# Patient Record
Sex: MALE | Race: WHITE | NOT HISPANIC OR LATINO | Employment: OTHER | URBAN - METROPOLITAN AREA
[De-identification: names, ages, dates, MRNs, and addresses within clinical notes are randomized per-mention and may not be internally consistent; named-entity substitution may affect disease eponyms.]

---

## 2017-02-07 ENCOUNTER — ALLSCRIPTS OFFICE VISIT (OUTPATIENT)
Dept: OTHER | Facility: OTHER | Age: 82
End: 2017-02-07

## 2017-02-07 LAB
FLUAV AG SPEC QL IA: NEGATIVE
INFLUENZA B AG (HISTORICAL): NEGATIVE
S PYO AG THROAT QL: NEGATIVE

## 2017-02-23 ENCOUNTER — GENERIC CONVERSION - ENCOUNTER (OUTPATIENT)
Dept: OTHER | Facility: OTHER | Age: 82
End: 2017-02-23

## 2017-04-27 ENCOUNTER — GENERIC CONVERSION - ENCOUNTER (OUTPATIENT)
Dept: OTHER | Facility: OTHER | Age: 82
End: 2017-04-27

## 2017-04-28 ENCOUNTER — LAB (OUTPATIENT)
Dept: LAB | Facility: HOSPITAL | Age: 82
End: 2017-04-28
Attending: INTERNAL MEDICINE
Payer: MEDICARE

## 2017-04-28 ENCOUNTER — TRANSCRIBE ORDERS (OUTPATIENT)
Dept: LAB | Facility: HOSPITAL | Age: 82
End: 2017-04-28

## 2017-04-28 DIAGNOSIS — R06.02 SHORTNESS OF BREATH: Primary | ICD-10-CM

## 2017-04-28 DIAGNOSIS — R06.02 SHORTNESS OF BREATH: ICD-10-CM

## 2017-04-28 LAB
ALBUMIN SERPL BCP-MCNC: 3.9 G/DL (ref 3.5–5)
ALP SERPL-CCNC: 109 U/L (ref 46–116)
ALT SERPL W P-5'-P-CCNC: 28 U/L (ref 12–78)
ANION GAP SERPL CALCULATED.3IONS-SCNC: 8 MMOL/L (ref 4–13)
AST SERPL W P-5'-P-CCNC: 18 U/L (ref 5–45)
BASOPHILS # BLD AUTO: 0.03 THOUSANDS/ΜL (ref 0–0.1)
BASOPHILS NFR BLD AUTO: 0 % (ref 0–1)
BILIRUB SERPL-MCNC: 0.94 MG/DL (ref 0.2–1)
BUN SERPL-MCNC: 13 MG/DL (ref 5–25)
CALCIUM SERPL-MCNC: 8.8 MG/DL (ref 8.3–10.1)
CHLORIDE SERPL-SCNC: 106 MMOL/L (ref 100–108)
CO2 SERPL-SCNC: 27 MMOL/L (ref 21–32)
CREAT SERPL-MCNC: 0.92 MG/DL (ref 0.6–1.3)
DEPRECATED D DIMER PPP: 305 NG/ML (FEU) (ref 0–424)
EOSINOPHIL # BLD AUTO: 0.2 THOUSAND/ΜL (ref 0–0.61)
EOSINOPHIL NFR BLD AUTO: 3 % (ref 0–6)
ERYTHROCYTE [DISTWIDTH] IN BLOOD BY AUTOMATED COUNT: 14.5 % (ref 11.6–15.1)
ERYTHROCYTE [SEDIMENTATION RATE] IN BLOOD: 12 MM/HOUR (ref 0–10)
GFR SERPL CREATININE-BSD FRML MDRD: >60 ML/MIN/1.73SQ M
GLUCOSE P FAST SERPL-MCNC: 110 MG/DL (ref 65–99)
HCT VFR BLD AUTO: 39.4 % (ref 36.5–49.3)
HGB BLD-MCNC: 13.3 G/DL (ref 12–17)
LYMPHOCYTES # BLD AUTO: 2.17 THOUSANDS/ΜL (ref 0.6–4.47)
LYMPHOCYTES NFR BLD AUTO: 29 % (ref 14–44)
MCH RBC QN AUTO: 29.4 PG (ref 26.8–34.3)
MCHC RBC AUTO-ENTMCNC: 33.8 G/DL (ref 31.4–37.4)
MCV RBC AUTO: 87 FL (ref 82–98)
MONOCYTES # BLD AUTO: 0.62 THOUSAND/ΜL (ref 0.17–1.22)
MONOCYTES NFR BLD AUTO: 8 % (ref 4–12)
NEUTROPHILS # BLD AUTO: 4.36 THOUSANDS/ΜL (ref 1.85–7.62)
NEUTS SEG NFR BLD AUTO: 60 % (ref 43–75)
NRBC BLD AUTO-RTO: 0 /100 WBCS
PLATELET # BLD AUTO: 273 THOUSANDS/UL (ref 149–390)
PMV BLD AUTO: 9.6 FL (ref 8.9–12.7)
POTASSIUM SERPL-SCNC: 3.7 MMOL/L (ref 3.5–5.3)
PROT SERPL-MCNC: 7.3 G/DL (ref 6.4–8.2)
RBC # BLD AUTO: 4.53 MILLION/UL (ref 3.88–5.62)
SODIUM SERPL-SCNC: 141 MMOL/L (ref 136–145)
TSH SERPL DL<=0.05 MIU/L-ACNC: 2.58 UIU/ML (ref 0.36–3.74)
WBC # BLD AUTO: 7.41 THOUSAND/UL (ref 4.31–10.16)

## 2017-04-28 PROCEDURE — 85025 COMPLETE CBC W/AUTO DIFF WBC: CPT

## 2017-04-28 PROCEDURE — 36415 COLL VENOUS BLD VENIPUNCTURE: CPT

## 2017-04-28 PROCEDURE — 85652 RBC SED RATE AUTOMATED: CPT

## 2017-04-28 PROCEDURE — 80053 COMPREHEN METABOLIC PANEL: CPT

## 2017-04-28 PROCEDURE — 85379 FIBRIN DEGRADATION QUANT: CPT

## 2017-04-28 PROCEDURE — 84443 ASSAY THYROID STIM HORMONE: CPT

## 2017-09-28 ENCOUNTER — GENERIC CONVERSION - ENCOUNTER (OUTPATIENT)
Dept: OTHER | Facility: OTHER | Age: 82
End: 2017-09-28

## 2017-10-04 ENCOUNTER — APPOINTMENT (OUTPATIENT)
Dept: PHYSICAL THERAPY | Age: 82
End: 2017-10-04
Payer: MEDICARE

## 2017-10-04 PROCEDURE — 97162 PT EVAL MOD COMPLEX 30 MIN: CPT

## 2017-10-04 PROCEDURE — G8979 MOBILITY GOAL STATUS: HCPCS

## 2017-10-04 PROCEDURE — G8978 MOBILITY CURRENT STATUS: HCPCS

## 2017-10-09 ENCOUNTER — APPOINTMENT (OUTPATIENT)
Dept: PHYSICAL THERAPY | Age: 82
End: 2017-10-09
Payer: MEDICARE

## 2017-10-09 PROCEDURE — 97110 THERAPEUTIC EXERCISES: CPT

## 2017-10-11 ENCOUNTER — APPOINTMENT (OUTPATIENT)
Dept: PHYSICAL THERAPY | Age: 82
End: 2017-10-11
Payer: MEDICARE

## 2017-10-11 PROCEDURE — 97110 THERAPEUTIC EXERCISES: CPT

## 2017-10-17 ENCOUNTER — APPOINTMENT (OUTPATIENT)
Dept: PHYSICAL THERAPY | Age: 82
End: 2017-10-17
Payer: MEDICARE

## 2017-10-17 PROCEDURE — 97112 NEUROMUSCULAR REEDUCATION: CPT

## 2017-10-17 PROCEDURE — 97110 THERAPEUTIC EXERCISES: CPT

## 2017-10-19 ENCOUNTER — APPOINTMENT (OUTPATIENT)
Dept: PHYSICAL THERAPY | Age: 82
End: 2017-10-19
Payer: MEDICARE

## 2017-10-19 PROCEDURE — 97110 THERAPEUTIC EXERCISES: CPT

## 2017-10-24 ENCOUNTER — APPOINTMENT (OUTPATIENT)
Dept: PHYSICAL THERAPY | Age: 82
End: 2017-10-24
Payer: MEDICARE

## 2017-10-24 PROCEDURE — 97110 THERAPEUTIC EXERCISES: CPT

## 2017-10-26 ENCOUNTER — APPOINTMENT (OUTPATIENT)
Dept: PHYSICAL THERAPY | Age: 82
End: 2017-10-26
Payer: MEDICARE

## 2017-10-26 PROCEDURE — 97110 THERAPEUTIC EXERCISES: CPT

## 2017-10-31 ENCOUNTER — APPOINTMENT (OUTPATIENT)
Dept: PHYSICAL THERAPY | Age: 82
End: 2017-10-31
Payer: MEDICARE

## 2017-11-02 ENCOUNTER — ALLSCRIPTS OFFICE VISIT (OUTPATIENT)
Dept: OTHER | Facility: OTHER | Age: 82
End: 2017-11-02

## 2017-11-02 ENCOUNTER — APPOINTMENT (OUTPATIENT)
Dept: PHYSICAL THERAPY | Age: 82
End: 2017-11-02
Payer: MEDICARE

## 2017-11-02 DIAGNOSIS — E78.00 PURE HYPERCHOLESTEROLEMIA: ICD-10-CM

## 2017-11-02 DIAGNOSIS — Z12.5 ENCOUNTER FOR SCREENING FOR MALIGNANT NEOPLASM OF PROSTATE: ICD-10-CM

## 2017-11-03 NOTE — PROGRESS NOTES
Assessment  1  Back pain (724 5) (M54 9)   2  Benign essential hypertension (401 1) (I10)   3  Atrial fibrillation (427 31) (I48 91)   4  Pure hypercholesterolemia (272 0) (E78 00)    Plan  Advance directive discussed with patient    · We recommend that you create an advance directive ; Status:Complete - Retrospective  By Protocol Authorization;   Done: [id number]  Encounter for prostate cancer screening, Pure hypercholesterolemia    · (1) PSA (SCREEN) (Dx V76 44 Screen for Prostate Cancer); Status:Active; Requested  for:02Nov2017;   Pure hypercholesterolemia    · (1) CBC/PLT/DIFF; Status:Active; Requested IGY:[id number];    · (1) COMPREHENSIVE METABOLIC PANEL; Status:Active; Requested BXJ:[id number];    · (1) LIPID PANEL, FASTING; Status:Active; Requested for:02Nov2017;    · (1) TSH; Status:Active; Requested NVC:[id number];   Screening for genitourinary condition    · *VB - Urinary Incontinence Screen (Dx Z13 89 Screen for UI); Status:Complete -  Retrospective By Protocol Authorization;   Done: 70ZQO6570 10:18AM    Discussion/Summary    Back pain: Looks like lumbar strain, can try heating pad, stretching, and continue with physical therapy  No tenderness over spine or sacroiliac joint  Controlled, continue medications  Rate is controlled, continue meds  fasting glucose: Watch diet, and will check hemoglobin A1c  Continue statin, and will check lipid panel  Avoid saturated fats and move towards a more plant based diet  Chief Complaint  The patient presents today with back pain      History of Present Illness  HPI: Back pain: pt has been doing physical therapy and developed more back pain  Pain located mostly left lower back, no radiation down legs, no rash in area  No change in urine or bowels  Pain does not wake him up at night, no fevers chills sweats  No bleeding problems, no palpitations  patient reports compliance with med(s) and no adverse side effects   No lightheadedness, no chest pain, no shortness of breath, no dry cough  patient tolerating med and denies any significant myalgias  fasting glucose: Patient has been trying did decrease sweets  Review of Systems    Constitutional: no fever,-- no chills-- and-- not feeling tired  Cardiovascular: the heart rate was not slow,-- no chest pain,-- the heart rate was not fast,-- no palpitations-- and-- no lower extremity edema  Respiratory: no shortness of breath,-- no cough,-- no wheezing-- and-- no shortness of breath during exertion  Gastrointestinal: no constipation-- and-- no diarrhea  Genitourinary: no dysuria  Musculoskeletal: arthralgias, but-- as noted in HPI  Integumentary: no rashes  Other Symptoms: No anxiety or depression  Active Problems  1  Acute upper respiratory infection (465 9) (J06 9)   2  Advance directive discussed with patient (V65 49) (Z71 89)   3  Atrial fibrillation (427 31) (I48 91)   4  Back pain (724 5) (M54 9)   5  Benign essential hypertension (401 1) (I10)   6  Esophageal reflux (530 81) (K21 9)   7  Pure hypercholesterolemia (272 0) (E78 00)   8  Screening for genitourinary condition (V81 6) (Z13 89)    Social History   ·    · Never smoked   · Social alcohol use (Z78 9)  The social history was reviewed and updated today  Current Meds   1  Azithromycin 250 MG Oral Tablet; TAKE 2 TABLETS ON DAY 1 THEN TAKE 1 TABLET A   DAY FOR 4 DAYS; Therapy: 41NOC8431 to (Last Rx:09Jun2017)  Requested for: 62YWM5664 Ordered   2  Coumadin 5 MG Oral Tablet; TAKE 1 TABLET DAILY AS DIRECTED; Therapy: (Recorded:86Bjq4785) to Recorded   3  Metoprolol Succinate ER 50 MG Oral Tablet Extended Release 24 Hour; Therapy: (Recorded:52Fkc4030) to Recorded   4  Oseltamivir Phosphate 75 MG Oral Capsule; TAKE 1 CAPSULE TWICE DAILY; Therapy: 95DIE3227 to (Evaluate:96Fju5934)  Requested for: 32KUY4625; Last   Rx:[id number] Ordered   5   Telmisartan-HCTZ 40-12 5 MG Oral Tablet; TAKE 1 TABLET DAILY  Requested for:   12Tjj5284; Last Rx: 25AUY2756 Ordered   6  Viagra 50 MG Oral Tablet; TAKE 1 TABLET  As Directed; Therapy: (Recorded:42Crq5572) to Recorded    The medication list was reviewed and updated today  Allergies  1  Penicillins    Vitals   Recorded: 60RBZ4989 10:24AM   Temperature 97 6 F, Oral   Heart Rate 46   Respiration 15   Systolic 596, Sitting   Diastolic 80, Sitting   Weight 253 lb    BMI Calculated 32 93   BSA Calculated 2 39   O2 Saturation 97     Physical Exam    Constitutional   General appearance: No acute distress, well appearing and well nourished  Head and Face   Head and face: Normal     Eyes   Conjunctiva and lids: No erythema, swelling or discharge  Ears, Nose, Mouth, and Throat   External inspection of ears and nose: Normal     Neck   Neck: Supple, symmetric, trachea midline, no masses  Thyroid: Normal, no thyromegaly  Pulmonary   Respiratory effort: No increased work of breathing or signs of respiratory distress  Auscultation of lungs: Clear to auscultation  Cardiovascular   Auscultation of heart: Abnormal   The heart rate was normal  The rhythm was irregularly irregular  Soft systolic murmur  Carotid pulses: 2+ bilaterally  Examination of extremities for edema and/or varicosities: Normal     Abdomen   Abdomen: Non-tender, no masses  Liver and spleen: No hepatomegaly or splenomegaly  Musculoskeletal   Inspection/palpation of joints, bones, and muscles: Normal  -- No tenderness with palpation over spine or sacroiliac joints  Range of motion: Abnormal  -- Straight leg raises are okay, patient has difficulty with the JOSE test due to in flexibility, good range of motion of hips in the hip joints passively  Neurologic   Cortical function: Normal mental status  Psychiatric   Judgment and insight: Normal     Orientation to person, place and time: Normal     Recent and remote memory: Intact      Mood and affect: Normal        Results/Data  *VB - Urinary Incontinence Screen (Dx Z13 89 Screen for UI) 35MNO3620 10:18AM Mariam Dumont     Test Name Result Flag Reference   Urinary Incontinence Assessment [id number]         Signatures   Electronically signed by : DEANDRE Evans ; Nov 2 2017 10:40AM EST                       (Author)

## 2017-11-07 ENCOUNTER — APPOINTMENT (OUTPATIENT)
Dept: PHYSICAL THERAPY | Age: 82
End: 2017-11-07
Payer: MEDICARE

## 2017-11-07 PROCEDURE — G8979 MOBILITY GOAL STATUS: HCPCS

## 2017-11-07 PROCEDURE — G8978 MOBILITY CURRENT STATUS: HCPCS

## 2017-11-07 PROCEDURE — 97110 THERAPEUTIC EXERCISES: CPT

## 2017-11-07 PROCEDURE — 97014 ELECTRIC STIMULATION THERAPY: CPT

## 2017-11-09 ENCOUNTER — APPOINTMENT (OUTPATIENT)
Dept: PHYSICAL THERAPY | Age: 82
End: 2017-11-09
Payer: MEDICARE

## 2017-11-14 ENCOUNTER — APPOINTMENT (OUTPATIENT)
Dept: PHYSICAL THERAPY | Age: 82
End: 2017-11-14
Payer: MEDICARE

## 2017-11-14 PROCEDURE — 97110 THERAPEUTIC EXERCISES: CPT

## 2017-11-16 ENCOUNTER — APPOINTMENT (OUTPATIENT)
Dept: PHYSICAL THERAPY | Age: 82
End: 2017-11-16
Payer: MEDICARE

## 2017-11-16 PROCEDURE — 97110 THERAPEUTIC EXERCISES: CPT

## 2017-11-20 ENCOUNTER — APPOINTMENT (OUTPATIENT)
Dept: PHYSICAL THERAPY | Age: 82
End: 2017-11-20
Payer: MEDICARE

## 2017-11-20 PROCEDURE — 97110 THERAPEUTIC EXERCISES: CPT

## 2017-11-22 ENCOUNTER — APPOINTMENT (OUTPATIENT)
Dept: PHYSICAL THERAPY | Age: 82
End: 2017-11-22
Payer: MEDICARE

## 2017-11-22 PROCEDURE — 97110 THERAPEUTIC EXERCISES: CPT

## 2017-11-28 ENCOUNTER — APPOINTMENT (OUTPATIENT)
Dept: PHYSICAL THERAPY | Age: 82
End: 2017-11-28
Payer: MEDICARE

## 2017-11-28 PROCEDURE — 97110 THERAPEUTIC EXERCISES: CPT

## 2017-11-28 PROCEDURE — 97112 NEUROMUSCULAR REEDUCATION: CPT

## 2017-11-30 ENCOUNTER — APPOINTMENT (OUTPATIENT)
Dept: PHYSICAL THERAPY | Age: 82
End: 2017-11-30
Payer: MEDICARE

## 2017-11-30 PROCEDURE — G8980 MOBILITY D/C STATUS: HCPCS

## 2017-11-30 PROCEDURE — 97112 NEUROMUSCULAR REEDUCATION: CPT

## 2017-11-30 PROCEDURE — G8979 MOBILITY GOAL STATUS: HCPCS

## 2017-11-30 PROCEDURE — 97110 THERAPEUTIC EXERCISES: CPT

## 2018-01-13 VITALS
SYSTOLIC BLOOD PRESSURE: 120 MMHG | DIASTOLIC BLOOD PRESSURE: 80 MMHG | TEMPERATURE: 97.6 F | BODY MASS INDEX: 32.93 KG/M2 | WEIGHT: 253 LBS | OXYGEN SATURATION: 97 % | HEART RATE: 46 BPM | RESPIRATION RATE: 15 BRPM

## 2018-01-14 VITALS
HEIGHT: 74 IN | DIASTOLIC BLOOD PRESSURE: 70 MMHG | OXYGEN SATURATION: 97 % | WEIGHT: 252 LBS | SYSTOLIC BLOOD PRESSURE: 120 MMHG | TEMPERATURE: 99.8 F | RESPIRATION RATE: 15 BRPM | BODY MASS INDEX: 32.34 KG/M2 | HEART RATE: 85 BPM

## 2018-03-18 DIAGNOSIS — I10 HTN (HYPERTENSION), BENIGN: Primary | ICD-10-CM

## 2018-03-18 RX ORDER — TELMISARTAN AND HYDROCHLORTHIAZIDE 40; 12.5 MG/1; MG/1
TABLET ORAL
Qty: 90 TABLET | Refills: 3 | Status: SHIPPED | OUTPATIENT
Start: 2018-03-18 | End: 2018-07-20 | Stop reason: SDUPTHER

## 2018-03-22 ENCOUNTER — OFFICE VISIT (OUTPATIENT)
Dept: INTERNAL MEDICINE CLINIC | Facility: CLINIC | Age: 83
End: 2018-03-22
Payer: MEDICARE

## 2018-03-22 VITALS
HEIGHT: 74 IN | BODY MASS INDEX: 32.7 KG/M2 | OXYGEN SATURATION: 97 % | HEART RATE: 80 BPM | TEMPERATURE: 97.6 F | DIASTOLIC BLOOD PRESSURE: 82 MMHG | SYSTOLIC BLOOD PRESSURE: 122 MMHG | WEIGHT: 254.8 LBS

## 2018-03-22 DIAGNOSIS — I10 BENIGN ESSENTIAL HYPERTENSION: Primary | ICD-10-CM

## 2018-03-22 DIAGNOSIS — I48.91 ATRIAL FIBRILLATION, UNSPECIFIED TYPE (HCC): ICD-10-CM

## 2018-03-22 DIAGNOSIS — E78.00 PURE HYPERCHOLESTEROLEMIA: ICD-10-CM

## 2018-03-22 PROCEDURE — 99214 OFFICE O/P EST MOD 30 MIN: CPT | Performed by: INTERNAL MEDICINE

## 2018-03-22 RX ORDER — PROPRANOLOL HCL 60 MG
60 CAPSULE, EXTENDED RELEASE 24HR ORAL DAILY
Refills: 0 | COMMUNITY
Start: 2018-03-20 | End: 2018-06-19 | Stop reason: DRUGHIGH

## 2018-03-22 RX ORDER — OSELTAMIVIR PHOSPHATE 75 MG/1
1 CAPSULE ORAL 2 TIMES DAILY
COMMUNITY
Start: 2017-02-07 | End: 2018-03-22 | Stop reason: ALTCHOICE

## 2018-03-22 RX ORDER — WARFARIN SODIUM 5 MG/1
1 TABLET ORAL DAILY
Status: ON HOLD | COMMUNITY
End: 2019-04-06 | Stop reason: SDUPTHER

## 2018-03-22 NOTE — PROGRESS NOTES
Assessment/Plan:    Benign essential hypertension   Controlled, continue meds  Pure hypercholesterolemia    Continue to watch diet  Patient had stiffness on a statin  Atrial fibrillation ([place] [address] )   Rate controlled, continue meds and follow-up Cardiology  Diagnoses and all orders for this visit:    Benign essential hypertension    Pure hypercholesterolemia    Atrial fibrillation, unspecified type ([place] [address] )    Other orders  -     warfarin (COUMADIN) 5 mg tablet; Take 1 tablet by mouth daily  -     Discontinue: oseltamivir (TAMIFLU) 75 mg capsule; Take 1 capsule by mouth 2 (two) times a day  -     propranolol (INDERAL LA) 60 mg 24 hr capsule; Take 60 mg by mouth daily          Subjective:      Patient ID: Juany Del Rio is a 82 y o  male  Hypertension: Patient reports compliance with blood pressure meds  Atrial fibrillation:  No palpitations, no bleeding problems  GERD: no current problems with this    Hypercholesterolemia:  Patient had stiffness on a statin, and currently watches his diet for cholesterol  The following portions of the patient's history were reviewed and updated as appropriate: allergies, current medications, past family history, past medical history, past social history, past surgical history and problem list     Review of Systems   Constitutional: Negative for chills, fatigue and fever  HENT: Negative for congestion, nosebleeds, postnasal drip, sore throat and trouble swallowing  Eyes: Negative for pain  Respiratory: Positive for shortness of breath (  With exertion)  Negative for cough, chest tightness and wheezing  Cardiovascular: Negative for chest pain, palpitations and leg swelling  Gastrointestinal: Negative for abdominal pain, constipation, diarrhea, nausea and vomiting  Endocrine: Negative for polydipsia and polyuria  Genitourinary: Negative for dysuria, flank pain and hematuria  Musculoskeletal: Negative for arthralgias     Skin: Negative for rash    Neurological: Negative for dizziness, tremors and headaches  Hematological: Does not bruise/bleed easily  Psychiatric/Behavioral: Negative for confusion and dysphoric mood  The patient is not nervous/anxious  Objective:      /82   Pulse 80   Temp 97 6 °F (36 4 °C) (Oral)   Ht 6' 2" (1 88 m)   Wt 116 kg (254 lb 12 8 oz)   SpO2 97%   BMI 32 71 kg/m²          Physical Exam   Constitutional: He is oriented to person, place, and time  He appears well-developed and well-nourished  No distress  HENT:   Head: Normocephalic and atraumatic  Right Ear: External ear normal    Left Ear: External ear normal    Eyes: Conjunctivae are normal  No scleral icterus  Neck: Normal range of motion  Neck supple  No tracheal deviation present  No thyromegaly present  Cardiovascular: Normal rate  An irregularly irregular rhythm present  Murmur ( Soft) heard  Systolic murmur is present   Pulmonary/Chest: Effort normal and breath sounds normal  No respiratory distress  He has no wheezes  He has no rales  Abdominal: Soft  Bowel sounds are normal  There is no tenderness  There is no rebound and no guarding  Musculoskeletal: He exhibits no edema  Lymphadenopathy:     He has no cervical adenopathy  Neurological: He is alert and oriented to person, place, and time  Psychiatric: He has a normal mood and affect  His behavior is normal  Judgment and thought content normal    Vitals reviewed

## 2018-05-14 ENCOUNTER — APPOINTMENT (OUTPATIENT)
Dept: RADIOLOGY | Age: 83
End: 2018-05-14
Payer: MEDICARE

## 2018-05-14 ENCOUNTER — OFFICE VISIT (OUTPATIENT)
Dept: INTERNAL MEDICINE CLINIC | Facility: CLINIC | Age: 83
End: 2018-05-14
Payer: MEDICARE

## 2018-05-14 VITALS
HEART RATE: 60 BPM | DIASTOLIC BLOOD PRESSURE: 80 MMHG | BODY MASS INDEX: 33.2 KG/M2 | OXYGEN SATURATION: 98 % | SYSTOLIC BLOOD PRESSURE: 130 MMHG | WEIGHT: 258.6 LBS

## 2018-05-14 DIAGNOSIS — M25.551 RIGHT HIP PAIN: ICD-10-CM

## 2018-05-14 DIAGNOSIS — M25.551 RIGHT HIP PAIN: Primary | ICD-10-CM

## 2018-05-14 PROCEDURE — 73502 X-RAY EXAM HIP UNI 2-3 VIEWS: CPT

## 2018-05-14 PROCEDURE — 99213 OFFICE O/P EST LOW 20 MIN: CPT | Performed by: NURSE PRACTITIONER

## 2018-05-14 NOTE — PROGRESS NOTES
Assessment/Plan:     Diagnoses and all orders for this visit:    Right hip pain  Comments:  try tylenol arthritis for pain  warm compresses   Orders:  -     XR hip/pelv 2-3 vws right if performed; Future  -     Ambulatory referral to Physical Therapy; Future    Other orders  -     Multiple Vitamins-Minerals (ICAPS AREDS 2) CAPS; Take by mouth          Subjective:      Patient ID: Kendra Navarro is a 82 y o  male  Here for right hip pain   Started about a week ago   Denies any fall, trauma, or injury  He slept on a different mattress while visiting his daughter   Osiel Evans to chiropractor X 3 which has helped   Osiel Evans for a walk today and then went home and had a sharp pain in his hip after going down the stairs   The pain has not radiated but it did this one time down to his foot   Taking advil with relief   Hurts worse with movement and sitting on a hard chair  He is still able to walk but with pain            The following portions of the patient's history were reviewed and updated as appropriate: allergies, current medications, past family history, past medical history, past social history, past surgical history and problem list     Review of Systems   Constitutional: Negative  Musculoskeletal: Positive for arthralgias  Right hip pain   Neurological: Negative  Objective:      /80 (BP Location: Left arm, Patient Position: Sitting, Cuff Size: Large)   Pulse 60   Wt 117 kg (258 lb 9 6 oz)   SpO2 98%   BMI 33 20 kg/m²          Physical Exam   Constitutional: He is oriented to person, place, and time  He appears well-developed and well-nourished  Cardiovascular: Normal rate and regular rhythm  Pulmonary/Chest: Effort normal and breath sounds normal    Musculoskeletal: He exhibits no edema  Able to perform flexion, extension, and rotation with minimal pain  Moderate pain with lateral bending  Normal gait  Neurological: He is alert and oriented to person, place, and time  Psychiatric: He has a normal mood and affect  His behavior is normal    Vitals reviewed

## 2018-05-14 NOTE — PATIENT INSTRUCTIONS
Try taking tylenol arthritis for pain   Xray right hip to r/o fracture   If negative for a fracture start  Low impact exercises and stretching  And physical therapy

## 2018-05-21 ENCOUNTER — OFFICE VISIT (OUTPATIENT)
Dept: INTERNAL MEDICINE CLINIC | Facility: CLINIC | Age: 83
End: 2018-05-21
Payer: MEDICARE

## 2018-05-21 VITALS — SYSTOLIC BLOOD PRESSURE: 130 MMHG | DIASTOLIC BLOOD PRESSURE: 80 MMHG | TEMPERATURE: 97.9 F | RESPIRATION RATE: 72 BRPM

## 2018-05-21 DIAGNOSIS — J01.00 ACUTE NON-RECURRENT MAXILLARY SINUSITIS: Primary | ICD-10-CM

## 2018-05-21 DIAGNOSIS — I48.91 ATRIAL FIBRILLATION, UNSPECIFIED TYPE (HCC): ICD-10-CM

## 2018-05-21 DIAGNOSIS — R25.1 TREMOR OF LEFT HAND: ICD-10-CM

## 2018-05-21 PROCEDURE — 99214 OFFICE O/P EST MOD 30 MIN: CPT | Performed by: INTERNAL MEDICINE

## 2018-05-21 RX ORDER — AZITHROMYCIN 250 MG/1
250 TABLET, FILM COATED ORAL EVERY 24 HOURS
Qty: 6 TABLET | Refills: 0 | Status: SHIPPED | OUTPATIENT
Start: 2018-05-21 | End: 2018-05-21 | Stop reason: CLARIF

## 2018-05-21 RX ORDER — AZITHROMYCIN 250 MG/1
250 TABLET, FILM COATED ORAL EVERY 24 HOURS
Qty: 6 TABLET | Refills: 0 | Status: SHIPPED | OUTPATIENT
Start: 2018-05-21 | End: 2018-05-26

## 2018-05-21 NOTE — PROGRESS NOTES
Assessment/Plan:    Tremor of left hand   Will refer to Neurology for evaluation    Atrial fibrillation ([facility] [address] )   Rate controlled, continue meds    Acute non-recurrent maxillary sinusitis   Will treat with azithromycin, follow up if not improving       Diagnoses and all orders for this visit:    Acute non-recurrent maxillary sinusitis  -     Discontinue: azithromycin (ZITHROMAX) 250 mg tablet; Take 1 tablet (250 mg total) by mouth every 24 hours for 5 days 2 tabs first day, then one tab daily  -     azithromycin (ZITHROMAX) 250 mg tablet; Take 1 tablet (250 mg total) by mouth every 24 hours for 5 days 2 tabs first day, then one tab daily    Tremor of left hand  -     Ambulatory referral to Neurology; Future    Atrial fibrillation, unspecified type ([facility] [address] )          Subjective:      Patient ID: Angela Jacinto is a 82 y o  male  Two days ago started feeling tired, and not feeling well  Has lots of head congestion, and can't breath through nose  No facial pressure or pain  No fevers chills or sweats  Patient coughing up a lot of green mucus  No chest pressure or chest pain  The following portions of the patient's history were reviewed and updated as appropriate: allergies, current medications, past family history, past medical history, past social history, past surgical history and problem list     Review of Systems   Constitutional: Positive for fatigue  Negative for chills and fever  HENT: Positive for congestion and sinus pressure  Negative for nosebleeds, postnasal drip, sore throat and trouble swallowing  Eyes: Negative for pain  Respiratory: Positive for cough  Negative for chest tightness, shortness of breath and wheezing  Cardiovascular: Negative for chest pain, palpitations and leg swelling  Gastrointestinal: Negative for abdominal pain, constipation, diarrhea, nausea and vomiting  Endocrine: Negative for polydipsia and polyuria     Genitourinary: Negative for dysuria, flank pain and hematuria  Musculoskeletal: Negative for arthralgias  Skin: Negative for rash  Neurological: Positive for tremors  Negative for dizziness and headaches  Hematological: Does not bruise/bleed easily  Psychiatric/Behavioral: Negative for confusion and dysphoric mood  The patient is not nervous/anxious  Objective:      /80   Temp 97 9 °F (36 6 °C)   Resp (!) 72          Physical Exam   Constitutional: He is oriented to person, place, and time  He appears well-developed and well-nourished  No distress  HENT:   Head: Normocephalic and atraumatic  Right Ear: External ear normal    Left Ear: External ear normal    Eyes: Conjunctivae are normal  No scleral icterus  Neck: Normal range of motion  Neck supple  No tracheal deviation present  No thyromegaly present  Cardiovascular: Normal rate and normal heart sounds  An irregularly irregular rhythm present  Pulmonary/Chest: Effort normal and breath sounds normal  No respiratory distress  He has no wheezes  He has no rales  Abdominal: Soft  Bowel sounds are normal  There is no tenderness  There is no rebound and no guarding  Musculoskeletal: He exhibits edema ( mild)  Lymphadenopathy:     He has no cervical adenopathy  Neurological: He is alert and oriented to person, place, and time  Some pill rolling tremor left hand, slightly stooped posture and decreased arm swing (but her reports his back is bothering him), no masked facies, no difficulty getting out of a chair, no micrographia   Psychiatric: He has a normal mood and affect   His behavior is normal  Judgment and thought content normal

## 2018-05-23 ENCOUNTER — EVALUATION (OUTPATIENT)
Dept: PHYSICAL THERAPY | Age: 83
End: 2018-05-23
Payer: MEDICARE

## 2018-05-23 DIAGNOSIS — M25.551 RIGHT HIP PAIN: Primary | ICD-10-CM

## 2018-05-23 PROCEDURE — G8981 BODY POS CURRENT STATUS: HCPCS | Performed by: PHYSICAL THERAPIST

## 2018-05-23 PROCEDURE — 97162 PT EVAL MOD COMPLEX 30 MIN: CPT | Performed by: PHYSICAL THERAPIST

## 2018-05-23 PROCEDURE — G8982 BODY POS GOAL STATUS: HCPCS | Performed by: PHYSICAL THERAPIST

## 2018-05-23 NOTE — PROGRESS NOTES
PT Evaluation     Today's date: 2018  Patient name: Kym Wolfe  : 1935  MRN: [id number]  Referring provider: Louis Jenkins MD  Dx:   Encounter Diagnosis     ICD-10-CM    1  Right hip pain M25 551 Ambulatory referral to Physical Therapy    try tylenol arthritis for pain  warm compresses                   Assessment  Impairments: abnormal or restricted ROM, activity intolerance, impaired balance, impaired physical strength, lacks appropriate home exercise program and pain with function    Assessment details: Patient seen for PT evaluation for R hip pain  Patient presents with the following: decreased LE strength, decreased hip ROM, hamstring tightness, IT band tightness on R, pain with palpation at R lateral hip, mild balance deficits d/t pain at R hip during stance phase of gait, pain with functional activities (ie sit>stand transfers and lying on R side  PT established HEP for gentle stretches to hip and stability/strengthening exercises  Patient is a good candidate for PT  Thank you for your referrals  Understanding of Dx/Px/POC: good   Prognosis: good    Goals  Impairment Goals to be met within 4 weeks  - Decrease pain to 0/10 at rest and with activity  - Improve ROM by 5-10 degrees  - Increase strength to 5/5 throughout LEs  - Patient to be able to sustain balance x 15 sec SLS each     Functional Goals to be met within 4-6 weeks  - Return to Prior Level of Function  - Increase Functional Status Measure to predicted  - Patient will be independent with HEP  - Patient to be able to tolerate ascending/descending stairs with pain <2/10, reciprocally  , B handrails         Plan  Patient would benefit from: skilled PT  Planned modality interventions: cryotherapy and thermotherapy: hydrocollator packs  Planned therapy interventions: abdominal trunk stabilization, manual therapy, balance, patient education, neuromuscular re-education, postural training, strengthening, stretching, therapeutic activities, therapeutic exercise, home exercise program and gait training  Frequency: 2x week  Duration in weeks: 4  Treatment plan discussed with: patient        Subjective Evaluation    History of Present Illness  Date of onset: 2018  Mechanism of injury: Patient reports onset of R hip pain s/p walking down the stairs when his leg/hip gave out and had immediate hip pain  Patient reports that he did have R hip pain ~ 2-3 weeks prior when he was sleeping on a hard mattress visiting family/friends and felt increased R hip pain  Was able to see the chiropractor for a few visits which helped initially; however, re-aggravated hip again when he was walking down the stairs and caught his hip  Patient feels that his hip pain is steadily improving, but would like to come to PT to exercise his hip and legs     Pain  Current pain ratin  At best pain ratin  At worst pain ratin  Location: R hip  Quality: dull ache, sharp and throbbing  Aggravating factors: sitting, standing, walking, stair climbing and lifting  Progression: improved    Social Support  [facility]: [facility]  Lives with: spouse    Employment status: not working    Diagnostic Tests  X-ray: normal  Treatments  Previous treatment: chiropractic  Patient Goals  Patient goals for therapy: decreased pain, improved balance, increased motion and increased strength          Objective     Active Range of Motion     Lumbar   Flexion: 62 degrees   Extension: 8 degrees with pain  Left lateral flexion: 10 degrees   Right lateral flexion: 10 degrees   Left rotation: 15 degrees   Right rotation: 10 degrees   Left Hip   Flexion: 108 degrees   Abduction: WFL    Right Hip   Flexion: 110 degrees   Abduction: WFL  Left Knee   Normal active range of motion    Right Knee   Normal active range of motion    Additional Active Range of Motion Details  Denies numbness/tingling into LEs    Strength/Myotome Testing     Left Hip   Planes of Motion   Flexion: 4  Extension: 4-  Abduction: 4  Adduction: 4    Right Hip   Planes of Motion   Flexion: 4-  Extension: 4-  Abduction: 4  Adduction: 4    Left Knee   Flexion: 4-  Extension: 4    Right Knee   Flexion: 4-  Extension: 4      Flowsheet Rows      Most Recent Value   PT/OT G-Codes   Current Score  52   Projected Score  65   FOTO information reviewed  Yes   Assessment Type  Evaluation   G code set  Changing & Maintaining Body Position   Changing and Maintaining Body Position Current Status ()  CK   Changing and Maintaining Body Position Goal Status ()  CJ          Precautions na    Specialty Daily Treatment Diary       Manual                                Exercise Diary                 Bike                 IT band stretch, strap, R only                bridges        SLR flexion, each        s/l hip abd, R only                Leg press        Knee extension        Hip abd        Ham curls                Biodex        SLS, foam        Tandem, foam                Step ups, FW, 8"        Step downs- progress        Modalities

## 2018-06-05 ENCOUNTER — OFFICE VISIT (OUTPATIENT)
Dept: PHYSICAL THERAPY | Age: 83
End: 2018-06-05
Payer: MEDICARE

## 2018-06-05 DIAGNOSIS — M25.551 RIGHT HIP PAIN: Primary | ICD-10-CM

## 2018-06-05 PROCEDURE — 97112 NEUROMUSCULAR REEDUCATION: CPT

## 2018-06-05 PROCEDURE — 97110 THERAPEUTIC EXERCISES: CPT

## 2018-06-05 NOTE — PROGRESS NOTES
Daily Note     Today's date: 2018  Patient name: Ana Mckay  : 1935  MRN: [id number]  Referring provider: Laurita Garner MD  Dx:   Encounter Diagnosis     ICD-10-CM    1  Right hip pain M25 551                   Subjective: Reports R hip pain persists but decreased since IE  Objective: See treatment diary below    Precautions na     Specialty Daily Treatment Diary         Manual                                                       Exercise Diary                            Bike   10' no tension                         IT band stretch, strap, R only  5x :20                         bridges  20x           SLR flexion, R  20x           s/l hip abd, R only  20x                         Leg press  20x 70#           Knee extension  20x 20#           Hip abd  20x 30#           Ham curls  20x 50#                         SLS, B  5x :30           Tandem, B  5x :30                         Step ups, FW, 8"  20x                                   Modalities                                                            Assessment: Tolerated treatment well  Challenged with balance exercises  Patient would benefit from continued PT  Plan: Continue per plan of care

## 2018-06-06 ENCOUNTER — OFFICE VISIT (OUTPATIENT)
Dept: NEUROLOGY | Facility: CLINIC | Age: 83
End: 2018-06-06
Payer: MEDICARE

## 2018-06-06 VITALS
SYSTOLIC BLOOD PRESSURE: 110 MMHG | DIASTOLIC BLOOD PRESSURE: 80 MMHG | HEART RATE: 71 BPM | WEIGHT: 246 LBS | BODY MASS INDEX: 31.58 KG/M2

## 2018-06-06 DIAGNOSIS — R25.1 TREMOR OF LEFT HAND: ICD-10-CM

## 2018-06-06 PROCEDURE — 99205 OFFICE O/P NEW HI 60 MIN: CPT | Performed by: PSYCHIATRY & NEUROLOGY

## 2018-06-06 NOTE — PROGRESS NOTES
Patient ID: Avel Peace is a 82 y o  male  Assessment/Plan: This is a 83 y/o Male who is here as a new patient for evaluation of tremor in his left hand  He does have a history of atrial fibrillation  Neurologic examination - non-focal, no motor or sensory deficits  He does have pill rolling resting type of tremor  He does have a hypophonic voice and does have some evidence of hypomimia, no bradykinesia noted, no shuffing gait noted  This is likely early parkinsons disease  PLAN:  - does not want any medications at this time  - no additional imaging/workup necessary at this time  Will have follow up with Dr Lulú Ashton at next available visit  Problem List Items Addressed This Visit     Tremor of left hand             Subjective:    HPI    This is a 83 y/o M who is here as a new patient for evaluation of tremor  He has a history of atrial fibrillation, benign essential hypertension  He says he has a tremor in his left hand, and it is present with certain positions  It does not bother him  He says that his PCP noticed but he does not notice it  No trouble with walking, no falls  No constipation, and dizziness  Handwriting is always shaky, but no change  Tremor is not present in his voice or head or anywhere else except his left hand/arm  NO tremor in his legs  No family history of tremor or parkinsons  He is on coumadin for atrial fibrillation  He had many surgeries in his shoulder, and his PCP thinks it could be pinched his arm  It does not bother him  He does not want take any medications for it  He says that his legs are always moving around at night, and he would prefer not to take any medications at this time  The following portions of the patient's history were reviewed and updated as appropriate:   He  has a past medical history of A-fib ([address] ) and Hypertension    He   Patient Active Problem List    Diagnosis Date Noted    Tremor of left hand 05/21/2018    Acute non-recurrent maxillary sinusitis 05/21/2018    Atrial fibrillation ([facility] [address] ) 07/27/2016    Pure hypercholesterolemia 07/27/2016    Benign essential hypertension 07/27/2016     He  has a past surgical history that includes Cardioversion and Rotator cuff repair  His family history includes Coronary artery disease in his father; No Known Problems in his mother  He  reports that he has never smoked  He has never used smokeless tobacco  He reports that he drinks alcohol  His drug history is not on file  Current Outpatient Prescriptions   Medication Sig Dispense Refill    Omega-3 Fatty Acids (FISH OIL OMEGA-3 PO) Take by mouth      propranolol (INDERAL LA) 60 mg 24 hr capsule Take 60 mg by mouth daily  0    warfarin (COUMADIN) 5 mg tablet Take 1 tablet by mouth daily      Multiple Vitamins-Minerals (ICAPS AREDS 2) CAPS Take by mouth      telmisartan-hydrochlorothiazide (MICARDIS HCT) 40-12 5 MG per tablet take 1 tablet by mouth once daily 90 tablet 3     No current facility-administered medications for this visit  Current Outpatient Prescriptions on File Prior to Visit   Medication Sig    propranolol (INDERAL LA) 60 mg 24 hr capsule Take 60 mg by mouth daily    warfarin (COUMADIN) 5 mg tablet Take 1 tablet by mouth daily    Multiple Vitamins-Minerals (ICAPS AREDS 2) CAPS Take by mouth    telmisartan-hydrochlorothiazide (MICARDIS HCT) 40-12 5 MG per tablet take 1 tablet by mouth once daily     No current facility-administered medications on file prior to visit  He is allergic to penicillins and pollen extract            Objective:    Blood pressure 110/80, pulse 71, weight 112 kg (246 lb)  Physical Exam  General: Patient is not in any acute distress  HEENT: normocephalic, atraumatic  Neck: supple  Heart: regular heart rate and rhythm, no murmurs, rubs and or gallops  Chest: Clear to auscultation bilaterally  Abdomen: soft and non-tender     Skin: no lesions  Musculosketal: no bony abnormalities  Neurologic Examination:   Mental status: alert, awake, and following commands  Speech: Speech is fluent without any dysarthria, no aphasia noted  Cranial Nerves: Pupils equal round reactive to light and extraocular movements intact  Visual fields full to confrontation  Facial sensation intact to soft touch in V1, V2 and V3 distributions  Face symmetric  Tongue midline, able to move side to side  Palate elevation symmetric uvula midline, no deviation noted  Shoulder shrug strong  Motor: Strength 5/5 in all 4 extremities  No drift  Normal rapid alternating movements  Normal tone  Pill rolling tremor present in left hand with rest    Sensory: Sensation intact to soft touch, vibration and pinprick in all 4 extremities  Cerebellar: Finger-to-nose intact, normal heel to shin  Reflexes: 2+ in all 4 extremities, downgoing toes bilaterally  Gait: Normal gait, tandem walking, normal arm to swing  Neurological Exam      ROS:    Review of Systems   Constitutional: Negative  Negative for appetite change and fever  HENT: Positive for congestion and sinus pain  Negative for hearing loss, tinnitus, trouble swallowing and voice change  Eyes: Negative  Negative for photophobia and pain  Respiratory: Negative  Negative for shortness of breath  Cardiovascular: Negative  Negative for palpitations  Gastrointestinal: Negative  Negative for nausea and vomiting  Endocrine: Negative  Negative for cold intolerance and heat intolerance  Genitourinary: Negative  Negative for dysuria, frequency and urgency  Musculoskeletal: Positive for back pain and myalgias  Negative for neck pain  Skin: Negative  Negative for rash  Neurological: Positive for tremors  Negative for dizziness, seizures, syncope, facial asymmetry, speech difficulty, weakness, light-headedness, numbness and headaches  Hematological: Negative  Does not bruise/bleed easily  Psychiatric/Behavioral: Negative    Negative for confusion, hallucinations and sleep disturbance

## 2018-06-07 ENCOUNTER — OFFICE VISIT (OUTPATIENT)
Dept: PHYSICAL THERAPY | Age: 83
End: 2018-06-07
Payer: MEDICARE

## 2018-06-07 DIAGNOSIS — M25.551 RIGHT HIP PAIN: Primary | ICD-10-CM

## 2018-06-07 PROCEDURE — 97110 THERAPEUTIC EXERCISES: CPT

## 2018-06-07 PROCEDURE — 97112 NEUROMUSCULAR REEDUCATION: CPT

## 2018-06-07 NOTE — PROGRESS NOTES
Daily Note     Today's date: 2018  Patient name: Chasidy Holcomb  : 1935  MRN: [id number]  Referring provider: Ana Paula Conteh MD  Dx:   Encounter Diagnosis     ICD-10-CM    1  Right hip pain M25 551                   Subjective: Finds balance exercises very challenging  "These make me sweat "      Objective: See treatment diary below    Precautions na     Specialty Daily Treatment Diary         Manual                                                     Exercise Diary                            Bike   10' no tension  10' L1                       IT band stretch, strap, R only  5x :20  5x :20                       bridges  20x  20x         SLR flexion, R  20x  20x         s/l hip abd, R only  20x  20x                       Leg press  20x 70#  20x 70#         Knee extension  20x 20#  20x 20#         Hip abd  20x 30#  20x 30#         Ham curls  20x 50#  20x 50#                       SLS, B  5x :30  5x :30         Tandem, B  5x :30  5x :30                       Step ups  20x 20x                      Modalities                                                             Assessment: Continuing with plan of care as indicated in treatment diary  Presents with moderate difficulty with balance exercises with need for occasional UE support  Tolerated treatment well and adapting well to plan of care  Patient demonstrated fatigue post treatment and would benefit from continued PT   Plan: Continue per plan of care

## 2018-06-12 ENCOUNTER — OFFICE VISIT (OUTPATIENT)
Dept: PHYSICAL THERAPY | Age: 83
End: 2018-06-12
Payer: MEDICARE

## 2018-06-12 DIAGNOSIS — M25.551 RIGHT HIP PAIN: Primary | ICD-10-CM

## 2018-06-12 PROCEDURE — 97112 NEUROMUSCULAR REEDUCATION: CPT

## 2018-06-12 PROCEDURE — 97110 THERAPEUTIC EXERCISES: CPT

## 2018-06-12 NOTE — PROGRESS NOTES
Daily Note     Today's date: 2018  Patient name: Kendra Navarro  : 1935  MRN: [id number]  Referring provider: Isla Cranker, MD  Dx:   Encounter Diagnosis     ICD-10-CM    1  Right hip pain M25 551                   Subjective: R hip pain improved  Feeling more LBP today  Objective: See treatment diary below    Precautions na     Specialty Daily Treatment Diary         Manual                                                   Exercise Diary                            Bike   10' no tension  10' L1 5' L1                      IT band stretch, strap, R only  5x :20  5x :20 nt                      bridges  20x  20x 10x       SLR flexion, R  20x  20x 10x       s/l hip abd, R only  20x  20x 10x                      Leg press  20x 70#  20x 70# 20x 70#        Knee extension  20x 20#  20x 20# 20x 20#        Hip abd  20x 30#  20x 30# 20x 30#        Ham curls  20x 50#  20x 50# 20x 50#                      SLS, B  5x :30  5x :30 3x :30        Tandem, B  5x :30  5x :30 3x :30                      Step ups  20x 20x 20x                     Modalities                                                             Assessment: Patient 30 minutes late for session  Performing exercises with less reps today were appropriate due to time constraints  Tolerated treatment well  Patient demonstrated fatigue post treatment, exhibited good technique with therapeutic exercises and would benefit from continued PT  Plan: Continue per plan of care  Resume full rep count NV

## 2018-06-14 ENCOUNTER — APPOINTMENT (OUTPATIENT)
Dept: PHYSICAL THERAPY | Age: 83
End: 2018-06-14
Payer: MEDICARE

## 2018-06-14 NOTE — PROGRESS NOTES
Daily Note     Today's date: 2018  Patient name: Trinity Grant  : 1935  MRN: [id number]  Referring provider: Vannesa Camacho MD  Dx: No diagnosis found  Subjective: ***      Objective: See treatment diary below      Assessment: Tolerated treatment {Tolerated treatment :8115151992}   Patient {assessment:0289419849}      Plan: {PLAN:0689454360}    Precautions na     Specialty Daily Treatment Diary         Manual                                                   Exercise Diary                            Bike   10' no tension  10' L1 5' L1                      IT band stretch, strap, R only  5x :20  5x :20 nt                      bridges  20x  20x 10x       SLR flexion, R  20x  20x 10x       s/l hip abd, R only  20x  20x 10x                      Leg press  20x 70#  20x 70# 20x 70#        Knee extension  20x 20#  20x 20# 20x 20#        Hip abd  20x 30#  20x 30# 20x 30#        Ham curls  20x 50#  20x 50# 20x 50#                      SLS, B  5x :30  5x :30 3x :30        Tandem, B  5x :30  5x :30 3x :30                      Step ups  20x 20x 20x                                   Modalities

## 2018-06-19 ENCOUNTER — EVALUATION (OUTPATIENT)
Dept: PHYSICAL THERAPY | Age: 83
End: 2018-06-19
Payer: MEDICARE

## 2018-06-19 ENCOUNTER — OFFICE VISIT (OUTPATIENT)
Dept: INTERNAL MEDICINE CLINIC | Facility: CLINIC | Age: 83
End: 2018-06-19
Payer: MEDICARE

## 2018-06-19 VITALS
WEIGHT: 242.4 LBS | HEART RATE: 57 BPM | SYSTOLIC BLOOD PRESSURE: 118 MMHG | TEMPERATURE: 97.5 F | DIASTOLIC BLOOD PRESSURE: 68 MMHG | OXYGEN SATURATION: 98 % | HEIGHT: 74 IN | BODY MASS INDEX: 31.11 KG/M2

## 2018-06-19 DIAGNOSIS — M25.551 RIGHT HIP PAIN: Primary | ICD-10-CM

## 2018-06-19 DIAGNOSIS — R25.1 TREMOR OF LEFT HAND: ICD-10-CM

## 2018-06-19 DIAGNOSIS — R14.0 BLOATING: ICD-10-CM

## 2018-06-19 DIAGNOSIS — I10 BENIGN ESSENTIAL HYPERTENSION: ICD-10-CM

## 2018-06-19 DIAGNOSIS — I48.91 ATRIAL FIBRILLATION, UNSPECIFIED TYPE (HCC): Primary | ICD-10-CM

## 2018-06-19 DIAGNOSIS — E78.00 PURE HYPERCHOLESTEROLEMIA: ICD-10-CM

## 2018-06-19 PROCEDURE — G8982 BODY POS GOAL STATUS: HCPCS | Performed by: PHYSICAL THERAPIST

## 2018-06-19 PROCEDURE — 97110 THERAPEUTIC EXERCISES: CPT | Performed by: PHYSICAL THERAPIST

## 2018-06-19 PROCEDURE — G8981 BODY POS CURRENT STATUS: HCPCS | Performed by: PHYSICAL THERAPIST

## 2018-06-19 PROCEDURE — 99214 OFFICE O/P EST MOD 30 MIN: CPT | Performed by: INTERNAL MEDICINE

## 2018-06-19 RX ORDER — LORATADINE 10 MG/1
10 TABLET ORAL DAILY PRN
COMMUNITY
End: 2021-09-16 | Stop reason: SDUPTHER

## 2018-06-19 RX ORDER — WARFARIN SODIUM 5 MG/1
1 TABLET ORAL
COMMUNITY
End: 2018-06-19 | Stop reason: SDUPTHER

## 2018-06-19 RX ORDER — PROPRANOLOL HYDROCHLORIDE 20 MG/1
20 TABLET ORAL EVERY 12 HOURS SCHEDULED
Qty: 60 TABLET | Refills: 5
Start: 2018-06-19 | End: 2021-09-16 | Stop reason: SDUPTHER

## 2018-06-19 RX ORDER — PROPRANOLOL HYDROCHLORIDE 20 MG/1
TABLET ORAL
Refills: 0 | COMMUNITY
Start: 2018-06-08 | End: 2018-06-19 | Stop reason: SDUPTHER

## 2018-06-19 RX ORDER — TELMISARTAN AND HYDROCHLORTHIAZIDE 40; 12.5 MG/1; MG/1
TABLET ORAL
COMMUNITY
Start: 2018-03-18 | End: 2018-06-19 | Stop reason: SDUPTHER

## 2018-06-19 NOTE — ASSESSMENT & PLAN NOTE
After reviewing the results of the x-ray of the abdomen from the emergency room, I agree with the addition of MiraLax to see if this helps with the symptoms

## 2018-06-19 NOTE — PATIENT INSTRUCTIONS
Problem List Items Addressed This Visit     Atrial fibrillation ([facility] [address] ) - Primary     Rate controlled, continue meds           Relevant Medications    propranolol (INDERAL) 20 mg tablet    Pure hypercholesterolemia      Continue to watch diet         Benign essential hypertension     Controlled, continue meds, stay active, watch diet  Relevant Medications    propranolol (INDERAL) 20 mg tablet    Tremor of left hand      Follow-up Neurology for this  Bloating       After reviewing the results of the x-ray of the abdomen from the emergency room, I agree with the addition of MiraLax to see if this helps with the symptoms

## 2018-06-19 NOTE — PROGRESS NOTES
Daily Note     Today's date: 2018  Patient name: Jese Bhagat  : 1935  MRN: [id number]  Referring provider: Eulogio Centeno MD  Dx:   Encounter Diagnosis     ICD-10-CM    1  Right hip pain M25 551                   Subjective: Hip pain is better      Objective: See treatment diary below       Manual                                                 Exercise Diary                            Bike   10' no tension  10' L1 5' L1   10 minutes                   IT band stretch, strap, R only  5x :20  5x :20 nt                      bridges  20x  20x 10x       SLR flexion, R  20x  20x 10x       s/l hip abd, R only  20x  20x 10x                      Leg press  20x 70#  20x 70# 20x 70#        Knee extension  20x 20#  20x 20# 20x 20#        Hip abd  20x 30#  20x 30# 20x 30#        Ham curls  20x 50#  20x 50# 20x 50#                      SLS, B  5x :30  5x :30 3x :30        Tandem, B  5x :30  5x :30 3x :30                      Step ups  20x 20x 20x                     Modalities                                                               Assessment: Tolerated treatment well  Patient would benefit from continued PT  Reviewed HEP - Piriformis, hip flexor, and quad stretch  Plan: Continue per plan of care

## 2018-06-19 NOTE — PROGRESS NOTES
Assessment/Plan:    Atrial fibrillation (HCC)  Rate controlled, continue meds      Benign essential hypertension  Controlled, continue meds, stay active, watch diet  Pure hypercholesterolemia   Continue to watch diet    Tremor of left hand   Follow-up Neurology for this  Bloating    After reviewing the results of the x-ray of the abdomen from the emergency room, I agree with the addition of MiraLax to see if this helps with the symptoms  Diagnoses and all orders for this visit:    Atrial fibrillation, unspecified type ([address] )  -     propranolol (INDERAL) 20 mg tablet; Take 1 tablet (20 mg total) by mouth every 12 (twelve) hours    Benign essential hypertension    Pure hypercholesterolemia    Tremor of left hand    Bloating    Other orders  -     loratadine (CLARITIN) 10 mg tablet; Take 10 mg by mouth daily  -     Dentifrices (BIOTENE DRY MOUTH CARE DT); Apply to teeth          Subjective:      Patient ID: Estefanía Hu is a 82 y o  male  Patient was in the emergency room June 15th for feeling abdominal bloating, and some difficulty taking deep breaths due to the bloating  He had EKG done, chest x-ray, labs, cardiac enzymes  He was told to get some MiraLax which he did get yet  He has been using prune juice and feels better after bowel movements  In general patient feels fatigued  He had a stress test earlier that day on [address] with Cardiology  The following portions of the patient's history were reviewed and updated as appropriate: allergies, current medications, past family history, past medical history, past social history, past surgical history and problem list     Review of Systems   Constitutional: Positive for fatigue  Negative for chills and fever  HENT: Negative for congestion, nosebleeds, postnasal drip, sore throat and trouble swallowing  Eyes: Negative for pain  Respiratory: Negative for cough, chest tightness, shortness of breath and wheezing      Cardiovascular: Negative for chest pain, palpitations and leg swelling  Gastrointestinal: Positive for abdominal distention  Negative for abdominal pain, constipation, diarrhea, nausea and vomiting  Endocrine: Negative for polydipsia and polyuria  Genitourinary: Negative for dysuria, flank pain and hematuria  Musculoskeletal: Negative for arthralgias  Skin: Negative for rash  Neurological: Negative for dizziness, tremors and headaches  Hematological: Does not bruise/bleed easily  Psychiatric/Behavioral: Negative for confusion and dysphoric mood  The patient is not nervous/anxious  Objective:      /68   Pulse 57   Temp 97 5 °F (36 4 °C)   Ht 6' 2" (1 88 m)   Wt 110 kg (242 lb 6 4 oz)   SpO2 98%   BMI 31 12 kg/m²          Physical Exam   Constitutional: He is oriented to person, place, and time  He appears well-developed and well-nourished  No distress  HENT:   Head: Normocephalic and atraumatic  Right Ear: External ear normal    Left Ear: External ear normal    Eyes: Conjunctivae are normal  No scleral icterus  Neck: Normal range of motion  Neck supple  No tracheal deviation present  No thyromegaly present  Cardiovascular: Normal rate  An irregularly irregular rhythm present  Murmur ( soft systolic) heard  Pulmonary/Chest: Effort normal and breath sounds normal  No respiratory distress  He has no wheezes  He has no rales  Abdominal: Soft  Bowel sounds are normal  There is no tenderness  There is no rebound and no guarding  Musculoskeletal: He exhibits edema ( trace)  Lymphadenopathy:     He has no cervical adenopathy  Neurological: He is alert and oriented to person, place, and time  Slight resting tremor left hand   Psychiatric: He has a normal mood and affect  His behavior is normal  Judgment and thought content normal    Vitals reviewed

## 2018-07-06 ENCOUNTER — TELEPHONE (OUTPATIENT)
Dept: INTERNAL MEDICINE CLINIC | Facility: CLINIC | Age: 83
End: 2018-07-06

## 2018-07-06 DIAGNOSIS — F41.9 ANXIETY: Primary | ICD-10-CM

## 2018-07-06 RX ORDER — ALPRAZOLAM 0.25 MG/1
0.25 TABLET ORAL 2 TIMES DAILY PRN
Qty: 50 TABLET | Refills: 1 | Status: SHIPPED | OUTPATIENT
Start: 2018-07-06 | End: 2021-09-16 | Stop reason: ALTCHOICE

## 2018-07-20 DIAGNOSIS — I10 HTN (HYPERTENSION), BENIGN: ICD-10-CM

## 2018-07-21 RX ORDER — TELMISARTAN AND HYDROCHLORTHIAZIDE 40; 12.5 MG/1; MG/1
1 TABLET ORAL DAILY
Qty: 90 TABLET | Refills: 0 | Status: SHIPPED | OUTPATIENT
Start: 2018-07-21 | End: 2018-07-24 | Stop reason: ALTCHOICE

## 2018-07-24 ENCOUNTER — OFFICE VISIT (OUTPATIENT)
Dept: INTERNAL MEDICINE CLINIC | Facility: CLINIC | Age: 83
End: 2018-07-24
Payer: MEDICARE

## 2018-07-24 VITALS
TEMPERATURE: 97.7 F | WEIGHT: 231 LBS | BODY MASS INDEX: 29.65 KG/M2 | DIASTOLIC BLOOD PRESSURE: 72 MMHG | HEART RATE: 69 BPM | HEIGHT: 74 IN | OXYGEN SATURATION: 98 % | SYSTOLIC BLOOD PRESSURE: 130 MMHG

## 2018-07-24 DIAGNOSIS — R25.1 TREMOR OF LEFT HAND: ICD-10-CM

## 2018-07-24 DIAGNOSIS — I10 BENIGN ESSENTIAL HYPERTENSION: Primary | ICD-10-CM

## 2018-07-24 DIAGNOSIS — I48.19 PERSISTENT ATRIAL FIBRILLATION (HCC): ICD-10-CM

## 2018-07-24 PROCEDURE — 99214 OFFICE O/P EST MOD 30 MIN: CPT | Performed by: INTERNAL MEDICINE

## 2018-07-24 RX ORDER — TELMISARTAN 40 MG/1
40 TABLET ORAL DAILY
Qty: 90 TABLET | Refills: 3 | Status: SHIPPED | OUTPATIENT
Start: 2018-07-24 | End: 2019-08-13 | Stop reason: SDUPTHER

## 2018-07-24 RX ORDER — ISOSORBIDE MONONITRATE 30 MG/1
30 TABLET, EXTENDED RELEASE ORAL
Refills: 0 | COMMUNITY
Start: 2018-07-23 | End: 2018-07-24 | Stop reason: ALTCHOICE

## 2018-07-24 NOTE — PATIENT INSTRUCTIONS
Problem List Items Addressed This Visit     Persistent atrial fibrillation ([address] )      Rate controlled, continue medications and follow-up Cardiology         Benign essential hypertension - Primary    Relevant Medications    telmisartan (MICARDIS) 40 mg tablet    Tremor of left hand       Patient following up with Neurology for probable early Parkinson's

## 2018-07-24 NOTE — PROGRESS NOTES
Assessment/Plan:    Tremor of left hand    Patient following up with Neurology for probable early Parkinson's    Persistent atrial fibrillation ([address] )   Rate controlled, continue medications and follow-up Cardiology       Diagnoses and all orders for this visit:    Benign essential hypertension  -     telmisartan (MICARDIS) 40 mg tablet; Take 1 tablet (40 mg total) by mouth daily    Tremor of left hand    Persistent atrial fibrillation (HCC)    Other orders  -     Discontinue: isosorbide mononitrate (IMDUR) 30 mg 24 hr tablet; Take 30 mg by mouth daily at bedtime          Subjective:      Patient ID: Ana Mckay is a 82 y o  male  Interval history:  Patient was seen by Neurosurgery after an emergency room visit  He had a small subdural hematoma and concussion  There is discussion that it may have just been a artifact on the CT scan  Patient not having nausea, vomiting, or lethargy  Atrial fibrillation: no palpitations    HTN: no cardiopulm complaints, no CP, no SOB, no lightheadedness     anxiety:  Patient has been feeling nervous at times, and uses alprazolam for this when his symptoms occur  The following portions of the patient's history were reviewed and updated as appropriate: allergies, current medications, past family history, past medical history, past social history, past surgical history and problem list     Review of Systems   Constitutional: Positive for fatigue  Negative for chills and fever  HENT: Negative for congestion, nosebleeds, postnasal drip, sore throat and trouble swallowing  Eyes: Negative for pain  Respiratory: Negative for cough, chest tightness, shortness of breath and wheezing  Cardiovascular: Negative for chest pain, palpitations and leg swelling  Gastrointestinal: Negative for abdominal pain, constipation, diarrhea, nausea and vomiting  Endocrine: Negative for polydipsia and polyuria  Genitourinary: Negative for dysuria, flank pain and hematuria  Musculoskeletal: Negative for arthralgias  Skin: Negative for rash  Neurological: Positive for tremors  Negative for dizziness and headaches  Hematological: Does not bruise/bleed easily  Psychiatric/Behavioral: Negative for confusion and dysphoric mood  The patient is not nervous/anxious  Objective:      /72   Pulse 69   Temp 97 7 °F (36 5 °C)   Ht 6' 2" (1 88 m)   Wt 105 kg (231 lb)   SpO2 98%   BMI 29 66 kg/m²          Physical Exam   Constitutional: He is oriented to person, place, and time  He appears well-developed and well-nourished  No distress  HENT:   Head: Normocephalic and atraumatic  Right Ear: External ear normal    Left Ear: External ear normal    Eyes: Conjunctivae are normal  No scleral icterus  Neck: Normal range of motion  Neck supple  No tracheal deviation present  No thyromegaly present  Cardiovascular: Normal rate and normal heart sounds  An irregularly irregular rhythm present  No murmur heard  Pulmonary/Chest: Effort normal and breath sounds normal  No respiratory distress  He has no wheezes  He has no rales  Abdominal: Soft  Bowel sounds are normal  There is no tenderness  There is no rebound and no guarding  Musculoskeletal: He exhibits no edema  Lymphadenopathy:     He has no cervical adenopathy  Neurological: He is alert and oriented to person, place, and time  Slight pill rolling tremor left hand   Psychiatric: He has a normal mood and affect  His behavior is normal  Judgment and thought content normal    Vitals reviewed

## 2018-07-31 ENCOUNTER — OFFICE VISIT (OUTPATIENT)
Dept: INTERNAL MEDICINE CLINIC | Facility: CLINIC | Age: 83
End: 2018-07-31
Payer: MEDICARE

## 2018-07-31 VITALS
SYSTOLIC BLOOD PRESSURE: 128 MMHG | HEIGHT: 74 IN | OXYGEN SATURATION: 97 % | HEART RATE: 57 BPM | WEIGHT: 234.6 LBS | BODY MASS INDEX: 30.11 KG/M2 | DIASTOLIC BLOOD PRESSURE: 76 MMHG

## 2018-07-31 DIAGNOSIS — F41.9 ANXIETY: Primary | ICD-10-CM

## 2018-07-31 DIAGNOSIS — R25.1 TREMOR OF LEFT HAND: ICD-10-CM

## 2018-07-31 DIAGNOSIS — I48.19 PERSISTENT ATRIAL FIBRILLATION (HCC): ICD-10-CM

## 2018-07-31 PROCEDURE — 99213 OFFICE O/P EST LOW 20 MIN: CPT | Performed by: INTERNAL MEDICINE

## 2018-07-31 RX ORDER — ESCITALOPRAM OXALATE 10 MG/1
10 TABLET ORAL DAILY
Qty: 30 TABLET | Refills: 5 | Status: SHIPPED | OUTPATIENT
Start: 2018-07-31 | End: 2019-10-29 | Stop reason: SDUPTHER

## 2018-07-31 NOTE — PATIENT INSTRUCTIONS
Problem List Items Addressed This Visit     Persistent atrial fibrillation ([address] )     Rate controlled, continue meds         Tremor of left hand     Probable early Parkinson's  Anxiety - Primary     Discussed that alprazolam can be a central nervous system depressant, and he may do better with a daily SSRI like escitalopram   He can still keep alprazolam to use if having a panic attack or excessive anxiety  He should notice some improvement in a week or so           Relevant Medications    escitalopram (LEXAPRO) 10 mg tablet

## 2018-07-31 NOTE — PROGRESS NOTES
Assessment/Plan:    Anxiety  Discussed that alprazolam can be a central nervous system depressant, and he may do better with a daily SSRI like escitalopram   He can still keep alprazolam to use if having a panic attack or excessive anxiety  He should notice some improvement in a week or so  Tremor of left hand  Probable early Parkinson's  Persistent atrial fibrillation ([address] )  Rate controlled, continue meds       Diagnoses and all orders for this visit:    Anxiety  -     escitalopram (LEXAPRO) 10 mg tablet; Take 1 tablet (10 mg total) by mouth daily    Tremor of left hand    Persistent atrial fibrillation (HCC)          Subjective:      Patient ID: Trinity Grant is a 82 y o  male  Anxiety: pt has been using alprazolam occasionally, and he gets very tired after taking it  He has even tried a half a pill  Atrial Fibrillation:  No palpitations  The following portions of the patient's history were reviewed and updated as appropriate: allergies, current medications, past family history, past medical history, past social history, past surgical history and problem list     Review of Systems   Constitutional: Positive for fatigue  Respiratory: Positive for shortness of breath (with exertion)  Negative for chest tightness  Cardiovascular: Negative for chest pain and palpitations  Neurological: Positive for tremors  Psychiatric/Behavioral: Positive for dysphoric mood  The patient is nervous/anxious  Objective:      /76   Pulse 57   Ht 6' 2" (1 88 m)   Wt 106 kg (234 lb 9 6 oz)   SpO2 97%   BMI 30 12 kg/m²          Physical Exam   Constitutional: He is oriented to person, place, and time  He appears well-developed and well-nourished  Cardiovascular: Normal rate and normal heart sounds  An irregularly irregular rhythm present  No murmur heard  Neurological: He is alert and oriented to person, place, and time  Psychiatric: He has a normal mood and affect   His behavior is normal  Judgment and thought content normal

## 2018-07-31 NOTE — ASSESSMENT & PLAN NOTE
Discussed that alprazolam can be a central nervous system depressant, and he may do better with a daily SSRI like escitalopram   He can still keep alprazolam to use if having a panic attack or excessive anxiety  He should notice some improvement in a week or so

## 2018-09-27 NOTE — PROGRESS NOTES
DEPARTMENT OF NEUROLOGICAL SCIENCES  [address] DISORDERS [facility]         NEW PATIENT EVALUATION NOTE    Patient: Gabby Elkins [place] Record Number: # [id number]  YOB: 1935  Date of visit: 2018    Referring provider: Jinny Hebert MD    The patient was not accompanied today  History was obtained from patient    HISTORY OF PRESENT ILLNESS:  Mr Jo Olea is a 82 y o  right handed male who has been referred to the [facility] for evaluation of tremor and possible Parkinsons       He did have three surgeries on the left shoulder before       Date of First Symptom:  Mid , in May-   First Symptom: left hand tremor  Side More Affected: left  Main Problems:  1  Left hand tremor  He says he first noticed when he was seeing Dr Christie Rodriguez, then saw Dr Lewis Louder  No medications or treatments started  He does not notice it when he's driving, but worse when he resting arm in position on a chair or when fatigued  Noticing it more at night  Shaking is not present and does not bother him during activities or tasks       No known family history of tremor or diagnosed PD  Mother  in childbirth       Hyposmia: denies  Fatigue: denies  RBD (REM semi-purposeful body movements): he is told he is very active during night by his wife, but no specifically acting out dreams  Seldom dreams  Gait Disturbance:  He feels that he has been leaning to his right side more  Onset:  Uncertain, within the last few months after reading more about PD  Falls: None  Fractures: None  Freezing of Gait: none  Handwriting: "worse" but "junkier" and he is not sure if smaller  Speech: his wife told him he has been talking more quieter (wife is also hard of hearing)  Facial Expression: not told or noticed any change in facial expression     Dementia:  None  Hallucination: None  Constipation: yes  Skin Cancer History / Last Skin Exam: excision of lesion on right hand  2018, told it was cancerous but uncertain type by Dr Carolyn Dutta  Hypovitaminosis D: denies  Hypovitaminosis B12:  denies  Exercise Therapy: "always out and about" from the house       Family History: Number of First Degree Relatives With Parkinsonism:   None     Neuro Imaging: None     Parkinson's Medications:   Effect of levodopa:  Never  Effect of pramipexole:   Never  Effect of ropinirole:       Never  Effect of other agonist:  Never  Effect of rasagiline:   Never  Effect of other medication:  Started on propranolol 60mg qDay, then reduced now to 20mg BID  No noticed benefit to tremor and he feels more "uptight" than before       Parkinson's Procedures:   DBS: none    REVIEW OF PAST MEDICAL, SOCIAL AND FAMILY HISTORY:  This is the list of problems as per our Medical Records:    Patient Active Problem List    Diagnosis Date Noted    Anxiety 07/31/2018    Dyspnea on exertion 06/22/2018    Bloating 06/19/2018    Tremor of left hand 05/21/2018    Acute non-recurrent maxillary sinusitis 05/21/2018    Persistent atrial fibrillation ([address] ) 07/27/2016    Pure hypercholesterolemia 07/27/2016    Benign essential hypertension 07/27/2016    Esophageal reflux 07/27/2016       Past Medical History:   Diagnosis Date    A-fib ([facility] [address]     Hypertension         Past Surgical History:   Procedure Laterality Date    CARDIOVERSION      ATRIAL    ROTATOR CUFF REPAIR          Allergies   Allergen Reactions    Penicillins Hives    Pollen Extract Other (See Comments)               Outpatient Encounter Prescriptions as of 9/28/2018   Medication Sig Dispense Refill    Apoaequorin (PREVAGEN PO) Take 1 tablet by mouth daily      Coenzyme Q10 (COQ-10 PO) Take 1 tablet by mouth daily      loratadine (CLARITIN) 10 mg tablet Take 10 mg by mouth daily as needed        Multiple Vitamins-Minerals (ICAPS AREDS 2) CAPS Take 1 capsule by mouth 2 (two) times a day        propranolol (INDERAL) 20 mg tablet Take 1 tablet (20 mg total) by mouth every 12 (twelve) hours 60 tablet 5    Simethicone (GAS-X PO) Take 1 tablet by mouth daily as needed      telmisartan (MICARDIS) 40 mg tablet Take 1 tablet (40 mg total) by mouth daily 90 tablet 3    warfarin (COUMADIN) 5 mg tablet Take 1 tablet by mouth daily   25MG TAKE 1 TABLET 6 DAY A WEEK AND THEN 1 TABLET 1 DAY A WEEK       ALPRAZolam (XANAX) 0 25 mg tablet Take 1 tablet (0 25 mg total) by mouth 2 (two) times a day as needed for anxiety (Patient not taking: Reported on 9/28/2018 ) 50 tablet 1    Dentifrices (BIOTENE DRY MOUTH CARE DT) Apply to teeth      escitalopram (LEXAPRO) 10 mg tablet Take 1 tablet (10 mg total) by mouth daily (Patient not taking: Reported on 9/28/2018 ) 30 tablet 5     No facility-administered encounter medications on file as of 9/28/2018  Social History   Substance Use Topics    Smoking status: Former Smoker    Smokeless tobacco: Never Used      Comment: Quit 35-40 years ago    Alcohol use Yes      Comment: Rare, previously moderate drinker cut down 2016  Family History   Problem Relation Age of Onset    No Known Problems Mother     Coronary artery disease Father         REVIEW OF SYSTEMS:  The patient has entered data on an intake form regarding present illness, past medical and surgical history, medications, allergies, family and social history, and a full review of 14 systems  I have reviewed this form with the patient, and all the relevant information has been included on this note  The full review of systems was negative except as stated in HPI and below  Review of Systems   Constitutional: Negative   Negative for appetite change and fever  HENT: Negative   Negative for hearing loss, tinnitus, trouble swallowing and voice change     Eyes: Negative   Negative for photophobia and pain  Respiratory: Negative   Negative for shortness of breath     Cardiovascular: Negative   Negative for palpitations     Gastrointestinal: Negative   Negative for nausea and vomiting  Endocrine: Negative   Negative for cold intolerance and heat intolerance  Genitourinary: Negative   Negative for dysuria, frequency and urgency  Musculoskeletal: Negative   Negative for myalgias and neck pain  Skin: Negative   Negative for rash  Neurological: Positive for tremors (Left hand)  Negative for dizziness, seizures, syncope, facial asymmetry, speech difficulty, weakness, light-headedness, numbness and headaches  Hematological: Negative   Does not bruise/bleed easily  Psychiatric/Behavioral: Negative   Negative for confusion, hallucinations and sleep disturbance  PHYSICAL EXAMINATION:     Vital signs:  /78 (BP Location: Left arm, Patient Position: Sitting, Cuff Size: Adult)   Pulse (!) 52   Ht 6' 2" (1 88 m)   Wt 107 kg (236 lb 8 oz)   BMI 30 36 kg/m²      General:  Well-appearing, well nourished, pleasant patient in no acute distress  Mood and Fund of Knowledge are appropriate  Head:  Normocephalic, atraumatic  Oropharynx and conjunctiva are clear  Speech  No hypophonia or bradylalia  No scanning speech  Language: Comprehension intact  Neck:  Supple, strong 5/5 forward flexion and retroflexion  Extremities: Range of motion is normal       Cognitive and Mental Exam:  The patient is alert, oriented to self, location, date and situation  Memory is normal to provide accurate details of health history     Cranial Nerves:  Funduscopic examination reveals no papilledema  Direct and consensual light reflexes were normal  No afferent pupillary defect  Visual fields are full to confrontation  Extraocular movements were full, with normal pursuit and saccades  Pupils were equal, reactive to light symmetrically  Facial sensation to light touch was intact  Face is symmetric with normal strength    Hearing was assessed using the Calibrated Finger Rub Auditory Screening Test (CALFRAST) and was not abnormal (Better than CALFRAST-Strong-70)      Palate is up going bilaterally and symmetrically  Neck muscles are strong  Tongue protrusion is at midline with normal movements  No dysarthria       Motor:     Dystonia: none  Dyskinesia: none  Myoclonus: none    Chorea: none  Tics: none     Spiral Drawing:  Normal circular drawing without tremor, some overlapping loops  Handwriting:  3rd signature slightly smaller than 1st  Dot-to-Dot:  Performed without issue       Hand over fist:  Left arm catch     UPDRS III:   Speech: 1  Facial Expression: 2  Tremor (Head):  0  Action Tremor of Hands (R): 0  Action Tremor of Hands (L): 0  Resting Tremor of Hands (R): 0  Resting Tremor of Hands (L): 2 (with thumb flex-ext, and wrist flexion-extension)  Finger tapping (R): 2  Finger tapping (L): 2  Hand clenching (R): 1  Hand clenching (L): 2  ALEXANDER Hand (R): 2   ALEXANDER Hand (L): 2  Leg Agility (R):  1  Leg Agility (L):  3  Rigidity (Neck): 2  Rigidity (RUE): 2  Rigidity (LUE): 2  Rigidity (RLE): 1  Rigidity (LLE): 1  Arising From Chair: 0  Posture: 1  Gait: 1  Postural Stability: 1  Body Bradykinesia: 3 (reduced left arm swing, re-emergent rest tremor on left hand while counting backwards and walking)  -------------------------------------------------------------------------------------  34     Muscle Strength Right Left   Muscle Strength Right Left   Deltoid 5/5 5/5   Hip Adductors 5/5 5/5   Biceps 5/5 5/5   Hip Abductors 5/5 5/5   Triceps 5/5 5/5   Knee Extensors 5/5 5/5   Wrist Extensors 5/5 5/5   Knee Flexors 5/5 5/5   Wrist Flexors 5/5 5/5   Ankle Extensors 5/5 5/5    5/5 5/5   Ankle Flexors 5/5 5/5   Finger Abductors 5/5 5/5           Hip Flexors 5/5 5/5       Hip Extensors 5/5 5/5          Sensory  Normal sensation to light touch and proprioception     Coordination:  Intact rapid alternating movements, tandem gait intact     Gait:  Slow to get up out of chair, but gait is smooth without freezing, normal turns, walks on heels and toes, slight forward bending of trunk, +presence of reemergent rest tremor of left hand  Pull test of 1       Reflexes:    Right Left   Biceps 2/4 2/4   Brachioradialis 2/4 2/4   Triceps 2/4 2/4   Knee 2/4 2/4   Ankle 2/4 2/4      Plantar cutaneous reflex:  Right: flexor  Left: flexor      REVIEW OF ANCILLARY TESTS:   No results found for this or any previous visit  Diagnoses for this encounter:  1  Parkinson disease ([facility] [address] )  CBC and differential    Comprehensive metabolic panel    Vitamin D 25 hydroxy    Vitamin B12    Vitamin E    Vitamin B6    Folate    TSH, 3rd generation with Free T4 reflex    Ceruloplasmin    COPPER, URINE, 24 HOUR    Ambulatory referral to Speech Therapy    Ambulatory referral to Physical Therapy    MRI brain without contrast   2  Tremor of left hand         ASSESSMENT     Impression of this gentleman with uncertain history of left hand tremors, resting type in setting of findings of bradykinesia, rigidity  Mild symptoms and no reports of cognitive issues  Stage II parkinsonism, 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 Parkinson's disease due to asymmetrical presentation, but early stage and cannot yet rule out DLB or PD plus  No obvious red flags and atypicality  No hx of exposure to Reglan or dopamine blocking agents  No signs of dystonia       Discussed with him in detail about the diagnosis and expected progression and treatments available  All questions answered  He mentioned he might seek a second opinion and I would have no problems with this  PLAN     ? We will perform the blood test panel for ruling out other forms of parkinsonism, including for any vitamin deficiencies commonly associated  ? Referral for Physical Therapy and Speech Therapy for [place] and [place]  Encouraged home exercises as well  ? Order MRI brain wo contrast to establish baseline  ? Discussed about low dose trial of levodopa, but he was not yet bothered by symptoms enough to elect starting it  He will call us if he is interested  ?  Explained to patient about a higher risk of melanoma in PD patients than normal populace, hence importance of regular skin checks yearly  ? Return to clinic in 3 months (Dec 2018)  ? The patient has been instructed to call us about any new neurological problems or medication side effects      Mary Lou Nation MD  Movement Disorders  Department of Neurological [address] [facility]     A total of 60 minutes were spent face-to-face with this patient, of which at least 50% was spent on counseling and coordination of care  We discussed the natural history of the patient's condition, differential diagnosis, level of diagnostic certainty, treatment alternatives and their side effects and possible complications

## 2018-09-28 ENCOUNTER — OFFICE VISIT (OUTPATIENT)
Dept: NEUROLOGY | Facility: CLINIC | Age: 83
End: 2018-09-28
Payer: MEDICARE

## 2018-09-28 VITALS
SYSTOLIC BLOOD PRESSURE: 126 MMHG | DIASTOLIC BLOOD PRESSURE: 78 MMHG | HEIGHT: 74 IN | WEIGHT: 236.5 LBS | HEART RATE: 52 BPM | BODY MASS INDEX: 30.35 KG/M2

## 2018-09-28 DIAGNOSIS — G20 PARKINSON DISEASE (HCC): Primary | ICD-10-CM

## 2018-09-28 DIAGNOSIS — R25.1 TREMOR OF LEFT HAND: ICD-10-CM

## 2018-09-28 PROBLEM — R06.00 DYSPNEA ON EXERTION: Status: ACTIVE | Noted: 2018-06-22

## 2018-09-28 PROBLEM — R06.09 DYSPNEA ON EXERTION: Status: ACTIVE | Noted: 2018-06-22

## 2018-09-28 PROCEDURE — 99214 OFFICE O/P EST MOD 30 MIN: CPT | Performed by: PSYCHIATRY & NEUROLOGY

## 2018-09-28 NOTE — LETTER
2018     R[address] Benavides [address]  51   [address] [address]    Patient: Clara Dumont   YOB: 1935   Date of Visit: 2018       Dear Dr Denise Page: Thank you for referring Jaz Okeefe to me for evaluation  Below are my notes for this consultation  If you have questions, please do not hesitate to call me  I look forward to following your patient along with you  Sincerely,        Cesar Mckeon MD        CC: No Recipients  Cesar Mckeon MD  2018  2:55 PM  Incomplete  DEPARTMENT OF NEUROLOGICAL SCIENCES  [address]         NEW PATIENT EVALUATION NOTE    Patient: [address] [place] [address] Record Number: # [id number]  YOB: 1935  Date of visit: 2018    Referring provider: Bronwyn Berry MD    The patient was not accompanied today  History was obtained from patient    HISTORY OF PRESENT ILLNESS:  Mr Willis Mota is a 82 y o  right handed male who has been referred to the [address] [place] [address] for evaluation of tremor and possible Parkinsons  He did have three surgeries on the left shoulder before  Date of First Symptom:  Mid , in May-   First Symptom: left hand tremor  Side More Affected: left  Main Problems:  1  Left hand tremor  He says he first noticed when he was seeing Dr Denise Page, then saw Dr Bobby Almonte  No medications or treatments started  He does not notice it when he's driving, but worse when he resting arm in position on a chair or when fatigued  Noticing it more at night  Shaking is not present and does not bother him during activities or tasks  No known family history of tremor or diagnosed PD  Mother  in childbirth  Hyposmia: denies  Fatigue: denies  RBD (REM semi-purposeful body movements): he is told he is very active during night by his wife, but no specifically acting out dreams  Seldom dreams     Gait Disturbance:  He feels that he has been leaning to his right side more  Onset:  Uncertain, within the last few months after reading more about PD  Falls: None  Fractures: None  Freezing of Gait: none  Handwriting: "worse" but "junkier" and he is not sure if smaller  Speech: his wife told him he has been talking more quieter (wife is also hard of hearing)  Facial Expression: not told or noticed any change in facial expression  Dementia:  None  Hallucination: None  Constipation: yes  Skin Cancer History / Last Skin Exam: excision of lesion on right hand Sept 25, 2018, told it was cancerous but uncertain type by Dr Matthew Bateman  Hypovitaminosis D: denies  Hypovitaminosis B12:  denies  Exercise Therapy: "always out and about" from the house  Family History: Number of First Degree Relatives With Parkinsonism:   None    Neuro Imaging: None    Parkinson's Medications:   Effect of levodopa:  Never  Effect of pramipexole:   Never  Effect of ropinirole:  Never  Effect of other agonist:  Never  Effect of rasagiline:   Never  Effect of other medication:  Started on propranolol 60mg qDay, then reduced now to 20mg BID  No noticed benefit to tremor and he feels more "uptight" than before  Parkinson's Procedures:   DBS: none    REVIEW OF PAST MEDICAL, SOCIAL AND FAMILY HISTORY:  This is the list of problems as per our Medical Records:    Patient Active Problem List    Diagnosis Date Noted    Anxiety 07/31/2018    Bloating 06/19/2018    Tremor of left hand 05/21/2018    Acute non-recurrent maxillary sinusitis 05/21/2018    Persistent atrial fibrillation ([address] ) 07/27/2016    Pure hypercholesterolemia 07/27/2016    Benign essential hypertension 07/27/2016       Past Medical History:   Diagnosis Date    A-fib [facility] [address] )     Hypertension         Past Surgical History:   Procedure Laterality Date    CARDIOVERSION      ATRIAL    ROTATOR CUFF REPAIR          Allergies   Allergen Reactions    Penicillins Hives    Pollen Extract Other (See Comments)               Outpatient Encounter Prescriptions as of 9/28/2018   Medication Sig Dispense Refill    Apoaequorin (PREVAGEN PO) Take 1 tablet by mouth daily      Coenzyme Q10 (COQ-10 PO) Take 1 tablet by mouth daily      loratadine (CLARITIN) 10 mg tablet Take 10 mg by mouth daily as needed        Multiple Vitamins-Minerals (ICAPS AREDS 2) CAPS Take 1 capsule by mouth 2 (two) times a day        propranolol (INDERAL) 20 mg tablet Take 1 tablet (20 mg total) by mouth every 12 (twelve) hours 60 tablet 5    Simethicone (GAS-X PO) Take 1 tablet by mouth daily as needed      telmisartan (MICARDIS) 40 mg tablet Take 1 tablet (40 mg total) by mouth daily 90 tablet 3    warfarin (COUMADIN) 5 mg tablet Take 1 tablet by mouth daily   25MG TAKE 1 TABLET 6 DAY A WEEK AND THEN 1 TABLET 1 DAY A WEEK       ALPRAZolam (XANAX) 0 25 mg tablet Take 1 tablet (0 25 mg total) by mouth 2 (two) times a day as needed for anxiety (Patient not taking: Reported on 9/28/2018 ) 50 tablet 1    Dentifrices (BIOTENE DRY MOUTH CARE DT) Apply to teeth      escitalopram (LEXAPRO) 10 mg tablet Take 1 tablet (10 mg total) by mouth daily (Patient not taking: Reported on 9/28/2018 ) 30 tablet 5     No facility-administered encounter medications on file as of 9/28/2018  Social History   Substance Use Topics    Smoking status: Former Smoker    Smokeless tobacco: Never Used      Comment: Quit 35-40 years ago    Alcohol use Yes      Comment: Rare, previously moderate drinker cut down 2016  Family History   Problem Relation Age of Onset    No Known Problems Mother     Coronary artery disease Father         REVIEW OF SYSTEMS:  The patient has entered data on an intake form regarding present illness, past medical and surgical history, medications, allergies, family and social history, and a full review of 14 systems  I have reviewed this form with the patient, and all the relevant information has been included on this note   The full review of systems was negative except as stated in HPI and below  []Hide copied text  []Hover for attribution information  Review of Systems   Constitutional: Negative  Negative for appetite change and fever  HENT: Negative  Negative for hearing loss, tinnitus, trouble swallowing and voice change  Eyes: Negative  Negative for photophobia and pain  Respiratory: Negative  Negative for shortness of breath  Cardiovascular: Negative  Negative for palpitations  Gastrointestinal: Negative  Negative for nausea and vomiting  Endocrine: Negative  Negative for cold intolerance and heat intolerance  Genitourinary: Negative  Negative for dysuria, frequency and urgency  Musculoskeletal: Negative  Negative for myalgias and neck pain  Skin: Negative  Negative for rash  Neurological: Positive for tremors (Left hand)  Negative for dizziness, seizures, syncope, facial asymmetry, speech difficulty, weakness, light-headedness, numbness and headaches  Hematological: Negative  Does not bruise/bleed easily  Psychiatric/Behavioral: Negative  Negative for confusion, hallucinations and sleep disturbance  PHYSICAL EXAMINATION:     Vital signs:  /78 (BP Location: Left arm, Patient Position: Sitting, Cuff Size: Adult)   Pulse (!) 52   Ht 6' 2" (1 88 m)   Wt 107 kg (236 lb 8 oz)   BMI 30 36 kg/m²      General:  Well-appearing, well nourished, pleasant patient in no acute distress  Mood and Fund of Knowledge are appropriate  Head:  Normocephalic, atraumatic  Oropharynx and conjunctiva are clear  Speech  No hypophonia or bradylalia  No scanning speech  Language: Comprehension intact  Neck:  Supple, strong 5/5 forward flexion and retroflexion  Extremities: Range of motion is normal       Cognitive and Mental Exam:  The patient is alert, oriented to self, location, date and situation   Memory is normal to provide accurate details of health history     Cranial Nerves:  Funduscopic examination reveals no papilledema  Direct and consensual light reflexes were normal  No afferent pupillary defect  Visual fields are full to confrontation  Extraocular movements were full, with normal pursuit and saccades  Pupils were equal, reactive to light symmetrically  Facial sensation to light touch was intact  Face is symmetric with normal strength  Hearing was assessed using the Calibrated Finger Rub Auditory Screening Test (CALFRAST) and was not abnormal (Better than CALFRAST-Strong-70)  Palate is up going bilaterally and symmetrically  Neck muscles are strong  Tongue protrusion is at midline with normal movements  No dysarthria  Motor:    Dystonia: none  Dyskinesia: none  Myoclonus: none  Chorea: none  Tics: none    Spiral Drawing:  Normal circular drawing without tremor, some overlapping loops  Handwriting:  3rd signature slightly smaller than 1st  Dot-to-Dot:  Performed without issue       Hand over fist:  Left arm catch    UPDRS III:   Speech: 1  Facial Expression: 2  Tremor (Head):  0  Action Tremor of Hands (R): 0  Action Tremor of Hands (L): 0  Resting Tremor of Hands (R): 0  Resting Tremor of Hands (L): 2 (with thumb flex-ext, and wrist flexion-extension)  Finger tapping (R): 2  Finger tapping (L): 2  Hand clenching (R): 1  Hand clenching (L): 2  ALEXANDER Hand (R): 2   ALEXANDER Hand (L): 2  Leg Agility (R):   1  Leg Agility (L):   3  Rigidity (Neck): 2  Rigidity (RUE): 2  Rigidity (LUE): 2  Rigidity (RLE): 1  Rigidity (LLE): 1  Arising From Chair: 0  Posture: 1  Gait: 1  Postural Stability: 1  Body Bradykinesia: 3 (reduced left arm swing, re-emergent rest tremor on left hand while counting backwards and walking)  -------------------------------------------------------------------------------------  34    Muscle Strength Right Left  Muscle Strength Right Left   Deltoid 5/5 5/5  Hip Adductors 5/5 5/5   Biceps 5/5 5/5  Hip Abductors 5/5 5/5   Triceps 5/5 5/5  Knee Extensors 5/5 5/5 Wrist Extensors 5/5 5/5  Knee Flexors 5/5 5/5   Wrist Flexors 5/5 5/5  Ankle Extensors 5/5 5/5    5/5 5/5  Ankle Flexors 5/5 5/5   Finger Abductors 5/5 5/5       Hip Flexors 5/5 5/5   Hip Extensors 5/5 5/5     Sensory  Normal sensation to light touch and proprioception    Coordination:  Intact rapid alternating movements, tandem gait intact     Gait:  Slow to get up out of chair, but gait is smooth without freezing, normal turns, walks on heels and toes, slight forward bending of trunk, +presence of reemergent rest tremor of left hand  Pull test of 1  Reflexes:    Right Left   Biceps 2/4 2/4   Brachioradialis 2/4 2/4   Triceps 2/4 2/4   Knee 2/4 2/4   Ankle 2/4 2/4      Plantar cutaneous reflex:  Right: flexor  Left: flexor      REVIEW OF ANCILLARY TESTS:   No results found for this or any previous visit  Diagnoses for this encounter:  1  Parkinson disease ([facility] [address] )  CBC and differential    Comprehensive metabolic panel    Vitamin D 25 hydroxy    Vitamin B12    Vitamin E    Vitamin B6    Folate    TSH, 3rd generation with Free T4 reflex    Ceruloplasmin    COPPER, URINE, 24 HOUR    Ambulatory referral to Speech Therapy    Ambulatory referral to Physical Therapy    MRI brain without contrast   2  Tremor of left hand         ASSESSMENT     Impression of this gentleman with uncertain history of left hand tremors, resting type in setting of findings of bradykinesia, rigidity  Mild symptoms and no reports of cognitive issues  Stage II parkinsonism, 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 Parkinson's disease due to asymmetrical presentation, but early stage and cannot yet rule out DLB or PD plus  No obvious red flags and atypicality  No hx of exposure to Reglan or dopamine blocking agents  No signs of dystonia  Discussed with him in detail about the diagnosis and expected progression and treatments available  All questions answered  He mentioned he might seek a second opinion and I would have no problems with this       PLAN · We will perform the blood test panel for ruling out other forms of parkinsonism, including for any vitamin deficiencies commonly associated  · Referral for Physical Therapy and Speech Therapy for BIG and L[facility]  Encouraged home exercises as well  · Order MRI brain wo contrast to establish baseline  · Discussed about low dose trial of levodopa, but he was not yet bothered by symptoms enough to elect starting it  He will call us if he is interested  · Explained to patient about a higher risk of melanoma in PD patients than normal populace, hence importance of regular skin checks yearly  · Return to clinic in 3 months (Dec 2018)  · The patient has been instructed to call us about any new neurological problems or medication side effects  Adarsh Nunn MD  Movement Disorders  Department of Neurological [address] [place] [address]    A total of 60 minutes were spent face-to-face with this patient, of which at least 50% was spent on counseling and coordination of care  We discussed the natural history of the patient's condition, differential diagnosis, level of diagnostic certainty, treatment alternatives and their side effects and possible complications  Santos Dumont MD  9/28/2018 11:39 AM  Sign at close encounter  [facility] PATIENT EVALUATION NOTE    Patient: [address] [place] [address] Record Number: # [id number]  YOB: 1935  Date of visit: 9/28/2018    Referring provider: Cherie Quintero MD    The patient was not accompanied today  History was obtained from patient    HISTORY OF PRESENT ILLNESS:  Mr Chauncey Eid is a 82 y o  right handed male who has been referred to the [facility] for evaluation of tremor and possible Parkinsons  He did have three surgeries on the left shoulder before       Date of First Symptom:  Mid 2018, in May-June   First Symptom: left hand tremor  Side More Affected: left  Main Problems:  1  Left hand tremor  He says he first noticed when he was seeing Dr Jesse Moise, then saw  He does not notice it when he's driving, but worse when he resting arm in position on a chair or when fatigued  Noticing it more at night  Shaking is not present and does not bother him during activities or tasks  No known family history of tremor or diagnosed PD  Mother  in childbirth  Hyposmia: denies  Fatigue: denies  RBD (REM semi-purposeful body movements): he is told he is very active during night by his wife, but no specifically acting out dreams  Seldom dreams  Gait Disturbance:  He feels that he has been leaning to his right side more  Onset:  Uncertain, within the last few months after reading more about PD  Falls: None  Fractures: None  Freezing of Gait: none  Handwriting: "worse" but "junkier" and he is not sure if smaller  Speech: his wife told him he has been talking more quieter (wife is also hard of hearing)  Facial Expression: not told or noticed any change in facial expression  Dementia:  None  MMSE: *  MOCA: *  Hallucination: None  Constipation: yes  UPDRS Motor: *  Mckenzie & Yahr STAGE: *  Pull Test:   Skin Cancer History / Last Skin Exam: excision of lesion on right hand 2018, told it was cancerous but uncertain type by Dr Yaima Xiao  Hypovitaminosis D: denies  Hypovitaminosis B12:  denies  Dysautonomia:    Gaze Palsy:   Exercise Therapy: "always out and about" from the house  Family History: Number of First Degree Relatives With Parkinsonism:   None    Neuro Imaging: None    Parkinson's Medications:   Effect of levodopa:  Never  Effect of pramipexole:   Never  Effect of ropinirole:  Never  Effect of other agonist:  Never  Effect of rasagiline:   Never  Effect of other medication:  Started on propranolol 60mg qDay, then reduced now to 20mg BID  No noticed benefit to tremor and he feels more "uptight" than before       Parkinson's Procedures:   DBS: none    REVIEW OF PAST MEDICAL, SOCIAL AND FAMILY HISTORY:  This is the list of problems as per our Medical Records:    Patient Active Problem List    Diagnosis Date Noted    Anxiety 07/31/2018    Bloating 06/19/2018    Tremor of left hand 05/21/2018    Acute non-recurrent maxillary sinusitis 05/21/2018    Persistent atrial fibrillation ([facility] [address] ) 07/27/2016    Pure hypercholesterolemia 07/27/2016    Benign essential hypertension 07/27/2016       Past Medical History:   Diagnosis Date    A-fib ([facility] [address] )     Hypertension         Past Surgical History:   Procedure Laterality Date    CARDIOVERSION      ATRIAL    ROTATOR CUFF REPAIR          Allergies   Allergen Reactions    Penicillins Hives    Pollen Extract Other (See Comments)       Outpatient Encounter Prescriptions as of 9/28/2018   Medication Sig Dispense Refill    Apoaequorin (PREVAGEN PO) Take 1 tablet by mouth daily      Coenzyme Q10 (COQ-10 PO) Take 1 tablet by mouth daily      loratadine (CLARITIN) 10 mg tablet Take 10 mg by mouth daily as needed        Multiple Vitamins-Minerals (ICAPS AREDS 2) CAPS Take 1 capsule by mouth 2 (two) times a day        propranolol (INDERAL) 20 mg tablet Take 1 tablet (20 mg total) by mouth every 12 (twelve) hours 60 tablet 5    Simethicone (GAS-X PO) Take 1 tablet by mouth daily as needed      telmisartan (MICARDIS) 40 mg tablet Take 1 tablet (40 mg total) by mouth daily 90 tablet 3    warfarin (COUMADIN) 5 mg tablet Take 1 tablet by mouth daily   25MG TAKE 1 TABLET 6 DAY A WEEK AND THEN 1 TABLET 1 DAY A WEEK       ALPRAZolam (XANAX) 0 25 mg tablet Take 1 tablet (0 25 mg total) by mouth 2 (two) times a day as needed for anxiety (Patient not taking: Reported on 9/28/2018 ) 50 tablet 1    Dentifrices (BIOTENE DRY MOUTH CARE DT) Apply to teeth      escitalopram (LEXAPRO) 10 mg tablet Take 1 tablet (10 mg total) by mouth daily (Patient not taking: Reported on 9/28/2018 ) 30 tablet 5     No facility-administered encounter medications on file as of 9/28/2018  Social History   Substance Use Topics    Smoking status: Former Smoker    Smokeless tobacco: Never Used      Comment: Quit 35-40 years ago    Alcohol use Yes      Comment: Rare        Family History   Problem Relation Age of Onset    No Known Problems Mother     Coronary artery disease Father         REVIEW OF SYSTEMS:  The patient has entered data on an intake form regarding present illness, past medical and surgical history, medications, allergies, family and social history, and a full review of 14 systems  I have reviewed this form with the patient, and all the relevant information has been included on this note  The full review of systems was negative except as stated in HPI and below         HEAD  NEURO     [] Headache  [] Unconsciousness spells  [] Forgetfulness   [] Head injury  [] Hallucinations  [] Depression   [] Dizziness  [] Paranoia  [] Seizures      [] Confusion       EYES  EARS  NOSE   [] Poor vision  [] Ear infection  [] Bloody nose   [] Eye pain  [] Hearing loss  [] Nose trauma   [] Eye swelling  [] Ringing in the ear  [] Sore nose   [] Double vision          NECK  HEART  RESPIRATORY   [] Trouble swallowing  [] Irregular heartbeat  [] Cough   [] Hoarseness  [] Fainting  [] Wheezing   [] Neck stiffness  [] High blood pressure  [] Bloody sputum   [] Neck pain     [] Shortness of breath   [] Neck swelling          CHEST  ABDOMEN     [] Chest pain  [] Stomach pain  [] Weight gain   [] Lump in breast  [] Loss of appetite  [] Nausea   [] Breast discharge  [] Weight loss  [] Vomiting     GI  BLADDER   [] Diarrhea  [] Urinary pain   [] Constipation  [] Increased frequency   [] Bloody or dark bowel movements  [] Blood in urine     MUSCULOSKELETAL   OTHER   [] Joint pain  [] Weakness  [] Rash   [] Back pain  [] Falls  [] Cancer   [] Joint swelling  [] Poor balance  [] Pregnant   [] Stiffness  [] Clumsiness  [] Fever         [] Chills       PHYSICAL EXAMINATION:     Vital signs:  /78 (BP Location: Left arm, Patient Position: Sitting, Cuff Size: Adult)   Pulse (!) 52   Ht 6' 2" (1 88 m)   Wt 107 kg (236 lb 8 oz)   BMI 30 36 kg/m²      General:  Well-appearing, well nourished, pleasant patient in no acute distress  Mood and Fund of Knowledge are appropriate  Head:  Normocephalic, atraumatic  Oropharynx and conjunctiva are clear  Speech  No hypophonia or bradylalia  No scanning speech  Language: Comprehension intact  Neck:  Supple, strong 5/5 forward flexion and retroflexion  Extremities: Range of motion is normal       Cognitive and Mental Exam:  The patient is alert, oriented to self, location, date and situation  Memory is normal to provide accurate details of health history     Cranial Nerves:  Sense of smell intact / impaired  Funduscopic examination reveals no papilledema  Direct and consensual light reflexes were normal  No afferent pupillary defect  Visual fields are full to confrontation  Extraocular movements were full, with normal pursuit and saccades  Pupils were equal, reactive to light symmetrically  Facial sensation to light touch was intact  Face is symmetric with normal strength  Hearing was assessed using the Calibrated Finger Rub Auditory Screening Test (CALFRAST) and was not abnormal (Better than CALFRAST-Strong-70)  Palate is up going bilaterally and symmetrically  Neck muscles are strong  Tongue protrusion is at midline with normal movements  No dysarthria  Motor:    Dystonia: ***  Dyskinesia: ***  Myoclonus: ***  Chorea: ***  Tics: ***      Spiral Drawing:   Handwriting:   Dot-to-Dot:     Hand over fist:  Left arm catch    UPDRS III:   Speech: 1  Facial Expression: 2  Tremor (Head):  0  Action Tremor of Hands (R): 0  Action Tremor of Hands (L): 0  Resting Tremor of Hands (R):   Resting Tremor of Hands (L): 2 (with thumb flex-ext, and wrist pronation supination)  Finger tapping (R): 2  Finger tapping (L): 2  Hand clenching (R): 1  Hand clenching (L): 2  ALEXANDER Hand (R): 2   ALEXANDER Hand (L): 2  Leg Agility (R):   1  Leg Agility (L):   3  Rigidity (Neck): 2  Rigidity (RUE): 2  Rigidity (LUE): 2  Rigidity (RLE): 1  Rigidity (LLE): 1  Arising From Chair: 0  Posture: 1  Gait: 1  Postural Stability: 0  Body Bradykinesia: 3 (reduced left arm swing, re-emergent rest tremor on left hand)  -------------------------------------------------------------------------------------    Muscle Strength Right Left  Muscle Strength Right Left   Deltoid 5/5 5/5  Hip Adductors 5/5 5/5   Biceps 5/5 5/5  Hip Abductors 5/5 5/5   Triceps 5/5 5/5  Knee Extensors 5/5 5/5   Wrist Extensors 5/5 5/5  Knee Flexors 5/5 5/5   Wrist Flexors 5/5 5/5  Ankle Extensors 5/5 5/5    5/5 5/5  Ankle Flexors 5/5 5/5   Finger Abductors 5/5 5/5       Hip Flexors 5/5 5/5   Hip Extensors 5/5 5/5     Sensory    Coordination:  Finger-to-nose-finger: normal  Sequential Finger Movements: normal  Hand rhythm tapping: normal  Finger-following-finger: normal     Gait:  Normal comprehensive gait evaluation, has normal raising, stance, gait, turns, AS, tip-toes, heels and Pull test       Reflexes:    Right Left   Biceps */4 */4   Brachioradialis */4 */4   Triceps */4 */4   Knee */4 */4   Ankle */4 */4      Plantar cutaneous reflex:  Right: flexor  Left: flexor      REVIEW OF ANCILLARY TESTS:   No results found for this or any previous visit  Diagnoses for this encounter:  No diagnosis found  ASSESSMENT     Impression of       PLAN     ·    ·    ·    ·    ·     ·   · The patient has been instructed to call us about any new neurological problems or medication side effects  · Return to Clinic on     [address]  89   Justin Nunn MD  Movement Disorders  Department of Neurological [address]    A total of *** minutes were spent face-to-face with this patient, of which at least 50% was spent on counseling and coordination of care  We discussed the natural history of the patient's condition, differential diagnosis, level of diagnostic certainty, treatment alternatives and their side effects and possible complications

## 2018-09-28 NOTE — PROGRESS NOTES
Review of Systems   Constitutional: Negative  Negative for appetite change and fever  HENT: Negative  Negative for hearing loss, tinnitus, trouble swallowing and voice change  Eyes: Negative  Negative for photophobia and pain  Respiratory: Negative  Negative for shortness of breath  Cardiovascular: Negative  Negative for palpitations  Gastrointestinal: Negative  Negative for nausea and vomiting  Endocrine: Negative  Negative for cold intolerance and heat intolerance  Genitourinary: Negative  Negative for dysuria, frequency and urgency  Musculoskeletal: Negative  Negative for myalgias and neck pain  Skin: Negative  Negative for rash  Neurological: Positive for tremors (Left hand)  Negative for dizziness, seizures, syncope, facial asymmetry, speech difficulty, weakness, light-headedness, numbness and headaches  Hematological: Negative  Does not bruise/bleed easily  Psychiatric/Behavioral: Negative  Negative for confusion, hallucinations and sleep disturbance

## 2018-09-28 NOTE — PATIENT INSTRUCTIONS
Directions:   - We will perform the blood test panel for ruling out other forms of parkinsonism, including for any vitamin deficiencies commonly associated  - Referral for Physical Therapy and Speech Therapy for [facility]  Encouraged home exercises as well  - Order MRI brain wo contrast to establish baseline    - Discussed about low dose trial of levodopa, but he was not yet bothered by symptoms enough to elect starting it  He will call us if he is interested  - Explained to patient about a higher risk of melanoma in PD patients than normal populace, hence importance of regular skin checks yearly     - Return to clinic in 3 months (Dec 2018)

## 2018-09-28 NOTE — LETTER
2018     Raza Silverman [address]  51   [address] [address]    Patient: Paulina Griffin   YOB: 1935   Date of Visit: 2018       Dear Dr Danish Jacobs: Thank you for referring Frieda Francois to me for evaluation  Below are my notes for this consultation  If you have questions, please do not hesitate to call me  I look forward to following your patient along with you  Sincerely,        Ganga Waterman MD        CC: No Recipients  Ganga Waterman MD  2018  3:15 PM  Sign at close encounter  [address] [facility] PATIENT EVALUATION NOTE    Patient: [facility] [employer] Record Number: # [id number]  YOB: 1935  Date of visit: 2018    Referring provider: Cheri Delgado MD    The patient was not accompanied today  History was obtained from patient    HISTORY OF PRESENT ILLNESS:  Mr Jennifer Tavarez is a 82 y o  right handed male who has been referred to the [address] [place] [address] for evaluation of tremor and possible Parkinsons       He did have three surgeries on the left shoulder before       Date of First Symptom:  Mid , in May-   First Symptom: left hand tremor  Side More Affected: left  Main Problems:  1  Left hand tremor  He says he first noticed when he was seeing Dr Danish Jacobs, then saw Dr Kim Finley  No medications or treatments started  He does not notice it when he's driving, but worse when he resting arm in position on a chair or when fatigued  Noticing it more at night  Shaking is not present and does not bother him during activities or tasks       No known family history of tremor or diagnosed PD  Mother  in childbirth       Hyposmia: denies  Fatigue: denies  RBD (REM semi-purposeful body movements): he is told he is very active during night by his wife, but no specifically acting out dreams  Seldom dreams     Gait Disturbance:  He feels that he has been leaning to his right side more  Onset:  Uncertain, within the last few months after reading more about PD  Falls: None  Fractures: None  Freezing of Gait: none  Handwriting: "worse" but "junkier" and he is not sure if smaller  Speech: his wife told him he has been talking more quieter (wife is also hard of hearing)  Facial Expression: not told or noticed any change in facial expression  Dementia:  None  Hallucination: None  Constipation: yes  Skin Cancer History / Last Skin Exam: excision of lesion on right hand Sept 25, 2018, told it was cancerous but uncertain type by Dr Ethel Wagner  Hypovitaminosis D: denies  Hypovitaminosis B12:  denies  Exercise Therapy: "always out and about" from the house       Family History: Number of First Degree Relatives With Parkinsonism:   None     Neuro Imaging: None     Parkinson's Medications:   Effect of levodopa:  Never  Effect of pramipexole:   Never  Effect of ropinirole:       Never  Effect of other agonist:  Never  Effect of rasagiline:   Never  Effect of other medication:  Started on propranolol 60mg qDay, then reduced now to 20mg BID   No noticed benefit to tremor and he feels more "uptight" than before       Parkinson's Procedures:   DBS: none    REVIEW OF PAST MEDICAL, SOCIAL AND FAMILY HISTORY:  This is the list of problems as per our Medical Records:    Patient Active Problem List    Diagnosis Date Noted    Anxiety 07/31/2018    Dyspnea on exertion 06/22/2018    Bloating 06/19/2018    Tremor of left hand 05/21/2018    Acute non-recurrent maxillary sinusitis 05/21/2018    Persistent atrial fibrillation ([facility] ) 07/27/2016    Pure hypercholesterolemia 07/27/2016    Benign essential hypertension 07/27/2016    Esophageal reflux 07/27/2016       Past Medical History:   Diagnosis Date    A-fib ([facility] )     Hypertension         Past Surgical History:   Procedure Laterality Date    CARDIOVERSION      ATRIAL    ROTATOR CUFF REPAIR          Allergies   Allergen Reactions    Penicillins Hives    Pollen Extract Other (See Comments)       Outpatient Encounter Prescriptions as of 9/28/2018   Medication Sig Dispense Refill    Apoaequorin (PREVAGEN PO) Take 1 tablet by mouth daily      Coenzyme Q10 (COQ-10 PO) Take 1 tablet by mouth daily      loratadine (CLARITIN) 10 mg tablet Take 10 mg by mouth daily as needed        Multiple Vitamins-Minerals (ICAPS AREDS 2) CAPS Take 1 capsule by mouth 2 (two) times a day        propranolol (INDERAL) 20 mg tablet Take 1 tablet (20 mg total) by mouth every 12 (twelve) hours 60 tablet 5    Simethicone (GAS-X PO) Take 1 tablet by mouth daily as needed      telmisartan (MICARDIS) 40 mg tablet Take 1 tablet (40 mg total) by mouth daily 90 tablet 3    warfarin (COUMADIN) 5 mg tablet Take 1 tablet by mouth daily   25MG TAKE 1 TABLET 6 DAY A WEEK AND THEN 1 TABLET 1 DAY A WEEK       ALPRAZolam (XANAX) 0 25 mg tablet Take 1 tablet (0 25 mg total) by mouth 2 (two) times a day as needed for anxiety (Patient not taking: Reported on 9/28/2018 ) 50 tablet 1    Dentifrices (BIOTENE DRY MOUTH CARE DT) Apply to teeth      escitalopram (LEXAPRO) 10 mg tablet Take 1 tablet (10 mg total) by mouth daily (Patient not taking: Reported on 9/28/2018 ) 30 tablet 5     No facility-administered encounter medications on file as of 9/28/2018  Social History   Substance Use Topics    Smoking status: Former Smoker    Smokeless tobacco: Never Used      Comment: Quit 35-40 years ago    Alcohol use Yes      Comment: Rare, previously moderate drinker cut down 2016  Family History   Problem Relation Age of Onset    No Known Problems Mother     Coronary artery disease Father         REVIEW OF SYSTEMS:  The patient has entered data on an intake form regarding present illness, past medical and surgical history, medications, allergies, family and social history, and a full review of 14 systems   I have reviewed this form with the patient, and all the relevant information has been included on this note  The full review of systems was negative except as stated in HPI and below  Review of Systems   Constitutional: Negative   Negative for appetite change and fever  HENT: Negative   Negative for hearing loss, tinnitus, trouble swallowing and voice change     Eyes: Negative   Negative for photophobia and pain  Respiratory: Negative   Negative for shortness of breath     Cardiovascular: Negative   Negative for palpitations  Gastrointestinal: Negative   Negative for nausea and vomiting  Endocrine: Negative   Negative for cold intolerance and heat intolerance  Genitourinary: Negative   Negative for dysuria, frequency and urgency  Musculoskeletal: Negative   Negative for myalgias and neck pain  Skin: Negative   Negative for rash  Neurological: Positive for tremors (Left hand)  Negative for dizziness, seizures, syncope, facial asymmetry, speech difficulty, weakness, light-headedness, numbness and headaches  Hematological: Negative   Does not bruise/bleed easily  Psychiatric/Behavioral: Negative   Negative for confusion, hallucinations and sleep disturbance  PHYSICAL EXAMINATION:     Vital signs:  /78 (BP Location: Left arm, Patient Position: Sitting, Cuff Size: Adult)   Pulse (!) 52   Ht 6' 2" (1 88 m)   Wt 107 kg (236 lb 8 oz)   BMI 30 36 kg/m²       General:  Well-appearing, well nourished, pleasant patient in no acute distress  Mood and Fund of Knowledge are appropriate  Head:  Normocephalic, atraumatic  Oropharynx and conjunctiva are clear  Speech  No hypophonia or bradylalia  No scanning speech  Language: Comprehension intact  Neck:  Supple, strong 5/5 forward flexion and retroflexion  Extremities: Range of motion is normal       Cognitive and Mental Exam:  The patient is alert, oriented to self, location, date and situation   Memory is normal to provide accurate details of health history     Cranial Nerves:  Funduscopic examination reveals no papilledema  Direct and consensual light reflexes were normal  No afferent pupillary defect  Visual fields are full to confrontation  Extraocular movements were full, with normal pursuit and saccades  Pupils were equal, reactive to light symmetrically  Facial sensation to light touch was intact  Face is symmetric with normal strength    Hearing was assessed using the Calibrated Finger Rub Auditory Screening Test (CALFRAST) and was not abnormal (Better than CALFRAST-Strong-70)     Palate is up going bilaterally and symmetrically  Neck muscles are strong  Tongue protrusion is at midline with normal movements  No dysarthria       Motor:     Dystonia: none  Dyskinesia: none  Myoclonus: none    Chorea: none  Tics: none     Spiral Drawing:  Normal circular drawing without tremor, some overlapping loops  Handwriting:  3rd signature slightly smaller than 1st  Dot-to-Dot:  Performed without issue       Hand over fist:  Left arm catch     UPDRS III:   Speech: 1  Facial Expression: 2  Tremor (Head):  0  Action Tremor of Hands (R): 0  Action Tremor of Hands (L): 0  Resting Tremor of Hands (R): 0  Resting Tremor of Hands (L): 2 (with thumb flex-ext, and wrist flexion-extension)  Finger tapping (R): 2  Finger tapping (L): 2  Hand clenching (R): 1  Hand clenching (L): 2  ALEXANDER Hand (R): 2   ALEXANDER Hand (L): 2  Leg Agility (R):  1  Leg Agility (L):  3  Rigidity (Neck): 2  Rigidity (RUE): 2  Rigidity (LUE): 2  Rigidity (RLE): 1  Rigidity (LLE): 1  Arising From Chair: 0  Posture: 1  Gait: 1  Postural Stability: 1  Body Bradykinesia: 3 (reduced left arm swing, re-emergent rest tremor on left hand while counting backwards and walking)  -------------------------------------------------------------------------------------  34     Muscle Strength Right Left   Muscle Strength Right Left   Deltoid 5/5 5/5   Hip Adductors 5/5 5/5   Biceps 5/5 5/5   Hip Abductors 5/5 5/5   Triceps 5/5 5/5   Knee Extensors 5/5 5/5   Wrist Extensors 5/5 5/5   Knee Flexors 5/5 5/5   Wrist Flexors 5/5 5/5   Ankle Extensors 5/5 5/5    5/5 5/5   Ankle Flexors 5/5 5/5   Finger Abductors 5/5 5/5           Hip Flexors 5/5 5/5       Hip Extensors 5/5 5/5          Sensory  Normal sensation to light touch and proprioception     Coordination:  Intact rapid alternating movements, tandem gait intact     Gait:  Slow to get up out of chair, but gait is smooth without freezing, normal turns, walks on heels and toes, slight forward bending of trunk, +presence of reemergent rest tremor of left hand  Pull test of 1       Reflexes:    Right Left   Biceps 2/4 2/4   Brachioradialis 2/4 2/4   Triceps 2/4 2/4   Knee 2/4 2/4   Ankle 2/4 2/4      Plantar cutaneous reflex:  Right: flexor  Left: flexor      REVIEW OF ANCILLARY TESTS:   No results found for this or any previous visit  Diagnoses for this encounter:  1  Parkinson disease ([facility] )  CBC and differential    Comprehensive metabolic panel    Vitamin D 25 hydroxy    Vitamin B12    Vitamin E    Vitamin B6    Folate    TSH, 3rd generation with Free T4 reflex    Ceruloplasmin    COPPER, URINE, 24 HOUR    Ambulatory referral to Speech Therapy    Ambulatory referral to Physical Therapy    MRI brain without contrast   2  Tremor of left hand         ASSESSMENT     Impression of this gentleman with uncertain history of left hand tremors, resting type in setting of findings of bradykinesia, rigidity  Mild symptoms and no reports of cognitive issues  Stage II parkinsonism, 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 Parkinson's disease due to asymmetrical presentation, but early stage and cannot yet rule out DLB or PD plus  No obvious red flags and atypicality  No hx of exposure to Reglan or dopamine blocking agents  No signs of dystonia       Discussed with him in detail about the diagnosis and expected progression and treatments available  All questions answered   He mentioned he might seek a second opinion and I would have no problems with this  PLAN     ? We will perform the blood test panel for ruling out other forms of parkinsonism, including for any vitamin deficiencies commonly associated  ? Referral for Physical Therapy and Speech Therapy for [facility]  Encouraged home exercises as well  ? Order MRI brain wo contrast to establish baseline  ? Discussed about low dose trial of levodopa, but he was not yet bothered by symptoms enough to elect starting it  He will call us if he is interested  ? Explained to patient about a higher risk of melanoma in PD patients than normal populace, hence importance of regular skin checks yearly  ? Return to clinic in 3 months (Dec 2018)  ? The patient has been instructed to call us about any new neurological problems or medication side effects      Tabitha Lindau Clance Runner, MD  Movement Disorders  Department of Neurological [facility]     A total of 60 minutes were spent face-to-face with this patient, of which at least 50% was spent on counseling and coordination of care  We discussed the natural history of the patient's condition, differential diagnosis, level of diagnostic certainty, treatment alternatives and their side effects and possible complications

## 2018-10-01 ENCOUNTER — APPOINTMENT (OUTPATIENT)
Dept: LAB | Age: 83
End: 2018-10-01
Payer: MEDICARE

## 2018-10-01 ENCOUNTER — TELEPHONE (OUTPATIENT)
Dept: INTERNAL MEDICINE CLINIC | Facility: CLINIC | Age: 83
End: 2018-10-01

## 2018-10-01 DIAGNOSIS — G20 PARKINSON DISEASE (HCC): ICD-10-CM

## 2018-10-01 DIAGNOSIS — Z71.84 TRAVEL ADVICE ENCOUNTER: Primary | ICD-10-CM

## 2018-10-01 LAB
25(OH)D3 SERPL-MCNC: 26.9 NG/ML (ref 30–100)
ALBUMIN SERPL BCP-MCNC: 3.9 G/DL (ref 3.5–5)
ALP SERPL-CCNC: 125 U/L (ref 46–116)
ALT SERPL W P-5'-P-CCNC: 29 U/L (ref 12–78)
ANION GAP SERPL CALCULATED.3IONS-SCNC: 5 MMOL/L (ref 4–13)
AST SERPL W P-5'-P-CCNC: 20 U/L (ref 5–45)
BASOPHILS # BLD AUTO: 0.05 THOUSANDS/ΜL (ref 0–0.1)
BASOPHILS NFR BLD AUTO: 1 % (ref 0–1)
BILIRUB SERPL-MCNC: 1.22 MG/DL (ref 0.2–1)
BUN SERPL-MCNC: 16 MG/DL (ref 5–25)
CALCIUM SERPL-MCNC: 8.9 MG/DL (ref 8.3–10.1)
CHLORIDE SERPL-SCNC: 106 MMOL/L (ref 100–108)
CO2 SERPL-SCNC: 26 MMOL/L (ref 21–32)
CREAT SERPL-MCNC: 0.83 MG/DL (ref 0.6–1.3)
EOSINOPHIL # BLD AUTO: 0.3 THOUSAND/ΜL (ref 0–0.61)
EOSINOPHIL NFR BLD AUTO: 4 % (ref 0–6)
ERYTHROCYTE [DISTWIDTH] IN BLOOD BY AUTOMATED COUNT: 14.7 % (ref 11.6–15.1)
FOLATE SERPL-MCNC: 14 NG/ML (ref 3.1–17.5)
GFR SERPL CREATININE-BSD FRML MDRD: 83 ML/MIN/1.73SQ M
GLUCOSE P FAST SERPL-MCNC: 95 MG/DL (ref 65–99)
HCT VFR BLD AUTO: 39 % (ref 36.5–49.3)
HGB BLD-MCNC: 12.8 G/DL (ref 12–17)
IMM GRANULOCYTES # BLD AUTO: 0.03 THOUSAND/UL (ref 0–0.2)
IMM GRANULOCYTES NFR BLD AUTO: 0 % (ref 0–2)
LYMPHOCYTES # BLD AUTO: 1.85 THOUSANDS/ΜL (ref 0.6–4.47)
LYMPHOCYTES NFR BLD AUTO: 23 % (ref 14–44)
MCH RBC QN AUTO: 29.6 PG (ref 26.8–34.3)
MCHC RBC AUTO-ENTMCNC: 32.8 G/DL (ref 31.4–37.4)
MCV RBC AUTO: 90 FL (ref 82–98)
MONOCYTES # BLD AUTO: 0.75 THOUSAND/ΜL (ref 0.17–1.22)
MONOCYTES NFR BLD AUTO: 9 % (ref 4–12)
NEUTROPHILS # BLD AUTO: 5.11 THOUSANDS/ΜL (ref 1.85–7.62)
NEUTS SEG NFR BLD AUTO: 63 % (ref 43–75)
NRBC BLD AUTO-RTO: 0 /100 WBCS
PLATELET # BLD AUTO: 329 THOUSANDS/UL (ref 149–390)
PMV BLD AUTO: 9.8 FL (ref 8.9–12.7)
POTASSIUM SERPL-SCNC: 4.3 MMOL/L (ref 3.5–5.3)
PROT SERPL-MCNC: 7.5 G/DL (ref 6.4–8.2)
RBC # BLD AUTO: 4.32 MILLION/UL (ref 3.88–5.62)
SODIUM SERPL-SCNC: 137 MMOL/L (ref 136–145)
TSH SERPL DL<=0.05 MIU/L-ACNC: 1.76 UIU/ML (ref 0.36–3.74)
VIT B12 SERPL-MCNC: 403 PG/ML (ref 100–900)
WBC # BLD AUTO: 8.09 THOUSAND/UL (ref 4.31–10.16)

## 2018-10-01 PROCEDURE — 82306 VITAMIN D 25 HYDROXY: CPT

## 2018-10-01 PROCEDURE — 82390 ASSAY OF CERULOPLASMIN: CPT

## 2018-10-01 PROCEDURE — 82607 VITAMIN B-12: CPT

## 2018-10-01 PROCEDURE — 84446 ASSAY OF VITAMIN E: CPT

## 2018-10-01 PROCEDURE — 80053 COMPREHEN METABOLIC PANEL: CPT

## 2018-10-01 PROCEDURE — 85025 COMPLETE CBC W/AUTO DIFF WBC: CPT

## 2018-10-01 PROCEDURE — 84207 ASSAY OF VITAMIN B-6: CPT

## 2018-10-01 PROCEDURE — 36415 COLL VENOUS BLD VENIPUNCTURE: CPT

## 2018-10-01 PROCEDURE — 82746 ASSAY OF FOLIC ACID SERUM: CPT

## 2018-10-01 PROCEDURE — 84443 ASSAY THYROID STIM HORMONE: CPT

## 2018-10-01 RX ORDER — AZITHROMYCIN 250 MG/1
250 TABLET, FILM COATED ORAL EVERY 24 HOURS
Qty: 6 TABLET | Refills: 0 | Status: SHIPPED | OUTPATIENT
Start: 2018-10-01 | End: 2018-10-06

## 2018-10-02 LAB — CERULOPLASMIN SERPL-MCNC: 25.3 MG/DL (ref 16–31)

## 2018-10-04 LAB
A-TOCOPHEROL VIT E SERPL-MCNC: 14.4 MG/L (ref 9–29)
GAMMA TOCOPHEROL SERPL-MCNC: 0.6 MG/L (ref 0.5–4.9)
VIT B6 SERPL-MCNC: 5.7 UG/L (ref 5.3–46.7)

## 2018-10-05 ENCOUNTER — TELEPHONE (OUTPATIENT)
Dept: NEUROLOGY | Facility: CLINIC | Age: 83
End: 2018-10-05

## 2018-10-05 NOTE — TELEPHONE ENCOUNTER
Mikki Loving MD  P Neurology [address] [facility], would you please inform the patient that all his labs that came back were reassuring and unremarkable except for a slightly lower vitamin D level than normal      Would recommend adding on Vitamin D3 of 1000 IU a day, as this commonly is low in Parkinson's patients and could help with fatigue  Thanks      Pt's wife made aware  She will have him call back with any questions

## 2018-10-08 ENCOUNTER — TELEPHONE (OUTPATIENT)
Dept: INTERNAL MEDICINE CLINIC | Facility: CLINIC | Age: 83
End: 2018-10-08

## 2018-10-23 ENCOUNTER — HOSPITAL ENCOUNTER (OUTPATIENT)
Dept: RADIOLOGY | Age: 83
Discharge: HOME | End: 2018-10-23
Payer: MEDICARE

## 2018-10-23 DIAGNOSIS — G20 PARKINSON DISEASE (HCC): ICD-10-CM

## 2018-10-23 PROCEDURE — 70551 MRI BRAIN STEM W/O DYE: CPT

## 2018-10-25 ENCOUNTER — TELEPHONE (OUTPATIENT)
Dept: NEUROLOGY | Facility: CLINIC | Age: 83
End: 2018-10-25

## 2018-10-25 NOTE — TELEPHONE ENCOUNTER
Called and Left a message on pt's answering machine for a call back  Attempted both phone numbers Iisted

## 2018-10-25 NOTE — TELEPHONE ENCOUNTER
Pt called back again re: MRI result  Advised of all of the below again  Pt verbalized understanding

## 2018-10-25 NOTE — TELEPHONE ENCOUNTER
----- Message from Soraida Harrell MD sent at 10/24/2018 10:43 PM EDT -----  Regarding: MRI results  Hi please let pt know MRI images reviewed and normal  He should continue with the rest of the plan discussed, but has he found who to see for 2nd opinion as he mentioned?      Thanks    ----- Message -----  From: Interface, Radiology Results In  Sent: 10/23/2018   2:45 PM  To: Soraida Harrell MD

## 2018-11-01 ENCOUNTER — TELEPHONE (OUTPATIENT)
Dept: NEUROLOGY | Facility: CLINIC | Age: 83
End: 2018-11-01

## 2018-11-21 ENCOUNTER — OFFICE VISIT (OUTPATIENT)
Dept: INTERNAL MEDICINE CLINIC | Facility: CLINIC | Age: 83
End: 2018-11-21
Payer: MEDICARE

## 2018-11-21 VITALS
HEART RATE: 73 BPM | OXYGEN SATURATION: 98 % | SYSTOLIC BLOOD PRESSURE: 112 MMHG | WEIGHT: 233 LBS | DIASTOLIC BLOOD PRESSURE: 80 MMHG | BODY MASS INDEX: 29.9 KG/M2 | HEIGHT: 74 IN

## 2018-11-21 DIAGNOSIS — G20 PARKINSON'S DISEASE (HCC): ICD-10-CM

## 2018-11-21 DIAGNOSIS — I10 BENIGN ESSENTIAL HYPERTENSION: Primary | ICD-10-CM

## 2018-11-21 DIAGNOSIS — I48.19 PERSISTENT ATRIAL FIBRILLATION (HCC): ICD-10-CM

## 2018-11-21 PROCEDURE — 99214 OFFICE O/P EST MOD 30 MIN: CPT | Performed by: NURSE PRACTITIONER

## 2018-11-21 NOTE — PROGRESS NOTES
Assessment/Plan:    No problem-specific Assessment & Plan notes found for this encounter  Diagnoses and all orders for this visit:    Benign essential hypertension    Persistent atrial fibrillation (HCC)    Parkinson's disease ([facility] )    Other orders  -     carbidopa-levodopa (SINEMET)  mg per tablet; Take 1 tablet by mouth        · Patient Counseling:   ? Nutrition: Stressed importance of a well balanced diet, moderation of sodium/saturated fat, caloric balance and sufficient intake of fiber  ? Exercise: Stressed the importance of regular exercise with a goal of 150 minutes per week  ? Dental Health: Discussed daily flossing and brushing and regular dental visits      · Immunizations reviewed yes, up to date        Subjective:      Patient ID: Yari Farrar is a 82 y o  male  Patient is here for a regular follow up    htn- stable on medication    afib - rate controlled on coumadin    Recently diagnosed with parkinsons- on medication for the this sees neuro        The following portions of the patient's history were reviewed and updated as appropriate: allergies, current medications, past family history, past medical history, past social history, past surgical history and problem list     Review of Systems   Constitutional: Negative  HENT: Negative  Eyes: Negative  Respiratory: Negative  Cardiovascular: Negative  Gastrointestinal: Negative  Musculoskeletal: Negative  Neurological: Negative  Family History   Problem Relation Age of Onset    No Known Problems Mother     Coronary artery disease Father      Past Medical History:   Diagnosis Date    A-fib [facility])     Hypertension     Parkinson's disease ([facility] ) 11/01/2018     Social History     Social History    Marital status: /Civil Union     Spouse name: N/A    Number of children: N/A    Years of education: N/A     Occupational History    Not on file       Social History Main Topics    Smoking status: Former Smoker    Smokeless tobacco: Never Used      Comment: Quit 35-40 years ago    Alcohol use Yes      Comment: Rare, previously moderate drinker cut down 2016   Drug use: No    Sexual activity: Not on file     Other Topics Concern    Not on file     Social History Narrative    No narrative on file       Current Outpatient Prescriptions:     ALPRAZolam (XANAX) 0 25 mg tablet, Take 1 tablet (0 25 mg total) by mouth 2 (two) times a day as needed for anxiety, Disp: 50 tablet, Rfl: 1    Apoaequorin (PREVAGEN PO), Take 1 tablet by mouth daily, Disp: , Rfl:     carbidopa-levodopa (SINEMET)  mg per tablet, Take 1 tablet by mouth, Disp: , Rfl:     Coenzyme Q10 (COQ-10 PO), Take 1 tablet by mouth daily, Disp: , Rfl:     Dentifrices (BIOTENE DRY MOUTH CARE DT), Apply to teeth, Disp: , Rfl:     loratadine (CLARITIN) 10 mg tablet, Take 10 mg by mouth daily as needed  , Disp: , Rfl:     Multiple Vitamins-Minerals (ICAPS AREDS 2) CAPS, Take 1 capsule by mouth 2 (two) times a day  , Disp: , Rfl:     propranolol (INDERAL) 20 mg tablet, Take 1 tablet (20 mg total) by mouth every 12 (twelve) hours, Disp: 60 tablet, Rfl: 5    Simethicone (GAS-X PO), Take 1 tablet by mouth daily as needed, Disp: , Rfl:     telmisartan (MICARDIS) 40 mg tablet, Take 1 tablet (40 mg total) by mouth daily, Disp: 90 tablet, Rfl: 3    warfarin (COUMADIN) 5 mg tablet, Take 1 tablet by mouth daily   25MG TAKE 1 TABLET 6 DAY A WEEK AND THEN 1 TABLET 1 DAY A WEEK , Disp: , Rfl:     escitalopram (LEXAPRO) 10 mg tablet, Take 1 tablet (10 mg total) by mouth daily (Patient not taking: Reported on 9/28/2018 ), Disp: 30 tablet, Rfl: 5  Allergies   Allergen Reactions    Penicillins Hives    Pollen Extract Other (See Comments)              Objective:      /80 (BP Location: Left arm, Patient Position: Sitting, Cuff Size: Large)   Pulse 73   Ht 6' 2" (1 88 m)   Wt 106 kg (233 lb)   SpO2 98%   BMI 29 92 kg/m²          Physical Exam Constitutional: He is oriented to person, place, and time  He appears well-developed and well-nourished  HENT:   Head: Normocephalic and atraumatic  Eyes: Pupils are equal, round, and reactive to light  Conjunctivae are normal    Neck: Normal range of motion  Neck supple  Cardiovascular: Normal rate and regular rhythm  Pulmonary/Chest: Effort normal and breath sounds normal    Abdominal: Soft  Bowel sounds are normal    Musculoskeletal: Normal range of motion  Neurological: He is alert and oriented to person, place, and time  Skin: Skin is warm and dry

## 2018-12-03 ENCOUNTER — APPOINTMENT (OUTPATIENT)
Dept: SPEECH THERAPY | Facility: CLINIC | Age: 83
End: 2018-12-03
Payer: MEDICARE

## 2018-12-03 ENCOUNTER — APPOINTMENT (OUTPATIENT)
Dept: PHYSICAL THERAPY | Facility: CLINIC | Age: 83
End: 2018-12-03
Payer: MEDICARE

## 2018-12-04 ENCOUNTER — APPOINTMENT (OUTPATIENT)
Dept: PHYSICAL THERAPY | Facility: CLINIC | Age: 83
End: 2018-12-04
Payer: MEDICARE

## 2018-12-04 ENCOUNTER — APPOINTMENT (OUTPATIENT)
Dept: SPEECH THERAPY | Facility: CLINIC | Age: 83
End: 2018-12-04
Payer: MEDICARE

## 2018-12-05 ENCOUNTER — APPOINTMENT (OUTPATIENT)
Dept: PHYSICAL THERAPY | Facility: CLINIC | Age: 83
End: 2018-12-05
Payer: MEDICARE

## 2018-12-05 ENCOUNTER — APPOINTMENT (OUTPATIENT)
Dept: SPEECH THERAPY | Facility: CLINIC | Age: 83
End: 2018-12-05
Payer: MEDICARE

## 2018-12-06 ENCOUNTER — APPOINTMENT (OUTPATIENT)
Dept: PHYSICAL THERAPY | Facility: CLINIC | Age: 83
End: 2018-12-06
Payer: MEDICARE

## 2018-12-06 ENCOUNTER — APPOINTMENT (OUTPATIENT)
Dept: SPEECH THERAPY | Facility: CLINIC | Age: 83
End: 2018-12-06
Payer: MEDICARE

## 2018-12-10 ENCOUNTER — EVALUATION (OUTPATIENT)
Dept: SPEECH THERAPY | Facility: CLINIC | Age: 83
End: 2018-12-10
Payer: MEDICARE

## 2018-12-10 ENCOUNTER — EVALUATION (OUTPATIENT)
Dept: PHYSICAL THERAPY | Facility: CLINIC | Age: 83
End: 2018-12-10
Payer: MEDICARE

## 2018-12-10 DIAGNOSIS — G20 PARKINSON'S DISEASE (HCC): Primary | ICD-10-CM

## 2018-12-10 DIAGNOSIS — G20 PARKINSON'S DISEASE (HCC): ICD-10-CM

## 2018-12-10 DIAGNOSIS — R49.9 UNSPECIFIED VOICE AND RESONANCE DISORDER: Primary | ICD-10-CM

## 2018-12-10 PROCEDURE — G8979 MOBILITY GOAL STATUS: HCPCS | Performed by: PHYSICAL THERAPIST

## 2018-12-10 PROCEDURE — 97163 PT EVAL HIGH COMPLEX 45 MIN: CPT | Performed by: PHYSICAL THERAPIST

## 2018-12-10 PROCEDURE — G9171 VOICE CURRENT STATUS: HCPCS | Performed by: SPEECH-LANGUAGE PATHOLOGIST

## 2018-12-10 PROCEDURE — G8978 MOBILITY CURRENT STATUS: HCPCS | Performed by: PHYSICAL THERAPIST

## 2018-12-10 PROCEDURE — G9172 VOICE GOAL STATUS: HCPCS | Performed by: SPEECH-LANGUAGE PATHOLOGIST

## 2018-12-10 PROCEDURE — 92524 BEHAVRAL QUALIT ANALYS VOICE: CPT | Performed by: SPEECH-LANGUAGE PATHOLOGIST

## 2018-12-10 NOTE — PROGRESS NOTES
PT Evaluation     Today's date: 12/10/2018  Patient name: Shavon Nunez  : 1935  MRN: [id number]  Referring provider: Jaelyn Zepeda MD  Dx: No diagnosis found  Assessment  Assessment details: Pt presents to physical therapy with a diagnosis of Parkinson's Disease  At this time patient has decreased coordination, endurance and gait efficiency per 6 MWT, decreased fine motor coordination per 9 hole peg test, and will benefit from education in home exercise program in order to stay active within home, community and independent with all IADLs, ADLs  Patient will benefit from skilled therapy intervention 4x/week for 4 weeks in order to address above impairments  Understanding of Dx/Px/POC: good   Prognosis: good    Goals  STG:  PT will complet e6 MWT in 1600 ft within 4 weeks  Pt will complete 9 hole peg test in 23 seconds within 4 weeks    LTG:  Pt will report improved to legible handwriting within 16 visits  Pt will report overall independence and knowledge of HEP within 16 visits  Plan  Planned therapy interventions: therapeutic exercise, neuromuscular re-education, balance and home exercise program  Frequency: 4x week  Duration in weeks: 4  Plan of Care beginning date: 12/10/2018  Plan of Care expiration date: 3/11/2019  Treatment plan discussed with: patient        Subjective Evaluation    History of Present Illness  Mechanism of injury: Pt reports to physical therapy with a diganosis of Parkinson's Disease  Pt was diagnosed several months ago and was referred for physical therapy for the [facility] program  Denies difficulty with ADLs, no difficulty with IADLs, and reports no difficulty with community or recreational activity  Pt only reports symptoms with UE tremors  Pt reports poor handwriting, increased difficulty with buttons  Has macular degeneration as well  Does note difficulty with voice being softer     Pain  No pain reported    Social Support  Stairs in house: yes   Lives in: multiple-level home  Lives with: spouse    Employment status: not working (worked as a )  Hand dominance: right  Exercise history: every now and then goes to gym walking on a treadmill    Treatments  Current treatment: physical therapy and speech therapy        Objective    Flowsheet Rows      Most Recent Value   PT/OT G-Codes   Current Score  91   Projected Score  0          Balance Test    6 Minute Walk Test (ft): 1450ft           5x Sit To Stand (s): 11 sec   TUG:  10sec   TUG dual task:  10sec   TUG co sec       Muscle Tone Left Right   Modified Barney Scale     Hamstring WNl wnl   Gastroc wnl wnl   Quad wnl wnl       9 - Hole Peg Test:  Left:32 sec   Right:26 sec     Manual Muscle Testing - Hip Left Right   Flexion 5 5   Extension 5 5   Abduction 5 5   External Rotation 5 5     Manual Muscle Testing - Knee Left Right   Flexion 5 5   Extension 5 5     Manual Muscle Testing - Ankle Left Right   Doriflexion 5 55   Plantarflexion 5 5        Transfers    Sit To Stand Independent   W/C To Bed Independent   Sit To Supine Independent   Roll Independent         Gait Assessment: forward trunk lean throughout gait cycle      Precautions: na    Daily Treatment Diary     Manual                                                                                   Exercise Diary                                                                                                                                                                                                                                                                                      Modalities

## 2018-12-10 NOTE — PROGRESS NOTES
Speech-Language Pathology Initial Evaluation    Today's date: 12/10/2018  Patients name: Stef Melgar  : 1935  MRN: [id number]   Safety measures: PD  Referring provider: Elver Judd MD    Subjective comments: "I'm fine "    How did the patient hear about us? Physician    Patient's goal(s): "so my wife can understand me"    Reason for referral: Change in vocal quality  Prior functional status: Communication effective and appropriate in all situations  Clinically complex situations: N/A    History: Patient is a 82 y o  male who was referred to outpatient skilled Speech Therapy services for a voice (LSVT LOUD) evaluation  Patient was recently dx with PD  Patient noted first symptom of L hand tremor May-2018, which worsens when he is resting his arm in the position of a chair or when fatigued  Patient reportedly notices it more at night  Patient reported that it does not bother him during activities/tasks  Changes also noted with handwriting ("   "worse" but "junkier" and he is not sure if smaller   ")  Per review of record, patient's wife tells him that he is talking more quietly  Patient indicated frustration with having to repeat himself  Patient indicated that his wife is also [place]  Patient with no significant c/o dysphagia  Patient reported that he takes his time with P O  Intake and "keeps a drink nearby" if needed to "wash something down"  No recent PNA or changes in respiratory function per patient report       Hearing: WFL during testing  Vision: WFL with reading glasses (cataract removed; "some macular degeneration" per patient report)    Home environment/lifestyle: Lives with wife  Highest level of education: BS Plus   Vocational status: Retired  ('s Ed)    Mental status: Alert  Behavior status: Cooperative  Communication modalities: Verbal  Rehabilitation prognosis: Good rehab potential to reach the established goals    Assessments    [facility] Treatment (LSVT) LOUD assessment:    Sound Level Meter-to-Mouth Distance: 50 cm throughout testing    Maximum Duration of Sustained Vowel Phonation (/ah/):   Average MDP 10 1 sec    Average Intensity 78 8 dB SPL       Maximum Fundamental Frequency Range:   Highest Pitch 318 0 Hz   Lowest Pitch 74 0 Hz   Average Intensity 79 5 dB SPL     Reading of Words:   Average Intensity  83 5 dB SPL     Reading of Phrases:   Average Intensity  83 1 dB SPL     Conversational Monologue:   Average Intensity  72 9 dB SPL     *Data gathered from reading and conversation tasks revealed vocal SPL levels that may reduce patients speech intelligibility and communicative effectiveness in his functional living environment  **STIMULABILITY TESTING TO [address] LOUD! **    Maximum Duration of Sustained Vowel Phonation (/ah/):   Average MDP 13 9 sec   Average Intensity 87 3 dB SPL       Maximum Fundamental Frequency Range:   Highest Pitch 353 5 Hz   Lowest Pitch 150 5 Hz   Average Intensity 77 05 dB SPL     Reading of Phrases:   Average Intensity  85 6 dB SPL     *Patient demonstrated vocal improvement with loudness and quality in all tasks when stimulated  Voice Handicap Index (VHI): The VHI is a list of 30 statements that many people have used to describe their voices and the effects of their voices on their lives  Patient indicated how frequently he has the same experience using a rating point scale (never = 0, almost never = 1, sometimes = 2, almost always = 3, and always = 4)  Results were as follows:    Subscale: Score: Self-Perceived Impairment Level:   Physical 7/40 Mild   Emotional 1/40 Mild   Functional 8/40 Mild        TOTAL 16/120 Mild     Goals  Short-term goals:  1  Patient will be educated on vocal hygiene and demonstrate understanding of recommendations to facilitate improved vocal quality (to be achieved in 1-2 weeks)       2  Patient will demonstrate understanding that his/her louder voice is WNL and will become comfortable increasing his/her phonatory effort (to be achieved in 4 weeks)  3  Patient will develop the ability to self-monitor adequate loudness levels in conversation to facilitate increased communication success (to be achieved in 4 weeks)  4  Patient will be able to increase his/her vocal loudness to reach a target sound pressure level of 75 dB SPL with paragraph-length reading material in order to increase vocal respiratory support required for functional communication (to be achieved in 4 weeks)  5  Patient will be able to increase his/her vocal loudness to reach a target sound pressure level of 75 dB SPL during brief conversational speech in order to increase vocal respiratory support required for functional communication (to be achieved in 4 weeks)       Long-Term Goals:  1  Patient will independently increase his/her vocal loudness to an appropriate level to be understood by others (to be achieved by discharge)      2  Patient will utilize strategies and exercises to increase vocal loudness and improve respiratory laryngeal coordination using the LSVT protocol (to be achieved by discharge)  Functional Limitations Reporting (G-codes):   Flowsheet Rows      Most Recent Value   SLP G-Codes   FOTO information reviewed  N/A   Assessment Type  Evaluation   Functional Limitations  Voice   Voice Current Status ()  CI   Voice Goal Status ()  CH        Impressions/Recommendations    Impressions: Patient presents with a mild voice disorder c/b reduced loudness and a hoarse vocal quality, which contributed to an overall decrease in speech intelligibility  During conversation, speech intelligibility is reduced 10% of the time  Based on stimulability testing, patient is an excellent candidate for the LSVT LOUD program       Patient would benefit from skilled speech/voice therapy (LSVT LOUD program) in the outpatient setting 4x/week for 4 weeks (consisting of 16 sessions)   Prognosis for improvement is good based on motivation, stimulability, and family support  Recommendations:  -Patient would benefit from outpatient skilled Speech Therapy services : LSVT LOUD program    -Frequency: 4x weekly  -Duration: 4-6 weeks    -Intervention certification from: 79/88/9311  -Intervention certification to: 80/97/0376    Visit Tracking:  -Referring provider: Eric Candelaria  -Billing guidelines: CMS  -Visit #1/10   -Medicare [employer]  -RE due 01/10/2019

## 2018-12-10 NOTE — LETTER
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    2  Parkinson's disease [facility]) G20        Dear Dr Severo Hutchinson:    Please review the attached Plan of Care from [facility] recent visit  Please verify that you agree therapy should continue by signing the attached document and sending it back to our office  If you have any questions or concerns, please don't hesitate to call  Sincerely,    Janey Hernandez, [address] [facility]      Referring Provider:     Based upon review of the patient's progress and continued therapy plan, it is my medical opinion that Brian Mcmillan should continue speech therapy treatment at the Physical Therapy at [address] [place] [address] Street:                    Bianka Gomez MD  [address] [facility]: [phone]        Speech-Language Pathology Initial Evaluation    Today's date: 12/10/2018  Patients name: Kirby Haq  : 1935  MRN: [id number]   Safety measures: PD  Referring provider: Kavon Elizabeth MD    Subjective comments: "I'm fine "    How did the patient hear about us? Physician    Patient's goal(s): "so my wife can understand me"    Reason for referral: Change in vocal quality  Prior functional status: Communication effective and appropriate in all situations  Clinically complex situations: N/A    History: Patient is a 82 y o  male who was referred to outpatient skilled Speech Therapy services for a voice (LSVT LOUD) evaluation  Patient was recently dx with PD  Patient noted first symptom of L hand tremor May-2018, which worsens when he is resting his arm in the position of a chair or when fatigued  Patient reportedly notices it more at night  Patient reported that it does not bother him during activities/tasks   Changes also noted with handwriting ("   "worse" but "junkier" and he is not sure if smaller   ")  Per review of record, patient's wife tells him that he is talking more quietly  Patient indicated frustration with having to repeat himself  Patient indicated that his wife is also [place]  Patient with no significant c/o dysphagia  Patient reported that he takes his time with P O  Intake and "keeps a drink nearby" if needed to "wash something down"  No recent PNA or changes in respiratory function per patient report  Hearing: WFL during testing  Vision: WFL with reading glasses (cataract removed; "some macular degeneration" per patient report)    Home environment/lifestyle: Lives with wife  Highest level of education: BS Plus   Vocational status: Retired  ('s Ed)    Mental status: Alert  Behavior status: Cooperative  Communication modalities: Verbal  Rehabilitation prognosis: Good rehab potential to reach the established goals    Assessments    [facility] (LSVT) LOUD assessment:    Sound Level Meter-to-Mouth Distance: 50 cm throughout testing    Maximum Duration of Sustained Vowel Phonation (/ah/):   Average MDP 10 1 sec    Average Intensity 78 8 dB SPL       Maximum Fundamental Frequency Range:   Highest Pitch 318 0 Hz   Lowest Pitch 74 0 Hz   Average Intensity 79 5 dB SPL     Reading of Words:   Average Intensity  83 5 dB SPL     Reading of Phrases:   Average Intensity  83 1 dB SPL     Conversational Monologue:   Average Intensity  72 9 dB SPL     *Data gathered from reading and conversation tasks revealed vocal SPL levels that may reduce patients speech intelligibility and communicative effectiveness in his functional living environment  **STIMULABILITY TESTING TO [address] LOUD! **    Maximum Duration of Sustained Vowel Phonation (/ah/):   Average MDP 13 9 sec   Average Intensity 87 3 dB SPL       Maximum Fundamental Frequency Range:   Highest Pitch 353 5 Hz   Lowest Pitch 150 5 Hz   Average Intensity 77 05 dB SPL     Reading of Phrases:   Average Intensity  85 6 dB SPL     *Patient demonstrated vocal improvement with loudness and quality in all tasks when stimulated  Voice Handicap Index (VHI): The VHI is a list of 30 statements that many people have used to describe their voices and the effects of their voices on their lives  Patient indicated how frequently he has the same experience using a rating point scale (never = 0, almost never = 1, sometimes = 2, almost always = 3, and always = 4)  Results were as follows:    Subscale: Score: Self-Perceived Impairment Level:   Physical 7/40 Mild   Emotional 1/40 Mild   Functional 8/40 Mild        TOTAL 16/120 Mild     Goals  Short-term goals:  1  Patient will be educated on vocal hygiene and demonstrate understanding of recommendations to facilitate improved vocal quality (to be achieved in 1-2 weeks)  2  Patient will demonstrate understanding that his/her louder voice is WNL and will become comfortable increasing his/her phonatory effort (to be achieved in 4 weeks)  3  Patient will develop the ability to self-monitor adequate loudness levels in conversation to facilitate increased communication success (to be achieved in 4 weeks)  4  Patient will be able to increase his/her vocal loudness to reach a target sound pressure level of 75 dB SPL with paragraph-length reading material in order to increase vocal respiratory support required for functional communication (to be achieved in 4 weeks)  5  Patient will be able to increase his/her vocal loudness to reach a target sound pressure level of 75 dB SPL during brief conversational speech in order to increase vocal respiratory support required for functional communication (to be achieved in 4 weeks)       Long-Term Goals:  1   Patient will independently increase his/her vocal loudness to an appropriate level to be understood by others (to be achieved by discharge)      2  Patient will utilize strategies and exercises to increase vocal loudness and improve respiratory laryngeal coordination using the LSVT protocol (to be achieved by discharge)  Functional Limitations Reporting (G-codes):   Flowsheet Rows      Most Recent Value   SLP G-Codes   FOTO information reviewed  N/A   Assessment Type  Evaluation   Functional Limitations  Voice   Voice Current Status ()  CI   Voice Goal Status ()  CH        Impressions/Recommendations    Impressions: Patient presents with a mild voice disorder c/b reduced loudness and a hoarse vocal quality, which contributed to an overall decrease in speech intelligibility  During conversation, speech intelligibility is reduced 10% of the time  Based on stimulability testing, patient is an excellent candidate for the LSVT LOUD program       Patient would benefit from skilled speech/voice therapy (LSVT LOUD program) in the outpatient setting 4x/week for 4 weeks (consisting of 16 sessions)  Prognosis for improvement is good based on motivation, stimulability, and family support  Recommendations:  -Patient would benefit from outpatient skilled Speech Therapy services : LSVT LOUD program    -Frequency: 4x weekly  -Duration: 4-6 weeks    -Intervention certification from: 14/31/2948  -Intervention certification to: 96/05/5760    Visit Tracking:  -Referring provider: Keyon Elliott  -Billing guidelines: CMS  -Visit #1/10   -Medicare  [employer]  -RE due 01/10/2019

## 2018-12-11 ENCOUNTER — OFFICE VISIT (OUTPATIENT)
Dept: SPEECH THERAPY | Facility: CLINIC | Age: 83
End: 2018-12-11
Payer: MEDICARE

## 2018-12-11 ENCOUNTER — OFFICE VISIT (OUTPATIENT)
Dept: PHYSICAL THERAPY | Facility: CLINIC | Age: 83
End: 2018-12-11
Payer: MEDICARE

## 2018-12-11 DIAGNOSIS — G20 PARKINSON'S DISEASE (HCC): Primary | ICD-10-CM

## 2018-12-11 DIAGNOSIS — G20 PARKINSON'S DISEASE (HCC): ICD-10-CM

## 2018-12-11 DIAGNOSIS — R49.9 UNSPECIFIED VOICE AND RESONANCE DISORDER: Primary | ICD-10-CM

## 2018-12-11 PROCEDURE — 97112 NEUROMUSCULAR REEDUCATION: CPT | Performed by: PHYSICAL THERAPIST

## 2018-12-11 PROCEDURE — 92507 TX SP LANG VOICE COMM INDIV: CPT

## 2018-12-11 PROCEDURE — 97116 GAIT TRAINING THERAPY: CPT | Performed by: PHYSICAL THERAPIST

## 2018-12-11 NOTE — PROGRESS NOTES
Daily Speech Treatment Note    Today's date: 2018  Patients name: Zamzam Olivier  : 1935  MRN: [id number]  Safety measures: PD  Referring provider: Harry Shultz MD    Primary Diagnosis/Billing code: Y68 9  Secondary Diagnosis/ Billing code: [address]    Visit Tracking:  -Referring provider: Betty German  -Billing guidelines: CMS  -Visit #2/10   -Medicare [employer]  -RE due 01/10/2019  Subjective/Behavioral:  -"Nice to meet you "    Objective/Assessment:  -Reviewed testing results and goals in plan care with patient  Patient is in agreement at this time  Short-term goals:  1  Patient will be educated on vocal hygiene and demonstrate understanding of recommendations to facilitate improved vocal quality (to be achieved in 1-2 weeks)  2  Patient will demonstrate understanding that his/her louder voice is WNL and will become comfortable increasing his/her phonatory effort (to be achieved in 4 weeks)  3  Patient will develop the ability to self-monitor adequate loudness levels in conversation to facilitate increased communication success (to be achieved in 4 weeks)  4  Patient will be able to increase his/her vocal loudness to reach a target sound pressure level of 75 dB SPL with paragraph-length reading material in order to increase vocal respiratory support required for functional communication (to be achieved in 4 weeks)       5  Patient will be able to increase his/her vocal loudness to reach a target sound pressure level of 75 dB SPL during brief conversational speech in order to increase vocal respiratory support required for functional communication (to be achieved in 4 weeks)         Daily Task #1  Maximum Duration & Intensity of Sustained LOUD /ah/ Phonation:  Average duration: 9 3 sec  Average intensity: 83 5 dB SPL  Perceived level of effort: 10/10  Cues for loudness: Minimal    Daily Task #2  Maximum Fundamental Frequency Range:  Average Pitch (high): 342 3 Hz  Average Pitch (low): 149 2 Hz  Perceived level of effort: 8/10  Cues for loudness: Min-Mod  Cues for pitch: Moderate    Daily Task #3  Maximum Speech Loudness Drill of Functional Phrases:  Average intensity: 74 2 dB SPL  Perceived level of effort: 8/10  Cues for loudness: Minimal    Daily Task #4  Hierarchal Speech Loudness Drill (Word Level): Average intensity: 73 7 dB SPL  Perceived level of effort: 9/10  Cues for loudness: Min    Average intensity during spontaneous "off the cuff" questions: 66  8dB SPL    Plan:  -Patient was provided with home exercises/activities to target goals in plan of care at the end of today's session   -Continue with current plan of care

## 2018-12-11 NOTE — PROGRESS NOTES
Daily Note     Today's date: 2018  Patient name: Marli Marshall  : 1935  MRN: [id number]  Referring provider: Kian Martinez MD  Dx:   Encounter Diagnosis     ICD-10-CM    1  Parkinson's disease ([facility] [address] ) G20                   Subjective: Reports feeling well today no changes since last visit  Objective: See treatment diary below    LSVT BIG Daily Treatment Diary      Carryover Assignment             Big walking while walking to restaurant later                                                                         Max Daily Exercises             Floor to Ceiling  10x           Side to Side  10x ea            Forward Step and Reach  10x            Sideways Step and Reach  10x           Backwards Step and Reach  10x           Forwards Rock and Reach  10x            Sideways Rock and Reach  10x            Functional Components             1  STS  10x 2            2  Hand writting  On wide ruled paper  4 min           3              4              5              Hierarchies             1              2              BIG walking               In enrique, 2# ankles, 1# wrists, 10 min                                             Assessment: Tolerated treatment fair today  Rest breaks were needed between sets of exercises due to fatigue, but was able to complete session  Visual, verbal, tactile cues needed to perform exercises big  Demonstrates decreased arm swing on R, states this is due to previous shoulder surgeries  Would benefit from continued PT with LSVT BIG program        Plan: Continue POC progress as tolerated

## 2018-12-12 ENCOUNTER — TRANSCRIBE ORDERS (OUTPATIENT)
Dept: SPEECH THERAPY | Facility: CLINIC | Age: 83
End: 2018-12-12

## 2018-12-12 ENCOUNTER — OFFICE VISIT (OUTPATIENT)
Dept: PHYSICAL THERAPY | Facility: CLINIC | Age: 83
End: 2018-12-12
Payer: MEDICARE

## 2018-12-12 ENCOUNTER — OFFICE VISIT (OUTPATIENT)
Dept: SPEECH THERAPY | Facility: CLINIC | Age: 83
End: 2018-12-12
Payer: MEDICARE

## 2018-12-12 DIAGNOSIS — R49.9 UNSPECIFIED VOICE AND RESONANCE DISORDER: Primary | ICD-10-CM

## 2018-12-12 DIAGNOSIS — G20 PARKINSON'S DISEASE (HCC): Primary | ICD-10-CM

## 2018-12-12 DIAGNOSIS — G20 PARKINSON DISEASE (HCC): ICD-10-CM

## 2018-12-12 DIAGNOSIS — G20 PARKINSON'S DISEASE (HCC): ICD-10-CM

## 2018-12-12 PROCEDURE — 97112 NEUROMUSCULAR REEDUCATION: CPT | Performed by: PHYSICAL THERAPIST

## 2018-12-12 PROCEDURE — 92507 TX SP LANG VOICE COMM INDIV: CPT

## 2018-12-12 PROCEDURE — 97116 GAIT TRAINING THERAPY: CPT | Performed by: PHYSICAL THERAPIST

## 2018-12-12 NOTE — PROGRESS NOTES
Daily Speech Treatment Note    Today's date: 2018  Patients name: Kirby Haq  : 1935  MRN: [id number]  Safety measures: PD  Referring provider: Kavon Elizabeth MD    Primary Diagnosis/Billing code: I01 8  Secondary Diagnosis/ Billing code: [address]    Visit Tracking:  -Referring provider: Non-Epic  -Billing guidelines: CMS  -Visit #3/10   -Medicare [employer]  -RE due 01/10/2019  Subjective/Behavioral:  -"I think my voice is better already "     Objective/Assessment:  Education provided on adequate water intake- patient reports he is currently drinking >64oz daily  Education was also provided on HEP with tracking worksheets provided and instructions to return to therapy with them  Reciprocal comprehension was verbally expressed  Short-term goals:  1  Patient will be educated on vocal hygiene and demonstrate understanding of recommendations to facilitate improved vocal quality (to be achieved in 1-2 weeks)  2  Patient will demonstrate understanding that his/her louder voice is WNL and will become comfortable increasing his/her phonatory effort (to be achieved in 4 weeks)  3  Patient will develop the ability to self-monitor adequate loudness levels in conversation to facilitate increased communication success (to be achieved in 4 weeks)  4  Patient will be able to increase his/her vocal loudness to reach a target sound pressure level of 75 dB SPL with paragraph-length reading material in order to increase vocal respiratory support required for functional communication (to be achieved in 4 weeks)       5  Patient will be able to increase his/her vocal loudness to reach a target sound pressure level of 75 dB SPL during brief conversational speech in order to increase vocal respiratory support required for functional communication (to be achieved in 4 weeks)         Daily Task #1  Maximum Duration & Intensity of Sustained LOUD /ah/ Phonation:  Average duration: 10 sec  Average intensity: 83 6 dB SPL  Perceived level of effort: 9/10  Cues for loudness: Min-mod    Daily Task #2  Maximum Fundamental Frequency Range:  Average Pitch (high): 292 6 Hz  Average Pitch (low): 149 3 Hz  Perceived level of effort: 9/10  Cues for loudness: Min-Mod  Cues for pitch: Mod-max    Daily Task #3  Maximum Speech Loudness Drill of Functional Phrases:  Average intensity: 75 8 dB SPL  Perceived level of effort: 8/10  Cues for loudness: Minimal- none    Daily Task #4  Hierarchal Speech Loudness Drill (Word Level): Average intensity: 76 4 dB SPL  Perceived level of effort: 9/10  Cues for loudness: Min- none    Average intensity during spontaneous "off the cuff" questions: 66 5 dB SPL    Plan:  -Patient was provided with home exercises/activities to target goals in plan of care at the end of today's session   -Continue with current plan of care       DEANDRE Lundy , [address] [place] [address] Language Pathologist   [id number]

## 2018-12-12 NOTE — PROGRESS NOTES
Daily Note     Today's date: 2018  Patient name: Jeannie Christianson  : 1935  MRN: [id number]  Referring provider: Kiko Koroma MD  Dx:   Encounter Diagnosis     ICD-10-CM    1  Parkinson's disease ([address] ) G20                   Subjective: Reports feeling tired today from session yesterday  Was able to do about half his HEP at home last night  Objective: See treatment diary below    LSVT BIG Daily Treatment Diary      Carryover Assignment             Big walking while walking to restaurant later                                                                         Max Daily Exercises           Floor to Ceiling  10x  10x          Side to Side  10x ea   10x ea         Forward Step and Reach  10x   10x ea         Sideways Step and Reach  10x  10x ea         Backwards Step and Reach  10x  10x ea         Forwards Rock and Reach  10x   10x ea         Sideways Rock and Reach  10x   10x ea         Functional Components    10x ea         1  STS  10x 2   10x2         2  Hand writting  On wide ruled paper  4 min  On wide ruled paper  4 min         3  Step Ups    10x2 ea LE, opposite arm swing, 8in step          4              5              Hierarchies             1              2              BIG walking               In enrique, 2# ankles, 1# wrists, 10 min  In enrique, 2# ankles, 1# wrists, 10 min    Around clinic in tight spaces, backwards and side stepping                                           Assessment: Tolerated treatment fair today  Needed rest breaks between sets due to fatigue  Cues needed with daily exercises about 80% of time  Performed well with big walking only needing minimal cueing  Would benefit from continued PT with LSVT BIG program        Plan: Continue POC progress as tolerated

## 2018-12-13 ENCOUNTER — APPOINTMENT (OUTPATIENT)
Dept: PHYSICAL THERAPY | Facility: CLINIC | Age: 83
End: 2018-12-13
Payer: MEDICARE

## 2018-12-14 ENCOUNTER — OFFICE VISIT (OUTPATIENT)
Dept: PHYSICAL THERAPY | Facility: CLINIC | Age: 83
End: 2018-12-14
Payer: MEDICARE

## 2018-12-14 ENCOUNTER — OFFICE VISIT (OUTPATIENT)
Dept: SPEECH THERAPY | Facility: CLINIC | Age: 83
End: 2018-12-14
Payer: MEDICARE

## 2018-12-14 DIAGNOSIS — G20 PARKINSON'S DISEASE (HCC): ICD-10-CM

## 2018-12-14 DIAGNOSIS — R49.9 UNSPECIFIED VOICE AND RESONANCE DISORDER: Primary | ICD-10-CM

## 2018-12-14 DIAGNOSIS — G20 PARKINSON'S DISEASE (HCC): Primary | ICD-10-CM

## 2018-12-14 PROCEDURE — 92507 TX SP LANG VOICE COMM INDIV: CPT | Performed by: SPEECH-LANGUAGE PATHOLOGIST

## 2018-12-14 PROCEDURE — 97116 GAIT TRAINING THERAPY: CPT | Performed by: PHYSICAL THERAPIST

## 2018-12-14 PROCEDURE — 97112 NEUROMUSCULAR REEDUCATION: CPT | Performed by: PHYSICAL THERAPIST

## 2018-12-14 NOTE — PROGRESS NOTES
Daily Speech Treatment Note    Today's date: 2018   Patients name: Erick Prasad  : 1935  MRN: [id number]  Safety measures: PD  Referring provider: Jessica Quintana MD    Primary Diagnosis/Billing code: R36 4  Secondary Diagnosis/ Billing code: [address]    Visit Tracking:  -Referring provider: Yojana Singh  -Billing guidelines: CMS  -Visit #4/10   -Medicare [facility]  -RE due 01/10/2019  Subjective/Behavioral:  -"Sorry, I was on the phone " (Patient was seen for a 45-minute session today )    Objective/Assessment:    Short-term goals:  1  Patient will be educated on vocal hygiene and demonstrate understanding of recommendations to facilitate improved vocal quality (to be achieved in 1-2 weeks)  2  Patient will demonstrate understanding that his/her louder voice is WNL and will become comfortable increasing his/her phonatory effort (to be achieved in 4 weeks)  3  Patient will develop the ability to self-monitor adequate loudness levels in conversation to facilitate increased communication success (to be achieved in 4 weeks)  4  Patient will be able to increase his/her vocal loudness to reach a target sound pressure level of 75 dB SPL with paragraph-length reading material in order to increase vocal respiratory support required for functional communication (to be achieved in 4 weeks)  5  Patient will be able to increase his/her vocal loudness to reach a target sound pressure level of 75 dB SPL during brief conversational speech in order to increase vocal respiratory support required for functional communication (to be achieved in 4 weeks)     The following data was collected using [address] [place] [address]  Sound level meter-to-mouth distance: 50 cm      Daily Task #1  Maximum Duration & Intensity of Sustained LOUD /ah/ Phonation:  Average duration: 10 25 sec  Average intensity: 88 1 dB SPL  Perceived level of effort: 9/10  Cues for loudness: Minimal-none    Daily Task #2  Maximum Fundamental Frequency Range:  Average Pitch (high): 310 8 Hz  Average Pitch (low): 130 3 Hz  Perceived level of effort: 8/10  Cues for loudness: Minimal-none  Cues for pitch: Minimal-moderate    Daily Task #3  Maximum Speech Loudness Drill of Functional Phrases:  Average intensity: 82 0 dB SPL  Perceived level of effort: 9/10  Cues for loudness: Minimal-none    Daily Task #4  Hierarchal Speech Loudness Drill (Word Level): Average intensity: 82 0 dB SPL  Perceived level of effort: 9/10  Cues for loudness: Minimal-none    Average intensity during spontaneous "off the cuff" questions: 69 9 dB SPL    Plan:  -Patient was provided with home exercises/activities to target goals in plan of care at the end of today's session   -Continue with current plan of care

## 2018-12-14 NOTE — PROGRESS NOTES
Daily Note     Today's date: 2018  Patient name: Paola Doyle  : 1935  MRN: [id number]  Referring provider: Maty Stephens MD  Dx:   Encounter Diagnosis     ICD-10-CM    1  Parkinson's disease ([facility] [address] ) G20                   Subjective: Reports feeling stiff today from workouts  Objective: See treatment diary below    LSVT BIG Daily Treatment Diary      Carryover Assignment             Big walking while walking to restaurant later                                                                         Max Daily Exercises         Floor to Ceiling  10x  10x   10x        Side to Side  10x ea   10x ea  10x ea       Forward Step and Reach  10x   10x ea  10x ea       Sideways Step and Reach  10x  10x ea  10x ea       Backwards Step and Reach  10x  10x ea  10x ea       Forwards Rock and Reach  10x   10x ea  10x ea       Sideways Rock and Reach  10x   10x ea  10x ea       Functional Components            1  STS  10x 2   10x2  10x2        2  Hand writting  On wide ruled paper  4 min  On wide ruled paper  4 min  On wide ruled paper  4 min       3  Step Ups    10x2 ea LE, opposite arm swing, 8in step   10x2 ea LE, opposite arm swing, 8in step        4              5              Hierarchies             1              2              BIG walking               In enrique, 2# ankles, 1# wrists, 10 min  In enrique, 2# ankles, 1# wrists, 10 min    Around clinic in tight spaces, backwards and side stepping  In enrique, 2# ankles, 1# wrists, 10 min    Around clinic in tight spaces, backwards and side stepping                                         Assessment: Tolerated treatment well today  Still needs rest breaks between exercises due to fatigue, but was able to continue with shorter breaks today  Cues and shaping needed about 80% of time with daily exercises, but demonstrates good gait pattern with walking with occasional cues needed   Would benefit from continued PT with LSVT BIG program  Plan: Continue POC progress as tolerated

## 2018-12-14 NOTE — PROGRESS NOTES
Daily Speech Treatment Note    Today's date: 2018   Patients name: Mayra Gordillo  : 1935  MRN: [id number]  Safety measures: PD  Referring provider: Isi Adams MD    Primary Diagnosis/Billing code: K22 3  Secondary Diagnosis/ Billing code: Matt Figueroa    Visit Tracking:  -Referring provider: Hailee John  -Billing guidelines: CMS  -Visit #5/10    -Medicare  [facility]  -RE due 01/10/2019  Subjective/Behavioral:  -"I'm fine "    Objective/Assessment:    Short-term goals:  1  Patient will be educated on vocal hygiene and demonstrate understanding of recommendations to facilitate improved vocal quality (to be achieved in 1-2 weeks)  2  Patient will demonstrate understanding that his/her louder voice is WNL and will become comfortable increasing his/her phonatory effort (to be achieved in 4 weeks)  3  Patient will develop the ability to self-monitor adequate loudness levels in conversation to facilitate increased communication success (to be achieved in 4 weeks)  4  Patient will be able to increase his/her vocal loudness to reach a target sound pressure level of 75 dB SPL with paragraph-length reading material in order to increase vocal respiratory support required for functional communication (to be achieved in 4 weeks)  5  Patient will be able to increase his/her vocal loudness to reach a target sound pressure level of 75 dB SPL during brief conversational speech in order to increase vocal respiratory support required for functional communication (to be achieved in 4 weeks)     The following data was collected using [address] [place] [address]  Sound level meter-to-mouth distance: 50 cm      Daily Task #1  Maximum Duration & Intensity of Sustained LOUD /ah/ Phonation:  Average duration: 9 6 sec  Average intensity: 90 8 dB SPL  Perceived level of effort: 9/10  Cues for loudness: Minimal-none    Daily Task #2  Maximum Fundamental Frequency Range:  Average Pitch (high): 295 4 Hz  Average Pitch (low): 130 4 Hz  Perceived level of effort: 8/10  Cues for loudness: Minimal-none  Cues for pitch: Minimal    Daily Task #3  Maximum Speech Loudness Drill of Functional Phrases:  Average intensity: 85 0 dB SPL  Perceived level of effort: 9/10  Cues for loudness: Minimal-none    Daily Task #4  Hierarchal Speech Loudness Drill (Word Level): Average intensity: 82 9 dB SPL  Perceived level of effort: 9/10  Cues for loudness: Minimal-none    Average intensity during spontaneous "off the cuff" questions: 68 5 dB SPL    Plan:  -Patient was provided with home exercises/activities to target goals in plan of care at the end of today's session   -Continue with current plan of care

## 2018-12-17 ENCOUNTER — OFFICE VISIT (OUTPATIENT)
Dept: PHYSICAL THERAPY | Facility: CLINIC | Age: 83
End: 2018-12-17
Payer: MEDICARE

## 2018-12-17 ENCOUNTER — OFFICE VISIT (OUTPATIENT)
Dept: SPEECH THERAPY | Facility: CLINIC | Age: 83
End: 2018-12-17
Payer: MEDICARE

## 2018-12-17 DIAGNOSIS — R49.9 UNSPECIFIED VOICE AND RESONANCE DISORDER: Primary | ICD-10-CM

## 2018-12-17 DIAGNOSIS — G20 PARKINSON'S DISEASE (HCC): ICD-10-CM

## 2018-12-17 DIAGNOSIS — G20 PARKINSON'S DISEASE (HCC): Primary | ICD-10-CM

## 2018-12-17 PROCEDURE — 97116 GAIT TRAINING THERAPY: CPT | Performed by: PHYSICAL THERAPIST

## 2018-12-17 PROCEDURE — 97112 NEUROMUSCULAR REEDUCATION: CPT | Performed by: PHYSICAL THERAPIST

## 2018-12-17 PROCEDURE — 92507 TX SP LANG VOICE COMM INDIV: CPT | Performed by: SPEECH-LANGUAGE PATHOLOGIST

## 2018-12-17 NOTE — PROGRESS NOTES
Daily Note     Today's date: 2018  Patient name: Paola Doyle  : 1935  MRN: [id number]  Referring provider: Maty Stephens MD  Dx:   Encounter Diagnosis     ICD-10-CM    1  Parkinson's disease ([facility] [address] ) G20                   Subjective:Reports no new changes today feeling well    Objective: See treatment diary below    LSVT BIG Daily Treatment Diary      Carryover Assignment             Big walking while walking to restaurant later                                                                         Max Daily Exercises       Floor to Ceiling  10x  10x   10x   10x     Side to Side  10x ea   10x ea  10x ea  10x     Forward Step and Reach  10x   10x ea  10x ea  10x     Sideways Step and Reach  10x  10x ea  10x ea  10x     Backwards Step and Reach  10x  10x ea  10x ea  10x     Forwards Rock and Reach  10x   10x ea  10x ea  10x     Sideways Rock and Reach  10x   10x ea  10x ea  10x     Functional Components            1  STS  10x 2   10x2  10x2   10x     2  Hand writting  On wide ruled paper  4 min  On wide ruled paper  4 min  On wide ruled paper  4 min  on lined paper     3  Step Ups    10x2 ea LE, opposite arm swing, 8in step   10x2 ea LE, opposite arm swing, 8in step   8" 10x fwd, 10x lat     4              5              Hierarchies             1              2              BIG walking               In enrique, 2# ankles, 1# wrists, 10 min  In enrique, 2# ankles, 1# wrists, 10 min    Around clinic in tight spaces, backwards and side stepping  In enrique, 2# ankles, 1# wrists, 10 min    Around clinic in tight spaces, backwards and side stepping  10 mintreadmill                                        Assessment:Session completed with 2# wts on ankles  Pt required frequent seated rest breaks between maximal daily exercises  Plan: Continue POC progress as tolerated

## 2018-12-18 ENCOUNTER — OFFICE VISIT (OUTPATIENT)
Dept: SPEECH THERAPY | Facility: CLINIC | Age: 83
End: 2018-12-18
Payer: MEDICARE

## 2018-12-18 ENCOUNTER — OFFICE VISIT (OUTPATIENT)
Dept: PHYSICAL THERAPY | Facility: CLINIC | Age: 83
End: 2018-12-18
Payer: MEDICARE

## 2018-12-18 DIAGNOSIS — G20 PARKINSON'S DISEASE (HCC): ICD-10-CM

## 2018-12-18 DIAGNOSIS — R49.9 UNSPECIFIED VOICE AND RESONANCE DISORDER: Primary | ICD-10-CM

## 2018-12-18 DIAGNOSIS — G20 PARKINSON'S DISEASE (HCC): Primary | ICD-10-CM

## 2018-12-18 PROCEDURE — 97112 NEUROMUSCULAR REEDUCATION: CPT | Performed by: PHYSICAL THERAPIST

## 2018-12-18 PROCEDURE — 92507 TX SP LANG VOICE COMM INDIV: CPT

## 2018-12-18 PROCEDURE — 97110 THERAPEUTIC EXERCISES: CPT | Performed by: PHYSICAL THERAPIST

## 2018-12-18 NOTE — PROGRESS NOTES
Daily Speech Treatment Note    Today's date: 2018   Patients name: Kaden Elliott  : 1935  MRN: [id number]  Safety measures: PD  Referring provider: Henrry Oswald MD    Primary Diagnosis/Billing code: X13 7  Secondary Diagnosis/ Billing code: [address]    Visit Tracking:  -Referring provider: Non-Epic  -Billing guidelines: CMS  -Visit #6/10    -Medicare  [facility] [facility]  -RE due 01/10/2019  Subjective/Behavioral:  -"I'm good "    Objective/Assessment: Pt reports completing LSVT exercises at home, he communicated his wife is noticing an increased loudness in his voice  Short-term goals:  1  Patient will be educated on vocal hygiene and demonstrate understanding of recommendations to facilitate improved vocal quality (to be achieved in 1-2 weeks)  2  Patient will demonstrate understanding that his/her louder voice is WNL and will become comfortable increasing his/her phonatory effort (to be achieved in 4 weeks)  3  Patient will develop the ability to self-monitor adequate loudness levels in conversation to facilitate increased communication success (to be achieved in 4 weeks)  4  Patient will be able to increase his/her vocal loudness to reach a target sound pressure level of 75 dB SPL with paragraph-length reading material in order to increase vocal respiratory support required for functional communication (to be achieved in 4 weeks)  5  Patient will be able to increase his/her vocal loudness to reach a target sound pressure level of 75 dB SPL during brief conversational speech in order to increase vocal respiratory support required for functional communication (to be achieved in 4 weeks)     The following data was collected using [address] [place] [address]  Sound level meter-to-mouth distance: 50 cm      Daily Task #1  Maximum Duration & Intensity of Sustained LOUD /ah/ Phonation:  Average duration: 9 8 sec  Average intensity: 87 6 dB SPL  Perceived level of effort: 10/10  Cues for loudness: Minimal-none    Daily Task #2  Maximum Fundamental Frequency Range:  Average Pitch (high): 300 6 Hz  Average Pitch (low): 132 0 Hz  Perceived level of effort: 8/10  Cues for loudness: Minimal-none  Cues for pitch: Minimal    Daily Task #3  Maximum Speech Loudness Drill of Functional Phrases:  Average intensity: 87 7 dB SPL  Perceived level of effort: 9/10  Cues for loudness: Minimal-none    Daily Task #4  Hierarchal Speech Loudness Drill (Word Level): Average intensity: 85 0 dB SPL  Perceived level of effort: 9/10  Cues for loudness: Minimal-none    Average intensity during spontaneous "off the cuff" questions: 70  dB SPL    Plan:  -Patient was provided with home exercises/activities to target goals in plan of care at the end of today's session   -Continue with current plan of care

## 2018-12-18 NOTE — PROGRESS NOTES
Daily Note     Today's date: 2018  Patient name: Mayra Gordillo  : 1935  MRN: [id number]  Referring provider: Isi Adams MD  Dx:   Encounter Diagnosis     ICD-10-CM    1  Parkinson's disease ([facility] [address] ) G20                   Subjective:Feeling stiff after last session    Objective: See treatment diary below    LSVT BIG Daily Treatment Diary      Carryover Assignment             Big walking while walking to restaurant later                                                                         Max Daily Exercises     Floor to Ceiling  10x  10x   10x   10x  10x   Side to Side  10x ea   10x ea  10x ea  10x  10x   Forward Step and Reach  10x   10x ea  10x ea  10x  10x   Sideways Step and Reach  10x  10x ea  10x ea  10x  10x   Backwards Step and Reach  10x  10x ea  10x ea  10x  10x   Forwards Rock and Reach  10x   10x ea  10x ea  10x  10x   Sideways Rock and Reach  10x   10x ea  10x ea  10x  10x   Functional Components            1  STS  10x 2   10x2  10x2   10x  10x   2  Hand writting  On wide ruled paper  4 min  On wide ruled paper  4 min  On wide ruled paper  4 min  on lined paper  On lined paper   3  Step Ups    10x2 ea LE, opposite arm swing, 8in step   10x2 ea LE, opposite arm swing, 8in step   8" 10x fwd, 10x lat 8" 10xfwd 10xlat   4  Marches         2# ankle wts with reciprocal arm reach   5              Hierarchies             1              2              BIG walking               In enrique, 2# ankles, 1# wrists, 10 min  In enrique, 2# ankles, 1# wrists, 10 min    Around clinic in tight spaces, backwards and side stepping  In enrique, 2# ankles, 1# wrists, 10 min    Around clinic in tight spaces, backwards and side stepping  10 mintreadmill   10 min treadmill                                      Assessment: Pt did exercises slightly slower but was able to complete them with fewer rest breaks between individual exercises   Patient demonstrated good reciprocal coordinationwith barry this session  Plan: Continue POC progress as tolerated

## 2018-12-19 ENCOUNTER — OFFICE VISIT (OUTPATIENT)
Dept: SPEECH THERAPY | Facility: CLINIC | Age: 83
End: 2018-12-19
Payer: MEDICARE

## 2018-12-19 ENCOUNTER — OFFICE VISIT (OUTPATIENT)
Dept: PHYSICAL THERAPY | Facility: CLINIC | Age: 83
End: 2018-12-19
Payer: MEDICARE

## 2018-12-19 DIAGNOSIS — G20 PARKINSON'S DISEASE (HCC): ICD-10-CM

## 2018-12-19 DIAGNOSIS — R49.9 UNSPECIFIED VOICE AND RESONANCE DISORDER: Primary | ICD-10-CM

## 2018-12-19 DIAGNOSIS — G20 PARKINSON'S DISEASE (HCC): Primary | ICD-10-CM

## 2018-12-19 PROCEDURE — 92507 TX SP LANG VOICE COMM INDIV: CPT | Performed by: SPEECH-LANGUAGE PATHOLOGIST

## 2018-12-19 PROCEDURE — 97110 THERAPEUTIC EXERCISES: CPT | Performed by: PHYSICAL THERAPIST

## 2018-12-19 PROCEDURE — 97112 NEUROMUSCULAR REEDUCATION: CPT | Performed by: PHYSICAL THERAPIST

## 2018-12-19 NOTE — PROGRESS NOTES
Daily Note     Today's date: 2018  Patient name: Kacey Márquez  : 1935  MRN: [id number]  Referring provider: Jerry Olvera MD  Dx:   Encounter Diagnosis     ICD-10-CM    1  Parkinson's disease ([facility] [address] ) G20                   Subjective:Feeling stiff after last session    Objective: See treatment diary below    LSVT BIG Daily Treatment Diary      Carryover Assignment             Big walking while walking to restaurant later                                                                         Max Daily Exercises    Floor to Ceiling 10x     10x   Side to Side 10x     10x   Forward Step and Reach 10x     10x   Sideways Step and Reach 10x     10x   Backwards Step and Reach 10x     10x   Forwards Rock and Reach 10x     10x   Sideways Rock and Reach 10x     10x   Functional Components         1  STS 10x     10x   2  Hand writting      On lined paper   3  Step Ups 8" fwd lat 10x ea    8" 10xfwd 10xlat   4  Marches 3laps with alt arm reach     2# ankle wts with reciprocal arm reach   5 clips Blue and black 2x ea UE        Hierarchies         1          2          BIG walking          NUstep 12 min (no treadmill or hallway available)     10 min treadmill                                      Assessment: Needs continued cues for exercieses for sequence and form  Continues to be fatigued during exercise and needing several rest breaks between exercise  Plan: Continue POC progress as tolerated

## 2018-12-19 NOTE — PROGRESS NOTES
Daily Speech Treatment Note    Today's date: 2018   Patients name: Awa Hassan  : 1935  MRN: [id number]  Safety measures: PD  Referring provider: Carola Pena MD    Primary Diagnosis/Billing code: M56 5  Secondary Diagnosis/ Billing code: [address]    Visit Tracking:  -Referring provider: Non-Epic  -Billing guidelines: CMS  -Visit #7/10    -Medicare  [facility]  -RE due 01/10/2019  Subjective/Behavioral:  -"I'm good "    Patient was observed to speak with his LOUD voice with another client and his wife in the PT gym today--demonstrated good carryover of strategy  Objective/Assessment:    Short-term goals:  1  Patient will be educated on vocal hygiene and demonstrate understanding of recommendations to facilitate improved vocal quality (to be achieved in 1-2 weeks)  2  Patient will demonstrate understanding that his/her louder voice is WNL and will become comfortable increasing his/her phonatory effort (to be achieved in 4 weeks)  3  Patient will develop the ability to self-monitor adequate loudness levels in conversation to facilitate increased communication success (to be achieved in 4 weeks)  4  Patient will be able to increase his/her vocal loudness to reach a target sound pressure level of 75 dB SPL with paragraph-length reading material in order to increase vocal respiratory support required for functional communication (to be achieved in 4 weeks)  5  Patient will be able to increase his/her vocal loudness to reach a target sound pressure level of 75 dB SPL during brief conversational speech in order to increase vocal respiratory support required for functional communication (to be achieved in 4 weeks)     The following data was collected using [address] [place] [address]  Sound level meter-to-mouth distance: 50 cm      Daily Task #1  Maximum Duration & Intensity of Sustained LOUD /ah/ Phonation:  Average duration: 9 9 sec  Average intensity: 95 8 dB SPL  Perceived level of effort: 10/10  Cues for loudness: Minimal-none    Daily Task #2  Maximum Fundamental Frequency Range:  Average Pitch (high): 361 4 Hz  Average Pitch (low): 132 8 Hz  Perceived level of effort: 8/10  Cues for loudness: Minimal-none  Cues for pitch: Minimal-none    Daily Task #3  Maximum Speech Loudness Drill of Functional Phrases:  Average intensity: 85 7 dB SPL  Perceived level of effort: 9/10  Cues for loudness: Minimal-none    Daily Task #4  Hierarchal Speech Loudness Drill (Sentence Level): Average intensity: 82 6 dB SPL  Perceived level of effort: 9/10  Cues for loudness: Minimal-none    Average intensity during spontaneous "off the cuff" questions: 69 3 dB SPL    Plan:  -Patient was provided with home exercises/activities to target goals in plan of care at the end of today's session   -Continue with current plan of care

## 2018-12-20 ENCOUNTER — OFFICE VISIT (OUTPATIENT)
Dept: PHYSICAL THERAPY | Facility: CLINIC | Age: 83
End: 2018-12-20
Payer: MEDICARE

## 2018-12-20 ENCOUNTER — OFFICE VISIT (OUTPATIENT)
Dept: SPEECH THERAPY | Facility: CLINIC | Age: 83
End: 2018-12-20
Payer: MEDICARE

## 2018-12-20 DIAGNOSIS — G20 PARKINSON'S DISEASE (HCC): Primary | ICD-10-CM

## 2018-12-20 DIAGNOSIS — G20 PARKINSON'S DISEASE (HCC): ICD-10-CM

## 2018-12-20 DIAGNOSIS — R49.9 UNSPECIFIED VOICE AND RESONANCE DISORDER: Primary | ICD-10-CM

## 2018-12-20 PROCEDURE — 92507 TX SP LANG VOICE COMM INDIV: CPT

## 2018-12-20 PROCEDURE — 97112 NEUROMUSCULAR REEDUCATION: CPT | Performed by: PHYSICAL THERAPIST

## 2018-12-20 PROCEDURE — 97116 GAIT TRAINING THERAPY: CPT | Performed by: PHYSICAL THERAPIST

## 2018-12-20 NOTE — PROGRESS NOTES
Daily Note     Today's date: 2018  Patient name: Nisha Srivastava  : 1935  MRN: [id number]  Referring provider: James Santos MD  Dx:   Encounter Diagnosis     ICD-10-CM    1  Parkinson's disease ([facility] [address] ) G20                   Subjective: Arrived 6 min late for appointment  Feeling well today  Objective: See treatment diary below    LSVT BIG Daily Treatment Diary      Carryover Assignment             Big walking while walking to restaurant later                                                                         Max Daily Exercises    Floor to Ceiling 10x 23D with finger flicks     68G   Side to Side 10x 10x    10x   Forward Step and Reach 10x 10x    10x   Sideways Step and Reach 10x 10x    10x   Backwards Step and Reach 10x 10x    10x   Forwards Rock and Reach 10x 10x    10x   Sideways Rock and Reach 10x 10x    10x   Functional Components         1  STS 10x 10x    10x   2  Hand writting  Standing on foam, lined paper, 4 min    On lined paper   3  Step Ups 8" fwd lat 10x ea 8" fwd 10x ea   8" 10xfwd 10xlat   4  Marches 3laps with alt arm reach  3laps with alt arm reach    2# ankle wts with reciprocal arm reach   5 clips Blue and black 2x ea UE Blue and black 2x ea UE, 2 min     Standing on foam       Hierarchies         1          2          BIG walking          NUstep 12 min (no treadmill or hallway available) 10 min around clinic, 2lbs ankles, 1lbs wrists  Also backwards walking and side stepping with big arms     10 min treadmill                                      Assessment: Performed well today with less verbal/visual cues needed to perform daily exercises properly, cues needed about 50% of time  Still challenged with backwards step and reach and coordinating arm swing with marching  Rest breaks needed due to fatigue  Would benefit from continued PT  Plan: Continue POC progress as tolerated

## 2018-12-20 NOTE — PROGRESS NOTES
Daily Speech Treatment Note    Today's date: 2018   Patients name: Haylie Oleary  : 1935  MRN: [id number]  Safety measures: PD  Referring provider: Philly Forrest MD    Primary Diagnosis/Billing code: G82 3  Secondary Diagnosis/ Billing code: Andrzej Win    Visit Tracking:  -Referring provider: Non-Epic  -Billing guidelines: CMS  -Visit #8/10    -Medicare  [facility]  -RE due 01/10/2019  Subjective/Behavioral:  -"I'm good "      Objective/Assessment:    Short-term goals:  1  Patient will be educated on vocal hygiene and demonstrate understanding of recommendations to facilitate improved vocal quality (to be achieved in 1-2 weeks)  2  Patient will demonstrate understanding that his/her louder voice is WNL and will become comfortable increasing his/her phonatory effort (to be achieved in 4 weeks)  3  Patient will develop the ability to self-monitor adequate loudness levels in conversation to facilitate increased communication success (to be achieved in 4 weeks)  4  Patient will be able to increase his/her vocal loudness to reach a target sound pressure level of 75 dB SPL with paragraph-length reading material in order to increase vocal respiratory support required for functional communication (to be achieved in 4 weeks)  5  Patient will be able to increase his/her vocal loudness to reach a target sound pressure level of 75 dB SPL during brief conversational speech in order to increase vocal respiratory support required for functional communication (to be achieved in 4 weeks)     The following data was collected using [address] [place] [address]  Sound level meter-to-mouth distance: 50 cm      Daily Task #1  Maximum Duration & Intensity of Sustained LOUD /ah/ Phonation:  Average duration: 9 9 sec  Average intensity: 79 6 dB SPL  Perceived level of effort: 10/10  Cues for loudness: Minimal-none    Daily Task #2  Maximum Fundamental Frequency Range:  Average Pitch (high): 307 6 Hz  Average Pitch (low): 138 1 Hz  Perceived level of effort: 9/10  Cues for loudness: Minimal-none  Cues for pitch: Minimal-none    Daily Task #3  Maximum Speech Loudness Drill of Functional Phrases:  Average intensity: 79 2 dB SPL  Perceived level of effort: 9/10  Cues for loudness: Minimal-none    Daily Task #4  Hierarchal Speech Loudness Drill (Sentence Level): Average intensity: 75 5 dB SPL  Perceived level of effort: 9/10  Cues for loudness: Minimal-none    Average intensity during spontaneous "off the cuff" questions: 68 3 dB SPL    Plan:  -Patient was provided with home exercises/activities to target goals in plan of care at the end of today's session   -Continue with current plan of care

## 2018-12-24 ENCOUNTER — OFFICE VISIT (OUTPATIENT)
Dept: PHYSICAL THERAPY | Facility: CLINIC | Age: 83
End: 2018-12-24
Payer: MEDICARE

## 2018-12-24 ENCOUNTER — OFFICE VISIT (OUTPATIENT)
Dept: SPEECH THERAPY | Facility: CLINIC | Age: 83
End: 2018-12-24
Payer: MEDICARE

## 2018-12-24 DIAGNOSIS — R49.9 UNSPECIFIED VOICE AND RESONANCE DISORDER: Primary | ICD-10-CM

## 2018-12-24 DIAGNOSIS — G20 PARKINSON'S DISEASE (HCC): Primary | ICD-10-CM

## 2018-12-24 DIAGNOSIS — G20 PARKINSON'S DISEASE (HCC): ICD-10-CM

## 2018-12-24 PROCEDURE — 97116 GAIT TRAINING THERAPY: CPT | Performed by: PHYSICAL THERAPIST

## 2018-12-24 PROCEDURE — 97112 NEUROMUSCULAR REEDUCATION: CPT | Performed by: PHYSICAL THERAPIST

## 2018-12-24 PROCEDURE — 92507 TX SP LANG VOICE COMM INDIV: CPT

## 2018-12-24 NOTE — PROGRESS NOTES
Daily Speech Treatment Note    Today's date: 2018   Patients name: Dago Setting  : 1935  MRN: [id number]  Safety measures: PD  Referring provider: Aye Mcfarlane MD    Primary Diagnosis/Billing code: G62 5  Secondary Diagnosis/ Billing code: [address]    Visit Tracking:  -Referring provider: Emily Buenrostro  -Billing guidelines: CMS  -Visit #9/10  -Medicare  [facility] [facility]  -RE due 01/10/2019  Subjective/Behavioral:  -"I drink so much water while I'm here "       Objective/Assessment:    Short-term goals:  1  Patient will be educated on vocal hygiene and demonstrate understanding of recommendations to facilitate improved vocal quality (to be achieved in 1-2 weeks)  2  Patient will demonstrate understanding that his/her louder voice is WNL and will become comfortable increasing his/her phonatory effort (to be achieved in 4 weeks)  3  Patient will develop the ability to self-monitor adequate loudness levels in conversation to facilitate increased communication success (to be achieved in 4 weeks)  4  Patient will be able to increase his/her vocal loudness to reach a target sound pressure level of 75 dB SPL with paragraph-length reading material in order to increase vocal respiratory support required for functional communication (to be achieved in 4 weeks)  5  Patient will be able to increase his/her vocal loudness to reach a target sound pressure level of 75 dB SPL during brief conversational speech in order to increase vocal respiratory support required for functional communication (to be achieved in 4 weeks)     The following data was collected using [address] [place] [address]  Sound level meter-to-mouth distance: 50 cm  Daily Task #1   Maximum Duration & Intensity of Sustained LOUD /ah/ Phonation:  Average duration: 10 4 sec  Average intensity: 95 8 dB SPL  Perceived level of effort: 9/10  Cues for loudness: Minimal-moderate; pt perceptually with increased volume       Daily Task #2  Maximum Fundamental Frequency Range:  Average Pitch (high): 334 6 Hz  Average Pitch (low): 142 1 Hz  Perceived level of effort: 9/10  Cues for loudness: Minimal-none  Cues for pitch: Minimal-none    Daily Task #3  Maximum Speech Loudness Drill of Functional Phrases:  Average intensity: 95 1 dB SPL  Perceived level of effort: 9/10  Cues for loudness: Minimal-none    Daily Task #4  Hierarchal Speech Loudness Drill (Sentence Level): Average intensity: 93 5 dB SPL  Perceived level of effort: 9/10  Cues for loudness: Minimal-none    Average intensity during spontaneous "off the cuff" questions: 86 1 dB SPL    Plan:  -Patient was provided with home exercises/activities to target goals in plan of care at the end of today's session   -Continue with current plan of care

## 2018-12-24 NOTE — PROGRESS NOTES
Daily Note     Today's date: 2018  Patient name: Paulina Markham  : 1935  MRN: [id number]  Referring provider: Simeon Oconnor MD  Dx:   Encounter Diagnosis     ICD-10-CM    1  Parkinson's disease ([facility] [address] ) G20                    Subjective: Feeling well today but had breakfast right before coming, has been doing HEP  Objective: See treatment diary below    LSVT BIG Daily Treatment Diary      Carryover Assignment             Big walking while walking to restaurant later                                                                         Max Daily Exercises    Floor to Ceiling 10x 48T with finger flicks  83S with finger flicks   45U   Side to Side 10x 10x 10x   10x   Forward Step and Reach 10x 10x 10x   10x   Sideways Step and Reach 10x 10x 10x   10x   Backwards Step and Reach 10x 10x 10x   10x   Forwards Rock and Reach 10x 10x 10x   10x   Sideways Rock and Reach 10x 10x 10x   10x   Functional Components         1  STS 10x 10x 10x    10x   2  Hand writting  Standing on foam, lined paper, 4 min Standing on foam, lined paper, 4 min   On lined paper   3  Step Ups 8" fwd lat 10x ea 8" fwd 10x ea 8" fwd 10x ea  8" 10xfwd 10xlat   4  Marches 3laps with alt arm reach  3laps with alt arm reach  5  laps at mirror with alt arm reach  2# ankle wts with reciprocal arm reach   5 clips Blue and black 2x ea UE Blue and black 2x ea UE, 2 min     Standing on foam Blue and black 2x ea UE, 3 min     Standing on foam      Hierarchies         1          2          BIG walking          NUstep 12 min (no treadmill or hallway available) 10 min around clinic, 2lbs ankles, 1lbs wrists  Also backwards walking and side stepping with big arms  10 min around clinic, 2lbs ankles, 1lbs wrists       Also backwards walking and side stepping with big arms, quick turn arounds    10 min treadmill                                      Assessment: Performed well today, but had some incresaed difficulty with exercises due to feeling full from eating directly before session  Able to perform daily exericses with less cues today, about 50% of time  Would benefit from continued PT with LSVT BIG program      Plan: Continue POC progress as tolerated

## 2018-12-26 ENCOUNTER — OFFICE VISIT (OUTPATIENT)
Dept: SPEECH THERAPY | Facility: CLINIC | Age: 83
End: 2018-12-26
Payer: MEDICARE

## 2018-12-26 ENCOUNTER — OFFICE VISIT (OUTPATIENT)
Dept: PHYSICAL THERAPY | Facility: CLINIC | Age: 83
End: 2018-12-26
Payer: MEDICARE

## 2018-12-26 DIAGNOSIS — G20 PARKINSON'S DISEASE (HCC): ICD-10-CM

## 2018-12-26 DIAGNOSIS — R49.9 UNSPECIFIED VOICE AND RESONANCE DISORDER: Primary | ICD-10-CM

## 2018-12-26 DIAGNOSIS — G20 PARKINSON'S DISEASE (HCC): Primary | ICD-10-CM

## 2018-12-26 PROCEDURE — 97530 THERAPEUTIC ACTIVITIES: CPT | Performed by: PHYSICAL THERAPIST

## 2018-12-26 PROCEDURE — 92507 TX SP LANG VOICE COMM INDIV: CPT

## 2018-12-26 PROCEDURE — 97112 NEUROMUSCULAR REEDUCATION: CPT | Performed by: PHYSICAL THERAPIST

## 2018-12-26 PROCEDURE — G9171 VOICE CURRENT STATUS: HCPCS

## 2018-12-26 PROCEDURE — G9172 VOICE GOAL STATUS: HCPCS

## 2018-12-26 NOTE — PROGRESS NOTES
DEPARTMENT OF NEUROLOGICAL SCIENCES  [address] DISORDERS CLINIC         RETURN PATIENT NOTE    Patient: Gabby Guerrero Record Number: # [id number]  YOB: 1935  Date of visit: 12/28/2018    Referring provider: No ref  provider found    ASSESSMENT     1  Parkinson's disease ([address] )     2  Dry mouth       Impression of this gentleman with asymmetric bradykinesia and rest tremor, likely stage II parkinsonism and idiopathic Parkinson's disease  Now started on Sinemet 3 tabs a day  Continues to have no obvious red flags today  He has seen [facility], who agrees  He is overall doing well with tremors still present and not much benefit noticed yet on the trial  He can continue this right now and then if symptoms become more bothersome we can adjust this further  For dry mouth, this may be related to hypomimia and his open jaw - advised chewing on toothpick to keep mouth closed and circulating saliva       PLAN     ? He can continue the low dose trial of levodopa for now  Though he does not feel symptoms are better for now, if he stops levodopa he might worsen  He will call us If he develops more bothersome symptoms so we can adjust the dosage  Discussed about potential side effects and what to look out for   ? Suggested to have toothpick in mouth to reduce mouth dryness, fluid hydration  ? Continue Physical Therapy and Speech Therapy for BIG and Loud  Encouraged home exercises as well  ? Reviewed MRI brain wo contrast  ? Explained to patient about a higher risk of melanoma in PD patients than normal, hence importance of regular skin checks yearly  ? The patient has been instructed to call us about any new neurological problems or medication side effects  ?  Return to clinic in 6 months  A total of 45 minutes were spent face-to-face with this patient, of which at least 50% was spent on counseling and coordination of care   We discussed the natural history of the patient's condition, differential diagnosis, level of diagnostic certainty, treatment alternatives and their side effects and possible complications  HISTORY OF PRESENT ILLNESS:  Mr Guy Ashraf is a 82 y o  right handed male who returns to the [address] [place] [address] for follow-up for Parkinson's disease  Last visit 18       The patient was not accompanied today  History was obtained from patient    Interim History  His labs that came back were reassuring and unremarkable except for a slightly lower vitamin D level than normal  Recommended adding on Vitamin D3 of 1000 IU a day  His MRI results were also unremarkable for age  He did seek a 2nd opinion at the Movement Disorders clinic at [facility] with Dr Greg Cervantes on 18; also found 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 PD and started on Sinemet 25/100 1 tab TID per his request  He has been at the [facility] therapy for three weeks now  He does note some dryness of mouth now  Current Dopaminergic Medications:      Name of Med AM PM QHS       Sinemet 25/100 IR 1 1 1                                    INITIAL HISTORY  He did have three surgeries on the left shoulder before  Date of First Symptom:  Mid , in May-   First Symptom: left hand tremor  Side More Affected: left  Main Problems:  1  Left hand tremor  He says he first noticed when he was seeing Dr Lenin Cooper, then saw Dr Rimma Melgar  No medications or treatments started  He does not notice it when he's driving, but worse when he resting arm in position on a chair or when fatigued  Noticing it more at night  Shaking is not present and does not bother him during activities or tasks  No known family history of tremor or diagnosed PD  Mother  in childbirth  Hyposmia: denies  Fatigue: denies  RBD (REM semi-purposeful body movements): he is told he is very active during night by his wife, but no specifically acting out dreams  Seldom dreams     Gait Disturbance:  He feels that he has been leaning to his right side more  Onset:  Uncertain, within the last few months after reading more about PD  Falls: None  Fractures: None  Freezing of Gait: none  Handwriting: "worse" but "junkier" and he is not sure if smaller  Speech: his wife told him he has been talking more quieter (wife is also hard of hearing)  Facial Expression: not told or noticed any change in facial expression  Dementia:  None  Hallucination: None  Constipation: yes  Skin Cancer History / Last Skin Exam: excision of lesion on right hand Sept 25, 2018, told it was cancerous but uncertain type by Dr Cynthia Marie  Hypovitaminosis D: denies  Hypovitaminosis B12:  denies  Exercise Therapy: "always out and about" from the house       Family History: Number of First Degree Relatives With Parkinsonism:   None     Neuro Imaging: None     Parkinson's Medications:   Effect of levodopa:  Never  Effect of pramipexole:   Never  Effect of ropinirole:       Never  Effect of other agonist:  Never  Effect of rasagiline:   Never  Effect of other medication:  Started on propranolol 60mg qDay, then reduced now to 20mg BID   No noticed benefit to tremor and he feels more "uptight" than before       Parkinson's Procedures:   DBS: none    REVIEW OF PAST MEDICAL, SOCIAL AND FAMILY HISTORY:  This is the list of problems as per our Medical Records:    Patient Active Problem List    Diagnosis Date Noted    Parkinson's disease ([facility] [address] ) 12/28/2018    Anxiety 07/31/2018    Dyspnea on exertion 06/22/2018    Bloating 06/19/2018    Tremor of left hand 05/21/2018    Acute non-recurrent maxillary sinusitis 05/21/2018    Persistent atrial fibrillation ([facility] [address] ) 07/27/2016    Pure hypercholesterolemia 07/27/2016    Benign essential hypertension 07/27/2016    Esophageal reflux 07/27/2016       Past Medical History:   Diagnosis Date    A-fib [facility])     Hypertension     Parkinson's disease ([facility] [address] ) 11/01/2018        Past Surgical History:   Procedure Laterality Date    CARDIOVERSION      ATRIAL  ROTATOR CUFF REPAIR          Allergies   Allergen Reactions    Penicillins Hives    Pollen Extract Other (See Comments)       Outpatient Encounter Prescriptions as of 12/28/2018   Medication Sig Dispense Refill    Apoaequorin (PREVAGEN PO) Take 1 tablet by mouth daily      carbidopa-levodopa (SINEMET)  mg per tablet Take 1 tablet by mouth 3 (three) times a day        Dentifrices (BIOTENE DRY MOUTH CARE DT) Apply to teeth      loratadine (CLARITIN) 10 mg tablet Take 10 mg by mouth daily as needed        propranolol (INDERAL) 20 mg tablet Take 1 tablet (20 mg total) by mouth every 12 (twelve) hours 60 tablet 5    Simethicone (GAS-X PO) Take 1 tablet by mouth daily as needed      telmisartan (MICARDIS) 40 mg tablet Take 1 tablet (40 mg total) by mouth daily 90 tablet 3    warfarin (COUMADIN) 5 mg tablet Take 1 tablet by mouth daily   25MG TAKE 1 TABLET 6 DAY A WEEK AND THEN 1 TABLET 1 DAY A WEEK       ALPRAZolam (XANAX) 0 25 mg tablet Take 1 tablet (0 25 mg total) by mouth 2 (two) times a day as needed for anxiety (Patient not taking: Reported on 12/28/2018 ) 50 tablet 1    Coenzyme Q10 (COQ-10 PO) Take 1 tablet by mouth daily      escitalopram (LEXAPRO) 10 mg tablet Take 1 tablet (10 mg total) by mouth daily (Patient not taking: Reported on 9/28/2018 ) 30 tablet 5    Multiple Vitamins-Minerals (ICAPS AREDS 2) CAPS Take 1 capsule by mouth 2 (two) times a day         No facility-administered encounter medications on file as of 12/28/2018  Social History   Substance Use Topics    Smoking status: Former Smoker    Smokeless tobacco: Never Used      Comment: Quit 35-40 years ago    Alcohol use Yes      Comment: Rare, previously moderate drinker cut down 2016           Family History   Problem Relation Age of Onset    No Known Problems Mother     Coronary artery disease Father         REVIEW OF SYSTEMS:  The patient has entered data on an intake form regarding present illness, past medical and surgical history, medications, allergies, family and social history, and a full review of 14 systems  I have reviewed this form with the patient, and all the relevant information has been included on this note  The full review of systems was negative except as stated in HPI and below  Constitutional: Negative for appetite change and fever  HENT: Negative  Negative for hearing loss, tinnitus, trouble swallowing and voice change  Eyes: Negative  Negative for photophobia and pain  Respiratory: Negative  Negative for shortness of breath  Cardiovascular: Negative  Negative for palpitations  Gastrointestinal: Negative  Negative for nausea  Endocrine: Negative  Negative for cold intolerance and heat intolerance  Genitourinary: Negative  Negative for dysuria, frequency and urgency  Musculoskeletal: Negative for myalgias and neck pain  Skin: Negative  Allergic/Immunologic: Negative  Neurological: Positive for tremors and weakness  Negative for dizziness, seizures, syncope, facial asymmetry, speech difficulty and numbness  Hematological: Negative  Does not bruise/bleed easily  Psychiatric/Behavioral: Negative  Negative for confusion and hallucinations  FOCUSED PHYSICAL EXAMINATION:     Vital signs:  /62 (BP Location: Right arm, Patient Position: Standing, Cuff Size: Standard)   Pulse 64   Ht 6' 2" (1 88 m)   Wt 106 kg (233 lb)   BMI 29 92 kg/m²      General:  Well-appearing, well nourished, pleasant patient in no acute distress  Mood and Fund of Knowledge are appropriate  Head:  Some hypomimia  Normocephalic, atraumatic  Oropharynx and conjunctiva are clear  Speech  No hypophonia or bradylalia  No scanning speech  Language: Comprehension intact  Neck:  Supple, strong 5/5 forward flexion and retroflexion  Extremities: Range of motion is normal       Cognitive and Mental Exam:  The patient is alert, oriented to self, location, date and situation  Memory is normal to provide accurate details of health history     Cranial Nerves:  Visual fields are full to confrontation  Extraocular movements were full, with normal pursuit and saccades  Pupils were equal, reactive to light symmetrically  Facial sensation to light touch was intact  Face is symmetric with normal strength    Hearing was assessed using the Calibrated Finger Rub Auditory Screening Test (CALFRAST) and was not abnormal (Better than CALFRAST-Strong-70)     Palate is up going bilaterally and symmetrically  Neck muscles are strong  Tongue protrusion is at midline with normal movements     No dysarthria       Motor:     UPDRS III:   Speech: 1  Facial Expression: 2  Tremor (Head):  0  Action Tremor of Hands (R): 0  Action Tremor of Hands (L): 0  Resting Tremor of Hands (R): 0  Resting Tremor of Hands (L): 2 (with thumb flex-ext, and wrist flexion-extension)  Finger tapping (R): 2  Finger tapping (L): 2  Hand clenching (R): 1  Hand clenching (L): 2  ALEXANDER Hand (R): 1  ALEXANDER Hand (L): 2  Leg Agility (R):  1  Leg Agility (L):  1  Rigidity (Neck): 3  Rigidity (RUE): 1  Rigidity (LUE): 2  Rigidity (RLE): 1  Rigidity (LLE): 1  Arising From Chair: 0  Posture: 1  Gait: 1  Postural Stability: 0   Body Bradykinesia: 3 (reduced left arm swing, re-emergent rest tremor on left hand while counting backwards and walking)  -------------------------------------------------------------------------------------       Muscle Strength Right Left   Muscle Strength Right Left   Deltoid 5/5 5/5   Hip Adductors 5/5 5/5   Biceps 5/5 5/5   Hip Abductors 5/5 5/5   Triceps 5/5 5/5   Knee Extensors 5/5 5/5   Wrist Extensors 5/5 5/5   Knee Flexors 5/5 5/5   Wrist Flexors 5/5 5/5   Ankle Extensors 5/5 5/5    5/5 5/5   Ankle Flexors 5/5 5/5   Finger Abductors 5/5 5/5           Hip Flexors 5/5 5/5       Hip Extensors 5/5 5/5          Sensory  Normal sensation to light touch and proprioception      Gait:  Slow rise out of chair, no freezing, normal turns, walks on heels and toes, slight forward bending of trunk, +rest tremor of left hand       Reflexes:    Right Left   Biceps 2/4 2/4   Brachioradialis 2/4 2/4   Triceps 2/4 2/4   Knee 2/4 2/4   Ankle 2/4 2/4       REVIEW OF ANCILLARY TESTS:   Results for orders placed or performed during the hospital encounter of 10/23/18   MRI brain without contrast    Narrative    MRI BRAIN WITHOUT CONTRAST    INDICATION: G20: Parkinson's disease  COMPARISON:   None  TECHNIQUE:  Sagittal T1, axial T2, axial FLAIR, axial T1, axial gradient echo, and axial diffusion imaging  IMAGE QUALITY:  Diagnostic  FINDINGS:    BRAIN PARENCHYMA:  There is no discrete mass, mass effect or midline shift  There are foci of T2 and FLAIR signal abnormality in the periventricular and subcortical white matter which are typical for microvascular disease in patients of this age group    Brainstem and cerebellum demonstrate normal signal  There is no intracranial hemorrhage  There is no evidence of acute infarction and diffusion imaging is unremarkable  VENTRICLES:  The ventricles are normal in size and contour  SELLA AND PITUITARY GLAND:  Normal     ORBITS:  Left lens replacement  PARANASAL SINUSES:  Normal     VASCULATURE:  Evaluation of the major intracranial vasculature demonstrates appropriate flow voids  CALVARIUM AND SKULL BASE:  Normal     EXTRACRANIAL SOFT TISSUES:  Normal       Impression    No acute intracranial abnormality  Scattered nonspecific T2 and FLAIR foci compatible with mild chronic small vessel ischemic changes

## 2018-12-26 NOTE — PROGRESS NOTES
Speech-Language Pathology Re-Evaluation    Today's date: 2018  Patients name: Rk Crowder  : 1935  MRN: [id number]   Safety measures: PD  Referring provider: Yoan Nicholas MD    Subjective comments: "I think I am doing a lot better"    Patient's goal(s): "so my wife can understand me"    Reason for referral: Change in vocal quality  Prior functional status: Communication effective and appropriate in all situations  Clinically complex situations: N/A    History: Patient is a 82 y o  male who was referred to outpatient skilled Speech Therapy services for a voice (LSVT LOUD) evaluation  Patient was recently dx with PD  Patient noted first symptom of L hand tremor May-2018, which worsens when he is resting his arm in the position of a chair or when fatigued  Patient reportedly notices it more at night  Patient reported that it does not bother him during activities/tasks  Changes also noted with handwriting ("   "worse" but "junkier" and he is not sure if smaller   ")  Per review of record, patient's wife tells him that he is talking more quietly  Patient indicated frustration with having to repeat himself  Patient indicated that his wife is also [place]  Patient with no significant c/o dysphagia  Patient reported that he takes his time with P O  Intake and "keeps a drink nearby" if needed to "wash something down"  No recent PNA or changes in respiratory function per patient report         Assessments    Ariel Standing Voice Treatment (LSVT) LOUD re-assessment:    Sound Level Meter-to-Mouth Distance: 50 cm throughout testing    Maximum Duration of Sustained Vowel Phonation (/ah/): IE: Status:   Average MDP 14 5 sec  10 1 sec IMPROVEMENT   Average Intensity 86 9 dB SPL 78 8 IMPROVEMENT         Maximum Fundamental Frequency Range: IE: Status:   Highest Pitch 269 8 Hz 318 IMPROVEMENT   Lowest Pitch 127 6 Hz 74 IMPROVEMENT   Average Intensity 78 dB SPL 79 NO CHANGE         Reading of Words: IE: Status:   Average Intensity  77 4 dB SPL 83 5 DECLINE         Reading of Phrases: IE: Status:   Average Intensity  81 8 dB SPL 83 DECLINE     Conversational Monologue: IE: Status:   Average Intensity  72 3 dB SPL 72 9 NO CHANGE     IE indicates the scores from the initial evaluation (12/10/2018)  *Data gathered from reading and conversation tasks revealed vocal SPL levels that may significantly reduce patients speech intelligibility and communicative effectiveness in his functional living environment  Goals  Short-term goals:  1  Patient will be educated on vocal hygiene and demonstrate understanding of recommendations to facilitate improved vocal quality (to be achieved in 1-2 weeks)  MET    2  Patient will demonstrate understanding that his/her louder voice is WNL and will become comfortable increasing his/her phonatory effort (to be achieved in 4 weeks)  PARTIALLY MET    3  Patient will develop the ability to self-monitor adequate loudness levels in conversation to facilitate increased communication success (to be achieved in 4 weeks)  PARTIALLY MET    4  Patient will be able to increase his/her vocal loudness to reach a target sound pressure level of 75 dB SPL with paragraph-length reading material in order to increase vocal respiratory support required for functional communication (to be achieved in 4 weeks)  PARTIALLY MET    5  Patient will be able to increase his/her vocal loudness to reach a target sound pressure level of 75 dB SPL during brief conversational speech in order to increase vocal respiratory support required for functional communication (to be achieved in 4 weeks)  PARTIALLY MET     Long-Term Goals:  1  Patient will independently increase his/her vocal loudness to an appropriate level to be understood by others (to be achieved by discharge)   PARTIALLY MET     2  Patient will utilize strategies and exercises to increase vocal loudness and improve respiratory laryngeal coordination using the LSVT protocol (to be achieved by discharge)  PARTIALLY MET    Functional Limitations Reporting (G-codes):   Flowsheet Rows      Most Recent Value   SLP G-Codes   FOTO information reviewed  N/A   Assessment Type  Re-evaluation   Functional Limitations  Voice   Voice Current Status ()  CI   Voice Goal Status ()  CH            Impressions/Recommendations    Impressions: Patient presents with a mild voice disorder c/b reduced loudness and a hoarse vocal quality, which contributed to an overall decrease in speech intelligibility  During conversation, speech intelligibility is reduced 5% (improved from 10%) of the time       Patient would benefit from skilled speech/voice therapy (LSVT LOUD program) in the outpatient setting 4x/week for 4 weeks (consisting of 16 sessions)  Prognosis for improvement is good based on motivation, stimulability, and family support  Recommendations:  -Patient would benefit from outpatient skilled Speech Therapy services : LSVT LOUD program    -Frequency: 4x weekly  -Duration: 4-6 weeks    -Intervention certification from: 90/56/5437  -Intervention certification to 2/07/2747    Visit Tracking:  -Referring provider: Evangelist Dee  -Billing guidelines: CMS  -Visit #1/10; 10 total  -Medicare [employer]  -RE due 01/26/2019

## 2018-12-26 NOTE — LETTER
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    2  Parkinson's disease [facility]) G20        Dear Dr Dao Iba:    Please review the attached Plan of Care from [facility] recent visit  Please verify that you agree therapy should continue by signing the attached document and sending it back to our office  If you have any questions or concerns, please don't hesitate to call  Sincerely,    Gorge Loving, [address] [facility], [place]      Referring Provider:     Based upon review of the patient's progress and continued therapy plan, it is my medical opinion that Tracy Beltran should continue speech therapy treatment at the Physical Therapy at [address] [place] [address]:                    Bianca Villalta MD  [address]: [phone]        Speech-Language Pathology Re-Evaluation    Today's date: 2018  Patients name: Erick Prasad  : 1935  MRN: [id number]   Safety measures: PD  Referring provider: Jessica Quintana MD    Subjective comments: "I think I am doing a lot better"    Patient's goal(s): "so my wife can understand me"    Reason for referral: Change in vocal quality  Prior functional status: Communication effective and appropriate in all situations  Clinically complex situations: N/A    History: Patient is a 82 y o  male who was referred to outpatient skilled Speech Therapy services for a voice (LSVT LOUD) evaluation  Patient was recently dx with PD  Patient noted first symptom of L hand tremor May-2018, which worsens when he is resting his arm in the position of a chair or when fatigued  Patient reportedly notices it more at night  Patient reported that it does not bother him during activities/tasks  Changes also noted with handwriting ("   "worse" but "junkier" and he is not sure if smaller   ")  Per review of record, patient's wife tells him that he is talking more quietly  Patient indicated frustration with having to repeat himself  Patient indicated that his wife is also [place]  Patient with no significant c/o dysphagia  Patient reported that he takes his time with P O  Intake and "keeps a drink nearby" if needed to "wash something down"  No recent PNA or changes in respiratory function per patient report  Assessments    [facility] Voice Treatment (LSVT) LOUD re-assessment:    Sound Level Meter-to-Mouth Distance: 50 cm throughout testing    Maximum Duration of Sustained Vowel Phonation (/ah/): IE: Status:   Average MDP 14 5 sec  10 1 sec IMPROVEMENT   Average Intensity 86 9 dB SPL 78 8 IMPROVEMENT         Maximum Fundamental Frequency Range: IE: Status:   Highest Pitch 269 8 Hz 318 IMPROVEMENT   Lowest Pitch 127 6 Hz 74 IMPROVEMENT   Average Intensity 78 dB SPL 79 NO CHANGE         Reading of Words: IE: Status:   Average Intensity  77 4 dB SPL 83 5 DECLINE         Reading of Phrases: IE: Status:   Average Intensity  81 8 dB SPL 83 DECLINE     Conversational Monologue: IE: Status:   Average Intensity  72 3 dB SPL 72 9 NO CHANGE     IE indicates the scores from the initial evaluation (12/10/2018)  *Data gathered from reading and conversation tasks revealed vocal SPL levels that may significantly reduce patients speech intelligibility and communicative effectiveness in his functional living environment  Goals  Short-term goals:  1  Patient will be educated on vocal hygiene and demonstrate understanding of recommendations to facilitate improved vocal quality (to be achieved in 1-2 weeks)  MET    2  Patient will demonstrate understanding that his/her louder voice is WNL and will become comfortable increasing his/her phonatory effort (to be achieved in 4 weeks)  PARTIALLY MET    3   Patient will develop the ability to self-monitor adequate loudness levels in conversation to facilitate increased communication success (to be achieved in 4 weeks)  PARTIALLY MET    4  Patient will be able to increase his/her vocal loudness to reach a target sound pressure level of 75 dB SPL with paragraph-length reading material in order to increase vocal respiratory support required for functional communication (to be achieved in 4 weeks)  PARTIALLY MET    5  Patient will be able to increase his/her vocal loudness to reach a target sound pressure level of 75 dB SPL during brief conversational speech in order to increase vocal respiratory support required for functional communication (to be achieved in 4 weeks)  PARTIALLY MET     Long-Term Goals:  1  Patient will independently increase his/her vocal loudness to an appropriate level to be understood by others (to be achieved by discharge)  PARTIALLY MET     2  Patient will utilize strategies and exercises to increase vocal loudness and improve respiratory laryngeal coordination using the LSVT protocol (to be achieved by discharge)  PARTIALLY MET    Functional Limitations Reporting (G-codes):   Flowsheet Rows      Most Recent Value   SLP G-Codes   FOTO information reviewed  N/A   Assessment Type  Re-evaluation   Functional Limitations  Voice   Voice Current Status ()  CI   Voice Goal Status ()              Impressions/Recommendations    Impressions: Patient presents with a mild voice disorder c/b reduced loudness and a hoarse vocal quality, which contributed to an overall decrease in speech intelligibility  During conversation, speech intelligibility is reduced 5% (improved from 10%) of the time       Patient would benefit from skilled speech/voice therapy (LSVT LOUD program) in the outpatient setting 4x/week for 4 weeks (consisting of 16 sessions)  Prognosis for improvement is good based on motivation, stimulability, and family support      Recommendations:  -Patient would benefit from outpatient skilled Speech Therapy services : LSVT LOUD program    -Frequency: 4x weekly  -Duration: 4-6 weeks    -Intervention certification from: 02/50/5033  -Intervention certification to 6/67/5053    Visit Tracking:  -Referring provider: Quincy Terrazas  -Billing guidelines: CMS  -Visit #1/10; 10 total  -Medicare [employer]  -RE due 01/26/2019

## 2018-12-26 NOTE — PROGRESS NOTES
Daily Note     Today's date: 2018  Patient name: Milo Boas  : 1935  MRN: [id number]  Referring provider: Forrest Devi MD  Dx:   No diagnosis found  Subjective: Feels his transfers, handwriting and overall mobility has improved  Objective: See treatment diary below    LSVT BIG Daily Treatment Diary      Carryover Assignment             Big walking while walking to restaurant later                                                                         Max Daily Exercises    Floor to Ceiling 10x 95Y with finger flicks  42Z with finger flicks 50A  54O   Side to Side 10x 10x 10x 10x  10x   Forward Step and Reach 10x 10x 10x 10x  10x   Sideways Step and Reach 10x 10x 10x 10x  10x   Backwards Step and Reach 10x 10x 10x 10x  10x   Forwards Rock and Reach 10x 10x 10x 10x  10x   Sideways Rock and Reach 10x 10x 10x 10x  10x   Functional Components         1  STS 10x 10x 10x    10x   2  Hand writting  Standing on foam, lined paper, 4 min Standing on foam, lined paper, 4 min   On lined paper   3  Step Ups 8" fwd lat 10x ea 8" fwd 10x ea 8" fwd 10x ea  8" 10xfwd 10xlat   4  Marches 3laps with alt arm reach  3laps with alt arm reach  5  laps at mirror with alt arm reach  2# ankle wts with reciprocal arm reach   5 clips Blue and black 2x ea UE Blue and black 2x ea UE, 2 min     Standing on foam Blue and black 2x ea UE, 3 min     Standing on foam      Hierarchies         1          2          BIG walking          NUstep 12 min (no treadmill or hallway available) 10 min around clinic, 2lbs ankles, 1lbs wrists  Also backwards walking and side stepping with big arms  10 min around clinic, 2lbs ankles, 1lbs wrists       Also backwards walking and side stepping with big arms, quick turn arounds    10 min treadmill                                      Assessment: Pt has progressed with balance per TUG and 5x sit to stand, walking efficiency and endurance per 6 MWT, imprved with balance per ABC, and overall has improved with UE dextierity in R UE per 9 hole peg test  Pt will continue to benefit from skilled PT in order to improve endurance to age referenced norms and left fine motor skills  Plan: Continue POC progress as tolerated           Balance Test     6 Minute Walk Test (ft): 1450ft; 7770 ft                5x Sit To Stand (s): 11 sec (IE); 10 53 sec RE   TUG:  10sec (IE); 7 25sec RE   TUG dual task:  10sec (IE); 8 87 sec RE   TUG co sec (IE) ; 9 44 sec RE       9 - Hole Peg Test:  Left:32 sec ; 32 sec RE  Right:26 sec ; 22 sec  RE    STG:  PT will complete 6 MWT in 1600 ft within 4 weeks - partially met  Pt will complete 9 hole peg test in 23 seconds within 4 weeks - MET R UE    LTG:  Pt will report improved to legible handwriting within 16 visits  Pt will report overall independence and knowledge of HEP within 16

## 2018-12-27 ENCOUNTER — OFFICE VISIT (OUTPATIENT)
Dept: PHYSICAL THERAPY | Facility: CLINIC | Age: 83
End: 2018-12-27
Payer: MEDICARE

## 2018-12-27 ENCOUNTER — OFFICE VISIT (OUTPATIENT)
Dept: SPEECH THERAPY | Facility: CLINIC | Age: 83
End: 2018-12-27
Payer: MEDICARE

## 2018-12-27 DIAGNOSIS — R49.9 UNSPECIFIED VOICE AND RESONANCE DISORDER: Primary | ICD-10-CM

## 2018-12-27 DIAGNOSIS — G20 PARKINSON'S DISEASE (HCC): Primary | ICD-10-CM

## 2018-12-27 DIAGNOSIS — G20 PARKINSON'S DISEASE (HCC): ICD-10-CM

## 2018-12-27 PROCEDURE — 92507 TX SP LANG VOICE COMM INDIV: CPT

## 2018-12-27 PROCEDURE — 97112 NEUROMUSCULAR REEDUCATION: CPT

## 2018-12-27 PROCEDURE — 97116 GAIT TRAINING THERAPY: CPT

## 2018-12-27 NOTE — PROGRESS NOTES
[facility] Daily Speech Treatment Note    Today's date: 2018   Patients name: Haylie Oleary  : 1935  MRN: [id number]  Safety measures: PD  Referring provider: Philly Forrest MD    Primary Diagnosis/Billing code: Z79 3  Secondary Diagnosis/ Billing code: [address]    Visit Tracking:  -Referring provider: Non-Epic  -Billing guidelines: CMS  -Visit #2/10; 10 total  -Medicare  [employer]  -RE due 2019  Subjective/Behavioral:  -"The PT worked me out today "       Objective/Assessment:    Short-term goals:    2  Patient will demonstrate understanding that his/her louder voice is WNL and will become comfortable increasing his/her phonatory effort (to be achieved in 4 weeks)  PARTIALLY MET    3  Patient will develop the ability to self-monitor adequate loudness levels in conversation to facilitate increased communication success (to be achieved in 4 weeks)  PARTIALLY MET    4  Patient will be able to increase his/her vocal loudness to reach a target sound pressure level of 75 dB SPL with paragraph-length reading material in order to increase vocal respiratory support required for functional communication (to be achieved in 4 weeks)  PARTIALLY MET    5  Patient will be able to increase his/her vocal loudness to reach a target sound pressure level of 75 dB SPL during brief conversational speech in order to increase vocal respiratory support required for functional communication (to be achieved in 4 weeks)  PARTIALLY MET       The following data was collected using [address] [place] [address]  Sound level meter-to-mouth distance: 50 cm      Daily Task #1   Maximum Duration & Intensity of Sustained LOUD /ah/ Phonation:  Average duration: 12 9 sec  Average intensity: 90 8 dB SPL  Perceived level of effort: 8/10  Cues for loudness: None    Daily Task #2  Maximum Fundamental Frequency Range:  Average Pitch (high): 298 0 Hz  Average Pitch (low): 131 8 Hz  Perceived level of effort: 9/10  Cues for loudness: Minimal-none  Cues for pitch: Minimal-none    Daily Task #3  Maximum Speech Loudness Drill of Functional Phrases:  Average intensity: 84 7 dB SPL  Perceived level of effort: 9/10  Cues for loudness: Minimal-none    Daily Task #4  Hierarchal Speech Loudness Drill (Sentence Level): Average intensity: 81 1 dB SPL  Perceived level of effort: 9/10  Cues for loudness: Minimal-none    Average intensity during spontaneous "off the cuff" questions: 71 8 dB SPL    Plan:  -Patient was provided with home exercises/activities to target goals in plan of care at the end of today's session   -Continue with current plan of care

## 2018-12-27 NOTE — PROGRESS NOTES
Daily Note     Today's date: 2018  Patient name: Maria Isabel Cao  : 1935  MRN: [id number]  Referring provider: Zainab Arellano MD  Dx:   Encounter Diagnosis     ICD-10-CM    1  Parkinson's disease ([facility] [address] ) G20          Subjective: Reports stiffness upon arrival  Reports that he feels he has improved since starting BIG program  Reports that he feels he is becoming more flexible  Objective: See treatment diary below    LSVT BIG Daily Treatment Diary      Carryover Assignment             Big walking while walking to restaurant later                                                                         Max Daily Exercises    Floor to Ceiling 10x 97C with finger flicks  28K with finger flicks 10E 50D w/ finger flicks   Side to Side 10x 10x 10x 64Z 87B w/ finger flicks   Forward Step and Reach 10x 10x 10x 10x 10x , stepping to blue foam   Sideways Step and Reach 10x 10x 10x 10x 10x , stepping to blue foam   Backwards Step and Reach 10x 10x 10x 10x 10x , stepping to blue foam   Forwards Rock and Reach 10x 10x 10x 10x 10x, standing on blue disks   Sideways Rock and Reach 10x 10x 10x 10x 10x, standing on blue disks   Functional Components        1  STS 10x 10x 10x   Foam, 10x   2  Hand writting  Standing on foam, lined paper, 4 min Standing on foam, lined paper, 4 min     3  Step Ups 8" fwd lat 10x ea 8" fwd 10x ea 8" fwd 10x ea  From foam, 8'',fwd/lateral, 10x   4  Marches 3laps with alt arm reach  3laps with alt arm reach  5  laps at mirror with alt arm reach  3 laps   5 clips Blue and black 2x ea UE Blue and black 2x ea UE, 2 min     Standing on foam Blue and black 2x ea UE, 3 min     Standing on foam     Hierarchies        1         2         BIG walking          NUstep 12 min (no treadmill or hallway available) 10 min around clinic, 2lbs ankles, 1lbs wrists  Also backwards walking and side stepping with big arms  10 min around clinic, 2lbs ankles, 1lbs wrists  Also backwards walking and side stepping with big arms, quick turn arounds    10 min treadmill                                      Assessment: Patient demo good understanding of exercises  Able to demonstrate big movements, requires reminders at times for big posture  Progressed stepping exericses to include stepping to blue foam, as well as standing on blue disks for reaching exercises  Patient had some minor balance deficits  Able to self correct most of the time, with minimal assistance from therapist  Reports fatigue post tx  Plan: Continue POC progress as tolerated

## 2018-12-28 ENCOUNTER — OFFICE VISIT (OUTPATIENT)
Dept: NEUROLOGY | Facility: CLINIC | Age: 83
End: 2018-12-28
Payer: MEDICARE

## 2018-12-28 ENCOUNTER — APPOINTMENT (OUTPATIENT)
Dept: SPEECH THERAPY | Facility: CLINIC | Age: 83
End: 2018-12-28
Payer: MEDICARE

## 2018-12-28 ENCOUNTER — APPOINTMENT (OUTPATIENT)
Dept: PHYSICAL THERAPY | Facility: CLINIC | Age: 83
End: 2018-12-28
Payer: MEDICARE

## 2018-12-28 VITALS
DIASTOLIC BLOOD PRESSURE: 62 MMHG | HEART RATE: 64 BPM | SYSTOLIC BLOOD PRESSURE: 116 MMHG | BODY MASS INDEX: 29.9 KG/M2 | HEIGHT: 74 IN | WEIGHT: 233 LBS

## 2018-12-28 DIAGNOSIS — R68.2 DRY MOUTH: ICD-10-CM

## 2018-12-28 DIAGNOSIS — G20 PARKINSON'S DISEASE (HCC): Primary | ICD-10-CM

## 2018-12-28 PROBLEM — G20.A1 PARKINSON'S DISEASE: Status: ACTIVE | Noted: 2018-12-28

## 2018-12-28 PROCEDURE — 99214 OFFICE O/P EST MOD 30 MIN: CPT | Performed by: PSYCHIATRY & NEUROLOGY

## 2018-12-28 NOTE — PROGRESS NOTES
Review of Systems   Constitutional: Negative for appetite change and fever  HENT: Negative  Negative for hearing loss, tinnitus, trouble swallowing and voice change  Eyes: Negative  Negative for photophobia and pain  Respiratory: Negative  Negative for shortness of breath  Cardiovascular: Negative  Negative for palpitations  Gastrointestinal: Negative  Negative for nausea  Endocrine: Negative  Negative for cold intolerance and heat intolerance  Genitourinary: Negative  Negative for dysuria, frequency and urgency  Musculoskeletal: Negative for myalgias and neck pain  Skin: Negative  Allergic/Immunologic: Negative  Neurological: Positive for tremors and weakness  Negative for dizziness, seizures, syncope, facial asymmetry, speech difficulty and numbness  Hematological: Negative  Does not bruise/bleed easily  Psychiatric/Behavioral: Negative  Negative for confusion and hallucinations

## 2018-12-28 NOTE — PATIENT INSTRUCTIONS
- continue the Sinemet as before   Discussed about potential side effects and what to look out for   - Continue with BIG and LOUD therapy, techniques at home as well  - Suggested to have toothpick in mouth to reduce mouth dryness, fluid hydration  - All questions addressed

## 2018-12-28 NOTE — LETTER
December 28, 2018     Raoul Wagner [address]  51   [address] [address]    Patient: Maria Isabel Cao   YOB: 1935   Date of Visit: 12/28/2018       Dear Dr Red Olea:    I am seeing Mr Og Mcdonald for evaluation of his Parkinson's disease  Below are my notes for this visit for your records and to keep you updated on his health status  If you have questions, please do not hesitate to call me  I look forward to following your patient along with you  Sincerely,        Mikki Loving MD        CC: No Recipients  [facility]  12/28/2018 10:35 AM  Sign at close encounter  Review of Systems   Constitutional: Negative for appetite change and fever  HENT: Negative  Negative for hearing loss, tinnitus, trouble swallowing and voice change  Eyes: Negative  Negative for photophobia and pain  Respiratory: Negative  Negative for shortness of breath  Cardiovascular: Negative  Negative for palpitations  Gastrointestinal: Negative  Negative for nausea  Endocrine: Negative  Negative for cold intolerance and heat intolerance  Genitourinary: Negative  Negative for dysuria, frequency and urgency  Musculoskeletal: Negative for myalgias and neck pain  Skin: Negative  Allergic/Immunologic: Negative  Neurological: Positive for tremors and weakness  Negative for dizziness, seizures, syncope, facial asymmetry, speech difficulty and numbness  Hematological: Negative  Does not bruise/bleed easily  Psychiatric/Behavioral: Negative  Negative for confusion and hallucinations  Mikki Loving MD  12/28/2018  6:52 PM  Sign at close encounter  [address] PATIENT NOTE    Patient: [address] [place] [address] Record Number: # [id number]  YOB: 1935  Date of visit: 12/28/2018    Referring provider: No ref  provider found    ASSESSMENT     1   Park[facility]'s [facility] ([facility] ) 2  Dry mouth       Impression of this gentleman with asymmetric bradykinesia and rest tremor, 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 He can continue the low dose trial of levodopa for now  Though he does not feel symptoms are better for now, if he stops levodopa he might worsen  He will call us If he develops more bothersome symptoms so we can adjust the dosage  Discussed about potential side effects and what to look out for   ? Suggested to have toothpick in mouth to reduce mouth dryness, fluid hydration  ? Continue Physical Therapy and Speech Therapy for [facility]  Encouraged home exercises as well  ? Reviewed MRI brain wo contrast  ? Explained to patient about a higher risk of melanoma in PD patients than normal, hence importance of regular skin checks yearly  ? The patient has been instructed to call us about any new neurological problems or medication side effects  ?  Return to clinic in 6 months  A total of 45 minutes were spent face-to-face with this patient, of which at least 50% was spent on counseling and coordination of care  We discussed the natural history of the patient's condition, differential diagnosis, level of diagnostic certainty, treatment alternatives and their side effects and possible complications  HISTORY OF PRESENT ILLNESS:  Mr Almaz Villareal is a 82 y o  right handed male who returns to the [address] [place] [address] for follow-up for Parkinson's disease  Last visit 9/28/18     The patient was not accompanied today  History was obtained from patient    Interim History  His labs that came back were reassuring and unremarkable except for a slightly lower vitamin D level than normal  Recommended adding on Vitamin D3 of 1000 IU a day  His MRI results were also unremarkable for age  He did seek a 2nd opinion at the Movement Disorders clinic at [facility] with Dr Matt Santacruz on 18; also found 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 PD and started on Sinemet 25/100 1 tab TID per his request  He has been at the [facility] therapy for three weeks now  He does note some dryness of mouth now  Current Dopaminergic Medications:      Name of Med AM PM QHS       Sinemet 25/100 IR 1 1 1                                    INITIAL HISTORY  He did have three surgeries on the left shoulder before  Date of First Symptom:  Mid , in May-   First Symptom: left hand tremor  Side More Affected: left  Main Problems:  1  Left hand tremor  He says he first noticed when he was seeing Dr Joan Long, then saw Dr Dory Crane  No medications or treatments started  He does not notice it when he's driving, but worse when he resting arm in position on a chair or when fatigued  Noticing it more at night  Shaking is not present and does not bother him during activities or tasks  No known family history of tremor or diagnosed PD  Mother  in childbirth  Hyposmia: denies  Fatigue: denies  RBD (REM semi-purposeful body movements): he is told he is very active during night by his wife, but no specifically acting out dreams  Seldom dreams  Gait Disturbance:  He feels that he has been leaning to his right side more  Onset:  Uncertain, within the last few months after reading more about PD     Falls: None  Fractures: None  Freezing of Gait: none  Handwriting: "worse" but "junkier" and he is not sure if smaller  Speech: his wife told him he has been talking more quieter (wife is also hard of hearing)  Facial Expression: not told or noticed any change in facial expression  Dementia:  None  Hallucination: None  Constipation: yes  Skin Cancer History / Last Skin Exam: excision of lesion on right hand Sept 25, 2018, told it was cancerous but uncertain type by Dr Sarah Welch  Hypovitaminosis D: denies  Hypovitaminosis B12:  denies  Exercise Therapy: "always out and about" from the house       Family History: Number of First Degree Relatives With Parkinsonism:   None     Neuro Imaging: None     Parkinson's Medications:   Effect of levodopa:  Never  Effect of pramipexole:   Never  Effect of ropinirole:       Never  Effect of other agonist:  Never  Effect of rasagiline:   Never  Effect of other medication:  Started on propranolol 60mg qDay, then reduced now to 20mg BID  No noticed benefit to tremor and he feels more "uptight" than before       Parkinson's Procedures:   DBS: none    REVIEW OF PAST MEDICAL, SOCIAL AND FAMILY HISTORY:  This is the list of problems as per our Medical Records:    Patient Active Problem List    Diagnosis Date Noted    Parkinson's disease ([facility] ) 12/28/2018    Anxiety 07/31/2018    Dyspnea on exertion 06/22/2018    Bloating 06/19/2018    Tremor of left hand 05/21/2018    Acute non-recurrent maxillary sinusitis 05/21/2018    Pers[facility] ) 07/27/2016    Pure hypercholesterolemia 07/27/2016    Benign essential hypertension 07/27/2016    Esophageal reflux 07/27/2016       Past Medical History:   Diagnosis Date    A-fib [facility])     Hypertension     Parkinson's disease ([facility] ) 11/01/2018        Past Surgical History:   Procedure Laterality Date    CARDIOVERSION      ATRIAL    ROTATOR CUFF REPAIR          Allergies   Allergen Reactions    Penicillins Hives    Pollen Extract Other (See Comments)               Outpatient Encounter Prescriptions as of 12/28/2018   Medication Sig Dispense Refill    Apoaequorin (PREVAGEN PO) Take 1 tablet by mouth daily      carbidopa-levodopa (SINEMET)  mg per tablet Take 1 tablet by mouth 3 (three) times a day        Dentifrices (BIOTENE DRY MOUTH CARE DT) Apply to teeth      loratadine (CLARITIN) 10 mg tablet Take 10 mg by mouth daily as needed        propranolol (INDERAL) 20 mg tablet Take 1 tablet (20 mg total) by mouth every 12 (twelve) hours 60 tablet 5    Simethicone (GAS-X PO) Take 1 tablet by mouth daily as needed      telmisartan (MICARDIS) 40 mg tablet Take 1 tablet (40 mg total) by mouth daily 90 tablet 3    warfarin (COUMADIN) 5 mg tablet Take 1 tablet by mouth daily   25MG TAKE 1 TABLET 6 DAY A WEEK AND THEN 1 TABLET 1 DAY A WEEK       ALPRAZolam (XANAX) 0 25 mg tablet Take 1 tablet (0 25 mg total) by mouth 2 (two) times a day as needed for anxiety (Patient not taking: Reported on 12/28/2018 ) 50 tablet 1    Coenzyme Q10 (COQ-10 PO) Take 1 tablet by mouth daily      escitalopram (LEXAPRO) 10 mg tablet Take 1 tablet (10 mg total) by mouth daily (Patient not taking: Reported on 9/28/2018 ) 30 tablet 5    Multiple Vitamins-Minerals (ICAPS AREDS 2) CAPS Take 1 capsule by mouth 2 (two) times a day         No facility-administered encounter medications on file as of 12/28/2018  Social History   Substance Use Topics    Smoking status: Former Smoker    Smokeless tobacco: Never Used      Comment: Quit 35-40 years ago    Alcohol use Yes      Comment: Rare, previously moderate drinker cut down 2016  Family History   Problem Relation Age of Onset    No Known Problems Mother     Coronary artery disease Father         REVIEW OF SYSTEMS:  The patient has entered data on an intake form regarding present illness, past medical and surgical history, medications, allergies, family and social history, and a full review of 14 systems  I have reviewed this form with the patient, and all the relevant information has been included on this note  The full review of systems was negative except as stated in HPI and below       Constitutional: Negative for appetite change and fever  HENT: Negative  Negative for hearing loss, tinnitus, trouble swallowing and voice change  Eyes: Negative  Negative for photophobia and pain  Respiratory: Negative  Negative for shortness of breath  Cardiovascular: Negative  Negative for palpitations  Gastrointestinal: Negative  Negative for nausea  Endocrine: Negative  Negative for cold intolerance and heat intolerance  Genitourinary: Negative  Negative for dysuria, frequency and urgency  Musculoskeletal: Negative for myalgias and neck pain  Skin: Negative  Allergic/Immunologic: Negative  Neurological: Positive for tremors and weakness  Negative for dizziness, seizures, syncope, facial asymmetry, speech difficulty and numbness  Hematological: Negative  Does not bruise/bleed easily  Psychiatric/Behavioral: Negative  Negative for confusion and hallucinations  FOCUSED PHYSICAL EXAMINATION:     Vital signs:  /62 (BP Location: Right arm, Patient Position: Standing, Cuff Size: Standard)   Pulse 64   Ht 6' 2" (1 88 m)   Wt 106 kg (233 lb)   BMI 29 92 kg/m²       General:  Well-appearing, well nourished, pleasant patient in no acute distress  Mood and Fund of Knowledge are appropriate  Head:   Some hypomimia  Normocephalic, atraumatic  Oropharynx and conjunctiva are clear  Speech  No hypophonia or bradylalia  No scanning speech  Language: Comprehension intact  Neck:  Supple, strong 5/5 forward flexion and retroflexion  Extremities: Range of motion is normal       Cognitive and Mental Exam:  The patient is alert, oriented to self, location, date and situation  Memory is normal to provide accurate details of health history     Cranial Nerves:  Visual fields are full to confrontation  Extraocular movements were full, with normal pursuit and saccades  Pupils were equal, reactive to light symmetrically  Facial sensation to light touch was intact     Face is symmetric with normal strength    Hearing was assessed using the Calibrated Finger Rub Auditory Screening Test (CALFRAST) and was not abnormal (Better than CALFRAST-Strong-70)     Palate is up going bilaterally and symmetrically  Neck muscles are strong  Tongue protrusion is at midline with normal movements     No dysarthria       Motor:     UPDRS III:   Speech: 1  Facial Expression: 2  Tremor (Head):  0  Action Tremor of Hands (R): 0  Action Tremor of Hands (L): 0  Resting Tremor of Hands (R): 0  Resting Tremor of Hands (L): 2 (with thumb flex-ext, and wrist flexion-extension)  Finger tapping (R): 2  Finger tapping (L): 2  Hand clenching (R): 1  Hand clenching (L): 2  ALEXANDER Hand (R): 1  ALEXANDER Hand (L): 2  Leg Agility (R):  1  Leg Agility (L):  1  Rigidity (Neck): 3  Rigidity (RUE): 1  Rigidity (LUE): 2  Rigidity (RLE): 1  Rigidity (LLE): 1  Arising From Chair: 0  Posture: 1  Gait: 1  Postural Stability: 0   Body Bradykinesia: 3 (reduced left arm swing, re-emergent rest tremor on left hand while counting backwards and walking)  -------------------------------------------------------------------------------------       Muscle Strength Right Left   Muscle Strength Right Left   Deltoid 5/5 5/5   Hip Adductors 5/5 5/5   Biceps 5/5 5/5   Hip Abductors 5/5 5/5   Triceps 5/5 5/5   Knee Extensors 5/5 5/5   Wrist Extensors 5/5 5/5   Knee Flexors 5/5 5/5   Wrist Flexors 5/5 5/5   Ankle Extensors 5/5 5/5    5/5 5/5   Ankle Flexors 5/5 5/5   Finger Abductors 5/5 5/5           Hip Flexors 5/5 5/5       Hip Extensors 5/5 5/5          Sensory  Normal sensation to light touch and proprioception      Gait:  Slow rise out of chair, no freezing, normal turns, walks on heels and toes, slight forward bending of trunk, +rest tremor of left hand       Reflexes:    Right Left   Biceps 2/4 2/4   Brachioradialis 2/4 2/4   Triceps 2/4 2/4   Knee 2/4 2/4   Ankle 2/4 2/4       REVIEW OF ANCILLARY TESTS:   Results for orders placed or performed during the hospital encounter of 10/23/18   MRI brain without contrast    Narrative    MRI BRAIN WITHOUT CONTRAST    INDICATION: G20: Parkinson's disease  COMPARISON:   None  TECHNIQUE:  Sagittal T1, axial T2, axial FLAIR, axial T1, axial gradient echo, and axial diffusion imaging  IMAGE QUALITY:  Diagnostic  FINDINGS:    BRAIN PARENCHYMA:  There is no discrete mass, mass effect or midline shift  There are foci of T2 and FLAIR signal abnormality in the periventricular and subcortical white matter which are typical for microvascular disease in patients of this age group    Brainstem and cerebellum demonstrate normal signal  There is no intracranial hemorrhage  There is no evidence of acute infarction and diffusion imaging is unremarkable  VENTRICLES:  The ventricles are normal in size and contour  SELLA AND PITUITARY GLAND:  Normal     ORBITS:  Left lens replacement  PARANASAL SINUSES:  Normal     VASCULATURE:  Evaluation of the major intracranial vasculature demonstrates appropriate flow voids  CALVARIUM AND SKULL BASE:  Normal     EXTRACRANIAL SOFT TISSUES:  Normal       Impression    No acute intracranial abnormality  Scattered nonspecific T2 and FLAIR foci compatible with mild chronic small vessel ischemic changes

## 2018-12-30 NOTE — PROGRESS NOTES
[facility] Daily Speech Treatment Note    Today's date: 2018  Patients name: Mayra Gordillo  : 1935  MRN: [id number]  Safety measures: PD  Referring provider: Isi Adams MD    Primary Diagnosis/Billing code: A01 1  Secondary Diagnosis/ Billing code: [address]    Visit Tracking:  -Referring provider: Non-Epic  -Billing guidelines: CMS  -Visit #3/10; 12 total   -Medicare  [employer]  -RE due 2019  Subjective/Behavioral:  -"I've been good "    Objective/Assessment:    Short-term goals:    2  Patient will demonstrate understanding that his/her louder voice is WNL and will become comfortable increasing his/her phonatory effort (to be achieved in 4 weeks)  PARTIALLY MET    3  Patient will develop the ability to self-monitor adequate loudness levels in conversation to facilitate increased communication success (to be achieved in 4 weeks)  PARTIALLY MET    4  Patient will be able to increase his/her vocal loudness to reach a target sound pressure level of 75 dB SPL with paragraph-length reading material in order to increase vocal respiratory support required for functional communication (to be achieved in 4 weeks)  PARTIALLY MET    5  Patient will be able to increase his/her vocal loudness to reach a target sound pressure level of 75 dB SPL during brief conversational speech in order to increase vocal respiratory support required for functional communication (to be achieved in 4 weeks)  PARTIALLY MET     The following data was collected using [address] [place] [address]  Sound level meter-to-mouth distance: 50 cm      Daily Task #1   Maximum Duration & Intensity of Sustained LOUD /ah/ Phonation:  Average duration: 13 7 sec  Average intensity: 89 1 dB SPL  Perceived level of effort: 8/10  Cues for loudness: None    Daily Task #2  Maximum Fundamental Frequency Range:  Average Pitch (high): 270 1 Hz  Average Pitch (low): 117 7 Hz  Perceived level of effort: 9/10  Cues for loudness: Minimal-none  Cues for pitch: Minimal-none    Daily Task #3  Maximum Speech Loudness Drill of Functional Phrases:  Average intensity: 86 0 dB SPL  Perceived level of effort: 9/10  Cues for loudness: Minimal-none    Daily Task #4  Hierarchal Speech Loudness Drill (Paragraph Level): Average intensity: 77 8 dB SPL  Perceived level of effort: 9/10  Cues for loudness: Minimal-none    Average intensity during spontaneous "off the cuff" questions: 68 9 dB SPL    Plan:  -Patient was provided with home exercises/activities to target goals in plan of care at the end of today's session   -Continue with current plan of care

## 2018-12-31 ENCOUNTER — OFFICE VISIT (OUTPATIENT)
Dept: PHYSICAL THERAPY | Facility: CLINIC | Age: 83
End: 2018-12-31
Payer: MEDICARE

## 2018-12-31 ENCOUNTER — OFFICE VISIT (OUTPATIENT)
Dept: SPEECH THERAPY | Facility: CLINIC | Age: 83
End: 2018-12-31
Payer: MEDICARE

## 2018-12-31 DIAGNOSIS — G20 PARKINSON'S DISEASE (HCC): ICD-10-CM

## 2018-12-31 DIAGNOSIS — G20 PARKINSON'S DISEASE (HCC): Primary | ICD-10-CM

## 2018-12-31 DIAGNOSIS — R49.9 UNSPECIFIED VOICE AND RESONANCE DISORDER: Primary | ICD-10-CM

## 2018-12-31 PROCEDURE — 97110 THERAPEUTIC EXERCISES: CPT

## 2018-12-31 PROCEDURE — 97116 GAIT TRAINING THERAPY: CPT

## 2018-12-31 PROCEDURE — 92507 TX SP LANG VOICE COMM INDIV: CPT | Performed by: SPEECH-LANGUAGE PATHOLOGIST

## 2018-12-31 NOTE — PROGRESS NOTES
Daily Note     Today's date: 2018  Patient name: Nicolas Trevino  : 1935  MRN: [id number]  Referring provider: Hannah Lane MD  Dx:   Encounter Diagnosis     ICD-10-CM    1  Parkinson's disease ([facility] ) G20          Subjective: Reports stiffness upon arrival  States that he did his HEP "a little bit" over the weekend  Objective: See treatment diary below    [facility] BIG Daily Treatment Diary      Carryover Assignment                                                                                    Max Daily Exercises        Floor to Ceiling 83N w/ finger flicks       Side to Side 17N w/ finger flicks       Forward Step and Reach 10x, stand on blue disks, step to blue foam       Sideways Step and Reach 10x, stand on blue disks, step to blue foam       Backwards Step and Reach 10x, stand on blue disks, step to blue foam       Forwards Rock and Reach 10x, standing on blue disks       Sideways Rock and Reach 10x, standing on blue disks       Functional Components        1  STS Foam, 10x       2  Hand writting        3  Step Ups From foam, 8'',fwd/lateral, 10x       4  Marches        5 clips        Hierarchies        1         2         BIG walking         10 min treadmill, 2 0 mph                             **1# wrist weights & 1 5# ankle weights entire session**    Assessment: Progressed today to include addition of 1# wrist weights and 1 5# ankle weights today  Advised to inform therapist of pain or soreness  Patient challenged at times with uneven surfaces with some LOB, able to mostly self correct  Utilizes ankle strategy  Most challenged and apprehensive with backwards stepping  Min A required from therapist at times  Advised patient to monitor for DOMS  Plan: Continue POC progress as tolerated

## 2019-01-01 NOTE — PROGRESS NOTES
[facility] Daily Speech Treatment Note    Today's date: 2019 (***update)  Patients name: Nicolas Trevino  : 1935  MRN: [id number]  Safety measures: PD  Referring provider: Hannah Lane MD    Primary Diagnosis/Billing code: A46 0  Secondary Diagnosis/ Billing code: [address]    Visit Tracking:  -Referring provider: Non-Epic  -Billing guidelines: CMS  -Visit #3/10; 12 total (***update)  -Medicare  [employer]  -RE due 2019  Subjective/Behavioral:  -***    Objective/Assessment:    Short-term goals:    2  Patient will demonstrate understanding that his/her louder voice is WNL and will become comfortable increasing his/her phonatory effort (to be achieved in 4 weeks)  PARTIALLY MET    3  Patient will develop the ability to self-monitor adequate loudness levels in conversation to facilitate increased communication success (to be achieved in 4 weeks)  PARTIALLY MET    4  Patient will be able to increase his/her vocal loudness to reach a target sound pressure level of 75 dB SPL with paragraph-length reading material in order to increase vocal respiratory support required for functional communication (to be achieved in 4 weeks)  PARTIALLY MET    5  Patient will be able to increase his/her vocal loudness to reach a target sound pressure level of 75 dB SPL during brief conversational speech in order to increase vocal respiratory support required for functional communication (to be achieved in 4 weeks)  PARTIALLY MET     The following data was collected using [address] [place] [address]  Sound level meter-to-mouth distance: 50 cm      Daily Task #1   Maximum Duration & Intensity of Sustained LOUD /ah/ Phonation:  Average duration: *** sec  Average intensity: *** dB SPL  Perceived level of effort: 8/10  Cues for loudness: None    Daily Task #2  Maximum Fundamental Frequency Range:  Average Pitch (high): *** Hz  Average Pitch (low): *** Hz  Perceived level of effort: 9/10  Cues for loudness: Minimal-none  Cues for pitch: Minimal-none    Daily Task #3  Maximum Speech Loudness Drill of Functional Phrases:  Average intensity: *** dB SPL  Perceived level of effort: 9/10  Cues for loudness: Minimal-none    Daily Task #4  Hierarchal Speech Loudness Drill (Paragraph Level): Average intensity: *** dB SPL  Perceived level of effort: 9/10  Cues for loudness: Minimal-none    Average intensity during spontaneous "off the cuff" questions: *** dB SPL    Plan:  -Patient was provided with home exercises/activities to target goals in plan of care at the end of today's session   -Continue with current plan of care

## 2019-01-01 NOTE — PROGRESS NOTES
[facility] Pedroza Daily Speech Treatment Note    Today's date: 2019   Patients name: Oscar Owen  : 1935  MRN: [id number]  Safety measures: PD  Referring provider: Eugene Guerrero MD    Primary Diagnosis/Billing code: B79 5  Secondary Diagnosis/ Billing code: [address]    Visit Tracking:  -Referring provider: Non-Epic  -Billing guidelines: CMS  -Visit #5/10; 14 total   -Medicare  [facility]  -RE due 2019  Subjective/Behavioral:  -"I have a cold "    Objective/Assessment:    Short-term goals:    2  Patient will demonstrate understanding that his/her louder voice is WNL and will become comfortable increasing his/her phonatory effort (to be achieved in 4 weeks)  PARTIALLY MET    3  Patient will develop the ability to self-monitor adequate loudness levels in conversation to facilitate increased communication success (to be achieved in 4 weeks)  PARTIALLY MET    4  Patient will be able to increase his/her vocal loudness to reach a target sound pressure level of 75 dB SPL with paragraph-length reading material in order to increase vocal respiratory support required for functional communication (to be achieved in 4 weeks)  PARTIALLY MET    5  Patient will be able to increase his/her vocal loudness to reach a target sound pressure level of 75 dB SPL during brief conversational speech in order to increase vocal respiratory support required for functional communication (to be achieved in 4 weeks)  PARTIALLY MET     The following data was collected using [address] [place] [address]  Sound level meter-to-mouth distance: 50 cm  *Patient with frequent expectoration of mucous from cold today during session      Daily Task #1   Maximum Duration & Intensity of Sustained LOUD /ah/ Phonation:  Average duration: 12 0 sec  Average intensity: 91 6 dB SPL  Perceived level of effort: 8/10  Cues for loudness: None    Daily Task #2  Maximum Fundamental Frequency Range:  Average Pitch (high): 308 8 Hz  Average Pitch (low): 136 0 Hz  Perceived level of effort: 9/10  Cues for loudness: Minimal-none  Cues for pitch: Minimal-moderate    Daily Task #3  Maximum Speech Loudness Drill of Functional Phrases:  Average intensity: 84 9 dB SPL  Perceived level of effort: 9/10  Cues for loudness: Minimal-none    Daily Task #4  Hierarchal Speech Loudness Drill (Paragraph Level): Average intensity: 76 0 dB SPL  Perceived level of effort: 9/10  Cues for loudness: Minimal-none    Average intensity during spontaneous "off the cuff" questions: 75 1 dB SPL    Plan:  -Patient was provided with home exercises/activities to target goals in plan of care at the end of today's session   -Continue with current plan of care

## 2019-01-01 NOTE — PROGRESS NOTES
[facility] Daily Speech Treatment Note    Today's date: 1/3/2019  Patients name: Stef Melgar  : 1935  MRN: [id number]  Safety measures: PD  Referring provider: Elver Judd MD    Primary Diagnosis/Billing code: W45 7  Secondary Diagnosis/ Billing code: [address]    Visit Tracking:  -Referring provider: Non-Epic  -Billing guidelines: CMS  -Visit #4/10; 13 total  -Medicare  [facility]  -RE due 2019  Subjective/Behavioral:  -"I have a cold today  I got antibiotics yesterday "     Objective/Assessment:    Short-term goals:    2  Patient will demonstrate understanding that his/her louder voice is WNL and will become comfortable increasing his/her phonatory effort (to be achieved in 4 weeks)  PARTIALLY MET    3  Patient will develop the ability to self-monitor adequate loudness levels in conversation to facilitate increased communication success (to be achieved in 4 weeks)  PARTIALLY MET    4  Patient will be able to increase his/her vocal loudness to reach a target sound pressure level of 75 dB SPL with paragraph-length reading material in order to increase vocal respiratory support required for functional communication (to be achieved in 4 weeks)  PARTIALLY MET    5  Patient will be able to increase his/her vocal loudness to reach a target sound pressure level of 75 dB SPL during brief conversational speech in order to increase vocal respiratory support required for functional communication (to be achieved in 4 weeks)  PARTIALLY MET     The following data was collected using [address] [place] [address]  Sound level meter-to-mouth distance: 50 cm      Daily Task #1   Maximum Duration & Intensity of Sustained LOUD /ah/ Phonation:  Average duration: 11 1 sec  Average intensity: 84 9 dB SPL  Perceived level of effort: 9/10  Cues for loudness: None    Daily Task #2  Maximum Fundamental Frequency Range:  Average Pitch (high): 312 Hz  Average Pitch (low): 126 7 Hz  Perceived level of effort: 9/10  Cues for loudness: Minimal-moderate  Cues for pitch: Minimal-none    Daily Task #3  Maximum Speech Loudness Drill of Functional Phrases:  Average intensity: 87 1 dB SPL  Perceived level of effort: 9/10  Cues for loudness: Minimal-none    Daily Task #4  Hierarchal Speech Loudness Drill (Paragraph Level): Average intensity: 82 6 dB SPL  Perceived level of effort: 9/10  Cues for loudness: Minimal-none    Average intensity during spontaneous "off the cuff" questions: 75 0 dB SPL    Plan:  -Patient was provided with home exercises/activities to target goals in plan of care at the end of today's session   -Continue with current plan of care

## 2019-01-02 ENCOUNTER — APPOINTMENT (OUTPATIENT)
Dept: SPEECH THERAPY | Facility: CLINIC | Age: 84
End: 2019-01-02
Payer: MEDICARE

## 2019-01-02 ENCOUNTER — OFFICE VISIT (OUTPATIENT)
Dept: INTERNAL MEDICINE CLINIC | Facility: CLINIC | Age: 84
End: 2019-01-02
Payer: MEDICARE

## 2019-01-02 ENCOUNTER — APPOINTMENT (OUTPATIENT)
Dept: PHYSICAL THERAPY | Facility: CLINIC | Age: 84
End: 2019-01-02
Payer: MEDICARE

## 2019-01-02 VITALS
WEIGHT: 241 LBS | SYSTOLIC BLOOD PRESSURE: 120 MMHG | TEMPERATURE: 97.5 F | BODY MASS INDEX: 30.93 KG/M2 | OXYGEN SATURATION: 97 % | DIASTOLIC BLOOD PRESSURE: 72 MMHG | HEIGHT: 74 IN | HEART RATE: 88 BPM

## 2019-01-02 DIAGNOSIS — J06.9 UPPER RESPIRATORY TRACT INFECTION, UNSPECIFIED TYPE: Primary | ICD-10-CM

## 2019-01-02 PROCEDURE — 99213 OFFICE O/P EST LOW 20 MIN: CPT | Performed by: INTERNAL MEDICINE

## 2019-01-02 RX ORDER — CEPHALEXIN 500 MG/1
500 CAPSULE ORAL EVERY 6 HOURS SCHEDULED
Qty: 28 CAPSULE | Refills: 0 | Status: SHIPPED | OUTPATIENT
Start: 2019-01-02 | End: 2019-01-09

## 2019-01-02 NOTE — PATIENT INSTRUCTIONS
Problem List Items Addressed This Visit        Respiratory    URI (upper respiratory infection) - Primary     Will cover with cephalexin, follow up if not improving         Relevant Medications    cephalexin (KEFLEX) 500 mg capsule

## 2019-01-02 NOTE — PROGRESS NOTES
Assessment/Plan:    URI (upper respiratory infection)  Will cover with cephalexin, follow up if not improving       Diagnoses and all orders for this visit:    Upper respiratory tract infection, unspecified type  -     cephalexin (KEFLEX) 500 mg capsule; Take 1 capsule (500 mg total) by mouth every 6 (six) hours for 7 days          Subjective:      Patient ID: Haylie Oleary is a 82 y o  male  Onset about 2 days ago coughing up some yellow mucus, nasal congestion, no facial pain  No fevers chills or sweats, no pleuritic pain        The following portions of the patient's history were reviewed and updated as appropriate: allergies, current medications, past family history, past medical history, past social history, past surgical history and problem list     Review of Systems   Constitutional: Negative for chills, fatigue and fever  HENT: Positive for congestion and postnasal drip  Negative for nosebleeds, sinus pain, sinus pressure, sore throat and trouble swallowing  Eyes: Negative for pain  Respiratory: Positive for cough  Negative for chest tightness, shortness of breath and wheezing  Cardiovascular: Negative for chest pain, palpitations and leg swelling  Gastrointestinal: Negative for abdominal pain, constipation, diarrhea, nausea and vomiting  Endocrine: Negative for polydipsia and polyuria  Genitourinary: Negative for dysuria, flank pain and hematuria  Musculoskeletal: Negative for arthralgias  Skin: Negative for rash  Neurological: Negative for dizziness and headaches  Hematological: Does not bruise/bleed easily  Psychiatric/Behavioral: Negative for confusion and dysphoric mood  The patient is not nervous/anxious  Objective:      /72   Pulse 88   Temp 97 5 °F (36 4 °C)   Ht 6' 2" (1 88 m)   Wt 109 kg (241 lb)   SpO2 97%   BMI 30 94 kg/m²          Physical Exam   Constitutional: He appears well-developed and well-nourished     HENT:   Mouth/Throat: Oropharynx is clear and moist  No oropharyngeal exudate  Nasal mucosa okay   Eyes: Conjunctivae are normal    Neck: Normal range of motion  Neck supple  Pulmonary/Chest: Effort normal and breath sounds normal  No respiratory distress  He has no wheezes  He has no rales

## 2019-01-03 ENCOUNTER — OFFICE VISIT (OUTPATIENT)
Dept: PHYSICAL THERAPY | Facility: CLINIC | Age: 84
End: 2019-01-03
Payer: MEDICARE

## 2019-01-03 ENCOUNTER — OFFICE VISIT (OUTPATIENT)
Dept: SPEECH THERAPY | Facility: CLINIC | Age: 84
End: 2019-01-03
Payer: MEDICARE

## 2019-01-03 DIAGNOSIS — G20 PARKINSON'S DISEASE (HCC): Primary | ICD-10-CM

## 2019-01-03 DIAGNOSIS — G20 PARKINSON'S DISEASE (HCC): ICD-10-CM

## 2019-01-03 DIAGNOSIS — R49.9 UNSPECIFIED VOICE AND RESONANCE DISORDER: Primary | ICD-10-CM

## 2019-01-03 PROCEDURE — 92507 TX SP LANG VOICE COMM INDIV: CPT

## 2019-01-03 PROCEDURE — 97116 GAIT TRAINING THERAPY: CPT

## 2019-01-03 PROCEDURE — 97112 NEUROMUSCULAR REEDUCATION: CPT

## 2019-01-03 NOTE — PROGRESS NOTES
Daily Note     Today's date: 1/3/2019  Patient name: Yari Farrar  : 1935  MRN: [id number]  Referring provider: Castillo Ellis MD  Dx:   Encounter Diagnosis     ICD-10-CM    1  Parkinson's disease ([facility] [address] ) G20          Subjective: States that he needs to take it slower today, due to being ill yesterday  Objective: See treatment diary below    LSVT BIG Daily Treatment Diary      Carryover Assignment                                                                                    Max Daily Exercises  1/3      Floor to Ceiling 51H w/ finger flicks 92X w/ finger flicks      Side to Side 89S w/ finger flicks 53C w/ finger flicks      Forward Step and Reach 10x, stand on blue disks, step to blue foam 10x, stand on blue disks, step to blue foam      Sideways Step and Reach 10x, stand on blue disks, step to blue foam 10x, stand on blue disks, step to blue foam      Backwards Step and Reach 10x, stand on blue disks, step to blue foam 10x, stand on blue disks, step to blue foam      Forwards Rock and Reach 10x, standing on blue disks 10x, standing on blue disks      Sideways Rock and Reach 10x, standing on blue disks 10x, standing on blue disks      Functional Components        1  STS Foam, 10x Foam, w/ finger flicks, 72I      2  Hand writting        3  Step Ups From foam, 8'',fwd/lateral, 10x From foam, 8'',fwd/lateral, 10x      4  Marches        5 clips        Hierarchies        1         2         BIG walking         10 min treadmill, 2 0 mph 10 min treadmill, 2 0 mph                            **1# wrist weights & 1 5# ankle weights entire session**    Assessment:   Patient required more rest breaks today  Tolerated treatment session well overall though with no complaints  Able to demonstrate bigger movements with less cueing  Reports feeling tired post tx  Plan: Continue POC progress as tolerated

## 2019-01-04 ENCOUNTER — OFFICE VISIT (OUTPATIENT)
Dept: SPEECH THERAPY | Facility: CLINIC | Age: 84
End: 2019-01-04
Payer: MEDICARE

## 2019-01-04 ENCOUNTER — OFFICE VISIT (OUTPATIENT)
Dept: PHYSICAL THERAPY | Facility: CLINIC | Age: 84
End: 2019-01-04
Payer: MEDICARE

## 2019-01-04 DIAGNOSIS — R49.9 UNSPECIFIED VOICE AND RESONANCE DISORDER: Primary | ICD-10-CM

## 2019-01-04 DIAGNOSIS — G20 PARKINSON'S DISEASE (HCC): Primary | ICD-10-CM

## 2019-01-04 DIAGNOSIS — G20 PARKINSON'S DISEASE (HCC): ICD-10-CM

## 2019-01-04 PROCEDURE — 92507 TX SP LANG VOICE COMM INDIV: CPT | Performed by: SPEECH-LANGUAGE PATHOLOGIST

## 2019-01-04 PROCEDURE — 97112 NEUROMUSCULAR REEDUCATION: CPT | Performed by: PHYSICAL THERAPIST

## 2019-01-04 NOTE — PROGRESS NOTES
Daily Note     Today's date: 2019  Patient name: Zamzam Olivier  : 1935  MRN: [id number]  Referring provider: Harry Shultz MD  Dx:   Encounter Diagnosis     ICD-10-CM    1  Parkinson's disease ([facility] [address] ) G20          Subjective: Arrived 10 min late for appointment today  Feeling a little sick today still  Feels he will need to make it "slower"  Objective: See treatment diary below    LSVT BIG Daily Treatment Diary      Carryover Assignment                                                                                    Max Daily Exercises 12/31 1/3 1/4     Floor to Ceiling 10Q w/ finger flicks 60M w/ finger flicks 14S w/ finger flicks     Side to Side 99I w/ finger flicks 99T w/ finger flicks 93Z w/ finger flicks     Forward Step and Reach 10x, stand on blue disks, step to blue foam 10x, stand on blue disks, step to blue foam 10x, stand on blue disks, step to blue foam     Sideways Step and Reach 10x, stand on blue disks, step to blue foam 10x, stand on blue disks, step to blue foam 10x, stand on blue disks, step to blue foam     Backwards Step and Reach 10x, stand on blue disks, step to blue foam 10x, stand on blue disks, step to blue foam 10x, stand on blue disks, step to blue foam     Forwards Rock and Reach 10x, standing on blue disks 10x, standing on blue disks 10x, standing on blue disks     Sideways Rock and Reach 10x, standing on blue disks 10x, standing on blue disks 10x, standing on blue disks     Functional Components        1  STS Foam, 10x Foam, w/ finger flicks, 20A Foam, w/ finger flicks, 28O     2  Hand writting        3  Step Ups From foam, 8'',fwd/lateral, 10x From foam, 8'',fwd/lateral, 10x From foam, 8'',fwrd, 10x     4          5 clips        Hierarchies        1         2         BIG walking         10 min treadmill, 2 0 mph 10 min treadmill, 2 0 mph 10 min around clinic,     over compliant surface, quick turns, fast walking (115bpm), backwards walking, sidestepping                        **1# wrist weights & 2# ankle weights entire session**    Assessment: Tolerated session fair needing longer rest breaks due to not feeling well  Requires verbal cues with daily exercises about 10% of time to perform properly  Demonstrated fatigue with Big walking, but uses good form and stride amplitude  Would benefit from continued PT  Plan: Continue POC progress as tolerated

## 2019-01-06 NOTE — PROGRESS NOTES
Speedy Priest Daily Speech Treatment Note    Today's date: 2019   Patients name: Mayra Gordillo  : 1935  MRN: [id number]  Safety measures: PD  Referring provider: Isi Adams MD    Primary Diagnosis/Billing code: I93 9  Secondary Diagnosis/ Billing code: [address]    Visit Tracking:  -Referring provider: Non-Epic  -Billing guidelines: CMS  -Visit #610; 15 total  -Medicare  [facility]  -RE due 2019  Subjective/Behavioral:  -"I'm fine "    Objective/Assessment:    Short-term goals:    2  Patient will demonstrate understanding that his/her louder voice is WNL and will become comfortable increasing his/her phonatory effort (to be achieved in 4 weeks)  PARTIALLY MET    3  Patient will develop the ability to self-monitor adequate loudness levels in conversation to facilitate increased communication success (to be achieved in 4 weeks)  PARTIALLY MET    4  Patient will be able to increase his/her vocal loudness to reach a target sound pressure level of 75 dB SPL with paragraph-length reading material in order to increase vocal respiratory support required for functional communication (to be achieved in 4 weeks)  PARTIALLY MET    5  Patient will be able to increase his/her vocal loudness to reach a target sound pressure level of 75 dB SPL during brief conversational speech in order to increase vocal respiratory support required for functional communication (to be achieved in 4 weeks)  PARTIALLY MET     The following data was collected using [address] [place] [address]  Sound level meter-to-mouth distance: 50 cm      Daily Task #1   Maximum Duration & Intensity of Sustained LOUD /ah/ Phonation:  Average duration: 13 3 sec  Average intensity: 83 6 dB SPL  Perceived level of effort: 8/10  Cues for loudness: None    Daily Task #2  Maximum Fundamental Frequency Range:  Average Pitch (high): 318 2 Hz  Average Pitch (low): 128 5 Hz  Perceived level of effort: 9/10  Cues for loudness: Minimal-none  Cues for pitch: Minimal-moderate    Daily Task #3  Maximum Speech Loudness Drill of Functional Phrases:  Average intensity: 77 6 dB SPL  Perceived level of effort: 9/10  Cues for loudness: Minimal-none    Daily Task #4  Hierarchal Speech Loudness Drill (Paragraph Level): Average intensity: 72 3 dB SPL  Perceived level of effort: 9/10  Cues for loudness: Minimal-none    Average intensity during spontaneous "off the cuff" questions: 64 5 dB SPL    Plan:  -Patient was provided with home exercises/activities to target goals in plan of care at the end of today's session   -Continue with current plan of care

## 2019-01-07 ENCOUNTER — OFFICE VISIT (OUTPATIENT)
Dept: PHYSICAL THERAPY | Facility: CLINIC | Age: 84
End: 2019-01-07
Payer: MEDICARE

## 2019-01-07 ENCOUNTER — OFFICE VISIT (OUTPATIENT)
Dept: SPEECH THERAPY | Facility: CLINIC | Age: 84
End: 2019-01-07
Payer: MEDICARE

## 2019-01-07 DIAGNOSIS — R49.9 UNSPECIFIED VOICE AND RESONANCE DISORDER: Primary | ICD-10-CM

## 2019-01-07 DIAGNOSIS — G20 PARKINSON'S DISEASE (HCC): Primary | ICD-10-CM

## 2019-01-07 DIAGNOSIS — G20 PARKINSON'S DISEASE (HCC): ICD-10-CM

## 2019-01-07 PROCEDURE — 92507 TX SP LANG VOICE COMM INDIV: CPT | Performed by: SPEECH-LANGUAGE PATHOLOGIST

## 2019-01-07 PROCEDURE — 97112 NEUROMUSCULAR REEDUCATION: CPT

## 2019-01-07 PROCEDURE — 97116 GAIT TRAINING THERAPY: CPT

## 2019-01-08 ENCOUNTER — OFFICE VISIT (OUTPATIENT)
Dept: SPEECH THERAPY | Facility: CLINIC | Age: 84
End: 2019-01-08
Payer: MEDICARE

## 2019-01-08 ENCOUNTER — OFFICE VISIT (OUTPATIENT)
Dept: PHYSICAL THERAPY | Facility: CLINIC | Age: 84
End: 2019-01-08
Payer: MEDICARE

## 2019-01-08 DIAGNOSIS — G20 PARKINSON'S DISEASE (HCC): Primary | ICD-10-CM

## 2019-01-08 DIAGNOSIS — G20 PARKINSON'S DISEASE (HCC): ICD-10-CM

## 2019-01-08 DIAGNOSIS — R49.9 UNSPECIFIED VOICE AND RESONANCE DISORDER: Primary | ICD-10-CM

## 2019-01-08 PROCEDURE — 97116 GAIT TRAINING THERAPY: CPT

## 2019-01-08 PROCEDURE — 97112 NEUROMUSCULAR REEDUCATION: CPT

## 2019-01-08 PROCEDURE — 92507 TX SP LANG VOICE COMM INDIV: CPT

## 2019-01-08 NOTE — PROGRESS NOTES
Daily Note     Today's date: 2019  Patient name: Oscar Owen  : 1935  MRN: [id number]  Referring provider: Eugene Guerrero MD  Dx:   Encounter Diagnosis     ICD-10-CM    1  Parkinson's disease ([facility] [address] ) G20          Subjective: Offers no new complaints  Objective: See treatment diary below    LSVT BIG Daily Treatment Diary      Carryover Assignment                                                                                    Max Daily Exercises 12/31 1/3 1/4 1/7 1/8   Floor to Ceiling 09J w/ finger flicks 80Z w/ finger flicks 35A w/ finger flicks 21Q w/ finger flicks 55D w/ finger flicks   Side to Side 22K w/ finger flicks 95J w/ finger flicks 40I w/ finger flicks 91V w/ finger flicks 06W w/ finger flicks   Forward Step and Reach 10x, stand on blue disks, step to blue foam 10x, stand on blue disks, step to blue foam 10x, stand on blue disks, step to blue foam 10x, stand on blue disks, step to blue foam 10x, stand on black disks, step to blue foam   Sideways Step and Reach 10x, stand on blue disks, step to blue foam 10x, stand on blue disks, step to blue foam 10x, stand on blue disks, step to blue foam 10x, stand on blue disks, step to blue foam 10x, stand on black disks, step to blue foam   Backwards Step and Reach 10x, stand on blue disks, step to blue foam 10x, stand on blue disks, step to blue foam 10x, stand on blue disks, step to blue foam 10x, stand on blue disks, step to blue foam 10x, stand on black disks, step to blue foam   Forwards Rock and Reach 10x, standing on blue disks 10x, standing on blue disks 10x, standing on blue disks 10x, standing on blue disks 10x, standing on black disks   Sideways Rock and Reach 10x, standing on blue disks 10x, standing on blue disks 10x, standing on blue disks 10x, standing on blue disks 10x, standing on black disks   Functional Components        1   STS Foam, 10x Foam, w/ finger flicks, 42P Foam, w/ finger flicks, 94U Foam, w/ finger flicks, 20M Foam, w/ finger flicks, 39G   2  Hand writting        3  Step Ups From foam, 8'',fwd/lateral, 10x From foam, 8'',fwd/lateral, 10x From foam, 8'',fwrd, 10x 6''+foam, fwd/lateral, 10x 6''+foam, fwd/lateral, 10x   4  Marches        5 clips        Hierarchies        1         2         BIG walking         10 min treadmill, 2 0 mph 10 min treadmill, 2 0 mph 10 min around clinic,     over compliant surface, quick turns, fast walking (115bpm), backwards walking, sidestepping  10 min, 2 0 mph, 4% incline 10 min, 2 0 mph, 4% incline    fwd/lateral over hurdles on foam    Fwd-3 laps, lateral- 2 laps  Fwd-3 laps, lateral- 1 laps               **1# wrist weights & 2# ankle weights entire session**    Assessment: Progressed to standing on black disks with additional challenge noted  Demo instability and LOB, able to mostly self correct with stepping strategy, but some assistance from therapist required  Continues to be challenged to stepping over hurdles on foam     Plan: 1 more session, then transition to HEP

## 2019-01-08 NOTE — PROGRESS NOTES
Susy Leos Daily Speech Treatment Note    Today's date: 2019   Patients name: Awa Hassan  : 1935  MRN: [id number]  Safety measures: PD  Referring provider: Carola Pena MD    Primary Diagnosis/Billing code: D66 0  Secondary Diagnosis/ Billing code: [address]    Visit Tracking:  -Referring provider: Non-Epic  -Billing guidelines: CMS  -Visit #7/10; 16 total   -Medicare  [facility]  -RE due 2019  Subjective/Behavioral:  "Feeling a little better"    Objective/Assessment:    Short-term goals:    2  Patient will demonstrate understanding that his/her louder voice is WNL and will become comfortable increasing his/her phonatory effort (to be achieved in 4 weeks)  PARTIALLY MET    3  Patient will develop the ability to self-monitor adequate loudness levels in conversation to facilitate increased communication success (to be achieved in 4 weeks)  PARTIALLY MET    4  Patient will be able to increase his/her vocal loudness to reach a target sound pressure level of 75 dB SPL with paragraph-length reading material in order to increase vocal respiratory support required for functional communication (to be achieved in 4 weeks)  PARTIALLY MET    5  Patient will be able to increase his/her vocal loudness to reach a target sound pressure level of 75 dB SPL during brief conversational speech in order to increase vocal respiratory support required for functional communication (to be achieved in 4 weeks)  PARTIALLY MET     The following data was collected using [address] [place] [address]  Sound level meter-to-mouth distance: 50 cm      Daily Task #1   Maximum Duration & Intensity of Sustained LOUD /ah/ Phonation:  Average duration: 13 9 sec  Average intensity: 88 3 dB SPL  Perceived level of effort: 8/10  Cues for loudness: None    Daily Task #2  Maximum Fundamental Frequency Range:  Average Pitch (high): 324 7 Hz  Average Pitch (low): 135 Hz  Perceived level of effort: 9/10  Cues for loudness: Minimal-none  Cues for pitch: Minimal-moderate    Daily Task #3  Maximum Speech Loudness Drill of Functional Phrases:  Average intensity: 81 5 dB SPL  Perceived level of effort: 9/10  Cues for loudness: Minimal-none    Average intensity during spontaneous "off the cuff" questions: 70 2 dB SPL    Plan:  -Patient was provided with home exercises/activities to target goals in plan of care at the end of today's session   -Continue with current plan of care

## 2019-01-10 ENCOUNTER — OFFICE VISIT (OUTPATIENT)
Dept: PHYSICAL THERAPY | Facility: CLINIC | Age: 84
End: 2019-01-10
Payer: MEDICARE

## 2019-01-10 ENCOUNTER — OFFICE VISIT (OUTPATIENT)
Dept: SPEECH THERAPY | Facility: CLINIC | Age: 84
End: 2019-01-10
Payer: MEDICARE

## 2019-01-10 DIAGNOSIS — R49.9 UNSPECIFIED VOICE AND RESONANCE DISORDER: Primary | ICD-10-CM

## 2019-01-10 DIAGNOSIS — G20 PARKINSON'S DISEASE (HCC): Primary | ICD-10-CM

## 2019-01-10 DIAGNOSIS — G20 PARKINSON'S DISEASE (HCC): ICD-10-CM

## 2019-01-10 PROCEDURE — 97116 GAIT TRAINING THERAPY: CPT

## 2019-01-10 PROCEDURE — 92507 TX SP LANG VOICE COMM INDIV: CPT

## 2019-01-10 PROCEDURE — 97112 NEUROMUSCULAR REEDUCATION: CPT

## 2019-01-10 PROCEDURE — G9172 VOICE GOAL STATUS: HCPCS

## 2019-01-10 PROCEDURE — G9173 VOICE D/C STATUS: HCPCS

## 2019-01-10 NOTE — PROGRESS NOTES
Speech-Language Pathology Discharge  Today's date: 1/10/2019  Patients name: Maria Isabel Cao  : 1935  MRN: [id number]   Safety measures: PD  Referring provider: Zainab Arellano MD      Visit Tracking:  -Referring provider: Non-Epic  -Billing guidelines: CMS  -Visit #8/10; 16 total   -Medicare  [facility]  -RE due 2019  Subjective comments: "I'm fine "    Patient's goal(s): "so my wife can understand me "          Assessments     [facility] (LSVT) LOUD re-assessment:     Sound Level Meter-to-Mouth Distance: 50 cm throughout testing           Maximum Duration of Sustained Vowel Phonation (/ah/): IE: Status:   Average MDP 12 1sec  10 1  IMPROVEMENT   Average Intensity 83 2 dB SPL 78 8 IMPROVEMENT             Maximum Fundamental Frequency Range: IE: Status:   Highest Pitch 319 3 Hz 318 IMPROVEMENT   Lowest Pitch 119 7 Hz 74 0 IMPROVEMENT   Average Intensity 82 7 dB SPL 79 5 IMPROVEMENT             Reading of Words: IE: Status:   Average Intensity  78 6 dB SPL 83 5 DECLINE             Reading of Phrases: IE: Status:   Average Intensity  76 8 dB SPL 83 1 DECLINE            Conversational Monologue: IE: Status:   Average Intensity  73 3 dB SPL 72 9 IMPROVEMENT      IE indicates the scores from the initial evaluation (12/10/18)  Goals  Short-term goals:  1  Patient will be educated on vocal hygiene and demonstrate understanding of recommendations to facilitate improved vocal quality (to be achieved in 1-2 weeks)  MET    2  Patient will demonstrate understanding that his/her louder voice is WNL and will become comfortable increasing his/her phonatory effort (to be achieved in 4 weeks)  MET    3  Patient will develop the ability to self-monitor adequate loudness levels in conversation to facilitate increased communication success (to be achieved in 4 weeks)  MET    4   Patient will be able to increase his/her vocal loudness to reach a target sound pressure level of 75 dB SPL with paragraph-length reading material in order to increase vocal respiratory support required for functional communication (to be achieved in 4 weeks)  MET    5  Patient will be able to increase his/her vocal loudness to reach a target sound pressure level of 75 dB SPL during brief conversational speech in order to increase vocal respiratory support required for functional communication (to be achieved in 4 weeks)  MET     Long-Term Goals:  1  Patient will independently increase his/her vocal loudness to an appropriate level to be understood by others (to be achieved by discharge)  MET     2  Patient will utilize strategies and exercises to increase vocal loudness and improve respiratory laryngeal coordination using the LSVT protocol (to be achieved by discharge)  MET    Functional Limitations Reporting (G-codes):   Flowsheet Rows      Most Recent Value   SLP G-Codes   FOTO information reviewed  N/A   Assessment Type  Discharge   Functional Limitations  Voice   Voice Goal Status ()  [address]   Voice Discharge Status ()  CI            Impressions/Recommendations     Impressions: 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 c/b reduced loudness and a hoarse vocal quality, which contributed to an overall decrease in speech intelligibility  During conversation, speech intelligibility is reduced 5% of the time (compared to 10% on IE) Patient reports he has noticed an improvement in his vocal loudness and is pleased with the results   He was encouraged to continue doing voice exercises at home       Patient has completed the 16 sessions of LSVT LOUD and has been d/c'd to home exercise program       Recommendations:  -Patient to be discharged from outpatient skilled Speech Therapy services: Patient to be discharged to an independent Home Exercise Program

## 2019-01-10 NOTE — PROGRESS NOTES
Daily Note     Today's date: 1/10/2019  Patient name: Ernestina Cameron  : 1935  MRN: [id number]  Referring provider: Tito Dutta MD  Dx:   Encounter Diagnosis     ICD-10-CM    1  Parkinson's disease ([facility] [address] ) G20        Subjective: Offers no new complaints  Objective: See treatment diary below    LSVT BIG Daily Treatment Diary      Carryover Assignment                                                                                    Max Daily Exercises 1/10       Floor to Ceiling 58T w/ finger flicks       Side to Side 70C w/ finger flicks       Forward Step and Reach 10x, stand on black disks, step to blue foam       Sideways Step and Reach 10x, stand on black disks, step to blue foam       Backwards Step and Reach 10x, stand on black disks, step to blue foam       Forwards Rock and Reach 10x, stand on black disks, step to blue foam       [employer] and Reach 10x, stand on black disks, step to blue foam       Functional Components        1  STS Foam, w/ finger flicks, 93P       2  Hand writting        3  Step Ups        4  March        5 clips        Hierarchies        1         2         BIG walking         10 min, 2 0 mph, 4% incline        fwd/lateral over hurdles on foam                   **1# wrist weights & 2# ankle weights entire session**    Assessment: 6 MWT: 1450ft  Reviewed exercises  Patient demo good understanding for HEP  Plan: Patient D/C to ongoing HEP

## 2019-03-08 ENCOUNTER — OFFICE VISIT (OUTPATIENT)
Dept: INTERNAL MEDICINE CLINIC | Facility: CLINIC | Age: 84
End: 2019-03-08
Payer: MEDICARE

## 2019-03-08 VITALS
HEIGHT: 74 IN | BODY MASS INDEX: 30.88 KG/M2 | OXYGEN SATURATION: 98 % | WEIGHT: 240.6 LBS | SYSTOLIC BLOOD PRESSURE: 122 MMHG | DIASTOLIC BLOOD PRESSURE: 78 MMHG | HEART RATE: 66 BPM | TEMPERATURE: 98.1 F

## 2019-03-08 DIAGNOSIS — I10 BENIGN ESSENTIAL HYPERTENSION: ICD-10-CM

## 2019-03-08 DIAGNOSIS — G20 PARKINSON'S DISEASE (HCC): ICD-10-CM

## 2019-03-08 DIAGNOSIS — Z00.00 MEDICARE ANNUAL WELLNESS VISIT, SUBSEQUENT: ICD-10-CM

## 2019-03-08 DIAGNOSIS — I48.19 PERSISTENT ATRIAL FIBRILLATION (HCC): Primary | ICD-10-CM

## 2019-03-08 PROCEDURE — G0439 PPPS, SUBSEQ VISIT: HCPCS | Performed by: INTERNAL MEDICINE

## 2019-03-08 PROCEDURE — 99214 OFFICE O/P EST MOD 30 MIN: CPT | Performed by: INTERNAL MEDICINE

## 2019-03-08 NOTE — PROGRESS NOTES
Assessment/Plan:    Medicare annual wellness visit, subsequent  Discussed preventative health, cancer screening, immunizations, and safety issues  I recommend getting the Shingrix shot to help prevent Shingles  You can get it a pharmacy, and they can administer it there  It is a two shot series with the second shot needed between 2-6 months after the first shot  I would not recommend getting the shot before an important or fun event in case you were to have a reaction to the shot like a sore arm or flu-like symptoms  I make the same recommendation about any shot, as people can have a reaction to any shot  Benign essential hypertension  Controlled, continue current meds    Persistent atrial fibrillation ([facility] [address] )  Rate controlled, continue meds and follow-up Cardiology    Parkinson's disease [facility])  Follow-up Neurology       Diagnoses and all orders for this visit:    Persistent atrial fibrillation ([address] )    Medicare annual wellness visit, subsequent    Benign essential hypertension    Parkinson's disease ([facility] [place] [address] )          Subjective:      Patient ID: Stef Melgar is a 82 y o  male  Atrial fibrillation:  No heart palpitations, no bleeding problems  Parkinson's:  Patient on Sinemet, and he has noticed less shaking on the medication    Hypertension:  Patient tolerating blood pressure meds, no cardiopulmonary complaints      The following portions of the patient's history were reviewed and updated as appropriate: allergies, current medications, past family history, past medical history, past social history, past surgical history and problem list     Review of Systems   Constitutional: Negative for chills, fatigue and fever  HENT: Negative for congestion, nosebleeds, postnasal drip, sore throat and trouble swallowing  Eyes: Negative for pain  Respiratory: Negative for cough, chest tightness, shortness of breath and wheezing  Cardiovascular: Negative for chest pain, palpitations and leg swelling  Gastrointestinal: Negative for abdominal pain, constipation, diarrhea, nausea and vomiting  Endocrine: Negative for polydipsia and polyuria  Genitourinary: Negative for dysuria, flank pain and hematuria  Musculoskeletal: Negative for arthralgias  Skin: Negative for rash  Neurological: Negative for dizziness, tremors and headaches  Hematological: Does not bruise/bleed easily  Psychiatric/Behavioral: Negative for confusion and dysphoric mood  The patient is not nervous/anxious  Objective:      /78   Pulse 66   Temp 98 1 °F (36 7 °C)   Ht 6' 2" (1 88 m)   Wt 109 kg (240 lb 9 6 oz)   SpO2 98%   BMI 30 89 kg/m²          Physical Exam   Constitutional: He is oriented to person, place, and time  He appears well-developed and well-nourished  No distress  HENT:   Head: Normocephalic and atraumatic  Right Ear: External ear normal    Left Ear: External ear normal    Eyes: Conjunctivae are normal  No scleral icterus  Neck: Normal range of motion  Neck supple  No tracheal deviation present  No thyromegaly present  Cardiovascular: Normal rate and normal heart sounds  An irregularly irregular rhythm present  No murmur heard  Pulmonary/Chest: Effort normal and breath sounds normal  No respiratory distress  He has no wheezes  He has no rales  Abdominal: Soft  Bowel sounds are normal  There is no tenderness  There is no rebound and no guarding  Musculoskeletal: He exhibits no edema  Lymphadenopathy:     He has no cervical adenopathy  Neurological: He is alert and oriented to person, place, and time  Psychiatric: He has a normal mood and affect  His behavior is normal  Judgment and thought content normal    Vitals reviewed

## 2019-03-08 NOTE — PATIENT INSTRUCTIONS
Problem List Items Addressed This Visit        Cardiovascular and Mediastinum    Persistent atrial fibrillation ([address] ) - Primary     Rate controlled, continue meds and follow-up Cardiology         Benign essential hypertension     Controlled, continue current meds            Nervous and Auditory    Parkinson's disease [facility])     Follow-up Neurology            Other    Medicare annual wellness visit, subsequent     Discussed preventative health, cancer screening, immunizations, and safety issues  I recommend getting the Shingrix shot to help prevent Shingles  You can get it a pharmacy, and they can administer it there  It is a two shot series with the second shot needed between 2-6 months after the first shot  I would not recommend getting the shot before an important or fun event in case you were to have a reaction to the shot like a sore arm or flu-like symptoms  I make the same recommendation about any shot, as people can have a reaction to any shot

## 2019-03-08 NOTE — PROGRESS NOTES
Assessment and Plan:    Problem List Items Addressed This Visit        Other    Medicare annual wellness visit, subsequent - Primary     Discussed preventative health, cancer screening, immunizations, and safety issues  I recommend getting the Shingrix shot to help prevent Shingles  You can get it a pharmacy, and they can administer it there  It is a two shot series with the second shot needed between 2-6 months after the first shot  I would not recommend getting the shot before an important or fun event in case you were to have a reaction to the shot like a sore arm or flu-like symptoms  I make the same recommendation about any shot, as people can have a reaction to any shot  Health Maintenance Due   Topic Date Due    Medicare Annual Wellness Visit (AWV)  1935    BMI: Followup Plan  03/09/1955    DTaP,Tdap,and Td Vaccines (1 - Tdap) 03/09/1958         HPI:  Virgil Trevino is a 82 y o  male here for his Subsequent Wellness Visit      Patient Active Problem List   Diagnosis    Persistent atrial fibrillation (HCC)    Pure hypercholesterolemia    Benign essential hypertension    Tremor of left hand    Acute non-recurrent maxillary sinusitis    Bloating    Anxiety    Dyspnea on exertion    Esophageal reflux    Parkinson's disease ([facility] [address] )    URI (upper respiratory infection)    Medicare annual wellness visit, subsequent     Past Medical History:   Diagnosis Date    A-fib ([facility] [address] )     Hypertension     Parkinson's disease ([facility] [address] ) 11/01/2018     Past Surgical History:   Procedure Laterality Date    CARDIOVERSION      ATRIAL    ROTATOR CUFF REPAIR       Family History   Problem Relation Age of Onset    No Known Problems Mother     Coronary artery disease Father      Social History     Tobacco Use   Smoking Status Former Smoker   Smokeless Tobacco Never Used   Tobacco Comment    Quit 35-40 years ago     Social History     Substance and Sexual Activity   Alcohol Use Yes    Comment: Rare, previously moderate drinker cut down 2016  Social History     Substance and Sexual Activity   Drug Use No       Current Outpatient Medications   Medication Sig Dispense Refill    ALPRAZolam (XANAX) 0 25 mg tablet Take 1 tablet (0 25 mg total) by mouth 2 (two) times a day as needed for anxiety 50 tablet 1    carbidopa-levodopa (SINEMET)  mg per tablet Take 1 tablet by mouth 3 (three) times a day        Coenzyme Q10 (COQ-10 PO) Take 1 tablet by mouth daily      Dentifrices (BIOTENE DRY MOUTH CARE DT) Apply to teeth      escitalopram (LEXAPRO) 10 mg tablet Take 1 tablet (10 mg total) by mouth daily 30 tablet 5    loratadine (CLARITIN) 10 mg tablet Take 10 mg by mouth daily as needed        Multiple Vitamins-Minerals (ICAPS AREDS 2) CAPS Take 1 capsule by mouth 2 (two) times a day        propranolol (INDERAL) 20 mg tablet Take 1 tablet (20 mg total) by mouth every 12 (twelve) hours 60 tablet 5    Simethicone (GAS-X PO) Take 1 tablet by mouth daily as needed      telmisartan (MICARDIS) 40 mg tablet Take 1 tablet (40 mg total) by mouth daily 90 tablet 3    warfarin (COUMADIN) 5 mg tablet Take 1 tablet by mouth daily   25MG TAKE 1 TABLET 6 DAY A WEEK AND THEN 1 TABLET 1 DAY A WEEK        No current facility-administered medications for this visit  Allergies   Allergen Reactions    Penicillins Hives    Pollen Extract Other (See Comments)       Immunization History   Administered Date(s) Administered    INFLUENZA 09/04/2016, 10/17/2017, 10/26/2017, 09/13/2018    Influenza Split High Dose Preservative Free IM 1935, 10/06/2014, 09/09/2015, 10/17/2017    Influenza TIV (IM) 10/11/2010, 10/11/2011    Pneumococcal Conjugate 13-Valent 09/27/2015    Pneumococcal Polysaccharide PPV23 01/15/2017    Zoster 01/01/2013       Patient Care Team:  Mik Delgado MD as PCP - General    Medicare Screening Tests and Risk Assessments: Dayna Pollard is here for his Subsequent Wellness visit  Health Risk Assessment:  Patient rates overall health as very good  Patient feels that their physical health rating is Slightly worse  Eyesight was rated as Same  Hearing was rated as Same  Patient feels that their emotional and mental health rating is Same  Pain experienced by patient in the last 7 days has been None  Patient states that he has experienced no weight loss or gain in last 6 months  Emotional/Mental Health:  Patient has been feeling nervous/anxious  PHQ-9 Depression Screening:    Frequency of the following problems over the past two weeks:      1  Little interest or pleasure in doing things: 0 - not at all      2  Feeling down, depressed, or hopeless: 0 - not at all  PHQ-2 Score: 0          Broken Bones/Falls: Fall Risk Assessment:    In the past year, patient has experienced: No history of falling in past year          Bladder/Bowel:  Patient has not leaked urine accidently in the last six months  Patient reports no loss of bowel control  Immunizations:  Patient has had a flu vaccination within the last year  Patient has received a pneumonia shot  Patient has received a shingles shot  Patient has not received tetanus/diphtheria shot  Home Safety:  Patient has trouble with stairs inside or outside of their home  Patient currently reports that there are no safety hazards present in home, working smoke alarms, working carbon monoxide detectors  Preventative Screenings:   no prostate cancer screen performed, no colon cancer screen completed, cholesterol screen completed, 4/4/2018  no glaucoma eye exam completed(Additional Comments: Done with colonoscopy)    Nutrition:  Current diet: Regular with servings of the following:    Medications:  Patient is currently taking over-the-counter supplements  List of OTC medications includes: vitamins  Patient is able to manage medications  Lifestyle Choices:  Patient reports no tobacco use    Patient has smoked or used tobacco in the past   Patient has stopped his tobacco use  Tobacco use quit date: 1972  Patient reports alcohol use  Alcohol use per week: per month 4  Patient drives a vehicle  Patient wears seat belt  Current level of exercise of physical activity described by patient as: walking  Activities of Daily Living:  Can get out of bed by his or her self, able to dress self, unable to make own meals, able to do own shopping, able to bathe self, can do own laundry/housekeeping, can manage own money, pay bills and track expensesAdditional Comments: wife    Previous Hospitalizations:  No hospitalization or ED visit in past 12 months        Advanced Directives:  Patient has decided on a power of   Patient has spoken to designated power of   Patient has not completed advanced directive  Preventative Screening/Counseling:      Cardiovascular:      General: Risks and Benefits Discussed and Screening Current          Diabetes:      General: Risks and Benefits Discussed and Screening Current          Colorectal Cancer:      General: Risks and Benefits Discussed and Screening Not Indicated          Prostate Cancer:      General: Risks and Benefits Discussed and Screening Current          Osteoporosis:      General: Risks and Benefits Discussed          AAA:      General: Risks and Benefits Discussed          Glaucoma:      General: Risks and Benefits Discussed          HIV:      General: Screening Not Indicated          Hepatitis C:      General: Screening Not Indicated        Advanced Directives: Information on ACP and/or AD provided       Immunizations:      Influenza: Risks & Benefits Discussed, Influenza Recommended Annually and Influenza UTD This Year      Pneumococcal: Risks & Benefits Discussed and Lifetime Vaccine Completed      Shingrix: Risks & Benefits Discussed      Zostavax: Risks & Benefits Discussed and Zostavax Vaccine UTD      TDAP: Risks & Benefits Discussed and Vaccine Status Unknown      Other Preventative Counseling (Non-Medicare):  Car/seat belt/driving safety reviewed, Skin self-exam and Sunscreen use

## 2019-03-27 ENCOUNTER — APPOINTMENT (OUTPATIENT)
Dept: RADIOLOGY | Age: 84
End: 2019-03-27
Payer: MEDICARE

## 2019-03-27 ENCOUNTER — TELEPHONE (OUTPATIENT)
Dept: INTERNAL MEDICINE CLINIC | Facility: CLINIC | Age: 84
End: 2019-03-27

## 2019-03-27 ENCOUNTER — TRANSCRIBE ORDERS (OUTPATIENT)
Dept: ADMINISTRATIVE | Age: 84
End: 2019-03-27

## 2019-03-27 DIAGNOSIS — M54.5 LOW BACK PAIN, UNSPECIFIED BACK PAIN LATERALITY, UNSPECIFIED CHRONICITY, WITH SCIATICA PRESENCE UNSPECIFIED: Primary | ICD-10-CM

## 2019-03-27 DIAGNOSIS — M54.5 LOW BACK PAIN, UNSPECIFIED BACK PAIN LATERALITY, UNSPECIFIED CHRONICITY, WITH SCIATICA PRESENCE UNSPECIFIED: ICD-10-CM

## 2019-03-27 PROCEDURE — 72100 X-RAY EXAM L-S SPINE 2/3 VWS: CPT

## 2019-03-27 NOTE — TELEPHONE ENCOUNTER
Pt went to the chiropractor today and they asked him to ask you to put an order in for a low back xray  [place] He will be going to [facility] - please call once entered  Reji Gregory to [facility]

## 2019-04-05 ENCOUNTER — APPOINTMENT (EMERGENCY)
Dept: RADIOLOGY | Facility: HOSPITAL | Age: 84
End: 2019-04-05
Payer: MEDICARE

## 2019-04-05 ENCOUNTER — HOSPITAL ENCOUNTER (OUTPATIENT)
Facility: HOSPITAL | Age: 84
Setting detail: OBSERVATION
Discharge: HOME | End: 2019-04-06
Attending: EMERGENCY MEDICINE | Admitting: INTERNAL MEDICINE
Payer: MEDICARE

## 2019-04-05 DIAGNOSIS — R07.89 OTHER CHEST PAIN: Primary | ICD-10-CM

## 2019-04-05 DIAGNOSIS — I48.19 PERSISTENT ATRIAL FIBRILLATION (HCC): ICD-10-CM

## 2019-04-05 LAB
ALBUMIN SERPL BCP-MCNC: 4 G/DL (ref 3.5–5)
ALP SERPL-CCNC: 128 U/L (ref 46–116)
ALT SERPL W P-5'-P-CCNC: 21 U/L (ref 12–78)
ANION GAP SERPL CALCULATED.3IONS-SCNC: 5 MMOL/L (ref 4–13)
APTT PPP: 52 SECONDS (ref 26–38)
AST SERPL W P-5'-P-CCNC: 20 U/L (ref 5–45)
BASOPHILS # BLD AUTO: 0.04 THOUSANDS/ΜL (ref 0–0.1)
BASOPHILS NFR BLD AUTO: 0 % (ref 0–1)
BILIRUB SERPL-MCNC: 0.89 MG/DL (ref 0.2–1)
BUN SERPL-MCNC: 14 MG/DL (ref 5–25)
CALCIUM SERPL-MCNC: 8.7 MG/DL (ref 8.3–10.1)
CHLORIDE SERPL-SCNC: 106 MMOL/L (ref 100–108)
CO2 SERPL-SCNC: 26 MMOL/L (ref 21–32)
CREAT SERPL-MCNC: 0.92 MG/DL (ref 0.6–1.3)
EOSINOPHIL # BLD AUTO: 0.24 THOUSAND/ΜL (ref 0–0.61)
EOSINOPHIL NFR BLD AUTO: 3 % (ref 0–6)
ERYTHROCYTE [DISTWIDTH] IN BLOOD BY AUTOMATED COUNT: 13.3 % (ref 11.6–15.1)
GFR SERPL CREATININE-BSD FRML MDRD: 77 ML/MIN/1.73SQ M
GLUCOSE SERPL-MCNC: 89 MG/DL (ref 65–140)
HCT VFR BLD AUTO: 39.8 % (ref 36.5–49.3)
HGB BLD-MCNC: 13.1 G/DL (ref 12–17)
IMM GRANULOCYTES # BLD AUTO: 0.04 THOUSAND/UL (ref 0–0.2)
IMM GRANULOCYTES NFR BLD AUTO: 0 % (ref 0–2)
INR PPP: 3.33 (ref 0.86–1.17)
LYMPHOCYTES # BLD AUTO: 2.66 THOUSANDS/ΜL (ref 0.6–4.47)
LYMPHOCYTES NFR BLD AUTO: 30 % (ref 14–44)
MAGNESIUM SERPL-MCNC: 2.1 MG/DL (ref 1.6–2.6)
MCH RBC QN AUTO: 30.1 PG (ref 26.8–34.3)
MCHC RBC AUTO-ENTMCNC: 32.9 G/DL (ref 31.4–37.4)
MCV RBC AUTO: 92 FL (ref 82–98)
MONOCYTES # BLD AUTO: 1.17 THOUSAND/ΜL (ref 0.17–1.22)
MONOCYTES NFR BLD AUTO: 13 % (ref 4–12)
NEUTROPHILS # BLD AUTO: 4.86 THOUSANDS/ΜL (ref 1.85–7.62)
NEUTS SEG NFR BLD AUTO: 54 % (ref 43–75)
NRBC BLD AUTO-RTO: 0 /100 WBCS
PLATELET # BLD AUTO: 300 THOUSANDS/UL (ref 149–390)
PMV BLD AUTO: 9.4 FL (ref 8.9–12.7)
POTASSIUM SERPL-SCNC: 3.9 MMOL/L (ref 3.5–5.3)
PROT SERPL-MCNC: 7.5 G/DL (ref 6.4–8.2)
PROTHROMBIN TIME: 33.8 SECONDS (ref 11.8–14.2)
RBC # BLD AUTO: 4.35 MILLION/UL (ref 3.88–5.62)
SODIUM SERPL-SCNC: 137 MMOL/L (ref 136–145)
TROPONIN I SERPL-MCNC: <0.02 NG/ML
WBC # BLD AUTO: 9.01 THOUSAND/UL (ref 4.31–10.16)

## 2019-04-05 PROCEDURE — 93005 ELECTROCARDIOGRAM TRACING: CPT

## 2019-04-05 PROCEDURE — 84484 ASSAY OF TROPONIN QUANT: CPT

## 2019-04-05 PROCEDURE — 80053 COMPREHEN METABOLIC PANEL: CPT | Performed by: EMERGENCY MEDICINE

## 2019-04-05 PROCEDURE — 85025 COMPLETE CBC W/AUTO DIFF WBC: CPT | Performed by: EMERGENCY MEDICINE

## 2019-04-05 PROCEDURE — 99285 EMERGENCY DEPT VISIT HI MDM: CPT | Performed by: EMERGENCY MEDICINE

## 2019-04-05 PROCEDURE — 84484 ASSAY OF TROPONIN QUANT: CPT | Performed by: EMERGENCY MEDICINE

## 2019-04-05 PROCEDURE — 99285 EMERGENCY DEPT VISIT HI MDM: CPT

## 2019-04-05 PROCEDURE — 99220 PR INITIAL OBSERVATION CARE/DAY 70 MINUTES: CPT | Performed by: INTERNAL MEDICINE

## 2019-04-05 PROCEDURE — 1123F ACP DISCUSS/DSCN MKR DOCD: CPT | Performed by: EMERGENCY MEDICINE

## 2019-04-05 PROCEDURE — 83735 ASSAY OF MAGNESIUM: CPT | Performed by: EMERGENCY MEDICINE

## 2019-04-05 PROCEDURE — 85730 THROMBOPLASTIN TIME PARTIAL: CPT | Performed by: EMERGENCY MEDICINE

## 2019-04-05 PROCEDURE — 36415 COLL VENOUS BLD VENIPUNCTURE: CPT

## 2019-04-05 PROCEDURE — 71046 X-RAY EXAM CHEST 2 VIEWS: CPT

## 2019-04-05 PROCEDURE — 85610 PROTHROMBIN TIME: CPT | Performed by: EMERGENCY MEDICINE

## 2019-04-06 VITALS
WEIGHT: 241.2 LBS | HEART RATE: 53 BPM | OXYGEN SATURATION: 93 % | DIASTOLIC BLOOD PRESSURE: 76 MMHG | HEIGHT: 74 IN | SYSTOLIC BLOOD PRESSURE: 170 MMHG | BODY MASS INDEX: 30.96 KG/M2 | TEMPERATURE: 97.6 F | RESPIRATION RATE: 20 BRPM

## 2019-04-06 PROBLEM — R07.89 CHEST PRESSURE: Status: RESOLVED | Noted: 2019-04-05 | Resolved: 2019-04-06

## 2019-04-06 LAB
ANION GAP SERPL CALCULATED.3IONS-SCNC: 6 MMOL/L (ref 4–13)
BUN SERPL-MCNC: 16 MG/DL (ref 5–25)
CALCIUM SERPL-MCNC: 8.4 MG/DL (ref 8.3–10.1)
CHLORIDE SERPL-SCNC: 107 MMOL/L (ref 100–108)
CHOLEST SERPL-MCNC: 181 MG/DL (ref 50–200)
CO2 SERPL-SCNC: 26 MMOL/L (ref 21–32)
CREAT SERPL-MCNC: 0.81 MG/DL (ref 0.6–1.3)
ERYTHROCYTE [DISTWIDTH] IN BLOOD BY AUTOMATED COUNT: 13.5 % (ref 11.6–15.1)
GFR SERPL CREATININE-BSD FRML MDRD: 83 ML/MIN/1.73SQ M
GLUCOSE SERPL-MCNC: 84 MG/DL (ref 65–140)
HCT VFR BLD AUTO: 38.7 % (ref 36.5–49.3)
HDLC SERPL-MCNC: 40 MG/DL (ref 40–60)
HGB BLD-MCNC: 12.7 G/DL (ref 12–17)
INR PPP: 3.42 (ref 0.86–1.17)
LDLC SERPL CALC-MCNC: 108 MG/DL (ref 0–100)
MCH RBC QN AUTO: 30.5 PG (ref 26.8–34.3)
MCHC RBC AUTO-ENTMCNC: 32.8 G/DL (ref 31.4–37.4)
MCV RBC AUTO: 93 FL (ref 82–98)
PLATELET # BLD AUTO: 281 THOUSANDS/UL (ref 149–390)
PLATELET # BLD AUTO: 291 THOUSANDS/UL (ref 149–390)
PMV BLD AUTO: 9.8 FL (ref 8.9–12.7)
PMV BLD AUTO: 9.9 FL (ref 8.9–12.7)
POTASSIUM SERPL-SCNC: 3.7 MMOL/L (ref 3.5–5.3)
PROTHROMBIN TIME: 34.5 SECONDS (ref 11.8–14.2)
RBC # BLD AUTO: 4.17 MILLION/UL (ref 3.88–5.62)
SODIUM SERPL-SCNC: 139 MMOL/L (ref 136–145)
TRIGL SERPL-MCNC: 165 MG/DL
TROPONIN I SERPL-MCNC: <0.02 NG/ML
WBC # BLD AUTO: 8.57 THOUSAND/UL (ref 4.31–10.16)

## 2019-04-06 PROCEDURE — 84484 ASSAY OF TROPONIN QUANT: CPT | Performed by: PHYSICIAN ASSISTANT

## 2019-04-06 PROCEDURE — 85049 AUTOMATED PLATELET COUNT: CPT | Performed by: PHYSICIAN ASSISTANT

## 2019-04-06 PROCEDURE — 80048 BASIC METABOLIC PNL TOTAL CA: CPT | Performed by: PHYSICIAN ASSISTANT

## 2019-04-06 PROCEDURE — 99217 PR OBSERVATION CARE DISCHARGE MANAGEMENT: CPT | Performed by: NURSE PRACTITIONER

## 2019-04-06 PROCEDURE — 80061 LIPID PANEL: CPT | Performed by: PHYSICIAN ASSISTANT

## 2019-04-06 PROCEDURE — 85610 PROTHROMBIN TIME: CPT | Performed by: NURSE PRACTITIONER

## 2019-04-06 PROCEDURE — 85027 COMPLETE CBC AUTOMATED: CPT | Performed by: PHYSICIAN ASSISTANT

## 2019-04-06 RX ORDER — ACETAMINOPHEN 325 MG/1
650 TABLET ORAL EVERY 4 HOURS PRN
Status: DISCONTINUED | OUTPATIENT
Start: 2019-04-06 | End: 2019-04-06 | Stop reason: HOSPADM

## 2019-04-06 RX ORDER — LANOLIN ALCOHOL/MO/W.PET/CERES
6 CREAM (GRAM) TOPICAL
Status: DISCONTINUED | OUTPATIENT
Start: 2019-04-06 | End: 2019-04-06 | Stop reason: HOSPADM

## 2019-04-06 RX ORDER — ONDANSETRON 2 MG/ML
4 INJECTION INTRAMUSCULAR; INTRAVENOUS EVERY 6 HOURS PRN
Status: DISCONTINUED | OUTPATIENT
Start: 2019-04-06 | End: 2019-04-06 | Stop reason: HOSPADM

## 2019-04-06 RX ORDER — ESCITALOPRAM OXALATE 10 MG/1
10 TABLET ORAL DAILY
Status: DISCONTINUED | OUTPATIENT
Start: 2019-04-06 | End: 2019-04-06 | Stop reason: HOSPADM

## 2019-04-06 RX ORDER — POLYETHYLENE GLYCOL 3350 17 G/17G
17 POWDER, FOR SOLUTION ORAL DAILY
Status: DISCONTINUED | OUTPATIENT
Start: 2019-04-06 | End: 2019-04-06 | Stop reason: HOSPADM

## 2019-04-06 RX ORDER — CALCIUM CARBONATE 200(500)MG
1000 TABLET,CHEWABLE ORAL DAILY PRN
Status: DISCONTINUED | OUTPATIENT
Start: 2019-04-06 | End: 2019-04-06 | Stop reason: HOSPADM

## 2019-04-06 RX ORDER — LOSARTAN POTASSIUM 25 MG/1
25 TABLET ORAL DAILY
COMMUNITY
End: 2020-08-31 | Stop reason: ALTCHOICE

## 2019-04-06 RX ORDER — WARFARIN SODIUM 5 MG/1
5 TABLET ORAL DAILY
Refills: 0
Start: 2019-04-07 | End: 2021-08-09 | Stop reason: ALTCHOICE

## 2019-04-06 RX ORDER — PROPRANOLOL HYDROCHLORIDE 20 MG/1
20 TABLET ORAL EVERY 12 HOURS SCHEDULED
Status: DISCONTINUED | OUTPATIENT
Start: 2019-04-06 | End: 2019-04-06 | Stop reason: HOSPADM

## 2019-04-06 RX ORDER — LOSARTAN POTASSIUM 50 MG/1
50 TABLET ORAL DAILY
Status: DISCONTINUED | OUTPATIENT
Start: 2019-04-06 | End: 2019-04-06 | Stop reason: HOSPADM

## 2019-04-06 RX ADMIN — PROPRANOLOL HYDROCHLORIDE 20 MG: 20 TABLET ORAL at 02:06

## 2019-04-06 RX ADMIN — CARBIDOPA AND LEVODOPA 1 TABLET: 25; 100 TABLET ORAL at 08:54

## 2019-04-06 RX ADMIN — PROPRANOLOL HYDROCHLORIDE 20 MG: 20 TABLET ORAL at 08:54

## 2019-04-06 RX ADMIN — LOSARTAN POTASSIUM 50 MG: 50 TABLET, FILM COATED ORAL at 08:54

## 2019-04-08 ENCOUNTER — TRANSITIONAL CARE MANAGEMENT (OUTPATIENT)
Dept: INTERNAL MEDICINE CLINIC | Facility: CLINIC | Age: 84
End: 2019-04-08

## 2019-04-09 ENCOUNTER — OFFICE VISIT (OUTPATIENT)
Dept: INTERNAL MEDICINE CLINIC | Facility: CLINIC | Age: 84
End: 2019-04-09
Payer: MEDICARE

## 2019-04-09 VITALS
DIASTOLIC BLOOD PRESSURE: 71 MMHG | WEIGHT: 247.6 LBS | HEIGHT: 74 IN | HEART RATE: 58 BPM | SYSTOLIC BLOOD PRESSURE: 119 MMHG | TEMPERATURE: 97.6 F | BODY MASS INDEX: 31.78 KG/M2 | OXYGEN SATURATION: 95 %

## 2019-04-09 DIAGNOSIS — I48.19 PERSISTENT ATRIAL FIBRILLATION (HCC): ICD-10-CM

## 2019-04-09 DIAGNOSIS — I10 BENIGN ESSENTIAL HYPERTENSION: ICD-10-CM

## 2019-04-09 DIAGNOSIS — G20 PARKINSON'S DISEASE (HCC): ICD-10-CM

## 2019-04-09 DIAGNOSIS — R07.9 CHEST PAIN, UNSPECIFIED TYPE: Primary | ICD-10-CM

## 2019-04-09 LAB
ATRIAL RATE: 416 BPM
QRS AXIS: 47 DEGREES
QRSD INTERVAL: 98 MS
QT INTERVAL: 406 MS
QTC INTERVAL: 418 MS
T WAVE AXIS: 56 DEGREES
VENTRICULAR RATE: 64 BPM

## 2019-04-09 PROCEDURE — 93010 ELECTROCARDIOGRAM REPORT: CPT | Performed by: INTERNAL MEDICINE

## 2019-04-09 PROCEDURE — 99495 TRANSJ CARE MGMT MOD F2F 14D: CPT | Performed by: INTERNAL MEDICINE

## 2019-06-28 ENCOUNTER — OFFICE VISIT (OUTPATIENT)
Dept: NEUROLOGY | Facility: CLINIC | Age: 84
End: 2019-06-28
Payer: MEDICARE

## 2019-06-28 VITALS
HEIGHT: 74 IN | RESPIRATION RATE: 14 BRPM | SYSTOLIC BLOOD PRESSURE: 128 MMHG | BODY MASS INDEX: 31.57 KG/M2 | HEART RATE: 64 BPM | WEIGHT: 246 LBS | DIASTOLIC BLOOD PRESSURE: 65 MMHG

## 2019-06-28 DIAGNOSIS — G20 PARKINSON'S DISEASE (HCC): Primary | ICD-10-CM

## 2019-06-28 PROCEDURE — 99215 OFFICE O/P EST HI 40 MIN: CPT | Performed by: PSYCHIATRY & NEUROLOGY

## 2019-06-28 RX ORDER — ISOSORBIDE MONONITRATE 30 MG/1
30 TABLET, EXTENDED RELEASE ORAL
COMMUNITY
End: 2021-09-15 | Stop reason: SDUPTHER

## 2019-07-17 ENCOUNTER — OFFICE VISIT (OUTPATIENT)
Dept: INTERNAL MEDICINE CLINIC | Facility: CLINIC | Age: 84
End: 2019-07-17
Payer: MEDICARE

## 2019-07-17 VITALS
SYSTOLIC BLOOD PRESSURE: 137 MMHG | HEIGHT: 74 IN | WEIGHT: 245.6 LBS | HEART RATE: 72 BPM | TEMPERATURE: 97.6 F | BODY MASS INDEX: 31.52 KG/M2 | OXYGEN SATURATION: 97 % | DIASTOLIC BLOOD PRESSURE: 78 MMHG

## 2019-07-17 DIAGNOSIS — I48.19 PERSISTENT ATRIAL FIBRILLATION (HCC): ICD-10-CM

## 2019-07-17 DIAGNOSIS — I10 BENIGN ESSENTIAL HYPERTENSION: ICD-10-CM

## 2019-07-17 DIAGNOSIS — G20 PARKINSON'S DISEASE (HCC): Primary | ICD-10-CM

## 2019-07-17 PROCEDURE — 99214 OFFICE O/P EST MOD 30 MIN: CPT | Performed by: INTERNAL MEDICINE

## 2019-07-17 NOTE — PATIENT INSTRUCTIONS
I recommend getting the Shingrix shot to help prevent Shingles  You can get it a pharmacy, and they can administer it there  It is a two shot series with the second shot needed between 2-6 months after the first shot  I would not recommend getting the shot before an important or fun event in case you were to have a reaction to the shot like a sore arm or flu-like symptoms  I make the same recommendation about any shot, as people can have a reaction to any shot      Problem List Items Addressed This Visit        Cardiovascular and Mediastinum    Persistent atrial fibrillation ([address] )     Rate controlled, continue meds and  follow-up Cardiology         Benign essential hypertension     Borderline, continue meds along with healthy diet and exercise            Nervous and Auditory    Parkinson's disease ([address] ) - Primary     Continue Sinemet and follow-up neurology

## 2019-07-17 NOTE — PROGRESS NOTES
Assessment/Plan:    Parkinson's disease ([facility] [address] )  Continue Sinemet and follow-up neurology    Benign essential hypertension  Borderline, continue meds along with healthy diet and exercise    Persistent atrial fibrillation ([facility] [address] )  Rate controlled, continue meds and  follow-up Cardiology       Diagnoses and all orders for this visit:    Parkinson's disease ([facility] [address] )    Benign essential hypertension    [facility] ([facility] )          Subjective:      Patient ID: Bill Boo is a 82 y o  male  Parkinson's:  Patient following with Neurology for this and is on Sinemet  No difficulty getting out of chairs, no shuffling gait, he is a little stiffer than he used to be    Hypertension:  Patient reports compliance with blood pressure med, no lightheadedness, no chest pain, no shortness of breath  Atrial fibrillation:  No palpitations, no bleeding problems  The following portions of the patient's history were reviewed and updated as appropriate: allergies, current medications, past family history, past medical history, past social history, past surgical history and problem list     Review of Systems   Constitutional: Negative for chills, fatigue and fever  HENT: Negative for congestion, nosebleeds, postnasal drip, sore throat and trouble swallowing  Eyes: Negative for pain  Respiratory: Negative for cough, chest tightness, shortness of breath and wheezing  Cardiovascular: Negative for chest pain, palpitations and leg swelling  Gastrointestinal: Negative for abdominal pain, constipation, diarrhea, nausea and vomiting  Endocrine: Negative for polydipsia and polyuria  Genitourinary: Negative for dysuria, flank pain and hematuria  Musculoskeletal: Negative for arthralgias  Skin: Negative for rash  Neurological: Positive for tremors  Negative for dizziness and headaches  Hematological: Does not bruise/bleed easily  Psychiatric/Behavioral: Negative for confusion and dysphoric mood   The patient is not nervous/anxious  Objective:      /78   Pulse 72   Temp 97 6 °F (36 4 °C)   Ht 6' 2" (1 88 m)   Wt 111 kg (245 lb 9 6 oz)   SpO2 97%   BMI 31 53 kg/m²          Physical Exam   Constitutional: He is oriented to person, place, and time  He appears well-developed and well-nourished  No distress  HENT:   Head: Normocephalic and atraumatic  Right Ear: External ear normal    Left Ear: External ear normal    Eyes: Conjunctivae are normal  No scleral icterus  Neck: Normal range of motion  Neck supple  No tracheal deviation present  No thyromegaly present  Cardiovascular: Normal rate and normal heart sounds  An irregularly irregular rhythm present  No murmur heard  Pulmonary/Chest: Effort normal and breath sounds normal  No respiratory distress  He has no wheezes  He has no rales  Abdominal: Soft  Bowel sounds are normal  There is no tenderness  There is no rebound and no guarding  Musculoskeletal: He exhibits no edema  Lymphadenopathy:     He has no cervical adenopathy  Neurological: He is alert and oriented to person, place, and time  Resting tremor left hand, no shuffling gait, no masked faces, no stooped posture, patient able to get out of chair okay   Psychiatric: He has a normal mood and affect  His behavior is normal  Judgment and thought content normal    Vitals reviewed  BMI Counseling: Body mass index is 31 53 kg/m²  Discussed the patient's BMI with him  The BMI is above average  BMI counseling and education was provided to the patient  Nutrition recommendations include reducing portion sizes, decreasing overall calorie intake, 3-5 servings of fruits/vegetables daily, reducing fast food intake, consuming healthier snacks, decreasing soda and/or juice intake, moderation in carbohydrate intake and increasing intake of lean protein  Exercise recommendations include exercising 3-5 times per week

## 2019-07-25 ENCOUNTER — TELEPHONE (OUTPATIENT)
Dept: INTERNAL MEDICINE CLINIC | Facility: CLINIC | Age: 84
End: 2019-07-25

## 2019-07-25 NOTE — TELEPHONE ENCOUNTER
I recommend he tries topical Aspercreme to the area and can try a heating pad with gentle stretching, and see if he wants to come in for an appointment tomorrow    If patient having bowel or bladder incontinence, high fevers, or a rash in the area, let me know

## 2019-07-25 NOTE — TELEPHONE ENCOUNTER
Spoke with pt he is having severe back pain and wants to see Dr Diane Flores, scheduled for tomorrow @ 11:30  He is going to try using heating pad and advil

## 2019-07-25 NOTE — TELEPHONE ENCOUNTER
Patient is having L leg and lower back pain  Started last night and when pt woke up he can barely walk  Asking what you would recommend      Please advise

## 2019-07-26 ENCOUNTER — OFFICE VISIT (OUTPATIENT)
Dept: INTERNAL MEDICINE CLINIC | Facility: CLINIC | Age: 84
End: 2019-07-26
Payer: MEDICARE

## 2019-07-26 VITALS
RESPIRATION RATE: 12 BRPM | TEMPERATURE: 97.6 F | SYSTOLIC BLOOD PRESSURE: 122 MMHG | HEART RATE: 70 BPM | OXYGEN SATURATION: 98 % | BODY MASS INDEX: 31.53 KG/M2 | HEIGHT: 74 IN | DIASTOLIC BLOOD PRESSURE: 64 MMHG

## 2019-07-26 DIAGNOSIS — M54.42 ACUTE LEFT-SIDED LOW BACK PAIN WITH LEFT-SIDED SCIATICA: Primary | ICD-10-CM

## 2019-07-26 PROCEDURE — 99213 OFFICE O/P EST LOW 20 MIN: CPT | Performed by: INTERNAL MEDICINE

## 2019-07-26 RX ORDER — METHYLPREDNISOLONE 4 MG/1
TABLET ORAL
Qty: 21 EACH | Refills: 0 | Status: SHIPPED | OUTPATIENT
Start: 2019-07-26 | End: 2019-08-15 | Stop reason: ALTCHOICE

## 2019-07-26 NOTE — PATIENT INSTRUCTIONS
Problem List Items Addressed This Visit        Other    Acute left-sided low back pain with left-sided sciatica - Primary     Will try oral steroids instead of the advil pt is taking, and will send pt for PT eval and treat           Relevant Medications    methylPREDNISolone 4 MG tablet therapy pack    Other Relevant Orders    Ambulatory referral to Physical Therapy

## 2019-07-26 NOTE — PROGRESS NOTES
Assessment/Plan:    No problem-specific Assessment & Plan notes found for this encounter  There are no diagnoses linked to this encounter  Subjective:      Patient ID: Allan Ortega is a 82 y o  male  Patient woke up with pain 2 days ago in the left sacroiliac area, radiating down the the lateral thigh on the left into the knee  No rash in area, no trauma or strain, it has affected his walking  Patient has been using heating pad, icy hot topical, and advil      The following portions of the patient's history were reviewed and updated as appropriate: allergies, current medications, past family history, past medical history, past social history, past surgical history and problem list     Review of Systems   Constitutional: Negative for chills and fever  Gastrointestinal: Positive for constipation  Negative for diarrhea  Musculoskeletal: Positive for back pain  Skin: Negative for rash  Objective:      /64 (BP Location: Left arm)   Pulse 70   Temp 97 6 °F (36 4 °C)   Resp 12   Ht 6' 2" (1 88 m)   SpO2 98%   BMI 31 53 kg/m²          Physical Exam   Constitutional: He appears well-nourished  Abdominal: Soft  Bowel sounds are normal  He exhibits no distension and no mass  There is no tenderness  There is no guarding     Musculoskeletal:   Patient able to flex the hip against resistance, no tenderness over spine, no tenderness over sacroiliac joint

## 2019-07-30 ENCOUNTER — EVALUATION (OUTPATIENT)
Dept: PHYSICAL THERAPY | Age: 84
End: 2019-07-30
Payer: MEDICARE

## 2019-07-30 DIAGNOSIS — M54.42 ACUTE LEFT-SIDED LOW BACK PAIN WITH LEFT-SIDED SCIATICA: Primary | ICD-10-CM

## 2019-07-30 PROCEDURE — 97162 PT EVAL MOD COMPLEX 30 MIN: CPT | Performed by: PHYSICAL THERAPIST

## 2019-07-30 NOTE — PROGRESS NOTES
PT Evaluation     Today's date: 2019  Patient name: Tamy Ledezma  : 1935  MRN: [id number]  Referring provider: Francine Garcia MD  Dx:   Encounter Diagnosis     ICD-10-CM    1  Acute left-sided low back pain with left-sided sciatica M54 42 Ambulatory referral to Physical Therapy                  Assessment  Assessment details: Patient seen for PT evaluation for acute low back pain  Presents with muscle tightness, SI dysfunction, significant tightness throughout all lumbar spine, full LE strength with mild weakness at hips  Balance and gait deficits related to Parkinson's and back pain  Impairments: abnormal gait, abnormal or restricted ROM, activity intolerance, impaired balance, impaired physical strength, lacks appropriate home exercise program, pain with function, poor posture  and poor body mechanics  Functional limitations: Patient with moderate limitations with IADLs with standing, sit>stand with recreational activities, with household chores and with prolonged walking  Understanding of Dx/Px/POC: good   Prognosis: good    Goals  Impairment Goals to be met within 4 weeks  - Decrease pain to 0/10 at rest and with activity  - Improve ROM by 5-10 degrees lumbar spine  - Increase strength to 5/5 throughout  - Improve walking speed     Functional Goals to be met within 4-6 weeks     - Return to Prior Level of Function  - Increase Functional Status Measure to: expected  - Patient will be independent with HEP  - Patient to be able to tolerate bending with back pain <2/10       Plan  Patient would benefit from: skilled physical therapy  Planned modality interventions: cryotherapy and thermotherapy: hydrocollator packs  Planned therapy interventions: abdominal trunk stabilization, manual therapy, neuromuscular re-education, patient education, postural training, strengthening, stretching, therapeutic activities, therapeutic exercise, home exercise program, gait training and balance  Frequency: 2x week  Duration in weeks: 6  Treatment plan discussed with: patient        Subjective Evaluation    History of Present Illness  Mechanism of injury: Patient with onset of L sided low back pain x ~ 2 -4 weeks ago when he was bending over with the bird feeder, trying to refill it  Patient thinks he may have strained his back in the mean time  Patient recently diagnosed with Parkinson's x 8 months  Patient noticed a tremoring in his L hand, was assessed by 2 different neurologists and diagnosed with Parkinson's  Pain  Current pain ratin  At best pain ratin  At worst pain ratin          Objective     Active Range of Motion     Lumbar   Flexion: 60 degrees  with pain  Extension: 0 degrees  with pain  Left lateral flexion: 5 degrees       Right lateral flexion: 10 degrees   Left Hip   Flexion: 80 degrees     Right Hip   Flexion: 80 degrees     Passive Range of Motion   Left Hip   Flexion: WFL    Right Hip   Flexion: WFL    Strength/Myotome Testing     Left Hip   Planes of Motion   Flexion: 4  Extension: 4-  Abduction: 4+  Adduction: 5    Right Hip   Planes of Motion   Flexion: 4  Extension: 4-  Abduction: 4+  Adduction: 5    Left Knee   Flexion: 5  Extension: 5    Right Knee   Flexion: 5  Extension: 5    Ambulation     Observational Gait   Gait: antalgic   Decreased walking speed and stride length  Quality of Movement During Gait   Trunk  Forward lean  Precautions Parkinson's    Specialty Daily Treatment Diary       Manual        Hamstring stretch        IT band stretch? ?                 Exercise Diary                 Bike                 SKTC                bridges        Sup DLS iso abd        DLS alt arms/legs                Standing hip abd        Standing hip ext                Leg press        Squats to hi/lo                                                        Modalities

## 2019-07-31 ENCOUNTER — APPOINTMENT (OUTPATIENT)
Dept: PHYSICAL THERAPY | Age: 84
End: 2019-07-31
Payer: MEDICARE

## 2019-08-02 ENCOUNTER — OFFICE VISIT (OUTPATIENT)
Dept: PHYSICAL THERAPY | Age: 84
End: 2019-08-02
Payer: MEDICARE

## 2019-08-02 DIAGNOSIS — M54.42 ACUTE LEFT-SIDED LOW BACK PAIN WITH LEFT-SIDED SCIATICA: Primary | ICD-10-CM

## 2019-08-02 PROCEDURE — 97110 THERAPEUTIC EXERCISES: CPT

## 2019-08-02 NOTE — PROGRESS NOTES
Daily Note     Today's date: 2019  Patient name: Bill Boo  : 1935  MRN: [id number]  Referring provider: Swati Brower MD  Dx:   Encounter Diagnosis     ICD-10-CM    1  Acute left-sided low back pain with left-sided sciatica M54 42                   Subjective: Pt reports pain mostly with poor sitting  No pain with walking or prolonged standing      Objective: See treatment diary below      Assessment: Tolerated treatment fair  LB ext causes pain with repetition but pt will use at 10 reps only when pain with LB from sitting   Otherwise pt will continue with flexibility prescribed by LPT      Plan: Cont PT per LPT plan     Precautions Parkinson's    Specialty Daily Treatment Diary       Manual        Hamstring stretch 51yvpw4       IT band stretch?? 25kefb1               Exercise Diary                 Bike  10m               [address] 27htbj90       Standing back bend ROM only x10 +pain with reps       bridges 2x10       Sup DLS iso abd 2U44       DLS alt arms/legs 1# 2x10       LTR 8cnbt35       Standing hip abd NT       Standing hip ext NT               Leg press NT       Squats to hi/lo NT                                                       Modalities

## 2019-08-06 ENCOUNTER — OFFICE VISIT (OUTPATIENT)
Dept: PHYSICAL THERAPY | Age: 84
End: 2019-08-06
Payer: MEDICARE

## 2019-08-06 DIAGNOSIS — M54.42 ACUTE LEFT-SIDED LOW BACK PAIN WITH LEFT-SIDED SCIATICA: Primary | ICD-10-CM

## 2019-08-06 PROCEDURE — 97110 THERAPEUTIC EXERCISES: CPT

## 2019-08-06 NOTE — PROGRESS NOTES
Daily Note     Today's date: 2019  Patient name: Laila Nj  : 1935  MRN: [id number]  Referring provider: Vazquez Lazo MD  Dx:   Encounter Diagnosis     ICD-10-CM    1  Acute left-sided low back pain with left-sided sciatica M54 42                   Subjective: Pt reports pain in LB with sitting has improved with doing back ext at home  Objective: See treatment diary below      Assessment: Tolerated treatment fair   Will continue LB ext for ROM jose luis after sittitng Otherwise pt will continue with flexibility and core ex prescribed by LPT      Plan: Cont PT per LPT plan     Precautions Parkinson's    Specialty Daily Treatment Diary       Manual       Hamstring stretch 92jjzs9 99ywej0      IT band stretch?? 35uegy9 67aghi2              Exercise Diary                 Bike  10m 10m              SKTC 60xsza63 51kdbi86      Standing back bend ROM only x10 +pain with reps x10      bridges 2x10 2x10      Sup DLS iso abd 8J13 1E84 1 5# 3x10      DLS alt arms/legs 1# 2x10 1 5# 3x10      LTR 6lrde86 2zapj68      Standing hip abd NT       Standing hip ext NT               Leg press NT #75 x30      Squats to hi/lo NT 2x10                                                      Modalities

## 2019-08-08 ENCOUNTER — OFFICE VISIT (OUTPATIENT)
Dept: PHYSICAL THERAPY | Age: 84
End: 2019-08-08
Payer: MEDICARE

## 2019-08-08 DIAGNOSIS — M54.42 ACUTE LEFT-SIDED LOW BACK PAIN WITH LEFT-SIDED SCIATICA: Primary | ICD-10-CM

## 2019-08-08 PROCEDURE — 97110 THERAPEUTIC EXERCISES: CPT | Performed by: PHYSICAL THERAPIST

## 2019-08-08 PROCEDURE — 97140 MANUAL THERAPY 1/> REGIONS: CPT | Performed by: PHYSICAL THERAPIST

## 2019-08-08 NOTE — PROGRESS NOTES
Daily Note     Today's date: 2019  Patient name: Cristian Maahraj  : 1935  MRN: [id number]  Referring provider: Jay Jorgensen MD  Dx:   Encounter Diagnosis     ICD-10-CM    1  Acute left-sided low back pain with left-sided sciatica M54 42                   Subjective: Patient reports his back pain is less, feels the back bends and exercises are helping to address his back pain and his leg pain  Objective: See treatment diary below      Assessment: Has been consistent with HEP at home  Making good progress  Able to tolerate current program without increase in pain  Moderately tighter on L hip with IT band stretch compared to the R side  No increase in pain with TE  Improved standing posture and gait after session today  Plan: Cont PT per LPT plan     Precautions Parkinson's    Specialty Daily Treatment Diary       Manual      Hamstring stretch 05jtqo7 77pagz4 5x:20     IT band stretch?? 94jtlz0 41sdkb9 5x:20             Exercise Diary                 Bike  10m 10m 10'             SKTC 12fpvk40 77ghwy62 10x:10     Standing back bend ROM only x10 +pain with reps x10 x10     bridges 2x10 2x10 2x10     Sup DLS iso abd 7X75 9Y53 1 5# 3x10 HEP (performs with iso abd)     DLS alt arms/legs 1# 2x10 1 5# 3x10 1  # 3x10     LTR 2tomh84 6jjay11 20x :05     Standing hip abd NT       Standing hip ext NT               Leg press NT #75 x30 75# 30x     Squats to hi/lo NT 2x10 2x10                                                     Modalities

## 2019-08-13 ENCOUNTER — OFFICE VISIT (OUTPATIENT)
Dept: PHYSICAL THERAPY | Age: 84
End: 2019-08-13
Payer: MEDICARE

## 2019-08-13 DIAGNOSIS — M54.42 ACUTE LEFT-SIDED LOW BACK PAIN WITH LEFT-SIDED SCIATICA: Primary | ICD-10-CM

## 2019-08-13 DIAGNOSIS — I10 BENIGN ESSENTIAL HYPERTENSION: ICD-10-CM

## 2019-08-13 PROCEDURE — 97110 THERAPEUTIC EXERCISES: CPT | Performed by: PHYSICAL THERAPIST

## 2019-08-13 NOTE — PROGRESS NOTES
Daily Note     Today's date: 2019  Patient name: Jese Bhagat  : 1935  MRN: [id number]  Referring provider: Eulogio Centeno MD  Dx:   Encounter Diagnosis     ICD-10-CM    1  Acute left-sided low back pain with left-sided sciatica M54 42                   Subjective: Patient feels that he's making progress, has less pain since he's started therapy  Reports that he's been doing his stretches daily  Objective: See treatment diary below      Assessment: Making good progress with progressions  Still tighter on L hip compared to R with manual stretching  Improved standing and gait after PT today  Feeling less pain after PT today  Plan: Cont PT per LPT plan     Precautions Parkinson's    Specialty Daily Treatment Diary       Manual     Hamstring stretch 75xcot2 99livc1 5x:20 5x:20    IT band stretch 71jubm5 61lhma1 5x:20 5x:20            Exercise Diary                 Bike  10m 10m 10' 10'            SKTC 76zwjl20 06ocke99 10x:10 10x:10    Standing back bend ROM only x10 +pain with reps x10 x10 x10    bridges 2x10 2x10 2x10 2x10    Sup DLS iso abd 3C90 6L48 1 5# 3x10 HEP (performs with iso abd)     DLS alt arms/legs 1# 2x10 1 5# 3x10 1  # 3x10 1 5# 3x10    LTR 2qsrj22 2ochh53 20x :05 20x:05    Standing hip abd & / NT       SLR flexion  NT   1 5# 2x10            Leg press NT #75 x30 75# 30x 75# 30x    Squats to hi/lo NT 2x10 2x10 2x10                                                    Modalities

## 2019-08-14 RX ORDER — TELMISARTAN 40 MG/1
TABLET ORAL
Qty: 90 TABLET | Refills: 3 | Status: SHIPPED | OUTPATIENT
Start: 2019-08-14 | End: 2020-11-30

## 2019-08-15 ENCOUNTER — OFFICE VISIT (OUTPATIENT)
Dept: PHYSICAL THERAPY | Age: 84
End: 2019-08-15
Payer: MEDICARE

## 2019-08-15 ENCOUNTER — OFFICE VISIT (OUTPATIENT)
Dept: INTERNAL MEDICINE CLINIC | Facility: CLINIC | Age: 84
End: 2019-08-15
Payer: MEDICARE

## 2019-08-15 VITALS
WEIGHT: 245.8 LBS | RESPIRATION RATE: 18 BRPM | HEIGHT: 74 IN | HEART RATE: 84 BPM | SYSTOLIC BLOOD PRESSURE: 110 MMHG | OXYGEN SATURATION: 96 % | DIASTOLIC BLOOD PRESSURE: 64 MMHG | TEMPERATURE: 98 F | BODY MASS INDEX: 31.54 KG/M2

## 2019-08-15 DIAGNOSIS — M54.42 ACUTE LEFT-SIDED LOW BACK PAIN WITH LEFT-SIDED SCIATICA: Primary | ICD-10-CM

## 2019-08-15 DIAGNOSIS — H61.23 BILATERAL IMPACTED CERUMEN: ICD-10-CM

## 2019-08-15 DIAGNOSIS — J06.9 UPPER RESPIRATORY TRACT INFECTION, UNSPECIFIED TYPE: Primary | ICD-10-CM

## 2019-08-15 PROCEDURE — 99213 OFFICE O/P EST LOW 20 MIN: CPT | Performed by: NURSE PRACTITIONER

## 2019-08-15 PROCEDURE — 97140 MANUAL THERAPY 1/> REGIONS: CPT | Performed by: PHYSICAL THERAPIST

## 2019-08-15 PROCEDURE — 1123F ACP DISCUSS/DSCN MKR DOCD: CPT | Performed by: NURSE PRACTITIONER

## 2019-08-15 PROCEDURE — 97110 THERAPEUTIC EXERCISES: CPT | Performed by: PHYSICAL THERAPIST

## 2019-08-15 RX ORDER — AZITHROMYCIN 250 MG/1
TABLET, FILM COATED ORAL
Qty: 6 TABLET | Refills: 0 | Status: SHIPPED | OUTPATIENT
Start: 2019-08-15 | End: 2019-08-19

## 2019-08-15 NOTE — PROGRESS NOTES
Daily Note     Today's date: 8/15/2019  Patient name: Jese Bhagat  : 1935  MRN: [id number]  Referring provider: Eulogio Cenetno MD  Dx:   Encounter Diagnosis     ICD-10-CM    1  Acute left-sided low back pain with left-sided sciatica M54 42                   Subjective: Pt states that ever since he started PT that he feels his back is getting better  Objective: See treatment diary below      Assessment: Pt subjectively reported left lateral thigh pain during manual ITB stretching, did not increase in intensity throughout tx session  Continues to progress in lumbar stabilization and LE strengthening program  Pt would benefit from continued PT services to reduce pain and increase level of function  Plan: Cont PT per LPT plan     Precautions Parkinson's    Specialty Daily Treatment Diary       Manual 8/2 8/6 8/8 8/13 8/15p   Hamstring stretch 63wahb8 84ldqo5 5x:20 5x:20 5x20"   IT band stretch 44thzo3 55tpku6 5x:20 5x:20 5x20"           Exercise Diary                 Bike  10m 10m 10' 10' 10'           SKTC 43phtx38 63duvm90 10x:10 10x:10 10x10"   Standing back bend ROM only x10 +pain with reps x10 x10 x10 x10   bridges 2x10 2x10 2x10 2x10 2x15   Sup DLS iso abd 0G65 2N81 1 5# 3x10 HEP (performs with iso abd)     DLS alt arms/legs 1# 2x10 1 5# 3x10 1  # 3x10 1 5# 3x10 1 5#, 3x10   LTR 4kwrv82 1nyph19 20x :05 20x:05 20x5"   Standing hip abd & / NT       SLR flexion  NT   1 5# 2x10 1 5#, 3x15           Leg press NT #75 x30 75# 30x 75# 30x 80#, 3x15   Squats to hi/lo NT 2x10 2x10 2x10 2x10                                                   Modalities

## 2019-08-15 NOTE — ASSESSMENT & PLAN NOTE
Start antibiotics  Drink plenty of fluids and rest  May take over the counter mucinex and cough syrup/cough drops  Call if worsening

## 2019-08-15 NOTE — PROGRESS NOTES
Assessment/Plan:    URI (upper respiratory infection)  Start antibiotics  Drink plenty of fluids and rest  May take over the counter mucinex and cough syrup/cough drops  Call if worsening  Diagnoses and all orders for this visit:    Upper respiratory tract infection, unspecified type  -     azithromycin (ZITHROMAX) 250 mg tablet; Take 2 tablets today then 1 tablet daily x 4 days    Bilateral impacted cerumen  -     carbamide peroxide (DEBROX) 6 5 % otic solution; Administer 5 drops into both ears 2 (two) times a day          Subjective:      Patient ID: Leah Lees is a 82 y o  male  URI    This is a new problem  The current episode started in the past 7 days  The problem has been unchanged  There has been no fever  Associated symptoms include congestion, coughing, rhinorrhea and a sore throat  The following portions of the patient's history were reviewed and updated as appropriate: allergies, current medications, past family history, past medical history, past social history, past surgical history and problem list     Review of Systems   Constitutional: Negative  HENT: Positive for congestion, rhinorrhea and sore throat  Eyes: Negative  Respiratory: Positive for cough  Cardiovascular: Negative  Gastrointestinal: Negative  Musculoskeletal: Negative  Neurological: Negative  Objective:      /64 (BP Location: Left arm, Patient Position: Sitting, Cuff Size: Large)   Pulse 84   Temp 98 °F (36 7 °C) (Oral)   Resp 18   Ht 6' 2" (1 88 m)   Wt 111 kg (245 lb 12 8 oz)   SpO2 96%   BMI 31 56 kg/m²          Physical Exam   Constitutional: He is oriented to person, place, and time  He appears well-developed and well-nourished  HENT:   Head: Normocephalic and atraumatic  Right Ear: External ear normal    Left Ear: External ear normal    Nose: Nose normal    Mouth/Throat: Oropharynx is clear and moist    Eyes: Pupils are equal, round, and reactive to light  Conjunctivae are normal    Neck: Normal range of motion  Neck supple  Cardiovascular: Normal rate and regular rhythm  Pulmonary/Chest: Effort normal and breath sounds normal    Abdominal: Soft  Bowel sounds are normal    Musculoskeletal: Normal range of motion  Neurological: He is alert and oriented to person, place, and time  Skin: Skin is warm and dry  Nursing note and vitals reviewed

## 2019-08-20 ENCOUNTER — OFFICE VISIT (OUTPATIENT)
Dept: PHYSICAL THERAPY | Age: 84
End: 2019-08-20
Payer: MEDICARE

## 2019-08-20 DIAGNOSIS — M54.42 ACUTE LEFT-SIDED LOW BACK PAIN WITH LEFT-SIDED SCIATICA: Primary | ICD-10-CM

## 2019-08-20 PROCEDURE — 97110 THERAPEUTIC EXERCISES: CPT | Performed by: PHYSICAL THERAPIST

## 2019-08-20 PROCEDURE — 97140 MANUAL THERAPY 1/> REGIONS: CPT | Performed by: PHYSICAL THERAPIST

## 2019-08-20 NOTE — PROGRESS NOTES
Daily Note     Today's date: 2019  Patient name: Toya Bloch  : 1935  MRN: [id number]  Referring provider: Wade Rene MD  Dx:   Encounter Diagnosis     ICD-10-CM    1  Acute left-sided low back pain with left-sided sciatica M54 42                   Subjective: Patient reports minimal back pain since starting PT, has really started to notice a decrease in his back pain over the past week  Objective: See treatment diary below      Assessment: Still tight at L hip compared to R with IT band stretching; although, able to tolerate progressive stretching to address tightness  Able to tolerate increase in reps from last session  No progression today  Discussed discharge at last session as patient is doing well  Plan: Cont PT per LPT plan     Precautions Parkinson's    Specialty Daily Treatment Diary       Manual 8/20 8/6 8/8 8/13 8/15   Hamstring stretch 17evak7 78raqn5 5x:20 5x:20 5x20"   IT band stretch 85xcaa4 17zieo7 5x:20 5x:20 5x20"           Exercise Diary                 Bike  10m 10m 10' 10' 10'           SKTC 70impe60 37spfr66 10x:10 10x:10 10x10"   Standing back bend  x10 x10 x10 x10   bridges 2x10 2x10 2x10 2x10 2x15   Sup DLS iso abd  5O26 6 0# 3x10 HEP (performs with iso abd)     DLS alt arms/legs 4 3# 3x10 1 5# 3x10 1  # 3x10 1 5# 3x10 1 5#, 3x10   LTR 9tqva36 2cige43 20x :05 20x:05 20x5"   Standing hip abd & /        SLR flexion  1 5# 3x10   1 5# 2x10 1 5#, 3x15           Leg press 80# 3x15 #75 x30 75# 30x 75# 30x 80#, 3x15   Squats to hi/lo 2x10 2x10 2x10 2x10 2x10                                                   Modalities

## 2019-08-22 ENCOUNTER — OFFICE VISIT (OUTPATIENT)
Dept: PHYSICAL THERAPY | Age: 84
End: 2019-08-22
Payer: MEDICARE

## 2019-08-22 DIAGNOSIS — M54.42 ACUTE LEFT-SIDED LOW BACK PAIN WITH LEFT-SIDED SCIATICA: Primary | ICD-10-CM

## 2019-08-22 PROCEDURE — 97110 THERAPEUTIC EXERCISES: CPT

## 2019-08-22 NOTE — PROGRESS NOTES
Daily Note     Today's date: 2019  Patient name: Zoe Baig  : 1935  MRN: [id number]  Referring provider: Brooklyn Kiran MD  Dx:   Encounter Diagnosis     ICD-10-CM    1  Acute left-sided low back pain with left-sided sciatica M54 42                   Subjective: Patient reports minimal back pain since starting PT          Objective: See treatment diary below      Assessment: Still tight at L hip compared to R with IT band stretching; although, able to tolerate progressive stretching to address tightness  Challenged wit progression of proximal hip strengthening  Plan: Cont PT per LPT plan     Precautions Parkinson's    Specialty Daily Treatment Diary       Manual 8/20 8/22 8/8 8/13 8/15   Hamstring stretch 25zsev7 76smmh2 5x:20 5x:20 5x20"   IT band stretch 64aemm9 79jigp3 5x:20 5x:20 5x20"           Exercise Diary                 Bike  10m 10m 10' 10' 10'           SKTC 79dfrr30 62jcxo43 10x:10 10x:10 10x10"   Standing back bend  x10 x10 x10 x10   bridges 2x10 2x10 2x10 2x10 2x15   Sup DLS iso abd   HEP (performs with iso abd)     DLS alt arms/legs 8 3# 3x10 1 5# 3x10 1  # 3x10 1 5# 3x10 1 5#, 3x10   LTR 9hjyo72 7uzpu19 20x :05 20x:05 20x5"   Standing hip abd & /        SLR flexion  1 5# 3x10 1 5# 3x10  1 5# 2x10 1 5#, 3x15   S/l hip abd  1 5#2x10      Leg press 80# 3x15 #80 x30 75# 30x 75# 30x 80#, 3x15   Squats to hi/lo 2x10 2x10 2x10 2x10 2x10                                                   Modalities

## 2019-08-27 ENCOUNTER — OFFICE VISIT (OUTPATIENT)
Dept: PHYSICAL THERAPY | Age: 84
End: 2019-08-27
Payer: MEDICARE

## 2019-08-27 DIAGNOSIS — M54.42 ACUTE LEFT-SIDED LOW BACK PAIN WITH LEFT-SIDED SCIATICA: Primary | ICD-10-CM

## 2019-08-27 PROCEDURE — 97110 THERAPEUTIC EXERCISES: CPT | Performed by: PHYSICAL THERAPIST

## 2019-08-27 NOTE — PROGRESS NOTES
Daily Note     Today's date: 2019  Patient name: Tamy Ledezma  : 1935  MRN: [id number]  Referring provider: Francine Garcia MD  Dx:   Encounter Diagnosis     ICD-10-CM    1  Acute left-sided low back pain with left-sided sciatica M54 42                   Subjective: Patient reports twinges today after walking about 1/2 mile this morning  Wants to get back to being able to walk 1+ miles a day to stay active  Also talking about getting back into the gym to strengthen daily  Objective: See treatment diary below      Assessment: Progressed program, increased difficulty performing s/l hip abd d/t muscle weakness at hip; fatigued and only able to perform reps of 10  Fatigued today throughout session, able to complete program, needing increased rest between exercises  Increased weight with DLS and SLR exercises today         Plan: Cont PT per LPT plan     Precautions Parkinson's    Specialty Daily Treatment Diary       Manual 8/20 8/22 8/27 8/13 8/15   Hamstring stretch 27bpjr1 83mcgc4 5x:20 5x:20 5x20"   IT band stretch 61mljb7 96iwna9 5x:20 5x:20 5x20"           Exercise Diary                 Bike  10m 10m 10' 10' 10'           SKTC 01saro02 17ijdd81 10x:10 10x:10 10x10"   Standing back bend  x10 x10 x10 x10   bridges 2x10 2x10 2x10 2x10 2x15           DLS alt arms/legs 2 3# 3x10 1 5# 3x10 2# 3x10 1 5# 3x10 1 5#, 3x10   LTR 6siqe57 9ejso29 20x :05 20x:05 20x5"   Standing hip abd & /        SLR flexion  1 5# 3x10 1 5# 3x10 2# 2x15 1 5# 2x10 1 5#, 3x15   S/l hip abd  1 5#2x10 2# 2x10     Leg press 80# 3x15 #80 x30 80# 30x 75# 30x 80#, 3x15   Squats to hi/lo 2x10 2x10 2x10 2x10 2x10                                                   Modalities

## 2019-08-29 ENCOUNTER — EVALUATION (OUTPATIENT)
Dept: PHYSICAL THERAPY | Age: 84
End: 2019-08-29
Payer: MEDICARE

## 2019-08-29 DIAGNOSIS — M54.42 ACUTE LEFT-SIDED LOW BACK PAIN WITH LEFT-SIDED SCIATICA: Primary | ICD-10-CM

## 2019-08-29 PROCEDURE — 97140 MANUAL THERAPY 1/> REGIONS: CPT | Performed by: PHYSICAL THERAPIST

## 2019-08-29 NOTE — PROGRESS NOTES
PT Discharge    Today's date: 2019  Patient name: Ana Mckay  : 1935  MRN: [id number]  Referring provider: Laurita Garner MD  Dx:   Encounter Diagnosis     ICD-10-CM    1  Acute left-sided low back pain with left-sided sciatica M54 42                   Assessment  Assessment details: Patient seen for PT re-assessment  Patient presents with improved upright posture, improved gait speed, stride length since IE  Patient responding well to manual stretching hips and hamstrings  Has been compliant with stretching at home and has returned to doing his Parkinson's [place] program daily  Impairments: abnormal gait, abnormal or restricted ROM, activity intolerance, impaired balance, impaired physical strength, lacks appropriate home exercise program, pain with function, poor posture  and poor body mechanics  Functional limitations: Patient with no report of limitations with IADLs nor with standing activities  Understanding of Dx/Px/POC: good   Prognosis: good    Goals  Impairment Goals to be met within 4 weeks  - Decrease pain to 0/10 at rest and with activity met  - Improve ROM by 5-10 degrees lumbar spine met  - Increase strength to 5/5 throughout met  - Improve walking speed  met    Functional Goals to be met within 4-6 weeks     - Return to Prior Level of Function met  - Increase Functional Status Measure to: expected met  - Patient will be independent with HEP met  - Patient to be able to tolerate bending with back pain <2/10 met      Plan  Plan details: Discharge PT  Patient would benefit from: skilled physical therapy  Planned modality interventions: cryotherapy and thermotherapy: hydrocollator packs  Planned therapy interventions: abdominal trunk stabilization, manual therapy, neuromuscular re-education, patient education, postural training, strengthening, stretching, therapeutic activities, therapeutic exercise, home exercise program, gait training and balance  Frequency: 2x week  Treatment plan discussed with: patient        Subjective Evaluation    History of Present Illness  Mechanism of injury: Patient feels that he's ready to discharge to [facility], feels that he's significantly looser  Plans to start exercising at the [facility] gym [facility]) to maintain gains made during PT  Pain  Current pain ratin  At best pain ratin  At worst pain ratin  Location: low back, L hip  Quality: dull ache and tight          Objective     Active Range of Motion     Lumbar   Flexion: 75 degrees   Extension: 5 degrees   Left lateral flexion: 8 degrees       Right lateral flexion: 12 degrees   Left Hip   Flexion: 80 degrees     Right Hip   Flexion: 80 degrees     Passive Range of Motion   Left Hip   Flexion: WFL    Right Hip   Flexion: WFL    Strength/Myotome Testing     Left Hip   Planes of Motion   Flexion: 5  Extension: 4+  Abduction: 5  Adduction: 5    Right Hip   Planes of Motion   Flexion: 5  Extension: 4+  Abduction: 5  Adduction: 5    Left Knee   Flexion: 5  Extension: 5    Right Knee   Flexion: 5  Extension: 5    Ambulation     Observational Gait   Gait: antalgic   Decreased walking speed and stride length  Quality of Movement During Gait   Trunk  Forward lean                Precautions Parkinson's    Specialty Daily Treatment Diary       Manual  8/15   Hamstring stretch 18bpxj7 39zsic1 5x:20 5x:20 5x20"   IT band stretch 26gzgj5 61clxd0 5x:20 5x:20 5x20"           Exercise Diary                 Bike  10m 10m 10' 10' 10'           SKTC 26veyo58 86yfsb81 10x:10  10x10"   Standing back bend  x10 x10  x10   bridges 2x10 2x10 2x10  2x15           DLS alt arms/legs 4 7# 3x10 1 5# 3x10 2# 3x10  1 5#, 3x10   LTR 5ejqk85 8taen61 20x :05  20x5"   Standing hip abd & /        SLR flexion  1 5# 3x10 1 5# 3x10 2# 2x15  1 5#, 3x15   S/l hip abd  1 5#2x10 2# 2x10     Leg press 80# 3x15 #80 x30 80# 30x  80#, 3x15   Squats to hi/lo 2x10 2x10 2x10  2x10 Modalities

## 2019-10-10 ENCOUNTER — TELEPHONE (OUTPATIENT)
Dept: INTERNAL MEDICINE CLINIC | Facility: CLINIC | Age: 84
End: 2019-10-10

## 2019-10-10 NOTE — TELEPHONE ENCOUNTER
The patient took is coumadin this morning  He also took his wife's mario by mistake  He would like to know if this is dangerous      As per Lizbeth Wallace; the patient should call his cardiologist

## 2019-10-22 ENCOUNTER — TELEPHONE (OUTPATIENT)
Dept: INTERNAL MEDICINE CLINIC | Facility: CLINIC | Age: 84
End: 2019-10-22

## 2019-10-22 DIAGNOSIS — Z71.84 TRAVEL ADVICE ENCOUNTER: Primary | ICD-10-CM

## 2019-10-22 RX ORDER — AZITHROMYCIN 250 MG/1
250 TABLET, FILM COATED ORAL EVERY 24 HOURS
Qty: 6 TABLET | Refills: 0 | Status: SHIPPED | OUTPATIENT
Start: 2019-10-22 | End: 2019-10-27

## 2019-10-22 NOTE — TELEPHONE ENCOUNTER
Patient is going on a trip  Patient is requesting a zpak to have on hand in case it is needed    Pharmacy-[facility]    Please advise

## 2019-10-29 ENCOUNTER — OFFICE VISIT (OUTPATIENT)
Dept: INTERNAL MEDICINE CLINIC | Facility: CLINIC | Age: 84
End: 2019-10-29
Payer: MEDICARE

## 2019-10-29 VITALS
HEART RATE: 106 BPM | HEIGHT: 74 IN | DIASTOLIC BLOOD PRESSURE: 90 MMHG | WEIGHT: 250 LBS | SYSTOLIC BLOOD PRESSURE: 150 MMHG | OXYGEN SATURATION: 96 % | TEMPERATURE: 97.7 F | BODY MASS INDEX: 32.08 KG/M2

## 2019-10-29 DIAGNOSIS — I48.19 PERSISTENT ATRIAL FIBRILLATION (HCC): ICD-10-CM

## 2019-10-29 DIAGNOSIS — I10 BENIGN ESSENTIAL HYPERTENSION: Primary | ICD-10-CM

## 2019-10-29 DIAGNOSIS — G20 PARKINSON'S DISEASE (HCC): ICD-10-CM

## 2019-10-29 DIAGNOSIS — E78.00 PURE HYPERCHOLESTEROLEMIA: ICD-10-CM

## 2019-10-29 DIAGNOSIS — F41.9 ANXIETY: ICD-10-CM

## 2019-10-29 PROCEDURE — 99214 OFFICE O/P EST MOD 30 MIN: CPT | Performed by: INTERNAL MEDICINE

## 2019-10-29 RX ORDER — ESCITALOPRAM OXALATE 10 MG/1
10 TABLET ORAL DAILY
Qty: 30 TABLET | Refills: 5 | Status: SHIPPED | OUTPATIENT
Start: 2019-10-29 | End: 2020-11-02

## 2019-10-29 NOTE — PROGRESS NOTES
Assessment/Plan:    Anxiety  Since patient feeling a little on edge, I recommend restarting escitalopram 10 mg daily    Parkinson's disease ([facility] )  Follow up neuro    Benign essential hypertension  Elevated today, but patient stressed out, continue monitoring, continue medications    Persistent atrial fibrillation ([facility] [address] )  Rate controlled, continue meds and follow-up Cardiology    Pure hypercholesterolemia  Continue healthy diet and exercise       Diagnoses and all orders for this visit:    Benign essential hypertension    Anxiety  -     escitalopram (LEXAPRO) 10 mg tablet; Take 1 tablet (10 mg total) by mouth daily    Parkinson's disease ([facility] [address] )    Persistent atrial fibrillation    Pure hypercholesterolemia    Other orders  -     Cancel: influenza vaccine, [id number], high-dose, PF 0 5 mL (FLUZONE HIGH-DOSE)          Subjective:      Patient ID: Deanna Donnelly is a 82 y o  male  Patient has been feeling a little on edge  He was on escitalopram in the past, but stopped because he was feeling well  Hypertension:  No lightheadedness, patient reports compliance with medications  Atrial fibrillation:  No palpitations or bleeding problems    Hypercholesterolemia:  Patient watches his diet for this       The following portions of the patient's history were reviewed and updated as appropriate: allergies, current medications, past family history, past medical history, past social history, past surgical history and problem list     Review of Systems   Constitutional: Negative for chills, fatigue and fever  HENT: Negative for congestion, nosebleeds, postnasal drip, sore throat and trouble swallowing  Eyes: Negative for pain  Respiratory: Negative for cough, chest tightness, shortness of breath and wheezing  Cardiovascular: Negative for chest pain, palpitations and leg swelling  Gastrointestinal: Negative for abdominal pain, constipation, diarrhea, nausea and vomiting     Endocrine: Negative for polydipsia and polyuria  Genitourinary: Negative for dysuria, flank pain and hematuria  Musculoskeletal: Negative for arthralgias  Skin: Negative for rash  Neurological: Positive for tremors  Negative for dizziness and headaches  Hematological: Does not bruise/bleed easily  Psychiatric/Behavioral: Negative for confusion and dysphoric mood  The patient is nervous/anxious  Objective:      /90   Pulse (!) 106   Temp 97 7 °F (36 5 °C)   Ht 6' 2" (1 88 m)   Wt 113 kg (250 lb)   SpO2 96%   BMI 32 10 kg/m²          Physical Exam   Constitutional: He is oriented to person, place, and time  He appears well-developed and well-nourished  No distress  HENT:   Head: Normocephalic and atraumatic  Right Ear: External ear normal    Left Ear: External ear normal    Eyes: Conjunctivae are normal  No scleral icterus  Neck: Normal range of motion  Neck supple  No tracheal deviation present  No thyromegaly present  Cardiovascular: Normal rate and normal heart sounds  An irregularly irregular rhythm present  No murmur heard  Pulmonary/Chest: Effort normal and breath sounds normal  No respiratory distress  He has no wheezes  He has no rales  Abdominal: Soft  Bowel sounds are normal  There is no tenderness  There is no rebound and no guarding  Musculoskeletal: He exhibits no edema  Lymphadenopathy:     He has no cervical adenopathy  Neurological: He is alert and oriented to person, place, and time  Pill rolling tremor   Psychiatric: He has a normal mood and affect  His behavior is normal  Judgment and thought content normal    Vitals reviewed

## 2019-10-29 NOTE — PATIENT INSTRUCTIONS
Problem List Items Addressed This Visit        Cardiovascular and Mediastinum    Persistent atrial fibrillation     Rate controlled, continue meds and follow-up Cardiology         Benign essential hypertension - Primary     Elevated today, but patient stressed out, continue monitoring, continue medications            Nervous and Auditory    Parkinson's disease ([facility] )     Follow up neuro            Other    Pure hypercholesterolemia     Continue healthy diet and exercise         Anxiety     Since patient feeling a little on edge, I recommend restarting escitalopram 10 mg daily         Relevant Medications    escitalopram (LEXAPRO) 10 mg tablet

## 2020-01-05 NOTE — PROGRESS NOTES
[facility] DISORDERS CLINIC         RETURN PATIENT NOTE    Patient: Gabby Elkins [place] Record Number: # [id number]  YOB: 1935  Date of visit: 1/6/2020    Referring provider: Ashley Haddad MD    ASSESSMENT     1  Parkinson's disease ([facility] )        Impression of this 81 yo gentleman with 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 Parkinson's disease, overall stable on 1 tab TID of Sinemet 25/100 with slight progression  He does not note any motor fluctuations  He is exercising well and finds Biotene helpful for dry mouth  No interim problems with gait, with swallowing  He does note mild worsening of memory but not enough to affect his daily routine  Proceed as below       PLAN     ? Continue with the current Sinemet regimen 1 tab TID and his exercise regimen  ? Discussed about potential for cognitive rehabilitation if memory worsens  ? The patient has been instructed to call us about any new neurological problems or medication side effects  ? Return to clinic in 6 months  A total of 25 minutes were spent face-to-face with this patient, of which 50% was spent on counseling and coordination of care  HISTORY OF PRESENT ILLNESS:     Mr Delicia Emmanuel is a 82 y o  right handed male who returns to the [address] [place] [address] for follow-up for Parkinson's disease  Last visit 6/28/19       The patient was not accompanied today  History was obtained from patient    Interim History  He states he has been overall doing well, with his tremors stable  He continues to be active, walking a mile a day  He continues taking Sinemet 25/100 IR 1 tab TID  He continues to deny any clear ON or OFF periods  He goes regularly out to lunch with a group of friends  He does keep hydrated, taking biotene spray for the dry mouth  He does endorse some decrease in concentration and mild forgetfulness but not enough to impact his overall daily life and regimens       Current Dopaminergic Medications:      Name of Med AM PM [facility]       Sinemet 25/100 IR 1 1 1           INITIAL HISTORY  He did have three surgeries on the left shoulder before  Date of First Symptom:  Mid , in May-   First Symptom: left hand tremor  Side More Affected: left  Main Problems:  1  Left hand tremor  He says he first noticed when he was seeing Dr Marion Workman, then saw Dr Karen Eid  No medications or treatments started  He does not notice it when he's driving, but worse when he resting arm in position on a chair or when fatigued  Noticing it more at night  Shaking is not present and does not bother him during activities or tasks  No known family history of tremor or diagnosed PD  Mother  in childbirth  Hyposmia: denies  Fatigue: denies  RBD (REM semi-purposeful body movements): he is told he is very active during night by his wife, but no specifically acting out dreams  Seldom dreams  Gait Disturbance:  He feels that he has been leaning to his right side more  Onset:  Uncertain, within the last few months after reading more about PD  Falls: None  Fractures: None  Freezing of Gait: none  Handwriting: "worse" but "junkier" and he is not sure if smaller  Speech: his wife told him he has been talking more quieter (wife is also hard of hearing)  Facial Expression: not told or noticed any change in facial expression  Dementia:  None  Hallucination: None  Constipation: yes  Skin Cancer History / Last Skin Exam: excision of lesion on right hand 2018, told it was cancerous but uncertain type by Dr Merline Rude     Hypovitaminosis D: denies  Hypovitaminosis B12:  denies  Exercise Therapy: "always out and about" from the house       Family History: Number of First Degree Relatives With Parkinsonism:   None     Neuro Imaging: None     Parkinson's Medications:   Effect of levodopa:  Never  Effect of pramipexole:   Never  Effect of ropinirole:       Never  Effect of other agonist:  Never  Effect of rasagiline:   Never  Effect of other medication:  Started on propranolol 60mg qDay, then reduced now to 20mg BID  No noticed benefit to tremor and he feels more "uptight" than before       Parkinson's Procedures:   DBS: none    REVIEW OF PAST MEDICAL, SOCIAL AND FAMILY HISTORY:  This is the list of problems as per our Medical Records:    Patient Active Problem List    Diagnosis Date Noted    Acute left-sided low back pain with left-sided sciatica 07/26/2019    Chest pain 04/09/2019    Medicare annual wellness visit, subsequent 03/08/2019    URI (upper respiratory infection) 01/02/2019    Parkinson's disease ([facility] ) 12/28/2018    Anxiety 07/31/2018    Dyspnea on exertion 06/22/2018    Bloating 06/19/2018    Tremor of left hand 05/21/2018    Acute non-recurrent maxillary sinusitis 05/21/2018    Persistent atrial fibrillation 07/27/2016    Pure hypercholesterolemia 07/27/2016    Benign essential hypertension 07/27/2016    Esophageal reflux 07/27/2016     Allergies   Allergen Reactions    Penicillins Hives    Pollen Extract Other (See Comments)             Outpatient Encounter Medications as of 1/6/2020   Medication Sig Dispense Refill    carbidopa-levodopa (SINEMET)  mg per tablet Take 1 tablet by mouth 3 (three) times a day        Coenzyme Q10 (COQ-10 PO) Take 1 tablet by mouth daily      Dentifrices (BIOTENE DRY MOUTH CARE DT) Apply to teeth      escitalopram (LEXAPRO) 10 mg tablet Take 1 tablet (10 mg total) by mouth daily 30 tablet 5    isosorbide mononitrate (IMDUR) 30 mg 24 hr tablet Take 30 mg by mouth daily at bedtime      loratadine (CLARITIN) 10 mg tablet Take 10 mg by mouth daily as needed        losartan (COZAAR) 25 mg tablet Take 25 mg by mouth daily      Multiple Vitamins-Minerals (ICAPS AREDS 2) CAPS Take 1 capsule by mouth 2 (two) times a day        propranolol (INDERAL) 20 mg tablet Take 1 tablet (20 mg total) by mouth every 12 (twelve) hours 60 tablet 5    Simethicone (GAS-X PO) Take 1 tablet by mouth daily as needed      telmisartan (MICARDIS) 40 mg tablet take 1 tablet by mouth once daily 90 tablet 3    warfarin (COUMADIN) 5 mg tablet Take 1 tablet (5 mg total) by mouth daily   25MG TAKE 1 TABLET 6 DAY A WEEK AND THEN 1 TABLET 1 DAY A WEEK  0    ALPRAZolam (XANAX) 0 25 mg tablet Take 1 tablet (0 25 mg total) by mouth 2 (two) times a day as needed for anxiety (Patient not taking: Reported on 8/15/2019) 50 tablet 1    carbamide peroxide (DEBROX) 6 5 % otic solution Administer 5 drops into both ears 2 (two) times a day (Patient not taking: Reported on 1/6/2020) 15 mL 0     No facility-administered encounter medications on file as of 1/6/2020  Social History     Tobacco Use    Smoking status: Former Smoker    Smokeless tobacco: Never Used    Tobacco comment: Quit 35-40 years ago   Substance Use Topics    Alcohol use: Yes     Frequency: Monthly or less     Drinks per session: 3 or 4     Binge frequency: Never     Comment: Rare, previously moderate drinker cut down 2016  Family History   Problem Relation Age of Onset    No Known Problems Mother     Coronary artery disease Father         REVIEW OF SYSTEMS:  The patient has entered data on an intake form regarding present illness, past medical and surgical history, medications, allergies, family and social history, and a full review of 14 systems  I have reviewed this form with the patient, and all the relevant information has been included on this note  The full review of systems was negative except as stated in HPI and below  Constitutional: Negative  Negative for appetite change and fever  HENT: Negative  Negative for hearing loss, tinnitus, trouble swallowing and voice change  Eyes: Negative  Negative for photophobia and pain  Respiratory: Negative  Negative for shortness of breath  Cardiovascular: Negative  Negative for palpitations  Gastrointestinal: Negative   Negative for nausea and vomiting  Endocrine: Negative  Negative for cold intolerance and heat intolerance  Genitourinary: Negative  Negative for dysuria, frequency and urgency  Musculoskeletal: Negative  Negative for myalgias and neck pain  Skin: Negative  Negative for rash  Allergic/Immunologic: Negative  Neurological: Negative for dizziness, tremors, seizures, syncope, facial asymmetry, speech difficulty, weakness, light-headedness, numbness and headaches  Positive for memory problems   Hematological: Negative  Does not bruise/bleed easily  Psychiatric/Behavioral: Negative  Negative for confusion, hallucinations and sleep disturbance  FOCUSED PHYSICAL EXAMINATION:     Vital signs:  /76 (BP Location: Left arm, Patient Position: Standing, Cuff Size: Large)   Pulse 91   Ht 6' 2" (1 88 m)   Wt 110 kg (242 lb)   BMI 31 07 kg/m²      General:  Well-appearing, well nourished, pleasant patient in no acute distress  Mood and Fund of Knowledge are appropriate  Head:  Some hypomimia  Normocephalic, atraumatic  Oropharynx and conjunctiva are clear  Speech  No hypophonia or bradylalia  No scanning speech  Language: Comprehension intact  Neck:  Supple, strong 5/5 forward flexion and retroflexion  Extremities: Range of motion is normal       Cognitive and Mental Exam:  The patient is alert, oriented to self, location, date and situation  Memory is normal to provide accurate details of health history     Cranial Nerves:  Visual fields are full to confrontation  Extraocular movements were full, with normal pursuit and saccades  Pupils were equal, reactive to light symmetrically  Facial sensation to light touch was intact  Face is symmetric with normal strength    Hearing was not assessed  Palate is up going bilaterally and symmetrically  Neck muscles are strong  Tongue protrusion is at midline with normal movements     No dysarthria       Motor:     [id number]                Time since last dose:   12/28/18 6/28/19 1/6/20   Speech  1  2 softer  2   Facial Expression  2  3  3   Postural Tremor (Right) 0  0  0   Postural Tremor (Left) 0 0 0   Kinetic Tremor (Right)  0 0 0   Kinetic Tremor (Left)  0 0 0   Rest tremor amplitude RUE 0 0 0   Rest tremor amplitude LUE 2 with thumb flex-ext, and wrist flexion-extension 2  1 wrist   Rest tremor amplitude RLE 0 0 0   Rest tremor amplitude LLE 0 0 0   Lip/Jaw Tremor  0 0 1   Consistency of tremor 0 0 0   Finger Taps (Right)   2 0 2   Finger Taps (Left)  2 1 2   Hand Movement (Right)  1 0 0   Hand Movement (Left)   2 0 0   Pronation/Supination (Right)  1 0 1   Pronation/Supination (Left)   2 2 2   Toe Tapping (Right) - 0  0   Toe Tapping (Left) - 3 2   Leg Agility (Right)  - 0 0   Leg Agility (Left)   -  2 2   Rigidity - Neck  3  2  3   Rigidity - Upper Extremity (Right)  1   2 2   Rigidity - Upper Extremity (Left)   2  3 2   Rigidity - Lower Extremity (Right)  1  0 0   Rigidity - Lower Extremity (Left)   1  0 0   Arising from Chair   0  0 0    Gait   1  1 2   Freezing of Gait -  0 0   Postural Stability  0  0 (two steps -   Posture 1  2 2   Global spontaneity of movement 3   3 reduced arm swing on left 3     Reduced L arm swing and rest tremor present  Normal turns      -------------------------------------------------------------------------------------     Muscle Strength Right Left   Muscle Strength Right Left   Deltoid 5/5 5/5   Hip Adductors 5/5 5/5   Biceps 5/5 5/5   Hip Abductors 5/5 5/5   Triceps 5/5 5/5   Knee Extensors 5/5 5/5   Wrist Extensors 5/5 5/5   Knee Flexors 5/5 5/5   Wrist Flexors 5/5 5/5   Ankle Extensors 5/5 5/5    5/5 5/5   Ankle Flexors 5/5 5/5   Finger Abductors 5/5 5/5           Hip Flexors 5/5 5/5       Hip Extensors 5/5 5/5            Gait:  Slow rise from chair, normal turns, can walk backwards well, mainly L hand swing reduced  Impaired tandem gait  Slightly forward truncal bending       Reflexes:  DTRs were 2/4 throughout

## 2020-01-06 ENCOUNTER — OFFICE VISIT (OUTPATIENT)
Dept: NEUROLOGY | Facility: CLINIC | Age: 85
End: 2020-01-06
Payer: MEDICARE

## 2020-01-06 VITALS
HEIGHT: 74 IN | SYSTOLIC BLOOD PRESSURE: 136 MMHG | WEIGHT: 242 LBS | BODY MASS INDEX: 31.06 KG/M2 | HEART RATE: 91 BPM | DIASTOLIC BLOOD PRESSURE: 76 MMHG

## 2020-01-06 DIAGNOSIS — G20 PARKINSON'S DISEASE (HCC): Primary | ICD-10-CM

## 2020-01-06 PROCEDURE — 99214 OFFICE O/P EST MOD 30 MIN: CPT | Performed by: PSYCHIATRY & NEUROLOGY

## 2020-01-06 NOTE — PROGRESS NOTES
Review of Systems   Constitutional: Negative  Negative for appetite change and fever  HENT: Negative  Negative for hearing loss, tinnitus, trouble swallowing and voice change  Eyes: Negative  Negative for photophobia and pain  Respiratory: Negative  Negative for shortness of breath  Cardiovascular: Negative  Negative for palpitations  Gastrointestinal: Negative  Negative for nausea and vomiting  Endocrine: Negative  Negative for cold intolerance and heat intolerance  Genitourinary: Negative  Negative for dysuria, frequency and urgency  Musculoskeletal: Negative  Negative for myalgias and neck pain  Skin: Negative  Negative for rash  Allergic/Immunologic: Negative  Neurological: Negative for dizziness, tremors, seizures, syncope, facial asymmetry, speech difficulty, weakness, light-headedness, numbness and headaches  Positive for memory problems     Hematological: Negative  Does not bruise/bleed easily  Psychiatric/Behavioral: Negative  Negative for confusion, hallucinations and sleep disturbance

## 2020-01-06 NOTE — PATIENT INSTRUCTIONS
6 months follow up  Continue with the current Sinemet regimen 1 tab TID and his exercise regimen  Discussed about potential for cognitive rehabilitation if memory worsens

## 2020-01-06 NOTE — LETTER
January 6, 2020     Jose Luisoliva Amaris, [address] [facility]  [address] [facility] [address]    Patient: Jasper Ellis   YOB: 1935   Date of Visit: 1/6/2020       Dear Dr Hayley Mares:    Earlier today I saw Mr Jose Vargas for follow-up of his Parkinson's disease  Below are my notes for this visit for your records and to keep you updated on his health status  If you have questions, please do not hesitate to call me  I look forward to following your patient along with you  Sincerely,        Valentino Bible, MD        CC: No Recipients  Valentino Bible, MD  1/6/2020  3:42 PM  Sign at close encounter  [address] [place] [address] PATIENT NOTE    Patient: [address] [place] [address] Record Number: # [id number]  YOB: 1935  Date of visit: 1/6/2020    Referring provider: Rito Villegas MD    ASSESSMENT     1  Park[facility] ([facility] )        Impression of this 83 yo gentleman with 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 Parkinson's disease, overall stable on 1 tab TID of Sinemet 25/100 with slight progression  He does not note any motor fluctuations  He is exercising well and finds Biotene helpful for dry mouth  No interim problems with gait, with swallowing  He does note mild worsening of memory but not enough to affect his daily routine  Proceed as below       PLAN     ? Continue with the current Sinemet regimen 1 tab TID and his exercise regimen  ? Discussed about potential for cognitive rehabilitation if memory worsens  ? The patient has been instructed to call us about any new neurological problems or medication side effects  ? Return to clinic in 6 months  A total of 25 minutes were spent face-to-face with this patient, of which 50% was spent on counseling and coordination of care       HISTORY OF PRESENT ILLNESS:     Mr Yong Banuelos is a 82 y o  right handed male who returns to the [address] [place] [address] for follow-up for Parkinson's disease  Last visit 19       The patient was not accompanied today  History was obtained from patient    Interim History  He states he has been overall doing well, with his tremors stable  He continues to be active, walking a mile a day  He continues taking Sinemet 25/100 IR 1 tab TID  He continues to deny any clear ON or OFF periods  He goes regularly out to lunch with a group of friends  He does keep hydrated, taking biotene spray for the dry mouth  He does endorse some decrease in concentration and mild forgetfulness but not enough to impact his overall daily life and regimens  Current Dopaminergic Medications:      Name of Med AM PM QHS       Sinemet 25/100 IR 1 1 1           INITIAL HISTORY  He did have three surgeries on the left shoulder before  Date of First Symptom:  Mid , in May-   First Symptom: left hand tremor  Side More Affected: left  Main Problems:  1  Left hand tremor  He says he first noticed when he was seeing Dr Ciro Ocampo, then saw Dr Kim Cheng  No medications or treatments started  He does not notice it when he's driving, but worse when he resting arm in position on a chair or when fatigued  Noticing it more at night  Shaking is not present and does not bother him during activities or tasks  No known family history of tremor or diagnosed PD  Mother  in childbirth  Hyposmia: denies  Fatigue: denies  RBD (REM semi-purposeful body movements): he is told he is very active during night by his wife, but no specifically acting out dreams  Seldom dreams  Gait Disturbance:  He feels that he has been leaning to his right side more  Onset:  Uncertain, within the last few months after reading more about PD     Falls: None  Fractures: None  Freezing of Gait: none  Handwriting: "worse" but "junkier" and he is not sure if smaller  Speech: his wife told him he has been talking more quieter (wife is also hard of hearing)  Facial Expression: not told or noticed any change in facial expression  Dementia:  None  Hallucination: None  Constipation: yes  Skin Cancer History / Last Skin Exam: excision of lesion on right hand Sept 25, 2018, told it was cancerous but uncertain type by Dr Isrrael Jules  Hypovitaminosis D: denies  Hypovitaminosis B12:  denies  Exercise Therapy: "always out and about" from the house       Family History: Number of First Degree Relatives With Parkinsonism:   None     Neuro Imaging: None     Parkinson's Medications:   Effect of levodopa:  Never  Effect of pramipexole:   Never  Effect of ropinirole:       Never  Effect of other agonist:  Never  Effect of rasagiline:   Never  Effect of other medication:  Started on propranolol 60mg qDay, then reduced now to 20mg BID  No noticed benefit to tremor and he feels more "uptight" than before       Parkinson's Procedures:   DBS: none    REVIEW OF PAST MEDICAL, SOCIAL AND FAMILY HISTORY:  This is the list of problems as per our Medical Records:    Patient Active Problem List    Diagnosis Date Noted    Acute left-sided low back pain with left-sided sciatica 07/26/2019    Chest pain 04/09/2019    Medicare annual wellness visit, subsequent 03/08/2019    URI (upper respiratory infection) 01/02/2019    [facility] ([facility] ) 12/28/2018    Anxiety 07/31/2018    Dyspnea on exertion 06/22/2018    Bloating 06/19/2018    Tremor of left hand 05/21/2018    Acute non-recurrent maxillary sinusitis 05/21/2018    Persistent atrial fibrillation 07/27/2016    Pure hypercholesterolemia 07/27/2016    Benign essential hypertension 07/27/2016    Esophageal reflux 07/27/2016     Allergies   Allergen Reactions    Penicillins Hives    Pollen Extract Other (See Comments)             Outpatient Encounter Medications as of 1/6/2020   Medication Sig Dispense Refill    carbidopa-levodopa (SINEMET)  mg per tablet Take 1 tablet by mouth 3 (three) times a day        Coenzyme Q10 (COQ-10 PO) Take 1 tablet by mouth daily      Dentifrices (BIOTENE DRY MOUTH CARE DT) Apply to teeth      escitalopram (LEXAPRO) 10 mg tablet Take 1 tablet (10 mg total) by mouth daily 30 tablet 5    isosorbide mononitrate (IMDUR) 30 mg 24 hr tablet Take 30 mg by mouth daily at bedtime      loratadine (CLARITIN) 10 mg tablet Take 10 mg by mouth daily as needed        losartan (COZAAR) 25 mg tablet Take 25 mg by mouth daily      Multiple Vitamins-Minerals (ICAPS AREDS 2) CAPS Take 1 capsule by mouth 2 (two) times a day        propranolol (INDERAL) 20 mg tablet Take 1 tablet (20 mg total) by mouth every 12 (twelve) hours 60 tablet 5    Simethicone (GAS-X PO) Take 1 tablet by mouth daily as needed      telmisartan (MICARDIS) 40 mg tablet take 1 tablet by mouth once daily 90 tablet 3    warfarin (COUMADIN) 5 mg tablet Take 1 tablet (5 mg total) by mouth daily   25MG TAKE 1 TABLET 6 DAY A WEEK AND THEN 1 TABLET 1 DAY A WEEK  0    ALPRAZolam (XANAX) 0 25 mg tablet Take 1 tablet (0 25 mg total) by mouth 2 (two) times a day as needed for anxiety (Patient not taking: Reported on 8/15/2019) 50 tablet 1    carbamide peroxide (DEBROX) 6 5 % otic solution Administer 5 drops into both ears 2 (two) times a day (Patient not taking: Reported on 1/6/2020) 15 mL 0     No facility-administered encounter medications on file as of 1/6/2020  Social History     Tobacco Use    Smoking status: Former Smoker    Smokeless tobacco: Never Used    Tobacco comment: Quit 35-40 years ago   Substance Use Topics    Alcohol use: Yes     Frequency: Monthly or less     Drinks per session: 3 or 4     Binge frequency: Never     Comment: Rare, previously moderate drinker cut down 2016           Family History   Problem Relation Age of Onset    No Known Problems Mother     Coronary artery disease Father         REVIEW OF SYSTEMS:  The patient has entered data on an intake form regarding present illness, past medical and surgical history, medications, allergies, family and social history, and a full review of 14 systems  I have reviewed this form with the patient, and all the relevant information has been included on this note  The full review of systems was negative except as stated in HPI and below  Constitutional: Negative  Negative for appetite change and fever  HENT: Negative  Negative for hearing loss, tinnitus, trouble swallowing and voice change  Eyes: Negative  Negative for photophobia and pain  Respiratory: Negative  Negative for shortness of breath  Cardiovascular: Negative  Negative for palpitations  Gastrointestinal: Negative  Negative for nausea and vomiting  Endocrine: Negative  Negative for cold intolerance and heat intolerance  Genitourinary: Negative  Negative for dysuria, frequency and urgency  Musculoskeletal: Negative  Negative for myalgias and neck pain  Skin: Negative  Negative for rash  Allergic/Immunologic: Negative  Neurological: Negative for dizziness, tremors, seizures, syncope, facial asymmetry, speech difficulty, weakness, light-headedness, numbness and headaches  Positive for memory problems   Hematological: Negative  Does not bruise/bleed easily  Psychiatric/Behavioral: Negative  Negative for confusion, hallucinations and sleep disturbance  FOCUSED PHYSICAL EXAMINATION:     Vital signs:  /76 (BP Location: Left arm, Patient Position: Standing, Cuff Size: Large)   Pulse 91   Ht 6' 2" (1 88 m)   Wt 110 kg (242 lb)   BMI 31 07 kg/m²       General:  Well-appearing, well nourished, pleasant patient in no acute distress  Mood and Fund of Knowledge are appropriate  Head:  Some hypomimia  Normocephalic, atraumatic  Oropharynx and conjunctiva are clear  Speech  No hypophonia or bradylalia  No scanning speech  Language: Comprehension intact  Neck:  Supple, strong 5/5 forward flexion and retroflexion     Extremities: Range of motion is normal       Cognitive and Mental Exam:  The patient is alert, oriented to self, location, date and situation  Memory is normal to provide accurate details of health history     Cranial Nerves:  Visual fields are full to confrontation  Extraocular movements were full, with normal pursuit and saccades  Pupils were equal, reactive to light symmetrically  Facial sensation to light touch was intact  Face is symmetric with normal strength    Hearing was not assessed  Palate is up going bilaterally and symmetrically  Neck muscles are strong  Tongue protrusion is at midline with normal movements  No dysarthria       Motor:     [id number]                Time since last dose:   12/28/18 6/28/19 1/6/20   Speech  1  2 softer  2   Facial Expression  2  3  3   Postural Tremor (Right) 0  0  0   Postural Tremor (Left) 0 0 0   Kinetic Tremor (Right)  0 0 0   Kinetic Tremor (Left)  0 0 0   Rest tremor amplitude RUE 0 0 0   Rest tremor amplitude LUE 2 with thumb flex-ext, and wrist flexion-extension 2  1 wrist   Rest tremor amplitude RLE 0 0 0   Rest tremor amplitude LLE 0 0 0   Lip/Jaw Tremor  0 0 1   Consistency of tremor 0 0 0   Finger Taps (Right)   2 0 2   Finger Taps (Left)  2 1 2   Hand Movement (Right)  1 0 0   Hand Movement (Left)   2 0 0   Pronation/Supination (Right)  1 0 1   Pronation/Supination (Left)   2 2 2   Toe Tapping (Right) - 0  0   Toe Tapping (Left) - 3 2   Leg Agility (Right)  - 0 0   Leg Agility (Left)   -  2 2   Rigidity - Neck  3  2  3   Rigidity - Upper Extremity (Right)  1   2 2   Rigidity - Upper Extremity (Left)   2  3 2   Rigidity - Lower Extremity (Right)  1  0 0   Rigidity - Lower Extremity (Left)   1  0 0   Arising from Chair   0  0 0    Gait   1  1 2   Freezing of Gait -  0 0   Postural Stability  0  0 (two steps -   Posture 1  2 2   Global spontaneity of movement 3   3 reduced arm swing on left 3     Reduced L arm swing and rest tremor present   Normal turns      -------------------------------------------------------------------------------------     Muscle Strength Right Left   Muscle Strength Right Left   Deltoid 5/5 5/5   Hip Adductors 5/5 5/5   Biceps 5/5 5/5   Hip Abductors 5/5 5/5   Triceps 5/5 5/5   Knee Extensors 5/5 5/5   Wrist Extensors 5/5 5/5   Knee Flexors 5/5 5/5   Wrist Flexors 5/5 5/5   Ankle Extensors 5/5 5/5    5/5 5/5   Ankle Flexors 5/5 5/5   Finger Abductors 5/5 5/5           Hip Flexors 5/5 5/5       Hip Extensors 5/5 5/5            Gait:  Slow rise from chair, normal turns, can walk backwards well, mainly L hand swing reduced  Impaired tandem gait  Slightly forward truncal bending       Reflexes:  DTRs were 2/4 throughout

## 2020-02-07 ENCOUNTER — OFFICE VISIT (OUTPATIENT)
Dept: INTERNAL MEDICINE CLINIC | Facility: CLINIC | Age: 85
End: 2020-02-07
Payer: MEDICARE

## 2020-02-07 VITALS
WEIGHT: 246 LBS | SYSTOLIC BLOOD PRESSURE: 142 MMHG | HEART RATE: 75 BPM | TEMPERATURE: 97.5 F | OXYGEN SATURATION: 95 % | HEIGHT: 74 IN | BODY MASS INDEX: 31.57 KG/M2 | DIASTOLIC BLOOD PRESSURE: 96 MMHG

## 2020-02-07 DIAGNOSIS — R01.1 SYSTOLIC MURMUR: ICD-10-CM

## 2020-02-07 DIAGNOSIS — F41.9 ANXIETY: ICD-10-CM

## 2020-02-07 DIAGNOSIS — G20 PARKINSON'S DISEASE (HCC): ICD-10-CM

## 2020-02-07 DIAGNOSIS — I10 BENIGN ESSENTIAL HYPERTENSION: Primary | ICD-10-CM

## 2020-02-07 DIAGNOSIS — I48.19 PERSISTENT ATRIAL FIBRILLATION (HCC): ICD-10-CM

## 2020-02-07 PROBLEM — S06.5X9A SDH (SUBDURAL HEMATOMA) (HCC): Status: ACTIVE | Noted: 2020-02-07

## 2020-02-07 PROBLEM — I71.20 THORACIC AORTIC ANEURYSM WITHOUT RUPTURE: Status: ACTIVE | Noted: 2020-02-07

## 2020-02-07 PROBLEM — I71.2 THORACIC AORTIC ANEURYSM WITHOUT RUPTURE (HCC): Status: ACTIVE | Noted: 2020-02-07

## 2020-02-07 PROBLEM — S06.5XAA SDH (SUBDURAL HEMATOMA): Status: ACTIVE | Noted: 2020-02-07

## 2020-02-07 PROCEDURE — 4040F PNEUMOC VAC/ADMIN/RCVD: CPT | Performed by: INTERNAL MEDICINE

## 2020-02-07 PROCEDURE — 1160F RVW MEDS BY RX/DR IN RCRD: CPT | Performed by: INTERNAL MEDICINE

## 2020-02-07 PROCEDURE — 3080F DIAST BP >= 90 MM HG: CPT | Performed by: INTERNAL MEDICINE

## 2020-02-07 PROCEDURE — 3008F BODY MASS INDEX DOCD: CPT | Performed by: INTERNAL MEDICINE

## 2020-02-07 PROCEDURE — 3077F SYST BP >= 140 MM HG: CPT | Performed by: INTERNAL MEDICINE

## 2020-02-07 PROCEDURE — 1036F TOBACCO NON-USER: CPT | Performed by: INTERNAL MEDICINE

## 2020-02-07 PROCEDURE — 99214 OFFICE O/P EST MOD 30 MIN: CPT | Performed by: INTERNAL MEDICINE

## 2020-02-07 NOTE — ASSESSMENT & PLAN NOTE
I reinforced with patient that escitalopram is a daily medication, and he will check to make sure he is taking daily when he gets home    He can continue alprazolam as needed, or not use that wanted all if he does not need it

## 2020-02-07 NOTE — PROGRESS NOTES
Assessment/Plan:    Benign essential hypertension  The elevated, continue meds along with healthy diet exercise    Anxiety  I reinforced with patient that escitalopram is a daily medication, and he will check to make sure he is taking daily when he gets home  He can continue alprazolam as needed, or not use that wanted all if he does not need it    Parkinson's disease [facility])  Patient doing well, follow-up Neurology, continue med    Systolic murmur  Patient asymptomatic, Echo 06/22/2018 was okay    Persistent atrial fibrillation ([facility] )  Rate controlled, continue meds       Diagnoses and all orders for this visit:    Benign essential hypertension  -     CBC and differential; Future  -     Comprehensive metabolic panel; Future  -     Lipid Panel with Direct LDL reflex; Future  -     TSH, 3rd generation with Free T4 reflex; Future    Anxiety    Parkinson's disease ([facility] )    Systolic murmur    Persistent atrial fibrillation        BMI Counseling: Body mass index is 31 58 kg/m²  The BMI is above normal  Nutrition recommendations include encouraging healthy choices of fruits and vegetables and moderation in carbohydrate intake  Exercise recommendations include exercising 3-5 times per week  Subjective:      Patient ID: Benja Mijaers is a 82 y o  male  Parkinson's:  Patient on Sinemet any follows with Neurology, no falls  No freezing up, no excessive movements  Hypertension:  Patient reports compliance with meds, no lightheadedness  Atrial fibrillation:  Patient reports compliance with med, no bleeding problems, no palpitations    Anxiety:  Patient has not been using alprazolam much      The following portions of the patient's history were reviewed and updated as appropriate: allergies, current medications, past family history, past medical history, past social history, past surgical history and problem list     Review of Systems   Constitutional: Negative for chills, fatigue and fever     HENT: Negative for congestion, nosebleeds, postnasal drip, sore throat and trouble swallowing  Eyes: Negative for pain  Respiratory: Negative for cough, chest tightness, shortness of breath and wheezing  Cardiovascular: Negative for chest pain, palpitations and leg swelling  Gastrointestinal: Negative for abdominal pain, constipation, diarrhea, nausea and vomiting  Endocrine: Negative for polydipsia and polyuria  Genitourinary: Negative for dysuria, flank pain and hematuria  Musculoskeletal: Negative for arthralgias  Skin: Negative for rash  Neurological: Positive for tremors  Negative for dizziness, light-headedness and headaches  Hematological: Does not bruise/bleed easily  Psychiatric/Behavioral: Negative for confusion and dysphoric mood  The patient is not nervous/anxious  Objective:      /96   Pulse 75   Temp 97 5 °F (36 4 °C)   Ht 6' 2" (1 88 m)   Wt 112 kg (246 lb)   SpO2 95%   BMI 31 58 kg/m²          Physical Exam   Constitutional: He is oriented to person, place, and time  He appears well-developed and well-nourished  No distress  HENT:   Head: Normocephalic and atraumatic  Right Ear: External ear normal    Left Ear: External ear normal    Eyes: Conjunctivae are normal  No scleral icterus  Neck: Normal range of motion  Neck supple  No tracheal deviation present  No thyromegaly present  Cardiovascular: Normal rate  An irregularly irregular rhythm present  Murmur (Soft systolic) heard  Pulmonary/Chest: Effort normal and breath sounds normal  No respiratory distress  He has no wheezes  He has no rales  Abdominal: Soft  Bowel sounds are normal  There is no tenderness  There is no rebound and no guarding  Musculoskeletal: He exhibits no edema  Lymphadenopathy:     He has no cervical adenopathy  Neurological: He is alert and oriented to person, place, and time     No difficulty getting out of chair, no shuffling gait mild pill-rolling resting tremor   Psychiatric: He has a normal mood and affect  His behavior is normal  Judgment and thought content normal    Vitals reviewed

## 2020-02-07 NOTE — PATIENT INSTRUCTIONS
Problem List Items Addressed This Visit        Cardiovascular and Mediastinum    Persistent atrial fibrillation     Rate controlled, continue meds         Benign essential hypertension - Primary     The elevated, continue meds along with healthy diet exercise         Relevant Orders    CBC and differential    Comprehensive metabolic panel    Lipid Panel with Direct LDL reflex    TSH, 3rd generation with Free T4 reflex       Nervous and Auditory    Parkinson's disease [facility])     Patient doing well, follow-up Neurology, continue med            Other    Anxiety     I reinforced with patient that escitalopram is a daily medication, and he will check to make sure he is taking daily when he gets home    He can continue alprazolam as needed, or not use that wanted all if he does not need it         Systolic murmur     Patient asymptomatic, Echo 06/22/2018 was okay

## 2020-05-11 ENCOUNTER — OFFICE VISIT (OUTPATIENT)
Dept: INTERNAL MEDICINE CLINIC | Facility: CLINIC | Age: 85
End: 2020-05-11
Payer: MEDICARE

## 2020-05-11 VITALS
HEART RATE: 64 BPM | TEMPERATURE: 98.6 F | WEIGHT: 234 LBS | BODY MASS INDEX: 30.03 KG/M2 | SYSTOLIC BLOOD PRESSURE: 128 MMHG | DIASTOLIC BLOOD PRESSURE: 90 MMHG | HEIGHT: 74 IN | OXYGEN SATURATION: 92 %

## 2020-05-11 DIAGNOSIS — G20 PARKINSON'S DISEASE (HCC): ICD-10-CM

## 2020-05-11 DIAGNOSIS — F41.9 ANXIETY: ICD-10-CM

## 2020-05-11 DIAGNOSIS — I10 BENIGN ESSENTIAL HYPERTENSION: Primary | ICD-10-CM

## 2020-05-11 DIAGNOSIS — Z00.00 MEDICARE ANNUAL WELLNESS VISIT, SUBSEQUENT: ICD-10-CM

## 2020-05-11 DIAGNOSIS — Z13.6 SCREENING FOR AAA (ABDOMINAL AORTIC ANEURYSM): ICD-10-CM

## 2020-05-11 DIAGNOSIS — I48.19 PERSISTENT ATRIAL FIBRILLATION (HCC): ICD-10-CM

## 2020-05-11 PROCEDURE — 1160F RVW MEDS BY RX/DR IN RCRD: CPT | Performed by: INTERNAL MEDICINE

## 2020-05-11 PROCEDURE — 3074F SYST BP LT 130 MM HG: CPT | Performed by: INTERNAL MEDICINE

## 2020-05-11 PROCEDURE — 1125F AMNT PAIN NOTED PAIN PRSNT: CPT | Performed by: INTERNAL MEDICINE

## 2020-05-11 PROCEDURE — 3080F DIAST BP >= 90 MM HG: CPT | Performed by: INTERNAL MEDICINE

## 2020-05-11 PROCEDURE — G0439 PPPS, SUBSEQ VISIT: HCPCS | Performed by: INTERNAL MEDICINE

## 2020-05-11 PROCEDURE — 4040F PNEUMOC VAC/ADMIN/RCVD: CPT | Performed by: INTERNAL MEDICINE

## 2020-05-11 PROCEDURE — 1170F FXNL STATUS ASSESSED: CPT | Performed by: INTERNAL MEDICINE

## 2020-05-11 PROCEDURE — 1036F TOBACCO NON-USER: CPT | Performed by: INTERNAL MEDICINE

## 2020-05-11 PROCEDURE — 3008F BODY MASS INDEX DOCD: CPT | Performed by: INTERNAL MEDICINE

## 2020-05-11 PROCEDURE — 99214 OFFICE O/P EST MOD 30 MIN: CPT | Performed by: INTERNAL MEDICINE

## 2020-05-30 ENCOUNTER — TELEPHONE (OUTPATIENT)
Dept: OTHER | Facility: OTHER | Age: 85
End: 2020-05-30

## 2020-06-17 DIAGNOSIS — G20 PARKINSON'S DISEASE (HCC): Primary | ICD-10-CM

## 2020-07-13 NOTE — PROGRESS NOTES
[address] [facility] DISORDERS CLINIC         RETURN PATIENT NOTE    [facility] Number: # [id number]  YOB: 1935  Date of visit: 7/14/2020    Referring provider: Lex Srivastava MD    ASSESSMENT     1  [facility]        Impression of this 83 yo gentleman returning for idiopathic Parkinsons's disease, feeling stable still on just 1 tablet TID of Sinemet and with L hand tremor, bradykinesia, and some rigidity  He is hypophonic but otherwise not particularly bothered by anything currently  He takes his Sinemet doses at variable times during the day, but sometimes this works out well and sometimes not  Proceed as below with mainly seeing how a scheduled dose regimen may be better able to help his day  PLAN     ? He will try the stretching and vocal exercises techniques previously taught him  ? Continue with the current Sinemet regimen 1 tab TID and his exercise regimen  He will be trying to take his first dose of medication at 10am and monitoring if he requires next doses after a certain number of hours and then call back to update us  ? Discussed about potential for cognitive rehabilitation if memory worsens  ? The patient has been instructed to call us about any new neurological problems or medication side effects  ? Return to clinic in 4 months  A total of 25 minutes were spent face-to-face with this patient, of which 50% was spent on counseling and coordination of care  HISTORY OF PRESENT ILLNESS:     Mr Laurence Covarrubias is a 82 y o  right handed male who returns for Parkinson's disease  Last visit 1/6/2020       The patient was not accompanied today  History was obtained from patient    Interim History  No interim calls were made to the office  He tells me today he still is "in good shape" and taking walks around the neighborhood   He does have some tremors, moreso if he places his arm "the wrong way" on a chair or table  He denies any progression or spread of tremors elsewhere  He denies any aspects that are bothersome to him at this time  He says he does not have much stiffness and is currently trying to lose some weight  He continues to take Sinemet 25/100 IR 1 tab TID, without clear ON or OFF period  He says his wife has told him he is quieter  No hallucinations or swallowing problems or gait problems  Name of Med AM PM QHS       Sinemet 25/100 IR 1 tab 1 tab 1 tab         INITIAL HISTORY  He did have three surgeries on the left shoulder before  Date of First Symptom:  Mid , in May-   First Symptom: left hand tremor  Side More Affected: left  Main Problems:  1  Left hand tremor  He says he first noticed when he was seeing Dr Joo Pope, then saw Dr Vickey Dacosta  No medications or treatments started  He does not notice it when he's driving, but worse when he resting arm in position on a chair or when fatigued  Noticing it more at night  Shaking is not present and does not bother him during activities or tasks  No known family history of tremor or diagnosed PD  Mother  in childbirth  Hyposmia: denies  Fatigue: denies  RBD (REM semi-purposeful body movements): he is told he is very active during night by his wife, but no specifically acting out dreams  Seldom dreams  Gait Disturbance:  He feels that he has been leaning to his right side more  Onset:  Uncertain, within the last few months after reading more about PD  Falls: None  Fractures: None  Freezing of Gait: none  Handwriting: "worse" but "junkier" and he is not sure if smaller  Speech: his wife told him he has been talking more quieter (wife is also hard of hearing)  Facial Expression: not told or noticed any change in facial expression     Dementia:  None  Hallucination: None  Constipation: yes  Skin Cancer History / Last Skin Exam: excision of lesion on right hand 2018, told it was cancerous but uncertain type by Dr Ermelinda Child  Hypovitaminosis D: denies  Hypovitaminosis B12:  denies  Exercise Therapy: "always out and about" from the house       Family History: Number of First Degree Relatives With Parkinsonism:   None     Neuro Imaging: None     Parkinson's Medications:   Effect of levodopa:  Never  Effect of pramipexole:   Never  Effect of ropinirole:       Never  Effect of other agonist:  Never  Effect of rasagiline:   Never  Effect of other medication:  Started on propranolol 60mg qDay, then reduced now to 20mg BID  No noticed benefit to tremor and he feels more "uptight" than before       Parkinson's Procedures:   DBS: none    REVIEW OF PAST MEDICAL, SOCIAL AND FAMILY HISTORY:  This is the list of problems as per our Medical Records:    Patient Active Problem List    Diagnosis Date Noted    Systolic murmur 94/57/6052    Thoracic aortic aneurysm without rupture [facility] ) 02/07/2020    SDH (subdural hematoma) ([facility] ) 02/07/2020    Acute left-sided low back pain with left-sided sciatica 07/26/2019    Chest pain 04/09/2019    Medicare annual wellness visit, subsequent 03/08/2019    URI (upper respiratory infection) 01/02/2019    Parkinson's disease ([facility] ) 12/28/2018    Anxiety 07/31/2018    Dyspnea on exertion 06/22/2018    Bloating 06/19/2018    Tremor of left hand 05/21/2018    Acute non-recurrent maxillary sinusitis 05/21/2018    Persistent atrial fibrillation ([facility] ) 07/27/2016    Pure hypercholesterolemia 07/27/2016    Benign essential hypertension 07/27/2016    Esophageal reflux 07/27/2016     Allergies   Allergen Reactions    Penicillins Hives    Pollen Extract Other (See Comments)             Outpatient Encounter Medications as of 7/14/2020   Medication Sig Dispense Refill    carbidopa-levodopa (SINEMET)  mg per tablet Take 1 tablet by mouth 3 (three) times a day 270 tablet 3    Coenzyme Q10 (COQ-10 PO) Take 1 tablet by mouth daily      isosorbide mononitrate (IMDUR) 30 mg 24 hr tablet Take 30 mg by mouth daily at bedtime      loratadine (CLARITIN) 10 mg tablet Take 10 mg by mouth daily as needed        Multiple Vitamins-Minerals (ICAPS AREDS 2) CAPS Take 1 capsule by mouth 2 (two) times a day        propranolol (INDERAL) 20 mg tablet Take 1 tablet (20 mg total) by mouth every 12 (twelve) hours 60 tablet 5    warfarin (COUMADIN) 5 mg tablet Take 1 tablet (5 mg total) by mouth daily   25MG TAKE 1 TABLET 6 DAY A WEEK AND THEN 1 TABLET 1 DAY A WEEK  0    ALPRAZolam (XANAX) 0 25 mg tablet Take 1 tablet (0 25 mg total) by mouth 2 (two) times a day as needed for anxiety (Patient not taking: Reported on 8/15/2019) 50 tablet 1    carbamide peroxide (DEBROX) 6 5 % otic solution Administer 5 drops into both ears 2 (two) times a day (Patient not taking: Reported on 1/6/2020) 15 mL 0    Dentifrices (BIOTENE DRY MOUTH CARE DT) Apply to teeth      escitalopram (LEXAPRO) 10 mg tablet Take 1 tablet (10 mg total) by mouth daily (Patient not taking: Reported on 7/14/2020) 30 tablet 5    losartan (COZAAR) 25 mg tablet Take 25 mg by mouth daily      Simethicone (GAS-X PO) Take 1 tablet by mouth daily as needed      telmisartan (MICARDIS) 40 mg tablet take 1 tablet by mouth once daily (Patient not taking: Reported on 7/14/2020) 90 tablet 3     No facility-administered encounter medications on file as of 7/14/2020  Social History     Tobacco Use    Smoking status: Former Smoker    Smokeless tobacco: Never Used    Tobacco comment: Quit 35-40 years ago   Substance Use Topics    Alcohol use: Yes     Frequency: Monthly or less     Drinks per session: 3 or 4     Binge frequency: Never     Comment: Rare, previously moderate drinker cut down 2016         Family History   Problem Relation Age of Onset    No Known Problems Mother     Coronary artery disease Father         REVIEW OF SYSTEMS:  The patient has entered data on an intake form regarding present illness, past medical and surgical history, medications, allergies, family and social history, and a full review of 14 systems  I have reviewed this form with the patient, and all the relevant information has been included on this note  The full review of systems was negative except as stated in HPI and below  Constitutional: Negative  Negative for appetite change and fever  HENT: Negative  Negative for hearing loss, tinnitus, trouble swallowing and voice change  Eyes: Negative  Negative for photophobia and pain  Respiratory: Negative  Negative for shortness of breath  Cardiovascular: Negative  Negative for palpitations  Gastrointestinal: Negative  Negative for nausea and vomiting  Endocrine: Negative  Negative for cold intolerance  Genitourinary: Negative  Negative for dysuria, frequency and urgency  Musculoskeletal: Negative  Negative for myalgias and neck pain  Skin: Negative  Negative for rash  Allergic/Immunologic: Negative  Neurological: Positive for tremors (left arm)  Negative for dizziness, seizures, syncope, facial asymmetry, speech difficulty, weakness, light-headedness, numbness and headaches  Hematological: Negative  Does not bruise/bleed easily  Psychiatric/Behavioral: Negative  Negative for confusion, hallucinations and sleep disturbance  FOCUSED PHYSICAL EXAMINATION:     Vital signs:  /72 (BP Location: Left arm, Patient Position: Standing, Cuff Size: Large)   Pulse 74   Temp 98 6 °F (37 °C) (Tympanic Core)   Ht 6' 2" (1 88 m)   Wt 108 kg (238 lb)   BMI 30 56 kg/m²      General:  Well-appearing, well nourished, pleasant patient in no acute distress  Mood appropriate  Head:  Some hypomimia  Normocephalic, atraumatic  Oropharynx and conjunctiva are clear  Speech  No hypophonia or bradylalia  No scanning speech  Language: Comprehension intact  Neck:  Supple, strong 5/5 forward flexion and retroflexion     Extremities: Range of motion is normal       Cognitive and Mental Exam:  The patient is alert, oriented to self, location, date and situation       Cranial Nerves:  Visual fields are full to confrontation  Extraocular movements were full, with normal pursuit and saccades  Pupils were equal, reactive to light symmetrically  Facial sensation to light touch was intact  Face is symmetric with normal strength    Hearing was not assessed  Palate is up going bilaterally and symmetrically  Neck muscles are strong  Tongue protrusion is at midline with normal movements  No dysarthria       Motor:     [id number]                Time since last dose:   12/28/18 6/28/19 1/6/20 7/14/20   Speech  1  2 softer  2 2 hoarse   Facial Expression  2  3  3 3   Postural Tremor (Right) 0  0  0 0   Postural Tremor (Left) 0 0 0 0   Kinetic Tremor (Right)  0 0 0 0   Kinetic Tremor (Left)  0 0 0 0   Rest tremor amplitude RUE 0 0 0 0   Rest tremor amplitude LUE 2 with thumb flex-ext, and wrist flexion-extension 2  1 wrist 3   Rest tremor amplitude RLE 0 0 0 0   Rest tremor amplitude LLE 0 0 0 0   Lip/Jaw Tremor  0 0 1 0   Consistency of tremor 0 0 0 0   Finger Taps (Right)   2 0 2 1   Finger Taps (Left)  2 1 2 2   Hand Movement (Right)  1 0 0 0   Hand Movement (Left)   2 0 0 0   Pronation/Supination (Right)  1 0 1 2   Pronation/Supination (Left)   2 2 2 2   Toe Tapping (Right) - 0  0 2   Toe Tapping (Left) - 3 2 3   Leg Agility (Right)  - 0 0 1   Leg Agility (Left)   -  2 2 2   Rigidity - Neck  3  2  3 3   Rigidity - Upper Extremity (Right)  1   2 2 1   Rigidity - Upper Extremity (Left)   2  3 2 3   Rigidity - Lower Extremity (Right)  1  0 0 0   Rigidity - Lower Extremity (Left)   1  0 0 0   Arising from Chair   0  0 0 0    Gait   1  1 2 2   Freezing of Gait -  0 0 1   Postural Stability  0  0 (two steps - 0 (two steps)   Posture 1  2 2 1   Global spontaneity of movement 3   3 reduced arm swing on left 3 3     Reduced L arm swing and rest tremor present  Normal turns  -------------------------------------------------------------------------------------     Muscle Strength Right Left   Muscle Strength Right Left   Deltoid 5/5 5/5   Hip Adductors 5/5 5/5   Biceps 5/5 5/5   Hip Abductors 5/5 5/5   Triceps 5/5 5/5   Knee Extensors 5/5 5/5   Wrist Extensors 5/5 5/5   Knee Flexors 5/5 5/5   Wrist Flexors 5/5 5/5   Ankle Extensors 5/5 5/5    5/5 5/5   Ankle Flexors 5/5 5/5   Finger Abductors 5/5 5/5           Hip Flexors 5/5 5/5       Hip Extensors 5/5 5/5            Gait:  Slow rise from chair, normal turns, can walk backwards well, mainly L hand swing reduced  Impaired tandem gait  Slightly forward truncal bending  Pull test 0       Reflexes:  DTRs were 2/4 throughout

## 2020-07-14 ENCOUNTER — OFFICE VISIT (OUTPATIENT)
Dept: NEUROLOGY | Facility: CLINIC | Age: 85
End: 2020-07-14
Payer: MEDICARE

## 2020-07-14 VITALS
DIASTOLIC BLOOD PRESSURE: 72 MMHG | TEMPERATURE: 98.6 F | SYSTOLIC BLOOD PRESSURE: 116 MMHG | HEIGHT: 74 IN | WEIGHT: 238 LBS | BODY MASS INDEX: 30.54 KG/M2 | HEART RATE: 74 BPM

## 2020-07-14 DIAGNOSIS — G20 PARKINSON'S DISEASE (HCC): Primary | ICD-10-CM

## 2020-07-14 PROCEDURE — 3008F BODY MASS INDEX DOCD: CPT | Performed by: PSYCHIATRY & NEUROLOGY

## 2020-07-14 PROCEDURE — 3078F DIAST BP <80 MM HG: CPT | Performed by: PSYCHIATRY & NEUROLOGY

## 2020-07-14 PROCEDURE — 1036F TOBACCO NON-USER: CPT | Performed by: PSYCHIATRY & NEUROLOGY

## 2020-07-14 PROCEDURE — 99214 OFFICE O/P EST MOD 30 MIN: CPT | Performed by: PSYCHIATRY & NEUROLOGY

## 2020-07-14 PROCEDURE — 3074F SYST BP LT 130 MM HG: CPT | Performed by: PSYCHIATRY & NEUROLOGY

## 2020-07-14 PROCEDURE — 1160F RVW MEDS BY RX/DR IN RCRD: CPT | Performed by: PSYCHIATRY & NEUROLOGY

## 2020-07-14 PROCEDURE — 4040F PNEUMOC VAC/ADMIN/RCVD: CPT | Performed by: PSYCHIATRY & NEUROLOGY

## 2020-07-14 NOTE — PATIENT INSTRUCTIONS
? He will try the stretching and vocal exercises techniques previously taught him  ? Continue with the current Sinemet regimen 1 tab TID and his exercise regimen  He will be trying to take his first dose of medication at 10am and monitoring if he requires next doses after a certain number of hours and then call back to update us  ? Discussed about potential for cognitive rehabilitation if memory worsens  ? The patient has been instructed to call us about any new neurological problems or medication side effects  ? Return to clinic in 4 months

## 2020-07-14 NOTE — Clinical Note
Alan Edwards, after speaking with Dr Vandana Sinha, would we be able to place him with Joycelyn Wade first after all?

## 2020-07-14 NOTE — LETTER
July 14, 2020     Dana Wang, [address] [place] [address]    Patient: Margarita Arguello   YOB: 1935   Date of Visit: 7/14/2020       Dear Dr Jahaira Barry:    Earlier today I saw Mr  Anselmo Sarmiento for evaluation of his Parkinson's disease  Below are my notes for this visit for your records and to keep you updated on his health status  If you have questions, please do not hesitate to call me  Sincerely,        Hany Chand MD        CC: No Recipients  Hany Chand MD  7/14/2020  8:30 PM  [facility]  DEPARTMENT OF NEUROLOGICAL SCIENCES  [address] CLINIC         RETURN PATIENT NOTE    Patient: Margarita Arguello  Medical Record Number: # [id number]  YOB: 1935  Date of visit: 7/14/2020    Referring provider: George Raya MD    ASSESSMENT     1  Parkinson's disease ([facility] [address] )        Impression of this 81 yo gentleman returning for idiopathic Parkinsons's disease, feeling stable still on just 1 tablet TID of Sinemet and with L hand tremor, bradykinesia, and some rigidity  He is hypophonic but otherwise not particularly bothered by anything currently  He takes his Sinemet doses at variable times during the day, but sometimes this works out well and sometimes not  Proceed as below with mainly seeing how a scheduled dose regimen may be better able to help his day  PLAN     ? He will try the stretching and vocal exercises techniques previously taught him  ? Continue with the current Sinemet regimen 1 tab TID and his exercise regimen  He will be trying to take his first dose of medication at 10am and monitoring if he requires next doses after a certain number of hours and then call back to update us  ? Discussed about potential for cognitive rehabilitation if memory worsens  ? The patient has been instructed to call us about any new neurological problems or medication side effects  ? Return to clinic in 4 months       A total of 25 minutes were spent face-to-face with this patient, of which 50% was spent on counseling and coordination of care  HISTORY OF PRESENT ILLNESS:     Mr Herson Ospina is a 82 y o  right handed male who returns for Parkinson's disease  Last visit 2020       The patient was not accompanied today  History was obtained from patient    Interim History  No interim calls were made to the office  He tells me today he still is "in good shape" and taking walks around the neighborhood  He does have some tremors, moreso if he places his arm "the wrong way" on a chair or table  He denies any progression or spread of tremors elsewhere  He denies any aspects that are bothersome to him at this time  He says he does not have much stiffness and is currently trying to lose some weight  He continues to take Sinemet 25/100 IR 1 tab TID, without clear ON or OFF period  He says his wife has told him he is quieter  No hallucinations or swallowing problems or gait problems  Name of Med AM PM QHS       Sinemet 25/100 IR 1 tab 1 tab 1 tab         INITIAL HISTORY  He did have three surgeries on the left shoulder before  Date of First Symptom:  Mid , in May-   First Symptom: left hand tremor  Side More Affected: left  Main Problems:  1  Left hand tremor  He says he first noticed when he was seeing Dr Jose Carlos Mitchell, then saw Dr Jordan Vincent  No medications or treatments started  He does not notice it when he's driving, but worse when he resting arm in position on a chair or when fatigued  Noticing it more at night  Shaking is not present and does not bother him during activities or tasks  No known family history of tremor or diagnosed PD  Mother  in childbirth  Hyposmia: denies  Fatigue: denies  RBD (REM semi-purposeful body movements): he is told he is very active during night by his wife, but no specifically acting out dreams  Seldom dreams  Gait Disturbance:  He feels that he has been leaning to his right side more  Onset:  Uncertain, within the last few months after reading more about PD  Falls: None  Fractures: None  Freezing of Gait: none  Handwriting: "worse" but "junkier" and he is not sure if smaller  Speech: his wife told him he has been talking more quieter (wife is also hard of hearing)  Facial Expression: not told or noticed any change in facial expression  Dementia:  None  Hallucination: None  Constipation: yes  Skin Cancer History / Last Skin Exam: excision of lesion on right hand Sept 25, 2018, told it was cancerous but uncertain type by Dr Osmin Herrera  Hypovitaminosis D: denies  Hypovitaminosis B12:  denies  Exercise Therapy: "always out and about" from the house       Family History: Number of First Degree Relatives With Parkinsonism:   None     Neuro Imaging: None     Parkinson's Medications:   Effect of levodopa:  Never  Effect of pramipexole:   Never  Effect of ropinirole:       Never  Effect of other agonist:  Never  Effect of rasagiline:   Never  Effect of other medication:  Started on propranolol 60mg qDay, then reduced now to 20mg BID   No noticed benefit to tremor and he feels more "uptight" than before       Parkinson's Procedures:   DBS: none    REVIEW OF PAST MEDICAL, SOCIAL AND FAMILY HISTORY:  This is the list of problems as per our Medical Records:    Patient Active Problem List    Diagnosis Date Noted    Systolic murmur 78/25/7264    Thoracic aortic aneurysm without rupture ([facility] [address] ) 02/07/2020    SDH (subdural hematoma) ([facility] [address] ) 02/07/2020    Acute left-sided low back pain with left-sided sciatica 07/26/2019    Chest pain 04/09/2019    Medicare annual wellness visit, subsequent 03/08/2019    URI (upper respiratory infection) 01/02/2019    Parkinson's disease ([address] ) 12/28/2018    Anxiety 07/31/2018    Dyspnea on exertion 06/22/2018    Bloating 06/19/2018    Tremor of left hand 05/21/2018    Acute non-recurrent maxillary sinusitis 05/21/2018    Persistent atrial fibrillation ([facility] [address] ) 07/27/2016    Pure hypercholesterolemia 07/27/2016    Benign essential hypertension 07/27/2016    Esophageal reflux 07/27/2016     Allergies   Allergen Reactions    Penicillins Hives    Pollen Extract Other (See Comments)       Outpatient Encounter Medications as of 7/14/2020   Medication Sig Dispense Refill    carbidopa-levodopa (SINEMET)  mg per tablet Take 1 tablet by mouth 3 (three) times a day 270 tablet 3    Coenzyme Q10 (COQ-10 PO) Take 1 tablet by mouth daily      isosorbide mononitrate (IMDUR) 30 mg 24 hr tablet Take 30 mg by mouth daily at bedtime      loratadine (CLARITIN) 10 mg tablet Take 10 mg by mouth daily as needed        Multiple Vitamins-Minerals (ICAPS AREDS 2) CAPS Take 1 capsule by mouth 2 (two) times a day        propranolol (INDERAL) 20 mg tablet Take 1 tablet (20 mg total) by mouth every 12 (twelve) hours 60 tablet 5    warfarin (COUMADIN) 5 mg tablet Take 1 tablet (5 mg total) by mouth daily   25MG TAKE 1 TABLET 6 DAY A WEEK AND THEN 1 TABLET 1 DAY A WEEK  0    ALPRAZolam (XANAX) 0 25 mg tablet Take 1 tablet (0 25 mg total) by mouth 2 (two) times a day as needed for anxiety (Patient not taking: Reported on 8/15/2019) 50 tablet 1    carbamide peroxide (DEBROX) 6 5 % otic solution Administer 5 drops into both ears 2 (two) times a day (Patient not taking: Reported on 1/6/2020) 15 mL 0    Dentifrices (BIOTENE DRY MOUTH CARE DT) Apply to teeth      escitalopram (LEXAPRO) 10 mg tablet Take 1 tablet (10 mg total) by mouth daily (Patient not taking: Reported on 7/14/2020) 30 tablet 5    losartan (COZAAR) 25 mg tablet Take 25 mg by mouth daily      Simethicone (GAS-X PO) Take 1 tablet by mouth daily as needed      telmisartan (MICARDIS) 40 mg tablet take 1 tablet by mouth once daily (Patient not taking: Reported on 7/14/2020) 90 tablet 3     No facility-administered encounter medications on file as of 7/14/2020        Social History     Tobacco Use    Smoking status: Former Smoker    Smokeless tobacco: Never Used    Tobacco comment: Quit 35-40 years ago   Substance Use Topics    Alcohol use: Yes     Frequency: Monthly or less     Drinks per session: 3 or 4     Binge frequency: Never     Comment: Rare, previously moderate drinker cut down 2016  Family History   Problem Relation Age of Onset    No Known Problems Mother     Coronary artery disease Father         REVIEW OF SYSTEMS:  The patient has entered data on an intake form regarding present illness, past medical and surgical history, medications, allergies, family and social history, and a full review of 14 systems  I have reviewed this form with the patient, and all the relevant information has been included on this note  The full review of systems was negative except as stated in HPI and below  Constitutional: Negative  Negative for appetite change and fever  HENT: Negative  Negative for hearing loss, tinnitus, trouble swallowing and voice change  Eyes: Negative  Negative for photophobia and pain  Respiratory: Negative  Negative for shortness of breath  Cardiovascular: Negative  Negative for palpitations  Gastrointestinal: Negative  Negative for nausea and vomiting  Endocrine: Negative  Negative for cold intolerance  Genitourinary: Negative  Negative for dysuria, frequency and urgency  Musculoskeletal: Negative  Negative for myalgias and neck pain  Skin: Negative  Negative for rash  Allergic/Immunologic: Negative  Neurological: Positive for tremors (left arm)  Negative for dizziness, seizures, syncope, facial asymmetry, speech difficulty, weakness, light-headedness, numbness and headaches  Hematological: Negative  Does not bruise/bleed easily  Psychiatric/Behavioral: Negative  Negative for confusion, hallucinations and sleep disturbance       FOCUSED PHYSICAL EXAMINATION:     Vital signs:  /72 (BP Location: Left arm, Patient Position: Standing, Cuff Size: Large)   Pulse 74   Temp 98 6 °F (37 °C) (Tympanic Core)   Ht 6' 2" (1 88 m)   Wt 108 kg (238 lb)   BMI 30 56 kg/m²       General:  Well-appearing, well nourished, pleasant patient in no acute distress  Mood appropriate  Head:  Some hypomimia  Normocephalic, atraumatic  Oropharynx and conjunctiva are clear  Speech  No hypophonia or bradylalia  No scanning speech  Language: Comprehension intact  Neck:  Supple, strong 5/5 forward flexion and retroflexion  Extremities: Range of motion is normal       Cognitive and Mental Exam:  The patient is alert, oriented to self, location, date and situation       Cranial Nerves:  Visual fields are full to confrontation  Extraocular movements were full, with normal pursuit and saccades  Pupils were equal, reactive to light symmetrically  Facial sensation to light touch was intact  Face is symmetric with normal strength    Hearing was not assessed  Palate is up going bilaterally and symmetrically  Neck muscles are strong  Tongue protrusion is at midline with normal movements     No dysarthria       Motor:     [id number]                Time since last dose:   12/28/18 6/28/19 1/6/20 7/14/20   Speech  1  2 softer  2 2 hoarse   Facial Expression  2  3  3 3   Postural Tremor (Right) 0  0  0 0   Postural Tremor (Left) 0 0 0 0   Kinetic Tremor (Right)  0 0 0 0   Kinetic Tremor (Left)  0 0 0 0   Rest tremor amplitude RUE 0 0 0 0   Rest tremor amplitude LUE 2 with thumb flex-ext, and wrist flexion-extension 2  1 wrist 3   Rest tremor amplitude RLE 0 0 0 0   Rest tremor amplitude LLE 0 0 0 0   Lip/Jaw Tremor  0 0 1 0   Consistency of tremor 0 0 0 0   Finger Taps (Right)   2 0 2 1   Finger Taps (Left)  2 1 2 2   Hand Movement (Right)  1 0 0 0   Hand Movement (Left)   2 0 0 0   Pronation/Supination (Right)  1 0 1 2   Pronation/Supination (Left)   2 2 2 2   Toe Tapping (Right) - 0  0 2   Toe Tapping (Left) - 3 2 3   Leg Agility (Right)  - 0 0 1   Leg Agility (Left)   -  2 2 2   Rigidity - Neck  3  2  3 3 Rigidity - Upper Extremity (Right)  1   2 2 1   Rigidity - Upper Extremity (Left)   2  3 2 3   Rigidity - Lower Extremity (Right)  1  0 0 0   Rigidity - Lower Extremity (Left)   1  0 0 0   Arising from Chair   0  0 0 0    Gait   1  1 2 2   Freezing of Gait -  0 0 1   Postural Stability  0  0 (two steps - 0 (two steps)   Posture 1  2 2 1   Global spontaneity of movement 3   3 reduced arm swing on left 3 3     Reduced L arm swing and rest tremor present  Normal turns      -------------------------------------------------------------------------------------     Muscle Strength Right Left   Muscle Strength Right Left   Deltoid 5/5 5/5   Hip Adductors 5/5 5/5   Biceps 5/5 5/5   Hip Abductors 5/5 5/5   Triceps 5/5 5/5   Knee Extensors 5/5 5/5   Wrist Extensors 5/5 5/5   Knee Flexors 5/5 5/5   Wrist Flexors 5/5 5/5   Ankle Extensors 5/5 5/5    5/5 5/5   Ankle Flexors 5/5 5/5   Finger Abductors 5/5 5/5           Hip Flexors 5/5 5/5       Hip Extensors 5/5 5/5            Gait:  Slow rise from chair, normal turns, can walk backwards well, mainly L hand swing reduced  Impaired tandem gait  Slightly forward truncal bending  Pull test 0       Reflexes:  DTRs were 2/4 throughout  Aidee Preciado  7/14/2020 12:04 PM  Sign at close encounter  Review of Systems   Constitutional: Negative  Negative for appetite change and fever  HENT: Negative  Negative for hearing loss, tinnitus, trouble swallowing and voice change  Eyes: Negative  Negative for photophobia and pain  Respiratory: Negative  Negative for shortness of breath  Cardiovascular: Negative  Negative for palpitations  Gastrointestinal: Negative  Negative for nausea and vomiting  Endocrine: Negative  Negative for cold intolerance  Genitourinary: Negative  Negative for dysuria, frequency and urgency  Musculoskeletal: Negative  Negative for myalgias and neck pain  Skin: Negative  Negative for rash  Allergic/Immunologic: Negative  Neurological: Positive for tremors (left arm)  Negative for dizziness, seizures, syncope, facial asymmetry, speech difficulty, weakness, light-headedness, numbness and headaches  Hematological: Negative  Does not bruise/bleed easily  Psychiatric/Behavioral: Negative  Negative for confusion, hallucinations and sleep disturbance  Sanjay Blue MD  7/14/2020  1:08 PM  Sign at close encounter  [address] PATIENT NOTE    Patient: [address] [place] [address] Record Number: # [id number]  YOB: 1935  Date of visit: 7/14/2020    Referring provider: No ref  provider found    ASSESSMENT     No diagnosis found  Impression of this 81 yo gentleman with 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 Parkinson's disease, overall stable on 1 tab TID of Sinemet 25/100 with slight progression  He does not note any motor fluctuations  He is exercising well and finds Biotene helpful for dry mouth  No interim problems with gait, with swallowing  He does note mild worsening of memory but not enough to affect his daily routine  Proceed as below       PLAN     ? He will try the stretching and vocal exercises techniques previously taught him  ? Continue with the current Sinemet regimen 1 tab TID and his exercise regimen  He will be trying to take his first dose of medication at 10am and monitoring if he requires next doses after a certain number of hours and then call back to update us  ? Discussed about potential for cognitive rehabilitation if memory worsens  ? The patient has been instructed to call us about any new neurological problems or medication side effects  ? Return to clinic in 4 months  A total of 25 minutes were spent face-to-face with this patient, of which 50% was spent on counseling and coordination of care       HISTORY OF PRESENT ILLNESS:     Mr Jose Rodriguez is a 82 y o  right handed male who returns for Parkinson's disease  Last visit 2020       The patient was not accompanied today  History was obtained from patient    Interim History  No interim calls were made to the office  He tells me today he still is "in good shape" and taking walks around the neighborhood  He does have some tremors, moreso if he places his arm "the wrong way" on a chair or table  He denies any progression or spread of tremors elsewhere  He denies any aspects that are bothersome to him at this time  He says he does not have much stiffness and is currently trying to lose some weight  He continues to take Sinemet 25/100 IR 1 tab TID, without clear ON or OFF period  He says his wife has told him he is quieter  No hallucinations or swallowing problems or gait problems  Name of Med AM PM QHS       Sinemet 25/100 IR 1 tab 1 tab 1 tab           He continues to deny any clear ON or OFF periods  He goes regularly out to lunch with a group of friends  He does keep hydrated, taking biotene spray for the dry mouth  He does endorse some decrease in concentration and mild forgetfulness but not enough to impact his overall daily life and regimens  Name of Med AM PM QHS       Sinemet 25/100 IR 1 1 1           INITIAL HISTORY  He did have three surgeries on the left shoulder before  Date of First Symptom:  Mid , in May-   First Symptom: left hand tremor  Side More Affected: left  Main Problems:  1  Left hand tremor  He says he first noticed when he was seeing Dr Raffy Zepeda, then saw Dr Oswald Berger  No medications or treatments started  He does not notice it when he's driving, but worse when he resting arm in position on a chair or when fatigued  Noticing it more at night  Shaking is not present and does not bother him during activities or tasks  No known family history of tremor or diagnosed PD  Mother  in childbirth     Hyposmia: denies  Fatigue: denies  RBD (REM semi-purposeful body movements): he is told he is very active during night by his wife, but no specifically acting out dreams  Seldom dreams  Gait Disturbance:  He feels that he has been leaning to his right side more  Onset:  Uncertain, within the last few months after reading more about PD  Falls: None  Fractures: None  Freezing of Gait: none  Handwriting: "worse" but "junkier" and he is not sure if smaller  Speech: his wife told him he has been talking more quieter (wife is also hard of hearing)  Facial Expression: not told or noticed any change in facial expression  Dementia:  None  Hallucination: None  Constipation: yes  Skin Cancer History / Last Skin Exam: excision of lesion on right hand Sept 25, 2018, told it was cancerous but uncertain type by Dr Moshe Wilson  Hypovitaminosis D: denies  Hypovitaminosis B12:  denies  Exercise Therapy: "always out and about" from the house       Family History: Number of First Degree Relatives With Parkinsonism:   None     Neuro Imaging: None     Parkinson's Medications:   Effect of levodopa:  Never  Effect of pramipexole:   Never  Effect of ropinirole:       Never  Effect of other agonist:  Never  Effect of rasagiline:   Never  Effect of other medication:  Started on propranolol 60mg qDay, then reduced now to 20mg BID   No noticed benefit to tremor and he feels more "uptight" than before       Parkinson's Procedures:   DBS: none    REVIEW OF PAST MEDICAL, SOCIAL AND FAMILY HISTORY:  This is the list of problems as per our Medical Records:    Patient Active Problem List    Diagnosis Date Noted    Systolic murmur 74/83/8458    Thoracic aortic aneurysm without rupture ([facility] [address] ) 02/07/2020    SDH (subdural hematoma) ([facility] [address] ) 02/07/2020    Acute left-sided low back pain with left-sided sciatica 07/26/2019    Chest pain 04/09/2019    Medicare annual wellness visit, subsequent 03/08/2019    URI (upper respiratory infection) 01/02/2019    Parkinson's disease ([address] ) 12/28/2018    Anxiety 07/31/2018    Dyspnea on exertion 06/22/2018    Bloating 06/19/2018    Tremor of left hand 05/21/2018    Acute non-recurrent maxillary sinusitis 05/21/2018    Persistent atrial fibrillation ([address] ) 07/27/2016    Pure hypercholesterolemia 07/27/2016    Benign essential hypertension 07/27/2016    Esophageal reflux 07/27/2016     Allergies   Allergen Reactions    Penicillins Hives    Pollen Extract Other (See Comments)       Outpatient Encounter Medications as of 7/14/2020   Medication Sig Dispense Refill    ALPRAZolam (XANAX) 0 25 mg tablet Take 1 tablet (0 25 mg total) by mouth 2 (two) times a day as needed for anxiety (Patient not taking: Reported on 8/15/2019) 50 tablet 1    carbamide peroxide (DEBROX) 6 5 % otic solution Administer 5 drops into both ears 2 (two) times a day (Patient not taking: Reported on 1/6/2020) 15 mL 0    carbidopa-levodopa (SINEMET)  mg per tablet Take 1 tablet by mouth 3 (three) times a day 270 tablet 3    Coenzyme Q10 (COQ-10 PO) Take 1 tablet by mouth daily      Dentifrices (BIOTENE DRY MOUTH CARE DT) Apply to teeth      escitalopram (LEXAPRO) 10 mg tablet Take 1 tablet (10 mg total) by mouth daily 30 tablet 5    isosorbide mononitrate (IMDUR) 30 mg 24 hr tablet Take 30 mg by mouth daily at bedtime      loratadine (CLARITIN) 10 mg tablet Take 10 mg by mouth daily as needed        losartan (COZAAR) 25 mg tablet Take 25 mg by mouth daily      Multiple Vitamins-Minerals (ICAPS AREDS 2) CAPS Take 1 capsule by mouth 2 (two) times a day        propranolol (INDERAL) 20 mg tablet Take 1 tablet (20 mg total) by mouth every 12 (twelve) hours 60 tablet 5    Simethicone (GAS-X PO) Take 1 tablet by mouth daily as needed      telmisartan (MICARDIS) 40 mg tablet take 1 tablet by mouth once daily 90 tablet 3    warfarin (COUMADIN) 5 mg tablet Take 1 tablet (5 mg total) by mouth daily   25MG TAKE 1 TABLET 6 DAY A WEEK AND THEN 1 TABLET 1 DAY A WEEK  0     No facility-administered encounter medications on file as of 7/14/2020  Social History     Tobacco Use    Smoking status: Former Smoker    Smokeless tobacco: Never Used    Tobacco comment: Quit 35-40 years ago   Substance Use Topics    Alcohol use: Yes     Frequency: Monthly or less     Drinks per session: 3 or 4     Binge frequency: Never     Comment: Rare, previously moderate drinker cut down 2016  Family History   Problem Relation Age of Onset    No Known Problems Mother     Coronary artery disease Father         REVIEW OF SYSTEMS:  The patient has entered data on an intake form regarding present illness, past medical and surgical history, medications, allergies, family and social history, and a full review of 14 systems  I have reviewed this form with the patient, and all the relevant information has been included on this note  The full review of systems was negative except as stated in HPI and below  Constitutional: Negative  Negative for appetite change and fever  HENT: Negative  Negative for hearing loss, tinnitus, trouble swallowing and voice change  Eyes: Negative  Negative for photophobia and pain  Respiratory: Negative  Negative for shortness of breath  Cardiovascular: Negative  Negative for palpitations  Gastrointestinal: Negative  Negative for nausea and vomiting  Endocrine: Negative  Negative for cold intolerance  Genitourinary: Negative  Negative for dysuria, frequency and urgency  Musculoskeletal: Negative  Negative for myalgias and neck pain  Skin: Negative  Negative for rash  Allergic/Immunologic: Negative  Neurological: Positive for tremors (left arm)  Negative for dizziness, seizures, syncope, facial asymmetry, speech difficulty, weakness, light-headedness, numbness and headaches  Hematological: Negative  Does not bruise/bleed easily  Psychiatric/Behavioral: Negative  Negative for confusion, hallucinations and sleep disturbance  FOCUSED PHYSICAL EXAMINATION:     Vital signs:   There were no vitals taken for this visit  General:  Well-appearing, well nourished, pleasant patient in no acute distress  Mood appropriate  Head:  Some hypomimia  Normocephalic, atraumatic  Oropharynx and conjunctiva are clear  Speech  No hypophonia or bradylalia  No scanning speech  Language: Comprehension intact  Neck:  Supple, strong 5/5 forward flexion and retroflexion  Extremities: Range of motion is normal       Cognitive and Mental Exam:  The patient is alert, oriented to self, location, date and situation       Cranial Nerves:  Visual fields are full to confrontation  Extraocular movements were full, with normal pursuit and saccades  Pupils were equal, reactive to light symmetrically  Facial sensation to light touch was intact  Face is symmetric with normal strength    Hearing was not assessed  Palate is up going bilaterally and symmetrically  Neck muscles are strong  Tongue protrusion is at midline with normal movements     No dysarthria       Motor:     [id number]                Time since last dose:   12/28/18 6/28/19 1/6/20 7/14/20   Speech  1  2 softer  2 2 hoarse   Facial Expression  2  3  3 3   Postural Tremor (Right) 0  0  0 0   Postural Tremor (Left) 0 0 0 0   Kinetic Tremor (Right)  0 0 0 0   Kinetic Tremor (Left)  0 0 0 0   Rest tremor amplitude RUE 0 0 0 0   Rest tremor amplitude LUE 2 with thumb flex-ext, and wrist flexion-extension 2  1 wrist 3   Rest tremor amplitude RLE 0 0 0 0   Rest tremor amplitude LLE 0 0 0 0   Lip/Jaw Tremor  0 0 1 0   Consistency of tremor 0 0 0 0   Finger Taps (Right)   2 0 2 1   Finger Taps (Left)  2 1 2 2   Hand Movement (Right)  1 0 0 0   Hand Movement (Left)   2 0 0 0   Pronation/Supination (Right)  1 0 1 2   Pronation/Supination (Left)   2 2 2 2   Toe Tapping (Right) - 0  0 2   Toe Tapping (Left) - 3 2 3   Leg Agility (Right)  - 0 0 1   Leg Agility (Left)   -  2 2 2   Rigidity - Neck  3  2  3 3   Rigidity - Upper Extremity (Right)  1   2 2 1   Rigidity - Upper Extremity (Left)   2  3 2 3   Rigidity - Lower Extremity (Right)  1  0 0 0   Rigidity - Lower Extremity (Left)   1  0 0 0   Arising from Chair   0  0 0 0    Gait   1  1 2 2   Freezing of Gait -  0 0 1   Postural Stability  0  0 (two steps - 0 (two steps)   Posture 1  2 2 1   Global spontaneity of movement 3   3 reduced arm swing on left 3 3     Reduced L arm swing and rest tremor present  Normal turns      -------------------------------------------------------------------------------------     Muscle Strength Right Left   Muscle Strength Right Left   Deltoid 5/5 5/5   Hip Adductors 5/5 5/5   Biceps 5/5 5/5   Hip Abductors 5/5 5/5   Triceps 5/5 5/5   Knee Extensors 5/5 5/5   Wrist Extensors 5/5 5/5   Knee Flexors 5/5 5/5   Wrist Flexors 5/5 5/5   Ankle Extensors 5/5 5/5    5/5 5/5   Ankle Flexors 5/5 5/5   Finger Abductors 5/5 5/5           Hip Flexors 5/5 5/5       Hip Extensors 5/5 5/5            Gait:  Slow rise from chair, normal turns, can walk backwards well, mainly L hand swing reduced  Impaired tandem gait  Slightly forward truncal bending       Reflexes:  DTRs were 2/4 throughout

## 2020-07-14 NOTE — PROGRESS NOTES
Review of Systems   Constitutional: Negative  Negative for appetite change and fever  HENT: Negative  Negative for hearing loss, tinnitus, trouble swallowing and voice change  Eyes: Negative  Negative for photophobia and pain  Respiratory: Negative  Negative for shortness of breath  Cardiovascular: Negative  Negative for palpitations  Gastrointestinal: Negative  Negative for nausea and vomiting  Endocrine: Negative  Negative for cold intolerance  Genitourinary: Negative  Negative for dysuria, frequency and urgency  Musculoskeletal: Negative  Negative for myalgias and neck pain  Skin: Negative  Negative for rash  Allergic/Immunologic: Negative  Neurological: Positive for tremors (left arm)  Negative for dizziness, seizures, syncope, facial asymmetry, speech difficulty, weakness, light-headedness, numbness and headaches  Hematological: Negative  Does not bruise/bleed easily  Psychiatric/Behavioral: Negative  Negative for confusion, hallucinations and sleep disturbance

## 2020-07-15 ENCOUNTER — TELEPHONE (OUTPATIENT)
Dept: NEUROLOGY | Facility: CLINIC | Age: 85
End: 2020-07-15

## 2020-07-15 NOTE — TELEPHONE ENCOUNTER
----- Message from Mikki Loving MD sent at 7/14/2020  8:31 PM EDT -----  Italo Lopez, after speaking with Dr Gilberto Whitman, would we be able to place him with Sourav Damian first after all?

## 2020-07-20 ENCOUNTER — OFFICE VISIT (OUTPATIENT)
Dept: INTERNAL MEDICINE CLINIC | Facility: CLINIC | Age: 85
End: 2020-07-20
Payer: MEDICARE

## 2020-07-20 VITALS
RESPIRATION RATE: 16 BRPM | HEIGHT: 74 IN | BODY MASS INDEX: 30.83 KG/M2 | WEIGHT: 240.2 LBS | SYSTOLIC BLOOD PRESSURE: 142 MMHG | DIASTOLIC BLOOD PRESSURE: 88 MMHG | HEART RATE: 76 BPM | OXYGEN SATURATION: 98 % | TEMPERATURE: 97.5 F

## 2020-07-20 DIAGNOSIS — L98.9 SKIN LESION: Primary | ICD-10-CM

## 2020-07-20 PROCEDURE — 99213 OFFICE O/P EST LOW 20 MIN: CPT | Performed by: INTERNAL MEDICINE

## 2020-07-20 PROCEDURE — 1160F RVW MEDS BY RX/DR IN RCRD: CPT | Performed by: INTERNAL MEDICINE

## 2020-07-20 PROCEDURE — 1036F TOBACCO NON-USER: CPT | Performed by: INTERNAL MEDICINE

## 2020-07-20 PROCEDURE — 4040F PNEUMOC VAC/ADMIN/RCVD: CPT | Performed by: INTERNAL MEDICINE

## 2020-07-20 PROCEDURE — 3079F DIAST BP 80-89 MM HG: CPT | Performed by: INTERNAL MEDICINE

## 2020-07-20 PROCEDURE — 3077F SYST BP >= 140 MM HG: CPT | Performed by: INTERNAL MEDICINE

## 2020-07-20 PROCEDURE — 3008F BODY MASS INDEX DOCD: CPT | Performed by: INTERNAL MEDICINE

## 2020-07-20 NOTE — PROGRESS NOTES
Assessment/Plan:    Skin lesion  This being in a high son exposure area, recommend evaluation of patient referred to surgery       Diagnoses and all orders for this visit:    Skin lesion  -     Ambulatory referral to General Surgery; Future          Subjective:      Patient ID: Nisha Srivastava is a 82 y o  male      Patient has a skin lesion on his head he wants evaluated      The following portions of the patient's history were reviewed and updated as appropriate: allergies, current medications, past family history, past medical history, past social history, past surgical history and problem list     Review of Systems   Skin:        Skin lesion top of head         Objective:      /88 (BP Location: Left arm, Patient Position: Sitting, Cuff Size: Adult)   Pulse 76   Temp 97 5 °F (36 4 °C) (Temporal)   Resp 16   Ht 6' 2" (1 88 m)   Wt 109 kg (240 lb 3 2 oz)   SpO2 98%   BMI 30 84 kg/m²          Physical Exam   Skin:   1 cm papule with a keratinized center about 0 5 cm in diameter, no erythema, no tenderness

## 2020-08-19 ENCOUNTER — TELEMEDICINE (OUTPATIENT)
Dept: INTERNAL MEDICINE CLINIC | Facility: CLINIC | Age: 85
End: 2020-08-19
Payer: MEDICARE

## 2020-08-19 DIAGNOSIS — J06.9 UPPER RESPIRATORY TRACT INFECTION, UNSPECIFIED TYPE: Primary | ICD-10-CM

## 2020-08-19 PROCEDURE — 3077F SYST BP >= 140 MM HG: CPT | Performed by: INTERNAL MEDICINE

## 2020-08-19 PROCEDURE — 1036F TOBACCO NON-USER: CPT | Performed by: INTERNAL MEDICINE

## 2020-08-19 PROCEDURE — 99442 PR PHYS/QHP TELEPHONE EVALUATION 11-20 MIN: CPT | Performed by: INTERNAL MEDICINE

## 2020-08-19 PROCEDURE — 4040F PNEUMOC VAC/ADMIN/RCVD: CPT | Performed by: INTERNAL MEDICINE

## 2020-08-19 PROCEDURE — 3079F DIAST BP 80-89 MM HG: CPT | Performed by: INTERNAL MEDICINE

## 2020-08-19 RX ORDER — IPRATROPIUM BROMIDE 42 UG/1
1 SPRAY, METERED NASAL 4 TIMES DAILY PRN
COMMUNITY
Start: 2020-08-18 | End: 2022-03-13 | Stop reason: SDUPTHER

## 2020-08-19 NOTE — ASSESSMENT & PLAN NOTE
Discussed using DayQuil and NyQuil for symptomatic relief, if patient worsening will do further evaluation, discussed COVID testing with patient also to keep a close eye on his respiratory status

## 2020-08-19 NOTE — PATIENT INSTRUCTIONS
Problem List Items Addressed This Visit        Respiratory    URI (upper respiratory infection) - Primary     Discussed using DayQuil and NyQuil for symptomatic relief, if patient worsening will do further evaluation, discussed COVID testing with patient also to keep a close eye on his respiratory status

## 2020-08-19 NOTE — PROGRESS NOTES
Virtual Regular Visit      Assessment/Plan:    Problem List Items Addressed This Visit        Respiratory    URI (upper respiratory infection) - Primary     Discussed using DayQuil and NyQuil for symptomatic relief, if patient worsening will do further evaluation, discussed COVID testing with patient also to keep a close eye on his respiratory status                    Reason for visit is   Chief Complaint   Patient presents with    Virtual Regular Visit        Encounter provider Ousmane Bobby MD    Provider located at [address] [place] [id number]      Recent Visits  No visits were found meeting these conditions  Showing recent visits within past 7 days and meeting all other requirements     Today's Visits  Date Type Provider Dept   08/19/20 Telemedicine Vern Mcdonnell today's visits and meeting all other requirements     Future Appointments  No visits were found meeting these conditions  Showing future appointments within next 150 days and meeting all other requirements        The patient was identified by name and date of birth  Cedric Downs was informed that this is a telemedicine visit and that the visit is being conducted through [facility] [place] [place] and patient was informed that this is a secure, HIPAA-compliant platform  He agrees to proceed     My office door was closed  No one else was in the room  He acknowledged consent and understanding of privacy and security of the video platform  The patient has agreed to participate and understands they can discontinue the visit at any time  Patient is aware this is a billable service  Subjective  Cedric Downs is a 82 y o  male   Pt having some cold symptoms for about a week, no fevers, no chills, no shortness of breath, no aches         Past Medical History:   Diagnosis Date    A-fib [facility])     Hypertension     Parkinson's disease ([facility] [address] ) 11/01/2018 Past Surgical History:   Procedure Laterality Date    CARDIOVERSION      ATRIAL    ROTATOR CUFF REPAIR         Current Outpatient Medications   Medication Sig Dispense Refill    ALPRAZolam (XANAX) 0 25 mg tablet Take 1 tablet (0 25 mg total) by mouth 2 (two) times a day as needed for anxiety (Patient not taking: Reported on 8/15/2019) 50 tablet 1    carbamide peroxide (DEBROX) 6 5 % otic solution Administer 5 drops into both ears 2 (two) times a day (Patient not taking: Reported on 1/6/2020) 15 mL 0    carbidopa-levodopa (SINEMET)  mg per tablet Take 1 tablet by mouth 3 (three) times a day 270 tablet 3    Coenzyme Q10 (COQ-10 PO) Take 1 tablet by mouth daily      Dentifrices (BIOTENE DRY MOUTH CARE DT) Apply to teeth      escitalopram (LEXAPRO) 10 mg tablet Take 1 tablet (10 mg total) by mouth daily (Patient not taking: Reported on 7/14/2020) 30 tablet 5    ipratropium (ATROVENT) 0 06 % nasal spray       isosorbide mononitrate (IMDUR) 30 mg 24 hr tablet Take 30 mg by mouth daily at bedtime      loratadine (CLARITIN) 10 mg tablet Take 10 mg by mouth daily as needed        losartan (COZAAR) 25 mg tablet Take 25 mg by mouth daily      Multiple Vitamins-Minerals (ICAPS AREDS 2) CAPS Take 1 capsule by mouth 2 (two) times a day        propranolol (INDERAL) 20 mg tablet Take 1 tablet (20 mg total) by mouth every 12 (twelve) hours 60 tablet 5    Simethicone (GAS-X PO) Take 1 tablet by mouth daily as needed      telmisartan (MICARDIS) 40 mg tablet take 1 tablet by mouth once daily (Patient not taking: Reported on 7/14/2020) 90 tablet 3    warfarin (COUMADIN) 5 mg tablet Take 1 tablet (5 mg total) by mouth daily   25MG TAKE 1 TABLET 6 DAY A WEEK AND THEN 1 TABLET 1 DAY A WEEK  0     No current facility-administered medications for this visit  Allergies   Allergen Reactions    Penicillins Hives    Pollen Extract Other (See Comments)              Review of Systems   Constitutional: Negative for chills, fatigue and fever  HENT: Positive for congestion and rhinorrhea  Negative for nosebleeds, postnasal drip, sore throat and trouble swallowing  Eyes: Negative for pain  Respiratory: Negative for cough, chest tightness, shortness of breath and wheezing  Cardiovascular: Negative for chest pain, palpitations and leg swelling  Gastrointestinal: Negative for abdominal pain, constipation, diarrhea, nausea and vomiting  Endocrine: Negative for polydipsia and polyuria  Genitourinary: Negative for dysuria, flank pain and hematuria  Musculoskeletal: Negative for arthralgias and myalgias  Skin: Negative for rash  Neurological: Negative for dizziness, tremors and headaches  Hematological: Does not bruise/bleed easily  Psychiatric/Behavioral: Negative for confusion and dysphoric mood  The patient is not nervous/anxious  Video Exam    There were no vitals filed for this visit  Physical Exam   It was my intent to perform this visit via video technology but the patient was not able to do a video connection so the visit was completed via audio telephone only  I spent 20 minutes with patient today in which greater than 50% of the time was spent in counseling/coordination of care regarding acute issue      VIRTUAL VISIT [address] acknowledges that he has consented to an online visit or consultation  He understands that the online visit is based solely on information provided by him, and that, in the absence of a face-to-face physical evaluation by the physician, the diagnosis he receives is both limited and provisional in terms of accuracy and completeness  This is not intended to replace a full medical face-to-face evaluation by the physician  Nicolas Trevino understands and accepts these terms

## 2020-08-31 ENCOUNTER — TELEMEDICINE (OUTPATIENT)
Dept: INTERNAL MEDICINE CLINIC | Facility: CLINIC | Age: 85
End: 2020-08-31
Payer: MEDICARE

## 2020-08-31 DIAGNOSIS — J01.00 ACUTE NON-RECURRENT MAXILLARY SINUSITIS: Primary | ICD-10-CM

## 2020-08-31 PROCEDURE — 4040F PNEUMOC VAC/ADMIN/RCVD: CPT | Performed by: INTERNAL MEDICINE

## 2020-08-31 PROCEDURE — 3077F SYST BP >= 140 MM HG: CPT | Performed by: INTERNAL MEDICINE

## 2020-08-31 PROCEDURE — 1036F TOBACCO NON-USER: CPT | Performed by: INTERNAL MEDICINE

## 2020-08-31 PROCEDURE — 3079F DIAST BP 80-89 MM HG: CPT | Performed by: INTERNAL MEDICINE

## 2020-08-31 PROCEDURE — 99214 OFFICE O/P EST MOD 30 MIN: CPT | Performed by: INTERNAL MEDICINE

## 2020-08-31 RX ORDER — AZITHROMYCIN 250 MG/1
250 TABLET, FILM COATED ORAL EVERY 24 HOURS
Qty: 6 TABLET | Refills: 0 | Status: SHIPPED | OUTPATIENT
Start: 2020-08-31 | End: 2020-09-05

## 2020-08-31 NOTE — PROGRESS NOTES
Virtual Regular Visit      Assessment/Plan:    Problem List Items Addressed This Visit        Respiratory    Acute non-recurrent maxillary sinusitis - Primary     Will treat with azithromycin, follow up if not improving, if developing shortness of breath will check COVID  Take calf dose of normal Coumadin while on antibiotics         Relevant Medications    azithromycin (ZITHROMAX) 250 mg tablet               Reason for visit is   Chief Complaint   Patient presents with    Virtual Regular Visit        Encounter provider Millie Chase MD    Provider located at [address] [place] [phone]      Recent Visits  No visits were found meeting these conditions  Showing recent visits within past 7 days and meeting all other requirements     Today's Visits  Date Type Provider Dept   08/31/20 Telemedicine Millie Chase MD [id number] today's visits and meeting all other requirements     Future Appointments  No visits were found meeting these conditions  Showing future appointments within next 150 days and meeting all other requirements        The patient was identified by name and date of birth  Nisha Srivastava was informed that this is a telemedicine visit and that the visit is being conducted through [facility] and patient was informed that this is a secure, HIPAA-compliant platform  He agrees to proceed     My office door was closed  No one else was in the room  He acknowledged consent and understanding of privacy and security of the video platform  The patient has agreed to participate and understands they can discontinue the visit at any time  Patient is aware this is a billable service  Subjective  Nisha Srivastava is a 82 y o  male         Patient woke up today with nasal congestion, hard time breathing through his nose, no fevers or chills, no cough, no shortness of breath except that he can not breathe through his nose   No aches  No diarrhea       Past Medical History:   Diagnosis Date    [facility] [address] )     Hypertension     Parkinson's disease ([facility] ) 11/01/2018       Past Surgical History:   Procedure Laterality Date    CARDIOVERSION      ATRIAL    ROTATOR CUFF REPAIR         Current Outpatient Medications   Medication Sig Dispense Refill    carbidopa-levodopa (SINEMET)  mg per tablet Take 1 tablet by mouth 3 (three) times a day 270 tablet 3    ipratropium (ATROVENT) 0 06 % nasal spray       isosorbide mononitrate (IMDUR) 30 mg 24 hr tablet Take 30 mg by mouth daily at bedtime      loratadine (CLARITIN) 10 mg tablet Take 10 mg by mouth daily as needed        Multiple Vitamins-Minerals (ICAPS AREDS 2) CAPS Take 1 capsule by mouth 2 (two) times a day        propranolol (INDERAL) 20 mg tablet Take 1 tablet (20 mg total) by mouth every 12 (twelve) hours 60 tablet 5    telmisartan (MICARDIS) 40 mg tablet take 1 tablet by mouth once daily 90 tablet 3    warfarin (COUMADIN) 5 mg tablet Take 1 tablet (5 mg total) by mouth daily   25MG TAKE 1 TABLET 6 DAY A WEEK AND THEN 1 TABLET 1 DAY A WEEK  0    ALPRAZolam (XANAX) 0 25 mg tablet Take 1 tablet (0 25 mg total) by mouth 2 (two) times a day as needed for anxiety (Patient not taking: Reported on 8/15/2019) 50 tablet 1    azithromycin (ZITHROMAX) 250 mg tablet Take 1 tablet (250 mg total) by mouth every 24 hours for 5 days 2 tabs first day, then one tab daily 6 tablet 0    carbamide peroxide (DEBROX) 6 5 % otic solution Administer 5 drops into both ears 2 (two) times a day (Patient not taking: Reported on 1/6/2020) 15 mL 0    Coenzyme Q10 (COQ-10 PO) Take 1 tablet by mouth daily      Dentifrices (BIOTENE DRY MOUTH CARE DT) Apply to teeth      escitalopram (LEXAPRO) 10 mg tablet Take 1 tablet (10 mg total) by mouth daily (Patient not taking: Reported on 7/14/2020) 30 tablet 5    Simethicone (GAS-X PO) Take 1 tablet by mouth daily as needed       No current facility-administered medications for this visit  Allergies   Allergen Reactions    Penicillins Hives    Pollen Extract Other (See Comments)       Review of Systems    Video Exam    There were no vitals filed for this visit  Physical Exam  Constitutional:       General: He is not in acute distress  Appearance: He is well-developed  He is not diaphoretic  HENT:      Head: Normocephalic and atraumatic  Right Ear: External ear normal       Left Ear: External ear normal       Nose: Nose normal    Eyes:      General: No scleral icterus  Right eye: No discharge  Left eye: No discharge  Conjunctiva/sclera: Conjunctivae normal    Neck:      Musculoskeletal: Normal range of motion and neck supple  Trachea: No tracheal deviation  Pulmonary:      Effort: Pulmonary effort is normal  No respiratory distress  Abdominal:      Palpations: Abdomen is soft  Tenderness: There is no abdominal tenderness  Musculoskeletal: Normal range of motion  Skin:     Coloration: Skin is not pale  Findings: No erythema  Neurological:      Mental Status: He is alert and oriented to person, place, and time  Cranial Nerves: No cranial nerve deficit  Coordination: Coordination normal    Psychiatric:         Behavior: Behavior normal          Thought Content: Thought content normal          Judgment: Judgment normal           I spent 25 minutes with patient today in which greater than 50% of the time was spent in counseling/coordination of care regarding acute and chronic conditions      VIRTUAL VISIT [address] acknowledges that he has consented to an online visit or consultation  He understands that the online visit is based solely on information provided by him, and that, in the absence of a face-to-face physical evaluation by the physician, the diagnosis he receives is both limited and provisional in terms of accuracy and completeness   This is not intended to replace a full medical face-to-face evaluation by the physician  Shavon Nunez understands and accepts these terms

## 2020-08-31 NOTE — ASSESSMENT & PLAN NOTE
Will treat with azithromycin, follow up if not improving, if developing shortness of breath will check COVID    Take calf dose of normal Coumadin while on antibiotics

## 2020-08-31 NOTE — PATIENT INSTRUCTIONS
Problem List Items Addressed This Visit        Respiratory    Acute non-recurrent maxillary sinusitis - Primary     Will treat with azithromycin, follow up if not improving, if developing shortness of breath will check COVID    Take calf dose of normal Coumadin while on antibiotics         Relevant Medications    azithromycin (ZITHROMAX) 250 mg tablet

## 2020-09-19 ENCOUNTER — NURSE TRIAGE (OUTPATIENT)
Dept: OTHER | Facility: OTHER | Age: 85
End: 2020-09-19

## 2020-09-19 NOTE — TELEPHONE ENCOUNTER
Reason for Disposition   [1] Taking antibiotic > 7 days AND [2] nasal discharge not improved    Answer Assessment - Initial Assessment Questions  1  ANTIBIOTIC: "What antibiotic are you receiving?" "How many times per day?"      Zpack     2  ONSET: "When was the antibiotic started?"       About 2 weeks ago     3  PAIN: "How bad is the sinus pain?"   (Scale 1-10; mild, moderate or severe)    - MILD (1-3): doesn't interfere with normal activities     - MODERATE (4-7): interferes with normal activities (e g , work or school) or awakens from sleep    - SEVERE (8-10): excruciating pain and patient unable to do any normal activities         Denies  Discomfort from "stuffiness and difficulty breathing from his nose"    4  FEVER: "Do you have a fever?" If so, ask: "What is it, how was it measured, and when did it start?"       Denies     5   SYMPTOMS: "Are there any other symptoms you're concerned about?" If so, ask: "When did it start?"      Denies    Protocols used: SINUS INFECTION ON ANTIBIOTIC FOLLOW-UP CALL-[facility]

## 2020-09-19 NOTE — TELEPHONE ENCOUNTER
Regarding: stuffy nose, meds not working  ----- Message from Gianna Acosta sent at 9/19/2020  1:14 PM EDT -----  "I was given medication for a stuffy nose and it is not working "

## 2020-09-22 ENCOUNTER — OFFICE VISIT (OUTPATIENT)
Dept: INTERNAL MEDICINE CLINIC | Facility: CLINIC | Age: 85
End: 2020-09-22
Payer: MEDICARE

## 2020-09-22 VITALS
RESPIRATION RATE: 18 BRPM | TEMPERATURE: 98.4 F | SYSTOLIC BLOOD PRESSURE: 118 MMHG | WEIGHT: 239.8 LBS | HEIGHT: 74 IN | HEART RATE: 59 BPM | OXYGEN SATURATION: 96 % | DIASTOLIC BLOOD PRESSURE: 62 MMHG | BODY MASS INDEX: 30.77 KG/M2

## 2020-09-22 DIAGNOSIS — J30.1 SEASONAL ALLERGIC RHINITIS DUE TO POLLEN: Primary | ICD-10-CM

## 2020-09-22 PROCEDURE — 99213 OFFICE O/P EST LOW 20 MIN: CPT | Performed by: INTERNAL MEDICINE

## 2020-09-22 NOTE — PATIENT INSTRUCTIONS
Problem List Items Addressed This Visit        Respiratory    Seasonal allergic rhinitis due to pollen - Primary     I recommend patient try nasal steroid like Flonase which is available over-the-counter  Follow up if not improving

## 2020-09-22 NOTE — ASSESSMENT & PLAN NOTE
I recommend patient try nasal steroid like Flonase which is available over-the-counter  Follow up if not improving

## 2020-09-22 NOTE — PROGRESS NOTES
Assessment/Plan:    Seasonal allergic rhinitis due to pollen  I recommend patient try nasal steroid like Flonase which is available over-the-counter  Follow up if not improving  Diagnoses and all orders for this visit:    Seasonal allergic rhinitis due to pollen          Subjective:      Patient ID: Paulina Markham is a 82 y o  male  Patient having some runny nose with some yellow discharge for 4-5 days  No fevers or chills, no foul-smelling mucus, no cough, no shortness of breath, no GI complaints, no headache, no flu-like aches  Patient is starting to feel better  The following portions of the patient's history were reviewed and updated as appropriate: allergies, current medications, past family history, past medical history, past social history, past surgical history and problem list     Review of Systems   Constitutional: Negative for chills, fatigue and fever  HENT: Positive for congestion  Negative for nosebleeds, postnasal drip, sore throat and trouble swallowing  Eyes: Negative for pain  Respiratory: Negative for cough, chest tightness, shortness of breath and wheezing  Cardiovascular: Negative for chest pain, palpitations and leg swelling  Gastrointestinal: Negative for abdominal pain, constipation, diarrhea, nausea and vomiting  Endocrine: Negative for polydipsia and polyuria  Genitourinary: Negative for dysuria, flank pain and hematuria  Musculoskeletal: Negative for arthralgias  Skin: Negative for rash  Neurological: Negative for dizziness, tremors and headaches  Hematological: Does not bruise/bleed easily  Psychiatric/Behavioral: Negative for confusion and dysphoric mood  The patient is not nervous/anxious            Objective:      /62   Pulse 59   Temp 98 4 °F (36 9 °C) (Tympanic)   Resp 18   Ht 6' 2" (1 88 m)   Wt 109 kg (239 lb 12 8 oz)   SpO2 96%   BMI 30 79 kg/m²          Physical Exam  Constitutional:       General: He is not in acute distress  Appearance: Normal appearance  He is well-developed  HENT:      Head: Normocephalic and atraumatic  Right Ear: External ear normal       Left Ear: External ear normal       Ears:      Comments: Some wax in canals     Nose: Nose normal       Mouth/Throat:      Pharynx: No oropharyngeal exudate or posterior oropharyngeal erythema  Eyes:      General: No scleral icterus  Conjunctiva/sclera: Conjunctivae normal    Neck:      Musculoskeletal: Normal range of motion and neck supple  Thyroid: No thyromegaly  Trachea: No tracheal deviation  Cardiovascular:      Rate and Rhythm: Normal rate  Rhythm irregular  Heart sounds: Normal heart sounds  No murmur  Pulmonary:      Effort: Pulmonary effort is normal  No respiratory distress  Breath sounds: Normal breath sounds  No wheezing or rales  Abdominal:      General: Bowel sounds are normal       Palpations: Abdomen is soft  Tenderness: There is no abdominal tenderness  There is no guarding or rebound  Lymphadenopathy:      Cervical: No cervical adenopathy  Neurological:      Mental Status: He is alert and oriented to person, place, and time  Comments: Mild pill rolling tremor   Psychiatric:         Behavior: Behavior normal          Thought Content:  Thought content normal          Judgment: Judgment normal

## 2020-09-29 ENCOUNTER — TELEMEDICINE (OUTPATIENT)
Dept: INTERNAL MEDICINE CLINIC | Facility: CLINIC | Age: 85
End: 2020-09-29
Payer: MEDICARE

## 2020-09-29 DIAGNOSIS — J01.00 ACUTE NON-RECURRENT MAXILLARY SINUSITIS: Primary | ICD-10-CM

## 2020-09-29 PROCEDURE — 99213 OFFICE O/P EST LOW 20 MIN: CPT | Performed by: INTERNAL MEDICINE

## 2020-09-29 RX ORDER — AZITHROMYCIN 250 MG/1
250 TABLET, FILM COATED ORAL EVERY 24 HOURS
Qty: 6 TABLET | Refills: 0 | Status: SHIPPED | OUTPATIENT
Start: 2020-09-29 | End: 2020-10-04

## 2020-09-29 NOTE — ASSESSMENT & PLAN NOTE
Will treat with azithromycin since patient has allergy to penicillins, follow up if not improved  Discussed COVID testing if patient not improving  Pt not in any respiratory distress

## 2020-09-29 NOTE — PROGRESS NOTES
Virtual Regular Visit      Assessment/Plan:    Problem List Items Addressed This Visit        Respiratory    Acute non-recurrent maxillary sinusitis - Primary     Will treat with azithromycin since patient has allergy to penicillins, follow up if not improved  Discussed COVID testing if patient not improving  Pt not in any respiratory distress  Relevant Medications    azithromycin (ZITHROMAX) 250 mg tablet               Reason for visit is   Chief Complaint   Patient presents with    Virtual Regular Visit        Encounter provider Ezio Valdez MD    Provider located at [address] [place] [id number]      Recent Visits  Date Type Provider Dept   09/22/20 Office Visit Ezio Valdez MD [id number] recent visits within past 7 days and meeting all other requirements     Today's Visits  Date Type Provider Dept   09/29/20 Telemedicine Ezio Valdez MD [id number] today's visits and meeting all other requirements     Future Appointments  No visits were found meeting these conditions  Showing future appointments within next 150 days and meeting all other requirements        The patient was identified by name and date of birth  Juany Del Rio was informed that this is a telemedicine visit and that the visit is being conducted through telephone  My office door was closed  No one else was in the room  He acknowledged consent and understanding of privacy and security of the video platform  The patient has agreed to participate and understands they can discontinue the visit at any time  Patient is aware this is a billable service  Subjective  Juany Del Rio is a 82 y o  male   Patient reports his sinus symptoms have been getting worse, he has been using the nasal steroid  No fevers or chills, no shortness of breath, no significant cough, no GI complaints         Past Medical History: Diagnosis Date    A-fib [facility])     Hypertension     Parkinson's disease ([facility] ) 11/01/2018       Past Surgical History:   Procedure Laterality Date    CARDIOVERSION      ATRIAL    ROTATOR CUFF REPAIR         Current Outpatient Medications   Medication Sig Dispense Refill    ALPRAZolam (XANAX) 0 25 mg tablet Take 1 tablet (0 25 mg total) by mouth 2 (two) times a day as needed for anxiety 50 tablet 1    carbamide peroxide (DEBROX) 6 5 % otic solution Administer 5 drops into both ears 2 (two) times a day 15 mL 0    carbidopa-levodopa (SINEMET)  mg per tablet Take 1 tablet by mouth 3 (three) times a day 270 tablet 3    Coenzyme Q10 (COQ-10 PO) Take 1 tablet by mouth daily      Dentifrices (BIOTENE DRY MOUTH CARE DT) Apply to teeth      escitalopram (LEXAPRO) 10 mg tablet Take 1 tablet (10 mg total) by mouth daily 30 tablet 5    ipratropium (ATROVENT) 0 06 % nasal spray       isosorbide mononitrate (IMDUR) 30 mg 24 hr tablet Take 30 mg by mouth daily at bedtime      loratadine (CLARITIN) 10 mg tablet Take 10 mg by mouth daily as needed        Multiple Vitamins-Minerals (ICAPS AREDS 2) CAPS Take 1 capsule by mouth 2 (two) times a day        propranolol (INDERAL) 20 mg tablet Take 1 tablet (20 mg total) by mouth every 12 (twelve) hours 60 tablet 5    Simethicone (GAS-X PO) Take 1 tablet by mouth daily as needed      telmisartan (MICARDIS) 40 mg tablet take 1 tablet by mouth once daily 90 tablet 3    warfarin (COUMADIN) 5 mg tablet Take 1 tablet (5 mg total) by mouth daily   25MG TAKE 1 TABLET 6 DAY A WEEK AND THEN 1 TABLET 1 DAY A WEEK  0    azithromycin (ZITHROMAX) 250 mg tablet Take 1 tablet (250 mg total) by mouth every 24 hours for 5 days 2 tabs first day, then one tab daily 6 tablet 0     No current facility-administered medications for this visit  Allergies   Allergen Reactions    Penicillins Hives    Pollen Extract Other (See Comments)              Review of Systems   Constitutional: Negative for chills, fatigue and fever  HENT: Positive for congestion and postnasal drip  Negative for nosebleeds, sore throat and trouble swallowing  Eyes: Negative for pain  Respiratory: Negative for cough, chest tightness, shortness of breath and wheezing  Cardiovascular: Negative for chest pain, palpitations and leg swelling  Gastrointestinal: Negative for abdominal pain, constipation, diarrhea, nausea and vomiting  Endocrine: Negative for polydipsia and polyuria  Genitourinary: Negative for dysuria, flank pain and hematuria  Musculoskeletal: Negative for arthralgias  Skin: Negative for rash  Neurological: Negative for dizziness, tremors and headaches  Hematological: Does not bruise/bleed easily  Psychiatric/Behavioral: Negative for confusion and dysphoric mood  The patient is not nervous/anxious  Video Exam    There were no vitals filed for this visit  Physical Exam   It was my intent to perform this visit via video technology but the patient was not able to do a video connection so the visit was completed via audio telephone only  I spent 15 minutes with patient today in which greater than 50% of the time was spent in counseling/coordination of care regarding acute issue      VIRTUAL VISIT [address] acknowledges that he has consented to an online visit or consultation  He understands that the online visit is based solely on information provided by him, and that, in the absence of a face-to-face physical evaluation by the physician, the diagnosis he receives is both limited and provisional in terms of accuracy and completeness  This is not intended to replace a full medical face-to-face evaluation by the physician  Margarita Arguello understands and accepts these terms

## 2020-09-29 NOTE — PATIENT INSTRUCTIONS
Problem List Items Addressed This Visit        Respiratory    Acute non-recurrent maxillary sinusitis - Primary     Will treat with azithromycin since patient has allergy to penicillins, follow up if not improved  Discussed COVID testing if patient not improving  Pt not in any respiratory distress           Relevant Medications    azithromycin (ZITHROMAX) 250 mg tablet

## 2020-11-01 DIAGNOSIS — F41.9 ANXIETY: ICD-10-CM

## 2020-11-02 RX ORDER — ESCITALOPRAM OXALATE 10 MG/1
TABLET ORAL
Qty: 30 TABLET | Refills: 5 | Status: SHIPPED | OUTPATIENT
Start: 2020-11-02 | End: 2021-11-17

## 2020-11-29 DIAGNOSIS — I10 BENIGN ESSENTIAL HYPERTENSION: ICD-10-CM

## 2020-11-30 RX ORDER — TELMISARTAN 40 MG/1
TABLET ORAL
Qty: 90 TABLET | Refills: 3 | Status: SHIPPED | OUTPATIENT
Start: 2020-11-30 | End: 2021-08-09 | Stop reason: SDUPTHER

## 2020-12-03 ENCOUNTER — TELEPHONE (OUTPATIENT)
Dept: NEUROLOGY | Facility: CLINIC | Age: 85
End: 2020-12-03

## 2020-12-21 ENCOUNTER — TELEPHONE (OUTPATIENT)
Dept: INTERNAL MEDICINE CLINIC | Facility: CLINIC | Age: 85
End: 2020-12-21

## 2020-12-21 DIAGNOSIS — Z20.822 EXPOSURE TO COVID-19 VIRUS: ICD-10-CM

## 2020-12-21 DIAGNOSIS — Z20.822 EXPOSURE TO COVID-19 VIRUS: Primary | ICD-10-CM

## 2020-12-21 PROCEDURE — U0003 INFECTIOUS AGENT DETECTION BY NUCLEIC ACID (DNA OR RNA); SEVERE ACUTE RESPIRATORY SYNDROME CORONAVIRUS 2 (SARS-COV-2) (CORONAVIRUS DISEASE [COVID-19]), AMPLIFIED PROBE TECHNIQUE, MAKING USE OF HIGH THROUGHPUT TECHNOLOGIES AS DESCRIBED BY CMS-2020-01-R: HCPCS | Performed by: INTERNAL MEDICINE

## 2020-12-22 LAB — SARS-COV-2 RNA SPEC QL NAA+PROBE: NOT DETECTED

## 2021-01-27 ENCOUNTER — IMMUNIZATIONS (OUTPATIENT)
Dept: FAMILY MEDICINE CLINIC | Facility: HOSPITAL | Age: 86
End: 2021-01-27

## 2021-01-27 DIAGNOSIS — Z23 ENCOUNTER FOR IMMUNIZATION: Primary | ICD-10-CM

## 2021-01-27 PROCEDURE — 0001A SARS-COV-2 / COVID-19 MRNA VACCINE (PFIZER-BIONTECH) 30 MCG: CPT

## 2021-01-27 PROCEDURE — 91300 SARS-COV-2 / COVID-19 MRNA VACCINE (PFIZER-BIONTECH) 30 MCG: CPT

## 2021-02-17 ENCOUNTER — IMMUNIZATIONS (OUTPATIENT)
Dept: FAMILY MEDICINE CLINIC | Facility: HOSPITAL | Age: 86
End: 2021-02-17

## 2021-02-17 DIAGNOSIS — Z23 ENCOUNTER FOR IMMUNIZATION: Primary | ICD-10-CM

## 2021-02-17 PROCEDURE — 91300 SARS-COV-2 / COVID-19 MRNA VACCINE (PFIZER-BIONTECH) 30 MCG: CPT

## 2021-02-17 PROCEDURE — 0002A SARS-COV-2 / COVID-19 MRNA VACCINE (PFIZER-BIONTECH) 30 MCG: CPT

## 2021-04-13 ENCOUNTER — OFFICE VISIT (OUTPATIENT)
Dept: NEUROLOGY | Facility: CLINIC | Age: 86
End: 2021-04-13
Payer: MEDICARE

## 2021-04-13 VITALS
HEART RATE: 65 BPM | DIASTOLIC BLOOD PRESSURE: 70 MMHG | WEIGHT: 240.7 LBS | BODY MASS INDEX: 30.89 KG/M2 | HEIGHT: 74 IN | SYSTOLIC BLOOD PRESSURE: 118 MMHG

## 2021-04-13 DIAGNOSIS — G20 PARKINSON'S DISEASE (HCC): ICD-10-CM

## 2021-04-13 PROCEDURE — 99213 OFFICE O/P EST LOW 20 MIN: CPT | Performed by: PHYSICIAN ASSISTANT

## 2021-04-13 NOTE — ASSESSMENT & PLAN NOTE
Patient overall doing well  He does continue to have some left-sided tremor however otherwise his exam is stable  He is currently only taking Sinemet 1 tab in the morning  We did discuss what Parkinson's disease is and how the medications are used to help  We will have try to get more consistent with taking the Sinemet 3 times a day to see if there is any added  He was also encouraged to continue with his daily walks and exercise

## 2021-04-13 NOTE — PROGRESS NOTES
Patient ID: Paulina Markham is a 82 y o  male  Assessment/Plan:    Parkinson's disease ([facility] [address] )  Patient overall doing well  He does continue to have some left-sided tremor however otherwise his exam is stable  He is currently only taking Sinemet 1 tab in the morning  We did discuss what Parkinson's disease is and how the medications are used to help  We will have try to get more consistent with taking the Sinemet 3 times a day to see if there is any added  He was also encouraged to continue with his daily walks and exercise  Subjective:    Mr Renny Lilly is an 82 y o  right handed male who returns for Parkinson's disease  To review, symptoms began in mid 2018 with left hand tremor  No known family history of tremor or diagnosed PD  Started on propranolol 60mg qDay, then reduced to 20mg BID  No benefit to tremor and he felt more "uptight" on the medication  He was later started on Sinemet with improvement  At his last visit he was doing well and he was encouraged to try and get on a better schedule for taking his Sinemet  INTERVAL HISTORY:  Patient overall doing well  No concerns today  He will have some occasional tremors however these are minor and overall well controlled  He is not taking the medication tid but rather only first thing in the AM    He is walking well and denies any falls  He is able to perform all of his ADLs on his own  He likes to walk in the AM when the weather is warm  He sleeps well at night  He feels that his memory is still good, however not as good as it was  No hallucinations  Current medications:  Sinemet 25/100mg 1tab tid - he is only really taking this once a day      I personally reviewed and updated the ROS  Objective:    Blood pressure 118/70, pulse 65, height 6' 2" (1 88 m), weight 109 kg (240 lb 11 2 oz)  Physical Exam  Constitutional:       Appearance: Normal appearance     HENT:      Right Ear: Hearing normal       Left Ear: Hearing normal    Eyes:      General: Lids are normal       Extraocular Movements: Extraocular movements intact  Pupils: Pupils are equal, round, and reactive to light  Pulmonary:      Effort: Pulmonary effort is normal    Neurological:      Mental Status: He is alert  Deep Tendon Reflexes: Strength normal    Psychiatric:         Speech: Speech normal          Neurological Exam  Mental Status  Alert  Oriented to person, place and time  Speech is normal     Cranial Nerves  CN III, IV, VI: Extraocular movements intact bilaterally  Normal lids and orbits bilaterally  Pupils equal round and reactive to light bilaterally  CN V:  Right: Facial sensation is normal   Left: Facial sensation is normal on the left  CN VIII:  Right: Hearing is normal   Left: Hearing is normal   CN XI: Shoulder shrug strength is normal     Motor   Strength is 5/5 throughout all four extremities  Sensory  Light touch is normal in upper and lower extremities  Gait  Arose without using hands  Stooped posture  Slightly decreased stride on the left  No clear shuffling or freezing  Decreased arm swing on the left  Ochoa Brady     UPDRSIII                Time since last dose:   4/13/21 7/14/20   Speech   2 2 hoarse   Facial Expression    3   Postural Tremor (Right)  0 0   Postural Tremor (Left) 0 0   Kinetic Tremor (Right)  0 0   Kinetic Tremor (Left)  0 0   Rest tremor amplitude RUE 0 0   Rest tremor amplitude LUE 2 3   Rest tremor amplitude RLE 0 0   Rest tremor amplitude LLE 0 0   Lip/Jaw Tremor   0   Consistency of tremor 1 0   Finger Taps (Right)   1 1   Finger Taps (Left)  2 2   Hand Movement (Right)  0 0   Hand Movement (Left)   1 0   Pronation/Supination (Right)  1 2   Pronation/Supination (Left)   2 2   Toe Tapping (Right)  0 2   Toe Tapping (Left) 2 3   Leg Agility (Right)  0 1   Leg Agility (Left)   1 2   Rigidity - Neck    3   Rigidity - Upper Extremity (Right)  0 1   Rigidity - Upper Extremity (Left)   1 3   Rigidity - Lower Extremity (Right)  0 0   Rigidity - Lower Extremity (Left)   0 0   Arising from Chair   0 0    Gait   1 2   Freezing of Gait 0 1   Postural Stability   0 (two steps)   Posture 2 1   Global spontaneity of movement 1                ROS:    Review of Systems   Constitutional: Negative  Negative for appetite change and fever  HENT: Negative  Negative for hearing loss, tinnitus, trouble swallowing and voice change  Eyes: Negative  Negative for photophobia and pain  Respiratory: Negative  Negative for shortness of breath  Cardiovascular: Negative  Negative for palpitations  Gastrointestinal: Negative  Negative for nausea and vomiting  Endocrine: Negative  Negative for cold intolerance  Genitourinary: Negative  Negative for dysuria, frequency and urgency  Musculoskeletal: Negative  Negative for myalgias and neck pain  Skin: Negative  Negative for rash  Allergic/Immunologic: Negative  Neurological: Positive for tremors  Negative for dizziness, seizures, syncope, facial asymmetry, speech difficulty, weakness, light-headedness, numbness and headaches  Hematological: Negative  Does not bruise/bleed easily  Psychiatric/Behavioral: Negative  Negative for confusion, hallucinations and sleep disturbance  All other systems reviewed and are negative

## 2021-04-13 NOTE — PATIENT INSTRUCTIONS
Overall doing well  Discussed taking the Sinemet three times a day consistently  He was also encouraged to remain active

## 2021-05-24 ENCOUNTER — OFFICE VISIT (OUTPATIENT)
Dept: INTERNAL MEDICINE CLINIC | Facility: CLINIC | Age: 86
End: 2021-05-24
Payer: MEDICARE

## 2021-05-24 VITALS
HEIGHT: 74 IN | HEART RATE: 55 BPM | TEMPERATURE: 97.1 F | WEIGHT: 241 LBS | OXYGEN SATURATION: 99 % | DIASTOLIC BLOOD PRESSURE: 62 MMHG | BODY MASS INDEX: 30.93 KG/M2 | SYSTOLIC BLOOD PRESSURE: 132 MMHG

## 2021-05-24 DIAGNOSIS — I10 BENIGN ESSENTIAL HYPERTENSION: ICD-10-CM

## 2021-05-24 DIAGNOSIS — E78.00 PURE HYPERCHOLESTEROLEMIA: ICD-10-CM

## 2021-05-24 DIAGNOSIS — I48.19 PERSISTENT ATRIAL FIBRILLATION (HCC): ICD-10-CM

## 2021-05-24 DIAGNOSIS — G20 PARKINSON'S DISEASE (HCC): ICD-10-CM

## 2021-05-24 DIAGNOSIS — R42 LIGHTHEADEDNESS: Primary | ICD-10-CM

## 2021-05-24 PROCEDURE — 99214 OFFICE O/P EST MOD 30 MIN: CPT | Performed by: INTERNAL MEDICINE

## 2021-05-24 RX ORDER — RIVAROXABAN 15 MG/1
TABLET, FILM COATED ORAL
COMMUNITY
Start: 2021-05-10 | End: 2021-06-23 | Stop reason: SDUPTHER

## 2021-05-24 NOTE — PROGRESS NOTES
Assessment/Plan:    Lightheadedness  Patient's blood pressure is good, heart rate is good, oxygenation is good  Discussed with patient that if he were to take an extra dose of his medications, could cause some lightheadedness, but on review of his meds he is not on high doses of meds to start with  Discussed that with Parkinson's sometimes people can feel a little lightheaded  Discussed with patient that rate issues with AFib can sometimes cause some lightheadedness, and keep an eye on his heart rate, it is good today    Persistent atrial fibrillation (HCC)  Rate controlled, continue anticoagulation    Benign essential hypertension  Controlled, continue meds and monitor    Pure hypercholesterolemia  Continue with healthy diet    Parkinson's disease ([facility] )  Continue medication follow-up Neurology       Diagnoses and all orders for this visit:    Lightheadedness    Persistent atrial fibrillation ([facility] )    Benign essential hypertension    Pure hypercholesterolemia    Parkinson's disease ([facility] )    Other orders  -     Xarelto 15 MG tablet; take 1 tablet once daily REPLACING WARFARIN        BMI Counseling: Body mass index is 30 94 kg/m²  The BMI is above normal  Nutrition recommendations include encouraging healthy choices of fruits and vegetables and moderation in carbohydrate intake  Exercise recommendations include exercising 3-5 times per week  Subjective:      Patient ID: Jeannie Christianson is a 82 y o  male  Been feeling lightheaded today, no dizziness, he had a slight feeling like he was going to pass out  No palpitations, no nausea, no vomiting, no chest discomfort, no shortness of breath  Patient reports compliance with medications, but reports her is a possibility he may have taken extra accident at some point  No new medication, no new over-the-counter medications  Patient does not feel sick, no fevers, no chills, no nausea vomiting or diarrhea    Patient unsure if he has had increasing anxiety lately  No black tarry stool  Patient denies increasing caffeine  No changes in his gait  The following portions of the patient's history were reviewed and updated as appropriate: allergies, current medications, past family history, past medical history, past social history, past surgical history and problem list     Review of Systems   Constitutional: Negative for chills, fatigue and fever  HENT: Negative for congestion, nosebleeds, postnasal drip, sore throat and trouble swallowing  Eyes: Negative for pain  Respiratory: Negative for cough, chest tightness, shortness of breath and wheezing  Cardiovascular: Negative for chest pain, palpitations and leg swelling  Gastrointestinal: Negative for abdominal pain, constipation, diarrhea, nausea and vomiting  Endocrine: Negative for polydipsia and polyuria  Genitourinary: Negative for dysuria, flank pain and hematuria  Musculoskeletal: Negative for arthralgias  Skin: Negative for rash  Neurological: Positive for tremors and light-headedness  Negative for dizziness and headaches  Hematological: Does not bruise/bleed easily  Psychiatric/Behavioral: Negative for confusion and dysphoric mood  The patient is not nervous/anxious  Objective:      /62   Pulse 55   Temp (!) 97 1 °F (36 2 °C)   Ht 6' 2" (1 88 m)   Wt 109 kg (241 lb)   SpO2 99%   BMI 30 94 kg/m²          Physical Exam  Vitals signs reviewed  Constitutional:       General: He is not in acute distress  Appearance: Normal appearance  He is well-developed  HENT:      Head: Normocephalic and atraumatic  Right Ear: External ear normal       Left Ear: External ear normal    Eyes:      General: No scleral icterus  Conjunctiva/sclera: Conjunctivae normal    Neck:      Musculoskeletal: Normal range of motion and neck supple  Thyroid: No thyromegaly  Trachea: No tracheal deviation  Cardiovascular:      Rate and Rhythm: Normal rate   Rhythm irregular  Heart sounds: Normal heart sounds  No murmur  Pulmonary:      Effort: Pulmonary effort is normal  No respiratory distress  Breath sounds: Normal breath sounds  No wheezing or rales  Abdominal:      General: Bowel sounds are normal       Palpations: Abdomen is soft  Tenderness: There is no abdominal tenderness  There is no guarding or rebound  Musculoskeletal:      Right lower leg: No edema  Left lower leg: No edema  Lymphadenopathy:      Cervical: No cervical adenopathy  Neurological:      Mental Status: He is alert and oriented to person, place, and time  Comments: Resting tremor left hand   Psychiatric:         Mood and Affect: Mood normal          Behavior: Behavior normal          Thought Content:  Thought content normal          Judgment: Judgment normal

## 2021-05-24 NOTE — ASSESSMENT & PLAN NOTE
Patient's blood pressure is good, heart rate is good, oxygenation is good  Discussed with patient that if he were to take an extra dose of his medications, could cause some lightheadedness, but on review of his meds he is not on high doses of meds to start with  Discussed that with Parkinson's sometimes people can feel a little lightheaded    Discussed with patient that rate issues with AFib can sometimes cause some lightheadedness, and keep an eye on his heart rate, it is good today

## 2021-05-24 NOTE — PATIENT INSTRUCTIONS
Problem List Items Addressed This Visit        Cardiovascular and Mediastinum    Persistent atrial fibrillation ([address] )     Rate controlled, continue anticoagulation         Benign essential hypertension     Controlled, continue meds and monitor            Nervous and Auditory    Parkinson's disease ([facility] [address] )     Continue medication follow-up Neurology            Other    Pure hypercholesterolemia     Continue with healthy diet         Lightheadedness - Primary     Patient's blood pressure is good, heart rate is good, oxygenation is good  Discussed with patient that if he were to take an extra dose of his medications, could cause some lightheadedness, but on review of his meds he is not on high doses of meds to start with  Discussed that with Parkinson's sometimes people can feel a little lightheaded    Discussed with patient that rate issues with AFib can sometimes cause some lightheadedness, and keep an eye on his heart rate, it is good today

## 2021-06-02 ENCOUNTER — OFFICE VISIT (OUTPATIENT)
Dept: NEUROLOGY | Facility: CLINIC | Age: 86
End: 2021-06-02
Payer: MEDICARE

## 2021-06-02 ENCOUNTER — OFFICE VISIT (OUTPATIENT)
Dept: INTERNAL MEDICINE CLINIC | Facility: CLINIC | Age: 86
End: 2021-06-02
Payer: MEDICARE

## 2021-06-02 VITALS
HEART RATE: 60 BPM | SYSTOLIC BLOOD PRESSURE: 120 MMHG | HEIGHT: 74 IN | WEIGHT: 241.5 LBS | DIASTOLIC BLOOD PRESSURE: 76 MMHG | BODY MASS INDEX: 30.99 KG/M2

## 2021-06-02 VITALS
HEIGHT: 74 IN | HEART RATE: 50 BPM | SYSTOLIC BLOOD PRESSURE: 100 MMHG | BODY MASS INDEX: 31.06 KG/M2 | WEIGHT: 242 LBS | TEMPERATURE: 98 F | OXYGEN SATURATION: 98 % | DIASTOLIC BLOOD PRESSURE: 62 MMHG

## 2021-06-02 DIAGNOSIS — G20 PARKINSON'S DISEASE (HCC): Primary | ICD-10-CM

## 2021-06-02 DIAGNOSIS — H00.012 HORDEOLUM EXTERNUM OF RIGHT LOWER EYELID: Primary | ICD-10-CM

## 2021-06-02 PROCEDURE — 99213 OFFICE O/P EST LOW 20 MIN: CPT | Performed by: INTERNAL MEDICINE

## 2021-06-02 PROCEDURE — 99213 OFFICE O/P EST LOW 20 MIN: CPT | Performed by: PHYSICIAN ASSISTANT

## 2021-06-02 NOTE — ASSESSMENT & PLAN NOTE
Patient overall doing well  He has no complaints today  He states he is still taking his Sinemet in the morning and will occasionally remember in the evening as well  He really takes his midday dose  We once again had a long discussion in regards to the role of Sinemet in the need to take it multiple times a day to get the most benefit  He will start by trying to take it in the morning, in the afternoon around lunch and in the evening around dinner  He remains active and walks daily  He is however having some difficulty with going up stairs lately  Because of this we will make a referral for formal physical therapy  He would certainly benefit from the big therapy exercises

## 2021-06-02 NOTE — PROGRESS NOTES
Assessment/Plan:    Hordeolum externum of right lower eyelid   Patient can try a warm washcloth for this, and will refer to eye doctor       Diagnoses and all orders for this visit:    Hordeolum externum of right lower eyelid  -     Ambulatory referral to Ophthalmology; Future          Subjective:      Patient ID: Margarita Arguello is a 82 y o  male  Patient started with a lump on his right lower eyelid few days ago, is been red and tender and swollen  No history of this  No foreign body history      The following portions of the patient's history were reviewed and updated as appropriate: allergies, current medications, past family history, past medical history, past social history, past surgical history and problem list     Review of Systems   Eyes: Negative for pain, discharge and itching           Objective:      /62   Pulse (!) 50   Temp 98 °F (36 7 °C)   Ht 6' 2" (1 88 m)   Wt 110 kg (242 lb)   SpO2 98%   BMI 31 07 kg/m²          Physical Exam  Eyes:      Comments: Stye right medial lower lid at lash line

## 2021-06-02 NOTE — PROGRESS NOTES
Patient ID: Haylie Oleary is a 82 y o  male  Assessment/Plan:    Parkinson's disease ([facility] )  Patient overall doing well  He has no complaints today  He states he is still taking his Sinemet in the morning and will occasionally remember in the evening as well  He really takes his midday dose  We once again had a long discussion in regards to the role of Sinemet in the need to take it multiple times a day to get the most benefit  He will start by trying to take it in the morning, in the afternoon around lunch and in the evening around dinner  He remains active and walks daily  He is however having some difficulty with going up stairs lately  Because of this we will make a referral for formal physical therapy  He would certainly benefit from the big therapy exercises  Subjective:    Mr Bryanna Duron is an 82 y o  right handed male who returns for Parkinson's disease  To review, symptoms began in mid 2018 with left hand tremor  No known family history of tremor or diagnosed PD  Started on propranolol 60mg qDay, then reduced to 20mg BID  No benefit to tremor and he felt more "uptight" on the medication  He was later started on Sinemet with improvement  At his last visit he was doing well and he was encouraged to get more consistent with taking his Sinemet  INTERVAL HISTORY:  He has been having some issues with intermittent lightheadedness  No current issues with this  He feels that he is doing well  He is able to perform all of his ADLs on his own  He goes for walks daily  He does not notice tremors on a regular basis  He is still driving   He stays active with going out with his buddies   He does have a local gym that he is thinking about starting back up at     He finds it harder to go up stairs a times   Sleeping well   No hallucinations     Current medications:  Sinemet 25/100mg 1tab tid - he is only really taking this in the AM and sometimes in the evening       I personally reviewed and updated the ROS  Objective:    Blood pressure 120/76, pulse 60, height 6' 2" (1 88 m), weight 110 kg (241 lb 8 oz)  Physical Exam  Constitutional:       Appearance: Normal appearance  HENT:      Right Ear: Hearing normal       Left Ear: Hearing normal    Eyes:      General: Lids are normal       Extraocular Movements: Extraocular movements intact  Pupils: Pupils are equal, round, and reactive to light  Pulmonary:      Effort: Pulmonary effort is normal    Neurological:      Mental Status: He is alert  Deep Tendon Reflexes: Strength normal    Psychiatric:         Speech: Speech normal          Neurological Exam  Mental Status  Alert  Oriented to person, place and time  Speech is normal     Cranial Nerves  CN III, IV, VI: Extraocular movements intact bilaterally  Normal lids and orbits bilaterally  Pupils equal round and reactive to light bilaterally  CN V:  Right: Facial sensation is normal   Left: Facial sensation is normal on the left  CN VIII:  Right: Hearing is normal   Left: Hearing is normal   CN XI: Shoulder shrug strength is normal     Motor   Strength is 5/5 throughout all four extremities  Sensory  Light touch is normal in upper and lower extremities  Gait  Arose without using hands  Stooped posture  Slightly decreased stride on the left  No clear shuffling or freezing  Decreased arm swing on the left  Ronna Cary     UPDRSIII                Time since last dose:   4/13/21 6/2/21   Speech   2 2 hoarse   Facial Expression       Postural Tremor (Right)  0 0   Postural Tremor (Left) 0 0   Kinetic Tremor (Right)  0 0   Kinetic Tremor (Left)  0 0   Rest tremor amplitude RUE 0 0   Rest tremor amplitude LUE 2 2   Rest tremor amplitude RLE 0 0   Rest tremor amplitude LLE 0 0   Lip/Jaw Tremor       Consistency of tremor 1 1   Finger Taps (Right)   1 1   Finger Taps (Left)  2 2   Hand Movement (Right)  0 0   Hand Movement (Left)   1 1   Pronation/Supination (Right)  1 2 Pronation/Supination (Left)   2 2   Toe Tapping (Right)  0 1   Toe Tapping (Left) 2 2   Leg Agility (Right)  0 1   Leg Agility (Left)   1 2   Rigidity - Neck       Rigidity - Upper Extremity (Right)  0 1   Rigidity - Upper Extremity (Left)   1 2   Rigidity - Lower Extremity (Right)  0 0   Rigidity - Lower Extremity (Left)   0 0   Arising from Chair   0 0    Gait   1 1   Freezing of Gait 0 0   Postural Stability       Posture 2 1   Global spontaneity of movement 1  1         ROS:    Review of Systems   Constitutional: Negative  Negative for appetite change and fever  HENT: Negative  Negative for hearing loss, tinnitus, trouble swallowing and voice change  Eyes: Negative  Negative for photophobia and pain  Respiratory: Negative  Negative for shortness of breath  Cardiovascular: Negative  Negative for palpitations  Gastrointestinal: Negative  Negative for nausea and vomiting  Endocrine: Negative  Negative for cold intolerance  Genitourinary: Negative  Negative for dysuria, frequency and urgency  Musculoskeletal: Negative  Negative for myalgias and neck pain  Skin: Negative  Negative for rash  Allergic/Immunologic: Negative  Neurological: Negative  Negative for dizziness, tremors, seizures, syncope, facial asymmetry, speech difficulty, weakness, light-headedness, numbness and headaches  Hematological: Negative  Does not bruise/bleed easily  Psychiatric/Behavioral: Negative  Negative for confusion, hallucinations and sleep disturbance  All other systems reviewed and are negative

## 2021-06-02 NOTE — PATIENT INSTRUCTIONS
Problem List Items Addressed This Visit        Other    Hordeolum externum of right lower eyelid - Primary      Patient can try a warm washcloth for this, and will refer to eye doctor         Relevant Orders    Ambulatory referral to Ophthalmology

## 2021-06-02 NOTE — PATIENT INSTRUCTIONS
He is taking the Sinemet in the AM and then at times in the evening  We once again discussed trying to get more consistent with taking the Sinemet three times a day (in the AM, around lunch and around dinner)  Continue with exercises and activity   Will make referral for PT

## 2021-06-13 ENCOUNTER — HOSPITAL ENCOUNTER (INPATIENT)
Facility: HOSPITAL | Age: 86
LOS: 3 days | Discharge: HOME HEALTH | DRG: 605 | End: 2021-06-17
Attending: SURGERY | Admitting: EMERGENCY MEDICINE
Payer: MEDICARE

## 2021-06-13 ENCOUNTER — APPOINTMENT (OUTPATIENT)
Dept: RADIOLOGY | Facility: HOSPITAL | Age: 86
DRG: 605 | End: 2021-06-13
Payer: MEDICARE

## 2021-06-13 ENCOUNTER — APPOINTMENT (EMERGENCY)
Dept: RADIOLOGY | Facility: HOSPITAL | Age: 86
DRG: 605 | End: 2021-06-13
Payer: MEDICARE
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
## 2021-06-13 DIAGNOSIS — R00.1 BRADYCARDIA: Primary | ICD-10-CM

## 2021-06-13 PROBLEM — G20 PARKINSON DISEASE (HCC): Status: ACTIVE | Noted: 2021-06-13

## 2021-06-13 PROBLEM — I35.0 AORTIC STENOSIS: Status: ACTIVE | Noted: 2021-06-13

## 2021-06-13 PROBLEM — I48.91 ATRIAL FIBRILLATION (HCC): Status: ACTIVE | Noted: 2021-06-13

## 2021-06-13 PROBLEM — I10 HYPERTENSION: Status: ACTIVE | Noted: 2021-06-13

## 2021-06-13 PROBLEM — G20.A1 PARKINSON DISEASE: Status: ACTIVE | Noted: 2021-06-13

## 2021-06-13 PROBLEM — E78.5 HYPERLIPIDEMIA: Status: ACTIVE | Noted: 2021-06-13

## 2021-06-13 LAB
ABO GROUP BLD: NORMAL
ABO GROUP BLD: NORMAL
ANION GAP SERPL CALCULATED.3IONS-SCNC: 6 MMOL/L (ref 4–13)
APTT PPP: 30 SECONDS (ref 23–37)
BASOPHILS # BLD AUTO: 0.06 THOUSANDS/ΜL (ref 0–0.1)
BASOPHILS NFR BLD AUTO: 1 % (ref 0–1)
BLD GP AB SCN SERPL QL: NEGATIVE
BUN SERPL-MCNC: 12 MG/DL (ref 5–25)
CALCIUM SERPL-MCNC: 9.1 MG/DL (ref 8.3–10.1)
CHLORIDE SERPL-SCNC: 107 MMOL/L (ref 100–108)
CO2 SERPL-SCNC: 27 MMOL/L (ref 21–32)
CREAT SERPL-MCNC: 0.83 MG/DL (ref 0.6–1.3)
EOSINOPHIL # BLD AUTO: 0.22 THOUSAND/ΜL (ref 0–0.61)
EOSINOPHIL NFR BLD AUTO: 2 % (ref 0–6)
ERYTHROCYTE [DISTWIDTH] IN BLOOD BY AUTOMATED COUNT: 13.7 % (ref 11.6–15.1)
GFR SERPL CREATININE-BSD FRML MDRD: 65 ML/MIN/1.73SQ M
GLUCOSE SERPL-MCNC: 120 MG/DL (ref 65–140)
HCT VFR BLD AUTO: 37.2 % (ref 34.8–46.1)
HCT VFR BLD AUTO: 39.5 % (ref 34.8–46.1)
HGB BLD-MCNC: 12.2 G/DL (ref 11.5–15.4)
HGB BLD-MCNC: 12.8 G/DL (ref 11.5–15.4)
HOLD SPECIMEN: NORMAL
IMM GRANULOCYTES # BLD AUTO: 0.12 THOUSAND/UL (ref 0–0.2)
IMM GRANULOCYTES NFR BLD AUTO: 1 % (ref 0–2)
INR PPP: 1.18 (ref 0.84–1.19)
LYMPHOCYTES # BLD AUTO: 1.97 THOUSANDS/ΜL (ref 0.6–4.47)
LYMPHOCYTES NFR BLD AUTO: 19 % (ref 14–44)
MCH RBC QN AUTO: 29.7 PG (ref 26.8–34.3)
MCHC RBC AUTO-ENTMCNC: 32.4 G/DL (ref 31.4–37.4)
MCV RBC AUTO: 92 FL (ref 82–98)
MONOCYTES # BLD AUTO: 0.94 THOUSAND/ΜL (ref 0.17–1.22)
MONOCYTES NFR BLD AUTO: 9 % (ref 4–12)
NEUTROPHILS # BLD AUTO: 6.85 THOUSANDS/ΜL (ref 1.85–7.62)
NEUTS SEG NFR BLD AUTO: 68 % (ref 43–75)
NRBC BLD AUTO-RTO: 0 /100 WBCS
PLATELET # BLD AUTO: 318 THOUSANDS/UL (ref 149–390)
PMV BLD AUTO: 9.2 FL (ref 8.9–12.7)
POTASSIUM SERPL-SCNC: 4.3 MMOL/L (ref 3.5–5.3)
PROTHROMBIN TIME: 15 SECONDS (ref 11.6–14.5)
RBC # BLD AUTO: 4.31 MILLION/UL (ref 3.81–5.12)
RH BLD: POSITIVE
RH BLD: POSITIVE
SODIUM SERPL-SCNC: 140 MMOL/L (ref 136–145)
SPECIMEN EXPIRATION DATE: NORMAL
TROPONIN I SERPL-MCNC: <0.02 NG/ML
WBC # BLD AUTO: 10.16 THOUSAND/UL (ref 4.31–10.16)

## 2021-06-13 PROCEDURE — NC001 PR NO CHARGE: Performed by: EMERGENCY MEDICINE

## 2021-06-13 PROCEDURE — 80048 BASIC METABOLIC PNL TOTAL CA: CPT | Performed by: SURGERY

## 2021-06-13 PROCEDURE — 74177 CT ABD & PELVIS W/CONTRAST: CPT

## 2021-06-13 PROCEDURE — 36415 COLL VENOUS BLD VENIPUNCTURE: CPT | Performed by: SURGERY

## 2021-06-13 PROCEDURE — G1004 CDSM NDSC: HCPCS

## 2021-06-13 PROCEDURE — 84484 ASSAY OF TROPONIN QUANT: CPT | Performed by: ORTHOPAEDIC SURGERY

## 2021-06-13 PROCEDURE — 85018 HEMOGLOBIN: CPT | Performed by: ORTHOPAEDIC SURGERY

## 2021-06-13 PROCEDURE — 71260 CT THORAX DX C+: CPT

## 2021-06-13 PROCEDURE — 82803 BLOOD GASES ANY COMBINATION: CPT

## 2021-06-13 PROCEDURE — 70450 CT HEAD/BRAIN W/O DYE: CPT

## 2021-06-13 PROCEDURE — 99285 EMERGENCY DEPT VISIT HI MDM: CPT

## 2021-06-13 PROCEDURE — 99220 PR INITIAL OBSERVATION CARE/DAY 70 MINUTES: CPT | Performed by: INTERNAL MEDICINE

## 2021-06-13 PROCEDURE — 85014 HEMATOCRIT: CPT

## 2021-06-13 PROCEDURE — 82947 ASSAY GLUCOSE BLOOD QUANT: CPT

## 2021-06-13 PROCEDURE — 85730 THROMBOPLASTIN TIME PARTIAL: CPT | Performed by: SURGERY

## 2021-06-13 PROCEDURE — 86850 RBC ANTIBODY SCREEN: CPT | Performed by: INTERNAL MEDICINE

## 2021-06-13 PROCEDURE — 93005 ELECTROCARDIOGRAM TRACING: CPT

## 2021-06-13 PROCEDURE — 85610 PROTHROMBIN TIME: CPT | Performed by: SURGERY

## 2021-06-13 PROCEDURE — 84132 ASSAY OF SERUM POTASSIUM: CPT

## 2021-06-13 PROCEDURE — 86901 BLOOD TYPING SEROLOGIC RH(D): CPT | Performed by: INTERNAL MEDICINE

## 2021-06-13 PROCEDURE — 1124F ACP DISCUSS-NO DSCNMKR DOCD: CPT | Performed by: EMERGENCY MEDICINE

## 2021-06-13 PROCEDURE — 85025 COMPLETE CBC W/AUTO DIFF WBC: CPT | Performed by: SURGERY

## 2021-06-13 PROCEDURE — 99222 1ST HOSP IP/OBS MODERATE 55: CPT | Performed by: SURGERY

## 2021-06-13 PROCEDURE — 86900 BLOOD TYPING SEROLOGIC ABO: CPT | Performed by: INTERNAL MEDICINE

## 2021-06-13 PROCEDURE — 85014 HEMATOCRIT: CPT | Performed by: ORTHOPAEDIC SURGERY

## 2021-06-13 PROCEDURE — 72125 CT NECK SPINE W/O DYE: CPT

## 2021-06-13 PROCEDURE — 84295 ASSAY OF SERUM SODIUM: CPT

## 2021-06-13 RX ORDER — ACETAMINOPHEN 325 MG/1
650 TABLET ORAL EVERY 6 HOURS PRN
Status: DISCONTINUED | OUTPATIENT
Start: 2021-06-13 | End: 2021-06-17 | Stop reason: HOSPADM

## 2021-06-13 RX ORDER — OXYCODONE HYDROCHLORIDE 5 MG/1
2.5 TABLET ORAL EVERY 4 HOURS PRN
Status: DISCONTINUED | OUTPATIENT
Start: 2021-06-13 | End: 2021-06-17 | Stop reason: HOSPADM

## 2021-06-13 RX ORDER — LORATADINE 10 MG/1
10 TABLET ORAL DAILY
COMMUNITY
End: 2022-03-13 | Stop reason: SDUPTHER

## 2021-06-13 RX ORDER — OXYCODONE HYDROCHLORIDE 5 MG/1
5 TABLET ORAL EVERY 4 HOURS PRN
Status: DISCONTINUED | OUTPATIENT
Start: 2021-06-13 | End: 2021-06-17 | Stop reason: HOSPADM

## 2021-06-13 RX ORDER — BACITRACIN, NEOMYCIN, POLYMYXIN B 400; 3.5; 5 [USP'U]/G; MG/G; [USP'U]/G
1 OINTMENT TOPICAL 2 TIMES DAILY
Status: DISCONTINUED | OUTPATIENT
Start: 2021-06-13 | End: 2021-06-17 | Stop reason: HOSPADM

## 2021-06-13 RX ORDER — SODIUM CHLORIDE, SODIUM GLUCONATE, SODIUM ACETATE, POTASSIUM CHLORIDE, MAGNESIUM CHLORIDE, SODIUM PHOSPHATE, DIBASIC, AND POTASSIUM PHOSPHATE .53; .5; .37; .037; .03; .012; .00082 G/100ML; G/100ML; G/100ML; G/100ML; G/100ML; G/100ML; G/100ML
75 INJECTION, SOLUTION INTRAVENOUS CONTINUOUS
Status: DISCONTINUED | OUTPATIENT
Start: 2021-06-13 | End: 2021-06-15

## 2021-06-13 RX ORDER — LORAZEPAM 2 MG/ML
0.25 INJECTION INTRAMUSCULAR ONCE
Status: COMPLETED | OUTPATIENT
Start: 2021-06-13 | End: 2021-06-13

## 2021-06-13 RX ORDER — ALPRAZOLAM 0.25 MG/1
TABLET ORAL 2 TIMES DAILY PRN
COMMUNITY
End: 2021-09-16 | Stop reason: ALTCHOICE

## 2021-06-13 RX ORDER — ISOSORBIDE MONONITRATE 30 MG/1
30 TABLET, EXTENDED RELEASE ORAL DAILY
COMMUNITY
End: 2021-09-16 | Stop reason: SDUPTHER

## 2021-06-13 RX ORDER — PROPRANOLOL HYDROCHLORIDE 20 MG/1
20 TABLET ORAL EVERY 12 HOURS SCHEDULED
COMMUNITY
End: 2021-06-23 | Stop reason: SDUPTHER

## 2021-06-13 RX ORDER — TELMISARTAN 40 MG/1
40 TABLET ORAL DAILY
COMMUNITY
End: 2021-06-23 | Stop reason: SDUPTHER

## 2021-06-13 RX ADMIN — SODIUM CHLORIDE, SODIUM GLUCONATE, SODIUM ACETATE, POTASSIUM CHLORIDE, MAGNESIUM CHLORIDE, SODIUM PHOSPHATE, DIBASIC, AND POTASSIUM PHOSPHATE 75 ML/HR: .53; .5; .37; .037; .03; .012; .00082 INJECTION, SOLUTION INTRAVENOUS at 15:56

## 2021-06-13 RX ADMIN — CARBIDOPA AND LEVODOPA 1 TABLET: 25; 100 TABLET ORAL at 16:52

## 2021-06-13 RX ADMIN — LORAZEPAM 0.25 MG: 2 INJECTION INTRAMUSCULAR; INTRAVENOUS at 16:21

## 2021-06-13 RX ADMIN — IOHEXOL 100 ML: 350 INJECTION, SOLUTION INTRAVENOUS at 22:50

## 2021-06-13 RX ADMIN — ACETAMINOPHEN 650 MG: 325 TABLET, FILM COATED ORAL at 20:08

## 2021-06-13 RX ADMIN — CARBIDOPA AND LEVODOPA 1 TABLET: 25; 100 TABLET ORAL at 21:55

## 2021-06-13 RX ADMIN — BACITRACIN, NEOMYCIN, POLYMYXIN B 1 SMALL APPLICATION: 400; 3.5; 5 OINTMENT TOPICAL at 15:56

## 2021-06-13 RX ADMIN — DICLOFENAC SODIUM 2 G: 10 GEL TOPICAL at 21:00

## 2021-06-13 RX ADMIN — DICLOFENAC SODIUM 2 G: 10 GEL TOPICAL at 21:55

## 2021-06-13 NOTE — ASSESSMENT & PLAN NOTE
· afib with slow ventricular rate on admission, denies any lightheadedness or dizziness  · Patient states he was arranging his pill organizer today and may have double dosed himself with propranolol today  · Monitor on telemetry off propanolol at this time  · Consider cardiology evaluation if he remains persistently bradycardic

## 2021-06-13 NOTE — ASSESSMENT & PLAN NOTE
· Per cardiology records in [address] [place] [address] Rd  · No imaging on file  · Consider checking echocardiogram if he becomes symptomatic after heart rate is normalized

## 2021-06-13 NOTE — ASSESSMENT & PLAN NOTE
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

## 2021-06-13 NOTE — H&P
H&P Exam - Trauma   Kate Marin 82 y o  female MRN: [id number]  Unit/Bed#: ED 18 Encounter: [id number]    Assessment/Plan   Trauma Alert: Level B  Model of Arrival: Ambulance  Trauma Team: Attending Daryle Moon and Residents Natalie Peña   Consultants: None    Trauma Active Problems:   Fall on Xarelto   +Head strike, CTH negative for acute injury  Left shoulder pain, xrays negative for acute injury  History of Afib with new onset bradycardia and EKG changes      Trauma Plan:    Medicine consult for admission and syncope/cardiac workup   Troponin   Pain control   Geriatrics consult   PT/OT when able       Chief Complaint: Left shoulder pain     History of Present Illness   HPI:  Kate Marin is a 82 y o  female who presents as a level B trauma alert status post fall  Past medical history significant for AFib on Xarelto and two previous left shoulder surgeries for [facility] joint separation  Patient reports that he had a misstep while walking and fell to the ground hitting the left side of his head  No loss of consciousness  Also complaining of mild left shoulder pain  Denies pain elsewhere  Superficial abrasion over dorsum of left hand, left wrist, left elbow, and left occiput  No headaches, lightheadedness, changes in vision, chest pain, palpitations, shortness of breath  Mechanism:Fall    Review of Systems   Constitutional: Negative  Negative for chills, diaphoresis and fever  HENT: Negative  Eyes: Negative for photophobia and visual disturbance  Respiratory: Negative  Negative for chest tightness and shortness of breath  Cardiovascular: Negative for chest pain and palpitations  Gastrointestinal: Negative for abdominal pain  Musculoskeletal: Positive for arthralgias  Negative for back pain, joint swelling, neck pain and neck stiffness  Neurological: Negative for dizziness, syncope, light-headedness and headaches         12-point, complete review of systems was reviewed and negative except as stated above        Historical Information   Efforts to obtain history included the following sources: obtained from other records    Past Medical History:   Diagnosis Date    Afib ([address] )     Left AC separation      Past Surgical History:   Procedure Laterality Date    SHOULDER SURGERY       Social History   Social History     Substance and Sexual Activity   Alcohol Use Not Currently     Social History     Substance and Sexual Activity   Drug Use Never     Social History     Tobacco Use   Smoking Status Never Smoker   Smokeless Tobacco Never Used     E-Cigarette/Vaping    E-Cigarette Use Never User      E-Cigarette/Vaping Substances       There is no immunization history on file for this patient  Last Tetanus: Unknown   Family History: Non-contributory      Meds/Allergies   all current active meds have been reviewed    Not on File      PHYSICAL EXAM    Objective   Vitals:   First set: Temperature: 97 5 °F (36 4 °C) (06/13/21 1330)  Pulse: 64 (06/13/21 1330)  Respirations: 16 (06/13/21 1330)  Blood Pressure: 122/67 (06/13/21 1330)    Primary Survey:   (A) Airway: Intact   (B) Breathing: Clear bilaterally  (C) Circulation: Pulses:   normal  (D) Disabliity:  GCS Total:  15  (E) Expose:  Completed    Secondary Survey: (Click on Physical Exam tab above)  Physical Exam  Vitals reviewed  HENT:      Head:      Comments: Superficial abrasion over left occiput      Right Ear: External ear normal       Left Ear: External ear normal       Nose: Nose normal       Mouth/Throat:      Mouth: Mucous membranes are moist       Pharynx: Oropharynx is clear  Eyes:      Pupils: Pupils are equal, round, and reactive to light  Cardiovascular:      Rate and Rhythm: Bradycardia present  Rhythm irregular  Pulses: Normal pulses  Heart sounds: Normal heart sounds  Pulmonary:      Effort: Pulmonary effort is normal  No respiratory distress  Breath sounds: Normal breath sounds  Abdominal:      General: Abdomen is flat   There is no distension  Palpations: Abdomen is soft  Tenderness: There is no abdominal tenderness  Musculoskeletal:         General: Tenderness present  No swelling or deformity  Normal range of motion  Cervical back: Normal range of motion and neck supple  No tenderness  Comments: TTP left shoulder, AROM intact, motor and sensory intact, fingers WWP   No TTP over left elbow, wrist or hand    Skin:     General: Skin is warm  Capillary Refill: Capillary refill takes less than 2 seconds  Comments: Superficial abrasion over left dorsal hand, volar wrist and elbow   Neurological:      General: No focal deficit present  Mental Status: She is oriented to person, place, and time  Invasive Devices     Peripheral Intravenous Line            Peripheral IV 06/13/21 Right Antecubital <1 day                Lab Results: Results: I have personally reviewed pertinent reports      Imaging/EKG Studies: Results: I have personally reviewed pertinent films in PACS, FAST: neg, EKG: Afib with bradycardia and new onset heartblock   Other Studies: n/a    Code Status: No Order  Advance Directive and Living Will:      Power of :    POLST:

## 2021-06-13 NOTE — ED NOTES
Patient's wife states he is shaking   Sinemet requested from pharmacy stat     Nuno Adame, LAURIE  06/13/21 0316

## 2021-06-13 NOTE — ASSESSMENT & PLAN NOTE
· bp is acceptable at this time  · Continue micardis or formulary equivalent  · Holding propranolol due to bradycardia

## 2021-06-13 NOTE — PROGRESS NOTES
Pastoral Care Progress Note    2021  Patient: Cj Weathers : 1935  Admission Date & Time: 2021 1329  MRN: [id number] [facility]: [id number]         21 1300   Clinical Encounter Type   Crisis Visit Trauma  (Fall on steps at home)        spoke with patient's wife Jackelyn Valverde ([phone])  Wife was present at the time of the fall    Mireille is waiting for update from doctor before coming in to hospital

## 2021-06-13 NOTE — ED PROVIDER NOTES
Emergency Department Airway Evaluation and Management Form    History  Obtained from: EMS      Chief complaint  Fall, head trauma, on "thinners"    HPI  Fall, head injury, abrasions    No past medical history on file  No past surgical history on file  No family history on file  Social History   Substance Use Topics    Smoking status: Not on file    Smokeless tobacco: Not on file    Alcohol use Not on file     I have reviewed and agree with the history as documented  Review of Systems  No complaints      Physical Exam  There were no vitals taken for this visit  Physical Exam  AAOX3, GCS 15, airway patent, ls cta bilat, pulses intact        ED Medications  Medications - No data to display    Intubation  no    Notes      CritCare Time      ED Provider  Electronically Signed by       Andrew Moore DO  06/13/21 3345

## 2021-06-13 NOTE — H&P
[facility]  H&P- Claude Flower 1935, 82 y o  female MRN: [id number]  Unit/Bed#: ED 18 Encounter: [id number]  Primary Care Provider: Grazyna Martínez MD   Date and time admitted to hospital: 6/13/2021  1:29 PM    * [address]  · Reports that he tripped on the stairs in his basement  · Denies any lightheadedness, dizziness, palpitations or LOC  · Multiple abrasions but no major traumatic injuries per trauma service  · Trauma service to clear c spine  · Local wound care to abrasions  · PT/OT evaluations    Bradycardia  Assessment & Plan  · afib with slow ventricular rate on admission, denies any lightheadedness or dizziness  · Patient states he was arranging his pill organizer today and may have double dosed himself with propranolol today  · Monitor on telemetry off propanolol at this time  · Consider cardiology evaluation if he remains persistently bradycardic  Atrial fibrillation (HCC)  Assessment & Plan  · Currently bradycardic, will hold propranolol  · 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  · xarelto for anticoagulation    Aortic stenosis  Assessment & Plan  · Per cardiology records in Epic  · No imaging on file  · Consider checking echocardiogram if he becomes symptomatic after heart rate is normalized  Parkinson disease ([facility] [address] )  Assessment & Plan  · Continue sinemet TID    Hypertension  Assessment & Plan  · bp is acceptable at this time  · Continue micardis or formulary equivalent  · Holding propranolol due to bradycardia      VTE Pharmacologic Prophylaxis:   High Risk (Score >/= 5) - Pharmacological DVT Prophylaxis Contraindicated  Sequential Compression Devices Ordered  Code Status: No Order full  Discussion with family: Updated  (christina) at bedside  Anticipated Length of Stay: Patient will be admitted on an observation basis with an anticipated length of stay of less than 2 midnights secondary to bradycardia      Total Time for Visit, including Counseling / Coordination of Care: 45 minutes Greater than 50% of this total time spent on direct patient counseling and coordination of care  Chief Complaint: fall    History of Present Illness: Claude Flower is a 82 y o  female with a PMH of atrial fibrillation and parkinsons who presents with fall  Patient states that he tripped while walking up stairs in his basement  He denies any prodrome prior to falling  He present to the ED and was evaluated by the trauma service  He was noted to be taking anticoagulants  Initial trauma survey is negative for major traumatic injuries  He does have a few abrasions and has significant left shoulder pain  He was noted to be bradycardic by the trauma team and was referred for admission to internal medicine service  Upon history gathering he states that he may have double dosed his propranolol today as he was organizing his weekly pill organizer  Denies any lightheadedness, dizziness, palpitations, loss of consciousness  Review of Systems:  Review of Systems   All other systems reviewed and are negative  Past Medical and Surgical History:   Past Medical History:   Diagnosis Date    Afib ([address] )     Left AC separation        Past Surgical History:   Procedure Laterality Date    SHOULDER SURGERY         Meds/Allergies:  Prior to Admission medications    Medication Sig Start Date End Date Taking?  Authorizing Provider   ALPPEARLZoemily Rodriguez) 0 25 mg tablet Take by mouth 2 (two) times a day as needed for anxiety   Yes Historical Provider, MD   carbidopa-levodopa (SINEMET)  mg per tablet Take 1 tablet by mouth 3 (three) times a day   Yes Historical Provider, MD   isosorbide mononitrate (IMDUR) 30 mg 24 hr tablet Take 30 mg by mouth daily   Yes Historical Provider, MD   loratadine (CLARITIN) 10 mg tablet Take 10 mg by mouth daily   Yes Historical Provider, MD   propranolol (INDERAL) 20 mg tablet Take 20 mg by mouth every 12 (twelve) hours Yes Historical Provider, MD   rivaroxaban (Xarelto) 15 mg tablet Take 15 mg by mouth   Yes Historical Provider, MD   telmisartan (MICARDIS) 40 mg tablet Take 40 mg by mouth daily   Yes Historical Provider, MD     I have reviewed home medications using recent Epic encounter  Allergies: Not on File    Social History:  Marital Status: /Civil Union   Occupation: retired  Patient Pre-hospital Living Situation: Home  Patient Pre-hospital Level of Mobility: walks  Patient Pre-hospital Diet Restrictions: none  Substance Use History:   Social History     Substance and Sexual Activity   Alcohol Use Not Currently     Social History     Tobacco Use   Smoking Status Never Smoker   Smokeless Tobacco Never Used     Social History     Substance and Sexual Activity   Drug Use Never       Family History:  Family History   Family history unknown: Yes       Physical Exam:     Vitals:   Blood Pressure: (!) 154/113 (06/13/21 1400)  Pulse: (!) 47 (06/13/21 1400)  Temperature: 97 5 °F (36 4 °C) (06/13/21 1330)  Respirations: 18 (06/13/21 1400)  Weight - Scale: 112 kg (247 lb 5 7 oz) (06/13/21 1335)  SpO2: 98 % (06/13/21 1345)    Physical Exam  Constitutional:       Appearance: Normal appearance  HENT:      Head: Normocephalic  Comments: Left posterior scalp laceration     Nose: Nose normal       Mouth/Throat:      Mouth: Mucous membranes are moist       Pharynx: Oropharynx is clear  Eyes:      Extraocular Movements: Extraocular movements intact  Neck:      Comments: Cervical collar in place  Cardiovascular:      Rate and Rhythm: Bradycardia present  Rhythm irregular  Pulmonary:      Effort: Pulmonary effort is normal       Breath sounds: No wheezing or rales  Abdominal:      General: There is no distension  Palpations: Abdomen is soft  Tenderness: There is no abdominal tenderness  Musculoskeletal:      Right lower leg: No edema  Left lower leg: No edema        Comments: Left shoulder pain to palpation   Skin:     General: Skin is warm and dry  Comments: Few small abrasions noted on the left foot   Neurological:      Mental Status: She is alert and oriented to person, place, and time  Mental status is at baseline  Psychiatric:         Mood and Affect: Mood normal          Behavior: Behavior normal           Additional Data:     Lab Results:  Results from last 7 days   Lab Units 06/13/21  1335   WBC Thousand/uL 10 16   HEMOGLOBIN g/dL 12 8   HEMATOCRIT % 39 5   PLATELETS Thousands/uL 318   NEUTROS PCT % 68   LYMPHS PCT % 19   MONOS PCT % 9   EOS PCT % 2     Results from last 7 days   Lab Units 06/13/21  1335   SODIUM mmol/L 140   POTASSIUM mmol/L 4 3   CHLORIDE mmol/L 107   CO2 mmol/L 27   BUN mg/dL 12   CREATININE mg/dL 0 83   ANION GAP mmol/L 6   CALCIUM mg/dL 9 1   GLUCOSE RANDOM mg/dL 120     Results from last 7 days   Lab Units 06/13/21  1335   INR  1 18                   Imaging: Reviewed radiology reports from this admission including: chest xray and xray(s) and Personally reviewed the following imaging: chest xray and xray(s)  CT head without contrast   Final Result by Sunita Bliss MD (06/13 6269)      No acute intracranial abnormality  Microangiopathic changes  I personally discussed this study with Juan Carlos Nielsen on 6/13/2021 at 2:07 PM                            Workstation performed: [id number]         CT spine cervical without contrast   Final Result by Sunita Bliss MD (06/13 1444)      No cervical spine fracture or traumatic malalignment  I personally discussed this study with Juan Carlos Nielsen on 6/13/2021 at 2:07 PM                    Workstation performed: [id number]         XR trauma multiple   Final Result by Sanju Merchant MD (06/13 4615)      No acute cardiopulmonary disease within limitations of supine imaging  Probable displaced fracture of the midshaft of the left clavicle    Additional clavicle deformity suggests that there is probably prior injury also  Evaluation is not optimal due to patient condition  The study was marked in [facility] for immediate notification  Workstation performed: [id number]         XR chest 1 view    (Results Pending)   XR shoulder 2+ vw left    (Results Pending)   XR clavicle left    (Results Pending)       EKG and Other Studies Reviewed on Admission:   · EKG: Atrial fibrillation  HR 51     ** Please Note: This note has been constructed using a voice recognition system   **

## 2021-06-13 NOTE — ASSESSMENT & PLAN NOTE
· Reports that he tripped on the stairs in his basement    · Denies any lightheadedness, dizziness, palpitations or LOC  · Multiple abrasions but no major traumatic injuries per trauma service  · Trauma service to clear c spine  · Local wound care to abrasions  · PT/OT evaluations

## 2021-06-14 PROBLEM — S30.1XXA ABDOMINAL WALL HEMATOMA: Status: ACTIVE | Noted: 2021-06-14

## 2021-06-14 PROBLEM — D62 ACUTE BLOOD LOSS ANEMIA: Status: ACTIVE | Noted: 2021-06-14

## 2021-06-14 LAB
ANION GAP SERPL CALCULATED.3IONS-SCNC: 7 MMOL/L (ref 4–13)
ATRIAL RATE: 138 BPM
ATRIAL RATE: 357 BPM
ATRIAL RATE: 375 BPM
BASOPHILS # BLD AUTO: 0.05 THOUSANDS/ΜL (ref 0–0.1)
BASOPHILS NFR BLD AUTO: 0 % (ref 0–1)
BUN SERPL-MCNC: 13 MG/DL (ref 5–25)
CALCIUM SERPL-MCNC: 8.8 MG/DL (ref 8.3–10.1)
CHLORIDE SERPL-SCNC: 106 MMOL/L (ref 100–108)
CO2 SERPL-SCNC: 26 MMOL/L (ref 21–32)
CREAT SERPL-MCNC: 0.88 MG/DL (ref 0.6–1.3)
EOSINOPHIL # BLD AUTO: 0.08 THOUSAND/ΜL (ref 0–0.61)
EOSINOPHIL NFR BLD AUTO: 1 % (ref 0–6)
ERYTHROCYTE [DISTWIDTH] IN BLOOD BY AUTOMATED COUNT: 13.6 % (ref 11.6–15.1)
GFR SERPL CREATININE-BSD FRML MDRD: 79 ML/MIN/1.73SQ M
GLUCOSE P FAST SERPL-MCNC: 126 MG/DL (ref 65–99)
GLUCOSE SERPL-MCNC: 126 MG/DL (ref 65–140)
HCT VFR BLD AUTO: 35.2 % (ref 36.5–49.3)
HCT VFR BLD AUTO: 36.6 % (ref 36.5–49.3)
HCT VFR BLD AUTO: 36.8 % (ref 36.5–49.3)
HCT VFR BLD AUTO: 37.2 % (ref 34.8–46.1)
HGB BLD-MCNC: 11.8 G/DL (ref 12–17)
HGB BLD-MCNC: 11.8 G/DL (ref 12–17)
HGB BLD-MCNC: 12.2 G/DL (ref 12–17)
HGB BLD-MCNC: 12.3 G/DL (ref 11.5–15.4)
IMM GRANULOCYTES # BLD AUTO: 0.06 THOUSAND/UL (ref 0–0.2)
IMM GRANULOCYTES NFR BLD AUTO: 1 % (ref 0–2)
LYMPHOCYTES # BLD AUTO: 1.75 THOUSANDS/ΜL (ref 0.6–4.47)
LYMPHOCYTES NFR BLD AUTO: 14 % (ref 14–44)
MAGNESIUM SERPL-MCNC: 2.2 MG/DL (ref 1.6–2.6)
MCH RBC QN AUTO: 29.5 PG (ref 26.8–34.3)
MCHC RBC AUTO-ENTMCNC: 32.2 G/DL (ref 31.4–37.4)
MCV RBC AUTO: 92 FL (ref 82–98)
MONOCYTES # BLD AUTO: 1.06 THOUSAND/ΜL (ref 0.17–1.22)
MONOCYTES NFR BLD AUTO: 9 % (ref 4–12)
NEUTROPHILS # BLD AUTO: 9.33 THOUSANDS/ΜL (ref 1.85–7.62)
NEUTS SEG NFR BLD AUTO: 75 % (ref 43–75)
NRBC BLD AUTO-RTO: 0 /100 WBCS
PHOSPHATE SERPL-MCNC: 3.1 MG/DL (ref 2.3–4.1)
PLATELET # BLD AUTO: 288 THOUSANDS/UL (ref 149–390)
PMV BLD AUTO: 9.5 FL (ref 8.9–12.7)
POTASSIUM SERPL-SCNC: 3.8 MMOL/L (ref 3.5–5.3)
QRS AXIS: 48 DEGREES
QRS AXIS: 51 DEGREES
QRS AXIS: 64 DEGREES
QRSD INTERVAL: 90 MS
QRSD INTERVAL: 94 MS
QRSD INTERVAL: 94 MS
QT INTERVAL: 378 MS
QT INTERVAL: 410 MS
QT INTERVAL: 446 MS
QTC INTERVAL: 411 MS
QTC INTERVAL: 425 MS
QTC INTERVAL: 439 MS
RBC # BLD AUTO: 4 MILLION/UL (ref 3.88–5.62)
SODIUM SERPL-SCNC: 139 MMOL/L (ref 136–145)
T WAVE AXIS: 15 DEGREES
T WAVE AXIS: 25 DEGREES
T WAVE AXIS: 26 DEGREES
VENTRICULAR RATE: 51 BPM
VENTRICULAR RATE: 69 BPM
VENTRICULAR RATE: 76 BPM
WBC # BLD AUTO: 12.33 THOUSAND/UL (ref 4.31–10.16)

## 2021-06-14 PROCEDURE — 85014 HEMATOCRIT: CPT | Performed by: PHYSICIAN ASSISTANT

## 2021-06-14 PROCEDURE — C9132 KCENTRA, PER I.U.: HCPCS | Performed by: ORTHOPAEDIC SURGERY

## 2021-06-14 PROCEDURE — 85025 COMPLETE CBC W/AUTO DIFF WBC: CPT | Performed by: INTERNAL MEDICINE

## 2021-06-14 PROCEDURE — 85018 HEMOGLOBIN: CPT | Performed by: PHYSICIAN ASSISTANT

## 2021-06-14 PROCEDURE — 99232 SBSQ HOSP IP/OBS MODERATE 35: CPT | Performed by: INTERNAL MEDICINE

## 2021-06-14 PROCEDURE — 93010 ELECTROCARDIOGRAM REPORT: CPT | Performed by: INTERNAL MEDICINE

## 2021-06-14 PROCEDURE — 36415 COLL VENOUS BLD VENIPUNCTURE: CPT | Performed by: PHYSICIAN ASSISTANT

## 2021-06-14 PROCEDURE — 80048 BASIC METABOLIC PNL TOTAL CA: CPT | Performed by: INTERNAL MEDICINE

## 2021-06-14 PROCEDURE — 93005 ELECTROCARDIOGRAM TRACING: CPT

## 2021-06-14 PROCEDURE — 84100 ASSAY OF PHOSPHORUS: CPT | Performed by: INTERNAL MEDICINE

## 2021-06-14 PROCEDURE — 83735 ASSAY OF MAGNESIUM: CPT | Performed by: INTERNAL MEDICINE

## 2021-06-14 RX ORDER — PROPRANOLOL HYDROCHLORIDE 20 MG/1
20 TABLET ORAL EVERY 12 HOURS SCHEDULED
Status: DISCONTINUED | OUTPATIENT
Start: 2021-06-14 | End: 2021-06-15

## 2021-06-14 RX ORDER — ISOSORBIDE MONONITRATE 30 MG/1
30 TABLET, EXTENDED RELEASE ORAL DAILY
Status: DISCONTINUED | OUTPATIENT
Start: 2021-06-14 | End: 2021-06-14

## 2021-06-14 RX ORDER — CALCIUM CARBONATE 200(500)MG
1000 TABLET,CHEWABLE ORAL DAILY PRN
Status: DISCONTINUED | OUTPATIENT
Start: 2021-06-14 | End: 2021-06-17 | Stop reason: HOSPADM

## 2021-06-14 RX ORDER — LORATADINE 10 MG/1
10 TABLET ORAL DAILY
Status: DISCONTINUED | OUTPATIENT
Start: 2021-06-14 | End: 2021-06-17 | Stop reason: HOSPADM

## 2021-06-14 RX ORDER — ALPRAZOLAM 0.25 MG/1
0.25 TABLET ORAL 2 TIMES DAILY PRN
Status: DISCONTINUED | OUTPATIENT
Start: 2021-06-14 | End: 2021-06-17 | Stop reason: HOSPADM

## 2021-06-14 RX ORDER — ISOSORBIDE MONONITRATE 30 MG/1
30 TABLET, EXTENDED RELEASE ORAL DAILY
Status: DISCONTINUED | OUTPATIENT
Start: 2021-06-15 | End: 2021-06-17 | Stop reason: HOSPADM

## 2021-06-14 RX ORDER — ONDANSETRON 2 MG/ML
4 INJECTION INTRAMUSCULAR; INTRAVENOUS EVERY 6 HOURS PRN
Status: DISCONTINUED | OUTPATIENT
Start: 2021-06-14 | End: 2021-06-17 | Stop reason: HOSPADM

## 2021-06-14 RX ORDER — LOSARTAN POTASSIUM 50 MG/1
50 TABLET ORAL DAILY
Status: DISCONTINUED | OUTPATIENT
Start: 2021-06-14 | End: 2021-06-14

## 2021-06-14 RX ORDER — LANOLIN ALCOHOL/MO/W.PET/CERES
3 CREAM (GRAM) TOPICAL
Status: DISCONTINUED | OUTPATIENT
Start: 2021-06-14 | End: 2021-06-17 | Stop reason: HOSPADM

## 2021-06-14 RX ORDER — LOSARTAN POTASSIUM 50 MG/1
50 TABLET ORAL DAILY
Status: DISCONTINUED | OUTPATIENT
Start: 2021-06-15 | End: 2021-06-17 | Stop reason: HOSPADM

## 2021-06-14 RX ADMIN — ACETAMINOPHEN 650 MG: 325 TABLET, FILM COATED ORAL at 19:56

## 2021-06-14 RX ADMIN — CARBIDOPA AND LEVODOPA 1 TABLET: 25; 100 TABLET ORAL at 21:59

## 2021-06-14 RX ADMIN — CARBIDOPA AND LEVODOPA 1 TABLET: 25; 100 TABLET ORAL at 09:41

## 2021-06-14 RX ADMIN — MELATONIN TAB 3 MG 3 MG: 3 TAB at 21:59

## 2021-06-14 RX ADMIN — SODIUM CHLORIDE, SODIUM GLUCONATE, SODIUM ACETATE, POTASSIUM CHLORIDE, MAGNESIUM CHLORIDE, SODIUM PHOSPHATE, DIBASIC, AND POTASSIUM PHOSPHATE 75 ML/HR: .53; .5; .37; .037; .03; .012; .00082 INJECTION, SOLUTION INTRAVENOUS at 17:54

## 2021-06-14 RX ADMIN — PROPRANOLOL HYDROCHLORIDE 20 MG: 20 TABLET ORAL at 21:59

## 2021-06-14 RX ADMIN — DICLOFENAC SODIUM 2 G: 10 GEL TOPICAL at 09:42

## 2021-06-14 RX ADMIN — CARBIDOPA AND LEVODOPA 1 TABLET: 25; 100 TABLET ORAL at 17:55

## 2021-06-14 RX ADMIN — BACITRACIN, NEOMYCIN, POLYMYXIN B 1 SMALL APPLICATION: 400; 3.5; 5 OINTMENT TOPICAL at 09:42

## 2021-06-14 RX ADMIN — Medication 3000 UNITS: at 00:14

## 2021-06-14 RX ADMIN — SODIUM CHLORIDE, SODIUM GLUCONATE, SODIUM ACETATE, POTASSIUM CHLORIDE, MAGNESIUM CHLORIDE, SODIUM PHOSPHATE, DIBASIC, AND POTASSIUM PHOSPHATE 75 ML/HR: .53; .5; .37; .037; .03; .012; .00082 INJECTION, SOLUTION INTRAVENOUS at 02:10

## 2021-06-14 RX ADMIN — ISOSORBIDE MONONITRATE 30 MG: 30 TABLET, EXTENDED RELEASE ORAL at 09:41

## 2021-06-14 RX ADMIN — BACITRACIN, NEOMYCIN, POLYMYXIN B 1 SMALL APPLICATION: 400; 3.5; 5 OINTMENT TOPICAL at 17:55

## 2021-06-14 NOTE — ED NOTES
Around 0145 pt was standing in room, pt urinated on himself trying to get urinal, tech sat pt down in the chair and was changing his bed sheets and pt became pale, and diaphoretic  Tech got nurse, upon entering room pt was nauseated and dry heaving, pale, and diaphoretic  Placed pt back in bed, Dr Mikal Louis made aware  Pt on bedrest  RN will continue to monitor         Kadeem Jernigan RN  06/14/21 6405

## 2021-06-14 NOTE — PROGRESS NOTES
[address] [facility]  Progress Note Lorin Baldwin 1935, 82 y o  male MRN: [id number]  Unit/Bed#: ED 18 Encounter: [id number]  Primary Care Provider: Cierra Ramos MD   Date and time admitted to hospital: 6/13/2021  1:29 PM    * Abdominal wall hematoma  Assessment & Plan  S/p fall  Noted ct a/p results revealing fluid collection in the L lateral abdominal wall w/ active extravasation likely representing hematoma with active hemorrhage  Immediately notified trauma service  Patient will now be transferred to their service  Keep NPO  Xarelto on hold  Upgrade patient to Step down level  Close monitor of H/H  Patient's wife updated      Acute blood loss anemia  Assessment & Plan  Secondary to abdominal wall hemorrhage due to fall  Care as above      Fall  Assessment & Plan  · S/p ct abd/pelvis revealing abd wall hemorrhage, will be transferred to trauma service  · S/p CT head, cervical   · S/p pan xr ? Probable displaced L clavicle w/ additional deformity suggesting prior injury  · Fall precautions      Bradycardia  Assessment & Plan  · Resolved    Aortic stenosis  Assessment & Plan  · Per cardiology records in Epic  · No imaging on file  · Consider checking echocardiogram if he becomes symptomatic after heart rate is normalized      Hypertension  Assessment & Plan  · Better controlled  · Cont meds with holding parameters in setting of acute blood loss anemia; micardis non formulary thus substituted for losartan    Parkinson disease ([facility] [address] )  Assessment & Plan  · Continue sinemet TID    Atrial fibrillation (HCC)  Assessment & Plan  · Rate controlled  · Bradycardia resolved, resume back on propanolol  · Xarelto on hold given abd wall hemorrhage     VTE Pharmacologic Prophylaxis:   Pharmacologic: Pharmacologic VTE Prophylaxis contraindicated due to abd wall hemorrhage/hematoma  Mechanical VTE Prophylaxis in Place: Yes    Patient Centered Rounds: I have performed bedside rounds with nursing staff today  Discussions with Specialists or Other Care Team Provider: Trauma    Education and Discussions with Family / Patient: Patient  Also called his wife to update    Time Spent for Care: 30 minutes  More than 50% of total time spent on counseling and coordination of care as described above  Current Length of Stay: 0 day(s)    Current Patient Status: Inpatient   Certification Statement: The patient will continue to require additional inpatient hospital stay due to abd wall hemorhage, per primary team    Discharge Plan:     Code Status: Level 1 - Full Code      Subjective:   Pt seen and examined  Hemodynamically stable  Denies abd pain, dizziness though states has not been mobile    Objective:     Vitals:   Temp (24hrs), Av 5 °F (36 4 °C), Min:97 5 °F (36 4 °C), Max:97 5 °F (36 4 °C)    Temp:  [97 5 °F (36 4 °C)] 97 5 °F (36 4 °C)  HR:  [47-78] 76  Resp:  [16-21] 18  BP: (122-169)/() 169/92  SpO2:  [91 %-99 %] 98 %  There is no height or weight on file to calculate BMI  Input and Output Summary (last 24 hours):     No intake or output data in the 24 hours ending 21 1055    Physical Exam:     Physical Exam  Constitutional:       Appearance: He is obese  Cardiovascular:      Rate and Rhythm: Normal rate  Rhythm irregular  Pulses: Normal pulses  Heart sounds: Normal heart sounds  No murmur heard  Pulmonary:      Effort: Pulmonary effort is normal  No respiratory distress  Breath sounds: Normal breath sounds  No wheezing or rales  Abdominal:      General: Abdomen is flat  Bowel sounds are normal  There is no distension  Palpations: Abdomen is soft  Tenderness: There is no abdominal tenderness  There is no guarding  Musculoskeletal:      Cervical back: Normal range of motion  Right lower leg: No edema  Left lower leg: No edema  Skin:     General: Skin is warm        Comments: Large hematoma on L lateral abd wall  Multiple superficial skin abrasions   Neurological:      General: No focal deficit present  Mental Status: He is alert  Mental status is at baseline  Cranial Nerves: No cranial nerve deficit  Motor: No weakness  Comments: + resting tremors           Additional Data:     Labs:    Results from last 7 days   Lab Units 06/14/21  0915 06/14/21  0613   WBC Thousand/uL  --  12 33*   HEMOGLOBIN g/dL 12 2 11 8*   HEMATOCRIT % 36 8 36 6   PLATELETS Thousands/uL  --  288   NEUTROS PCT %  --  75   LYMPHS PCT %  --  14   MONOS PCT %  --  9   EOS PCT %  --  1     Results from last 7 days   Lab Units 06/14/21  0613   SODIUM mmol/L 139   POTASSIUM mmol/L 3 8   CHLORIDE mmol/L 106   CO2 mmol/L 26   BUN mg/dL 13   CREATININE mg/dL 0 88   ANION GAP mmol/L 7   CALCIUM mg/dL 8 8   GLUCOSE RANDOM mg/dL 126     Results from last 7 days   Lab Units 06/13/21  1335   INR  1 18                       * I Have Reviewed All Lab Data Listed Above  * Additional Pertinent Lab Tests Reviewed:  All Labs Within Last 24 Hours Reviewed    Imaging:    Imaging Reports Reviewed Today Include:   Imaging Personally Reviewed by Myself Includes:      Recent Cultures (last 7 days):           Last 24 Hours Medication List:   Current Facility-Administered Medications   Medication Dose Route Frequency Provider Last Rate    acetaminophen  650 mg Oral Q6H PRN Matthias Del Toro MD      ALPRAZolam  0 25 mg Oral BID PRN Matthias Del Toro MD      calcium carbonate  1,000 mg Oral Daily PRN Matthias Del Toro MD      carbidopa-levodopa  1 tablet Oral TID Matthias Del Toro MD      Diclofenac Sodium  2 g Topical 4x Daily Matthias Del Toro MD      [START ON 6/15/2021] isosorbide mononitrate  30 mg Oral Daily Ethyl Nate, DO      loratadine  10 mg Oral Daily Matthias Del Toro MD      [START ON 6/15/2021] losartan  50 mg Oral Daily Ethyl Nate, DO      multi-electrolyte  75 mL/hr Intravenous Continuous Elva Kendall MD 75 mL/hr (06/14/21 0210)    neomycin-bacitracin-polymyxin b  1 small application Topical BID Laure Nicole MD      ondansetron  4 mg Intravenous Q6H PRN Laure Nicole MD      oxyCODONE  2 5 mg Oral Q4H PRN Laure Nicole MD      oxyCODONE  5 mg Oral Q4H PRN Laure Nicole MD      propranolol  20 mg Oral Q12H [facility] Shahriar Ocampo DO          Today, Patient Was Seen By: Shahriar Ocampo DO    ** Please Note: Dictation voice to text software may have been used in the creation of this document   **

## 2021-06-14 NOTE — ASSESSMENT & PLAN NOTE
· Better controlled  · Cont meds with holding parameters in setting of acute blood loss anemia; micardis non formulary thus substituted for losartan

## 2021-06-14 NOTE — ASSESSMENT & PLAN NOTE
· Rate controlled  · Bradycardia resolved, resume back on propanolol  · Xarelto on hold given abd wall hemorrhage

## 2021-06-14 NOTE — ASSESSMENT & PLAN NOTE
· Per cardiology records in [address] [place] [address] Rd  · No imaging on file  · Consider checking echocardiogram if he becomes symptomatic after heart rate is normalized

## 2021-06-14 NOTE — PLAN OF CARE
Problem: Potential for Falls  Goal: Patient will remain free of falls  Description: INTERVENTIONS:  - Educate patient/family on patient safety including physical limitations  - Instruct patient to call for assistance with activity   - Consult OT/PT to assist with strengthening/mobility   - Keep Call bell within reach  - Keep bed low and locked with side rails adjusted as appropriate  - Keep care items and personal belongings within reach  - Initiate and maintain comfort rounds  - Make Fall Risk Sign visible to staff  - Offer Toileting every 2 Hours, in advance of need  - Initiate/Maintain alarm  - Obtain necessary fall risk management equipment:   - Apply yellow socks and bracelet for high fall risk patients  - Consider moving patient to room near nurses station  Outcome: Progressing

## 2021-06-14 NOTE — QUICK NOTE
Notified by nursing that the family is at bedside and requesting an update  Medical update provided to son and wife  Family also noticed a finding in the left flank  There is a large hematoma about 20cm present now  Will hold xarelto and monitor hemoglobin  Trauma resident notified

## 2021-06-14 NOTE — QUICK NOTE
Cervical Collar Clearance: The patient had a CT scan of the cervical spine demonstrating no acute injury  On exam, the patient had no midline point tenderness or paresthesias/numbness/weakness in the extremities  The patient had full range of motion (was then able to flex, extend, and rotate head laterally) without pain  There were no distracting injuries and the patient was not intoxicated  The patient's cervical spine was cleared radiologically and clinically  Cervical collar removed at this time       Toshia Rushing MD  6/14/2021 5:24 AM

## 2021-06-14 NOTE — ASSESSMENT & PLAN NOTE
S/p fall  Noted ct a/p results revealing fluid collection in the L lateral abdominal wall w/ active extravasation likely representing hematoma with active hemorrhage  Immediately notified trauma service    Patient will now be transferred to their service  Keep NPO  Xarelto on hold  Upgrade patient to Step down level  Close monitor of H/H  Patient's wife updated

## 2021-06-15 ENCOUNTER — APPOINTMENT (INPATIENT)
Dept: RADIOLOGY | Facility: HOSPITAL | Age: 86
DRG: 605 | End: 2021-06-15
Attending: EMERGENCY MEDICINE
Payer: MEDICARE

## 2021-06-15 LAB
ANION GAP SERPL CALCULATED.3IONS-SCNC: 5 MMOL/L (ref 4–13)
BASE EXCESS BLDA CALC-SCNC: 1 MMOL/L (ref -2–3)
BUN SERPL-MCNC: 11 MG/DL (ref 5–25)
CALCIUM SERPL-MCNC: 8.8 MG/DL (ref 8.3–10.1)
CHLORIDE SERPL-SCNC: 105 MMOL/L (ref 100–108)
CO2 SERPL-SCNC: 28 MMOL/L (ref 21–32)
CREAT SERPL-MCNC: 0.76 MG/DL (ref 0.6–1.3)
ERYTHROCYTE [DISTWIDTH] IN BLOOD BY AUTOMATED COUNT: 13.9 % (ref 11.6–15.1)
GFR SERPL CREATININE-BSD FRML MDRD: 84 ML/MIN/1.73SQ M
GLUCOSE SERPL-MCNC: 120 MG/DL (ref 65–140)
GLUCOSE SERPL-MCNC: 120 MG/DL (ref 65–140)
HCO3 BLDA-SCNC: 26.9 MMOL/L (ref 24–30)
HCT VFR BLD AUTO: 33 % (ref 36.5–49.3)
HCT VFR BLD AUTO: 33.9 % (ref 36.5–49.3)
HCT VFR BLD AUTO: 34.6 % (ref 36.5–49.3)
HCT VFR BLD CALC: 40 % (ref 36.5–49.3)
HGB BLD-MCNC: 11 G/DL (ref 12–17)
HGB BLD-MCNC: 11 G/DL (ref 12–17)
HGB BLD-MCNC: 11.2 G/DL (ref 12–17)
HGB BLDA-MCNC: 13.6 G/DL (ref 12–17)
MCH RBC QN AUTO: 29.4 PG (ref 26.8–34.3)
MCHC RBC AUTO-ENTMCNC: 32.4 G/DL (ref 31.4–37.4)
MCV RBC AUTO: 91 FL (ref 82–98)
PCO2 BLD: 28 MMOL/L (ref 21–32)
PCO2 BLD: 44.9 MM HG (ref 42–50)
PH BLD: 7.39 [PH] (ref 7.3–7.4)
PLATELET # BLD AUTO: 250 THOUSANDS/UL (ref 149–390)
PMV BLD AUTO: 9.5 FL (ref 8.9–12.7)
PO2 BLD: 27 MM HG (ref 35–45)
POTASSIUM BLD-SCNC: 4.4 MMOL/L (ref 3.5–5.3)
POTASSIUM SERPL-SCNC: 3.8 MMOL/L (ref 3.5–5.3)
RBC # BLD AUTO: 3.81 MILLION/UL (ref 3.88–5.62)
SAO2 % BLD FROM PO2: 48 % (ref 60–85)
SODIUM BLD-SCNC: 141 MMOL/L (ref 136–145)
SODIUM SERPL-SCNC: 138 MMOL/L (ref 136–145)
SPECIMEN SOURCE: ABNORMAL
WBC # BLD AUTO: 10.23 THOUSAND/UL (ref 4.31–10.16)

## 2021-06-15 PROCEDURE — 85027 COMPLETE CBC AUTOMATED: CPT | Performed by: EMERGENCY MEDICINE

## 2021-06-15 PROCEDURE — 99222 1ST HOSP IP/OBS MODERATE 55: CPT | Performed by: INTERNAL MEDICINE

## 2021-06-15 PROCEDURE — 97163 PT EVAL HIGH COMPLEX 45 MIN: CPT

## 2021-06-15 PROCEDURE — NC001 PR NO CHARGE: Performed by: INTERNAL MEDICINE

## 2021-06-15 PROCEDURE — 85014 HEMATOCRIT: CPT | Performed by: PHYSICIAN ASSISTANT

## 2021-06-15 PROCEDURE — 99232 SBSQ HOSP IP/OBS MODERATE 35: CPT | Performed by: EMERGENCY MEDICINE

## 2021-06-15 PROCEDURE — 80048 BASIC METABOLIC PNL TOTAL CA: CPT | Performed by: EMERGENCY MEDICINE

## 2021-06-15 PROCEDURE — 73502 X-RAY EXAM HIP UNI 2-3 VIEWS: CPT

## 2021-06-15 PROCEDURE — 97167 OT EVAL HIGH COMPLEX 60 MIN: CPT

## 2021-06-15 PROCEDURE — 97116 GAIT TRAINING THERAPY: CPT

## 2021-06-15 PROCEDURE — 85018 HEMOGLOBIN: CPT | Performed by: PHYSICIAN ASSISTANT

## 2021-06-15 RX ORDER — PROPRANOLOL HYDROCHLORIDE 20 MG/1
20 TABLET ORAL EVERY 12 HOURS SCHEDULED
Status: DISCONTINUED | OUTPATIENT
Start: 2021-06-15 | End: 2021-06-17 | Stop reason: HOSPADM

## 2021-06-15 RX ADMIN — CARBIDOPA AND LEVODOPA 1 TABLET: 25; 100 TABLET ORAL at 21:35

## 2021-06-15 RX ADMIN — BACITRACIN, NEOMYCIN, POLYMYXIN B 1 SMALL APPLICATION: 400; 3.5; 5 OINTMENT TOPICAL at 08:32

## 2021-06-15 RX ADMIN — SODIUM CHLORIDE, SODIUM GLUCONATE, SODIUM ACETATE, POTASSIUM CHLORIDE, MAGNESIUM CHLORIDE, SODIUM PHOSPHATE, DIBASIC, AND POTASSIUM PHOSPHATE 75 ML/HR: .53; .5; .37; .037; .03; .012; .00082 INJECTION, SOLUTION INTRAVENOUS at 05:52

## 2021-06-15 RX ADMIN — CARBIDOPA AND LEVODOPA 1 TABLET: 25; 100 TABLET ORAL at 08:32

## 2021-06-15 RX ADMIN — DICLOFENAC SODIUM 2 G: 10 GEL TOPICAL at 17:04

## 2021-06-15 RX ADMIN — PROPRANOLOL HYDROCHLORIDE 20 MG: 20 TABLET ORAL at 08:32

## 2021-06-15 RX ADMIN — LOSARTAN POTASSIUM 50 MG: 50 TABLET, FILM COATED ORAL at 08:32

## 2021-06-15 RX ADMIN — ENOXAPARIN SODIUM 30 MG: 30 INJECTION, SOLUTION INTRAVENOUS; SUBCUTANEOUS at 21:35

## 2021-06-15 RX ADMIN — BACITRACIN, NEOMYCIN, POLYMYXIN B 1 SMALL APPLICATION: 400; 3.5; 5 OINTMENT TOPICAL at 17:04

## 2021-06-15 RX ADMIN — DICLOFENAC SODIUM 2 G: 10 GEL TOPICAL at 10:45

## 2021-06-15 RX ADMIN — ISOSORBIDE MONONITRATE 30 MG: 30 TABLET, EXTENDED RELEASE ORAL at 08:32

## 2021-06-15 RX ADMIN — CARBIDOPA AND LEVODOPA 1 TABLET: 25; 100 TABLET ORAL at 17:06

## 2021-06-15 RX ADMIN — ENOXAPARIN SODIUM 30 MG: 30 INJECTION, SOLUTION INTRAVENOUS; SUBCUTANEOUS at 12:47

## 2021-06-15 RX ADMIN — DICLOFENAC SODIUM 2 G: 10 GEL TOPICAL at 21:35

## 2021-06-15 RX ADMIN — PROPRANOLOL HYDROCHLORIDE 20 MG: 20 TABLET ORAL at 21:35

## 2021-06-15 RX ADMIN — MELATONIN TAB 3 MG 3 MG: 3 TAB at 21:35

## 2021-06-15 RX ADMIN — LORATADINE 10 MG: 10 TABLET ORAL at 08:32

## 2021-06-15 NOTE — CONSULTS
Consultation - Cardiology Team One  Julieta Vitale 82 y o  male MRN: [id number]  Unit/Bed#: [facility] 823-01 Encounter: [id number]      Physician Requesting Consult: Nayeli Roman MD  Reason for Consult / Principal Problem: Bradycardia    Assessment/ Plan  1  Chronic atrial fibrillation with slow ventricular response  -Previous 48-hr holter monitor 06/2018: afib with controlled ventricular response, HR range , average 65 BPM  There were 560 pulses >2 seconds with the longest one being 3 05 seconds  There was 0 7% PVCs  Compared to prior study of May 2018   -EKG 4/5/21: Atrial fibrillation with with controlled ventricular response, 70 bpm    -EKG 6/13/21: Atrial fibrillation with slow ventricular response, HR 51, QRS 94, QTc 411   -Previously anticoagulated on warfarin, however switched to xarelto 04/2021   -Outpatient medication therapies: propanolol 20 mg BID  -24 hr telemetry review: episodes of afib with SVR (HR 50s)  -Currently rate controlled in the 70s during time of exam   -Bradycardia likely 2/2 double dosage of propanolol yesterday and then receiving another 20 mg dose later at night  No bradycardic events today on monitor   -Asymptomatic    2  Preserved systolic function  -TTE 8/00/69: mild left ventricular hypertrophy with normal systolic function, EF 33-44%, moderate biatrial enlargement, calcified aortic valve with mild aortic stenosis, mild TR   -On betablocker, ARB, and imdur   -Physical exam: euvolemic, patient states activity is at baseline   -Not on diuretic regimen at home  3  HTN  -Average /71, acceptable   -Outpatient medication therapies: telmisartan 40 mg daily  -Changed to losartan 50 mg daily while inpatient  4  Aortic stenosis, mild noted on recent ECHO 04/21    5   Parkinson's disease  -On carbidopa-levodopa     Plan:  -Continue outpatient home regimen   -Bradycardia likely 2/2 to medication discrepancy and double dosage of propanolol yesterday plus extra dose administered while in hospital   HR in the 70s currently  Bradycardia not sustained today and patient asymptomatic   -Continue to monitor on telemetry  -Replete electrolytes as needed    History of Present Illness   HPI: Julieta Vitale is a 82y o  year old male who has a history of chronic atrial fibrillation previously anticoagulated on warfarin and changed to xarelto on 4/2021, Parkinson's, aortic stenosis, and HTN  Patient follows with cardiologist Dr Jesica Garibay with [facility] outpatient  He presents to the [facility] ED s/p mechanical fall at home stating he tripped while walking up the stairs from his basement  Trauma survey negative for acute findings  Xray of the left clavicle showed probable displaced fracture of the midshaft of the left clavicle and CT abd revealed fluid collection in left lateral abdominal wall likely representing hematoma  All other  trauma imaging negative for acute findings/injuries  Patient denied any lightheadedness, dizziness, palpitations, loss of consciousness or syncopal episodes at time of fall or prior to falling  Patient noticed to be bradycardic with HR 50s during admission  EKG 6/13 afib with slow ventricular response  Patient did report that he may have doubled his propanolol dose the day of admission while organizing his weekly pills  Patient did have office visit with PCP 5/24/21 for c/o lightheadedness and mildly feeling like he was going to pass out upon chart review  Patient did report to PCP that he may have also taken an extra dose of medication at this time, however which medication is not stated  Today he denies any chest pain, SOB, palpitations, lightheadedness/dizziness  States he feels good and wants to go home  Reports not feeling dizzy or lightheaded at time of fall  Does admit to sometimes not being accurate with propanolol dosage and hey may have doubled his dose by mistake on day of admission        EKG reviewed personally: Atrial fibrillation with slow ventricular response, HR 51, QRS 94, QTc 411  Telemetry reviewed personally: Atrial fibrillation rate controlled with HR 70s, episodes of SVR with HR 50s  Review of Systems   Constitutional: Negative for chills, diaphoresis, fever and weight gain  Eyes: Negative for blurred vision and double vision  Cardiovascular: Negative for chest pain, cyanosis, dyspnea on exertion, leg swelling, near-syncope, orthopnea, palpitations and syncope  Respiratory: Negative for cough, shortness of breath and wheezing  Gastrointestinal: Negative for abdominal pain, diarrhea, nausea and vomiting  Neurological: Positive for tremors  Negative for dizziness, headaches and light-headedness  All other systems reviewed and are negative  Historical Information   Past Medical History:   Diagnosis Date    Afib ([address] )     Left AC separation      Past Surgical History:   Procedure Laterality Date    SHOULDER SURGERY       Social History     Substance and Sexual Activity   Alcohol Use Not Currently     Social History     Substance and Sexual Activity   Drug Use Never     Social History     Tobacco Use   Smoking Status Never Smoker   Smokeless Tobacco Never Used     Family History:   Family History   Problem Relation Age of Onset    Hypertension Father        Meds/Allergies   all current active meds have been reviewed       No Known Allergies    Objective   Vitals: Blood pressure 122/78, pulse 64, temperature 98 5 °F (36 9 °C), temperature source Oral, resp  rate 16, weight 112 kg (247 lb 5 7 oz), SpO2 98 %  , There is no height or weight on file to calculate BMI ,     Systolic (70WCK), KNF:589 , Min:110 , UUJ:425     Diastolic (52RNF), YSU:26, Min:56, Max:81        Intake/Output Summary (Last 24 hours) at 6/15/2021 1342  Last data filed at 6/15/2021 1100  Gross per 24 hour   Intake 3161 25 ml   Output 350 ml   Net 2811 25 ml     Wt Readings from Last 3 Encounters:   06/13/21 112 kg (247 lb 5 7 oz)     Invasive Devices Peripheral Intravenous Line            Peripheral IV 06/13/21 Right Antecubital 2 days                Physical Exam  Constitutional:       General: He is not in acute distress  Appearance: Normal appearance  HENT:      Head: Normocephalic  Eyes:      Conjunctiva/sclera: Conjunctivae normal       Pupils: Pupils are equal, round, and reactive to light  Cardiovascular:      Rate and Rhythm: Bradycardia present  Rhythm irregular  Pulses: Normal pulses  Heart sounds: Normal heart sounds  Pulmonary:      Effort: Pulmonary effort is normal       Breath sounds: Normal breath sounds  Abdominal:      General: Bowel sounds are normal       Palpations: Abdomen is soft  Musculoskeletal:         General: Normal range of motion  Cervical back: Normal range of motion and neck supple  Right lower leg: No edema  Left lower leg: No edema  Skin:     General: Skin is warm and dry  Neurological:      General: No focal deficit present  Mental Status: He is alert and oriented to person, place, and time  Mental status is at baseline     Psychiatric:         Mood and Affect: Mood normal          Behavior: Behavior normal          LABORATORY RESULTS:  Results from last 7 days   Lab Units 06/13/21  1335   TROPONIN I ng/mL <0 02     CBC with diff:   Results from last 7 days   Lab Units 06/15/21  0829 06/15/21  0513 06/15/21  0008 06/14/21  1757 06/14/21  0915 06/14/21  0613 06/14/21  0227 06/13/21  1336 06/13/21  1335   WBC Thousand/uL  --  10 23*  --   --   --  12 33*  --   --  10 16   HEMOGLOBIN g/dL 11 0* 11 2* 11 0* 11 8* 12 2 11 8* 12 3   < > 12 8   HEMATOCRIT % 33 9* 34 6* 33 0* 35 2* 36 8 36 6 37 2   < > 39 5   MCV fL  --  91  --   --   --  92  --   --  92   PLATELETS Thousands/uL  --  250  --   --   --  288  --   --  318   MCH pg  --  29 4  --   --   --  29 5  --   --  29 7   MCHC g/dL  --  32 4  --   --   --  32 2  --   --  32 4   RDW %  --  13 9  --   --   --  13 6  --   --  13 7 MPV fL  --  9 5  --   --   --  9 5  --   --  9 2   NRBC AUTO /100 WBCs  --   --   --   --   --  0  --   --  0    < > = values in this interval not displayed  CMP:  Results from last 7 days   Lab Units 06/15/21  0513 06/14/21  0613 06/13/21  1336 06/13/21  1335   POTASSIUM mmol/L 3 8 3 8  --  4 3   CHLORIDE mmol/L 105 106  --  107   CO2 mmol/L 28 26  --  27   CO2, I-STAT mmol/L  --   --  28  --    BUN mg/dL 11 13  --  12   CREATININE mg/dL 0 76 0 88  --  0 83   GLUCOSE, ISTAT mg/dl  --   --  120  --    CALCIUM mg/dL 8 8 8 8  --  9 1   EGFR ml/min/1 73sq m 84 79  --  65       BMP:  Results from last 7 days   Lab Units 06/15/21  0513 06/14/21  0613 06/13/21  1336 06/13/21  1335   POTASSIUM mmol/L 3 8 3 8  --  4 3   CHLORIDE mmol/L 105 106  --  107   CO2 mmol/L 28 26  --  27   CO2, I-STAT mmol/L  --   --  28  --    BUN mg/dL 11 13  --  12   CREATININE mg/dL 0 76 0 88  --  0 83   GLUCOSE, ISTAT mg/dl  --   --  120  --    CALCIUM mg/dL 8 8 8 8  --  9 1       Lab Results   Component Value Date    CREATININE 0 76 06/15/2021    CREATININE 0 88 06/14/2021    CREATININE 0 83 06/13/2021       No results found for: NTBNP       Results from last 7 days   Lab Units 06/14/21  0613   MAGNESIUM mg/dL 2 2                     Results from last 7 days   Lab Units 06/13/21  1335   INR  1 18     Lipid Profile:   No results found for: CHOL  No results found for: HDL  No results found for: LDLCALC  No results found for: TRIG    Cardiac testing:   No results found for this or any previous visit  No results found for this or any previous visit  No valid procedures specified  No results found for this or any previous visit  Imaging: I have personally reviewed pertinent reports  XR clavicle left    Result Date: 6/14/2021  Narrative: TRAUMA SERIES INDICATION:  injury  COMPARISON:  None VIEWS:  XR TRAUMA MULTIPLE Images: 5  FINDINGS: CHEST: Supine frontal view of the chest is obtained   Cardiomediastinal silhouette is within normal limits accounting for technique and patient positioning  Lungs are clear  No layering pleural effusions detected  No pneumothorax is seen on this supine film  Upright images are more sensitive to detect anterior pneumothoraces if relevant  No displaced rib fractures  Left clavicle, left shoulder 4 views  Considerable deformity of the mid and lateral left clavicle  This is not well evaluated on this examination  There may be an acute displaced fracture of the midshaft of the clavicle but additional deformity suggests prior injury  Moderate glenohumeral degenerative changes  No evidence for shoulder dislocation  Impression: No acute cardiopulmonary disease within limitations of supine imaging  Probable displaced fracture of the midshaft of the left clavicle  Additional clavicle deformity suggests that there is probably prior injury also  Evaluation is not optimal due to patient condition  The study was marked in [facility] for immediate notification  Workstation performed: [id number]     XR shoulder 2+ vw left    Result Date: 6/14/2021  Narrative: TRAUMA SERIES INDICATION:  injury  COMPARISON:  None VIEWS:  XR TRAUMA MULTIPLE Images: 5  FINDINGS: CHEST: Supine frontal view of the chest is obtained  Cardiomediastinal silhouette is within normal limits accounting for technique and patient positioning  Lungs are clear  No layering pleural effusions detected  No pneumothorax is seen on this supine film  Upright images are more sensitive to detect anterior pneumothoraces if relevant  No displaced rib fractures  Left clavicle, left shoulder 4 views  Considerable deformity of the mid and lateral left clavicle  This is not well evaluated on this examination  There may be an acute displaced fracture of the midshaft of the clavicle but additional deformity suggests prior injury  Moderate glenohumeral degenerative changes  No evidence for shoulder dislocation       Impression: No acute cardiopulmonary disease within limitations of supine imaging  Probable displaced fracture of the midshaft of the left clavicle  Additional clavicle deformity suggests that there is probably prior injury also  Evaluation is not optimal due to patient condition  The study was marked in [facility] for immediate notification  Workstation performed: [id number]     CT head without contrast    Result Date: 6/13/2021  Narrative: CT BRAIN - WITHOUT CONTRAST INDICATION:   injury  COMPARISON:  None  TECHNIQUE:  CT examination of the brain was performed  In addition to axial images, sagittal and coronal 2D reformatted images were created and submitted for interpretation  Radiation dose length product (DLP) for this visit:  959 37 mGy-cm   This examination, like all CT scans performed in the [facility], was performed utilizing techniques to minimize radiation dose exposure, including the use of iterative  reconstruction and automated exposure control  IMAGE QUALITY:  Diagnostic  FINDINGS: PARENCHYMA: Decreased attenuation is noted in periventricular and subcortical white matter demonstrating an appearance that is statistically most likely to represent mild microangiopathic change  No CT signs of acute infarction  No intracranial mass, mass effect or midline shift  No acute parenchymal hemorrhage  VENTRICLES AND EXTRA-AXIAL SPACES:  Normal for the patient's age  VISUALIZED ORBITS AND PARANASAL SINUSES:  Unremarkable  CALVARIUM AND EXTRACRANIAL SOFT TISSUES:  Normal      Impression: No acute intracranial abnormality  Microangiopathic changes  I personally discussed this study with Abelardo Bedolla on 6/13/2021 at 2:07 PM  Workstation performed: [id number]     CT spine cervical without contrast    Result Date: 6/13/2021  Narrative: CT CERVICAL SPINE - WITHOUT CONTRAST INDICATION:   injury  COMPARISON:  None   TECHNIQUE:  CT examination of the cervical spine was performed without intravenous contrast   Contiguous axial images were obtained  Sagittal and coronal reconstructions were performed  Radiation dose length product (DLP) for this visit:  491 98 mGy-cm   This examination, like all CT scans performed in the [facility], was performed utilizing techniques to minimize radiation dose exposure, including the use of iterative  reconstruction and automated exposure control  IMAGE QUALITY:  Diagnostic  FINDINGS: ALIGNMENT:  There is straightening of normal cervical lordosis  No subluxation or compression deformity  VERTEBRAL BODIES:  No fracture  DEGENERATIVE CHANGES:  No significant cervical degenerative changes are noted  PREVERTEBRAL AND PARASPINAL SOFT TISSUES:  Unremarkable  THORACIC INLET:  Normal      Impression: No cervical spine fracture or traumatic malalignment  I personally discussed this study with Livan Pisano on 6/13/2021 at 2:07 PM   Workstation performed: [id number]     XR chest 1 view    Result Date: 6/14/2021  Narrative: TRAUMA SERIES INDICATION:  injury  COMPARISON:  None VIEWS:  XR TRAUMA MULTIPLE Images: 5  FINDINGS: CHEST: Supine frontal view of the chest is obtained  Cardiomediastinal silhouette is within normal limits accounting for technique and patient positioning  Lungs are clear  No layering pleural effusions detected  No pneumothorax is seen on this supine film  Upright images are more sensitive to detect anterior pneumothoraces if relevant  No displaced rib fractures  Left clavicle, left shoulder 4 views  Considerable deformity of the mid and lateral left clavicle  This is not well evaluated on this examination  There may be an acute displaced fracture of the midshaft of the clavicle but additional deformity suggests prior injury  Moderate glenohumeral degenerative changes  No evidence for shoulder dislocation  Impression: No acute cardiopulmonary disease within limitations of supine imaging  Probable displaced fracture of the midshaft of the left clavicle  Additional clavicle deformity suggests that there is probably prior injury also  Evaluation is not optimal due to patient condition  The study was marked in [facility] for immediate notification  Workstation performed: [id number]     CT chest abdomen pelvis w contrast    Result Date: 6/14/2021  Narrative: CT CHEST, ABDOMEN AND PELVIS WITH IV CONTRAST INDICATION:   Chest-abdomen-pelvis trauma, blunt flank echymosis s/p trauma  COMPARISON:  None  TECHNIQUE: CT examination of the chest, abdomen and pelvis was performed  Axial, sagittal, and coronal 2D reformatted images were created from the source data and submitted for interpretation  Radiation dose length product (DLP) for this visit:  2778 66 mGy-cm   This examination, like all CT scans performed in the [facility], was performed utilizing techniques to minimize radiation dose exposure, including the use of iterative reconstruction and automated exposure control  IV Contrast:  100 mL of iohexol (OMNIPAQUE) Enteric Contrast: Enteric contrast was administered  FINDINGS: CHEST LUNGS:  Lungs are clear  There is no tracheal or endobronchial lesion  PLEURA:  Unremarkable  HEART/GREAT VESSELS:  The heart is mildly enlarged  Coronary artery calcifications are visualized  MEDIASTINUM AND YNES:  Unremarkable  CHEST WALL AND LOWER NECK:   Unremarkable  ABDOMEN LIVER/BILIARY TREE:  Unremarkable  GALLBLADDER:  No calcified gallstones  No pericholecystic inflammatory change  SPLEEN:  Unremarkable  PANCREAS:  Unremarkable  ADRENAL GLANDS:  Unremarkable  KIDNEYS/URETERS:  Unremarkable  No hydronephrosis  STOMACH AND BOWEL:  Unremarkable  APPENDIX:  No findings to suggest appendicitis  ABDOMINOPELVIC CAVITY:  No ascites  No pneumoperitoneum  No lymphadenopathy  VESSELS:  Unremarkable for patient's age  PELVIS REPRODUCTIVE ORGANS:  Unremarkable for patient's age  URINARY BLADDER:  Unremarkable   ABDOMINAL WALL/INGUINAL REGIONS:  Fat-containing right inguinal hernia is seen   There is a fluid collection measuring 6 9 x 5 2 cm in the left lateral abdominal wall overlying the left iliac bone with active extravasation of contrast  OSSEOUS STRUCTURES:  No acute fracture or destructive osseous lesion  Impression: Fluid collection in the left lateral abdominal wall overlying the left iliac bone with active extravasation of contrast   Findings likely representing hematoma with active hemorrhage  The study was marked in [facility] for immediate notification  Workstation performed: [id number]     XR trauma multiple    Result Date: 6/13/2021  Narrative: TRAUMA SERIES INDICATION:  injury  COMPARISON:  None VIEWS:  XR TRAUMA MULTIPLE Images: 5  FINDINGS: CHEST: Supine frontal view of the chest is obtained  Cardiomediastinal silhouette is within normal limits accounting for technique and patient positioning  Lungs are clear  No layering pleural effusions detected  No pneumothorax is seen on this supine film  Upright images are more sensitive to detect anterior pneumothoraces if relevant  No displaced rib fractures  Left clavicle, left shoulder 4 views  Considerable deformity of the mid and lateral left clavicle  This is not well evaluated on this examination  There may be an acute displaced fracture of the midshaft of the clavicle but additional deformity suggests prior injury  Moderate glenohumeral degenerative changes  No evidence for shoulder dislocation  Impression: No acute cardiopulmonary disease within limitations of supine imaging  Probable displaced fracture of the midshaft of the left clavicle  Additional clavicle deformity suggests that there is probably prior injury also  Evaluation is not optimal due to patient condition  The study was marked in [facility] for immediate notification  Workstation performed: [id number]     US bedside procedure    Result Date: 6/13/2021  Narrative: 1 2 840 [id number]  9 [id number]  6 [id number]      Assessment  Principal Problem: Abdominal wall hematoma  Active Problems:    Atrial fibrillation (HCC)    Parkinson disease (HCC)    Hypertension    Aortic stenosis    Bradycardia    Fall    Acute blood loss anemia      Thank you for allowing us to participate in this patient's care  This pt will follow up with          once discharged  Counseling / Coordination of Care  Total floor / unit time spent today 45 minutes  Greater than 50% of total time was spent with the patient and / or family counseling and / or coordination of care  A description of the counseling / coordination of care: Review of history, current assessment, development of a plan  Code Status: Level 1 - Full Code    ** Please Note: Dragon 360 Dictation voice to text software may have been used in the creation of this document   **

## 2021-06-15 NOTE — PLAN OF CARE
Problem: PHYSICAL THERAPY ADULT  Goal: Performs mobility at highest level of function for planned discharge setting  See evaluation for individualized goals  Description: Treatment/Interventions: Functional transfer training, LE strengthening/ROM, Therapeutic exercise, Endurance training, Elevations, Patient/family training, Equipment eval/education, Bed mobility, Gait training  Equipment Recommended: Mary Spencer       See flowsheet documentation for full assessment, interventions and recommendations  Note: Prognosis: Good  Problem List: Decreased strength, Decreased endurance, Decreased mobility, Impaired balance, Decreased safety awareness, Pain, Orthopedic restrictions  Assessment: Pt is a 82 y o  male seen for PT evaluation s/p admit to [facility] on 6/13/2021  Pt was admitted with a primary dx of: Fall resulting in abdominal wall hematoma  PT now consulted for assessment of mobility and d/c needs  Pt with Up with assistance orders  Pts current comorbidities effecting treatment include: Afib, Bradycardia, Parkinson's disease, HTN, Aortic stenosis, previous L shoulder surgeries  Pts current clinical presentation is Unstable/ Unpredictable (high complexity) due to Ongoing medical management for primary dx, Increased reliance on more restrictive AD compared to baseline, Decreased activity tolerance compared to baseline, Fall risk, Increased assistance needed from caregiver at current time, Current WBS, Trending lab values  Prior to admission, pt was independent  Upon evaluation, pt currently is requiring Adi for bed mobility; Adi for transfers and Adi for ambulation 5 ft w/ HHA  Pt presents at PT eval functioning below baseline and currently w/ overall mobility deficits 2* to: impaired balance, decreased endurance, gait deviations, pain, decreased activity tolerance compared to baseline, decreased functional mobility tolerance compared to baseline, decreased safety awareness, fall risk   Pt currently at a fall risk 2* to impairments listed above  Pt will continue to benefit from skilled acute PT interventions to address stated impairments; to maximize functional mobility; for ongoing pt/ family training; and DME needs  At conclusion of PT session pt returned back in chair with phone and call bell within reach  Pt denies any further questions at this time  Recommend home with family care and HHPT upon hospital D/C  PT Discharge Recommendation: Home with home health rehabilitation     PT - OK to Discharge: No (pending stair training)    See flowsheet documentation for full assessment

## 2021-06-15 NOTE — PLAN OF CARE
Problem: OCCUPATIONAL THERAPY ADULT  Goal: Performs self-care activities at highest level of function for planned discharge setting  See evaluation for individualized goals  Description: Treatment Interventions: ADL retraining, Functional transfer training, Endurance training, Cognitive reorientation, Patient/family training, Compensatory technique education, Continued evaluation, Energy conservation  Equipment Recommended: Bedside commode, Shower/Tub chair with back ($)       See flowsheet documentation for full assessment, interventions and recommendations  6/15/2021 1505 by Laurie Brunson OT  Note: Limitation: Decreased ADL status, Decreased Safe judgement during ADL, Decreased endurance, Decreased self-care trans, Decreased high-level ADLs  Prognosis: Good  Assessment: Pt is a 82 y o  YO  male admitted to [facility] on 6/13/2021 w/ fall- pt w/ abdominal wall hematoma (binder in place)  Pt w/ old L shoulder injuries, fell on shoulder (no acute abnormalities)  Pt reports he did not trip and believes it to be a syncopal event  Pt  has a past medical history of Afib ([address] ), Parkinson disease, aortic stenosis, bradycardia and Left AC separation  Pt with active OT orders and up with assistance  orders    Pt resides in a 2Sh with spouse  Pt was I w/  ADLS and IADLS, (+) drove, & required no use of DME PTA  Currently pt is min a for ADLs and functional mobility  Pt is limited at this time 2*: pain, endurance, activity tolerance, functional mobility, functional standing tolerance, decreased I w/ ADLS/IADLS and decreased safety awareness  The following Occupational Performance Areas to address include: grooming, bathing/shower, toilet hygiene, dressing, functional mobility and household maintenance  Based on the aforementioned OT evaluation, functional performance deficits, and assessments, pt has been identified as a high complexity evaluation  From OT standpoint, anticipate d/c home OT   Pt to continue to benefit from acute immediate OT services to address the following goals 3-5x/week to  w/in 7-10 days:   The patient's raw score on the AM-PAC Daily Activity inpatient short form is 20, standardized score is 42 03, greater than 39 4  Patients at this level are likely to benefit from discharge to home  Please refer to the recommendation of the Occupational Therapist for safe discharge planning  OT Discharge Recommendation: Home with home health rehabilitation       6/15/2021 1504 by Roselind Goodpasture, OT  Note: Limitation: Decreased ADL status, Decreased Safe judgement during ADL, Decreased endurance, Decreased self-care trans, Decreased high-level ADLs  Prognosis: Good  Assessment: Pt is a 82 y o  YO  male admitted to [facility] on 2021 w/ fall- pt w/ abdominal wall hematoma (binder in place)  Pt w/ old L shoulder injuries, fell on shoulder (no acute abnormalities)  Pt reports he did not trip and believes it to be a syncopal event  Pt  has a past medical history of Afib ([address] ), Parkinson disease, aortic stenosis, bradycardia and Left AC separation  Pt with active OT orders and up with assistance  orders    Pt resides in a 2Sh with spouse  Pt was I w/  ADLS and IADLS, (+) drove, & required no use of DME PTA  Currently pt is min a for ADLs and functional mobility  Pt is limited at this time 2*: pain, endurance, activity tolerance, functional mobility, functional standing tolerance, decreased I w/ ADLS/IADLS and decreased safety awareness  The following Occupational Performance Areas to address include: grooming, bathing/shower, toilet hygiene, dressing, functional mobility and household maintenance  Based on the aforementioned OT evaluation, functional performance deficits, and assessments, pt has been identified as a high complexity evaluation  From OT standpoint, anticipate d/c home OT  Pt to continue to benefit from acute immediate OT services to address the following goals 3-5x/week to  w/in 7-10 days:    The patient's raw score on the AM-PAC Daily Activity inpatient short form is 20, standardized score is 42 03, greater than 39 4  Patients at this level are likely to benefit from discharge to home  Please refer to the recommendation of the Occupational Therapist for safe discharge planning       OT Discharge Recommendation: Home with home health rehabilitation

## 2021-06-15 NOTE — OCCUPATIONAL THERAPY NOTE
Occupational Therapy Evaluation     Patient Name: Jaycee Guadalupe  KCNBF'X Date: 6/15/2021  Problem List  Principal Problem:    Abdominal wall hematoma  Active Problems:    Atrial fibrillation (HCC)    Parkinson disease ([facility] )    Hypertension    Aortic stenosis    Bradycardia    Fall    Acute blood loss anemia    Past Medical History  Past Medical History:   Diagnosis Date    [facility] ([facility] )     Left AC separation      Past Surgical History  Past Surgical History:   Procedure Laterality Date    SHOULDER SURGERY           06/15/21 1030   OT Last Visit   OT Visit Date 06/15/21   Note Type   Note type Evaluation   Restrictions/Precautions   Weight Bearing Precautions Per Order No  (spoke to trauma WBAT LUE)   Braces or Orthoses Other (Comment)  (abdominal binder)   Other Precautions Fall Risk;Multiple lines;Telemetry   Pain Assessment   Pain Assessment Tool Pain Assessment not indicated - pt denies pain   Pain Score No Pain   Home Living   Type of [address] [place] [address] One level   Bathroom Shower/Tub Walk-in shower   Bathroom Toilet Standard   Home Equipment Cane;Stair glide   Prior Function   Level of [place] Independent with ADLs and functional mobility   Lives With Spouse   Receives Help From Family   ADL Assistance Independent   IADLs Independent   Falls in the last 6 months 1 to 4  (1- current admission)   Vocational Retired   Lifestyle   Autonomy pta pt reports I in ADls/IADLs/functional mobility   Reciprocal Relationships supportive spouse   Service to Others reitred- served his country, 's    [place]ess enjoys traveling and going on cruises w/ his wife   Psychosocial   Psychosocial (WDL) WDL   Subjective   Subjective "I want to be home with my wife"   ADL   Where Assessed Chair   Eating Assistance 5  Supervision/Setup   Grooming Assistance 5  Supervision/Setup   [facility] 4  Minimal Assistance   LB Dressing Assistance 4  Minimal Assistance   Toileting Assistance  4  Minimal Assistance   Bed Mobility   Supine to Sit 4  Minimal assistance   Additional items Assist x 1   Transfers   Sit to Stand 4  Minimal assistance   Additional items Assist x 1; Increased time required   Stand to Sit 4  Minimal assistance   Additional items Assist x 1   Functional Mobility   Functional Mobility 4  Minimal assistance   Additional items Rolling walker   Balance   Static Sitting Fair   Dynamic Sitting Fair -   Static Standing Poor +   Dynamic Standing Poor +   Ambulatory Poor +   Activity Tolerance   Activity Tolerance Patient limited by fatigue;Patient limited by pain   Medical Staff Made Aware Pt seen as a co-eval w/ PT Amina due to the patient's co-morbidities, clinically unstable presentation, and present impairments which are a regression from the patient's baseline  Nurse Made Aware okay to see per RN   RUE Assessment   RUE Assessment WFL   LUE Assessment   LUE Assessment X  (1/2 normal ROM in shoulder due to pain)   Hand Function   Gross Motor Coordination Functional   Fine Motor Coordination Functional   Cognition   Overall Cognitive Status WFL   Arousal/Participation Cooperative   Attention Attends with cues to redirect   Orientation Level Oriented X4   Memory Within functional limits   Following Commands Follows all commands and directions without difficulty   Comments pt very pleasant and cooperative; slightly confused at times   Assessment   Limitation Decreased ADL status; Decreased Safe judgement during ADL;Decreased endurance;Decreased self-care trans;Decreased high-level ADLs   Prognosis Good   Assessment Pt is a 82 y o  YO  male admitted to [facility] on 6/13/2021 w/ fall- pt w/ abdominal wall hematoma (binder in place)  Pt w/ old L shoulder injuries, fell on shoulder (no acute abnormalities)  Pt reports he did not trip and believes it to be a syncopal event   Pt  has a past medical history of Afib ([facility] ), Parkinson disease, aortic stenosis, bradycardia and Left AC separation  Pt with active OT orders and up with assistance  orders    Pt resides in a 2Sh with spouse  Pt was I w/  ADLS and IADLS, (+) drove, & required no use of DME PTA  Currently pt is min a for ADLs and functional mobility  Pt is limited at this time 2*: pain, endurance, activity tolerance, functional mobility, functional standing tolerance, decreased I w/ ADLS/IADLS and decreased safety awareness  The following Occupational Performance Areas to address include: grooming, bathing/shower, toilet hygiene, dressing, functional mobility and household maintenance  Based on the aforementioned OT evaluation, functional performance deficits, and assessments, pt has been identified as a high complexity evaluation  From OT standpoint, anticipate d/c home OT  Pt to continue to benefit from acute immediate OT services to address the following goals 3-5x/week to  w/in 7-10 days:   The patient's raw score on the AM-PAC Daily Activity inpatient short form is 20, standardized score is 42 03, greater than 39 4  Patients at this level are likely to benefit from discharge to home  Please refer to the recommendation of the Occupational Therapist for safe discharge planning  Goals   Patient Goals go home   LTG Time Frame 7-10   Plan   Treatment Interventions ADL retraining;Functional transfer training; Endurance training;Cognitive reorientation;Patient/family training; Compensatory technique education;Continued evaluation; Energy conservation   Goal Expiration Date 21   OT Frequency 3-5x/wk   Recommendation   OT Discharge Recommendation Home with home health rehabilitation   Equipment Recommended Bedside commode; Shower/Tub chair with back ($)   AM-PAC Daily Activity Inpatient   Lower Body Dressing 3   Bathing 3   Toileting 3   Upper Body Dressing 3   Grooming 4   Eating 4   Daily Activity Raw Score 20   Daily Activity Standardized Score (Calc for Raw Score >=11) 42 03   AM-PAC Applied Cognition Inpatient   Following a Speech/Presentation 4   Understanding Ordinary Conversation 4   Taking Medications 4   Remembering Where Things Are Placed or Put Away 3   Remembering List of 4-5 Errands 3   Taking Care of Complicated Tasks 3   Applied Cognition Raw Score 21   Applied Cognition Standardized Score 44 3   Modified Dawes Scale   Modified Dawes Scale 4     GOALS    1) Pt will increase activity tolerance to G for 30 min txment sessions    2) Pt will complete UB/LB dressing/self care w/ mod I using adaptive device and DME as needed    3) Pt will complete bathing w/ Mod I w/ use of AE and DME as needed    4) Pt will complete toileting w/ mod I w/ G hygiene/thoroughness using DME as needed    5) Pt will improve functional transfers to Mod I on/off all surfaces using DME as needed w/ G balance/safety     6) Pt will improve functional mobility during ADL/IADL/leisure tasks to Mod I using DME as needed w/ G balance/safety     7) Pt will participate in simulated IADL management task to increase independence to  w/ G safety and endurance    8) Pt will demonstrate G carryover of pt/caregiver education and training as appropriate      9) Pt will demonstrate 100% carryover of energy conservation techniques t/o functional I/ADL/leisure tasks w/o cues s/p skilled education    10) Pt will engage in ongoing cognitive assessment w/ G participation to assist w/ safe d/c planning/recommendations    Torito Reynaga MS, OTR/L

## 2021-06-15 NOTE — ASSESSMENT & PLAN NOTE
Holding home beta blocker in setting of bradycardic episodes  Chronically anticoagulated on xarelto, held in setting of abdominal wall hematoma

## 2021-06-15 NOTE — PLAN OF CARE
Problem: Potential for Falls  Goal: Patient will remain free of falls  Description: INTERVENTIONS:  - Educate patient/family on patient safety including physical limitations  - Instruct patient to call for assistance with activity   - Consult OT/PT to assist with strengthening/mobility   - Keep Call bell within reach  - Keep bed low and locked with side rails adjusted as appropriate  - Keep care items and personal belongings within reach  - Initiate and maintain comfort rounds  - Make Fall Risk Sign visible to staff  - Offer Toileting every 2 Hours, in advance of need  - Initiate/Maintain bed alarm  - Obtain necessary fall risk management equipment: bed alarm  - Apply yellow socks and bracelet for high fall risk patients  - Consider moving patient to room near nurses station  Outcome: Progressing     Problem: Prexisting or High Potential for Compromised Skin Integrity  Goal: Skin integrity is maintained or improved  Description: INTERVENTIONS:  - Identify patients at risk for skin breakdown  - Assess and monitor skin integrity  - Assess and monitor nutrition and hydration status  - Monitor labs   - Assess for incontinence   - Turn and reposition patient  - Assist with mobility/ambulation  - Relieve pressure over bony prominences  - Avoid friction and shearing  - Provide appropriate hygiene as needed including keeping skin clean and dry  - Evaluate need for skin moisturizer/barrier cream  - Collaborate with interdisciplinary team   - Patient/family teaching  - Consider wound care consult   Outcome: Progressing     Problem: MOBILITY - ADULT  Goal: Maintain or return to baseline ADL function  Description: INTERVENTIONS:  -  Assess patient's ability to carry out ADLs; assess patient's baseline for ADL function and identify physical deficits which impact ability to perform ADLs (bathing, care of mouth/teeth, toileting, grooming, dressing, etc )  - Assess/evaluate cause of self-care deficits   - Assess range of motion  - Assess patient's mobility; develop plan if impaired  - Assess patient's need for assistive devices and provide as appropriate  - Encourage maximum independence but intervene and supervise when necessary  - Involve family in performance of ADLs  - Assess for home care needs following discharge   - Consider OT consult to assist with ADL evaluation and planning for discharge  - Provide patient education as appropriate  Outcome: Progressing  Goal: Maintains/Returns to pre admission functional level  Description: INTERVENTIONS:  - Perform BMAT or MOVE assessment daily    - Set and communicate daily mobility goal to care team and patient/family/caregiver  - Collaborate with rehabilitation services on mobility goals if consulted  - Perform Range of Motion 3 times a day  - Reposition patient every 2 hours    - Dangle patient 3 times a day  - Stand patient 3 times a day  - Ambulate patient 3 times a day  - Out of bed to chair 3 times a day   - Out of bed for meals 3 times a day  - Out of bed for toileting  - Record patient progress and toleration of activity level   Outcome: Progressing     Problem: PAIN - ADULT  Goal: Verbalizes/displays adequate comfort level or baseline comfort level  Description: Interventions:  - Encourage patient to monitor pain and request assistance  - Assess pain using appropriate pain scale  - Administer analgesics based on type and severity of pain and evaluate response  - Implement non-pharmacological measures as appropriate and evaluate response  - Consider cultural and social influences on pain and pain management  - Notify physician/advanced practitioner if interventions unsuccessful or patient reports new pain  Outcome: Progressing     Problem: INFECTION - ADULT  Goal: Absence or prevention of progression during hospitalization  Description: INTERVENTIONS:  - Assess and monitor for signs and symptoms of infection  - Monitor lab/diagnostic results  - Monitor all insertion sites, i e  indwelling lines, tubes, and drains  - Monitor endotracheal if appropriate and nasal secretions for changes in amount and color  - [place] appropriate cooling/warming therapies per order  - Administer medications as ordered  - Instruct and encourage patient and family to use good hand hygiene technique  - Identify and instruct in appropriate isolation precautions for identified infection/condition  Outcome: Progressing     Problem: SAFETY ADULT  Goal: Patient will remain free of falls  Description: INTERVENTIONS:  - Educate patient/family on patient safety including physical limitations  - Instruct patient to call for assistance with activity   - Consult OT/PT to assist with strengthening/mobility   - Keep Call bell within reach  - Keep bed low and locked with side rails adjusted as appropriate  - Keep care items and personal belongings within reach  - Initiate and maintain comfort rounds  - Make Fall Risk Sign visible to staff  - Offer Toileting every 2 Hours, in advance of need  - Initiate/Maintain alarm  - Obtain necessary fall risk management equipment: bracelet  - Apply yellow socks and bracelet for high fall risk patients  - Consider moving patient to room near nurses station  Outcome: Progressing  Goal: Maintain or return to baseline ADL function  Description: INTERVENTIONS:  -  Assess patient's ability to carry out ADLs; assess patient's baseline for ADL function and identify physical deficits which impact ability to perform ADLs (bathing, care of mouth/teeth, toileting, grooming, dressing, etc )  - Assess/evaluate cause of self-care deficits   - Assess range of motion  - Assess patient's mobility; develop plan if impaired  - Assess patient's need for assistive devices and provide as appropriate  - Encourage maximum independence but intervene and supervise when necessary  - Involve family in performance of ADLs  - Assess for home care needs following discharge   - Consider OT consult to assist with ADL evaluation and planning for discharge  - Provide patient education as appropriate  Outcome: Progressing  Goal: Maintains/Returns to pre admission functional level  Description: INTERVENTIONS:  - Perform BMAT or MOVE assessment daily    - Set and communicate daily mobility goal to care team and patient/family/caregiver  - Collaborate with rehabilitation services on mobility goals if consulted  - Perform Range of Motion 3 times a day  - Reposition patient every 2 hours  - Dangle patient 3 times a day  - Stand patient 3 times a day  - Ambulate patient 3 times a day  - Out of bed to chair 3 times a day   - Out of bed for meals 3 times a day  - Out of bed for toileting  - Record patient progress and toleration of activity level   Outcome: Progressing     Problem: DISCHARGE PLANNING  Goal: Discharge to home or other facility with appropriate resources  Description: INTERVENTIONS:  - Identify barriers to discharge w/patient and caregiver  - Arrange for needed discharge resources and transportation as appropriate  - Identify discharge learning needs (meds, wound care, etc )  - Arrange for interpretive services to assist at discharge as needed  - Refer to Case Management Department for coordinating discharge planning if the patient needs post-hospital services based on physician/advanced practitioner order or complex needs related to functional status, cognitive ability, or social support system  Outcome: Progressing     Problem: Knowledge Deficit  Goal: Patient/family/caregiver demonstrates understanding of disease process, treatment plan, medications, and discharge instructions  Description: Complete learning assessment and assess knowledge base    Interventions:  - Provide teaching at level of understanding  - Provide teaching via preferred learning methods  Outcome: Progressing     Problem: METABOLIC, FLUID AND ELECTROLYTES - ADULT  Goal: Electrolytes maintained within normal limits  Description: INTERVENTIONS:  - Monitor labs and assess patient for signs and symptoms of electrolyte imbalances  - Administer electrolyte replacement as ordered  - Monitor response to electrolyte replacements, including repeat lab results as appropriate  - Instruct patient on fluid and nutrition as appropriate  Outcome: Progressing     Problem: HEMATOLOGIC - ADULT  Goal: Maintains hematologic stability  Description: INTERVENTIONS  - Assess for signs and symptoms of bleeding or hemorrhage  - Monitor labs  - Administer supportive blood products/factors as ordered and appropriate  Outcome: Progressing     Problem: MUSCULOSKELETAL - ADULT  Goal: Maintain or return mobility to safest level of function  Description: INTERVENTIONS:  - Assess patient's ability to carry out ADLs; assess patient's baseline for ADL function and identify physical deficits which impact ability to perform ADLs (bathing, care of mouth/teeth, toileting, grooming, dressing, etc )  - Assess/evaluate cause of self-care deficits   - Assess range of motion  - Assess patient's mobility  - Assess patient's need for assistive devices and provide as appropriate  - Encourage maximum independence but intervene and supervise when necessary  - Involve family in performance of ADLs  - Assess for home care needs following discharge   - Consider OT consult to assist with ADL evaluation and planning for discharge  - Provide patient education as appropriate  Outcome: Progressing

## 2021-06-15 NOTE — CONSULTS
Consultation - Cardiology Team One  Indira Craig 82 y o  male MRN: [id number]  Unit/Bed#: [facility] 823-01 Encounter: [id number]      Physician Requesting Consult: Vikash Romero MD  Reason for Consult / Principal Problem: Bradycardia    Assessment  1  Unwitnessed fall, unclear if true syncope/collapse - see HPI for further detail but suspect to be mechanical in nature  2  Chronic atrial fibrillation w/ intermittent SVR  -Previous 48-hr holter monitor 6/2018: afib with controlled ventricular response, HR range , average 65 BPM  There were 560 pulses >2 seconds with the longest one being 3 05 seconds  There was 0 7% PVCs  Compared to prior study of May 2018   -EKG 4/5/21: Atrial fibrillation with with controlled ventricular response, 70 bpm    -EKG 6/13/21: Atrial fibrillation with slow ventricular response, HR 51, QRS 94, QTc 411   -24 hr telemetry review: Currently in afib w/rates in the 60's, late last evening episodes of bradycardia with rates in the 40s to 50s - while sleeping; no significant pauses or heart block noted  -Currently propanolol 20 mg BID  -On Xarelto 15 mg daily    3  Preserved BiV systolic function  4  Aortic stenosis, mild per TTE 4/2021  -Compensated- euvolemic, patient states activity is at baseline  -TTE 4/12/21: mild left ventricular hypertrophy with normal systolic function, EF 98-99%, moderate biatrial enlargement, calcified aortic valve with mild aortic stenosis, mild TR   -On BB, ARB, and Imdur   -Not on diuretic regimen at home  5  HTN  -Average /71, acceptable   -Outpatient medication therapies: telmisartan 40 mg daily (on losartan 50 mg daily while inpatient), Imdur 30 mg daily, and propanolol 20 mg BID    6   Parkinson's disease  -On carbidopa-levodopa     Plan:  -Recommend 2 week zio patch monitor - for further cardiac monitoring as an outpatient to assess for any significant simon / tachyarrhythmias with his usual level of activity- can be arranged by his primary cardiology office - follows w/  [address]  -Continue propanolol 20 mg BID - HR's currently stable on this dose  -Needs to have closer observation of medication administration at home - as her has been some concern for him a doubling up on his medications in the past   -Resume [address] when deemed appropriate by the trauma service  History of Present Illness   HPI: Claude Flower is a 82y o  year old male who has a history of chronic atrial fibrillation - on xarelto, curved biventricular systolic function, mild aortic stenosis, HTN, and Parkinson's  Patient follows with cardiologist Dr Deborah Lamas with [facility] outpatient  He did have cardiac monitoring performed in the past dating back to June 2018 which demonstrated atrial fibrillation with an average heart rate of 65 beats per minute, with heart rates ranging from   He did have evidence of pauses noted on Holter monitoring with the longest being 3 seconds  He does not remember ever having any discussions with his primary cardiologist in regards to a pacemaker  Patient states he is a relatively active gentleman  He frequently walks around his neighborhood without any significant cardiac complaints  He did have a recent office visit with his PCP 5/24/21 for c/o brief lightheadedness in the early morning that had quickly resolved  He states he had felt better on his way over to the PCP with no further symptoms that day  No medication adjustments were made at this time  However there was concern that the patient may have doubled up on his propanolol dose prior to presenting to the PCP  He presented to the \[facility]\"" ED on 6/13/2021 s/p fall at home while walking up the stairs from his basement  Pt denied any c/o lightheadedness, dizziness, or  palpitations prior to sustaining the fall, nor does he clearly remember losing consciousness  Trauma survey negative for acute findings    Xray of the left clavicle showed probable displaced fracture of the midshaft of the left clavicle and CT abd revealed fluid collection in left lateral abdominal wall likely representing hematoma  All other  trauma imaging negative for acute findings/injuries  Patient was noted to have been bradycardic with HR's in the 50's on admission  Initial EKG 6/13 demonstrated afib with slow ventricular response  Patient did report that he may have doubled his propanolol dose the day of admission while organizing his weekly pills  Today he denies any chest pain, SOB, palpitations, lightheadedness/dizziness  States he feels good and wants to go home  Reports not feeling dizzy or lightheaded at time of fall  Does admit to sometimes not being accurate with propanolol dosage and hey may have doubled his dose by mistake on day of admission  EKG reviewed personally: Atrial fibrillation with slow ventricular response, HR 51, QRS 94, QTc 411  Telemetry reviewed personally: Atrial fibrillation rate controlled with HR 70s, episodes of SVR with HR 50s  Review of Systems   Constitutional: Negative for chills, diaphoresis, fever and weight gain  Eyes: Negative for blurred vision and double vision  Cardiovascular: Negative for chest pain, cyanosis, dyspnea on exertion, leg swelling, near-syncope, orthopnea, palpitations and syncope  Respiratory: Negative for cough, shortness of breath and wheezing  Gastrointestinal: Negative for abdominal pain, diarrhea, nausea and vomiting  Neurological: Positive for tremors  Negative for dizziness, headaches and light-headedness  All other systems reviewed and are negative      Historical Information   Past Medical History:   Diagnosis Date    Afib ([address] )     Left AC separation      Past Surgical History:   Procedure Laterality Date    SHOULDER SURGERY       Social History     Substance and Sexual Activity   Alcohol Use Not Currently     Social History     Substance and Sexual Activity   Drug Use Never     Social History Tobacco Use   Smoking Status Never Smoker   Smokeless Tobacco Never Used     Family History:   Family History   Problem Relation Age of Onset    Hypertension Father        Meds/Allergies   all current active meds have been reviewed       No Known Allergies    Objective   Vitals: Blood pressure 122/78, pulse 64, temperature 98 5 °F (36 9 °C), temperature source Oral, resp  rate 16, weight 112 kg (247 lb 5 7 oz), SpO2 98 %  , There is no height or weight on file to calculate BMI ,     Systolic (34RJN), MIN:106 , Min:110 , PSQ:933     Diastolic (82HCT), NQH:67, Min:56, Max:81        Intake/Output Summary (Last 24 hours) at 6/15/2021 1420  Last data filed at 6/15/2021 1342  Gross per 24 hour   Intake 3748 75 ml   Output 350 ml   Net 3398 75 ml     Wt Readings from Last 3 Encounters:   06/13/21 112 kg (247 lb 5 7 oz)     Invasive Devices     Peripheral Intravenous Line            Peripheral IV 06/13/21 Right Antecubital 2 days                Physical Exam  Constitutional:       General: He is not in acute distress  Appearance: Normal appearance  HENT:      Head: Normocephalic  Eyes:      Conjunctiva/sclera: Conjunctivae normal       Pupils: Pupils are equal, round, and reactive to light  Cardiovascular:      Rate and Rhythm: Bradycardia present  Rhythm irregular  Pulses: Normal pulses  Heart sounds: Normal heart sounds  Pulmonary:      Effort: Pulmonary effort is normal       Breath sounds: Normal breath sounds  Abdominal:      General: Bowel sounds are normal       Palpations: Abdomen is soft  Musculoskeletal:         General: Normal range of motion  Cervical back: Normal range of motion and neck supple  Right lower leg: No edema  Left lower leg: No edema  Skin:     General: Skin is warm and dry  Findings: Bruising present  Comments: Left sided abdominal bruising   Neurological:      General: No focal deficit present        Mental Status: He is alert and oriented to person, place, and time  Mental status is at baseline  Psychiatric:         Mood and Affect: Mood normal          Behavior: Behavior normal          LABORATORY RESULTS:  Results from last 7 days   Lab Units 06/13/21  1335   TROPONIN I ng/mL <0 02     CBC with diff:   Results from last 7 days   Lab Units 06/15/21  0829 06/15/21  0513 06/15/21  0008 06/14/21  1757 06/14/21  0915 06/14/21  0613 06/14/21  0227 06/13/21  1336 06/13/21  1335   WBC Thousand/uL  --  10 23*  --   --   --  12 33*  --   --  10 16   HEMOGLOBIN g/dL 11 0* 11 2* 11 0* 11 8* 12 2 11 8* 12 3   < > 12 8   HEMATOCRIT % 33 9* 34 6* 33 0* 35 2* 36 8 36 6 37 2   < > 39 5   MCV fL  --  91  --   --   --  92  --   --  92   PLATELETS Thousands/uL  --  250  --   --   --  288  --   --  318   MCH pg  --  29 4  --   --   --  29 5  --   --  29 7   MCHC g/dL  --  32 4  --   --   --  32 2  --   --  32 4   RDW %  --  13 9  --   --   --  13 6  --   --  13 7   MPV fL  --  9 5  --   --   --  9 5  --   --  9 2   NRBC AUTO /100 WBCs  --   --   --   --   --  0  --   --  0    < > = values in this interval not displayed         CMP:  Results from last 7 days   Lab Units 06/15/21  0513 06/14/21 0613 06/13/21 1336 06/13/21  1335   POTASSIUM mmol/L 3 8 3 8  --  4 3   CHLORIDE mmol/L 105 106  --  107   CO2 mmol/L 28 26  --  27   CO2, I-STAT mmol/L  --   --  28  --    BUN mg/dL 11 13  --  12   CREATININE mg/dL 0 76 0 88  --  0 83   GLUCOSE, ISTAT mg/dl  --   --  120  --    CALCIUM mg/dL 8 8 8 8  --  9 1   EGFR ml/min/1 73sq m 84 79  --  65       BMP:  Results from last 7 days   Lab Units 06/15/21  0513 06/14/21  0613 06/13/21  1336 06/13/21  1335   POTASSIUM mmol/L 3 8 3 8  --  4 3   CHLORIDE mmol/L 105 106  --  107   CO2 mmol/L 28 26  --  27   CO2, I-STAT mmol/L  --   --  28  --    BUN mg/dL 11 13  --  12   CREATININE mg/dL 0 76 0 88  --  0 83   GLUCOSE, ISTAT mg/dl  --   --  120  --    CALCIUM mg/dL 8 8 8 8  --  9 1       Lab Results   Component Value Date    CREATININE 0 76 06/15/2021    CREATININE 0 88 06/14/2021    CREATININE 0 83 06/13/2021       No results found for: NTBNP       Results from last 7 days   Lab Units 06/14/21  0613   MAGNESIUM mg/dL 2 2                     Results from last 7 days   Lab Units 06/13/21  1335   INR  1 18     Lipid Profile:   No results found for: CHOL  No results found for: HDL  No results found for: LDLCALC  No results found for: TRIG    Cardiac testing:   No results found for this or any previous visit  No results found for this or any previous visit  No valid procedures specified  No results found for this or any previous visit  Imaging: I have personally reviewed pertinent reports  XR clavicle left    Result Date: 6/14/2021  Narrative: TRAUMA SERIES INDICATION:  injury  COMPARISON:  None VIEWS:  XR TRAUMA MULTIPLE Images: 5  FINDINGS: CHEST: Supine frontal view of the chest is obtained  Cardiomediastinal silhouette is within normal limits accounting for technique and patient positioning  Lungs are clear  No layering pleural effusions detected  No pneumothorax is seen on this supine film  Upright images are more sensitive to detect anterior pneumothoraces if relevant  No displaced rib fractures  Left clavicle, left shoulder 4 views  Considerable deformity of the mid and lateral left clavicle  This is not well evaluated on this examination  There may be an acute displaced fracture of the midshaft of the clavicle but additional deformity suggests prior injury  Moderate glenohumeral degenerative changes  No evidence for shoulder dislocation  Impression: No acute cardiopulmonary disease within limitations of supine imaging  Probable displaced fracture of the midshaft of the left clavicle  Additional clavicle deformity suggests that there is probably prior injury also  Evaluation is not optimal due to patient condition  The study was marked in [facility] for immediate notification   Workstation performed: [id number]     XR shoulder 2+ vw left    Result Date: 6/14/2021  Narrative: TRAUMA SERIES INDICATION:  injury  COMPARISON:  None VIEWS:  XR TRAUMA MULTIPLE Images: 5  FINDINGS: CHEST: Supine frontal view of the chest is obtained  Cardiomediastinal silhouette is within normal limits accounting for technique and patient positioning  Lungs are clear  No layering pleural effusions detected  No pneumothorax is seen on this supine film  Upright images are more sensitive to detect anterior pneumothoraces if relevant  No displaced rib fractures  Left clavicle, left shoulder 4 views  Considerable deformity of the mid and lateral left clavicle  This is not well evaluated on this examination  There may be an acute displaced fracture of the midshaft of the clavicle but additional deformity suggests prior injury  Moderate glenohumeral degenerative changes  No evidence for shoulder dislocation  Impression: No acute cardiopulmonary disease within limitations of supine imaging  Probable displaced fracture of the midshaft of the left clavicle  Additional clavicle deformity suggests that there is probably prior injury also  Evaluation is not optimal due to patient condition  The study was marked in [facility] for immediate notification  Workstation performed: [id number]     CT head without contrast    Result Date: 6/13/2021  Narrative: CT BRAIN - WITHOUT CONTRAST INDICATION:   injury  COMPARISON:  None  TECHNIQUE:  CT examination of the brain was performed  In addition to axial images, sagittal and coronal 2D reformatted images were created and submitted for interpretation  Radiation dose length product (DLP) for this visit:  959 37 mGy-cm   This examination, like all CT scans performed in the [facility], was performed utilizing techniques to minimize radiation dose exposure, including the use of iterative  reconstruction and automated exposure control  IMAGE QUALITY:  Diagnostic   FINDINGS: PARENCHYMA: Decreased attenuation is noted in periventricular and subcortical white matter demonstrating an appearance that is statistically most likely to represent mild microangiopathic change  No CT signs of acute infarction  No intracranial mass, mass effect or midline shift  No acute parenchymal hemorrhage  VENTRICLES AND EXTRA-AXIAL SPACES:  Normal for the patient's age  VISUALIZED ORBITS AND PARANASAL SINUSES:  Unremarkable  CALVARIUM AND EXTRACRANIAL SOFT TISSUES:  Normal      Impression: No acute intracranial abnormality  Microangiopathic changes  I personally discussed this study with Luis Antonio Cam on 6/13/2021 at 2:07 PM  Workstation performed: [id number]     CT spine cervical without contrast    Result Date: 6/13/2021  Narrative: CT CERVICAL SPINE - WITHOUT CONTRAST INDICATION:   injury  COMPARISON:  None  TECHNIQUE:  CT examination of the cervical spine was performed without intravenous contrast   Contiguous axial images were obtained  Sagittal and coronal reconstructions were performed  Radiation dose length product (DLP) for this visit:  491 98 mGy-cm   This examination, like all CT scans performed in the [facility], was performed utilizing techniques to minimize radiation dose exposure, including the use of iterative  reconstruction and automated exposure control  IMAGE QUALITY:  Diagnostic  FINDINGS: ALIGNMENT:  There is straightening of normal cervical lordosis  No subluxation or compression deformity  VERTEBRAL BODIES:  No fracture  DEGENERATIVE CHANGES:  No significant cervical degenerative changes are noted  PREVERTEBRAL AND PARASPINAL SOFT TISSUES:  Unremarkable  THORACIC INLET:  Normal      Impression: No cervical spine fracture or traumatic malalignment  I personally discussed this study with Luis Antonio Cam on 6/13/2021 at 2:07 PM   Workstation performed: [id number]     XR chest 1 view    Result Date: 6/14/2021  Narrative: TRAUMA SERIES INDICATION:  injury   COMPARISON:  None VIEWS:  XR TRAUMA MULTIPLE Images: 5  FINDINGS: CHEST: Supine frontal view of the chest is obtained  Cardiomediastinal silhouette is within normal limits accounting for technique and patient positioning  Lungs are clear  No layering pleural effusions detected  No pneumothorax is seen on this supine film  Upright images are more sensitive to detect anterior pneumothoraces if relevant  No displaced rib fractures  Left clavicle, left shoulder 4 views  Considerable deformity of the mid and lateral left clavicle  This is not well evaluated on this examination  There may be an acute displaced fracture of the midshaft of the clavicle but additional deformity suggests prior injury  Moderate glenohumeral degenerative changes  No evidence for shoulder dislocation  Impression: No acute cardiopulmonary disease within limitations of supine imaging  Probable displaced fracture of the midshaft of the left clavicle  Additional clavicle deformity suggests that there is probably prior injury also  Evaluation is not optimal due to patient condition  The study was marked in [facility] for immediate notification  Workstation performed: [id number]     CT chest abdomen pelvis w contrast    Result Date: 6/14/2021  Narrative: CT CHEST, ABDOMEN AND PELVIS WITH IV CONTRAST INDICATION:   Chest-abdomen-pelvis trauma, blunt flank echymosis s/p trauma  COMPARISON:  None  TECHNIQUE: CT examination of the chest, abdomen and pelvis was performed  Axial, sagittal, and coronal 2D reformatted images were created from the source data and submitted for interpretation  Radiation dose length product (DLP) for this visit:  2778 66 mGy-cm   This examination, like all CT scans performed in the [facility], was performed utilizing techniques to minimize radiation dose exposure, including the use of iterative reconstruction and automated exposure control   IV Contrast:  100 mL of iohexol (OMNIPAQUE) Enteric Contrast: Enteric contrast was administered  FINDINGS: CHEST LUNGS:  Lungs are clear  There is no tracheal or endobronchial lesion  PLEURA:  Unremarkable  HEART/GREAT VESSELS:  The heart is mildly enlarged  Coronary artery calcifications are visualized  MEDIASTINUM AND YNES:  Unremarkable  CHEST WALL AND LOWER NECK:   Unremarkable  ABDOMEN LIVER/BILIARY TREE:  Unremarkable  GALLBLADDER:  No calcified gallstones  No pericholecystic inflammatory change  SPLEEN:  Unremarkable  PANCREAS:  Unremarkable  ADRENAL GLANDS:  Unremarkable  KIDNEYS/URETERS:  Unremarkable  No hydronephrosis  STOMACH AND BOWEL:  Unremarkable  APPENDIX:  No findings to suggest appendicitis  ABDOMINOPELVIC CAVITY:  No ascites  No pneumoperitoneum  No lymphadenopathy  VESSELS:  Unremarkable for patient's age  PELVIS REPRODUCTIVE ORGANS:  Unremarkable for patient's age  URINARY BLADDER:  Unremarkable  ABDOMINAL WALL/INGUINAL REGIONS:  Fat-containing right inguinal hernia is seen  There is a fluid collection measuring 6 9 x 5 2 cm in the left lateral abdominal wall overlying the left iliac bone with active extravasation of contrast  OSSEOUS STRUCTURES:  No acute fracture or destructive osseous lesion  Impression: Fluid collection in the left lateral abdominal wall overlying the left iliac bone with active extravasation of contrast   Findings likely representing hematoma with active hemorrhage  The study was marked in [facility] for immediate notification  Workstation performed: [id number]     XR trauma multiple    Result Date: 6/13/2021  Narrative: TRAUMA SERIES INDICATION:  injury  COMPARISON:  None VIEWS:  XR TRAUMA MULTIPLE Images: 5  FINDINGS: CHEST: Supine frontal view of the chest is obtained  Cardiomediastinal silhouette is within normal limits accounting for technique and patient positioning  Lungs are clear  No layering pleural effusions detected  No pneumothorax is seen on this supine film    Upright images are more sensitive to detect anterior pneumothoraces if relevant  No displaced rib fractures  Left clavicle, left shoulder 4 views  Considerable deformity of the mid and lateral left clavicle  This is not well evaluated on this examination  There may be an acute displaced fracture of the midshaft of the clavicle but additional deformity suggests prior injury  Moderate glenohumeral degenerative changes  No evidence for shoulder dislocation  Impression: No acute cardiopulmonary disease within limitations of supine imaging  Probable displaced fracture of the midshaft of the left clavicle  Additional clavicle deformity suggests that there is probably prior injury also  Evaluation is not optimal due to patient condition  The study was marked in [facility] for immediate notification  Workstation performed: [id number]     US bedside procedure    Result Date: 6/13/2021  Narrative: 1 2 [id number]  3 [id number]  3 [id number]      Assessment  Principal Problem:    Abdominal wall hematoma  Active Problems:    Atrial fibrillation (HCC)    Parkinson disease (HCC)    Hypertension    Aortic stenosis    Bradycardia    Fall    Acute blood loss anemia      Thank you for allowing us to participate in this patient's care  This pt will follow up with          once discharged  Counseling / Coordination of Care  Total floor / unit time spent today 45 minutes  Greater than 50% of total time was spent with the patient and / or family counseling and / or coordination of care  A description of the counseling / coordination of care: Review of history, current assessment, development of a plan  Code Status: Level 1 - Full Code    ** Please Note: Dragon 360 Dictation voice to text software may have been used in the creation of this document   **

## 2021-06-15 NOTE — CONSULTS
Consultation - Geriatric Medicine   Charlie Iraheta 82 y o  male MRN: [id number]  [facility]: [id number]      Assessment/Plan     Ambulatory dysfunction with fall   -reportedly mechanical fall down stairs 6/13/21  -(+) head strike, (-) loss of consciousness  -14 [facility] 6/13/21 reports no acute intracranial abnormalities  -denies use of assist device for ambulation at baseline  -increased risk falls in future due to age having had history of fall, impaired vision, deconditioning debility unfamiliar environment and poor safety awareness  -continue good body mechanics assist with transfers  -keep personal items and call bell close to prevent reaching  -recommend home fall risk assessment consideration of personal fall alert system  -PT and OT following, recommend home with VNA and use of walker     Abdominal wall hematoma  -CT chest and pelvis admission reports left lateral abdominal wall hematoma  -hemoglobin appears to be remaining stable around 11-12  -continue management per Trauma     Left shoulder pain  -left shoulder XR 6/22/21 reports probable fracture midshaft left clavicle, consider Orthopedics evaluation  -continue acute pain control per geriatric pain protocol    Acute pain due to trauma  -recommend recommend ain control per Geriatric pain protocol:  Tylenol 975mg Q8H scheduled  Roxicodone 2 5mg Q4H PRN moderate pain  Roxicodone 5mg Q4H PRN severe pain  Dilaudid 0 2mg Q4H PRN  -consider adjuncts such as Gabapentin 100mg HS and lidocaine patch topically  -encourage addition of non-pharmacologic pain treatment including ice and frequent repositioning  -recommend  bowel regimen to prevent and treat constipation due to increased risk with acute pain and opiate pain medications    Parkinson's disease   -follows with Dr Loreli Denver (Neurology) last see 4/13/21  -maintained on Sinemet 25/100mg TID (per most recent O/P Neurology visit only taking in morning and sometimes once in evening)   -maintained on propranolol 20 mg BID, once in morning and once at 1500 hours for tremors, monitor HR/BP with use given risk orthostatics jose luis in pt with underlying Parkinson disease already at high risk orthostasis and falls  -continue stressed importance of adherence to medication regimen as prescribed  -continue close o/p f/u with Neurology     Cognitive impairment  -seen by Neurology at [facility], more recently follows with [address] 19/30 (11/2018), ddx at that time included DLB vs vasc dementia with IPD  -no repeat cognitive testing since that time and has been doing community dwelling with family support, consider repeat cognitive testing as o/p  -currently managing on medications, encourage family supervision/support worse pill pack ease of use and compliance   -consider cognitively stimulating activities/puzzles   -continue to encourage patient to remain physically, socially and cognitively engaged to maintain cognitive acuity  -increased risk of delirium due to underlying cognitive impairment, continue delirium precautions     Anxiety  -maintained on Lexapro 10 mg daily  -not maintained on alprazolam since 2018, recommend d/c given increased risk of confusion and falls in elderly individuals   -consider referral to support group/counseling as outpatient as part of comprehensive multimodal treatment plan  -patient reports good psychosocial supports    Chronic AFib with slow ventricular response  -maintained on Xarelto for anticoagulation which patient reports was changed from Coumadin by his Cardiologist as o/p  -mild bradycardia on admission likely secondary to propranolol which patient takes for control of tremors likely due to Parkinson's, may benefit dose reduction Cardiology following    Impaired Vision  -recommend use of corrective lenses at all appropriate times  -encourage adequate lighting and encourage use of assistance with ambulation  -keep personal belongings close to person to avoid reaching  -encourage appropriate footwear at all times    Deconditioning/debility/frailty  -clinical frailty scale stage 5, mildly frail  -multifactorial including cognitive impairment, parkinson's disease, afib, AS, and multiple chronic medical co-morbidities  -continue to encourage well balanced nutrition  -continue optimization of chronic medical conditions   -ensure mood/anxiety symptoms well controlled as may impact patients response to therapies as well as overall sense of well-being and quality of life    Delirium precautions  -Patient is high risk of delirium due to age, fall, traumatic injury, acute pain, cognitive impairment, Parkinson's disease, hospitalization  -Initiate delirium precautions  -maintain normal sleep/wake cycle  -minimize overnight interruptions, group overnight vitals/labs/nursing checks as possible  -dim lights, close blinds and turn off tv to minimize stimulation and encourage sleep environment in evenings  -ensure that pain is well controlled   -monitor for fecal and urinary retention which may precipitate delirium  -encourage early mobilization and ambulation with assistance  -provide frequent reorientation and redirection as indicated appropriate  -encourage family and friends at the bedside to help help calm patient if anxious  -limit use medications which may precipitate or worsen delirium such as tramadol, benzodiazepine, anticholinergics, and benadryl, monitor mentation with all medication regimen changes  -encourage hydration and nutrition   -redirect unwanted behaviors as first line, avoid physical restraints    Home medication review   Personally confirmed with [facility] [phone]:    Lexapro 10 mg daily  Xarelto 15 mg daily  Isosorbide mononitrate 30 mg at bedtime  Telmisartan 40 mg daily  Carbidopa-levodopa  mg 3 times daily  Propranolol 20 mg BID    History of Present Illness   Physician Requesting Consult: Ryan Silva MD  Reason for Consult / Principal Problem: Fall   Hx and PE limited by: N/A  Additional history obtained from: Chart review and patient interview, outside records from [address] [place] [address] [address] and [place] reviewed, personally spoke with pharmacy to confirm home medications    HPI: Joe Buck is a 82y o  year old male who presents with Parkinson's disease, hypertension, atrial fibrillation, aortic stenosis, NSVT, anxiety, hyperlipidemia, CAD, history of alcohol abuse, and hx SDH who is admitted to the trauma service with ambulatory dysfunction and fall, he is being seen in consultation by Geriatrics for high risk developing delirium during hospitalization  He seen examined bedside where he sitting resting comfortably, he reports that he had a fall home on 6/13/21 when he lost his footing going up the short flight of stairs in his home, he reports falling backwards striking his head but denies loss of consciousness, he reports also striking his left shoulder causing immediate severe pain  On admission imaging he was found to have a abdominal wall hematoma and probable displaced fracture left clavicle  He reports that he has had recurrent issues with his left shoulder previously required surgical fixation with pin  Prior to admission he resides at home with his wife and reports full independence with ADLs and IADLs, he denies use of assist device for ambulation at baseline or additional falls in the past 6 months, requires use of glasses for reading he does not use hearing aids or dentures  His wife assists with filling his pill boxes for the week and he takes as well out of his pre filled boxes although he admits that frequently he forgets to take his afternoon and evening pills  He admits to some forgetfulness and memory trouble more have early relying on his wife for assistance as his Parkinson's progresses      Inpatient consult to Gerontology  Consult performed by: Madalyn Reagan DO  Consult ordered by: Saurabh Marie MD        Review of Systems   Constitutional: Negative for appetite change, chills and fever  HENT: Negative for dental problem, hearing loss and trouble swallowing  Eyes: Positive for visual disturbance (Wears glasses for reading)  Respiratory: Negative for cough, chest tightness, shortness of breath and wheezing  Cardiovascular: Negative for chest pain  Gastrointestinal: Negative for abdominal pain, constipation, nausea and vomiting  Endocrine: Negative  Genitourinary: Negative for difficulty urinating  Musculoskeletal: Positive for arthralgias (left shoulder) and gait problem  Skin:        Bruising left shoulder   Allergic/Immunologic: Negative  Neurological: Positive for tremors (left arm)  Negative for dizziness and light-headedness  Hematological: Bruises/bleeds easily ( due to Xarelto)  Psychiatric/Behavioral: Negative for sleep disturbance  The patient is nervous/anxious  All other systems reviewed and are negative  Historical Information   Past Medical History:   Diagnosis Date    Afib ([address] )     Left AC separation      Past Surgical History:   Procedure Laterality Date    SHOULDER SURGERY       Social History   Social History     Substance and Sexual Activity   Alcohol Use Not Currently     Social History     Substance and Sexual Activity   Drug Use Never     Social History     Tobacco Use   Smoking Status Never Smoker   Smokeless Tobacco Never Used     Family History:   Family History   Problem Relation Age of Onset    Hypertension Father      Meds/Allergies   all current active meds have been reviewed    No Known Allergies    Objective     Intake/Output Summary (Last 24 hours) at 6/15/2021 1456  Last data filed at 6/15/2021 1342  Gross per 24 hour   Intake 3748 75 ml   Output 350 ml   Net 3398 75 ml     Invasive Devices     Peripheral Intravenous Line            Peripheral IV 06/13/21 Right Antecubital 2 days              Physical Exam  Nursing note reviewed     Constitutional:       Comments: Elderly male sitting in chair aside bed holding ice on left shoulder   HENT:      Head: Normocephalic  Nose: Nose normal       Mouth/Throat:      Mouth: Mucous membranes are moist       Comments: Dentition intact  Eyes:      General: No scleral icterus  Right eye: No discharge  Left eye: No discharge  Conjunctiva/sclera: Conjunctivae normal    Neck:      Comments: Phonation normal  Cardiovascular:      Rate and Rhythm: Normal rate  Heart sounds: Murmur heard  Pulmonary:      Effort: No respiratory distress  Breath sounds: No wheezing  Abdominal:      Palpations: Abdomen is soft  Tenderness: There is no abdominal tenderness  Musculoskeletal:      Cervical back: Neck supple  Right lower leg: No edema  Left lower leg: No edema  Comments: Left shoulder swelling and bruising most notable superior and lateral shoulder, tenderness with palpation and movement   Skin:     General: Skin is warm and dry  Comments: Left shoulder bruising   Neurological:      Mental Status: He is alert        Comments: Left arm tremor at rest  Answers questions appropriately   Psychiatric:      Comments: Pleasant and cooperative       Lab Results:   I have personally reviewed pertinent lab results including the following:  -sodium 138, potassium 3 8, chloride 105, CO2 28, BUN 11, creatinine 0 76 glucose 120, calcium 8 8, GFR 84  -hemoglobin 11 hematocrit 33 9, WBC 10 22, platelets 350  -troponin <0 2    I have personally reviewed the following imaging study reports in PACS:    Chest x-ray 6/13/21:  No acute cardiopulmonary disease within limits of supine imaging, probable displaced fracture midshaft left clavicle  Left clavicle XR 6/13/21:  No acute cardiopulmonary disease, probable displaced fracture midshaft left clavicle additional clubbing pharmacy suggests probable prior injury also  CT head without contrast 6/13/21:  No acute intracranial abnormality, microangiopathic changes  CT C-spine without contrast 6/13/21: No cervical spine fracture or traumatic malalignment  CT chest abdomen pelvis with contrast 6/13/21:  Fluid collection in left lateral abdominal wall overlying left iliac bone without extravasation of contrast, findings likely representing hematoma with active hemorrhage    Therapies:   PT:  Pending  OT:  Pending    VTE Prophylaxis: Enoxaparin (Lovenox)    Code Status: Level 1 - Full Code  Advance Directive and Living Will:      Power of :    POLST:      Family and Social Support:   Living Arrangements: Spouse/significant other  Support Systems: Spouse/significant other  Assistance Needed: tbd  Type of Current Residence: Private residence  Current Home Care Services: No  CM Handoff Comments: 6/15/21 TBD s/p fall at home, left lateral abdominal hematoma, xarelto on hold on IVF's  pending therapy eval    Goals of Care: Go home

## 2021-06-15 NOTE — ASSESSMENT & PLAN NOTE
Episodic bradycardia, as low as 40's on telemetry monitoring, possibly over beta-blocked  Unsure if related to syncopal event  Consult cardiology, hold home beta blocker and any AV marichuy blocking agents

## 2021-06-15 NOTE — PHYSICAL THERAPY NOTE
Physical Therapy Evaluation    Patient's Name: Keagan Navarro    Admitting Diagnosis  Other injury of unspecified body region, initial encounter Lamont Mo  8XXA]    Problem List  Patient Active Problem List   Diagnosis    Atrial fibrillation ([address] )    [facility]    Hypertension    Hyperlipidemia    Aortic stenosis    Bradycardia    Fall    Abdominal wall hematoma    Acute blood loss anemia       Past Medical History  Past Medical History:   Diagnosis Date    Afib ([address] )     Left AC separation        Past Surgical History  Past Surgical History:   Procedure Laterality Date    SHOULDER SURGERY          06/15/21 1029   PT Last Visit   PT Visit Date 06/15/21   Note Type   Note type Evaluation   Pain Assessment   Pain Assessment Tool 0-10   Pain Score 4   Pain Location/Orientation Orientation: Upper; Location: Back   Hospital Pain Intervention(s) Repositioned; Ambulation/increased activity   Home Living   Type of [address] [place] [address] Two level;Bed/bath upstairs;Stairs to enter with rails  ([address])   Home Equipment Cane;Stair glide  (stair glide to 2nd floor)   Prior Function   Level of [place] Independent with ADLs and functional mobility   Lives With Spouse   Receives Help From Family  (states children are local and able to assist)   Falls in the last 6 months 1 to 4  (only this fall on stairs leading to admission)   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)   Comments Pt states he is typically active and independent, uses straight cane occassionally       Restrictions/Precautions   Weight Bearing Precautions Per Order Yes   LUE Weight Bearing Per Order WBAT  (per Dr  Doc Patch)   Braces or Orthoses Other (Comment)  (abdominal binder)   Other Precautions Fall Risk;Pain   General   Family/Caregiver Present No   Cognition   Overall Cognitive Status WFL   Attention Attends with cues to redirect   Orientation Level Oriented X4   Following Commands Follows all commands and directions without difficulty   Comments Pt very pleasant, cooperative and motivated to participate  Good safety awareness and insight   RLE Assessment   RLE Assessment WFL  (Grossly 4/5)   LLE Assessment   LLE Assessment WFL  (Grossly 4/5)   Light Touch   RLE Light Touch Grossly intact   LLE Light Touch Grossly intact   Bed Mobility   Supine to Sit 4  Minimal assistance   Additional items Assist x 1; Increased time required;Verbal cues   Transfers   Sit to Stand 4  Minimal assistance   Additional items Assist x 1; Increased time required;Verbal cues   Stand to Sit 4  Minimal assistance   Additional items Assist x 1; Increased time required;Verbal cues   Additional Comments Initially HHA, with RW   Ambulation/Elevation   Gait pattern Improper Weight shift;Decreased foot clearance; Excessively slow; Short stride   Gait Assistance 4  Minimal assist   Additional items Assist x 1   Assistive Device Other (Comment)  (HHA)   Distance 5 ft to chair, limited due to lightheadedness, BP stable, pt requested time in sitting given orthostatic episode in ED yesterday   Balance   Static Sitting Fair +   Dynamic Sitting Fair -   Static Standing Fair -   Dynamic Standing Poor +   Ambulatory Poor +   Activity Tolerance   Activity Tolerance Patient limited by fatigue   Nurse Made Aware RN updated  Dr Georgina Brady updated on hip pain with ambulation  Assessment   Prognosis Good   Problem List Decreased strength;Decreased endurance;Decreased mobility; Impaired balance;Decreased safety awareness;Pain;Orthopedic restrictions   Assessment Pt is a 82 y o  male seen for PT evaluation s/p admit to [facility] on 6/13/2021  Pt was admitted with a primary dx of: Fall resulting in abdominal wall hematoma  PT now consulted for assessment of mobility and d/c needs  Pt with Up with assistance orders  Pts current comorbidities effecting treatment include: Afib, Bradycardia, Parkinson's disease, HTN, Aortic stenosis, previous L shoulder surgeries   Pts current clinical presentation is Unstable/ Unpredictable (high complexity) due to Ongoing medical management for primary dx, Increased reliance on more restrictive AD compared to baseline, Decreased activity tolerance compared to baseline, Fall risk, Increased assistance needed from caregiver at current time, Current WBS, Trending lab values  Prior to admission, pt was independent  Upon evaluation, pt currently is requiring Adi for bed mobility; Adi for transfers and Adi for ambulation 5 ft w/ HHA  Pt presents at PT eval functioning below baseline and currently w/ overall mobility deficits 2* to: impaired balance, decreased endurance, gait deviations, pain, decreased activity tolerance compared to baseline, decreased functional mobility tolerance compared to baseline, decreased safety awareness, fall risk  Pt currently at a fall risk 2* to impairments listed above  Pt will continue to benefit from skilled acute PT interventions to address stated impairments; to maximize functional mobility; for ongoing pt/ family training; and DME needs  At conclusion of PT session pt returned back in chair with phone and call bell within reach  Pt denies any further questions at this time  Recommend home with family care and HHPT upon hospital D/C  Goals   Patient Goals to go home   STG Expiration Date 06/29/21   Short Term Goal #1 In 14 days pt will be able to: 1  Demonstrate ability to perform all aspects of bed mobility independently to increase functional independence  2  Perform functional transfers with LRAD independently to facilitate safe return to previous living environment  3   Ambulate 150 ft with LRAD independently with stable vitals to improve safety with household distances and reduce fall risk  4  Improve LE strength grades by 1 to increase ease of functional mobility with transfers and gait  5  Pt will demonstrate improved balance by one grade in order to decrease risk of falls   6  Climb 1 step with supervision and 1 HR to simulate entrance to home  Plan   Treatment/Interventions Functional transfer training;LE strengthening/ROM; Therapeutic exercise; Endurance training;Elevations; Patient/family training;Equipment eval/education; Bed mobility;Gait training   PT Frequency Other (Comment)  (3-5x/week)   Recommendation   PT Discharge Recommendation Home with home health rehabilitation   [place] walker   Change/add to Adtuitive? No   PT - OK to Discharge No  (pending stair training)   [address] Mobility Inpatient   Turning in Bed Without Bedrails 4   Lying on Back to Sitting on Edge of Flat Bed 3   Moving Bed to Chair 3   Standing Up From Chair 3   Walk in Room 3   Climb 3-5 Stairs 3   Basic Mobility Inpatient Raw Score 19   Basic Mobility Standardized Score 42 48     Treatment:   11:24-11:47  GT: PT returned to perform gait training per patient request and in preparation for discharge planning  Pt utilized RW for stability, 50 ft x2 trials with short standing rest break between trials secondary to hip pain  VC's for proximity to RW, forward eye gaze, pacing for safety  +2 for chair follow for safety, no seated rest break required  HR max 72 bpm, O2 sats stable on RA  Pt educated on importance of ambulation and OOB in chair during hospital stay to improve ease of functional mobility in preparation for discharge home, verbalized understanding  Educated on importance of RW use for safety  Pt states his children live locally and will be able to assist upon discharge as well  Will plan to trial stairs next session       Edilberto Che, PT, DPT

## 2021-06-15 NOTE — PROGRESS NOTES
[facility]  Progress Note Tim Erm 1935, 82 y o  male MRN: [id number]  Unit/Bed#: [facility] 823-01 Encounter: [id number]  Primary Care Provider: Bryanna Camacho MD   Date and time admitted to hospital: 6/13/2021  1:29 PM    [address]  Pt c/o hip pain today, check xray of L hip  PT/OT evals    Bradycardia  Assessment & Plan  Episodic bradycardia, as low as 40's on telemetry monitoring, possibly over beta-blocked  Unsure if related to syncopal event  Consult cardiology, hold home beta blocker and any AV marichuy blocking agents    Parkinson disease ([address] )  Assessment & Plan  Continue home sinemet    Atrial fibrillation ([facility] [address] )  Assessment & Plan  Holding home beta blocker in setting of bradycardic episodes  Chronically anticoagulated on xarelto, held in setting of abdominal wall hematoma    * Abdominal wall hematoma  Assessment & Plan  Serial hb checks  Hb stable today at 11  Continue abdominal binder  Start dvt ppx today (6/15)        TERTIARY TRAUMA SURVEY NOTE    Prophylaxis: Enoxaparin (Lovenox)    Disposition: Continue inpatient care    Code status:  Level 1 - Full Code    Consultants: Geriatrics, cardiology    Is the patient 72 years or older?: YES:    1  Before the illness or injury that brought you to the Emergency, did you need someone to help you on a regular basis? 1=Yes   2  Since the illness or injury that brought you to the Emergency, have you needed more help than usual to take care of yourself? 1=Yes   3  Have you been hospitalized for one or more nights during the past 6 months (excluding a stay in the Emergency Department)? 0=No   4  In general, do you see well? 0=Yes   5  In general, do you have serious problems with your memory? 0=No   6  Do you take more than three different medications everyday?  1=Yes   TOTAL   3     Did you order a geriatric consult if the score was 2 or greater?: yes          SUBJECTIVE:     Tertiary Exam Due on: 6/15/2021    Mechanism of Injury: Fall     Chief Complaint: Hip pain    HPI/Last 24 hour events: From h&p: "Shy Obando is a 82 y o  female who presents as a level B trauma alert status post fall  Past medical history significant for AFib on Xarelto and two previous left shoulder surgeries for [facility] joint separation  Patient reports that he had a misstep while walking and fell to the ground hitting the left side of his head  No loss of consciousness  Also complaining of mild left shoulder pain  Denies pain elsewhere  Superficial abrasion over dorsum of left hand, left wrist, left elbow, and left occiput    No headaches, lightheadedness, changes in vision, chest pain, palpitations, shortness of breath  "    Active medications:           Current Facility-Administered Medications:     acetaminophen (TYLENOL) tablet 650 mg, 650 mg, Oral, Q6H PRN, 650 mg at 06/14/21 1956    ALPRAZolam (XANAX) tablet 0 25 mg, 0 25 mg, Oral, BID PRN    calcium carbonate (TUMS) chewable tablet 1,000 mg, 1,000 mg, Oral, Daily PRN    carbidopa-levodopa (SINEMET)  mg per tablet 1 tablet, 1 tablet, Oral, TID, 1 tablet at 06/15/21 0926    Diclofenac Sodium (VOLTAREN) 1 % topical gel 2 g, 2 g, Topical, 4x Daily, 2 g at 06/15/21 1045    enoxaparin (LOVENOX) subcutaneous injection 30 mg, 30 mg, Subcutaneous, Q12H ALYSON, 30 mg at 06/15/21 1247    isosorbide mononitrate (IMDUR) 24 hr tablet 30 mg, 30 mg, Oral, Daily, 30 mg at 06/15/21 0832    loratadine (CLARITIN) tablet 10 mg, 10 mg, Oral, Daily, 10 mg at 06/15/21 0832    losartan (COZAAR) tablet 50 mg, 50 mg, Oral, Daily, 50 mg at 06/15/21 0832    melatonin tablet 3 mg, 3 mg, Oral, HS, 3 mg at 06/14/21 2159    neomycin-bacitracin-polymyxin b (NEOSPORIN) ointment 1 small application, 1 small application, Topical, BID, 1 small application at 62/25/45 0832    ondansetron (ZOFRAN) injection 4 mg, 4 mg, Intravenous, Q6H PRN    oxyCODONE (ROXICODONE) IR tablet 2 5 mg, 2 5 mg, Oral, Q4H PRN    oxyCODONE (ROXICODONE) IR tablet 5 mg, 5 mg, Oral, Q4H PRN    propranolol (INDERAL) tablet 20 mg, 20 mg, Oral, Q12H [address]      OBJECTIVE:     Vitals:   Vitals:    06/15/21 1100   BP: 122/78   Pulse: 64   Resp: 16   Temp: 98 5 °F (36 9 °C)   SpO2: 98%       Physical Exam:   GENERAL APPEARANCE: chronically ill appearing, obese, no acute distress  NEURO: GCS 15, non-focal  HEENT: normocephalic atraumatic  CV: RRR  LUNGS: CTA b/l  GI: Soft, non-tender, non-distended, binder in place  : deferred  MSK: Pain in L hip  SKIN: warm and well perfused    I/O:   I/O       06/13 0701 - 06/14 0700 06/14 0701 - 06/15 0700 06/15 0701 - 06/16 0700    P  O   240 1025    I V  (mL/kg)  1896 3 (16 9) 587 5 (5 2)    Total Intake(mL/kg)  2136 3 (19 1) 1612 5 (14 4)    Urine (mL/kg/hr)  350 (0 1)     Total Output  350     Net  +1786 3 +1612 5           Unmeasured Urine Occurrence  1 x           Invasive Devices: Invasive Devices     Peripheral Intravenous Line            Peripheral IV 06/13/21 Right Antecubital 2 days                  Imaging:   XR clavicle left    Result Date: 6/14/2021  Impression: No acute cardiopulmonary disease within limitations of supine imaging  Probable displaced fracture of the midshaft of the left clavicle  Additional clavicle deformity suggests that there is probably prior injury also  Evaluation is not optimal due to patient condition  The study was marked in [facility] for immediate notification  Workstation performed: [id number]     XR shoulder 2+ vw left    Result Date: 6/14/2021  Impression: No acute cardiopulmonary disease within limitations of supine imaging  Probable displaced fracture of the midshaft of the left clavicle  Additional clavicle deformity suggests that there is probably prior injury also  Evaluation is not optimal due to patient condition  The study was marked in [facility] for immediate notification   Workstation performed: [id number]     CT head without contrast    Result Date: 6/13/2021  Impression: No acute intracranial abnormality  Microangiopathic changes  I personally discussed this study with Luis Antonio Cam on 6/13/2021 at 2:07 PM  Workstation performed: [id number]     CT spine cervical without contrast    Result Date: 6/13/2021  Impression: No cervical spine fracture or traumatic malalignment  I personally discussed this study with Luis Antonio Cam on 6/13/2021 at 2:07 PM   Workstation performed: [id number]     XR chest 1 view    Result Date: 6/14/2021  Impression: No acute cardiopulmonary disease within limitations of supine imaging  Probable displaced fracture of the midshaft of the left clavicle  Additional clavicle deformity suggests that there is probably prior injury also  Evaluation is not optimal due to patient condition  The study was marked in [facility] for immediate notification  Workstation performed: [id number]     CT chest abdomen pelvis w contrast    Result Date: 6/14/2021  Impression: Fluid collection in the left lateral abdominal wall overlying the left iliac bone with active extravasation of contrast   Findings likely representing hematoma with active hemorrhage  The study was marked in [facility] for immediate notification  Workstation performed: [id number]     XR trauma multiple    Result Date: 6/13/2021  Impression: No acute cardiopulmonary disease within limitations of supine imaging  Probable displaced fracture of the midshaft of the left clavicle  Additional clavicle deformity suggests that there is probably prior injury also  Evaluation is not optimal due to patient condition  The study was marked in [facility] for immediate notification   Workstation performed: [id number]       Labs:   CBC:   Lab Results   Component Value Date    WBC 10 23 (H) 06/15/2021    HGB 11 0 (L) 06/15/2021    HCT 33 9 (L) 06/15/2021    MCV 91 06/15/2021     06/15/2021    MCH 29 4 06/15/2021    MCHC 32 4 06/15/2021    RDW 13 9 06/15/2021    MPV 9 5 06/15/2021     CMP:   Lab Results   Component Value Date     06/15/2021    CO2 28 06/15/2021    BUN 11 06/15/2021    CREATININE 0 76 06/15/2021    CALCIUM 8 8 06/15/2021    EGFR 84 06/15/2021

## 2021-06-15 NOTE — CASE MANAGEMENT
Pt recommended for a home d/c with VNA and a walker  CM left voicemail with pt's wife to see if the pt has DME as well as gauge their interest in VNA

## 2021-06-15 NOTE — CASE MANAGEMENT
LOS 1 day   Bundle No  30 day readmit NO  Met with patient in his room, explained the role of the  and program      82year old male lives at home with his wife Cecily in a 2 story home with 1 step and railing going into the front of the home and 5 steps into the back entrance which pt states he fell going up the back steps  Pt has a stair glide in the home going up from the first floor to the second which is primarily used by his wife but states he occasionally uses himself if tired  Patient states prior to hospitalization he was independent with ambulation, used a cane occasionally and was independent with ADL's  Pt states he drives  [address] is [facility] [address]  Pt denies any past rehab/SNF stays or having any home care agency  Pt denies a history of alcohol or drug abuse and states he quit smoking " many years ago"  Pt is retired and primary caregiver would be his wife Alee Maldonado  Wife Cecily and /or daughter Priya Kaur will transport home  Pt states that if he needs to go to a rehab he would like It to be close to his home and located around the [address] [place] [address]  CM reviewed d/c planning process including the following: identifying help at home, patient preference for d/c planning needs, Discharge Lounge, [facility] to [facility] program, availability of treatment team to discuss questions or concerns patient and/or family may have regarding understanding medications and recognizing signs and symptoms once discharged  CM also encouraged patient to follow up with all recommended appointments after discharge  Patient advised of importance for patient and family to participate in managing patients medical well being

## 2021-06-16 ENCOUNTER — APPOINTMENT (INPATIENT)
Dept: RADIOLOGY | Facility: HOSPITAL | Age: 86
DRG: 605 | End: 2021-06-16
Payer: MEDICARE

## 2021-06-16 PROBLEM — M25.512 LEFT SHOULDER PAIN: Status: ACTIVE | Noted: 2021-06-16

## 2021-06-16 LAB
ANION GAP SERPL CALCULATED.3IONS-SCNC: 8 MMOL/L (ref 4–13)
BASOPHILS # BLD AUTO: 0.05 THOUSANDS/ΜL (ref 0–0.1)
BASOPHILS NFR BLD AUTO: 1 % (ref 0–1)
BUN SERPL-MCNC: 10 MG/DL (ref 5–25)
CALCIUM SERPL-MCNC: 8.5 MG/DL (ref 8.3–10.1)
CHLORIDE SERPL-SCNC: 108 MMOL/L (ref 100–108)
CO2 SERPL-SCNC: 25 MMOL/L (ref 21–32)
CREAT SERPL-MCNC: 0.68 MG/DL (ref 0.6–1.3)
EOSINOPHIL # BLD AUTO: 0.25 THOUSAND/ΜL (ref 0–0.61)
EOSINOPHIL NFR BLD AUTO: 2 % (ref 0–6)
ERYTHROCYTE [DISTWIDTH] IN BLOOD BY AUTOMATED COUNT: 13.9 % (ref 11.6–15.1)
GFR SERPL CREATININE-BSD FRML MDRD: 88 ML/MIN/1.73SQ M
GLUCOSE SERPL-MCNC: 106 MG/DL (ref 65–140)
HCT VFR BLD AUTO: 32.9 % (ref 36.5–49.3)
HGB BLD-MCNC: 11 G/DL (ref 12–17)
IMM GRANULOCYTES # BLD AUTO: 0.05 THOUSAND/UL (ref 0–0.2)
IMM GRANULOCYTES NFR BLD AUTO: 1 % (ref 0–2)
LYMPHOCYTES # BLD AUTO: 2.15 THOUSANDS/ΜL (ref 0.6–4.47)
LYMPHOCYTES NFR BLD AUTO: 21 % (ref 14–44)
MCH RBC QN AUTO: 30.3 PG (ref 26.8–34.3)
MCHC RBC AUTO-ENTMCNC: 33.4 G/DL (ref 31.4–37.4)
MCV RBC AUTO: 91 FL (ref 82–98)
MONOCYTES # BLD AUTO: 1.37 THOUSAND/ΜL (ref 0.17–1.22)
MONOCYTES NFR BLD AUTO: 13 % (ref 4–12)
NEUTROPHILS # BLD AUTO: 6.5 THOUSANDS/ΜL (ref 1.85–7.62)
NEUTS SEG NFR BLD AUTO: 62 % (ref 43–75)
NRBC BLD AUTO-RTO: 0 /100 WBCS
PLATELET # BLD AUTO: 245 THOUSANDS/UL (ref 149–390)
PMV BLD AUTO: 9.8 FL (ref 8.9–12.7)
POTASSIUM SERPL-SCNC: 3.5 MMOL/L (ref 3.5–5.3)
RBC # BLD AUTO: 3.63 MILLION/UL (ref 3.88–5.62)
SODIUM SERPL-SCNC: 141 MMOL/L (ref 136–145)
WBC # BLD AUTO: 10.37 THOUSAND/UL (ref 4.31–10.16)

## 2021-06-16 PROCEDURE — 80048 BASIC METABOLIC PNL TOTAL CA: CPT | Performed by: PHYSICIAN ASSISTANT

## 2021-06-16 PROCEDURE — 73050 X-RAY EXAM OF SHOULDERS: CPT

## 2021-06-16 PROCEDURE — 99232 SBSQ HOSP IP/OBS MODERATE 35: CPT | Performed by: EMERGENCY MEDICINE

## 2021-06-16 PROCEDURE — 73000 X-RAY EXAM OF COLLAR BONE: CPT

## 2021-06-16 PROCEDURE — 99232 SBSQ HOSP IP/OBS MODERATE 35: CPT | Performed by: PHYSICIAN ASSISTANT

## 2021-06-16 PROCEDURE — 99232 SBSQ HOSP IP/OBS MODERATE 35: CPT | Performed by: INTERNAL MEDICINE

## 2021-06-16 PROCEDURE — 85025 COMPLETE CBC W/AUTO DIFF WBC: CPT | Performed by: PHYSICIAN ASSISTANT

## 2021-06-16 RX ADMIN — DICLOFENAC SODIUM 2 G: 10 GEL TOPICAL at 09:54

## 2021-06-16 RX ADMIN — CARBIDOPA AND LEVODOPA 1 TABLET: 25; 100 TABLET ORAL at 21:42

## 2021-06-16 RX ADMIN — PROPRANOLOL HYDROCHLORIDE 20 MG: 20 TABLET ORAL at 21:49

## 2021-06-16 RX ADMIN — BACITRACIN, NEOMYCIN, POLYMYXIN B 1 SMALL APPLICATION: 400; 3.5; 5 OINTMENT TOPICAL at 17:11

## 2021-06-16 RX ADMIN — BACITRACIN, NEOMYCIN, POLYMYXIN B 1 SMALL APPLICATION: 400; 3.5; 5 OINTMENT TOPICAL at 09:55

## 2021-06-16 RX ADMIN — LORATADINE 10 MG: 10 TABLET ORAL at 09:54

## 2021-06-16 RX ADMIN — ISOSORBIDE MONONITRATE 30 MG: 30 TABLET, EXTENDED RELEASE ORAL at 09:55

## 2021-06-16 RX ADMIN — PROPRANOLOL HYDROCHLORIDE 20 MG: 20 TABLET ORAL at 09:56

## 2021-06-16 RX ADMIN — DICLOFENAC SODIUM 2 G: 10 GEL TOPICAL at 13:04

## 2021-06-16 RX ADMIN — ACETAMINOPHEN 650 MG: 325 TABLET, FILM COATED ORAL at 09:55

## 2021-06-16 RX ADMIN — DICLOFENAC SODIUM 2 G: 10 GEL TOPICAL at 21:42

## 2021-06-16 RX ADMIN — LOSARTAN POTASSIUM 50 MG: 50 TABLET, FILM COATED ORAL at 09:55

## 2021-06-16 RX ADMIN — ENOXAPARIN SODIUM 30 MG: 30 INJECTION, SOLUTION INTRAVENOUS; SUBCUTANEOUS at 09:54

## 2021-06-16 RX ADMIN — CARBIDOPA AND LEVODOPA 1 TABLET: 25; 100 TABLET ORAL at 09:55

## 2021-06-16 RX ADMIN — CARBIDOPA AND LEVODOPA 1 TABLET: 25; 100 TABLET ORAL at 17:11

## 2021-06-16 RX ADMIN — DICLOFENAC SODIUM 2 G: 10 GEL TOPICAL at 17:11

## 2021-06-16 RX ADMIN — RIVAROXABAN 15 MG: 15 TABLET, FILM COATED ORAL at 13:04

## 2021-06-16 RX ADMIN — MELATONIN TAB 3 MG 3 MG: 3 TAB at 21:42

## 2021-06-16 NOTE — ASSESSMENT & PLAN NOTE
Episodic bradycardia, as low as 40's on telemetry monitoring, possibly over beta-blocked  Unsure if related to syncopal event  Consult cardiology--approved resuming Inderal   Will continue to monitor heart rate

## 2021-06-16 NOTE — PHYSICAL THERAPY NOTE
Physical Therapy Cancellation Note     06/16/21 1219   PT Last Visit   PT Visit Date 06/16/21   Note Type   Note Type Treatment   Cancel Reasons Other  (pending clavicle xray; pending LE/ pelvic imaging final read)     PT PENDING CLAVICLE IMAGING; AND PENDING FINAL READ OF LE/ PELVIC IMAGING  ALSO NOTED ORTHO CONSULT- WILL F/U PM FOR SESSION IF MEDICALLY APPROPRIATE       Dora Meza, PT

## 2021-06-16 NOTE — CONSULTS
Consultation- Orthopedics   Anselmo Sarmiento 82 y o  male MRN: [id number]  Unit/Bed#: [facility] 823-01      Chief Complaint:   left shoulder pain    HPI:   82 y o male right hand dominant status post fall down stairs at home complaining of left shoulder pain  Had a ground level mechanical fall 3 days ago fell onto his left side, complaining primarily of left shoulder pain  Struck head but did not lose consciousness  Pain localized to left clavicle does not radiate  Denies numbness tingling other tender areas in extremities  Pain worse with movement especially attempting to raise left arm above head, feels popping sensation and pain when he attempts to do so  No significant medical history  Has broken his left clavicle 3 times prior reportedly  Had surgery to correct clavicle fracture twice before, doesn't recall exactly when or with what surgeon but believes the first few were in college  He reports at least one episode of left clavicle hardware failure after a prior fall      Review Of Systems:   · Skin: Normal  · Neuro: See HPI  · Musculoskeletal: See HPI  · 14 point review of systems negative except as stated above     Past Medical History:   Past Medical History:   Diagnosis Date    Afib ([address] )     Left AC separation        Past Surgical History:   Past Surgical History:   Procedure Laterality Date    SHOULDER SURGERY         Family History:  Family history reviewed and non-contributory  Family History   Problem Relation Age of Onset    Hypertension Father        Social History:  Social History     Socioeconomic History    Marital status: /Civil Union     Spouse name: None    Number of children: None    Years of education: None    Highest education level: None   Occupational History    None   Tobacco Use    Smoking status: Never Smoker    Smokeless tobacco: Never Used   Vaping Use    Vaping Use: Never used   Substance and Sexual Activity    Alcohol use: Not Currently    Drug use: Never    Sexual activity: Not Currently   Other Topics Concern    None   Social History Narrative    None     Social Determinants of Health     Financial Resource Strain:     Difficulty of Paying Living Expenses:    Food Insecurity:     Worried About Running Out of Food in the Last Year:     [address] in the Last Year:    Transportation Needs:     Lack of Transportation (Medical):  Lack of Transportation (Non-Medical):    Physical Activity:     Days of Exercise per Week:     Minutes of Exercise per Session:    Stress:     Feeling of Stress :    Social Connections:     Frequency of Communication with Friends and Family:     Frequency of Social Gatherings with Friends and Family:     Attends Baptism Services:     Active Member of Clubs or Organizations:     Attends Club or Organization Meetings:     Marital Status:    Intimate Partner Violence:     Fear of Current or Ex-Partner:     Emotionally Abused:     Physically Abused:     Sexually Abused:         Allergies:   No Known Allergies        Labs:  0   Lab Value Date/Time    HCT 32 9 (L) 06/16/2021 0436    HCT 33 9 (L) 06/15/2021 0829    HCT 34 6 (L) 06/15/2021 0513    HGB 11 0 (L) 06/16/2021 0436    HGB 11 0 (L) 06/15/2021 0829    HGB 11 2 (L) 06/15/2021 0513    INR 1 18 06/13/2021 1335    WBC 10 37 (H) 06/16/2021 0436    WBC 10 23 (H) 06/15/2021 0513    WBC 12 33 (H) 06/14/2021 2038       Meds:    Current Facility-Administered Medications:     acetaminophen (TYLENOL) tablet 650 mg, 650 mg, Oral, Q6H PRN, Quita Arce MD, 650 mg at 06/16/21 0955    ALPRAZolam (XANAX) tablet 0 25 mg, 0 25 mg, Oral, BID PRN, Quita Arce MD    calcium carbonate (TUMS) chewable tablet 1,000 mg, 1,000 mg, Oral, Daily PRN, Quita Arce MD    carbidopa-levodopa (SINEMET)  mg per tablet 1 tablet, 1 tablet, Oral, TID, Quita Arce MD, 1 tablet at 06/16/21 0955    Diclofenac Sodium (VOLTAREN) 1 % topical gel 2 g, 2 g, Topical, 4x Daily, Quita Arce MD, 2 g at 06/16/21 1304    isosorbide mononitrate (IMDUR) 24 hr tablet 30 mg, 30 mg, Oral, Daily, Ronna Mackter, DO, 30 mg at 06/16/21 0955    loratadine (CLARITIN) tablet 10 mg, 10 mg, Oral, Daily, Silviano Martínez MD, 10 mg at 06/16/21 0954    losartan (COZAAR) tablet 50 mg, 50 mg, Oral, Daily, Ronna Flatter, DO, 50 mg at 06/16/21 0955    melatonin tablet 3 mg, 3 mg, Oral, HS, Jalyn Fraga PA-C, 3 mg at 06/15/21 2135    neomycin-bacitracin-polymyxin b (NEOSPORIN) ointment 1 small application, 1 small application, Topical, BID, Silviano Martínez MD, 1 small application at 27/62/61 0955    ondansetron (ZOFRAN) injection 4 mg, 4 mg, Intravenous, Q6H PRN, Silviano Martínez MD    oxyCODONE (ROXICODONE) IR tablet 2 5 mg, 2 5 mg, Oral, Q4H PRN, Silviano Martínez MD    oxyCODONE (ROXICODONE) IR tablet 5 mg, 5 mg, Oral, Q4H PRN, Silviano Martínez MD    propranolol (INDERAL) tablet 20 mg, 20 mg, Oral, Q12H [address], Mary Dumont MD, 20 mg at 06/16/21 0956    rivaroxaban (XARELTO) tablet 15 mg, 15 mg, Oral, Daily With Breakfast, RAFFI Christine, 15 mg at 06/16/21 1304    Blood Culture:   No results found for: BLOODCX    Wound Culture:   No results found for: WOUNDCULT    Ins and Outs:  I/O last 24 hours: In: 1972 5 [P O :1385; I V :587 5]  Out: 1075 [Urine:1075]          Physical Exam:   /73   Pulse 72   Temp 98 3 °F (36 8 °C)   Resp 18   Ht 6' 2" (1 88 m) Comment: per encounter review  Wt 112 kg (247 lb 5 7 oz)   SpO2 94%   BMI 31 76 kg/m²   Gen: Alert and oriented to person, place, time  HEENT: EOMI, eyes clear, moist mucus membranes, hearing intact  Respiratory: Bilateral chest rise  No audible wheezing found  Cardiovascular: Regular Rate and Rhythm  Abdomen: soft nontender/nondistended  Musculoskeletal: left upper extremity  · Skin intact, ecchymosis and swelling noted over shoulder   There is deformity to left clavicle and palpable bony crepitus but No tenting  · Tender to palpation over clavicle  · Sensation intact to axillary, median, radial, and ulnar nerve distributions  · Motor intact to median, radial, and ulnar nerve distributions  Pain with attempted left shoulder forward flexion and cross body adduction  · 2+ radial pulse    Radiology:   I personally reviewed the films  X-rays left clavicle shows fracture of left clavicle likely acute with significant shortening and comminution and evidence of healing of prior clavicle fractures     _*_*_*_*_*_*_*_*_*_*_*_*_*_*_*_*_*_*_*_*_*_*_*_*_*_*_*_*_*_*_*_*_*_*_*_*_*_*_*_*_*    Assessment:  86 y o male S/P fall with left clavicle fracture, likely acute on chronic component as patient is tender over left clavicle  Plan:   · NWB left upper extremity in sling  · Plan for nonoperative treatment in sling  · Analgesics for pain  · PT/OT  · Body mass index is 31 76 kg/m²  mildly obese  Recommend nutrition and physical activity    · Dispo: Ortho will follow  · Case discussed with senior resident    Jeremiah Crews MD

## 2021-06-16 NOTE — PROGRESS NOTES
Progress Note - Geriatric Medicine   Christin Salomon 82 y o  male MRN: [id number]  Unit/Bed#: [facility] 823-01 Encounter: [id number]      Assessment/Plan:    Ambulatory dysfunction with fall  -reportedly mechanical fall 6/13/21  -no intracranial abnormalities reported on CT head  -no assist device for ambulation at baseline   -remains high risk of falls in future due to age, having had history of fall, impaired vision, and deconditioning  -continue encourage good body mechanics assist with transfers  -maintain environment free of fall hazards  -encourage appropriate footwear, adequate lighting, and use of corrective lenses as appropriate when out of bed  -PT and OT following, appreciate recommendations    Abdominal wall hematoma  -s/p mechanical fall  -hemoglobin stable at 11, repeat pending for morning  -continue abdominal binder as indicated    Left shoulder pain  -left shoulder XR 6/22/21 reports probable mid shaft fracture of left clavicle suspected to be acute on chronic due to multiple prior injuries of left clavicle  -recommend orthopedic consultation, consider NWB status until evaluated by orthopedics  -neurovascular checks per protocol  -continue acute pain control    Acute pain due to trauma  -continue acute pain control per geriatric pain protocol  -bowel regimen to reduce risk constipation    Parkinson's disease  -follows with Dr Marjorie Diana (Neurology)  -maintained on Sinemet  mg 3 times daily-per outpatient Neurology no patient taking daily in morning and intermittently in evenings, encourage adherence to regimen as prescribed  -continue close outpatient follow-up with Neurology    Cognitive impairment  -seen by Neurology at [facility] in 2018  [facility] 19/30 (2018)  -ddx at that time per Neurology include DLB versus vascular dementia versus IPD  -may benefit from repeat cognitive testing following resolution of acute injuries  -currently residing in community with family support and doing extremely well, may benefit from some additional supportive resources such as pill pack prepackaged medications to improve compliance and reduce risk of errors  -encourage patient remain physically, socially, and cognitively active engaged to maintain cognitive acuity  -high risk of delirium due to underlying cognitive impairment, continue delirium precautions    Anxiety  -home regimen includes Lexapro 10 mg daily, consider restart to avoid withdrawal symptoms  -continue supportive cares and close outpatient follow-up with PCP for ongoing medication management    Chronic AFib with slow ventricular response  -Cardiology following, suspect patient may have unintentionally taken extra propranolol dosing prior to admission leading to slow ventricular response now markedly improved  -continue to monitor clinically, management per cardiology    Frailty in geriatric patient  -continue optimization chronic medical comorbidities  -continue optimization nutritional intake  -monitor for metabolic derangements and address as arise    Care coordination:  Rounded with Nomi Muse (trauma)    Subjective:     Patient seen examined bedside where he sitting resting comfortably, reports he slept well overnight but has ongoing left shoulder pain this morning, inquires whether sling would be helpful for him given his recurrent injury of his left clavicle and pain with movement  Denies additional complaints at this time  Review of Systems   Constitutional: Negative for chills and fever  HENT: Negative for trouble swallowing  Eyes: Negative for visual disturbance  Respiratory: Negative for shortness of breath  Cardiovascular: Negative for chest pain and leg swelling  Gastrointestinal: Negative for abdominal pain and vomiting  Endocrine: Negative  Genitourinary: Negative for difficulty urinating  Musculoskeletal: Positive for arthralgias (Left shoulder pain)  Skin: Negative  Allergic/Immunologic: Negative      Neurological: Negative for dizziness and light-headedness  Hematological: Negative  Psychiatric/Behavioral: Negative for sleep disturbance ( sleeps well overnight)  All other systems reviewed and are negative  Objective:     Vitals: Blood pressure 120/73, pulse 72, temperature 98 3 °F (36 8 °C), resp  rate 18, weight 112 kg (247 lb 5 7 oz), SpO2 94 %  ,There is no height or weight on file to calculate BMI  Intake/Output Summary (Last 24 hours) at 6/16/2021 1026  Last data filed at 6/16/2021 0900  Gross per 24 hour   Intake 1472 5 ml   Output 1075 ml   Net 397 5 ml     Current Medications: Reviewed    Physical Exam:   Physical Exam  Vitals and nursing note reviewed  Constitutional:       Appearance: He is obese  Comments: Elderly male sitting in chair side bed resting comfortably   HENT:      Head: Normocephalic  Nose: Nose normal       Mouth/Throat:      Mouth: Mucous membranes are moist    Eyes:      General:         Right eye: No discharge  Left eye: No discharge  Neck:      Comments: Trachea midline, phonation normal  Cardiovascular:      Rate and Rhythm: Normal rate  Pulses: Normal pulses  Pulmonary:      Effort: Pulmonary effort is normal  No respiratory distress  Breath sounds: No wheezing  Abdominal:      Palpations: Abdomen is soft  Tenderness: There is no abdominal tenderness  Musculoskeletal:      Right lower leg: No edema  Left lower leg: No edema  Comments: Swelling base of left neck and left shoulder   Skin:     General: Skin is warm and dry  Coloration: Skin is pale (Appears to be baseline)  Neurological:      Mental Status: He is alert        Comments: Awake and alert, answers questions appropriately, follows commands, moves all 4 extremities spontaneously   Psychiatric:      Comments: Pleasant and cooperative        Invasive Devices     Peripheral Intravenous Line            Peripheral IV 06/13/21 Right Antecubital 2 days              Lab, Imaging and other studies: I have personally reviewed pertinent reports

## 2021-06-16 NOTE — RESTORATIVE TECHNICIAN NOTE
Restorative Technician Note      Patient Name: Kayden Sinclair     Restorative Tech Visit Date: 06/16/21  Note Type: Mobility  Patient Position Upon Consult: Bedside chair  Activity Performed: Ambulated  Assistive Device: Roller walker  Patient Position at End of Consult: Bed/Chair alarm activated; All needs within reach

## 2021-06-16 NOTE — ASSESSMENT & PLAN NOTE
- Abdominal wall hematoma in setting of anticoagulant use  No evidence of active or ongoing bleeding   - Hemoglobin remained stable following resumption of anticoagulant on 06/16/2021   - Recommend avoiding strenuous exercise / activities and lifting greater than 10 lb for 2 weeks  - Continue use of abdominal binder for support and comfort     - Outpatient follow-up in the trauma clinic for re-evaluation

## 2021-06-16 NOTE — ASSESSMENT & PLAN NOTE
- Acute blood loss anemia secondary to traumatic injuries including clavicle fracture and abdominal hematoma  - Hemoglobin remained stable following resumption of anticoagulant on 06/16/2021     - Outpatient follow-up with PCP

## 2021-06-16 NOTE — ASSESSMENT & PLAN NOTE
- Episodic bradycardia, as low as 40's on telemetry monitoring during this admission     - Appreciate Cardiology evaluation and recommendations  - Anticoagulant and home medication regimen resumed per Cardiology recommendations  - At this time, there is no indication for further workup or event monitor, but was strongly suggested the patient follow up in the short term with his primary Cardiologist to discuss further workup if indicated

## 2021-06-16 NOTE — ASSESSMENT & PLAN NOTE
- Chronic history of atrial fibrillation with bradycardic episode potentially contributing to syncope  - Appreciate Cardiology evaluation and recommendations  - Anticoagulant and home medication regimen resumed per Cardiology recommendations  - At this time, there is no indication for further workup or event monitor, but was strongly suggested the patient follow up in the short term with his primary Cardiologist to discuss further workup if indicated

## 2021-06-16 NOTE — ASSESSMENT & PLAN NOTE
· Repeat hemoglobin tomorrow morning  · Hb stable today at 11  · Continue abdominal binder  · Restarted Xarelto today  If hemoglobin continues to show stability, patient may be discharged tomorrow

## 2021-06-16 NOTE — PROGRESS NOTES
General Cardiology   Progress Note -  [facility]   Cat Bocanegra 82 y o  male MRN: [id number]    Unit/Bed#: [facility] 823-01 Encounter: [id number]    Assessment  1  Unwitnessed fall, unclear if true syncope/collapse - see HPI for further detail but appears to be mechanical in nature  2  Chronic atrial fibrillation w/ SVR ( POA)  -Pt may have doubled up on his usual propanolol dose prior to admission  -EKG 6/13/21: Atrial fibrillation with slow ventricular response, HR 51, QRS 94, QTc 411  -HR's currently stable in the 60's to 70's  -Currently on propanolol 20 mg BID  -On Xarelto 15 mg daily     3  Preserved BiV systolic function  4  Aortic stenosis, mild per TTE 4/2021  -Compensated  -TTE 4/12/21: mild left ventricular hypertrophy with normal systolic function, EF 87-13%, moderate biatrial enlargement, calcified aortic valve with mild aortic stenosis, mild TR   -On BB, ARB, and Imdur   -Not on diuretic regimen at home      5  HTN  -Average P 120 8/77, last recorded 120/73, HR 72  -Outpatient medication therapies: telmisartan 40 mg daily (on losartan 50 mg daily while inpatient), Imdur 30 mg daily, and propanolol 20 mg BID     6  Parkinson's disease  -On carbidopa-levodopa      Plan:  -If there is ongoing concern for symptomatic bradycardia w/ symptoms suggestive of such - can consider additional outpatient cardiac event monitoring for arrhythmia detection - no indication at this time  Will have him follow up w/ his Cardiologist Dr Mei Brunner for close observance    -Continue propanolol 20 mg BID - HR's currently stable on this dose  -Needs to have closer observation of medication administration at home - as there has been some concern for him a doubling up on his medications - specifically his propanolol in the past and more recently prior to admission   -Resume [address] when deemed appropriate by the trauma service    Subjective  Review of Systems   Constitutional: Negative for chills, fever and malaise/fatigue  Cardiovascular: Negative for chest pain, dyspnea on exertion, irregular heartbeat, leg swelling, near-syncope and palpitations  Respiratory: Negative for cough and shortness of breath  Musculoskeletal: Positive for joint pain  Left shoulder pain  Gastrointestinal: Negative for abdominal pain  Neurological: Negative for dizziness, headaches and light-headedness  Objective:   Physical Exam  Vitals and nursing note reviewed  Constitutional:       General: He is not in acute distress  Appearance: Normal appearance  He is not ill-appearing or diaphoretic  HENT:      Head: Normocephalic and atraumatic  Mouth/Throat:      Mouth: Mucous membranes are moist    Eyes:      General: No scleral icterus  Neck:      Comments: No JVD  Cardiovascular:      Rate and Rhythm: Normal rate  Rhythm irregular  Pulses: Normal pulses  Heart sounds: Murmur heard  Pulmonary:      Effort: Pulmonary effort is normal       Breath sounds: Normal breath sounds  No wheezing or rales  Abdominal:      General: Bowel sounds are normal       Palpations: Abdomen is soft  Tenderness: There is no abdominal tenderness  Musculoskeletal:      Cervical back: Neck supple  Right lower leg: No edema  Left lower leg: No edema  Skin:     General: Skin is warm and dry  Capillary Refill: Capillary refill takes less than 2 seconds  Findings: Bruising present  Neurological:      General: No focal deficit present  Mental Status: He is alert and oriented to person, place, and time  Psychiatric:         Mood and Affect: Mood normal          Vitals: Blood pressure 120/73, pulse 72, temperature 98 3 °F (36 8 °C), resp  rate 18, weight 112 kg (247 lb 5 7 oz), SpO2 94 %  ,     There is no height or weight on file to calculate BMI ,   Systolic (73VTU), KXU:258 , Min:120 , DAB:050     Diastolic (97OON), YMP:36, Min:70, Max:88      Intake/Output Summary (Last 24 hours) at 6/16/2021 3694  Last data filed at 6/16/2021 0900  Gross per 24 hour   Intake 1472 5 ml   Output 1075 ml   Net 397 5 ml     Weight (last 2 days)     None          LABORATORY RESULTS  Results from last 7 days   Lab Units 06/13/21  1335   TROPONIN I ng/mL <0 02     CBC with diff:   Results from last 7 days   Lab Units 06/16/21  0436 06/15/21  0829 06/15/21  0513 06/15/21  0008 06/14/21  1757 06/14/21  0915 06/14/21  0613 06/13/21  1336 06/13/21  1335   WBC Thousand/uL 10 37*  --  10 23*  --   --   --  12 33*  --  10 16   HEMOGLOBIN g/dL 11 0* 11 0* 11 2* 11 0* 11 8* 12 2 11 8*   < > 12 8   HEMATOCRIT % 32 9* 33 9* 34 6* 33 0* 35 2* 36 8 36 6   < > 39 5   MCV fL 91  --  91  --   --   --  92  --  92   PLATELETS Thousands/uL 245  --  250  --   --   --  288  --  318   MCH pg 30 3  --  29 4  --   --   --  29 5  --  29 7   MCHC g/dL 33 4  --  32 4  --   --   --  32 2  --  32 4   RDW % 13 9  --  13 9  --   --   --  13 6  --  13 7   MPV fL 9 8  --  9 5  --   --   --  9 5  --  9 2   NRBC AUTO /100 WBCs 0  --   --   --   --   --  0  --  0    < > = values in this interval not displayed         CMP:  Results from last 7 days   Lab Units 06/16/21  0436 06/15/21  0513 06/14/21  0613 06/13/21  1336 06/13/21  1335   POTASSIUM mmol/L 3 5 3 8 3 8  --  4 3   CHLORIDE mmol/L 108 105 106  --  107   CO2 mmol/L 25 28 26  --  27   CO2, I-STAT mmol/L  --   --   --  28  --    BUN mg/dL 10 11 13  --  12   CREATININE mg/dL 0 68 0 76 0 88  --  0 83   GLUCOSE, ISTAT mg/dl  --   --   --  120  --    CALCIUM mg/dL 8 5 8 8 8 8  --  9 1   EGFR ml/min/1 73sq m 88 84 79  --  65       BMP:  Results from last 7 days   Lab Units 06/16/21  0436 06/15/21  0513 06/14/21  0613 06/13/21  1336 06/13/21  1335   POTASSIUM mmol/L 3 5 3 8 3 8  --  4 3   CHLORIDE mmol/L 108 105 106  --  107   CO2 mmol/L 25 28 26  --  27   CO2, I-STAT mmol/L  --   --   --  28  --    BUN mg/dL 10 11 13  --  12   CREATININE mg/dL 0 68 0 76 0 88  --  0 83   GLUCOSE, ISTAT mg/dl  --   --   --  120 --    CALCIUM mg/dL 8 5 8 8 8 8  --  9 1       No results found for: NTBNP     Results from last 7 days   Lab Units 06/14/21  0613   MAGNESIUM mg/dL 2 2                   Results from last 7 days   Lab Units 06/13/21  1335   INR  1 18       Lipid Profile:   No results found for: CHOL  No results found for: HDL  No results found for: LDLCALC  No results found for: TRIG    Cardiac testing:   No results found for this or any previous visit  No results found for this or any previous visit  No results found for this or any previous visit  No valid procedures specified  No results found for this or any previous visit        Meds/Allergies   all current active meds have been reviewed and current meds:   Current Facility-Administered Medications   Medication Dose Route Frequency    acetaminophen (TYLENOL) tablet 650 mg  650 mg Oral Q6H PRN    ALPRAZolam (XANAX) tablet 0 25 mg  0 25 mg Oral BID PRN    calcium carbonate (TUMS) chewable tablet 1,000 mg  1,000 mg Oral Daily PRN    carbidopa-levodopa (SINEMET)  mg per tablet 1 tablet  1 tablet Oral TID    Diclofenac Sodium (VOLTAREN) 1 % topical gel 2 g  2 g Topical 4x Daily    enoxaparin (LOVENOX) subcutaneous injection 30 mg  30 mg Subcutaneous Q12H ALYSON    isosorbide mononitrate (IMDUR) 24 hr tablet 30 mg  30 mg Oral Daily    loratadine (CLARITIN) tablet 10 mg  10 mg Oral Daily    losartan (COZAAR) tablet 50 mg  50 mg Oral Daily    melatonin tablet 3 mg  3 mg Oral HS    neomycin-bacitracin-polymyxin b (NEOSPORIN) ointment 1 small application  1 small application Topical BID    ondansetron (ZOFRAN) injection 4 mg  4 mg Intravenous Q6H PRN    oxyCODONE (ROXICODONE) IR tablet 2 5 mg  2 5 mg Oral Q4H PRN    oxyCODONE (ROXICODONE) IR tablet 5 mg  5 mg Oral Q4H PRN    propranolol (INDERAL) tablet 20 mg  20 mg Oral Q12H [address]          EKG personally reviewed by RAFFI Monroe    Assessment:  Principal Problem:    Abdominal wall hematoma  Active Problems:    Atrial fibrillation (HCC)    Parkinson disease ([facility] [address] )    Hypertension    Aortic stenosis    Bradycardia    Fall    Acute blood loss anemia    Counseling / Coordination of Care  Total floor / unit time spent today 20 minutes  Greater than 50% of total time was spent with the patient and / or family counseling and / or coordination of care  ** Please Note: Dragon 360 Dictation voice to text software may have been used in the creation of this document   **

## 2021-06-16 NOTE — OCCUPATIONAL THERAPY NOTE
Occupational Therapy Treatment Note:       06/16/21 1300   OT Last Visit   OT Visit Date 06/16/21   Note Type   Cancel Reasons Other  (pt awaiting hip xray and clearence for therapy   will follow )   pt has ortho   ELVIA Carter

## 2021-06-16 NOTE — ASSESSMENT & PLAN NOTE
· Continue home Cozaar (can continue lmisartan as an OP), Inderal, Imdur  · Cardiology consulted and note appreciated

## 2021-06-16 NOTE — PROGRESS NOTES
[address] [facility]  Progress Note Ben Founds 1935, 82 y o  male MRN: [id number]  Unit/Bed#: [facility] 823-01 Encounter: [id number]  Primary Care Provider: Scotty Vick MD   Date and time admitted to hospital: 6/13/2021  1:29 PM    Left shoulder pain  Assessment & Plan  · Patent has a history of left shoulder pain  · XR from 06/13 shows probable clavicle fracture  CT chest showed no osseous fracture  · Patient is tender in left clavicular region with some mild swelling  · Orthopedics consulted    Acute blood loss anemia  Assessment & Plan  · Hemoglobin is stable  · Hemoglobin was 11 this morning    Fall  Assessment & Plan  Pt c/o hip pain today, check xray of L hip--pending  PT/OT evals    Bradycardia  Assessment & Plan  Episodic bradycardia, as low as 40's on telemetry monitoring, possibly over beta-blocked  Unsure if related to syncopal event  Consult cardiology--approved resuming Inderal   Will continue to monitor heart rate  Hypertension  Assessment & Plan  · Continue home Cozaar (can continue lmisartan as an OP), Inderal, Imdur  · Cardiology consulted and note appreciated  Parkinson disease [facility])  Assessment & Plan  Continue home sinemet    Atrial fibrillation [facility])  Assessment & Plan  Beta-blocker resumed, per cardiology  Xarelto resume  If patient's hemoglobin is stable for the next 24 hours on Xarelto, patient may be discharged  * Abdominal wall hematoma  Assessment & Plan    · Repeat hemoglobin tomorrow morning  · Hb stable today at 11  · Continue abdominal binder  · Restarted Xarelto today  If hemoglobin continues to show stability, patient may be discharged tomorrow  Disposition:  Eventually to be discharged home with therapy  Pending stability of hemoglobin on Xarelto  Discussed plan of care with daughter  SUBJECTIVE:  Chief Complaint:  Left side pain    Subjective:  Patient complaining left flank pain    He states that he has been sore there since the fall  He also notes having left shoulder pain, which she describes having chronically  He denies any new pain in left shoulder  He states that he has been able to tolerate an oral diet without any worsening abdominal pain, nausea, vomiting  Patient states that he feels ready to be discharged home  We discussed that if he is able to show hemoglobin stability on Xarelto he may be discharged tomorrow  Patient is amenable to this plan        OBJECTIVE:     Meds/Allergies     Current Facility-Administered Medications:     acetaminophen (TYLENOL) tablet 650 mg, 650 mg, Oral, Q6H PRN, Carmita Jung MD, 650 mg at 06/16/21 0955    ALPRAZolam (XANAX) tablet 0 25 mg, 0 25 mg, Oral, BID PRN, Carmita Jung MD    calcium carbonate (TUMS) chewable tablet 1,000 mg, 1,000 mg, Oral, Daily PRN, Carmita Jung MD    carbidopa-levodopa (SINEMET)  mg per tablet 1 tablet, 1 tablet, Oral, TID, Camrita Jung MD, 1 tablet at 06/16/21 0955    Diclofenac Sodium (VOLTAREN) 1 % topical gel 2 g, 2 g, Topical, 4x Daily, Carmita Jung MD, 2 g at 06/16/21 0954    isosorbide mononitrate (IMDUR) 24 hr tablet 30 mg, 30 mg, Oral, Daily, Theodor Richard, DO, 30 mg at 06/16/21 0955    loratadine (CLARITIN) tablet 10 mg, 10 mg, Oral, Daily, Carmita Jung MD, 10 mg at 06/16/21 0954    losartan (COZAAR) tablet 50 mg, 50 mg, Oral, Daily, Theodor Richard, DO, 50 mg at 06/16/21 0955    melatonin tablet 3 mg, 3 mg, Oral, HS, Quinton Delarosa PA-C, 3 mg at 06/15/21 2135    neomycin-bacitracin-polymyxin b (NEOSPORIN) ointment 1 small application, 1 small application, Topical, BID, Carmita Jung MD, 1 small application at 23/26/95 0955    ondansetron (ZOFRAN) injection 4 mg, 4 mg, Intravenous, Q6H PRN, Carmita Jung MD    oxyCODONE (ROXICODONE) IR tablet 2 5 mg, 2 5 mg, Oral, Q4H PRN, Carmita Jung MD    oxyCODONE (ROXICODONE) IR tablet 5 mg, 5 mg, Oral, Q4H PRN, Carmita Jung MD    propranolol (INDERAL) tablet 20 mg, 20 mg, Oral, Q12H [facility], Ryan Silva MD, 20 mg at 06/16/21 0956    rivaroxaban (XARELTO) tablet 15 mg, 15 mg, Oral, Daily With Breakfast, RAFFI Mayer     Vitals:   Vitals:    06/16/21 0719   BP: 120/73   Pulse: 72   Resp: 18   Temp: 98 3 °F (36 8 °C)   SpO2: 94%       Intake/Output:  I/O       06/14 0701 - 06/15 0700 06/15 0701 - 06/16 0700 06/16 0701 - 06/17 0700    P  O  240 1205 180    I V  (mL/kg) 1896 3 (16 9) 587 5 (5 2)     Total Intake(mL/kg) 2136 3 (19 1) 1792 5 (16) 180 (1 6)    Urine (mL/kg/hr) 350 (0 1) 1075 (0 4)     Stool  0     Total Output 350 1075     Net +1786 3 +717 5 +180           Unmeasured Urine Occurrence 1 x 3 x     Unmeasured Stool Occurrence  1 x            Nutrition/GI Proph/Bowel Reg:  Continue current diet and bowel regimen  Physical Exam:   GENERAL APPEARANCE:  No acute distress  NEURO:  GCS 15  Light touch sensation intact throughout  HEENT:  Normocephalic, small scabbed area noted on parietal left scalp--healing appropriately, no drainage  Neck is supple  CV:  Irregularly irregular  +2 radial and dorsalis pedis pulses, bilaterally  + murmur  No gallop  No rub  LUNGS:  Clear to auscultation, bilaterally  No wheezing, no rhonchi, rales  GI:  Abdomen is soft nontender  Bowel sounds are present  :  Pelvis is stable  MSK:  Moving all extremities  No deformities  Decreased Strength and mobility of left shoulder  Left clavicle mildly swollen and tender  SKIN:  Warm, dry, well perfused    Ecchymosis on left flank    Invasive Devices     Peripheral Intravenous Line            Peripheral IV 06/13/21 Right Antecubital 2 days                 Lab Results:   Results: I have personally reviewed pertinent reports   , BMP/CMP:   Lab Results   Component Value Date    SODIUM 141 06/16/2021    K 3 5 06/16/2021     06/16/2021    CO2 25 06/16/2021    BUN 10 06/16/2021    CREATININE 0 68 06/16/2021    CALCIUM 8 5 06/16/2021    EGFR 88 06/16/2021    and CBC:   Lab Results   Component Value Date    WBC 10 37 (H) 06/16/2021    HGB 11 0 (L) 06/16/2021    HCT 32 9 (L) 06/16/2021    MCV 91 06/16/2021     06/16/2021    MCH 30 3 06/16/2021    MCHC 33 4 06/16/2021    RDW 13 9 06/16/2021    MPV 9 8 06/16/2021    NRBC 0 06/16/2021     Imaging/EKG Studies: Results: I have personally reviewed pertinent reports      Other Studies:  None  VTE Prophylaxis:  Xarelto, SCDs

## 2021-06-16 NOTE — PLAN OF CARE
Problem: Prexisting or High Potential for Compromised Skin Integrity  Goal: Skin integrity is maintained or improved  Description: INTERVENTIONS:  - Identify patients at risk for skin breakdown  - Assess and monitor skin integrity  - Assess and monitor nutrition and hydration status  - Monitor labs   - Assess for incontinence   - Turn and reposition patient  - Assist with mobility/ambulation  - Relieve pressure over bony prominences  - Avoid friction and shearing  - Provide appropriate hygiene as needed including keeping skin clean and dry  - Evaluate need for skin moisturizer/barrier cream  - Collaborate with interdisciplinary team   - Patient/family teaching  - Consider wound care consult   Outcome: Progressing     Problem: MOBILITY - ADULT  Goal: Maintain or return to baseline ADL function  Description: INTERVENTIONS:  -  Assess patient's ability to carry out ADLs; assess patient's baseline for ADL function and identify physical deficits which impact ability to perform ADLs (bathing, care of mouth/teeth, toileting, grooming, dressing, etc )  - Assess/evaluate cause of self-care deficits   - Assess range of motion  - Assess patient's mobility; develop plan if impaired  - Assess patient's need for assistive devices and provide as appropriate  - Encourage maximum independence but intervene and supervise when necessary  - Involve family in performance of ADLs  - Assess for home care needs following discharge   - Consider OT consult to assist with ADL evaluation and planning for discharge  - Provide patient education as appropriate  Outcome: Progressing    Problem: PAIN - ADULT  Goal: Verbalizes/displays adequate comfort level or baseline comfort level  Description: Interventions:  - Encourage patient to monitor pain and request assistance  - Assess pain using appropriate pain scale  - Administer analgesics based on type and severity of pain and evaluate response  - Implement non-pharmacological measures as appropriate and evaluate response  - Consider cultural and social influences on pain and pain management  - Notify physician/advanced practitioner if interventions unsuccessful or patient reports new pain  Outcome: Progressing     Problem: INFECTION - ADULT  Goal: Absence or prevention of progression during hospitalization  Description: INTERVENTIONS:  - Assess and monitor for signs and symptoms of infection  - Monitor lab/diagnostic results  - Monitor all insertion sites, i e  indwelling lines, tubes, and drains  - Monitor endotracheal if appropriate and nasal secretions for changes in amount and color  - [place] appropriate cooling/warming therapies per order  - Administer medications as ordered  - Instruct and encourage patient and family to use good hand hygiene technique  - Identify and instruct in appropriate isolation precautions for identified infection/condition  Outcome: Progressing     Problem: SAFETY ADULT  Goal: Patient will remain free of falls  Description: INTERVENTIONS:  - Educate patient/family on patient safety including physical limitations  - Instruct patient to call for assistance with activity   - Consult OT/PT to assist with strengthening/mobility   - Keep Call bell within reach  - Keep bed low and locked with side rails adjusted as appropriate  - Keep care items and personal belongings within reach  - Initiate and maintain comfort rounds  - Make Fall Risk Sign visible to staff  - Offer Toileting every 2 Hours, in advance of need  - Apply yellow socks and bracelet for high fall risk patients  - Consider moving patient to room near nurses station  Outcome: Progressing  Goal: Maintain or return to baseline ADL function  Description: INTERVENTIONS:  -  Assess patient's ability to carry out ADLs; assess patient's baseline for ADL function and identify physical deficits which impact ability to perform ADLs (bathing, care of mouth/teeth, toileting, grooming, dressing, etc )  - Assess/evaluate cause of self-care deficits   - Assess range of motion  - Assess patient's mobility; develop plan if impaired  - Assess patient's need for assistive devices and provide as appropriate  - Encourage maximum independence but intervene and supervise when necessary  - Involve family in performance of ADLs  - Assess for home care needs following discharge   - Consider OT consult to assist with ADL evaluation and planning for discharge  - Provide patient education as appropriate  Outcome: Progressing  Goal: Maintains/Returns to pre admission functional level  Description: INTERVENTIONS:  - Perform BMAT or MOVE assessment daily    - Set and communicate daily mobility goal to care team and patient/family/caregiver  - Collaborate with rehabilitation services on mobility goals if consulted  - Perform Range of Motion 3 times a day    - Dangle patient 3 times a day  - Stand patient 3 times a day  - Ambulate patient 3 times a day  - Out of bed to chair 3 times a day   - Out of bed for meals 3 times a day  - Out of bed for toileting  - Record patient progress and toleration of activity level   Outcome: Progressing     Problem: DISCHARGE PLANNING  Goal: Discharge to home or other facility with appropriate resources  Description: INTERVENTIONS:  - Identify barriers to discharge w/patient and caregiver  - Arrange for needed discharge resources and transportation as appropriate  - Identify discharge learning needs (meds, wound care, etc )  - Arrange for interpretive services to assist at discharge as needed  - Refer to Case Management Department for coordinating discharge planning if the patient needs post-hospital services based on physician/advanced practitioner order or complex needs related to functional status, cognitive ability, or social support system  Outcome: Progressing     Problem: Knowledge Deficit  Goal: Patient/family/caregiver demonstrates understanding of disease process, treatment plan, medications, and discharge instructions  Description: Complete learning assessment and assess knowledge base    Interventions:  - Provide teaching at level of understanding  - Provide teaching via preferred learning methods  Outcome: Progressing     Problem: METABOLIC, FLUID AND ELECTROLYTES - ADULT  Goal: Electrolytes maintained within normal limits  Description: INTERVENTIONS:  - Monitor labs and assess patient for signs and symptoms of electrolyte imbalances  - Administer electrolyte replacement as ordered  - Monitor response to electrolyte replacements, including repeat lab results as appropriate  - Instruct patient on fluid and nutrition as appropriate  Outcome: Progressing     Problem: HEMATOLOGIC - ADULT  Goal: Maintains hematologic stability  Description: INTERVENTIONS  - Assess for signs and symptoms of bleeding or hemorrhage  - Monitor labs  - Administer supportive blood products/factors as ordered and appropriate  Outcome: Progressing     Problem: MUSCULOSKELETAL - ADULT  Goal: Maintain or return mobility to safest level of function  Description: INTERVENTIONS:  - Assess patient's ability to carry out ADLs; assess patient's baseline for ADL function and identify physical deficits which impact ability to perform ADLs (bathing, care of mouth/teeth, toileting, grooming, dressing, etc )  - Assess/evaluate cause of self-care deficits   - Assess range of motion  - Assess patient's mobility  - Assess patient's need for assistive devices and provide as appropriate  - Encourage maximum independence but intervene and supervise when necessary  - Involve family in performance of ADLs  - Assess for home care needs following discharge   - Consider OT consult to assist with ADL evaluation and planning for discharge  - Provide patient education as appropriate  Outcome: Progressing

## 2021-06-16 NOTE — PHYSICAL THERAPY NOTE
Physical Therapy Cancellation Note         06/16/21 1301   PT Last Visit   PT Visit Date 06/16/21   Note Type   Note Type Treatment   Cancel Reasons Other       PT SPOKE W/ DALY/ TRAUMA RAFFI WHO ADVISES HOLDING OFF PT SESSION THIS PM PENDING XRAY READS AND ORTHO F/U- PT WILL PLACE ON SCHEDULE TO BE SEEN TOMORROW AM AS REQUESTED BY TEAM FOLLOWING IMAGING RESULT AND ORTHO PLAN       Diamond Castro, PT

## 2021-06-16 NOTE — ASSESSMENT & PLAN NOTE
Beta-blocker resumed, per cardiology  Xarelto resume  If patient's hemoglobin is stable for the next 24 hours on Xarelto, patient may be discharged

## 2021-06-16 NOTE — ASSESSMENT & PLAN NOTE
- Patient has a history of left shoulder pain and was found have findings suggestive of a left clavicle fracture  - Appreciate Orthopedic surgery evaluation and recommendations  Non operative management recommended  - Maintain nonweightbearing status in the left upper extremity with use of a sling     - Analgesia as needed  - Continue PT and OT evaluation and treatment as indicated  - Outpatient follow-up with Orthopedic surgery for re-evaluation

## 2021-06-16 NOTE — ASSESSMENT & PLAN NOTE
- Continue current medication regimen with plan to resume home medication therapy on discharge  - Outpatient follow-up with PCP

## 2021-06-17 VITALS
RESPIRATION RATE: 18 BRPM | TEMPERATURE: 97.6 F | SYSTOLIC BLOOD PRESSURE: 131 MMHG | HEIGHT: 74 IN | WEIGHT: 247.36 LBS | DIASTOLIC BLOOD PRESSURE: 65 MMHG | OXYGEN SATURATION: 96 % | BODY MASS INDEX: 31.75 KG/M2 | HEART RATE: 63 BPM

## 2021-06-17 LAB
ANION GAP SERPL CALCULATED.3IONS-SCNC: 6 MMOL/L (ref 4–13)
BASOPHILS # BLD AUTO: 0.04 THOUSANDS/ΜL (ref 0–0.1)
BASOPHILS NFR BLD AUTO: 0 % (ref 0–1)
BUN SERPL-MCNC: 11 MG/DL (ref 5–25)
CALCIUM SERPL-MCNC: 8.8 MG/DL (ref 8.3–10.1)
CHLORIDE SERPL-SCNC: 106 MMOL/L (ref 100–108)
CO2 SERPL-SCNC: 26 MMOL/L (ref 21–32)
CREAT SERPL-MCNC: 0.68 MG/DL (ref 0.6–1.3)
EOSINOPHIL # BLD AUTO: 0.37 THOUSAND/ΜL (ref 0–0.61)
EOSINOPHIL NFR BLD AUTO: 4 % (ref 0–6)
ERYTHROCYTE [DISTWIDTH] IN BLOOD BY AUTOMATED COUNT: 13.9 % (ref 11.6–15.1)
GFR SERPL CREATININE-BSD FRML MDRD: 88 ML/MIN/1.73SQ M
GLUCOSE SERPL-MCNC: 109 MG/DL (ref 65–140)
HCT VFR BLD AUTO: 34.2 % (ref 36.5–49.3)
HGB BLD-MCNC: 11.1 G/DL (ref 12–17)
IMM GRANULOCYTES # BLD AUTO: 0.05 THOUSAND/UL (ref 0–0.2)
IMM GRANULOCYTES NFR BLD AUTO: 1 % (ref 0–2)
LYMPHOCYTES # BLD AUTO: 1.75 THOUSANDS/ΜL (ref 0.6–4.47)
LYMPHOCYTES NFR BLD AUTO: 18 % (ref 14–44)
MCH RBC QN AUTO: 29.7 PG (ref 26.8–34.3)
MCHC RBC AUTO-ENTMCNC: 32.5 G/DL (ref 31.4–37.4)
MCV RBC AUTO: 91 FL (ref 82–98)
MONOCYTES # BLD AUTO: 1.28 THOUSAND/ΜL (ref 0.17–1.22)
MONOCYTES NFR BLD AUTO: 13 % (ref 4–12)
NEUTROPHILS # BLD AUTO: 6.18 THOUSANDS/ΜL (ref 1.85–7.62)
NEUTS SEG NFR BLD AUTO: 64 % (ref 43–75)
NRBC BLD AUTO-RTO: 0 /100 WBCS
PLATELET # BLD AUTO: 241 THOUSANDS/UL (ref 149–390)
PMV BLD AUTO: 9.5 FL (ref 8.9–12.7)
POTASSIUM SERPL-SCNC: 3.5 MMOL/L (ref 3.5–5.3)
RBC # BLD AUTO: 3.74 MILLION/UL (ref 3.88–5.62)
SODIUM SERPL-SCNC: 138 MMOL/L (ref 136–145)
WBC # BLD AUTO: 9.67 THOUSAND/UL (ref 4.31–10.16)

## 2021-06-17 PROCEDURE — 99238 HOSP IP/OBS DSCHRG MGMT 30/<: CPT | Performed by: PHYSICIAN ASSISTANT

## 2021-06-17 PROCEDURE — 97116 GAIT TRAINING THERAPY: CPT

## 2021-06-17 PROCEDURE — 99222 1ST HOSP IP/OBS MODERATE 55: CPT | Performed by: ORTHOPAEDIC SURGERY

## 2021-06-17 PROCEDURE — NC001 PR NO CHARGE: Performed by: ORTHOPAEDIC SURGERY

## 2021-06-17 PROCEDURE — 99232 SBSQ HOSP IP/OBS MODERATE 35: CPT | Performed by: INTERNAL MEDICINE

## 2021-06-17 PROCEDURE — 85025 COMPLETE CBC W/AUTO DIFF WBC: CPT | Performed by: NURSE PRACTITIONER

## 2021-06-17 PROCEDURE — 80048 BASIC METABOLIC PNL TOTAL CA: CPT | Performed by: NURSE PRACTITIONER

## 2021-06-17 PROCEDURE — 97530 THERAPEUTIC ACTIVITIES: CPT

## 2021-06-17 PROCEDURE — 97112 NEUROMUSCULAR REEDUCATION: CPT

## 2021-06-17 RX ORDER — POTASSIUM CHLORIDE 20 MEQ/1
20 TABLET, EXTENDED RELEASE ORAL ONCE
Status: COMPLETED | OUTPATIENT
Start: 2021-06-17 | End: 2021-06-17

## 2021-06-17 RX ORDER — ACETAMINOPHEN 325 MG/1
650 TABLET ORAL EVERY 4 HOURS PRN
Refills: 0
Start: 2021-06-17

## 2021-06-17 RX ADMIN — PROPRANOLOL HYDROCHLORIDE 20 MG: 20 TABLET ORAL at 09:28

## 2021-06-17 RX ADMIN — DICLOFENAC SODIUM 2 G: 10 GEL TOPICAL at 13:11

## 2021-06-17 RX ADMIN — POTASSIUM CHLORIDE 20 MEQ: 1500 TABLET, EXTENDED RELEASE ORAL at 10:40

## 2021-06-17 RX ADMIN — LORATADINE 10 MG: 10 TABLET ORAL at 09:28

## 2021-06-17 RX ADMIN — BACITRACIN, NEOMYCIN, POLYMYXIN B 1 SMALL APPLICATION: 400; 3.5; 5 OINTMENT TOPICAL at 09:28

## 2021-06-17 RX ADMIN — CARBIDOPA AND LEVODOPA 1 TABLET: 25; 100 TABLET ORAL at 09:28

## 2021-06-17 RX ADMIN — ACETAMINOPHEN 650 MG: 325 TABLET, FILM COATED ORAL at 09:29

## 2021-06-17 RX ADMIN — LOSARTAN POTASSIUM 50 MG: 50 TABLET, FILM COATED ORAL at 09:28

## 2021-06-17 RX ADMIN — DICLOFENAC SODIUM 2 G: 10 GEL TOPICAL at 09:28

## 2021-06-17 RX ADMIN — RIVAROXABAN 15 MG: 15 TABLET, FILM COATED ORAL at 09:28

## 2021-06-17 RX ADMIN — ISOSORBIDE MONONITRATE 30 MG: 30 TABLET, EXTENDED RELEASE ORAL at 09:29

## 2021-06-17 NOTE — PHYSICAL THERAPY NOTE
Physical Therapy Progress Note     06/17/21 0915   PT Last Visit   PT Visit Date 06/17/21   Note Type   Note Type Treatment   Pain Assessment   Pain Assessment Tool 0-10   Pain Score 7   Pain Location/Orientation Orientation: Left; Location: Shoulder   Hospital Pain Intervention(s) Repositioned; Ambulation/increased activity; Emotional support;Cold applied   Restrictions/Precautions   LUE Weight Bearing Per Order NWB   Braces or Orthoses Sling  (Sling LUE  Abdominal binder )   Other Precautions WBS;Pain; Fall Risk   Subjective   Subjective The pt  states that he would love to walk as he wants to maintain his independence  Bed Mobility   Supine to Sit 4  Minimal assistance   Additional items Assist x 1; Increased time required   Transfers   Sit to Stand 5  Supervision   Stand to Sit 5  Supervision   Ambulation/Elevation   Gait pattern Excessively slow   Gait Assistance 4  Minimal assist   Additional items Assist x 1   Assistive Device Other (Comment); None  (right hand-hold assistance )   Distance 380 feet with hand-hold assistance, 10 feet x 2 with no device  Balance   Static Sitting Good   Dynamic Sitting Good   Static Standing Fair   Ambulatory Fair   Activity Tolerance   Activity Tolerance Patient tolerated treatment well;Patient limited by fatigue   Nurse [address] [place] [address], RN  Assessment   Prognosis Good   Problem List Decreased endurance;Decreased mobility; Impaired balance;Pain;Orthopedic restrictions   Assessment The pt  has improved balance, strength, and activity tolerance  He was able to transfer from the low toilet without assistance, as well as ambulate a few feet with no device  He was able to ambulate around the unit extensively with only cursory hand-hold assistance  The patient did not bear any weight through therapist's hand nor push or pull on it during gait at all  He was sized for and provided a SPC as he is now NWB LUE in a sling   He demonstrated awareness of his deficits as well as took appropriate measures for them  He will benefit from continued therapy in order to further facilitate his return to his baseline independence  Barriers to Discharge None   Goals   Patient Goals To go home  STG Expiration Date 06/29/21   PT Treatment Day 1   Plan   Treatment/Interventions Functional transfer training;LE strengthening/ROM; Elevations; Therapeutic exercise; Endurance training;Patient/family training;Bed mobility;Gait training   Progress Progressing toward goals   PT Frequency   (3-5x a week )   Recommendation   PT Discharge Recommendation Home with home health rehabilitation   Equipment Recommended Cane   PT - OK to Discharge Yes   AM-PAC Basic Mobility Inpatient   Turning in Bed Without Bedrails 4   Lying on Back to Sitting on Edge of Flat Bed 3   Moving Bed to Chair 3   Standing Up From Chair 3   Walk in Room 3   Climb 3-5 Stairs 3   Basic Mobility Inpatient Raw Score 19   Basic Mobility Standardized Score 42 48     An AM-PAC Basic Mobility standardized score less than 42 9 suggests the patient may benefit from discharge to post-acute rehab services      Bijan Diaz, PTA

## 2021-06-17 NOTE — PROGRESS NOTES
[facility]  Progress Note Odilia Holman 1935, 82 y o  male MRN: [id number]  Unit/Bed#: [facility] 823-01 Encounter: [id number]  Primary Care Provider: Luiz Kevin MD   Date and time admitted to hospital: 6/13/2021  1:29 PM    Fall  Assessment & Plan  - Status post fall with the below noted injuries  - Fall precautions  - Geriatric Medicine consultation for evaluation, medication review and recommendations   - PT and OT evaluation and treatment as indicated  - Case Management consultation for disposition planning  * Abdominal wall hematoma  Assessment & Plan  - Abdominal wall hematoma in setting of anticoagulant use  No evidence of active or ongoing bleeding   - Hemoglobin remained stable following resumption of anticoagulant on 06/16/2021   - Recommend avoiding strenuous exercise / activities and lifting greater than 10 lb for 2 weeks  - Continue use of abdominal binder for support and comfort     - Outpatient follow-up in the trauma clinic for re-evaluation  Acute blood loss anemia  Assessment & Plan  - Acute blood loss anemia secondary to traumatic injuries including clavicle fracture and abdominal hematoma  - Hemoglobin remained stable following resumption of anticoagulant on 06/16/2021     - Outpatient follow-up with PCP  Left shoulder pain  Assessment & Plan  - Patient has a history of left shoulder pain and was found have findings suggestive of a left clavicle fracture  - Appreciate Orthopedic surgery evaluation and recommendations  Non operative management recommended  - Maintain nonweightbearing status in the left upper extremity with use of a sling     - Analgesia as needed  - Continue PT and OT evaluation and treatment as indicated  - Outpatient follow-up with Orthopedic surgery for re-evaluation      Atrial fibrillation [facility])  Assessment & Plan  - Chronic history of atrial fibrillation with bradycardic episode potentially contributing to syncope  - Appreciate Cardiology evaluation and recommendations  - Anticoagulant and home medication regimen resumed per Cardiology recommendations  - At this time, there is no indication for further workup or event monitor, but was strongly suggested the patient follow up in the short term with his primary Cardiologist to discuss further workup if indicated  Bradycardia  Assessment & Plan  - Episodic bradycardia, as low as 40's on telemetry monitoring during this admission     - Appreciate Cardiology evaluation and recommendations  - Anticoagulant and home medication regimen resumed per Cardiology recommendations  - At this time, there is no indication for further workup or event monitor, but was strongly suggested the patient follow up in the short term with his primary Cardiologist to discuss further workup if indicated  Hypertension  Assessment & Plan  - Continue current medication regimen with plan to resume home medication therapy on discharge  - Outpatient follow-up with PCP  Parkinson disease [facility])  Assessment & Plan  - Continue current medication regimen   - Outpatient follow-up with PCP  Disposition:  Anticipate likely discharge home today with home health therapy  Case Management following for disposition planning  SUBJECTIVE:  Chief Complaint:   "I am feeling well today  "    Subjective:  Patient notes he is feeling well today and hopeful for discharge  He is tolerating a diet without nausea or vomiting  He notes that his pain both in the shoulder and abdomen are improving and well controlled with his current medication regimen  He offers no new complaints and is hopeful for discharge today        OBJECTIVE:     Meds/Allergies     Current Facility-Administered Medications:     acetaminophen (TYLENOL) tablet 650 mg, 650 mg, Oral, Q6H PRN, Paty Phillips MD, 650 mg at 06/17/21 0929    ALPRAZolam (XANAX) tablet 0 25 mg, 0 25 mg, Oral, BID PRN, Deven Orozco MD    calcium carbonate (TUMS) chewable tablet 1,000 mg, 1,000 mg, Oral, Daily PRN, Deven Orozco MD    carbidopa-levodopa (SINEMET)  mg per tablet 1 tablet, 1 tablet, Oral, TID, Deven Orozco MD, 1 tablet at 21 0928    Diclofenac Sodium (VOLTAREN) 1 % topical gel 2 g, 2 g, Topical, 4x Daily, Deven Orozco MD, 2 g at 21 7158    isosorbide mononitrate (IMDUR) 24 hr tablet 30 mg, 30 mg, Oral, Daily, Regina Michael, DO, 30 mg at 21 0929    loratadine (CLARITIN) tablet 10 mg, 10 mg, Oral, Daily, Deven Orozco MD, 10 mg at 21 9617    losartan (COZAAR) tablet 50 mg, 50 mg, Oral, Daily, Regina Michael, DO, 50 mg at 21 0928    melatonin tablet 3 mg, 3 mg, Oral, HS, Sherolyn Duverney, PA-C, 3 mg at 21 2142    neomycin-bacitracin-polymyxin b (NEOSPORIN) ointment 1 small application, 1 small application, Topical, BID, Deven Orozco MD, 1 small application at  0928    ondansetron (ZOFRAN) injection 4 mg, 4 mg, Intravenous, Q6H PRN, Deven Orozco MD    oxyCODONE (ROXICODONE) IR tablet 2 5 mg, 2 5 mg, Oral, Q4H PRN, Deven Orozco MD    oxyCODONE (ROXICODONE) IR tablet 5 mg, 5 mg, Oral, Q4H PRN, Deven Orozco MD    propranolol (INDERAL) tablet 20 mg, 20 mg, Oral, Q12H [address], Joe Vargas MD, 20 mg at 21 9138    rivaroxaban (XARELTO) tablet 15 mg, 15 mg, Oral, Daily With Breakfast, RAFFI Avila, 15 mg at 21 0928     Vitals:   Vitals:    21 0632   BP: 135/66   Pulse: 62   Resp: 18   Temp: 97 6 °F (36 4 °C)   SpO2: 97%       Intake/Output:  I/O       06/15 07 -  0700  07 -  0700    P  O  1205 840    I V  (mL/kg) 587 5 (5 2)     Total Intake(mL/kg) 1792 5 (16) 840 (7 5)    Urine (mL/kg/hr) 1075 (0 4)     Stool 0     Total Output 1075     Net +717 5 +840          Unmeasured Urine Occurrence 3 x 2 x    Unmeasured Stool Occurrence 1 x            Nutrition/GI Proph/Bowel Re g Sodium restricted Cardiac diet    Physical Exam:   GENERAL APPEARANCE: Patient in no acute distress  HEENT: NCAT; EOMs intact; Mucous membranes moist  CV: Regular rate and rhythm; no murmur/gallops/rubs appreciated  CHEST / LUNGS: Clear to auscultation; no wheezes/rales/rhonci  No chest wall tenderness  ABD: NABS; soft; non-distended; non-tender  Abdominal binder in place  :   Voiding spontaneously  EXT: +2 pulses bilaterally upper & lower extremities  There was mild tenderness over the left clavicle with significant swelling and ecchymosis with a sling in place and an intact neurovascular exam throughout the left upper extremity  NEURO: GCS 15; no focal neurologic deficits; neurovascularly intact  SKIN: Warm, dry and well perfused; no rash; no jaundice  Evolving ecchymosis over the left clavicle  Invasive Devices     Peripheral Intravenous Line            Peripheral IV 06/13/21 Right Antecubital 3 days                 Lab Results:   Results: I have personally reviewed pertinent reports   , BMP/CMP:   Lab Results   Component Value Date    SODIUM 138 06/17/2021    K 3 5 06/17/2021     06/17/2021    CO2 26 06/17/2021    BUN 11 06/17/2021    CREATININE 0 68 06/17/2021    CALCIUM 8 8 06/17/2021    EGFR 88 06/17/2021   , CBC:   Lab Results   Component Value Date    WBC 9 67 06/17/2021    HGB 11 1 (L) 06/17/2021    HCT 34 2 (L) 06/17/2021    MCV 91 06/17/2021     06/17/2021    MCH 29 7 06/17/2021    MCHC 32 5 06/17/2021    RDW 13 9 06/17/2021    MPV 9 5 06/17/2021    NRBC 0 06/17/2021    and Coagulation: No results found for: PT, INR, APTT  Imaging/EKG Studies: Results: I have personally reviewed pertinent reports      Other Studies: N/A  VTE Prophylaxis: Anticoagulated with Carlos Mras PA-C  6/17/2021 09:00 AM

## 2021-06-17 NOTE — PROGRESS NOTES
Cardiology Progress Note - Herman Montano 82 y o  male MRN: [id number]    Unit/Bed#: [facility] 823-01 Encounter: [id number]      Assessment:  Principal Problem:    Abdominal wall hematoma  Active Problems:    Atrial fibrillation (HCC)    Parkinson disease ([address] )    Hypertension    Aortic stenosis    Bradycardia    Fall    Acute blood loss anemia    Left shoulder pain      Plan:  Patient is comfortable this morning  He has no chest pain or significant dyspnea  He had no issues overnight  Vital signs are stable on current medical regimen  BMP today with potassium of 3 5 and creatinine of 0 68  Hemoglobin 11 1  Will supplement potassium  Cardiac status is stable  Subjective:   Patient seen and examined  No significant events overnight   negative  Objective:     Vitals: Blood pressure 135/66, pulse 62, temperature 97 6 °F (36 4 °C), resp  rate 18, height 6' 2" (1 88 m), weight 112 kg (247 lb 5 7 oz), SpO2 97 %  , Body mass index is 31 76 kg/m² ,   Orthostatic Blood Pressures      Most Recent Value   Blood Pressure  135/66 filed at 06/17/2021 7395   Patient Position - Orthostatic VS  Lying filed at 06/15/2021 2300      ,      Intake/Output Summary (Last 24 hours) at 6/17/2021 0948  Last data filed at 6/17/2021 0446  Gross per 24 hour   Intake 660 ml   Output 0 ml   Net 660 ml           Physical Exam:    GEN: Herman Montano appears well, alert and oriented x 3, pleasant and cooperative   NECK: supple, no carotid bruits, no JVD or HJR  HEART: normal rate, regular rhythm, normal S1 and S2, no murmurs, clicks, gallops or rubs   LUNGS: clear to auscultation bilaterally; no wheezes, rales, or rhonchi   ABDOMEN: normal bowel sounds, soft, no tenderness, no distention  EXTREMITIES: peripheral pulses normal; no clubbing, cyanosis, or edema  SKIN: warm and well perfused, no suspicious lesions on exposed skin    Labs & Results:    No results displayed because visit has over 200 results            XR clavicle left    Result Date: 6/14/2021  Narrative: TRAUMA SERIES INDICATION:  injury  COMPARISON:  None VIEWS:  XR TRAUMA MULTIPLE Images: 5  FINDINGS: CHEST: Supine frontal view of the chest is obtained  Cardiomediastinal silhouette is within normal limits accounting for technique and patient positioning  Lungs are clear  No layering pleural effusions detected  No pneumothorax is seen on this supine film  Upright images are more sensitive to detect anterior pneumothoraces if relevant  No displaced rib fractures  Left clavicle, left shoulder 4 views  Considerable deformity of the mid and lateral left clavicle  This is not well evaluated on this examination  There may be an acute displaced fracture of the midshaft of the clavicle but additional deformity suggests prior injury  Moderate glenohumeral degenerative changes  No evidence for shoulder dislocation  Impression: No acute cardiopulmonary disease within limitations of supine imaging  Probable displaced fracture of the midshaft of the left clavicle  Additional clavicle deformity suggests that there is probably prior injury also  Evaluation is not optimal due to patient condition  The study was marked in [facility] for immediate notification  Workstation performed: [id number]     XR shoulder 2+ vw left    Result Date: 6/14/2021  Narrative: TRAUMA SERIES INDICATION:  injury  COMPARISON:  None VIEWS:  XR TRAUMA MULTIPLE Images: 5  FINDINGS: CHEST: Supine frontal view of the chest is obtained  Cardiomediastinal silhouette is within normal limits accounting for technique and patient positioning  Lungs are clear  No layering pleural effusions detected  No pneumothorax is seen on this supine film  Upright images are more sensitive to detect anterior pneumothoraces if relevant  No displaced rib fractures  Left clavicle, left shoulder 4 views  Considerable deformity of the mid and lateral left clavicle  This is not well evaluated on this examination    There may be an acute displaced fracture of the midshaft of the clavicle but additional deformity suggests prior injury  Moderate glenohumeral degenerative changes  No evidence for shoulder dislocation  Impression: No acute cardiopulmonary disease within limitations of supine imaging  Probable displaced fracture of the midshaft of the left clavicle  Additional clavicle deformity suggests that there is probably prior injury also  Evaluation is not optimal due to patient condition  The study was marked in [facility] for immediate notification  Workstation performed: [id number]     CT head without contrast    Result Date: 6/13/2021  Narrative: CT BRAIN - WITHOUT CONTRAST INDICATION:   injury  COMPARISON:  None  TECHNIQUE:  CT examination of the brain was performed  In addition to axial images, sagittal and coronal 2D reformatted images were created and submitted for interpretation  Radiation dose length product (DLP) for this visit:  959 37 mGy-cm   This examination, like all CT scans performed in the [facility], was performed utilizing techniques to minimize radiation dose exposure, including the use of iterative  reconstruction and automated exposure control  IMAGE QUALITY:  Diagnostic  FINDINGS: PARENCHYMA: Decreased attenuation is noted in periventricular and subcortical white matter demonstrating an appearance that is statistically most likely to represent mild microangiopathic change  No CT signs of acute infarction  No intracranial mass, mass effect or midline shift  No acute parenchymal hemorrhage  VENTRICLES AND EXTRA-AXIAL SPACES:  Normal for the patient's age  VISUALIZED ORBITS AND PARANASAL SINUSES:  Unremarkable  CALVARIUM AND EXTRACRANIAL SOFT TISSUES:  Normal      Impression: No acute intracranial abnormality  Microangiopathic changes     I personally discussed this study with Tatiana Amaro on 6/13/2021 at 2:07 PM  Workstation performed: [id number]     CT spine cervical without contrast    Result Date: 6/13/2021  Narrative: CT CERVICAL SPINE - WITHOUT CONTRAST INDICATION:   injury  COMPARISON:  None  TECHNIQUE:  CT examination of the cervical spine was performed without intravenous contrast   Contiguous axial images were obtained  Sagittal and coronal reconstructions were performed  Radiation dose length product (DLP) for this visit:  491 98 mGy-cm   This examination, like all CT scans performed in the [facility], was performed utilizing techniques to minimize radiation dose exposure, including the use of iterative  reconstruction and automated exposure control  IMAGE QUALITY:  Diagnostic  FINDINGS: ALIGNMENT:  There is straightening of normal cervical lordosis  No subluxation or compression deformity  VERTEBRAL BODIES:  No fracture  DEGENERATIVE CHANGES:  No significant cervical degenerative changes are noted  PREVERTEBRAL AND PARASPINAL SOFT TISSUES:  Unremarkable  THORACIC INLET:  Normal      Impression: No cervical spine fracture or traumatic malalignment  I personally discussed this study with Andre Fritz on 6/13/2021 at 2:07 PM   Workstation performed: [id number]     XR chest 1 view    Result Date: 6/14/2021  Narrative: TRAUMA SERIES INDICATION:  injury  COMPARISON:  None VIEWS:  XR TRAUMA MULTIPLE Images: 5  FINDINGS: CHEST: Supine frontal view of the chest is obtained  Cardiomediastinal silhouette is within normal limits accounting for technique and patient positioning  Lungs are clear  No layering pleural effusions detected  No pneumothorax is seen on this supine film  Upright images are more sensitive to detect anterior pneumothoraces if relevant  No displaced rib fractures  Left clavicle, left shoulder 4 views  Considerable deformity of the mid and lateral left clavicle  This is not well evaluated on this examination  There may be an acute displaced fracture of the midshaft of the clavicle but additional deformity suggests prior injury   Moderate glenohumeral degenerative changes  No evidence for shoulder dislocation  Impression: No acute cardiopulmonary disease within limitations of supine imaging  Probable displaced fracture of the midshaft of the left clavicle  Additional clavicle deformity suggests that there is probably prior injury also  Evaluation is not optimal due to patient condition  The study was marked in [facility] for immediate notification  Workstation performed: [id number]     CT chest abdomen pelvis w contrast    Result Date: 6/14/2021  Narrative: CT CHEST, ABDOMEN AND PELVIS WITH IV CONTRAST INDICATION:   Chest-abdomen-pelvis trauma, blunt flank echymosis s/p trauma  COMPARISON:  None  TECHNIQUE: CT examination of the chest, abdomen and pelvis was performed  Axial, sagittal, and coronal 2D reformatted images were created from the source data and submitted for interpretation  Radiation dose length product (DLP) for this visit:  2778 66 mGy-cm   This examination, like all CT scans performed in the [facility], was performed utilizing techniques to minimize radiation dose exposure, including the use of iterative reconstruction and automated exposure control  IV Contrast:  100 mL of iohexol (OMNIPAQUE) Enteric Contrast: Enteric contrast was administered  FINDINGS: CHEST LUNGS:  Lungs are clear  There is no tracheal or endobronchial lesion  PLEURA:  Unremarkable  HEART/GREAT VESSELS:  The heart is mildly enlarged  Coronary artery calcifications are visualized  MEDIASTINUM AND YNES:  Unremarkable  CHEST WALL AND LOWER NECK:   Unremarkable  ABDOMEN LIVER/BILIARY TREE:  Unremarkable  GALLBLADDER:  No calcified gallstones  No pericholecystic inflammatory change  SPLEEN:  Unremarkable  PANCREAS:  Unremarkable  ADRENAL GLANDS:  Unremarkable  KIDNEYS/URETERS:  Unremarkable  No hydronephrosis  STOMACH AND BOWEL:  Unremarkable  APPENDIX:  No findings to suggest appendicitis  ABDOMINOPELVIC CAVITY:  No ascites  No pneumoperitoneum    No lymphadenopathy  VESSELS:  Unremarkable for patient's age  PELVIS REPRODUCTIVE ORGANS:  Unremarkable for patient's age  URINARY BLADDER:  Unremarkable  ABDOMINAL WALL/INGUINAL REGIONS:  Fat-containing right inguinal hernia is seen  There is a fluid collection measuring 6 9 x 5 2 cm in the left lateral abdominal wall overlying the left iliac bone with active extravasation of contrast  OSSEOUS STRUCTURES:  No acute fracture or destructive osseous lesion  Impression: Fluid collection in the left lateral abdominal wall overlying the left iliac bone with active extravasation of contrast   Findings likely representing hematoma with active hemorrhage  The study was marked in [facility] for immediate notification  Workstation performed: [id number]     XR trauma multiple    Result Date: 6/13/2021  Narrative: TRAUMA SERIES INDICATION:  injury  COMPARISON:  None VIEWS:  XR TRAUMA MULTIPLE Images: 5  FINDINGS: CHEST: Supine frontal view of the chest is obtained  Cardiomediastinal silhouette is within normal limits accounting for technique and patient positioning  Lungs are clear  No layering pleural effusions detected  No pneumothorax is seen on this supine film  Upright images are more sensitive to detect anterior pneumothoraces if relevant  No displaced rib fractures  Left clavicle, left shoulder 4 views  Considerable deformity of the mid and lateral left clavicle  This is not well evaluated on this examination  There may be an acute displaced fracture of the midshaft of the clavicle but additional deformity suggests prior injury  Moderate glenohumeral degenerative changes  No evidence for shoulder dislocation  Impression: No acute cardiopulmonary disease within limitations of supine imaging  Probable displaced fracture of the midshaft of the left clavicle  Additional clavicle deformity suggests that there is probably prior injury also  Evaluation is not optimal due to patient condition   The study was marked in [employer] for immediate notification  Workstation performed: [id number]      bedside procedure    Result Date: 6/13/2021  Narrative: 1 2 [id number]  5 [id number]  4 [id number]      EKG personally reviewed by Kranthi Reyes MD      Counseling / Coordination of Care  Total floor / unit time spent today 30 minutes  Greater than 50% of total time was spent with the patient and / or family counseling and / or coordination of care

## 2021-06-17 NOTE — PROGRESS NOTES
Subjective: No acute events overnight  No acute distress  Objective:  A 10 point ROS was performed; negative except as noted above       Lab Results   Component Value Date/Time    WBC 9 67 06/17/2021 04:45 AM    HGB 11 1 (L) 06/17/2021 04:45 AM       Vitals:    06/16/21 2242   BP: 126/85   Pulse: 57   Resp:    Temp: 98 2 °F (36 8 °C)   SpO2: 94%     Left upper extremity  Skin intact  Sling in place  Motor grossly intact to median, radial and ulnar nerve distributions  Sensation intact to light touch in m/r/u/mc/ax distributions  Digits warm and well perfused with brisk capillary refill     Assessment: 82 y o  male with Left clavicle fracture    Plan:  NWB LUE in sling   Plans for non-operative management   Pain control  DVT ppx:  PT/OT  Dispo: Ortho signing off

## 2021-06-17 NOTE — DISCHARGE SUMMARY
[address] [facility]  Discharge- Cj Weathers 1935, 82 y o  male MRN: [id number]  Unit/Bed#: [facility] 823-01 Encounter: [id number]  Primary Care Provider: Terrell Sifuentes MD   Date and time admitted to hospital: 6/13/2021  1:29 PM    Fall  Assessment & Plan  - Status post fall with the below noted injuries  - Fall precautions  - Geriatric Medicine consultation for evaluation, medication review and recommendations   - PT and OT evaluation and treatment as indicated  - Case Management consultation for disposition planning  * Abdominal wall hematoma  Assessment & Plan  - Abdominal wall hematoma in setting of anticoagulant use  No evidence of active or ongoing bleeding   - Hemoglobin remained stable following resumption of anticoagulant on 06/16/2021   - Recommend avoiding strenuous exercise / activities and lifting greater than 10 lb for 2 weeks  - Continue use of abdominal binder for support and comfort     - Outpatient follow-up in the trauma clinic for re-evaluation  Acute blood loss anemia  Assessment & Plan  - Acute blood loss anemia secondary to traumatic injuries including clavicle fracture and abdominal hematoma  - Hemoglobin remained stable following resumption of anticoagulant on 06/16/2021     - Outpatient follow-up with PCP  Left shoulder pain  Assessment & Plan  - Patient has a history of left shoulder pain and was found have findings suggestive of a left clavicle fracture  - Appreciate Orthopedic surgery evaluation and recommendations  Non operative management recommended  - Maintain nonweightbearing status in the left upper extremity with use of a sling     - Analgesia as needed  - Continue PT and OT evaluation and treatment as indicated  - Outpatient follow-up with Orthopedic surgery for re-evaluation      Atrial fibrillation [facility])  Assessment & Plan  - Chronic history of atrial fibrillation with bradycardic episode potentially contributing to syncope  - Appreciate Cardiology evaluation and recommendations  - Anticoagulant and home medication regimen resumed per Cardiology recommendations  - At this time, there is no indication for further workup or event monitor, but was strongly suggested the patient follow up in the short term with his primary Cardiologist to discuss further workup if indicated  Bradycardia  Assessment & Plan  - Episodic bradycardia, as low as 40's on telemetry monitoring during this admission     - Appreciate Cardiology evaluation and recommendations  - Anticoagulant and home medication regimen resumed per Cardiology recommendations  - At this time, there is no indication for further workup or event monitor, but was strongly suggested the patient follow up in the short term with his primary Cardiologist to discuss further workup if indicated  Hypertension  Assessment & Plan  - Continue current medication regimen with plan to resume home medication therapy on discharge  - Outpatient follow-up with PCP  Parkinson disease [facility])  Assessment & Plan  - Continue current medication regimen   - Outpatient follow-up with PCP  Discharge Summary - Trauma Service   Alyx Potter 82 y o  male MRN: [id number]  Unit/Bed#: [facility] 823-01 Encounter: [id number]    Admission Date: 6/13/2021     Discharge Date: 6/17/2021    Admitting Diagnosis: Other injury of unspecified body region, initial encounter [T14  8XXA]    Discharge Diagnosis: See above  Attending and Service: Dr Brenton Small, Acute Care Surgical Services  Consulting Physician(s):     1  Cardiology  2  Orthopedic Surgery  3  Geriatric Medicine  Imaging and Procedures Performed:     XR clavicle left    Result Date: 6/14/2021  Impression: No acute cardiopulmonary disease within limitations of supine imaging  Probable displaced fracture of the midshaft of the left clavicle  Additional clavicle deformity suggests that there is probably prior injury also    Evaluation is not optimal due to patient condition  The study was marked in [facility] for immediate notification  Workstation performed: [id number]     XR shoulder 2+ vw left    Result Date: 6/14/2021  Impression: No acute cardiopulmonary disease within limitations of supine imaging  Probable displaced fracture of the midshaft of the left clavicle  Additional clavicle deformity suggests that there is probably prior injury also  Evaluation is not optimal due to patient condition  The study was marked in [facility] for immediate notification  Workstation performed: [id number]     XR hip/pelv 2-3 vws left if performed    Result Date: 6/17/2021  Impression: No acute osseous abnormality  Degenerative changes as described  Workstation performed: [id number]     CT head without contrast    Result Date: 6/13/2021  Impression: No acute intracranial abnormality  Microangiopathic changes  I personally discussed this study with Brianna Median on 6/13/2021 at 2:07 PM  Workstation performed: [id number]     CT spine cervical without contrast    Result Date: 6/13/2021  Impression: No cervical spine fracture or traumatic malalignment  I personally discussed this study with Brianna Median on 6/13/2021 at 2:07 PM   Workstation performed: [id number]     XR chest 1 view    Result Date: 6/14/2021  Impression: No acute cardiopulmonary disease within limitations of supine imaging  Probable displaced fracture of the midshaft of the left clavicle  Additional clavicle deformity suggests that there is probably prior injury also  Evaluation is not optimal due to patient condition  The study was marked in [facility] for immediate notification  Workstation performed: [id number]     CT chest abdomen pelvis w contrast    Result Date: 6/14/2021  Impression: Fluid collection in the left lateral abdominal wall overlying the left iliac bone with active extravasation of contrast   Findings likely representing hematoma with active hemorrhage   The study was marked in [employer] for immediate notification  Workstation performed: [id number]     XR trauma multiple    Result Date: 6/13/2021  Impression: No acute cardiopulmonary disease within limitations of supine imaging  Probable displaced fracture of the midshaft of the left clavicle  Additional clavicle deformity suggests that there is probably prior injury also  Evaluation is not optimal due to patient condition  The study was marked in [facility] for immediate notification  Workstation performed: [id number]       Hospital Course: Choco Harrington is a 82-year-old male who presented as a level B trauma alert via ambulance following a fall  He reported that he had a misstep while walking and fell to the ground striking the left side of his head, but denied losing consciousness  He presented complaining of mild left shoulder pain and was noted to have some abrasions on his left upper extremity and the back of his head  On his initial trauma evaluation, his primary survey was unremarkable  On secondary survey, he had a superficial abrasion over the left occiput; he was noted to be bradycardic with an irregular rhythm; he had tenderness over the left shoulder with an intact neurovascular exam distally; he had superficial abrasions over the left dorsal hand, volar wrist and elbow; the remainder of his exam was unremarkable  His initial workup included labs in the above-noted imaging studies  There is concern that his fall may have been the result of a syncopal event and was initially admitted to the medicine service for syncope and cardiac workup  Geriatric Medicine was also consulted  During the patient's tertiary survey, he was noted to have a significant abdominal wall hematoma and his care was picked up by the trauma service again  Cardiology was consulted and assisted with his medication regimen and recommendations throughout his hospitalization as well as for discharge    The patient did not meet criteria for event monitor or intervention for his bradycardia this admission with recommendations for the patient to follow-up with his primary cardiologist on discharge  Additionally, Orthopedic surgery was consulted during his hospitalization over concern for acute clavicle fracture in setting of prior chronic clavicle shaft fracture and recommended non operative management with nonweightbearing status in the left upper extremity in a sling  Geriatric Medicine assisted with medication review and management throughout his hospitalization  PT and OT evaluated the patient and ultimately cleared for discharge home with home health therapy services  Case Management assisted with disposition planning, the patient was deemed stable for discharge on 06/17/2021  On discharge, the patient is instructed to follow-up with the patient's primary care provider to review the events of the patient's recent hospitalization  The patient is instructed to follow-up in the Trauma Clinic as scheduled on 6/28/2021 at 9:00 AM  The patient is instructed to follow up with his cardiologist in the next 1-2 weeks for further evaluation and to review the hospital encounter  The patient is instructed to follow up with Orthopedic surgery  The patient should follow the provided discharge instructions  Condition at Discharge: good     Discharge instructions/Information to patient and family:   See after visit summary for information provided to patient and family  Provisions for Follow-Up Care:  See after visit summary for information related to follow-up care and any pertinent home health orders  Disposition: See After Visit Summary for discharge disposition information  Planned Readmission: No    Discharge Statement   I spent 26 minutes discharging the patient  This time was spent on the day of discharge  I had direct contact with the patient on the day of discharge   Additional documentation is required if more than 30 minutes were spent on discharge  Discharge Medications:  See after visit summary for reconciled discharge medications provided to patient and family        Brittany Montes PA-C  6/17/2021  12:56 PM

## 2021-06-17 NOTE — PLAN OF CARE
Problem: Potential for Falls  Goal: Patient will remain free of falls  Description: INTERVENTIONS:  - Educate patient/family on patient safety including physical limitations  - Instruct patient to call for assistance with activity   - Consult OT/PT to assist with strengthening/mobility   - Keep Call bell within reach  - Keep bed low and locked with side rails adjusted as appropriate  - Keep care items and personal belongings within reach  - Initiate and maintain comfort rounds  - Make Fall Risk Sign visible to staff  - Offer Toileting every 2 Hours, in advance of need  - Obtain necessary fall risk management equipment: walker, cane   Problem: Prexisting or High Potential for Compromised Skin Integrity  Goal: Skin integrity is maintained or improved  Description: INTERVENTIONS:  - Identify patients at risk for skin breakdown  - Assess and monitor skin integrity  - Assess and monitor nutrition and hydration status  - Monitor labs   - Assess for incontinence   - Turn and reposition patient  - Assist with mobility/ambulation  - Relieve pressure over bony prominences  - Avoid friction and shearing  - Provide appropriate hygiene as needed including keeping skin clean and dry  - Evaluate need for skin moisturizer/barrier cream  - Collaborate with interdisciplinary team   - Patient/family teaching  - Consider wound care consult   Outcome: Progressing     Problem: MOBILITY - ADULT  Goal: Maintain or return to baseline ADL function  Description: INTERVENTIONS:  -  Assess patient's ability to carry out ADLs; assess patient's baseline for ADL function and identify physical deficits which impact ability to perform ADLs (bathing, care of mouth/teeth, toileting, grooming, dressing, etc )  - Assess/evaluate cause of self-care deficits   - Assess range of motion  - Assess patient's mobility; develop plan if impaired  - Assess patient's need for assistive devices and provide as appropriate  - Encourage maximum independence but intervene and supervise when necessary  - Involve family in performance of ADLs  - Assess for home care needs following discharge   - Consider OT consult to assist with ADL evaluation and planning for discharge  - Provide patient education as appropriate  Outcome: Progressing  Goal: Maintains/Returns to pre admission functional level  Description: INTERVENTIONS:  - Perform BMAT or MOVE assessment daily    - Set and communicate daily mobility goal to care team and patient/family/caregiver  - Collaborate with rehabilitation services on mobility goals if consulted  - Perform Range of Motion 3 times a day    Problem: PAIN - ADULT  Goal: Verbalizes/displays adequate comfort level or baseline comfort level  Description: Interventions:  - Encourage patient to monitor pain and request assistance  - Assess pain using appropriate pain scale  - Administer analgesics based on type and severity of pain and evaluate response  - Implement non-pharmacological measures as appropriate and evaluate response  - Consider cultural and social influences on pain and pain management  - Notify physician/advanced practitioner if interventions unsuccessful or patient reports new pain  Outcome: Progressing     Problem: INFECTION - ADULT  Goal: Absence or prevention of progression during hospitalization  Description: INTERVENTIONS:  - Assess and monitor for signs and symptoms of infection  - Monitor lab/diagnostic results  - Monitor all insertion sites, i e  indwelling lines, tubes, and drains  - Monitor endotracheal if appropriate and nasal secretions for changes in amount and color  - Griffith appropriate cooling/warming therapies per order  - Administer medications as ordered  - Instruct and encourage patient and family to use good hand hygiene technique  - Identify and instruct in appropriate isolation precautions for identified infection/condition  Outcome: Progressing     Problem: SAFETY ADULT  Goal: Maintain or return to baseline ADL function  Description: INTERVENTIONS:  -  Assess patient's ability to carry out ADLs; assess patient's baseline for ADL function and identify physical deficits which impact ability to perform ADLs (bathing, care of mouth/teeth, toileting, grooming, dressing, etc )  - Assess/evaluate cause of self-care deficits   - Assess range of motion  - Assess patient's mobility; develop plan if impaired  - Assess patient's need for assistive devices and provide as appropriate  - Encourage maximum independence but intervene and supervise when necessary  - Involve family in performance of ADLs  - Assess for home care needs following discharge   - Consider OT consult to assist with ADL evaluation and planning for discharge  - Provide patient education as appropriate  Outcome: Progressing  Problem: DISCHARGE PLANNING  Goal: Discharge to home or other facility with appropriate resources  Description: INTERVENTIONS:  - Identify barriers to discharge w/patient and caregiver  - Arrange for needed discharge resources and transportation as appropriate  - Identify discharge learning needs (meds, wound care, etc )  - Arrange for interpretive services to assist at discharge as needed  - Refer to Case Management Department for coordinating discharge planning if the patient needs post-hospital services based on physician/advanced practitioner order or complex needs related to functional status, cognitive ability, or social support system  Outcome: Progressing     Problem: Knowledge Deficit  Goal: Patient/family/caregiver demonstrates understanding of disease process, treatment plan, medications, and discharge instructions  Description: Complete learning assessment and assess knowledge base    Interventions:  - Provide teaching at level of understanding  - Provide teaching via preferred learning methods  Outcome: Progressing     Problem: METABOLIC, FLUID AND ELECTROLYTES - ADULT  Goal: Electrolytes maintained within normal limits  Description: INTERVENTIONS:  - Monitor labs and assess patient for signs and symptoms of electrolyte imbalances  - Administer electrolyte replacement as ordered  - Monitor response to electrolyte replacements, including repeat lab results as appropriate  - Instruct patient on fluid and nutrition as appropriate  Outcome: Progressing     Problem: HEMATOLOGIC - ADULT  Goal: Maintains hematologic stability  Description: INTERVENTIONS  - Assess for signs and symptoms of bleeding or hemorrhage  - Monitor labs  - Administer supportive blood products/factors as ordered and appropriate  Outcome: Progressing     Problem: MUSCULOSKELETAL - ADULT  Goal: Maintain or return mobility to safest level of function  Description: INTERVENTIONS:  - Assess patient's ability to carry out ADLs; assess patient's baseline for ADL function and identify physical deficits which impact ability to perform ADLs (bathing, care of mouth/teeth, toileting, grooming, dressing, etc )  - Assess/evaluate cause of self-care deficits   - Assess range of motion  - Assess patient's mobility  - Assess patient's need for assistive devices and provide as appropriate  - Encourage maximum independence but intervene and supervise when necessary  - Involve family in performance of ADLs  - Assess for home care needs following discharge   - Consider OT consult to assist with ADL evaluation and planning for discharge  - Provide patient education as appropriate  Outcome: Progressing        - Stand patient 3 times a day  - Ambulate patient 2 times a day  - Out of bed to chair 3 times a day   - Out of bed for meals 3 times a day  - Out of bed for toileting  - Record patient progress and toleration of activity level   Outcome: Progressing     - Apply yellow socks and bracelet for high fall risk patients  - Consider moving patient to room near nurses station  Outcome: Progressing

## 2021-06-17 NOTE — PROGRESS NOTES
Pastoral Care Progress Note    2021  Patient: Lilly Gaines : 1935  Admission Date & Time: 2021 1329  MRN: [id number] CSN: [id number]     offered brief introduction to support available to Pt as he was eating his lunch      21 1200   Clinical Encounter Type   Visited With Patient   Routine Visit Introduction

## 2021-06-17 NOTE — PLAN OF CARE
Problem: PHYSICAL THERAPY ADULT  Goal: Performs mobility at highest level of function for planned discharge setting  See evaluation for individualized goals  Description: Treatment/Interventions: Functional transfer training, LE strengthening/ROM, Therapeutic exercise, Endurance training, Elevations, Patient/family training, Equipment eval/education, Bed mobility, Gait training  Equipment Recommended: Essie Lowe       See flowsheet documentation for full assessment, interventions and recommendations  Outcome: Adequate for Discharge  Note: Prognosis: Good  Problem List: Decreased endurance, Decreased mobility, Impaired balance, Pain, Orthopedic restrictions  Assessment: The pt  has improved balance, strength, and activity tolerance  He was able to transfer from the low toilet without assistance, as well as ambulate a few feet with no device  He was able to ambulate around the unit extensively with only cursory hand-hold assistance  The patient did not bear any weight through therapist's hand nor push or pull on it during gait at all  He was sized for and provided a SPC as he is now NWB LUE in a sling  He demonstrated awareness of his deficits as well as took appropriate measures for them  He will benefit from continued therapy in order to further facilitate his return to his baseline independence  Barriers to Discharge: None        PT Discharge Recommendation: Home with home health rehabilitation     PT - OK to Discharge: Yes    See flowsheet documentation for full assessment

## 2021-06-17 NOTE — DISCHARGE INSTRUCTIONS
Discharge Instructions - Orthopedics  Brian Mcmillan 82 y o  male MRN: [id number]  Unit/Bed#: [facility] 823-01    Weight Bearing Status:                                           Non weight bearing left upper extremity in sling  After 2 weeks, it is ok to come out of the sling for range of motion exercises and pendulums  No strengthening or lifting weights  No overhead activity with left arm until followup  Pain:  Continue analgesics as directed    Dressing Instructions:   Please keep clean, dry and intact until follow up     Appt Instructions: If you do not have your appointment, please call the clinic at [phone] t  Otherwise followup as scheduled     Contact the office sooner if you experience any increased numbness/tingling in the extremities  Trauma Discharge Instructions:    Please follow-up as instructed  If you need a follow-up appointment, please call the office when you leave to schedule an appointment  Activity:  - You may resume light activity as tolerated, but should continue to avoid lifting greater than 10 lb and/or any strenuous core activity for 2 weeks  - Walking and normal light activities are encouraged  - Normal daily activities including climbing steps are okay  Diet:    - You may resume your normal diet  Medications:  - You should continue your current medication regimen after discharge unless otherwise instructed  Please refer to your discharge medication list for further details  - Please take the pain medications as directed  - You may become constipated, especially if taking pain medications  You may take any over the counter stool softeners or laxatives as needed  Examples: Milk of Magnesia, Colace, Senna      Additional Instructions:  - May shower daily   - If you have any questions or concerns after discharge please call the office   - Call office or return to ER if fever greater than 101, chills, persistent nausea/vomiting, new/worsening/uncontrollable pain, develop productive cough, increasing shortness of breath, and/or difficulty breathing

## 2021-06-18 ENCOUNTER — DOCUMENTATION (OUTPATIENT)
Dept: SOCIAL WORK | Facility: HOSPITAL | Age: 86
End: 2021-06-18

## 2021-06-18 ENCOUNTER — TRANSITIONAL CARE MANAGEMENT (OUTPATIENT)
Dept: INTERNAL MEDICINE CLINIC | Facility: CLINIC | Age: 86
End: 2021-06-18

## 2021-06-18 NOTE — PROGRESS NOTES
Admission Report at [facility] has admitted your patient to [address] [place] [address] service with the following disciplines:      PT and OT  This report is informational only, no responses is needed  Primary focus of home health care MUSCULOSKELETAL  Patient stated goals of care to be able to get in and out of bed  to be able to use left arm  to get back to driving  Anticipated visit pattern 1wk1 and startign week of 06 20 21 2wk4 and 1wk2  Significant clinical findings high BP at rest and following activity  At rest 146/92 and post activity 152/96    Potential barriers to goal achievement PMH of PD unsteady and ataxic gait apttern  use of one UE during mobility  steps within the home      Thank you for allowing us to participate in the care of your patient        Brendan Nj, PT

## 2021-06-21 LAB
DME PARACHUTE DELIVERY DATE ACTUAL: NORMAL
DME PARACHUTE DELIVERY DATE ACTUAL: NORMAL
DME PARACHUTE DELIVERY DATE REQUESTED: NORMAL
DME PARACHUTE DELIVERY DATE REQUESTED: NORMAL
DME PARACHUTE ITEM DESCRIPTION: NORMAL
DME PARACHUTE ITEM DESCRIPTION: NORMAL
DME PARACHUTE ORDER STATUS: NORMAL
DME PARACHUTE ORDER STATUS: NORMAL
DME PARACHUTE SUPPLIER NAME: NORMAL
DME PARACHUTE SUPPLIER NAME: NORMAL
DME PARACHUTE SUPPLIER PHONE: NORMAL
DME PARACHUTE SUPPLIER PHONE: NORMAL

## 2021-06-23 DIAGNOSIS — G20 PARKINSON'S DISEASE (HCC): ICD-10-CM

## 2021-06-23 DIAGNOSIS — I48.19 PERSISTENT ATRIAL FIBRILLATION (HCC): ICD-10-CM

## 2021-06-23 DIAGNOSIS — I10 ESSENTIAL HYPERTENSION: Primary | ICD-10-CM

## 2021-06-23 RX ORDER — RIVAROXABAN 15 MG/1
15 TABLET, FILM COATED ORAL
Qty: 90 TABLET | Refills: 3 | Status: SHIPPED | OUTPATIENT
Start: 2021-06-23 | End: 2021-09-16 | Stop reason: SDUPTHER

## 2021-06-23 RX ORDER — TELMISARTAN 40 MG/1
40 TABLET ORAL DAILY
Qty: 90 TABLET | Refills: 3 | Status: SHIPPED | OUTPATIENT
Start: 2021-06-23 | End: 2021-09-15 | Stop reason: SDUPTHER

## 2021-06-23 RX ORDER — PROPRANOLOL HYDROCHLORIDE 20 MG/1
20 TABLET ORAL EVERY 12 HOURS SCHEDULED
Qty: 180 TABLET | Refills: 3 | Status: SHIPPED | OUTPATIENT
Start: 2021-06-23 | End: 2021-09-15 | Stop reason: SDUPTHER

## 2021-06-28 ENCOUNTER — HOSPITAL ENCOUNTER (EMERGENCY)
Facility: HOSPITAL | Age: 86
Discharge: HOME | End: 2021-06-29
Attending: EMERGENCY MEDICINE
Payer: MEDICARE

## 2021-06-28 ENCOUNTER — OFFICE VISIT (OUTPATIENT)
Dept: SURGERY | Facility: CLINIC | Age: 86
End: 2021-06-28
Payer: MEDICARE

## 2021-06-28 VITALS
HEART RATE: 62 BPM | DIASTOLIC BLOOD PRESSURE: 85 MMHG | RESPIRATION RATE: 22 BRPM | OXYGEN SATURATION: 98 % | TEMPERATURE: 98.5 F | SYSTOLIC BLOOD PRESSURE: 121 MMHG

## 2021-06-28 VITALS — BODY MASS INDEX: 29.82 KG/M2 | HEIGHT: 74 IN | TEMPERATURE: 97.6 F | WEIGHT: 232.4 LBS

## 2021-06-28 DIAGNOSIS — M79.605 LEFT LEG PAIN: Primary | ICD-10-CM

## 2021-06-28 DIAGNOSIS — S30.1XXD ABDOMINAL WALL HEMATOMA, SUBSEQUENT ENCOUNTER: Primary | ICD-10-CM

## 2021-06-28 PROCEDURE — 99283 EMERGENCY DEPT VISIT LOW MDM: CPT

## 2021-06-28 PROCEDURE — 99213 OFFICE O/P EST LOW 20 MIN: CPT | Performed by: SURGERY

## 2021-06-28 NOTE — PROGRESS NOTES
Office Visit - [address] MRN: [id number]  Encounter: [id number]    Assessment and Plan    Problem List Items Addressed This Visit        Other    Abdominal wall hematoma - Primary     81 y/o M who presented as a Trauma alert, s/p fall, L clavicle fx, 6 9 x 5 2 cm L-sided abdominal wall hematoma  --Hematoma decreasing in size, pt reports no discomfort at site  --Follow-up scheduled with Orthopedics for L clavicle fx, currently non-weightbearing in sling  --Can Follow-up on PRN basis if sx worsen               Chief Complaint:  Juany Del Rio is a 82 y o  male who presents for Fall (f/u fall, abdominal wall hematoma, left )    Subjective    Pt presents for re-eval s/p Trauma admission on 6/13-6/17 following a fall, with subsequent L clavicle fx and L-sided abdominal wall hematoma  CT imaging on 6/13 showed a 6 9 x 5 2 cm L-sided abdominal wall hematoma with active extravasation  Hemoglobin level and hemodynamics remained stable  Today, pt feels well  Has occasional discomfort in his L lateral thigh with walking, but denies any abdominal pain, nausea, or vomiting  Pt prefers not to wear abdominal binder because the hematoma is decreasing in size  Denies any pain at the hematoma site       Past Medical History  Past Medical History:   Diagnosis Date    [facility]-fib ([facility]     Afib ([facility]     Hypertension     Left AC separation     Parkinson's disease ([facility] ) 11/01/2018       Past Surgical History  Past Surgical History:   Procedure Laterality Date    CARDIOVERSION      ATRIAL    ROTATOR CUFF REPAIR      SHOULDER SURGERY         Family History  Family History   Problem Relation Age of Onset    Hypertension Father     No Known Problems Mother     Coronary artery disease Father        Social History  Social History     Socioeconomic History    Marital status: /Civil Union     Spouse name: None    Number of children: None    Years of education: None    Highest education level: None   Occupational History    None   Tobacco Use    Smoking status: Never Smoker    Smokeless tobacco: Never Used    Tobacco comment: Quit 35-40 years ago   Vaping Use    Vaping Use: Never used   Substance and Sexual Activity    Alcohol use: Not Currently     Comment: Rare, previously moderate drinker cut down 2016   Drug use: Never    Sexual activity: Not Currently     Partners: Female     Birth control/protection: Post-menopausal   Other Topics Concern    None   Social History Narrative    ** Merged History Encounter **          Social Determinants of Health     Financial Resource Strain:     Difficulty of Paying Living Expenses:    Food Insecurity:     Worried About Running Out of Food in the Last Year:     Ran Out of Food in the Last Year:    Transportation Needs:     Lack of Transportation (Medical):      Lack of Transportation (Non-Medical):    Physical Activity:     Days of Exercise per Week:     Minutes of Exercise per Session:    Stress:     Feeling of Stress :    Social Connections:     Frequency of Communication with Friends and Family:     Frequency of Social Gatherings with Friends and Family:     Attends [employer] Services:     Active Member of Clubs or Organizations:     Attends Club or Organization Meetings:     Marital Status:    Intimate Partner Violence:     Fear of Current or Ex-Partner:     Emotionally Abused:     Physically Abused:     Sexually Abused:         Medications  Current Outpatient Medications on File Prior to Visit   Medication Sig Dispense Refill    acetaminophen (TYLENOL) 325 mg tablet Take 2 tablets (650 mg total) by mouth every 4 (four) hours as needed for mild pain  0    ALPRAZolam (XANAX) 0 25 mg tablet Take 1 tablet (0 25 mg total) by mouth 2 (two) times a day as needed for anxiety 50 tablet 1    ALPRAZolam (XANAX) 0 25 mg tablet Take by mouth 2 (two) times a day as needed for anxiety      carbamide peroxide (DEBROX) 6 5 % otic solution Administer 5 drops into both ears 2 (two) times a day 15 mL 0    carbidopa-levodopa (SINEMET)  mg per tablet Take 1 tablet by mouth 3 (three) times a day 270 tablet 3    carbidopa-levodopa (SINEMET)  mg per tablet Take 1 tablet by mouth 3 (three) times a day      Coenzyme Q10 (COQ-10 PO) Take 1 tablet by mouth daily      Dentifrices (BIOTENE DRY MOUTH CARE DT) Apply to teeth      ipratropium (ATROVENT) 0 06 % nasal spray       isosorbide mononitrate (IMDUR) 30 mg 24 hr tablet Take 30 mg by mouth daily at bedtime      isosorbide mononitrate (IMDUR) 30 mg 24 hr tablet Take 30 mg by mouth daily      loratadine (CLARITIN) 10 mg tablet Take 10 mg by mouth daily as needed        loratadine (CLARITIN) 10 mg tablet Take 10 mg by mouth daily      Multiple Vitamins-Minerals (ICAPS AREDS 2) CAPS Take 1 capsule by mouth 2 (two) times a day        propranolol (INDERAL) 20 mg tablet Take 1 tablet (20 mg total) by mouth every 12 (twelve) hours 60 tablet 5    propranolol (INDERAL) 20 mg tablet Take 1 tablet (20 mg total) by mouth every 12 (twelve) hours 180 tablet 3    rivaroxaban (Xarelto) 15 mg tablet Take 15 mg by mouth      Simethicone (GAS-X PO) Take 1 tablet by mouth daily as needed      telmisartan (MICARDIS) 40 mg tablet TAKE 1 TABLET DAILY 90 tablet 3    telmisartan (MICARDIS) 40 mg tablet Take 1 tablet (40 mg total) by mouth daily 90 tablet 3    warfarin (COUMADIN) 5 mg tablet Take 1 tablet (5 mg total) by mouth daily   25MG TAKE 1 TABLET 6 DAY A WEEK AND THEN 1 TABLET 1 DAY A WEEK  0    Xarelto 15 MG tablet Take 1 tablet (15 mg total) by mouth daily with breakfast 90 tablet 3    escitalopram (LEXAPRO) 10 mg tablet take 1 tablet by mouth once daily (Patient not taking: Reported on 6/2/2021) 30 tablet 5     No current facility-administered medications on file prior to visit  Allergies  Allergies   Allergen Reactions    Penicillins Hives    Pollen Extract Other (See Comments)              Review of Systems Constitutional: Negative for activity change, appetite change, chills and fever  HENT: Negative for congestion, sinus pressure and sinus pain  Respiratory: Negative for apnea, cough, chest tightness, shortness of breath and wheezing  Cardiovascular: Negative for chest pain, palpitations and leg swelling  Gastrointestinal: Negative for abdominal distention, abdominal pain, constipation, diarrhea, nausea and vomiting  L abdominal wall hematoma   Genitourinary: Negative for difficulty urinating and dysuria  Musculoskeletal: Positive for arthralgias  Negative for back pain and gait problem  Left lateral thigh pain   Skin: Negative for color change, pallor, rash and wound  Neurological: Negative for dizziness, tremors, seizures, syncope, facial asymmetry and numbness  Psychiatric/Behavioral: Negative for agitation, behavioral problems and confusion  Objective  Vitals:    06/28/21 0913   Temp: 97 6 °F (36 4 °C)       Physical Exam  Constitutional:       General: He is not in acute distress  Appearance: He is well-developed  He is not diaphoretic  HENT:      Head: Normocephalic and atraumatic  Cardiovascular:      Rate and Rhythm: Normal rate and regular rhythm  Heart sounds: Normal heart sounds  No murmur heard  No friction rub  No gallop  Pulmonary:      Effort: Pulmonary effort is normal  No respiratory distress  Breath sounds: Normal breath sounds  No wheezing or rales  Abdominal:      General: Bowel sounds are normal  There is no distension  Palpations: Abdomen is soft  Tenderness: There is no abdominal tenderness  Comments: Approximately 4 x 3 cm L lateral abdominal wall mass-consistent with abdominal wall hematoma-nontender, with surrounding chronic ecchymosis  Musculoskeletal:         General: Normal range of motion  Cervical back: Normal range of motion and neck supple        Comments: LUE in sling   Skin:     General: Skin is warm and dry  Findings: No erythema  Neurological:      Mental Status: He is alert and oriented to person, place, and time

## 2021-06-28 NOTE — ASSESSMENT & PLAN NOTE
81 y/o M who presented as a Trauma alert, s/p fall, L clavicle fx, 6 9 x 5 2 cm L-sided abdominal wall hematoma  --Hematoma decreasing in size, pt reports no discomfort at site  --Follow-up scheduled with Orthopedics for L clavicle fx, currently non-weightbearing in sling  --Can Follow-up on PRN basis if sx worsen

## 2021-06-29 ENCOUNTER — TELEPHONE (OUTPATIENT)
Dept: INTERNAL MEDICINE CLINIC | Facility: CLINIC | Age: 86
End: 2021-06-29

## 2021-06-29 PROCEDURE — 99284 EMERGENCY DEPT VISIT MOD MDM: CPT | Performed by: EMERGENCY MEDICINE

## 2021-06-29 NOTE — ED PROVIDER NOTES
History  Chief Complaint   Patient presents with    Leg Pain     reports having a recent admission for a fall with multiple injuries  reports this afternoon started with left thigh pain descibed as a "nasty sarita horse " tylenol immediatly PTA  82-year-old male with history of atrial fibrillation on Xarelto presents to the emergency department for evaluation left leg pain  The patient reports that approximately 30 minutes prior to arrival he had an episode of upper left leg pain that he describes as a severe Charley horse  The patient states he thought he might have a blood clot so came to the emergency department for further evaluation  The patient states that just prior to coming in his pain resolved  Reports that he took a Tylenol to treat his symptoms but states that he took the Tylenol after the pain already resolved  Patient states that he currently feels fine with no current complaints  He denies fevers, chills, nausea, vomiting, diarrhea, chest pain, shortness of breath, calf pain/swelling and localized numbness, tingling and weakness  Prior to Admission Medications   Prescriptions Last Dose Informant Patient Reported? Taking?    ALPRAZolam (XANAX) 0 25 mg tablet  Self No No   Sig: Take 1 tablet (0 25 mg total) by mouth 2 (two) times a day as needed for anxiety   ALPRAZolam (XANAX) 0 25 mg tablet   Yes No   Sig: Take by mouth 2 (two) times a day as needed for anxiety   Coenzyme Q10 (COQ-10 PO)  Self Yes No   Sig: Take 1 tablet by mouth daily   Dentifrices (BIOTENE DRY MOUTH CARE DT)  Self Yes No   Sig: Apply to teeth   Multiple Vitamins-Minerals (ICAPS AREDS 2) CAPS  Self Yes No   Sig: Take 1 capsule by mouth 2 (two) times a day     Simethicone (GAS-X PO)  Self Yes No   Sig: Take 1 tablet by mouth daily as needed   Xarelto 15 MG tablet   No No   Sig: Take 1 tablet (15 mg total) by mouth daily with breakfast   acetaminophen (TYLENOL) 325 mg tablet   No No   Sig: Take 2 tablets (650 mg total) by mouth every 4 (four) hours as needed for mild pain   carbamide peroxide (DEBROX) 6 5 % otic solution   No No   Sig: Administer 5 drops into both ears 2 (two) times a day   carbidopa-levodopa (SINEMET)  mg per tablet   No No   Sig: Take 1 tablet by mouth 3 (three) times a day   carbidopa-levodopa (SINEMET)  mg per tablet   Yes No   Sig: Take 1 tablet by mouth 3 (three) times a day   escitalopram (LEXAPRO) 10 mg tablet   No No   Sig: take 1 tablet by mouth once daily   Patient not taking: Reported on 6/2/2021   ipratropium (ATROVENT) 0 06 % nasal spray   Yes No   isosorbide mononitrate (IMDUR) 30 mg 24 hr tablet  Self Yes No   Sig: Take 30 mg by mouth daily at bedtime   isosorbide mononitrate (IMDUR) 30 mg 24 hr tablet   Yes No   Sig: Take 30 mg by mouth daily   loratadine (CLARITIN) 10 mg tablet  Self Yes No   Sig: Take 10 mg by mouth daily as needed     loratadine (CLARITIN) 10 mg tablet   Yes No   Sig: Take 10 mg by mouth daily   propranolol (INDERAL) 20 mg tablet  Self No No   Sig: Take 1 tablet (20 mg total) by mouth every 12 (twelve) hours   propranolol (INDERAL) 20 mg tablet   No No   Sig: Take 1 tablet (20 mg total) by mouth every 12 (twelve) hours   rivaroxaban (Xarelto) 15 mg tablet   Yes No   Sig: Take 15 mg by mouth   telmisartan (MICARDIS) 40 mg tablet   No No   Sig: TAKE 1 TABLET DAILY   telmisartan (MICARDIS) 40 mg tablet   No No   Sig: Take 1 tablet (40 mg total) by mouth daily   warfarin (COUMADIN) 5 mg tablet  Self No No   Sig: Take 1 tablet (5 mg total) by mouth daily   25MG TAKE 1 TABLET 6 DAY A WEEK AND THEN 1 TABLET 1 DAY A WEEK      Facility-Administered Medications: None       Past Medical History:   Diagnosis Date    [facility])     [facility]ib [facility])     Hypertension     Left AC separation     Parkinson's disease ([facility]) 11/01/2018       Past Surgical History:   Procedure Laterality Date    CARDIOVERSION      ATRIAL    ROTATOR CUFF REPAIR      SHOULDER SURGERY Family History   Problem Relation Age of Onset    Hypertension Father     No Known Problems Mother     Coronary artery disease Father      I have reviewed and agree with the history as documented  E-Cigarette/Vaping    E-Cigarette Use Never User      E-Cigarette/Vaping Substances    Nicotine No     THC No     CBD No     Flavoring No     Other No     Unknown No      Social History     Tobacco Use    Smoking status: Never Smoker    Smokeless tobacco: Never Used    Tobacco comment: Quit 35-40 years ago   Vaping Use    Vaping Use: Never used   Substance Use Topics    Alcohol use: Not Currently     Comment: Rare, previously moderate drinker cut down 2016   Drug use: Never        Review of Systems   Constitutional: Negative for chills and fever  HENT: Negative for ear pain and sore throat  Eyes: Negative for pain and visual disturbance  Respiratory: Negative for cough and shortness of breath  Cardiovascular: Negative for chest pain and palpitations  Gastrointestinal: Negative for abdominal pain and vomiting  Genitourinary: Negative for dysuria and hematuria  Musculoskeletal: Negative for arthralgias and back pain  Skin: Negative for color change and rash  Neurological: Negative for seizures and syncope  All other systems reviewed and are negative  Physical Exam  ED Triage Vitals [06/28/21 2304]   Temperature Pulse Respirations Blood Pressure SpO2   98 5 °F (36 9 °C) 62 22 121/85 98 %      Temp Source Heart Rate Source Patient Position - Orthostatic VS BP Location FiO2 (%)   Oral -- -- -- --      Pain Score       8             Orthostatic Vital Signs  Vitals:    06/28/21 2304   BP: 121/85   Pulse: 62       Physical Exam  Vitals and nursing note reviewed  Constitutional:       Appearance: He is well-developed  HENT:      Head: Normocephalic and atraumatic     Eyes:      Conjunctiva/sclera: Conjunctivae normal    Cardiovascular:      Rate and Rhythm: Normal rate and regular rhythm  Heart sounds: No murmur heard  Pulmonary:      Effort: Pulmonary effort is normal  No respiratory distress  Breath sounds: Normal breath sounds  Abdominal:      Palpations: Abdomen is soft  Tenderness: There is no abdominal tenderness  Musculoskeletal:      Cervical back: Neck supple  Right hip: Normal       Left hip: Normal       Right upper leg: Normal       Left upper leg: Normal       Right knee: Normal       Left knee: Normal    Skin:     General: Skin is warm and dry  Neurological:      Mental Status: He is alert  ED Medications  Medications - No data to display    Diagnostic Studies  Results Reviewed     None                 No orders to display         Procedures  Procedures      ED Course               Identification of Seniors at Risk      Most Recent Value   (ISAR) Identification of Seniors at Risk   Before the illness or injury that brought you to the Emergency, did you need someone to help you on a regular basis? 1 Filed at: 06/28/2021 2305   In the last 24 hours, have you needed more help than usual?  1 Filed at: 06/28/2021 2305   Have you been hospitalized for one or more nights during the past 6 months? 1 Filed at: 06/28/2021 2305   In general, do you see well?  0 Filed at: 06/28/2021 2305   In general, do you have serious problems with your memory? 0 Filed at: 06/28/2021 2305   Do you take more than three different medications every day? 1 Filed at: 06/28/2021 2305   ISAR Score  4 Filed at: 06/28/2021 2305                              MDM  Number of Diagnoses or Management Options  Left leg pain  Diagnosis management comments: 21-year-old male presented to the emergency department for evaluation of left leg pain  On arrival the patient was alert, awake, oriented, in no acute distress and asymptomatic  Physical exam was unremarkable    The patient is appropriate for discharge at this time with recommendation to follow up with his PCP for continued symptoms  Return precautions were discussed  Patient agrees with the plan for discharge and feels comfortable to go home with proper f/u  Advised to return for worsening or additional problems  Diagnostic tests were reviewed and questions answered  Diagnosis, care plan and treatment options were discussed  The patient understands instructions and will follow up as directed  Disposition  Final diagnoses:   Left leg pain     Time reflects when diagnosis was documented in both MDM as applicable and the Disposition within this note     Time User Action Codes Description Comment    6/29/2021 12:52 AM Isa Cruz Add [M79 [id number]] Left leg pain       ED Disposition     ED Disposition Condition Date/Time Comment    Discharge Stable Tue Jun 29, 2021 12:52 AM [facility] discharge to home/self care              Follow-up Information     Follow up With Specialties Details Why Contact Info    Ravi Castillo MD Internal Medicine Schedule an appointment as soon as possible for a visit   [address]  [phone]            Discharge Medication List as of 6/29/2021 12:52 AM      CONTINUE these medications which have NOT CHANGED    Details   acetaminophen (TYLENOL) 325 mg tablet Take 2 tablets (650 mg total) by mouth every 4 (four) hours as needed for mild pain, Starting Thu 6/17/2021, No Print      !! ALPRAZolam (XANAX) 0 25 mg tablet Take 1 tablet (0 25 mg total) by mouth 2 (two) times a day as needed for anxiety, Starting Fri 7/6/2018, Normal      !! ALPRAZolam (XANAX) 0 25 mg tablet Take by mouth 2 (two) times a day as needed for anxiety, Historical Med      carbamide peroxide (DEBROX) 6 5 % otic solution Administer 5 drops into both ears 2 (two) times a day, Starting Thu 8/15/2019, Normal      !! carbidopa-levodopa (SINEMET)  mg per tablet Take 1 tablet by mouth 3 (three) times a day, Starting Wed 6/23/2021, Until Tue 9/21/2021, Normal      !! carbidopa-levodopa (SINEMET)  mg per tablet Take 1 tablet by mouth 3 (three) times a day, Historical Med      Coenzyme Q10 (COQ-10 PO) Take 1 tablet by mouth daily, Historical Med      Dentifrices (BIOTENE DRY MOUTH CARE DT) Apply to teeth, Historical Med      escitalopram (LEXAPRO) 10 mg tablet take 1 tablet by mouth once daily, Normal      ipratropium (ATROVENT) 0 06 % nasal spray Starting Tue 8/18/2020, Historical Med      !! isosorbide mononitrate (IMDUR) 30 mg 24 hr tablet Take 30 mg by mouth daily at bedtime, Historical Med      !! isosorbide mononitrate (IMDUR) 30 mg 24 hr tablet Take 30 mg by mouth daily, Historical Med      !! loratadine (CLARITIN) 10 mg tablet Take 10 mg by mouth daily as needed  , Historical Med      !! loratadine (CLARITIN) 10 mg tablet Take 10 mg by mouth daily, Historical Med      Multiple Vitamins-Minerals (ICAPS AREDS 2) CAPS Take 1 capsule by mouth 2 (two) times a day  , Historical Med      !! propranolol (INDERAL) 20 mg tablet Take 1 tablet (20 mg total) by mouth every 12 (twelve) hours, Starting Tue 6/19/2018, No Print      !! propranolol (INDERAL) 20 mg tablet Take 1 tablet (20 mg total) by mouth every 12 (twelve) hours, Starting Wed 6/23/2021, Normal      !! rivaroxaban (Xarelto) 15 mg tablet Take 15 mg by mouth, Historical Med      Simethicone (GAS-X PO) Take 1 tablet by mouth daily as needed, Historical Med      !! telmisartan (MICARDIS) 40 mg tablet TAKE 1 TABLET DAILY, Normal      !! telmisartan (MICARDIS) 40 mg tablet Take 1 tablet (40 mg total) by mouth daily, Starting Wed 6/23/2021, Normal      warfarin (COUMADIN) 5 mg tablet Take 1 tablet (5 mg total) by mouth daily   25MG TAKE 1 TABLET 6 DAY A WEEK AND THEN 1 TABLET 1 DAY A WEEK, Starting Sun 4/7/2019, No Print      !! Xarelto 15 MG tablet Take 1 tablet (15 mg total) by mouth daily with breakfast, Starting Wed 6/23/2021, Normal       !! - Potential duplicate medications found  Please discuss with provider          No discharge procedures on file  PDMP Review       Value Time User    PDMP Reviewed  Yes 6/17/2021 12:42 PM Carlos Pozo PA-C           ED Provider  Attending physically available and evaluated Angela Jacinto  I managed the patient along with the ED Attending      Electronically Signed by         Daniel Hernandez MD  06/29/21 2869

## 2021-06-29 NOTE — TELEPHONE ENCOUNTER
WAGNER Lugo from [address] saw patient today [facility]   Will be seeing patient   1x a week for 4 weeks   Will fax over plan of care to have you sign

## 2021-06-29 NOTE — ED ATTENDING ATTESTATION
6/28/2021  IValdemar MD, saw and evaluated the patient  I have discussed the patient with the resident/non-physician practitioner and agree with the resident's/non-physician practitioner's findings, Plan of Care, and MDM as documented in the resident's/non-physician practitioner's note, except where noted  All available labs and Radiology studies were reviewed  I was present for key portions of any procedure(s) performed by the resident/non-physician practitioner and I was immediately available to provide assistance  At this point I agree with the current assessment done in the Emergency Department  I have conducted an independent evaluation of this patient a history and physical is as follows:    ED Course     Emergency Department Note- Tamy Ledezma 82 y o  male MRN: [id number]    Unit/Bed#: ED 18 Encounter: [id number]    Tamy Ledezma is a 82 y o  male who presents with   Chief Complaint   Patient presents with    Leg Pain     reports having a recent admission for a fall with multiple injuries  reports this afternoon started with left thigh pain descibed as a "nasty sarita horse " tylenol immediatly PTA  History of Present Illness   HPI:  Tamy Ledezma is a 82 y o  male who presents for evaluation of:  Left anterior thigh pain that started yesterday morning and hurt worse just PTA but is completely resolved in the ED without treatment  Patient has had a recent admission for fall  Patient has been compliant with rivaroxaban  Review of EMR admission for fall June 13 to 17: clavicle fracture; lateral abdominal wall hematoma  Review of Systems   Constitutional: Negative for chills and fever  HENT: Negative for congestion and rhinorrhea  Respiratory: Negative for cough and shortness of breath  Cardiovascular: Negative for chest pain and palpitations  All other systems reviewed and are negative      + covid vaccine  Historical Information   Past Medical History: Diagnosis Date    [facility]     [facility]     Hypertension     Left AC separation     [facility]'s disease ([facility] ) 11/01/2018     Past Surgical History:   Procedure Laterality Date    CARDIOVERSION      ATRIAL    ROTATOR CUFF REPAIR      SHOULDER SURGERY       Social History   Social History     Substance and Sexual Activity   Alcohol Use Not Currently    Comment: Rare, previously moderate drinker cut down 2016  Social History     Substance and Sexual Activity   Drug Use Never     Social History     Tobacco Use   Smoking Status Never Smoker   Smokeless Tobacco Never Used   Tobacco Comment    Quit 35-40 years ago     Family History:   Family History   Problem Relation Age of Onset    Hypertension Father     No Known Problems Mother     Coronary artery disease Father        Meds/Allergies   PTA meds:   Prior to Admission Medications   Prescriptions Last Dose Informant Patient Reported? Taking?    ALPRAZolam (XANAX) 0 25 mg tablet  Self No No   Sig: Take 1 tablet (0 25 mg total) by mouth 2 (two) times a day as needed for anxiety   ALPRAZolam (XANAX) 0 25 mg tablet   Yes No   Sig: Take by mouth 2 (two) times a day as needed for anxiety   Coenzyme Q10 (COQ-10 PO)  Self Yes No   Sig: Take 1 tablet by mouth daily   Dentifrices (BIOTENE DRY MOUTH CARE DT)  Self Yes No   Sig: Apply to teeth   Multiple Vitamins-Minerals (ICAPS AREDS 2) CAPS  Self Yes No   Sig: Take 1 capsule by mouth 2 (two) times a day     Simethicone (GAS-X PO)  Self Yes No   Sig: Take 1 tablet by mouth daily as needed   Xarelto 15 MG tablet   No No   Sig: Take 1 tablet (15 mg total) by mouth daily with breakfast   acetaminophen (TYLENOL) 325 mg tablet   No No   Sig: Take 2 tablets (650 mg total) by mouth every 4 (four) hours as needed for mild pain   carbamide peroxide (DEBROX) 6 5 % otic solution   No No   Sig: Administer 5 drops into both ears 2 (two) times a day   carbidopa-levodopa (SINEMET)  mg per tablet   No No   Sig: Take 1 tablet by mouth 3 (three) times a day   carbidopa-levodopa (SINEMET)  mg per tablet   Yes No   Sig: Take 1 tablet by mouth 3 (three) times a day   escitalopram (LEXAPRO) 10 mg tablet   No No   Sig: take 1 tablet by mouth once daily   Patient not taking: Reported on 6/2/2021   ipratropium (ATROVENT) 0 06 % nasal spray   Yes No   isosorbide mononitrate (IMDUR) 30 mg 24 hr tablet  Self Yes No   Sig: Take 30 mg by mouth daily at bedtime   isosorbide mononitrate (IMDUR) 30 mg 24 hr tablet   Yes No   Sig: Take 30 mg by mouth daily   loratadine (CLARITIN) 10 mg tablet  Self Yes No   Sig: Take 10 mg by mouth daily as needed     loratadine (CLARITIN) 10 mg tablet   Yes No   Sig: Take 10 mg by mouth daily   propranolol (INDERAL) 20 mg tablet  Self No No   Sig: Take 1 tablet (20 mg total) by mouth every 12 (twelve) hours   propranolol (INDERAL) 20 mg tablet   No No   Sig: Take 1 tablet (20 mg total) by mouth every 12 (twelve) hours   rivaroxaban (Xarelto) 15 mg tablet   Yes No   Sig: Take 15 mg by mouth   telmisartan (MICARDIS) 40 mg tablet   No No   Sig: TAKE 1 TABLET DAILY   telmisartan (MICARDIS) 40 mg tablet   No No   Sig: Take 1 tablet (40 mg total) by mouth daily   warfarin (COUMADIN) 5 mg tablet  Self No No   Sig: Take 1 tablet (5 mg total) by mouth daily   25MG TAKE 1 TABLET 6 DAY A WEEK AND THEN 1 TABLET 1 DAY A WEEK      Facility-Administered Medications: None     Allergies   Allergen Reactions    Penicillins Hives    Pollen Extract Other (See Comments)              Objective   First Vitals:   Blood Pressure: 121/85 (06/28/21 2304)  Pulse: 62 (06/28/21 2304)  Temperature: 98 5 °F (36 9 °C) (06/28/21 2304)  Temp Source: Oral (06/28/21 2304)  Respirations: 22 (06/28/21 2304)  SpO2: 98 % (06/28/21 2304)    Current Vitals:   Blood Pressure: 121/85 (06/28/21 2304)  Pulse: 62 (06/28/21 2304)  Temperature: 98 5 °F (36 9 °C) (06/28/21 2304)  Temp Source: Oral (06/28/21 2304)  Respirations: 22 (06/28/21 2304)  SpO2: 98 % (21 2304)    No intake or output data in the 24 hours ending 21 0027    Invasive Devices     None                 Physical Exam  Vitals and nursing note reviewed  Constitutional:       Appearance: Normal appearance  HENT:      Head: Normocephalic and atraumatic  Right Ear: External ear normal       Left Ear: External ear normal       Nose: Nose normal       Mouth/Throat:      Mouth: Mucous membranes are moist       Pharynx: Oropharynx is clear  Eyes:      Conjunctiva/sclera: Conjunctivae normal       Pupils: Pupils are equal, round, and reactive to light  Cardiovascular:      Rate and Rhythm: Normal rate and regular rhythm  Pulmonary:      Effort: Pulmonary effort is normal  No respiratory distress  Abdominal:      General: Bowel sounds are normal    Musculoskeletal:         General: No tenderness  Normal range of motion  Right lower leg: No edema  Left lower leg: No edema  Skin:     General: Skin is warm  Capillary Refill: Capillary refill takes less than 2 seconds  Findings: Bruising (left abdomen and left thigh) present  Neurological:      General: No focal deficit present  Mental Status: He is alert and oriented to person, place, and time  Mental status is at baseline  Coordination: Coordination normal    Psychiatric:         Mood and Affect: Mood normal          Behavior: Behavior normal          Thought Content: Thought content normal          Judgment: Judgment normal            Medical Decision Makin  Acute anterior left thigh pain: resolved    No results found for this or any previous visit (from the past 36 hour(s))  No orders to display         Portions of the record may have been created with voice recognition software  Occasional wrong word or "sound a like" substitutions may have occurred due to the inherent limitations of voice recognition software    Read the chart carefully and recognize, using context, where substitutions have occurred          Critical Care Time  Procedures

## 2021-06-30 ENCOUNTER — OFFICE VISIT (OUTPATIENT)
Dept: INTERNAL MEDICINE CLINIC | Facility: CLINIC | Age: 86
End: 2021-06-30
Payer: MEDICARE

## 2021-06-30 VITALS
SYSTOLIC BLOOD PRESSURE: 130 MMHG | BODY MASS INDEX: 29.52 KG/M2 | OXYGEN SATURATION: 99 % | DIASTOLIC BLOOD PRESSURE: 68 MMHG | HEART RATE: 70 BPM | WEIGHT: 230 LBS | HEIGHT: 74 IN | TEMPERATURE: 97.5 F

## 2021-06-30 DIAGNOSIS — I48.19 PERSISTENT ATRIAL FIBRILLATION (HCC): ICD-10-CM

## 2021-06-30 DIAGNOSIS — D62 ACUTE BLOOD LOSS ANEMIA: ICD-10-CM

## 2021-06-30 DIAGNOSIS — R00.1 BRADYCARDIA: ICD-10-CM

## 2021-06-30 DIAGNOSIS — I48.91 ATRIAL FIBRILLATION, UNSPECIFIED TYPE (HCC): ICD-10-CM

## 2021-06-30 DIAGNOSIS — G20 PARKINSON'S DISEASE (HCC): ICD-10-CM

## 2021-06-30 DIAGNOSIS — S30.1XXD ABDOMINAL WALL HEMATOMA, SUBSEQUENT ENCOUNTER: ICD-10-CM

## 2021-06-30 DIAGNOSIS — S42.002A CLOSED NONDISPLACED FRACTURE OF LEFT CLAVICLE, UNSPECIFIED PART OF CLAVICLE, INITIAL ENCOUNTER: Primary | ICD-10-CM

## 2021-06-30 PROBLEM — G20.A1 PARKINSON DISEASE: Status: RESOLVED | Noted: 2021-06-13 | Resolved: 2021-06-30

## 2021-06-30 PROCEDURE — 99495 TRANSJ CARE MGMT MOD F2F 14D: CPT | Performed by: INTERNAL MEDICINE

## 2021-06-30 NOTE — PROGRESS NOTES
Assessment/Plan:    Closed left clavicular fracture  Follow up ortho, pt has history of fracture in this area too  Parkinson's disease ([facility] [address] )  Continue meds and follow up neuro    Atrial fibrillation ([facility] [address] )  With bradycardic episodes in the hospital, follow-up Cardiology as an outpatient to see if any other intervention is needed    Persistent atrial fibrillation ([address] )  With bradycardic episodes in the hospital, follow-up Cardiology as an outpatient to see if any other intervention is needed      Bradycardia  Pulses good today, but he had some bradycardic episodes in the hospital, follow-up Cardiology    Abdominal wall hematoma  Discussed evaluation with vascular if not improving, will recheck CBC       Diagnoses and all orders for this visit:    Closed nondisplaced fracture of left clavicle, unspecified part of clavicle, initial encounter    Parkinson's disease ([facility] [address] )    Atrial fibrillation, unspecified type ([address] )    Persistent atrial fibrillation (HCC)    Bradycardia    Acute blood loss anemia  -     CBC and differential; Future  -     Comprehensive metabolic panel; Future    Abdominal wall hematoma, subsequent encounter          Falls Plan of Care: balance, strength, and gait training instructions were provided  Subjective:   TCM Call (since 5/30/2021)     Date and time call was made  6/18/2021 10:27 AM    Hospital care reviewed  Records not available    Patient was hospitialized at  Critical access hospital        Date of Admission  06/13/21    Date of discharge  06/17/21    Diagnosis  Abdominal wall hematoma    Disposition  Home    Were the patients medications reviewed and updated  No    Current Symptoms  Arm pain - left side      TCM Call (since 5/30/2021)     Should patient be enrolled in anticoag monitoring? No    Scheduled for follow up?   Yes    Did you obtain your prescribed medications  Yes    Do you need help managing your prescriptions or medications  No    I have advised the patient to call PCP with any new or worsening symptoms  [address] [facility]    Are you recieving any outpatient services  Yes    What type of services  PT    Have you fallen in the last 12 months  Yes    How many times  1           Patient ID: Rk Crowder is a 82 y o  male  Patient's hospitalization for fall, he had an abdominal wall hematoma, left clavicle fracture  He was monitored while in the hospital for his AFib and had some bradycardic episodes in the 40s  He was also monitored for his hypertension and anemia  Since out of the hospital pt reports he has been doing ok, but he went back to the emergency room for acute left thigh pain which resolved when he was in the emergency room  The following portions of the patient's history were reviewed and updated as appropriate: allergies, current medications, past family history, past medical history, past social history, past surgical history and problem list     Review of Systems   Constitutional: Negative for chills, fatigue and fever  HENT: Negative for congestion, nosebleeds, postnasal drip, sore throat and trouble swallowing  Eyes: Negative for pain  Respiratory: Negative for cough, chest tightness, shortness of breath and wheezing  Cardiovascular: Negative for chest pain, palpitations and leg swelling  Gastrointestinal: Negative for abdominal pain, constipation, diarrhea, nausea and vomiting  Endocrine: Negative for polydipsia and polyuria  Genitourinary: Negative for dysuria, flank pain and hematuria  Musculoskeletal: Positive for arthralgias  Skin: Negative for rash  Neurological: Negative for dizziness, tremors, light-headedness and headaches  Hematological: Does not bruise/bleed easily  Psychiatric/Behavioral: Negative for confusion and dysphoric mood  The patient is not nervous/anxious            Objective:      /68   Pulse 70   Temp 97 5 °F (36 4 °C) (Temporal)   Ht 6' 2" (1 88 m)   Wt 104 kg (230 lb) SpO2 99%   BMI 29 53 kg/m²          Physical Exam  Vitals reviewed  Constitutional:       General: He is not in acute distress  Appearance: Normal appearance  He is well-developed  HENT:      Head: Normocephalic and atraumatic  Right Ear: External ear normal       Left Ear: External ear normal    Eyes:      General: No scleral icterus  Conjunctiva/sclera: Conjunctivae normal    Neck:      Thyroid: No thyromegaly  Trachea: No tracheal deviation  Cardiovascular:      Rate and Rhythm: Normal rate  Rhythm irregular  Heart sounds: Normal heart sounds  No murmur heard  Pulmonary:      Effort: Pulmonary effort is normal  No respiratory distress  Breath sounds: Normal breath sounds  No wheezing or rales  Abdominal:      General: Bowel sounds are normal       Palpations: Abdomen is soft  Tenderness: There is no abdominal tenderness  There is no guarding or rebound  Musculoskeletal:      Cervical back: Normal range of motion and neck supple  Right lower leg: No edema  Left lower leg: No edema  Comments: Large hematoma and bruising above left hip on left side of trunk   Lymphadenopathy:      Cervical: No cervical adenopathy  Neurological:      Mental Status: He is alert and oriented to person, place, and time  Psychiatric:         Behavior: Behavior normal          Thought Content:  Thought content normal          Judgment: Judgment normal

## 2021-06-30 NOTE — ASSESSMENT & PLAN NOTE
With bradycardic episodes in the hospital, follow-up Cardiology as an outpatient to see if any other intervention is needed

## 2021-06-30 NOTE — PATIENT INSTRUCTIONS
Problem List Items Addressed This Visit        Cardiovascular and Mediastinum    Persistent atrial fibrillation ([address] )     With bradycardic episodes in the hospital, follow-up Cardiology as an outpatient to see if any other intervention is needed           RESOLVED: Atrial fibrillation ([address] )     With bradycardic episodes in the hospital, follow-up Cardiology as an outpatient to see if any other intervention is needed            Nervous and Auditory    Parkinson's disease ([address] )     Continue meds and follow up neuro            Musculoskeletal and Integument    Closed left clavicular fracture - Primary     Follow up ortho, pt has history of fracture in this area too              Other    Bradycardia     Pulses good today, but he had some bradycardic episodes in the hospital, follow-up Cardiology         Abdominal wall hematoma     Discussed evaluation with vascular if not improving, will recheck CBC         Acute blood loss anemia    Relevant Orders    CBC and differential    Comprehensive metabolic panel

## 2021-07-15 ENCOUNTER — APPOINTMENT (OUTPATIENT)
Dept: RADIOLOGY | Facility: OTHER | Age: 86
End: 2021-07-15
Payer: MEDICARE

## 2021-07-15 ENCOUNTER — OFFICE VISIT (OUTPATIENT)
Dept: OBGYN CLINIC | Facility: OTHER | Age: 86
End: 2021-07-15
Payer: MEDICARE

## 2021-07-15 VITALS
HEIGHT: 74 IN | BODY MASS INDEX: 29.52 KG/M2 | HEART RATE: 71 BPM | DIASTOLIC BLOOD PRESSURE: 70 MMHG | SYSTOLIC BLOOD PRESSURE: 111 MMHG | WEIGHT: 230 LBS

## 2021-07-15 DIAGNOSIS — T14.8XXA FRACTURE: ICD-10-CM

## 2021-07-15 DIAGNOSIS — S42.022D CLOSED DISPLACED FRACTURE OF SHAFT OF LEFT CLAVICLE WITH ROUTINE HEALING, SUBSEQUENT ENCOUNTER: Primary | ICD-10-CM

## 2021-07-15 PROCEDURE — 99213 OFFICE O/P EST LOW 20 MIN: CPT | Performed by: ORTHOPAEDIC SURGERY

## 2021-07-15 PROCEDURE — 73000 X-RAY EXAM OF COLLAR BONE: CPT

## 2021-07-15 NOTE — PROGRESS NOTES
I personally examined the patient and reviewed the history provided  I agree with the note and the assessment and plan by Dr Claire Meraz MD        Assessment  Problem List Items Addressed This Visit        Musculoskeletal and Integument    Displaced fracture of shaft of left clavicle with routine healing - Primary      Other Visit Diagnoses     Fracture              Discussion and Plan:    Doing well 4 weeks after fracture of left clavicle  Does have residual reduction in active range of motion of left shoulder compared to right  Not interfering with his daily activities  Continue with nonoperative management of left clavicle fracture at this time, weight bearing to tolerance  Subjective:   Patient ID: Darnell Acharya is a 82 y o  male      Patient left midshaft clavicle fracture 4 weeks ago after a fall  He had been wearing his sling and doing the exercises prescribed to him by physical therapy during his hospitalization  He only has occasional pain in left shoulder with use, is otherwise doing well  Reports that he is able to complete his activities of daily living without much difficulty  He is right hand dominant  Does note reduced range of motion in left shoulder although doesn't bother him much  Denies fevers chills nausea vomiting numbness tingling  The following portions of the patient's history were reviewed and updated as appropriate: allergies, current medications, past family history, past medical history, past social history, past surgical history and problem list     Review of Systems   Constitutional: Negative for chills and fever  HENT: Negative for ear pain and sore throat  Eyes: Negative for pain  Respiratory: Negative for shortness of breath  Cardiovascular: Negative for chest pain  Gastrointestinal: Negative for abdominal pain  Endocrine: Negative for cold intolerance and heat intolerance  Genitourinary: Negative for difficulty urinating and dysuria  Musculoskeletal: Positive for arthralgias  Negative for joint swelling and neck pain  Skin: Negative for color change and wound  Allergic/Immunologic: Positive for environmental allergies  Negative for food allergies  Neurological: Negative for weakness and numbness  Hematological: Does not bruise/bleed easily  Objective: There were no vitals taken for this visit  Left Shoulder Exam     Tenderness   The patient is experiencing no tenderness  Range of Motion   Active abduction: abnormal   Passive abduction: abnormal   Forward flexion: abnormal     Muscle Strength   The patient has normal left shoulder strength  Tests   Impingement: negative    Other   Erythema: absent  Sensation: normal     Comments:  Reduction of active abduction to approximately 45 degrees  Reduction of forward flexion to approximately 90 degrees            Physical Exam  Constitutional:       Appearance: Normal appearance  HENT:      Head: Normocephalic  Eyes:      Conjunctiva/sclera: Conjunctivae normal       Pupils: Pupils are equal, round, and reactive to light  Pulmonary:      Effort: Pulmonary effort is normal       Breath sounds: Normal breath sounds  Abdominal:      General: Abdomen is flat  Skin:     General: Skin is warm and dry  Capillary Refill: Capillary refill takes less than 2 seconds  Neurological:      General: No focal deficit present  Mental Status: He is alert  I have personally reviewed pertinent films in PACS and my interpretation is as follows  Interval healing of midshaft left clavicle fracture without significant interval displacement

## 2021-07-16 ENCOUNTER — TELEPHONE (OUTPATIENT)
Dept: INTERNAL MEDICINE CLINIC | Facility: CLINIC | Age: 86
End: 2021-07-16

## 2021-07-16 NOTE — TELEPHONE ENCOUNTER
[facility]ish from [facility] occupational therapy has discharged this patient   He is doing well and has met all of his goals

## 2021-07-21 ENCOUNTER — TELEPHONE (OUTPATIENT)
Dept: OBGYN CLINIC | Facility: HOSPITAL | Age: 86
End: 2021-07-21

## 2021-07-21 NOTE — TELEPHONE ENCOUNTER
Dr Briana Mccartney    Patient would like a script for PT,  He would like to go to [facility]  CB # [id number] or [phone]  Please call patient when entered into the system so he can schedule appt

## 2021-07-22 DIAGNOSIS — S42.022D CLOSED DISPLACED FRACTURE OF SHAFT OF LEFT CLAVICLE WITH ROUTINE HEALING, SUBSEQUENT ENCOUNTER: Primary | ICD-10-CM

## 2021-07-22 NOTE — TELEPHONE ENCOUNTER
Left message on machine to inform patient that PT script is in  Advised to call with any other questions or concerns

## 2021-07-26 ENCOUNTER — EVALUATION (OUTPATIENT)
Dept: PHYSICAL THERAPY | Age: 86
End: 2021-07-26
Payer: MEDICARE

## 2021-07-26 DIAGNOSIS — S42.022D CLOSED DISPLACED FRACTURE OF SHAFT OF LEFT CLAVICLE WITH ROUTINE HEALING, SUBSEQUENT ENCOUNTER: ICD-10-CM

## 2021-07-26 PROCEDURE — 97162 PT EVAL MOD COMPLEX 30 MIN: CPT | Performed by: PHYSICAL THERAPIST

## 2021-07-26 NOTE — PROGRESS NOTES
PT Evaluation     Today's date: 2021  Patient name: Fabiana Gamboa  : 1935  MRN: [id number]  Referring provider: Titus Freed PA-C  Dx:   Encounter Diagnosis     ICD-10-CM    1  Closed displaced fracture of shaft of left clavicle with routine healing, subsequent encounter  S42 022D Ambulatory referral to Physical Therapy                  Assessment  Assessment details: 21  Patient fell on 21 that required hospitalization  Patient noted he fell down the steps and landed on his left shoulder and back  Patient noted he went to the ED and was hospitalized for about 4 days  Patient denies any other falls  Patient noted he did have left shoulder and lumbar spine pain, while now he has left shoulder pain but it is getting better  Patient noted he is right ue dominant  Patient noted the following activities that are limited by left shoulder pain and tightness: dressing, reaching, lifting, house hold chores and activities  Patient noted left [address] joint mobility and strength are limited which limits all left ue functional activities, but his motion is improving  Impairments: abnormal gait, abnormal or restricted ROM, abnormal movement, activity intolerance, lacks appropriate home exercise program and pain with function  Understanding of Dx/Px/POC: excellent   Prognosis: good  Prognosis details: Patient is a 82y o  year old male seen for outpatient PT evaluation with pain, mobility and functional deficits due to left [address] joint region pain secondary to fall and left clavicular fracture  Patient presents to PT IE with the following problems, concerns, deficits and impairments: left GH joint region pain, decreased left UE range of motion, decreased left GH joint region strength, decrease in postural awareness, functional limitations and decreased tolerance to activity    Patient would benefit from skilled PT services under the following PT treatment plan to address the above noted deficits: therapeutic exercises and activities to facilitate left UE ROM and strength, postural reeducation and strengthening, modalities, manual therapy techniques, IASTM techniques, Kinesio taping techniques and a hep  Thank you for the referral      Goals  Short Term goals - 4 weeks  1  Patient will be independent HEP  2   Patient will report a 25 - 50% decrease in pain complaints  3   Increase strength 1/2 grade  4   Increase ROM 5-10 degrees  Long Term goals - 8 weeks  1  Patient will report elimination of pain complaints  2   Patient will return to all recreational activities without restriction  3   ROM WFL  4   Strength 5/5   5   Patient will report dressing is without left GH joint limitations  6   Patient will report reaching is without left GH joint limitations  7   Patient will report lifting activities is without left [address] joint limitations  8   Patient will report being able to lie on left ue without pain limitations  9   Patient will report being able to push off with left ue during sit to stand transfers      Plan  Patient would benefit from: skilled physical therapy  Planned modality interventions: cryotherapy, TENS, thermotherapy: hydrocollator packs and unattended electrical stimulation  Planned therapy interventions: joint mobilization, manual therapy, massage, balance, balance/weight bearing training, neuromuscular re-education, patient education, postural training, body mechanics training, compression, self care, strengthening, stretching, therapeutic activities, therapeutic exercise, therapeutic training, transfer training, flexibility, functional ROM exercises, gait training, graded activity, graded exercise, graded motor and home exercise program  Frequency: 2x week  Duration in weeks: 12  Treatment plan discussed with: patient        Subjective Evaluation    History of Present Illness  Mechanism of injury: Patient's PMHx is remarkable for A-fib, HTN, PD, left shoulder surgery, thoracic aortic aneurysm, aortic stenosis, left hand tremor  LEFT CLAVICLE     INDICATION:   T14  8XXA: Other injury of unspecified body region, initial encounter      COMPARISON:  Compared with 2021     VIEWS:  XR CLAVICLE LEFT         FINDINGS:     Left mid shaft fracture demonstrates healing with periosteal reaction and callus formation  Degenerative changes in the glenohumeral and AC joint unchanged      Minimal overlapping of fracture fragments      No lytic or blastic osseous lesion      Soft tissues are unremarkable      IMPRESSION:     Healing midshaft clavicle fracture  Pain  At best pain ratin  At worst pain ratin  Location: left shoulder    Patient Goals  Patient goals for therapy: increased strength, independence with ADLs/IADLs, decreased pain and increased motion          Objective     Tenderness     Additional Tenderness Details  Patient is - TTP at left [facility] joint or clavicle  Active Range of Motion   Left Shoulder   Flexion: 94 degrees with pain  Abduction: 92 degrees with pain  External rotation 45°: 22 degrees with pain  Internal rotation 45°: 58 degrees with pain    Right Shoulder   Flexion: 160 degrees   Abduction: 162 degrees   External rotation 90°: 74 degrees  Internal rotation 90°: 20 degrees     Left Elbow   Flexion: 140 degrees   Extension: -10 degrees     Right Elbow   Flexion: 144 degrees   Extension: -5 degrees     Strength/Myotome Testing     Left Shoulder     Planes of Motion   Flexion: 3+   Abduction: 3+   External rotation at 0°: 4-   Internal rotation at 0°: 4     Right Shoulder     Planes of Motion   Flexion: 4+   Abduction: 4   External rotation at 0°: 4+   Internal rotation at 0°: 5     Left Elbow   Flexion: 4+  Extension: 4    Right Elbow   Flexion: 5  Extension: 5      Flowsheet Rows      Most Recent Value   PT/OT G-Codes   Current Score  41   Projected Score  60                Precautions: FALL History        Patient's PMHx is remarkable for A-fib, HTN, PD, left shoulder surgery, thoracic aortic aneurysm, aortic stenosis, left hand tremor  Manuals 7/26            Left GH joint prom supine                                                    Neuro Re-Ed                                                                                                        Ther Ex             Pulleys             Scapular squeezes             Left shoulder table slides flexion and scaption:L:             Seated shoulder ER stretch with wedge:L             Seated postural correction slough over correct             Shoulder scaption and flexion:B:             Biceps curls: B             Left shoulder pendulums: side to side and fwd/bwd                          Supine shoulder flexion with spc and bench press:B:             scap punches:L             Triceps extension:L             Right side lying left GH joint ER and abduction                                                                                                                                  Ther Activity                                       Gait Training                                       Modalities             MHP to left shoulder 10 min

## 2021-07-29 ENCOUNTER — TELEPHONE (OUTPATIENT)
Dept: INTERNAL MEDICINE CLINIC | Facility: CLINIC | Age: 86
End: 2021-07-29

## 2021-07-29 DIAGNOSIS — J06.9 UPPER RESPIRATORY TRACT INFECTION, UNSPECIFIED TYPE: Primary | ICD-10-CM

## 2021-07-29 RX ORDER — AZITHROMYCIN 250 MG/1
250 TABLET, FILM COATED ORAL EVERY 24 HOURS
Qty: 6 TABLET | Refills: 0 | Status: SHIPPED | OUTPATIENT
Start: 2021-07-29 | End: 2021-08-03

## 2021-07-29 NOTE — TELEPHONE ENCOUNTER
Around someone who was sick or had the sniffles and now he feels he has a sore throat and feels like its going to get worse  He wanted to know what he can try otc or if you would call something in for him?

## 2021-08-05 ENCOUNTER — OFFICE VISIT (OUTPATIENT)
Dept: PHYSICAL THERAPY | Age: 86
End: 2021-08-05
Payer: MEDICARE

## 2021-08-05 DIAGNOSIS — S42.022D CLOSED DISPLACED FRACTURE OF SHAFT OF LEFT CLAVICLE WITH ROUTINE HEALING, SUBSEQUENT ENCOUNTER: Primary | ICD-10-CM

## 2021-08-05 PROCEDURE — 97110 THERAPEUTIC EXERCISES: CPT

## 2021-08-05 PROCEDURE — 97140 MANUAL THERAPY 1/> REGIONS: CPT

## 2021-08-05 NOTE — PROGRESS NOTES
Daily Note     Today's date: 2021  Patient name: Estefanía Hu  : 1935  MRN: [id number]  Referring provider: Atanacio Brunner, PA-C  Dx:   Encounter Diagnosis     ICD-10-CM    1  Closed displaced fracture of shaft of left clavicle with routine healing, subsequent encounter  S42 402D                   Subjective: Patient reports HEP is going well, shoulder has show some improvements  Objective: See treatment diary below      Assessment: Pt did well with new TE and PROM stretching added today, continues to be very limited in PROM especially ER  Plan: Continue per plan of care  Precautions: FALL History  Patient's PMHx is remarkable for A-fib, HTN, PD, left shoulder surgery, thoracic aortic aneurysm, aortic stenosis, left hand tremor  Manuals             Left GH joint prom supine HS PROM                                                   Neuro Re-Ed                                                                                                        Ther Ex             Pulleys 5"            Scapular squeezes 3"x20            Left shoulder table slides flexion and scaption:L:             Seated shoulder ER stretch with wedge:L             Seated postural correction slough over correct             Shoulder scaption and flexion:B: x20            Biceps curls: B             Left shoulder pendulums: side to side and fwd/bwd                          Supine shoulder flexion with spc and bench press:B: x10            scap punches:L             Triceps extension:L             Right side lying left GH joint ER and abduction                                                                                                                                  Ther Activity                                       Gait Training                                       Modalities             MHP to left shoulder 10 min

## 2021-08-09 ENCOUNTER — OFFICE VISIT (OUTPATIENT)
Dept: INTERNAL MEDICINE CLINIC | Facility: CLINIC | Age: 86
End: 2021-08-09
Payer: MEDICARE

## 2021-08-09 VITALS
SYSTOLIC BLOOD PRESSURE: 125 MMHG | DIASTOLIC BLOOD PRESSURE: 80 MMHG | TEMPERATURE: 98.5 F | WEIGHT: 235 LBS | OXYGEN SATURATION: 98 % | HEIGHT: 74 IN | HEART RATE: 78 BPM | BODY MASS INDEX: 30.16 KG/M2

## 2021-08-09 DIAGNOSIS — Z00.00 MEDICARE ANNUAL WELLNESS VISIT, SUBSEQUENT: ICD-10-CM

## 2021-08-09 DIAGNOSIS — I48.19 PERSISTENT ATRIAL FIBRILLATION (HCC): ICD-10-CM

## 2021-08-09 DIAGNOSIS — E78.00 PURE HYPERCHOLESTEROLEMIA: ICD-10-CM

## 2021-08-09 DIAGNOSIS — I10 BENIGN ESSENTIAL HYPERTENSION: Primary | ICD-10-CM

## 2021-08-09 DIAGNOSIS — G20 PARKINSON'S DISEASE (HCC): ICD-10-CM

## 2021-08-09 PROBLEM — E78.5 HYPERLIPIDEMIA: Status: RESOLVED | Noted: 2021-06-13 | Resolved: 2021-08-09

## 2021-08-09 PROCEDURE — G0439 PPPS, SUBSEQ VISIT: HCPCS | Performed by: INTERNAL MEDICINE

## 2021-08-09 PROCEDURE — 99214 OFFICE O/P EST MOD 30 MIN: CPT | Performed by: INTERNAL MEDICINE

## 2021-08-09 NOTE — PROGRESS NOTES
Assessment/Plan:    Medicare annual wellness visit, subsequent    Discussed preventative health, cancer screening, immunizations, and safety issues  Patient's last colonoscopy was in 2011 with recommendations to recheck in 10 years, so he would be due this year  I recommend Tdap vaccination at the pharmacy    Persistent atrial fibrillation ([facility] )  Rate controlled, continue current meds and follow-up Cardiology    Benign essential hypertension   Controlled, continue medication along with healthy diet and exercise    Parkinson's disease ([facility] )   Continue meds and follow-up neurology    Pure hypercholesterolemia   Continue healthy diet       Diagnoses and all orders for this visit:    Benign essential hypertension    Medicare annual wellness visit, subsequent    Parkinson's disease ([facility] )    Pure hypercholesterolemia    Persistent atrial fibrillation ([facility] )          Subjective:      Patient ID: Estefanía Hu is a 82 y o  male  Atrial fibrillation:  Patient reports compliance with meds, no bleeding problems, no palpitations  Parkinson's: Patient on meds for this, and he follows with Neurology  Patient denies difficulty getting chairs, denies recent falls other than the recent clavicle fracture  hypertension: Patient reports compliance with meds, no lightheadedness  The following portions of the patient's history were reviewed and updated as appropriate: allergies, current medications, past family history, past medical history, past social history, past surgical history and problem list     Review of Systems   Constitutional: Negative for chills, fatigue and fever  HENT: Negative for congestion, nosebleeds, postnasal drip, sore throat and trouble swallowing  Eyes: Negative for pain  Respiratory: Negative for cough, chest tightness, shortness of breath and wheezing  Cardiovascular: Negative for chest pain, palpitations and leg swelling     Gastrointestinal: Negative for abdominal pain, constipation, diarrhea, nausea and vomiting  Endocrine: Negative for polydipsia and polyuria  Genitourinary: Negative for dysuria, flank pain and hematuria  Musculoskeletal: Negative for arthralgias, back pain and myalgias  Skin: Negative for rash  Neurological: Positive for tremors  Negative for dizziness, light-headedness and headaches  Hematological: Does not bruise/bleed easily  Psychiatric/Behavioral: Negative for confusion and dysphoric mood  The patient is not nervous/anxious  Objective:      /80   Pulse 78   Temp 98 5 °F (36 9 °C) (Temporal)   Ht 6' 2" (1 88 m)   Wt 107 kg (235 lb)   SpO2 98%   BMI 30 17 kg/m²          Physical Exam  Vitals reviewed  Constitutional:       General: He is not in acute distress  Appearance: Normal appearance  He is well-developed  HENT:      Head: Normocephalic and atraumatic  Right Ear: External ear normal       Left Ear: External ear normal       Nose: Nose normal    Eyes:      General: No scleral icterus  Conjunctiva/sclera: Conjunctivae normal    Neck:      Thyroid: No thyromegaly  Trachea: No tracheal deviation  Cardiovascular:      Rate and Rhythm: Normal rate  Rhythm irregular  Heart sounds: Murmur (soft systolic) heard  Pulmonary:      Effort: Pulmonary effort is normal  No respiratory distress  Breath sounds: Normal breath sounds  No wheezing or rales  Abdominal:      General: Bowel sounds are normal       Palpations: Abdomen is soft  Tenderness: There is no abdominal tenderness  There is no guarding or rebound  Musculoskeletal:      Cervical back: Normal range of motion and neck supple  Right lower leg: No edema  Left lower leg: No edema  Lymphadenopathy:      Cervical: No cervical adenopathy  Skin:     Coloration: Skin is not jaundiced or pale  Neurological:      General: No focal deficit present  Mental Status: He is alert and oriented to person, place, and time  Comments: Intermittent pill rolling tremor left hand, no difficulty getting out of chair, no shuffling gait   Psychiatric:         Mood and Affect: Mood normal          Behavior: Behavior normal          Thought Content:  Thought content normal          Judgment: Judgment normal

## 2021-08-09 NOTE — PROGRESS NOTES
Assessment and Plan:     Problem List Items Addressed This Visit        Other    Medicare annual wellness visit, subsequent - Primary       Discussed preventative health, cancer screening, immunizations, and safety issues  Patient's last colonoscopy was in 2011 with recommendations to recheck in 10 years, so he would be due this year  I recommend Tdap vaccination at the pharmacy                Preventive health issues were discussed with patient, and age appropriate screening tests were ordered as noted in patient's After Visit Summary  Personalized health advice and appropriate referrals for health education or preventive services given if needed, as noted in patient's After Visit Summary       History of Present Illness:     Patient presents for Medicare Annual Wellness visit    Patient Care Team:  Alexis Burris MD as PCP - General (Internal Medicine)  Alexis Burris MD     Problem List:     Patient Active Problem List   Diagnosis    Persistent atrial fibrillation ([address] )    Pure hypercholesterolemia    Benign essential hypertension    Tremor of left hand    Acute non-recurrent maxillary sinusitis    Bloating    Anxiety    Dyspnea on exertion    Esophageal reflux    Parkinson's disease ([address] )    URI (upper respiratory infection)    Medicare annual wellness visit, subsequent    Chest pain    Acute left-sided low back pain with left-sided sciatica    Systolic murmur    Thoracic aortic aneurysm without rupture ([address] )    SDH (subdural hematoma) ([address] )    Skin lesion    Seasonal allergic rhinitis due to pollen    Lightheadedness    Hordeolum externum of right lower eyelid    Hypertension    Hyperlipidemia    Aortic stenosis    Bradycardia    Fall    Abdominal wall hematoma    Acute blood loss anemia    Left shoulder pain    Closed left clavicular fracture    Displaced fracture of shaft of left clavicle with routine healing      Past Medical and Surgical History:     Past Medical History: Diagnosis Date    A-fib ([place] [address] )     Afib ([place] [address] )     Atrial fibrillation ([place] [address] ) 6/13/2021    Hypertension     Left AC separation     Parkinson's disease ([place] [address] ) 11/01/2018     Past Surgical History:   Procedure Laterality Date    CARDIOVERSION      ATRIAL    ROTATOR CUFF REPAIR      SHOULDER SURGERY        Family History:     Family History   Problem Relation Age of Onset    Hypertension Father     No Known Problems Mother     Coronary artery disease Father       Social History:     Social History     Socioeconomic History    Marital status: /Civil Union     Spouse name: None    Number of children: None    Years of education: None    Highest education level: None   Occupational History    None   Tobacco Use    Smoking status: Never Smoker    Smokeless tobacco: Never Used    Tobacco comment: Quit 35-40 years ago   Vaping Use    Vaping Use: Never used   Substance and Sexual Activity    Alcohol use: Not Currently     Comment: Rare, previously moderate drinker cut down 2016   Drug use: Never    Sexual activity: Not Currently     Partners: Female     Birth control/protection: Post-menopausal   Other Topics Concern    None   Social History Narrative    ** Merged History Encounter **          Social Determinants of Health     Financial Resource Strain:     Difficulty of Paying Living Expenses:    Food Insecurity:     Worried About Running Out of Food in the Last Year:     Ran Out of Food in the Last Year:    Transportation Needs:     Lack of Transportation (Medical):      Lack of Transportation (Non-Medical):    Physical Activity:     Days of Exercise per Week:     Minutes of Exercise per Session:    Stress:     Feeling of Stress :    Social Connections:     Frequency of Communication with Friends and Family:     Frequency of Social Gatherings with Friends and Family:     Attends [employer] Services:     Active Member of Clubs or Organizations:     Attends Club or Organization Meetings:     Marital Status:    Intimate Partner Violence:     Fear of Current or Ex-Partner:     Emotionally Abused:     Physically Abused:     Sexually Abused:       Medications and Allergies:     Current Outpatient Medications   Medication Sig Dispense Refill    acetaminophen (TYLENOL) 325 mg tablet Take 2 tablets (650 mg total) by mouth every 4 (four) hours as needed for mild pain  0    ALPRAZolam (XANAX) 0 25 mg tablet Take 1 tablet (0 25 mg total) by mouth 2 (two) times a day as needed for anxiety 50 tablet 1    ALPRAZolam (XANAX) 0 25 mg tablet Take by mouth 2 (two) times a day as needed for anxiety      carbamide peroxide (DEBROX) 6 5 % otic solution Administer 5 drops into both ears 2 (two) times a day 15 mL 0    carbidopa-levodopa (SINEMET)  mg per tablet Take 1 tablet by mouth 3 (three) times a day 270 tablet 3    carbidopa-levodopa (SINEMET)  mg per tablet Take 1 tablet by mouth 3 (three) times a day      Coenzyme Q10 (COQ-10 PO) Take 1 tablet by mouth daily      Dentifrices (BIOTENE DRY MOUTH CARE DT) Apply to teeth      escitalopram (LEXAPRO) 10 mg tablet take 1 tablet by mouth once daily 30 tablet 5    ipratropium (ATROVENT) 0 06 % nasal spray       isosorbide mononitrate (IMDUR) 30 mg 24 hr tablet Take 30 mg by mouth daily at bedtime      isosorbide mononitrate (IMDUR) 30 mg 24 hr tablet Take 30 mg by mouth daily      loratadine (CLARITIN) 10 mg tablet Take 10 mg by mouth daily as needed        loratadine (CLARITIN) 10 mg tablet Take 10 mg by mouth daily      Multiple Vitamins-Minerals (ICAPS AREDS 2) CAPS Take 1 capsule by mouth 2 (two) times a day        propranolol (INDERAL) 20 mg tablet Take 1 tablet (20 mg total) by mouth every 12 (twelve) hours 60 tablet 5    propranolol (INDERAL) 20 mg tablet Take 1 tablet (20 mg total) by mouth every 12 (twelve) hours 180 tablet 3    Simethicone (GAS-X PO) Take 1 tablet by mouth daily as needed      telmisartan (MICARDIS) 40 mg tablet Take 1 tablet (40 mg total) by mouth daily 90 tablet 3    Xarelto 15 MG tablet Take 1 tablet (15 mg total) by mouth daily with breakfast 90 tablet 3    rivaroxaban (Xarelto) 15 mg tablet Take 15 mg by mouth (Patient not taking: Reported on 6/30/2021)      telmisartan (MICARDIS) 40 mg tablet TAKE 1 TABLET DAILY (Patient not taking: Reported on 6/30/2021) 90 tablet 3    warfarin (COUMADIN) 5 mg tablet Take 1 tablet (5 mg total) by mouth daily   25MG TAKE 1 TABLET 6 DAY A WEEK AND THEN 1 TABLET 1 DAY A WEEK (Patient not taking: Reported on 6/30/2021)  0     No current facility-administered medications for this visit  Allergies   Allergen Reactions    Penicillins Hives    Pollen Extract Other (See Comments)       Immunizations:     Immunization History   Administered Date(s) Administered    INFLUENZA 09/04/2016, 10/26/2017, 10/26/2017, 09/13/2018    Influenza Split High Dose Preservative Free IM 1937, 10/06/2014, 09/09/2015, 10/17/2017    Influenza, seasonal, injectable 10/11/2010, 10/11/2011    Pneumococcal Conjugate 13-Valent 09/27/2015    Pneumococcal Polysaccharide PPV23 01/15/2017    SARS-CoV-2 / COVID-19 mRNA IM (Pfizer-BioNTech) 01/27/2021, 02/17/2021    Zoster 01/01/2013    Zoster Vaccine Recombinant 01/01/2013    influenza, trivalent, adjuvanted 09/19/2019      Health Maintenance: There are no preventive care reminders to display for this patient  Topic Date Due    Influenza Vaccine (1) 09/01/2021      Medicare Health Risk Assessment:     /80   Pulse 78   Temp 98 5 °F (36 9 °C) (Temporal)   Ht 6' 2" (1 88 m)   Wt 107 kg (235 lb)   SpO2 98%   BMI 30 17 kg/m²      Shailesh Interiano is here for his Subsequent Wellness visit  Health Risk Assessment:   Patient rates overall health as very good  Patient feels that their physical health rating is same  Patient is very satisfied with their life  Eyesight was rated as same  Hearing was rated as slightly worse  Patient feels that their emotional and mental health rating is same  Patients states they are sometimes angry  Patient states they are never, rarely unusually tired/fatigued  Pain experienced in the last 7 days has been none  Patient states that he has experienced no weight loss or gain in last 6 months  Fall Risk Screening: In the past year, patient has experienced: no history of falling in past year      Home Safety:  Patient does not have trouble with stairs inside or outside of their home  Patient has working smoke alarms and has working carbon monoxide detector  Nutrition:   Current diet is Regular  Medications:   Patient is not currently taking any over-the-counter supplements  Patient is able to manage medications  Activities of Daily Living (ADLs)/Instrumental Activities of Daily Living (IADLs):   Walk and transfer into and out of bed and chair?: Yes  Dress and groom yourself?: Yes    Bathe or shower yourself?: Yes    Feed yourself? Yes  Do your laundry/housekeeping?: Yes  Manage your money, pay your bills and track your expenses?: Yes  Make your own meals?: Yes    Do your own shopping?: Yes    Previous Hospitalizations:   Any hospitalizations or ED visits within the last 12 months?: No      Advance Care Planning:   Living will: Yes    Durable POA for healthcare: Yes    Advanced directive: Yes      PREVENTIVE SCREENINGS      Cardiovascular Screening:    General: Screening Not Indicated, History Lipid Disorder, Risks and Benefits Discussed and Screening Current      Diabetes Screening:     General: Screening Current and Risks and Benefits Discussed      Prostate Cancer Screening:    General: Screening Not Indicated      Lung Cancer Screening:     General: Screening Not Indicated    Screening, Brief Intervention, and Referral to Treatment (SBIRT)    Screening    Typical number of drinks in a week: 3    AUDIT-C Screenin) How often did you have a drink containing alcohol in the past year? monthly or less  2) How many drinks did you have on a typical day when you were drinking in the past year?  1 to 2  3) How often did you have 6 or more drinks on one occasion in the past year? never    AUDIT-C Score: 1  Interpretation: Score 0-3 (male): Negative screen for alcohol misuse      Sangeetha Rene MD

## 2021-08-09 NOTE — PATIENT INSTRUCTIONS
Problem List Items Addressed This Visit        Cardiovascular and Mediastinum    Persistent atrial fibrillation ([facility] [address] )     Rate controlled, continue current meds and follow-up Cardiology         Benign essential hypertension - Primary      Controlled, continue medication along with healthy diet and exercise            Nervous and Auditory    Parkinson's disease ([facility] [address] )      Continue meds and follow-up neurology            Other    Pure hypercholesterolemia      Continue healthy diet         Medicare annual wellness visit, subsequent       Discussed preventative health, cancer screening, immunizations, and safety issues  Patient's last colonoscopy was in 2011 with recommendations to recheck in 10 years, so he would be due this year  I recommend Tdap vaccination at the pharmacy               Medicare Preventive Visit Patient Instructions  Thank you for completing your Welcome to Medicare Visit or Medicare Annual Wellness Visit today  Your next wellness visit will be due in one year (8/10/2022)  The screening/preventive services that you may require over the next 5-10 years are detailed below  Some tests may not apply to you based off risk factors and/or age  Screening tests ordered at today's visit but not completed yet may show as past due  Also, please note that scanned in results may not display below  Preventive Screenings:  Service Recommendations Previous Testing/Comments   Colorectal Cancer Screening  · Colonoscopy    · Fecal Occult Blood Test (FOBT)/Fecal Immunochemical Test (FIT)  · Fecal DNA/Cologuard Test  · Flexible Sigmoidoscopy Age: 54-65 years old   Colonoscopy: every 10 years (May be performed more frequently if at higher risk)  OR  FOBT/FIT: every 1 year  OR  Cologuard: every 3 years  OR  Sigmoidoscopy: every 5 years  Screening may be recommended earlier than age 48 if at higher risk for colorectal cancer   Also, an individualized decision between you and your healthcare provider will decide whether screening between the ages of 74-80 would be appropriate  Colonoscopy: 04/28/2011  FOBT/FIT: Not on file  Cologuard: Not on file  Sigmoidoscopy: Not on file          Prostate Cancer Screening Individualized decision between patient and health care provider in men between ages of 53-78   Medicare will cover every 12 months beginning on the day after your 50th birthday PSA: No results in last 5 years     Screening Not Indicated     Hepatitis C Screening Once for adults born between [facility]  More frequently in patients at high risk for Hepatitis C Hep C Antibody: Not on file        Diabetes Screening 1-2 times per year if you're at risk for diabetes or have pre-diabetes Fasting glucose: 126 mg/dL   A1C: No results in last 5 years    Screening Current   Cholesterol Screening Once every 5 years if you don't have a lipid disorder  May order more often based on risk factors  Lipid panel: 04/05/2021    Screening Not Indicated  History Lipid Disorder      Other Preventive Screenings Covered by Medicare:  1  Abdominal Aortic Aneurysm (AAA) Screening: covered once if your at risk  You're considered to be at risk if you have a family history of AAA or a male between the age of 73-68 who smoking at least 100 cigarettes in your lifetime  2  Lung Cancer Screening: covers low dose CT scan once per year if you meet all of the following conditions: (1) Age 50-69; (2) No signs or symptoms of lung cancer; (3) Current smoker or have quit smoking within the last 15 years; (4) You have a tobacco smoking history of at least 30 pack years (packs per day x number of years you smoked); (5) You get a written order from a healthcare provider  3  Glaucoma Screening: covered annually if you're considered high risk: (1) You have diabetes OR (2) Family history of glaucoma OR (3)  aged 48 and older OR (3)  American aged 72 and older  3   Osteoporosis Screening: covered every 2 years if you meet one of the following conditions: (1) Have a vertebral abnormality; (2) On glucocorticoid therapy for more than 3 months; (3) Have primary hyperparathyroidism; (4) On osteoporosis medications and need to assess response to drug therapy  5  HIV Screening: covered annually if you're between the age of 12-76  Also covered annually if you are younger than 13 and older than 72 with risk factors for HIV infection  For pregnant patients, it is covered up to 3 times per pregnancy  Immunizations:  Immunization Recommendations   Influenza Vaccine Annual influenza vaccination during flu season is recommended for all persons aged >= 6 months who do not have contraindications   Pneumococcal Vaccine (Prevnar and Pneumovax)  * Prevnar = PCV13  * Pneumovax = PPSV23 Adults 25-60 years old: 1-3 doses may be recommended based on certain risk factors  Adults 72 years old: Prevnar (PCV13) vaccine recommended followed by Pneumovax (PPSV23) vaccine  If already received PPSV23 since turning 65, then PCV13 recommended at least one year after PPSV23 dose  Hepatitis B Vaccine 3 dose series if at intermediate or high risk (ex: diabetes, end stage renal disease, liver disease)   Tetanus (Td) Vaccine - COST NOT COVERED BY MEDICARE PART B Following completion of primary series, a booster dose should be given every 10 years to maintain immunity against tetanus  Td may also be given as tetanus wound prophylaxis  Tdap Vaccine - COST NOT COVERED BY MEDICARE PART B Recommended at least once for all adults  For pregnant patients, recommended with each pregnancy  Shingles Vaccine (Shingrix) - COST NOT COVERED BY MEDICARE PART B  2 shot series recommended in those aged 48 and above     Health Maintenance Due:  There are no preventive care reminders to display for this patient  Immunizations Due:      Topic Date Due    Influenza Vaccine (1) 09/01/2021     Advance Directives   What are advance directives?   Advance directives are legal documents that state your wishes and plans for medical care  These plans are made ahead of time in case you lose your ability to make decisions for yourself  Advance directives can apply to any medical decision, such as the treatments you want, and if you want to donate organs  What are the types of advance directives? There are many types of advance directives, and each state has rules about how to use them  You may choose a combination of any of the following:  · Living will: This is a written record of the treatment you want  You can also choose which treatments you do not want, which to limit, and which to stop at a certain time  This includes surgery, medicine, IV fluid, and tube feedings  · Durable power of  for healthcare [facility]): This is a written record that states who you want to make healthcare choices for you when you are unable to make them for yourself  This person, called a proxy, is usually a family member or a friend  You may choose more than 1 proxy  · Do not resuscitate (DNR) order:  A DNR order is used in case your heart stops beating or you stop breathing  It is a request not to have certain forms of treatment, such as CPR  A DNR order may be included in other types of advance directives  · Medical directive: This covers the care that you want if you are in a coma, near death, or unable to make decisions for yourself  You can list the treatments you want for each condition  Treatment may include pain medicine, surgery, blood transfusions, dialysis, IV or tube feedings, and a ventilator (breathing machine)  · Values history: This document has questions about your views, beliefs, and how you feel and think about life  This information can help others choose the care that you would choose  Why are advance directives important? An advance directive helps you control your care  Although spoken wishes may be used, it is better to have your wishes written down   Spoken wishes can be misunderstood, or not followed  Treatments may be given even if you do not want them  An advance directive may make it easier for your family to make difficult choices about your care  Weight Management   Why it is important to manage your weight:  Being overweight increases your risk of health conditions such as heart disease, high blood pressure, type 2 diabetes, and certain types of cancer  It can also increase your risk for osteoarthritis, sleep apnea, and other respiratory problems  Aim for a slow, steady weight loss  Even a small amount of weight loss can lower your risk of health problems  How to lose weight safely:  A safe and healthy way to lose weight is to eat fewer calories and get regular exercise  You can lose up about 1 pound a week by decreasing the number of calories you eat by 500 calories each day  Healthy meal plan for weight management:  A healthy meal plan includes a variety of foods, contains fewer calories, and helps you stay healthy  A healthy meal plan includes the following:  · Eat whole-grain foods more often  A healthy meal plan should contain fiber  Fiber is the part of grains, fruits, and vegetables that is not broken down by your body  Whole-grain foods are healthy and provide extra fiber in your diet  Some examples of whole-grain foods are whole-wheat breads and pastas, oatmeal, brown rice, and bulgur  · Eat a variety of vegetables every day  Include dark, leafy greens such as spinach, kale, mu greens, and mustard greens  Eat yellow and orange vegetables such as carrots, sweet potatoes, and winter squash  · Eat a variety of fruits every day  Choose fresh or canned fruit (canned in its own juice or light syrup) instead of juice  Fruit juice has very little or no fiber  · Eat low-fat dairy foods  Drink fat-free (skim) milk or 1% milk  Eat fat-free yogurt and low-fat cottage cheese  Try low-fat cheeses such as mozzarella and other reduced-fat cheeses    · Choose meat and other protein foods that are low in fat  Choose beans or other legumes such as split peas or lentils  Choose fish, skinless poultry (chicken or turkey), or lean cuts of red meat (beef or pork)  Before you cook meat or poultry, cut off any visible fat  · Use less fat and oil  Try baking foods instead of frying them  Add less fat, such as margarine, sour cream, regular salad dressing and mayonnaise to foods  Eat fewer high-fat foods  Some examples of high-fat foods include french fries, doughnuts, ice cream, and cakes  · Eat fewer sweets  Limit foods and drinks that are high in sugar  This includes candy, cookies, regular soda, and sweetened drinks  Exercise:  Exercise at least 30 minutes per day on most days of the week  Some examples of exercise include walking, biking, dancing, and swimming  You can also fit in more physical activity by taking the stairs instead of the elevator or parking farther away from stores  Ask your healthcare provider about the best exercise plan for you  © Copyright [employer] 2018 Information is for End User's use only and may not be sold, redistributed or otherwise used for commercial purposes   All illustrations and images included in CareNotes® are the copyrighted property of [employer]  or [address] [place] [address] [facility]

## 2021-08-09 NOTE — ASSESSMENT & PLAN NOTE
Discussed preventative health, cancer screening, immunizations, and safety issues  Patient's last colonoscopy was in 2011 with recommendations to recheck in 10 years, so he would be due this year     I recommend Tdap vaccination at the pharmacy

## 2021-08-10 ENCOUNTER — OFFICE VISIT (OUTPATIENT)
Dept: PHYSICAL THERAPY | Age: 86
End: 2021-08-10
Payer: MEDICARE

## 2021-08-10 DIAGNOSIS — S42.022D CLOSED DISPLACED FRACTURE OF SHAFT OF LEFT CLAVICLE WITH ROUTINE HEALING, SUBSEQUENT ENCOUNTER: Primary | ICD-10-CM

## 2021-08-10 PROCEDURE — 97110 THERAPEUTIC EXERCISES: CPT

## 2021-08-10 PROCEDURE — 97140 MANUAL THERAPY 1/> REGIONS: CPT

## 2021-08-10 NOTE — PROGRESS NOTES
Daily Note     Today's date: 8/10/2021  Patient name: Carrillo Askew  : 1935  MRN: [id number]  Referring provider: Tianna Jaquez PA-C  Dx:   Encounter Diagnosis     ICD-10-CM    1  Closed displaced fracture of shaft of left clavicle with routine healing, subsequent encounter  S42 022D                   Subjective: Pt noted no pain today at arrival        Objective: See treatment diary below      Assessment: Good tolerance to exercises  Plan: Continue per plan of care  Precautions: FALL History  Patient's PMHx is remarkable for A-fib, HTN, PD, left shoulder surgery, thoracic aortic aneurysm, aortic stenosis, left hand tremor       Manuals 8/5 8/10           Left GH joint prom supine HS PROM 10 MIN                                                   Neuro Re-Ed                                                                 Ther Ex             UBE  10 MIN            Pulleys 5" 5 min            Scapular squeezes 3"x20 20X 3SEC            Left shoulder table slides flexion and scaption:L:  20X 2SEC            Seated shoulder ER stretch with wedge:L  20X 3SEC            Seated postural correction slough over correct  20X            Shoulder scaption and flexion:B: x20 NT           Biceps curls:B  20X           Left shoulder pendulums: side to side and fwd/bwd  20X                        Supine shoulder flexion with spc and bench press:B: x10 20x 2#            scap punches:L  20X 2#            Triceps extension:L  20X 2#            Right side lying left GH joint ER and abduction  NT                                                                                                                                Ther Activity                                       Gait Training                                       Modalities             MHP to left shoulder 10 min

## 2021-08-13 ENCOUNTER — OFFICE VISIT (OUTPATIENT)
Dept: PHYSICAL THERAPY | Age: 86
End: 2021-08-13
Payer: MEDICARE

## 2021-08-13 DIAGNOSIS — S42.022D CLOSED DISPLACED FRACTURE OF SHAFT OF LEFT CLAVICLE WITH ROUTINE HEALING, SUBSEQUENT ENCOUNTER: Primary | ICD-10-CM

## 2021-08-13 PROCEDURE — 97110 THERAPEUTIC EXERCISES: CPT | Performed by: PHYSICAL THERAPIST

## 2021-08-13 PROCEDURE — 97140 MANUAL THERAPY 1/> REGIONS: CPT | Performed by: PHYSICAL THERAPIST

## 2021-08-13 NOTE — PROGRESS NOTES
Daily Note     Today's date: 2021  Patient name: Rosey Martin  : 1935  MRN: [id number]  Referring provider: Agata Martinez PA-C  Dx:   Encounter Diagnosis     ICD-10-CM    1  Closed displaced fracture of shaft of left clavicle with routine healing, subsequent encounter  S42 022D                   Subjective: Patient reported minimal left shoulder pain with functional movements above shoulder height  Objective: See treatment diary below  Assessment: Patient presents with left [facility] joint arom limited by pain and tightness  But, his active functional mobility has improved since onset of PT  Patient presents with both clavical and [facility] joint as the pain aggravating areas both actively and passively  Plan: Continue per plan of care  Precautions: FALL History  Patient's PMHx is remarkable for A-fib, HTN, PD, left shoulder surgery, thoracic aortic aneurysm, aortic stenosis, left hand tremor       Manuals 8/5 8/10 8/12          Left GH joint prom supine HS PROM 10 MIN  10 min                                                 Neuro Re-Ed                                                                 Ther Ex             UBE  10 MIN  10 min          Pulleys 5" 5 min  6 min          Scapular squeezes 3"x20 20X 3SEC  3 sec x 20          Left shoulder table slides flexion and scaption:L:  20X 2SEC  3 sec x 20          Seated shoulder ER stretch with wedge:L  20X 3SEC  20 sec x 5          Seated postural correction slough over correct  20X  3 sec x 20          Shoulder scaption and flexion:B: x20 NT flexion x 20          Biceps curls:B  20X 2 x 10          Left shoulder pendulums: side to side and fwd/bwd  20X NT                       Supine shoulder flexion with spc and bench press:B: x10 20x 2#  2# x 20          scap punches:L  20X 2#  2# x 20          Triceps extension:L  20X 2#  2# x 20          Right side lying left GH joint ER and abduction  NT Ther Activity                                       Gait Training                                       Modalities             MHP to left shoulder 10 min

## 2021-08-16 ENCOUNTER — OFFICE VISIT (OUTPATIENT)
Dept: PHYSICAL THERAPY | Age: 86
End: 2021-08-16
Payer: MEDICARE

## 2021-08-16 DIAGNOSIS — S42.022D CLOSED DISPLACED FRACTURE OF SHAFT OF LEFT CLAVICLE WITH ROUTINE HEALING, SUBSEQUENT ENCOUNTER: Primary | ICD-10-CM

## 2021-08-16 PROCEDURE — 97140 MANUAL THERAPY 1/> REGIONS: CPT

## 2021-08-16 PROCEDURE — 97110 THERAPEUTIC EXERCISES: CPT

## 2021-08-16 NOTE — PROGRESS NOTES
Daily Note     Today's date: 2021  Patient name: Allie Owens  : 1935  MRN: [id number]  Referring provider: Evelia Gutierrez PA-C  Dx:   Encounter Diagnosis     ICD-10-CM    1  Closed displaced fracture of shaft of left clavicle with routine healing, subsequent encounter  S42 [id number]                   Subjective: Patient reported only stiffness at L shoulder/elbow today       Objective: See treatment diary below  Assessment: Restricted with end range ROM in all planes, weakness noted L shoulder  Progress as able       Plan: Continue per plan of care  Precautions: FALL History  Patient's PMHx is remarkable for A-fib, HTN, PD, left shoulder surgery, thoracic aortic aneurysm, aortic stenosis, left hand tremor       Manuals 8/5 8/10 8/12 8/16         Left GH joint prom supine HS PROM 10 MIN  10 min 10 MIN                                                 Neuro Re-Ed                                       Ther Ex             UBE  10 MIN  10 min NT          Pulleys 5" 5 min  6 min 7 MIN          Scapular squeezes 3"x20 20X 3SEC  3 sec x 20 20X 3SEC          Left shoulder table slides flexion and scaption:L:  20X 2SEC  3 sec x 20 20x 2sec         Seated shoulder ER stretch with wedge:L  20X 3SEC  20 sec x 5 20sec 5x          Seated postural correction slough over correct  20X  3 sec x 20 20x 3sec          Shoulder scaption and flexion:B: x20 NT flexion x 20 20x         Biceps curls:B  20X 2 x 10 20x         Left shoulder pendulums: side to side and fwd/bwd  20X NT NT                      Supine shoulder flexion with spc and bench press:B: x10 20x 2#  2# x 20 20X 2#          scap punches:L  20X 2#  2# x 20 20X 2#          Triceps extension:L  20X 2#  2# x 20 20X 2#          Right side lying left GH joint ER and abduction  NT  NT                                                                                                                              Ther Activity Gait Training                                       Modalities             MHP to left shoulder 10 min

## 2021-08-18 ENCOUNTER — OFFICE VISIT (OUTPATIENT)
Dept: PHYSICAL THERAPY | Age: 86
End: 2021-08-18
Payer: MEDICARE

## 2021-08-18 DIAGNOSIS — S42.022D CLOSED DISPLACED FRACTURE OF SHAFT OF LEFT CLAVICLE WITH ROUTINE HEALING, SUBSEQUENT ENCOUNTER: Primary | ICD-10-CM

## 2021-08-18 PROCEDURE — 97140 MANUAL THERAPY 1/> REGIONS: CPT

## 2021-08-18 PROCEDURE — 97110 THERAPEUTIC EXERCISES: CPT

## 2021-08-18 NOTE — PROGRESS NOTES
Daily Note     Today's date: 2021  Patient name: Leah Lees  : 1935  MRN: [id number]  Referring provider: Clarita Epley, PA-C  Dx:   Encounter Diagnosis     ICD-10-CM    1  Closed displaced fracture of shaft of left clavicle with routine healing, subsequent encounter  S42 022D                   Subjective: Patient reported no shoulder pain  Objective: See treatment diary below  Assessment: Good tolerance to exercises, no pain after today's session restricted with L shoulder ROM       Plan: Continue per plan of care  Precautions: FALL History  Patient's PMHx is remarkable for A-fib, HTN, PD, left shoulder surgery, thoracic aortic aneurysm, aortic stenosis, left hand tremor       Manuals 8/5 8/10 8/12 8/16 8/18        Left GH joint prom supine HS PROM 10 MIN  10 min 10 MIN  10 min                                                Neuro Re-Ed                                       Ther Ex             UBE  10 MIN  10 min NT  5/5 min        Nustep      10 min         Pulleys 5" 5 min  6 min 7 MIN  NT        Scapular squeezes 3"x20 20X 3SEC  3 sec x 20 20X 3SEC  20X 3SEC         Left shoulder table slides flexion and scaption:L:  20X 2SEC  3 sec x 20 20x 2sec 20X 1#         Seated shoulder ER stretch with wedge:L  20X 3SEC  20 sec x 5 20sec 5x  NT        Seated postural correction slough over correct  20X  3 sec x 20 20x 3sec  20X 3SEC         Shoulder scaption and flexion:B: x20 NT flexion x 20 20x 20X 1#         Biceps curls:B  20X 2 x 10 20x 20X 1#         Left shoulder pendulums: side to side and fwd/bwd  20X NT NT T                     Supine shoulder flexion with spc and bench press:B: x10 20x 2#  2# x 20 20X 2#  20X 3#         scap punches:L  20X 2#  2# x 20 20X 2#  20X 3#         Triceps extension:L  20X 2#  2# x 20 20X 2#  20X 3#         Right side lying left GH joint ER and abduction  NT  NT NT Ther Activity                                       Gait Training                                       Modalities             MHP to left shoulder 10 min

## 2021-08-23 ENCOUNTER — EVALUATION (OUTPATIENT)
Dept: PHYSICAL THERAPY | Age: 86
End: 2021-08-23
Payer: MEDICARE

## 2021-08-23 DIAGNOSIS — S42.022D CLOSED DISPLACED FRACTURE OF SHAFT OF LEFT CLAVICLE WITH ROUTINE HEALING, SUBSEQUENT ENCOUNTER: Primary | ICD-10-CM

## 2021-08-23 PROCEDURE — 97110 THERAPEUTIC EXERCISES: CPT | Performed by: PHYSICAL THERAPIST

## 2021-08-23 PROCEDURE — 97750 PHYSICAL PERFORMANCE TEST: CPT | Performed by: PHYSICAL THERAPIST

## 2021-08-23 NOTE — PROGRESS NOTES
PT Evaluation  / PT Reassessment    Today's date: 2021  Patient name: Paulina Griffin  : 1935  MRN: [id number]  Referring provider: Lottie Chan PA-C  Dx:   Encounter Diagnosis     ICD-10-CM    1  Closed displaced fracture of shaft of left clavicle with routine healing, subsequent encounter  S42 022D                   Assessment  Assessment details: PT Reassessment: 21  Patient reported the following progress since onset of PT: increase in left arm mobility, left ue strength, using left ue more frequently during house hold iadls, dressing and functional activities  But, he noted the following deficits that still persists: repeated reaching, upper body dressing and lifting activities  21  Patient fell on 21 that required hospitalization  Patient noted he fell down the steps and landed on his left shoulder and back  Patient noted he went to the ED and was hospitalized for about 4 days  Patient denies any other falls  Patient noted he did have left shoulder and lumbar spine pain, while now he has left shoulder pain but it is getting better  Patient noted he is right ue dominant  Patient noted the following activities that are limited by left shoulder pain and tightness: dressing, reaching, lifting, house hold chores and activities  Patient noted left [address] joint mobility and strength are limited which limits all left ue functional activities, but his motion is improving  Impairments: abnormal gait, abnormal or restricted ROM, abnormal movement, activity intolerance, lacks appropriate home exercise program and pain with function  Understanding of Dx/Px/POC: excellent   Prognosis: good  Prognosis details: Patient is a 82y o  year old male seen for outpatient PT reevaluation with pain, mobility and functional deficits due to left [address] joint region pain secondary to fall and left clavicular fracture   Patient presents with the following progress since onset of PT: decrease in left shoulder and clavicle region pain, increase in left ue rom and strength, postural awareness improvements and functional mobility  Patient presents to PT reassessment with the following problems, concerns, deficits and impairments: left GH joint region pain, decreased left UE range of motion, decreased left GH joint region strength, decrease in postural awareness, functional limitations and decreased tolerance to activity  Patient would benefit from skilled PT services under the following PT treatment plan to address the above noted deficits: therapeutic exercises and activities to facilitate left UE ROM and strength, postural reeducation and strengthening, modalities, manual therapy techniques, IASTM techniques, Kinesio taping techniques and a hep  Thank you for the referral      Goals  Short Term goals - 4 weeks  1  Patient will be independent HEP   MET  2   Patient will report a 25 - 50% decrease in pain complaints  MET  3   Increase strength 1/2 grade  MET  4   Increase ROM 5-10 degrees  MET  Long Term goals - 8 weeks  1  Patient will report elimination of pain complaints  Partially MET  2   Patient will return to all recreational activities without restriction  Partially MET  3   ROM WFL  Partially MET  4   Strength 5/5  Partially MET  5   Patient will report dressing is without left [facility] joint limitations  Partially MET  6   Patient will report reaching is without left GH joint limitations  Partially MET  7   Patient will report lifting activities is without left [facility] joint limitations  Partially MET  8   Patient will report being able to lie on left ue without pain limitations  MET   9   Patient will report being able to push off with left ue during sit to stand transfers  MET      Plan  Patient would benefit from: skilled physical therapy  Planned modality interventions: cryotherapy, TENS, thermotherapy: hydrocollator packs and unattended electrical stimulation  Planned therapy interventions: joint mobilization, manual therapy, massage, balance, balance/weight bearing training, neuromuscular re-education, patient education, postural training, body mechanics training, compression, self care, strengthening, stretching, therapeutic activities, therapeutic exercise, therapeutic training, transfer training, flexibility, functional ROM exercises, gait training, graded activity, graded exercise, graded motor and home exercise program  Frequency: 2x week  Duration in weeks: 12  Treatment plan discussed with: patient        Subjective Evaluation    History of Present Illness  Mechanism of injury: Patient's PMHx is remarkable for A-fib, HTN, PD, left shoulder surgery, thoracic aortic aneurysm, aortic stenosis, left hand tremor  LEFT CLAVICLE     INDICATION:   T14  8XXA: Other injury of unspecified body region, initial encounter      COMPARISON:  Compared with 2021     VIEWS:  XR CLAVICLE LEFT         FINDINGS:     Left mid shaft fracture demonstrates healing with periosteal reaction and callus formation  Degenerative changes in the glenohumeral and AC joint unchanged      Minimal overlapping of fracture fragments      No lytic or blastic osseous lesion      Soft tissues are unremarkable      IMPRESSION:     Healing midshaft clavicle fracture  Pain  At best pain ratin  At worst pain ratin  Location: left shoulder    Patient Goals  Patient goals for therapy: increased strength, independence with ADLs/IADLs, decreased pain and increased motion          Objective     Tenderness     Additional Tenderness Details  Patient is - TTP at left [address] joint or clavicle      Active Range of Motion   Left Shoulder   Flexion: 114 degrees with pain  Abduction: 118 degrees with pain  External rotation 45°: 50 degrees with pain  Internal rotation 45°: 58 degrees with pain    Right Shoulder   Flexion: 160 degrees   Abduction: 162 degrees   External rotation 90°: 74 degrees  Internal rotation 90°: 20 degrees     Left Elbow Flexion: 140 degrees   Extension: -10 degrees     Right Elbow   Flexion: 144 degrees   Extension: -5 degrees     Strength/Myotome Testing     Left Shoulder     Planes of Motion   Flexion: 4   Abduction: 4-   External rotation at 0°: 4   Internal rotation at 0°: 4+     Right Shoulder     Planes of Motion   Flexion: 4+   Abduction: 4   External rotation at 0°: 4+   Internal rotation at 0°: 5     Left Elbow   Flexion: 4+  Extension: 4+    Right Elbow   Flexion: 5  Extension: 5                  Precautions: FALL History  Patient's PMHx is remarkable for A-fib, HTN, PD, left shoulder surgery, thoracic aortic aneurysm, aortic stenosis, left hand tremor       Manuals 8/5 8/10 8/12 8/16 8/18 8/23       Left GH joint prom supine HS PROM 10 MIN  10 min 10 MIN  10 min  10 min                                              Neuro Re-Ed                                       Ther Ex             UBE  10 MIN  10 min NT  5/5 min NT       Nustep      10 min  10 min       Pulleys 5" 5 min  6 min 7 MIN  NT 6 min       Scapular squeezes 3"x20 20X 3SEC  3 sec x 20 20X 3SEC  20X 3SEC         Left shoulder table slides flexion and scaption:L:  20X 2SEC  3 sec x 20 20x 2sec 20X 1#  2 x 10       Seated shoulder ER stretch with wedge:L  20X 3SEC  20 sec x 5 20sec 5x  NT 20 sec x 5       Seated postural correction slough over correct  20X  3 sec x 20 20x 3sec  20X 3SEC  3 sec x 20       Shoulder scaption and flexion:B: x20 NT flexion x 20 20x 20X 1#  1# x 20       Biceps curls:B  20X 2 x 10 20x 20X 1#  3# x 20       Left shoulder pendulums: side to side and fwd/bwd  20X NT NT NT NT                    Supine shoulder flexion with spc and bench press:B: x10 20x 2#  2# x 20 20X 2#  20X 3#  3# x 20       scap punches:L  20X 2#  2# x 20 20X 2#  20X 3#  3# x 20       Triceps extension:L  20X 2#  2# x 20 20X 2#  20X 3#  3# x 20       Right side lying left GH joint ER and abduction  NT  NT NT NT Ther Activity                                       Gait Training                                       Modalities             MHP to left shoulder 10 min

## 2021-08-30 ENCOUNTER — OFFICE VISIT (OUTPATIENT)
Dept: PHYSICAL THERAPY | Age: 86
End: 2021-08-30
Payer: MEDICARE

## 2021-08-30 DIAGNOSIS — S42.022D CLOSED DISPLACED FRACTURE OF SHAFT OF LEFT CLAVICLE WITH ROUTINE HEALING, SUBSEQUENT ENCOUNTER: Primary | ICD-10-CM

## 2021-08-30 PROCEDURE — 97140 MANUAL THERAPY 1/> REGIONS: CPT

## 2021-08-30 PROCEDURE — 97110 THERAPEUTIC EXERCISES: CPT

## 2021-08-30 NOTE — PROGRESS NOTES
Daily Note     Today's date: 2021  Patient name: Rafaela Leonard  : 1935  MRN: [id number]  Referring provider: Tala Cervantes PA-C  Dx:   Encounter Diagnosis     ICD-10-CM    1  Closed displaced fracture of shaft of left clavicle with routine healing, subsequent encounter  S42 317D                   Subjective: pt reports he's feeling good today no problems to report      Objective: See treatment diary below      Assessment:  Pt completed there ex with no increased sx, cueing throughout session to perform ex slowly/effectively       Plan: Continue per plan of care  Progress treatment as tolerated  Precautions: FALL History  Patient's PMHx is remarkable for A-fib, HTN, PD, left shoulder surgery, thoracic aortic aneurysm, aortic stenosis, left hand tremor       Manuals 8/5 8/10 8/12 8/16 8/18 8/23 8/30/21      Left GH joint prom supine HS PROM 10 MIN  10 min 10 MIN  10 min  10 min VK 14'                                             Neuro Re-Ed                                       Ther Ex             UBE  10 MIN  10 min NT  5/5 min NT       Nustep      10 min  10 min 10 min with UE      Pulleys 5" 5 min  6 min 7 MIN  NT 6 min 6'      Scapular squeezes 3"x20 20X 3SEC  3 sec x 20 20X 3SEC  20X 3SEC   25x5"      Left shoulder table slides flexion and scaption:L:  20X 2SEC  3 sec x 20 20x 2sec 20X 1#  2 x 10 2x10 ea      Seated shoulder ER stretch with wedge:L  20X 3SEC  20 sec x 5 20sec 5x  NT 20 sec x 5 5x20"      Seated postural correction slough over correct  20X  3 sec x 20 20x 3sec  20X 3SEC  3 sec x 20 20x3"      Shoulder scaption and flexion:B: x20 NT flexion x 20 20x 20X 1#  1# x 20 1# 2x10 ea      Biceps curls:B  20X 2 x 10 20x 20X 1#  3# x 20 3# 30x      Left shoulder pendulums: side to side and fwd/bwd  20X NT NT NT NT                    Supine shoulder flexion with spc and bench press:B: x10 20x 2#  2# x 20 20X 2#  20X 3#  3# x 20 BP 3# 2x15/ lfex 3#2x10      scap punches:L  20X 2# 2# x 20 20X 2#  20X 3#  3# x 20 3# 2x15      Triceps extension:L  20X 2#  2# x 20 20X 2#  20X 3#  3# x 20 3# 2x12      Right side lying left GH joint ER and abduction  NT  NT NT NT ER 2x10                                                                                                                            Ther Activity                                       Gait Training                                       Modalities             MHP to left shoulder 10 min

## 2021-09-01 ENCOUNTER — OFFICE VISIT (OUTPATIENT)
Dept: PHYSICAL THERAPY | Age: 86
End: 2021-09-01
Payer: MEDICARE

## 2021-09-01 DIAGNOSIS — S42.022D CLOSED DISPLACED FRACTURE OF SHAFT OF LEFT CLAVICLE WITH ROUTINE HEALING, SUBSEQUENT ENCOUNTER: Primary | ICD-10-CM

## 2021-09-01 PROCEDURE — 97140 MANUAL THERAPY 1/> REGIONS: CPT

## 2021-09-01 PROCEDURE — 97110 THERAPEUTIC EXERCISES: CPT

## 2021-09-01 NOTE — PROGRESS NOTES
Daily Note     Today's date: 2021  Patient name: Hanna Medley  : 1935  MRN: [id number]  Referring provider: Avril Liu PA-C  Dx:   Encounter Diagnosis     ICD-10-CM    1  Closed displaced fracture of shaft of left clavicle with routine healing, subsequent encounter  S42 022D                   Subjective: Pt reported L shoulder achiness only  Objective: See treatment diary below      Assessment: Restricted L Shoulder ROM with PROM  Plan: Continue per plan of care  Progress treatment as tolerated  Precautions: FALL History  Patient's PMHx is remarkable for A-fib, HTN, PD, left shoulder surgery, thoracic aortic aneurysm, aortic stenosis, left hand tremor       Manuals 8/5 8/10 8/12 8/16 8/18 8/23 8/30/21 9/1     Left GH joint prom supine HS PROM 10 MIN  10 min 10 MIN  10 min  10 min VK 14' 10 min                                             Neuro Re-Ed                                       Ther Ex             UBE  10 MIN  10 min NT  5/5 min NT  10 min      Nustep      10 min  10 min 10 min with UE NT     Pulleys 5" 5 min  6 min 7 MIN  NT 6 min 6' 6 MIN      Scapular squeezes 3"x20 20X 3SEC  3 sec x 20 20X 3SEC  20X 3SEC   25x5" 20X 3SEC      Left shoulder table slides flexion and scaption:L:  20X 2SEC  3 sec x 20 20x 2sec 20X 1#  2 x 10 2x10 ea 20X     Seated shoulder ER stretch with wedge:L  20X 3SEC  20 sec x 5 20sec 5x  NT 20 sec x 5 5x20" 10X 5SEC      Seated postural correction slough over correct  20X  3 sec x 20 20x 3sec  20X 3SEC  3 sec x 20 20x3" 20X     Shoulder scaption and flexion:B: x20 NT flexion x 20 20x 20X 1#  1# x 20 1# 2x10 ea 20X 2#      Biceps curls:B  20X 2 x 10 20x 20X 1#  3# x 20 3# 30x 20X 3#      Left shoulder pendulums: side to side and fwd/bwd  20X NT NT NT NT  NT                  Supine shoulder flexion with spc and bench press:B: x10 20x 2#  2# x 20 20X 2#  20X 3#  3# x 20 BP 3# 2x15/ lfex 3#2x10 20X 3#      scap punches:L  20X 2#  2# x 20 20X 2# 20X 3#  3# x 20 3# 2x15 20X 3#      Triceps extension:L  20X 2#  2# x 20 20X 2#  20X 3#  3# x 20 3# 2x12 20X 3#      Right side lying left GH joint ER and abduction  NT  NT NT NT ER 2x10  20X 5SEC                                                                                                                           Ther Activity                                       Gait Training                                       Modalities             MHP to left shoulder 10 min

## 2021-09-09 ENCOUNTER — OFFICE VISIT (OUTPATIENT)
Dept: PHYSICAL THERAPY | Age: 86
End: 2021-09-09
Payer: MEDICARE

## 2021-09-09 DIAGNOSIS — S42.022D CLOSED DISPLACED FRACTURE OF SHAFT OF LEFT CLAVICLE WITH ROUTINE HEALING, SUBSEQUENT ENCOUNTER: Primary | ICD-10-CM

## 2021-09-09 PROCEDURE — 97140 MANUAL THERAPY 1/> REGIONS: CPT | Performed by: PHYSICAL THERAPIST

## 2021-09-09 PROCEDURE — 97110 THERAPEUTIC EXERCISES: CPT | Performed by: PHYSICAL THERAPIST

## 2021-09-09 NOTE — PROGRESS NOTES
Daily Note     Today's date: 2021  Patient name: Trinity Flower  : 1935  MRN: [id number]  Referring provider: Jad Olivo PA-C  Dx:   Encounter Diagnosis     ICD-10-CM    1  Closed displaced fracture of shaft of left clavicle with routine healing, subsequent encounter  S42 022D                   Subjective: Patient reported his left shoulder is improved overall but weakness and pain limits all left ue functional activities  Patient currently denies pain in left shoulder and only has pain with lifting and reaching activities  Patient presents with vital signs of HR at 68, O2 sats 98, BP at 143 of 72  Objective: See treatment diary below  Assessment: Patient presents with left GH joint weakness and tightness limiting all left GH joint elevation arom which mimics standing based functional activities  Plan: Continue per plan of care  Progress treatment as tolerated  Precautions: FALL History  Patient's PMHx is remarkable for A-fib, HTN, PD, left shoulder surgery, thoracic aortic aneurysm, aortic stenosis, left hand tremor       Manuals 8/5 8/10 8/12 8/16 8/18 8/23 8/30/21 9/1 9/9    Left GH joint prom supine HS PROM 10 MIN  10 min 10 MIN  10 min  10 min VK 14' 10 min  10 min                                           Neuro Re-Ed                                       Ther Ex             UBE  10 MIN  10 min NT  5/5 min NT  10 min  5/5 min    Nustep      10 min  10 min 10 min with UE NT     Pulleys 5" 5 min  6 min 7 MIN  NT 6 min 6' 6 MIN  5 min    Scapular squeezes 3"x20 20X 3SEC  3 sec x 20 20X 3SEC  20X 3SEC   25x5" 20X 3SEC      Left shoulder table slides flexion and scaption:L:  20X 2SEC  3 sec x 20 20x 2sec 20X 1#  2 x 10 2x10 ea 20X 2 x 10    Seated shoulder ER stretch with wedge:L  20X 3SEC  20 sec x 5 20sec 5x  NT 20 sec x 5 5x20" 10X 5SEC  20 sec  x5    Seated postural correction slough over correct  20X  3 sec x 20 20x 3sec  20X 3SEC  3 sec x 20 20x3" 20X 3 sec x 20    Shoulder scaption and flexion:B: x20 NT flexion x 20 20x 20X 1#  1# x 20 1# 2x10 ea 20X 2#  2# x 20    Biceps curls:B  20X 2 x 10 20x 20X 1#  3# x 20 3# 30x 20X 3#  3# x 20    Left shoulder pendulums: side to side and fwd/bwd  20X NT NT NT NT  NT NT                 Supine shoulder flexion with spc and bench press:B: x10 20x 2#  2# x 20 20X 2#  20X 3#  3# x 20 BP 3# 2x15/ lfex 3#2x10 20X 3#  3# x 20    scap punches:L  20X 2#  2# x 20 20X 2#  20X 3#  3# x 20 3# 2x15 20X 3#  3# x 20    Triceps extension:L  20X 2#  2# x 20 20X 2#  20X 3#  3# x 20 3# 2x12 20X 3#  3# x 20    Right side lying left GH joint ER and abduction  NT  NT NT NT ER 2x10  20X 5SEC  20 sec x 5                                                                                                                         Ther Activity                                       Gait Training                                       Modalities             MHP to left shoulder 10 min

## 2021-09-13 ENCOUNTER — OFFICE VISIT (OUTPATIENT)
Dept: PHYSICAL THERAPY | Age: 86
End: 2021-09-13
Payer: MEDICARE

## 2021-09-13 DIAGNOSIS — S42.022D CLOSED DISPLACED FRACTURE OF SHAFT OF LEFT CLAVICLE WITH ROUTINE HEALING, SUBSEQUENT ENCOUNTER: Primary | ICD-10-CM

## 2021-09-13 PROCEDURE — 97110 THERAPEUTIC EXERCISES: CPT

## 2021-09-13 NOTE — PROGRESS NOTES
Daily Note     Today's date: 2021  Patient name: Linden Mancera  : 1935  MRN: [id number]  Referring provider: Sunil Wang PA-C  Dx:   Encounter Diagnosis     ICD-10-CM    1  Closed displaced fracture of shaft of left clavicle with routine healing, subsequent encounter  S42 022D                   Subjective:  Pt reported not feeling good today "I feel a bit dizzy I am not sure if I took my meds or not I didn't have breakfast either"       Objective: See treatment diary below  Assessment: Pt presents with improved Left shoulder functional ROM and pain  30 min session due to malaise symptoms  /89 by session's end  Plan: Continue per plan of care  Progress treatment as tolerated  Precautions: FALL History  Patient's PMHx is remarkable for A-fib, HTN, PD, left shoulder surgery, thoracic aortic aneurysm, aortic stenosis, left hand tremor       Manuals 8/5 8/10 8/12 8/16 8/18 8/23 8/30/21 9/1 9/9 9130   Left GH joint prom supine HS PROM 10 MIN  10 min 10 MIN  10 min  10 min VK 14' 10 min  10 min 10 min                                           Neuro Re-Ed                                       Ther Ex             UBE  10 MIN  10 min NT  5/5 min NT  10 min  5/5 min NT   Nustep      10 min  10 min 10 min with UE NT     Pulleys 5" 5 min  6 min 7 MIN  NT 6 min 6' 6 MIN  5 min 5 min    Scapular squeezes 3"x20 20X 3SEC  3 sec x 20 20X 3SEC  20X 3SEC   25x5" 20X 3SEC   20X 3SEC    Left shoulder table slides flexion and scaption:L:  20X 2SEC  3 sec x 20 20x 2sec 20X 1#  2 x 10 2x10 ea 20X 2 x 10 20X   Seated shoulder ER stretch with wedge:L  20X 3SEC  20 sec x 5 20sec 5x  NT 20 sec x 5 5x20" 10X 5SEC  20 sec  x5 20SEC 5X   Seated postural correction slough over correct  20X  3 sec x 20 20x 3sec  20X 3SEC  3 sec x 20 20x3" 20X 3 sec x 20 20X 3SEC    Shoulder scaption and flexion:B: x20 NT flexion x 20 20x 20X 1#  1# x 20 1# 2x10 ea 20X 2#  2# x 20 20X 2#    Biceps curls:B  20X 2 x 10 20x 20X 1#  3# x 20 3# 30x 20X 3#  3# x 20 20X 3#   Left shoulder pendulums: side to side and fwd/bwd  20X NT NT NT NT  NT NT NT                Supine shoulder flexion with spc and bench press:B: x10 20x 2#  2# x 20 20X 2#  20X 3#  3# x 20 BP 3# 2x15/ lfex 3#2x10 20X 3#  3# x 20 NT   scap punches:L  20X 2#  2# x 20 20X 2#  20X 3#  3# x 20 3# 2x15 20X 3#  3# x 20 NT   Triceps extension:L  20X 2#  2# x 20 20X 2#  20X 3#  3# x 20 3# 2x12 20X 3#  3# x 20 NT   Right side lying left GH joint ER and abduction  NT  NT NT NT ER 2x10  20X 5SEC  20 sec x 5 NT                                                                                                                        Ther Activity                                       Gait Training                                       Modalities             MHP to left shoulder 10 min

## 2021-09-14 ENCOUNTER — TELEPHONE (OUTPATIENT)
Dept: INTERNAL MEDICINE CLINIC | Facility: CLINIC | Age: 86
End: 2021-09-14

## 2021-09-14 DIAGNOSIS — F41.9 ANXIETY: ICD-10-CM

## 2021-09-14 DIAGNOSIS — G20 PARKINSON'S DISEASE (HCC): Primary | ICD-10-CM

## 2021-09-14 NOTE — TELEPHONE ENCOUNTER
Patient is calling to say he is feeling like he is over drugged  Patient said he's been feeling like this for a few days  He is asking for a call to review his current medications          Please advise

## 2021-09-14 NOTE — TELEPHONE ENCOUNTER
Let patient know that the medication on his med list that is most likely cause him to feel "overdrugged" is alprazolam, ask patient if he is taking this, and if so he can try titrating this down  Alprazolam is used for anxiety     I also entered in a referral the clinical pharmacist to review his meds with him in detail

## 2021-09-15 DIAGNOSIS — I48.19 PERSISTENT ATRIAL FIBRILLATION (HCC): ICD-10-CM

## 2021-09-15 DIAGNOSIS — R07.9 CHEST PAIN, UNSPECIFIED TYPE: Primary | ICD-10-CM

## 2021-09-15 DIAGNOSIS — I10 ESSENTIAL HYPERTENSION: ICD-10-CM

## 2021-09-15 RX ORDER — WARFARIN SODIUM 5 MG/1
5 TABLET ORAL DAILY
Qty: 90 TABLET | Refills: 3 | Status: CANCELLED | OUTPATIENT
Start: 2021-09-15

## 2021-09-15 NOTE — TELEPHONE ENCOUNTER
Please check with patient, I am getting a request to refill warfarin  Looks like he was switched to Xarelto    They are both blood thinners, he should only be on 1, see which 1 he needs refilled if any

## 2021-09-16 ENCOUNTER — CLINICAL SUPPORT (OUTPATIENT)
Dept: INTERNAL MEDICINE CLINIC | Facility: CLINIC | Age: 86
End: 2021-09-16

## 2021-09-16 VITALS — DIASTOLIC BLOOD PRESSURE: 80 MMHG | SYSTOLIC BLOOD PRESSURE: 122 MMHG | HEART RATE: 60 BPM

## 2021-09-16 DIAGNOSIS — Z79.899 POLYPHARMACY: Primary | ICD-10-CM

## 2021-09-16 DIAGNOSIS — G20 PARKINSON'S DISEASE (HCC): ICD-10-CM

## 2021-09-16 DIAGNOSIS — F41.9 ANXIETY: ICD-10-CM

## 2021-09-16 RX ORDER — PROPRANOLOL HYDROCHLORIDE 20 MG/1
20 TABLET ORAL EVERY 12 HOURS SCHEDULED
Qty: 180 TABLET | Refills: 3 | Status: SHIPPED | OUTPATIENT
Start: 2021-09-16 | End: 2021-10-21 | Stop reason: ALTCHOICE

## 2021-09-16 RX ORDER — RIVAROXABAN 15 MG/1
15 TABLET, FILM COATED ORAL
Qty: 90 TABLET | Refills: 3 | Status: SHIPPED | OUTPATIENT
Start: 2021-09-16 | End: 2022-03-13 | Stop reason: SDUPTHER

## 2021-09-16 RX ORDER — TELMISARTAN 40 MG/1
40 TABLET ORAL DAILY
Qty: 90 TABLET | Refills: 3 | Status: SHIPPED | OUTPATIENT
Start: 2021-09-16 | End: 2022-03-04

## 2021-09-16 RX ORDER — ISOSORBIDE MONONITRATE 30 MG/1
30 TABLET, EXTENDED RELEASE ORAL
Qty: 90 TABLET | Refills: 3 | Status: SHIPPED | OUTPATIENT
Start: 2021-09-16 | End: 2022-06-06

## 2021-09-16 NOTE — PROGRESS NOTES
[address] [facility] Black, PharmD, [facility]    Patient presents for medication review and education  Kym jaime present with all prescribed and OTC medications for medication review  SUBJECTIVE                                                                                                                        Medication list reviewed with patient, reports the following discrepancies/problems:   Acetaminophen: takes infrequently   ALPRAZolam: does not take anymore   BIOTENE DRY MOUTH CARE DT: does not take   carbamide peroxide: does not take   carbidopa-levodopa   COQ-10: takes a few times each week   escitalopram   GAS-X ORAL: does not take   ICaps Areds 2 Caps   Ipratropium: takes only with allergies   isosorbide mononitrate   Loratadine: takes with allergies   propranolol   telmisartan   Xarelto Tabs: ran out of medication and does not have any for tonight, reports he was able to take yesterday    Would like medications transferred to [facility] on [address] doses:  yes  # of missed doses in the past week: unable to verbalize, will miss medications throughout the day and then take later in the day    # of times per day patient takes meds: 3    Use of supportive adherence tools:Yes, describe: wife/daughter fill pill box for patient    Patient would like to stop any medications if able  Has blood pressure cuff at home but does not use  Review of Systems   Constitutional: Negative for fatigue and unexpected weight change  Neurological: Negative for dizziness, light-headedness and headaches          Denies falls     OBJECTIVE                                                                                                                              BP Readings from Last 3 Encounters:   09/16/21 122/80   08/09/21 125/80   07/15/21 111/70       Pulse Readings from Last 3 Encounters:   09/16/21 60   08/09/21 78   07/15/21 71       Lab Results   Component Value Date    SODIUM 138 06/17/2021    K 3 5 06/17/2021     06/17/2021    CO2 26 06/17/2021    BUN 11 06/17/2021    CREATININE 0 68 06/17/2021    GLUC 109 06/17/2021    CALCIUM 8 8 06/17/2021       ASSESSMENT/PLAN                                                                                                             Patient was counseled on current active medications  which include names, dosages, indications and directions for use  Current Medication Recommendation for Alternate Medication    Coenzyme Q10 No compelling indication to continue, patient may discontinue   Telmisartan, propranolol No home blood pressure readings to review, would benefit from home monitoring to assist with monitoring                                 Follow-Up: 1 month    Demographics  Interaction Method: Face to Face  Type of Intervention: New    Topic(s) Addressed  Medication Management  Polypharmacy  Hypertension    Intervention(s) Made    Pharmacologic:      Medication Discontinuation    Non-Pharmacologic:      Other    Tool(s) Used  Not Applicable    Time Spent:   Time Spent in Direct Patient Care: 30 minutes    Time Spent in Care Coordination: 30 minutes    Recommendation(s) Accepted by the Patient/Caregiver:  All Accepted

## 2021-09-16 NOTE — TELEPHONE ENCOUNTER
Med review with patient today  Patient would like medication's transferred to [facility] on [facility], please verify when signing medication's       Patient has been taking Xarelto not warfarin, orders updated

## 2021-09-16 NOTE — TELEPHONE ENCOUNTER
If patient calls back, please schedule patient to meet with me for medication review if he is interested

## 2021-09-16 NOTE — TELEPHONE ENCOUNTER
Advised patient of pcp message, he will check his pills  He said he just takes what is given to him in his pill box  He will call back if there is additional issues with the medications

## 2021-09-20 ENCOUNTER — OFFICE VISIT (OUTPATIENT)
Dept: PHYSICAL THERAPY | Age: 86
End: 2021-09-20
Payer: MEDICARE

## 2021-09-20 DIAGNOSIS — S42.022D CLOSED DISPLACED FRACTURE OF SHAFT OF LEFT CLAVICLE WITH ROUTINE HEALING, SUBSEQUENT ENCOUNTER: Primary | ICD-10-CM

## 2021-09-20 PROCEDURE — 97110 THERAPEUTIC EXERCISES: CPT

## 2021-09-22 ENCOUNTER — OFFICE VISIT (OUTPATIENT)
Dept: PHYSICAL THERAPY | Age: 86
End: 2021-09-22
Payer: MEDICARE

## 2021-09-22 DIAGNOSIS — S42.022D CLOSED DISPLACED FRACTURE OF SHAFT OF LEFT CLAVICLE WITH ROUTINE HEALING, SUBSEQUENT ENCOUNTER: Primary | ICD-10-CM

## 2021-09-22 PROCEDURE — 97110 THERAPEUTIC EXERCISES: CPT

## 2021-09-22 PROCEDURE — 97140 MANUAL THERAPY 1/> REGIONS: CPT

## 2021-09-22 NOTE — PROGRESS NOTES
Daily Note     Today's date: 2021  Patient name: Allan Ortega  : 1935  MRN: [id number]  Referring provider: Anny Flores PA-C  Dx:   Encounter Diagnosis     ICD-10-CM    1  Closed displaced fracture of shaft of left clavicle with routine healing, subsequent encounter  S42 022D                   Subjective:  Pt reported "My shoulder feels stronger"         Objective: See treatment diary below  Assessment: Restricted with end range abd/flex with PROM     Plan: Continue per plan of care  Progress treatment as tolerated  Precautions: FALL History        Patient's PMHx is remarkable for A-fib, HTN, PD, left shoulder surgery, thoracic aortic aneurysm, aortic stenosis, left hand tremor  /    Suma Lusher             Left GH joint prom supine 10 min  10 min                                                   Neuro Re-Ed                                       Ther Ex             UBE 5 min 10 min            Nustep              Pulleys 6 min NT           Scapular squeezes 20x 3sec  20X 3SEC            Left shoulder table slides flexion and scaption:L: 20x 20x             Seated shoulder ER stretch with wedge:L 20x 3sec  20x 3sec            Seated postural correction slough over correct  20x  20x            Shoulder scaption and flexion:B: 20x 3#  20x 3#           Biceps curls:B 20x 4#  20x 4#            Left shoulder pendulums: side to side and fwd/bwd NT NT                        Supine shoulder flexion with spc and bench press:B: 20X 4#  20X 4#            scap punches:L 20X 3#  20X 3#            Triceps extension:L 20X   3#  20X 3#            Right side lying left GH joint ER and abduction NT NT                                                               Ther Activity                                       Gait Training                                       Modalities             MHP to left shoulder

## 2021-09-27 ENCOUNTER — OFFICE VISIT (OUTPATIENT)
Dept: PHYSICAL THERAPY | Age: 86
End: 2021-09-27
Payer: MEDICARE

## 2021-09-27 DIAGNOSIS — S42.022D CLOSED DISPLACED FRACTURE OF SHAFT OF LEFT CLAVICLE WITH ROUTINE HEALING, SUBSEQUENT ENCOUNTER: Primary | ICD-10-CM

## 2021-09-27 PROCEDURE — 97110 THERAPEUTIC EXERCISES: CPT

## 2021-09-27 PROCEDURE — 97140 MANUAL THERAPY 1/> REGIONS: CPT

## 2021-09-27 NOTE — PROGRESS NOTES
Daily Note     Today's date: 2021  Patient name: Paulina Griffin  : 1935  MRN: [id number]  Referring provider: Lottie Chan PA-C  Dx:   Encounter Diagnosis     ICD-10-CM    1  Closed displaced fracture of shaft of left clavicle with routine healing, subsequent encounter  S42 110D                   Subjective: Pt reported no pain at L UE "I can move it higher"         Objective: See treatment diary below  Assessment: 30 min session only due to fatigue and short of breath at times  Resolved by session's end  Plan: Continue per plan of care  Progress treatment as tolerated  Patient agrees with PT POC to d/c to hep after next visit  Precautions: FALL History        Patient's PMHx is remarkable for A-fib, HTN, PD, left shoulder surgery, thoracic aortic aneurysm, aortic stenosis, left hand tremor  /    Rommel Cotta            Left GH joint prom supine 10 min  10 min                                                   Neuro Re-Ed                                       Ther Ex             UBE 5 min 10 min  10 min          Nustep    NT          Pulleys 6 min NT NT          Scapular squeezes 20x 3sec  20X 3SEC  20X 3SEC           Left shoulder table slides flexion and scaption:L: 20x 20x   20X           Seated shoulder ER stretch with wedge:L 20x 3sec  20x 3sec  20X 3SEC           Seated postural correction slough over correct  20x  20x  NT           Shoulder scaption and flexion:B: 20x 3#  20x 3# 20X 4#           Biceps curls:B 20x 4#  20x 4#  20X 4#           Left shoulder pendulums: side to side and fwd/bwd NT NT NT                       Supine shoulder flexion with spc and bench press:B: 20X 4#  20X 4#  20X 4#           scap punches:L 20X 3#  20X 3#  NT          Triceps extension:L 20X   3#  20X 3#  NT          Right side lying left GH joint ER and abduction NT NT NT                                                              Ther Activity Gait Training                                       Modalities             MHP to left shoulder

## 2021-09-28 ENCOUNTER — OFFICE VISIT (OUTPATIENT)
Dept: NEUROLOGY | Facility: CLINIC | Age: 86
End: 2021-09-28
Payer: MEDICARE

## 2021-09-28 VITALS
BODY MASS INDEX: 30.94 KG/M2 | DIASTOLIC BLOOD PRESSURE: 64 MMHG | WEIGHT: 241 LBS | HEART RATE: 58 BPM | SYSTOLIC BLOOD PRESSURE: 138 MMHG

## 2021-09-28 DIAGNOSIS — G20 PARKINSON'S DISEASE (HCC): Primary | ICD-10-CM

## 2021-09-28 PROCEDURE — 99214 OFFICE O/P EST MOD 30 MIN: CPT | Performed by: PHYSICIAN ASSISTANT

## 2021-09-28 NOTE — ASSESSMENT & PLAN NOTE
Patient continues to do well on low-dose Sinemet  Does have some occasional left-sided tremors as well as some bradykinesia and rigidity on exam   For the most part however his tremors are not bothersome and he is functioning very well on his own at home  He does continue to occasionally miss a dose of his Sinemet throughout the day we once again discussed the importance of taking this at regular intervals  He will continue to work on taking 1 tab 3 times a day  We did discuss the option of perhaps increasing his dose for his left-sided bradykinesia and tremor however he is not interested in making any changes at this time  He was encouraged to remain physically active  His wife is also noticing some changes in his voice and he was also encouraged to restart some of his speech exercises he was given in the past   He does not wish to go through formal physical therapy or speech therapy at this time

## 2021-09-28 NOTE — PROGRESS NOTES
Patient ID: Darnell Acharya is a 82 y o  male  Assessment/Plan:    Parkinson's disease [facility])  Patient continues to do well on low-dose Sinemet  Does have some occasional left-sided tremors as well as some bradykinesia and rigidity on exam   For the most part however his tremors are not bothersome and he is functioning very well on his own at home  He does continue to occasionally miss a dose of his Sinemet throughout the day we once again discussed the importance of taking this at regular intervals  He will continue to work on taking 1 tab 3 times a day  We did discuss the option of perhaps increasing his dose for his left-sided bradykinesia and tremor however he is not interested in making any changes at this time  He was encouraged to remain physically active  His wife is also noticing some changes in his voice and he was also encouraged to restart some of his speech exercises he was given in the past   He does not wish to go through formal physical therapy or speech therapy at this time  Subjective:    Mr Pancho Wade is an 82 y o  right handed male who returns for Parkinson's disease  To review, symptoms began in mid 2018 with left hand tremor  No known family history of tremor or diagnosed PD  Started on propranolol 60mg qDay, then reduced to 20mg BID  No benefit to tremor and he felt more "uptight" on the medication  He was later started on Sinemet with improvement  At his last visit he was overall doing well  He was encouraged to take his Sinemet three times a day for the most benefit  He was referred to PT  INTERVAL HISTORY:  Patient overall doing well     He does still have some tremors in the hands at times however these are not bothersome   His wife notices some worsening of his voice  He did have therapy for this in past but he does not keep up with the exercises   He is able to perform all of his ADLs on his own   He sleeps well at night   He feels that his memory is overall stable, wife notices that he is forgetful at times   Wife has always manages finances   He is still only often taking 2 tabs of Sinemet a day he will often forget one of the doses   No hallucinations   No issues with swallowing   No clear on or off with the medication    Current medications:  Sinemet 25/100mg 1tab tid       I personally reviewed and updated the ROS  Objective:    Blood pressure 138/64, pulse 58, weight 109 kg (241 lb)  Physical Exam  Constitutional:       Appearance: Normal appearance  HENT:      Right Ear: Hearing normal       Left Ear: Hearing normal    Eyes:      General: Lids are normal       Extraocular Movements: Extraocular movements intact  Pupils: Pupils are equal, round, and reactive to light  Pulmonary:      Effort: Pulmonary effort is normal    Neurological:      Mental Status: He is alert  Deep Tendon Reflexes: Strength normal    Psychiatric:         Speech: Speech normal          Neurological Exam  Mental Status  Alert  Oriented to person, place and time  Speech is normal     Cranial Nerves  CN III, IV, VI: Extraocular movements intact bilaterally  Normal lids and orbits bilaterally  Pupils equal round and reactive to light bilaterally  CN V:  Right: Facial sensation is normal   Left: Facial sensation is normal on the left  CN VIII:  Right: Hearing is normal   Left: Hearing is normal   Decreased left shoulder shrug   Motor   Strength is 5/5 throughout all four extremities  Sensory  Light touch is normal in upper and lower extremities  Coordination  Right: Finger-to-nose abnormality:  Left: Finger-to-nose abnormality:    Gait  Arose with slight difficulty  Stooped posture  Slightly decreased stride on the left  No clear shuffling or freezing  Decreased arm swing on the left  Ginger Luna       UPDRSIII                Time since last dose:   9/28/21 6/2/21   Speech   2 2 hoarse   Facial Expression        Postural Tremor (Right)  0 0   Postural Tremor (Left) 0 0   Kinetic Tremor (Right)  0 0   Kinetic Tremor (Left)  0 0   Rest tremor amplitude RUE 0 0   Rest tremor amplitude LUE 2 2   Rest tremor amplitude RLE 0 0   Rest tremor amplitude LLE 0 0   Lip/Jaw Tremor        Consistency of tremor 1 1   Finger Taps (Right)   1 1   Finger Taps (Left)  2 2   Hand Movement (Right)  0 0   Hand Movement (Left)   1 1   Pronation/Supination (Right)  1 2   Pronation/Supination (Left)   2 2   Toe Tapping (Right)  0 1   Toe Tapping (Left) 2 2   Leg Agility (Right)  0 1   Leg Agility (Left)   1 2   Rigidity - Neck        Rigidity - Upper Extremity (Right)  0 1   Rigidity - Upper Extremity (Left)   1 2   Rigidity - Lower Extremity (Right)  0 0   Rigidity - Lower Extremity (Left)   0 0   Arising from Chair   0 0    Gait   1 1   Freezing of Gait 0 0   Postural Stability        Posture 2 1   Global spontaneity of movement 1  1       ROS:    Review of Systems   Constitutional: Negative  Negative for appetite change and fever  HENT: Positive for voice change (talks low)  Negative for hearing loss, tinnitus and trouble swallowing  Eyes: Negative  Negative for photophobia and pain  Respiratory: Negative  Negative for shortness of breath  Cardiovascular: Negative  Negative for palpitations  Gastrointestinal: Negative  Negative for nausea and vomiting  Endocrine: Negative  Negative for cold intolerance  Genitourinary: Negative  Negative for dysuria, frequency and urgency  Musculoskeletal: Negative for myalgias and neck pain  Skin: Negative  Negative for rash  Allergic/Immunologic: Negative  Neurological: Positive for dizziness (sometimes) and tremors (very seldom, in hands)  Negative for seizures, syncope, facial asymmetry, speech difficulty, weakness, light-headedness, numbness and headaches  Hematological: Negative  Does not bruise/bleed easily  Psychiatric/Behavioral: Negative  Negative for confusion, hallucinations and sleep disturbance     All other systems reviewed and are negative

## 2021-09-29 ENCOUNTER — EVALUATION (OUTPATIENT)
Dept: PHYSICAL THERAPY | Age: 86
End: 2021-09-29
Payer: MEDICARE

## 2021-09-29 DIAGNOSIS — S42.022D CLOSED DISPLACED FRACTURE OF SHAFT OF LEFT CLAVICLE WITH ROUTINE HEALING, SUBSEQUENT ENCOUNTER: Primary | ICD-10-CM

## 2021-09-29 PROCEDURE — 97750 PHYSICAL PERFORMANCE TEST: CPT | Performed by: PHYSICAL THERAPIST

## 2021-09-29 PROCEDURE — 97110 THERAPEUTIC EXERCISES: CPT | Performed by: PHYSICAL THERAPIST

## 2021-09-29 NOTE — PROGRESS NOTES
PT Evaluation  / PT Reassessment / PT Discharge    Today's date: 2021  Patient name: Pascual Vasquez  : 1935  MRN: [id number]  Referring provider: Celina Arellano PA-C  Dx:   Encounter Diagnosis     ICD-10-CM    1  Closed displaced fracture of shaft of left clavicle with routine healing, subsequent encounter  S42 022D                   Assessment  Assessment details: PT Reassessment: 21  Patient reported the following progress since onset of PT: left ue mobility, left ue strength, reaching, lifting, showering and cleaning himself and dressing activities  But, he noted the following deficits that still persists: left ue repeated reaching over head  Patient agrees with PT POC to d/c to hep due to functional and impairment progress  Thus, patient provided with final hep and d/c to hep  PT Reassessment: 21  Patient reported the following progress since onset of PT: increase in left arm mobility, left ue strength, using left ue more frequently during house hold iadls, dressing and functional activities  But, he noted the following deficits that still persists: repeated reaching, upper body dressing and lifting activities  21  Patient fell on 21 that required hospitalization  Patient noted he fell down the steps and landed on his left shoulder and back  Patient noted he went to the ED and was hospitalized for about 4 days  Patient denies any other falls  Patient noted he did have left shoulder and lumbar spine pain, while now he has left shoulder pain but it is getting better  Patient noted he is right ue dominant  Patient noted the following activities that are limited by left shoulder pain and tightness: dressing, reaching, lifting, house hold chores and activities  Patient noted left [address] joint mobility and strength are limited which limits all left ue functional activities, but his motion is improving    Impairments: abnormal gait, abnormal or restricted ROM, abnormal movement, activity intolerance, lacks appropriate home exercise program and pain with function  Understanding of Dx/Px/POC: excellent   Prognosis: good  Prognosis details: Patient is a 82y o  year old male seen for outpatient PT reevaluation with pain, mobility and functional deficits due to left [facility] joint region pain secondary to fall and left clavicular fracture  Patient presents with the following progress since onset of PT: decrease in left shoulder pain, increase in left GH joint rom and strength, postural awareness progress and functional progress  Thus, patient agrees with PT POC to d/c to hep due to functional and impairment progress  Therefore, patient provided with final hep and d/c to hep  Thank you for the referral      Goals  Short Term goals - 4 weeks  1  Patient will be independent HEP   MET  2   Patient will report a 25 - 50% decrease in pain complaints  MET  3   Increase strength 1/2 grade  MET  4   Increase ROM 5-10 degrees  MET  Long Term goals - 8 weeks  1  Patient will report elimination of pain complaints  Partially MET  2   Patient will return to all recreational activities without restriction  Partially MET  3   ROM WFL  MET  4   Strength 5/5  Partially MET  5   Patient will report dressing is without left [facility] joint limitations  MET   6   Patient will report reaching is without left GH joint limitations  MET  7   Patient will report lifting activities is without left [facility] joint limitations  Partially MET  8   Patient will report being able to lie on left ue without pain limitations  MET   9   Patient will report being able to push off with left ue during sit to stand transfers  MET      Plan  Patient would benefit from: skilled physical therapy  Planned modality interventions: cryotherapy, TENS, thermotherapy: hydrocollator packs and unattended electrical stimulation  Planned therapy interventions: joint mobilization, manual therapy, massage, balance, balance/weight bearing training, neuromuscular re-education, patient education, postural training, body mechanics training, compression, self care, strengthening, stretching, therapeutic activities, therapeutic exercise, therapeutic training, transfer training, flexibility, functional ROM exercises, gait training, graded activity, graded exercise, graded motor and home exercise program  Frequency: 2x week  Duration in weeks: 12  Treatment plan discussed with: patient        Subjective Evaluation    History of Present Illness  Mechanism of injury: Patient's PMHx is remarkable for A-fib, HTN, PD, left shoulder surgery, thoracic aortic aneurysm, aortic stenosis, left hand tremor  LEFT CLAVICLE     INDICATION:   T14  8XXA: Other injury of unspecified body region, initial encounter      COMPARISON:  Compared with 2021     VIEWS:  XR CLAVICLE LEFT         FINDINGS:     Left mid shaft fracture demonstrates healing with periosteal reaction and callus formation  Degenerative changes in the glenohumeral and AC joint unchanged      Minimal overlapping of fracture fragments      No lytic or blastic osseous lesion      Soft tissues are unremarkable      IMPRESSION:     Healing midshaft clavicle fracture  Pain  At best pain ratin  At worst pain ratin  Location: left shoulder    Patient Goals  Patient goals for therapy: increased strength, independence with ADLs/IADLs, decreased pain and increased motion          Objective     Tenderness     Additional Tenderness Details  Patient is - TTP at left [facility] joint or clavicle      Active Range of Motion   Left Shoulder   Flexion: 132 degrees   Abduction: 128 degrees with pain  External rotation 45°: 60 degrees with pain  Internal rotation 45°: 68 degrees with pain    Right Shoulder   Flexion: 160 degrees   Abduction: 162 degrees   External rotation 90°: 74 degrees  Internal rotation 90°: 20 degrees     Left Elbow   Flexion: 140 degrees   Extension: -10 degrees     Right Elbow   Flexion: 144 degrees   Extension: -5 degrees     Strength/Myotome Testing     Left Shoulder     Planes of Motion   Flexion: 4+   Abduction: 4   External rotation at 0°: 4   Internal rotation at 0°: 4+     Right Shoulder     Planes of Motion   Flexion: 4+   Abduction: 4   External rotation at 0°: 4+   Internal rotation at 0°: 5     Left Elbow   Flexion: 4+  Extension: 4+    Right Elbow   Flexion: 5  Extension: 5                      Patient's PMHx is remarkable for A-fib, HTN, PD, left shoulder surgery, thoracic aortic aneurysm, aortic stenosis, left hand tremor  /    [facility]  [facility] 9/22 9/27 9/29         Left GH joint prom supine 10 min  10 min                                                   Neuro Re-Ed                                       Ther Ex             UBE 5 min 10 min  10 min NT         Nustep    NT NT         Pulleys 6 min NT NT 5 min         Scapular squeezes 20x 3sec  20X 3SEC  20X 3SEC  20 sec x 5         Left shoulder table slides flexion and scaption:L: 20x 20x   20X  2 x 10         Seated shoulder ER stretch with wedge:L 20x 3sec  20x 3sec  20X 3SEC  20 sec x 5         Seated postural correction slough over correct  20x  20x  NT 2 x 10          Shoulder scaption and flexion:B: 20x 3#  20x 3# 20X 4#  2# x 20         Biceps curls:B 20x 4#  20x 4#  20X 4#  4# x 20         Left shoulder pendulums: side to side and fwd/bwd NT NT NT NT                      Supine shoulder flexion with spc and bench press:B: 20X 4#  20X 4#  20X 4#  4# x 20         scap punches:L 20X 3#  20X 3#  NT NT         Triceps extension:L 20X   3#  20X 3#  NT NT         Right side lying left GH joint ER and abduction NT NT NT NT                                                             Ther Activity                                       Gait Training                                       Modalities             MHP to left shoulder

## 2021-10-04 ENCOUNTER — HOSPITAL ENCOUNTER (EMERGENCY)
Facility: HOSPITAL | Age: 86
Discharge: HOME | End: 2021-10-04
Attending: EMERGENCY MEDICINE | Admitting: EMERGENCY MEDICINE
Payer: MEDICARE

## 2021-10-04 ENCOUNTER — APPOINTMENT (EMERGENCY)
Dept: RADIOLOGY | Facility: HOSPITAL | Age: 86
End: 2021-10-04
Payer: MEDICARE

## 2021-10-04 ENCOUNTER — TELEPHONE (OUTPATIENT)
Dept: INTERNAL MEDICINE CLINIC | Facility: CLINIC | Age: 86
End: 2021-10-04

## 2021-10-04 VITALS
OXYGEN SATURATION: 99 % | HEART RATE: 60 BPM | DIASTOLIC BLOOD PRESSURE: 63 MMHG | SYSTOLIC BLOOD PRESSURE: 136 MMHG | TEMPERATURE: 97.8 F | RESPIRATION RATE: 18 BRPM

## 2021-10-04 DIAGNOSIS — Z86.69 HX OF PARKINSON'S DISEASE: ICD-10-CM

## 2021-10-04 DIAGNOSIS — R42 INTERMITTENT LIGHTHEADEDNESS: Primary | ICD-10-CM

## 2021-10-04 DIAGNOSIS — Z86.79 HISTORY OF ATRIAL FIBRILLATION: ICD-10-CM

## 2021-10-04 LAB
ANION GAP SERPL CALCULATED.3IONS-SCNC: 4 MMOL/L (ref 4–13)
ATRIAL RATE: 267 BPM
BASOPHILS # BLD AUTO: 0.05 THOUSANDS/ΜL (ref 0–0.1)
BASOPHILS NFR BLD AUTO: 1 % (ref 0–1)
BUN SERPL-MCNC: 15 MG/DL (ref 5–25)
CALCIUM SERPL-MCNC: 9.4 MG/DL (ref 8.3–10.1)
CHLORIDE SERPL-SCNC: 108 MMOL/L (ref 100–108)
CO2 SERPL-SCNC: 25 MMOL/L (ref 21–32)
CREAT SERPL-MCNC: 0.79 MG/DL (ref 0.6–1.3)
EOSINOPHIL # BLD AUTO: 0.2 THOUSAND/ΜL (ref 0–0.61)
EOSINOPHIL NFR BLD AUTO: 2 % (ref 0–6)
ERYTHROCYTE [DISTWIDTH] IN BLOOD BY AUTOMATED COUNT: 14.5 % (ref 11.6–15.1)
GFR SERPL CREATININE-BSD FRML MDRD: 82 ML/MIN/1.73SQ M
GLUCOSE SERPL-MCNC: 126 MG/DL (ref 65–140)
HCT VFR BLD AUTO: 38 % (ref 36.5–49.3)
HGB BLD-MCNC: 12.5 G/DL (ref 12–17)
IMM GRANULOCYTES # BLD AUTO: 0.03 THOUSAND/UL (ref 0–0.2)
IMM GRANULOCYTES NFR BLD AUTO: 0 % (ref 0–2)
LYMPHOCYTES # BLD AUTO: 1.99 THOUSANDS/ΜL (ref 0.6–4.47)
LYMPHOCYTES NFR BLD AUTO: 24 % (ref 14–44)
MCH RBC QN AUTO: 29.6 PG (ref 26.8–34.3)
MCHC RBC AUTO-ENTMCNC: 32.9 G/DL (ref 31.4–37.4)
MCV RBC AUTO: 90 FL (ref 82–98)
MONOCYTES # BLD AUTO: 0.7 THOUSAND/ΜL (ref 0.17–1.22)
MONOCYTES NFR BLD AUTO: 9 % (ref 4–12)
NEUTROPHILS # BLD AUTO: 5.29 THOUSANDS/ΜL (ref 1.85–7.62)
NEUTS SEG NFR BLD AUTO: 64 % (ref 43–75)
NRBC BLD AUTO-RTO: 0 /100 WBCS
PLATELET # BLD AUTO: 278 THOUSANDS/UL (ref 149–390)
PMV BLD AUTO: 9.5 FL (ref 8.9–12.7)
POTASSIUM SERPL-SCNC: 3.7 MMOL/L (ref 3.5–5.3)
QRS AXIS: 55 DEGREES
QRSD INTERVAL: 94 MS
QT INTERVAL: 426 MS
QTC INTERVAL: 421 MS
RBC # BLD AUTO: 4.22 MILLION/UL (ref 3.88–5.62)
SODIUM SERPL-SCNC: 137 MMOL/L (ref 136–145)
T WAVE AXIS: 72 DEGREES
TROPONIN I SERPL-MCNC: <0.02 NG/ML
VENTRICULAR RATE: 59 BPM
WBC # BLD AUTO: 8.26 THOUSAND/UL (ref 4.31–10.16)

## 2021-10-04 PROCEDURE — 93010 ELECTROCARDIOGRAM REPORT: CPT | Performed by: INTERNAL MEDICINE

## 2021-10-04 PROCEDURE — 84484 ASSAY OF TROPONIN QUANT: CPT | Performed by: EMERGENCY MEDICINE

## 2021-10-04 PROCEDURE — 93005 ELECTROCARDIOGRAM TRACING: CPT

## 2021-10-04 PROCEDURE — 36415 COLL VENOUS BLD VENIPUNCTURE: CPT | Performed by: EMERGENCY MEDICINE

## 2021-10-04 PROCEDURE — 71045 X-RAY EXAM CHEST 1 VIEW: CPT

## 2021-10-04 PROCEDURE — 85025 COMPLETE CBC W/AUTO DIFF WBC: CPT | Performed by: EMERGENCY MEDICINE

## 2021-10-04 PROCEDURE — 99284 EMERGENCY DEPT VISIT MOD MDM: CPT | Performed by: EMERGENCY MEDICINE

## 2021-10-04 PROCEDURE — 99284 EMERGENCY DEPT VISIT MOD MDM: CPT

## 2021-10-04 PROCEDURE — 80048 BASIC METABOLIC PNL TOTAL CA: CPT | Performed by: EMERGENCY MEDICINE

## 2021-10-21 ENCOUNTER — CLINICAL SUPPORT (OUTPATIENT)
Dept: INTERNAL MEDICINE CLINIC | Facility: CLINIC | Age: 86
End: 2021-10-21

## 2021-10-21 VITALS — SYSTOLIC BLOOD PRESSURE: 126 MMHG | DIASTOLIC BLOOD PRESSURE: 64 MMHG | HEART RATE: 60 BPM

## 2021-10-21 DIAGNOSIS — Z79.899 POLYPHARMACY: Primary | ICD-10-CM

## 2021-10-21 RX ORDER — METOPROLOL SUCCINATE 50 MG/1
50 TABLET, EXTENDED RELEASE ORAL DAILY
COMMUNITY
Start: 2021-10-12 | End: 2022-03-13 | Stop reason: SDUPTHER

## 2021-11-17 ENCOUNTER — OFFICE VISIT (OUTPATIENT)
Dept: INTERNAL MEDICINE CLINIC | Facility: CLINIC | Age: 86
End: 2021-11-17
Payer: MEDICARE

## 2021-11-17 VITALS
SYSTOLIC BLOOD PRESSURE: 134 MMHG | DIASTOLIC BLOOD PRESSURE: 82 MMHG | WEIGHT: 241.6 LBS | HEART RATE: 94 BPM | BODY MASS INDEX: 31.02 KG/M2 | OXYGEN SATURATION: 97 %

## 2021-11-17 DIAGNOSIS — G20 PARKINSON'S DISEASE (HCC): Primary | ICD-10-CM

## 2021-11-17 DIAGNOSIS — Z23 NEEDS FLU SHOT: ICD-10-CM

## 2021-11-17 DIAGNOSIS — F41.9 ANXIETY: ICD-10-CM

## 2021-11-17 DIAGNOSIS — I48.19 PERSISTENT ATRIAL FIBRILLATION (HCC): ICD-10-CM

## 2021-11-17 DIAGNOSIS — E78.00 PURE HYPERCHOLESTEROLEMIA: ICD-10-CM

## 2021-11-17 DIAGNOSIS — I10 BENIGN ESSENTIAL HYPERTENSION: ICD-10-CM

## 2021-11-17 PROCEDURE — G0008 ADMIN INFLUENZA VIRUS VAC: HCPCS

## 2021-11-17 PROCEDURE — 99214 OFFICE O/P EST MOD 30 MIN: CPT | Performed by: INTERNAL MEDICINE

## 2021-11-17 PROCEDURE — 90662 IIV NO PRSV INCREASED AG IM: CPT

## 2021-11-17 RX ORDER — ESCITALOPRAM OXALATE 10 MG/1
TABLET ORAL
Qty: 30 TABLET | Refills: 5 | Status: SHIPPED | OUTPATIENT
Start: 2021-11-17 | End: 2022-08-03 | Stop reason: SDDI

## 2021-11-26 ENCOUNTER — TELEPHONE (OUTPATIENT)
Dept: OTHER | Facility: OTHER | Age: 86
End: 2021-11-26

## 2021-12-20 ENCOUNTER — OFFICE VISIT (OUTPATIENT)
Dept: INTERNAL MEDICINE CLINIC | Facility: CLINIC | Age: 86
End: 2021-12-20
Payer: MEDICARE

## 2021-12-20 DIAGNOSIS — L98.9 SKIN LESION: Primary | ICD-10-CM

## 2021-12-20 PROCEDURE — 99213 OFFICE O/P EST LOW 20 MIN: CPT | Performed by: GENERAL ACUTE CARE HOSPITAL

## 2021-12-21 ENCOUNTER — CONSULT (OUTPATIENT)
Dept: DERMATOLOGY | Facility: CLINIC | Age: 86
End: 2021-12-21
Payer: MEDICARE

## 2021-12-21 VITALS — WEIGHT: 248 LBS | TEMPERATURE: 97.8 F | HEIGHT: 74 IN | BODY MASS INDEX: 31.83 KG/M2

## 2021-12-21 DIAGNOSIS — Z12.83 SCREENING FOR MALIGNANT NEOPLASM OF SKIN: ICD-10-CM

## 2021-12-21 DIAGNOSIS — D48.5 NEOPLASM OF UNCERTAIN BEHAVIOR OF SKIN: Primary | ICD-10-CM

## 2021-12-21 PROCEDURE — 88305 TISSUE EXAM BY PATHOLOGIST: CPT | Performed by: STUDENT IN AN ORGANIZED HEALTH CARE EDUCATION/TRAINING PROGRAM

## 2021-12-21 PROCEDURE — 99204 OFFICE O/P NEW MOD 45 MIN: CPT | Performed by: DERMATOLOGY

## 2021-12-21 PROCEDURE — 11102 TANGNTL BX SKIN SINGLE LES: CPT | Performed by: DERMATOLOGY

## 2022-01-05 ENCOUNTER — TELEPHONE (OUTPATIENT)
Dept: INTERNAL MEDICINE CLINIC | Facility: CLINIC | Age: 87
End: 2022-01-05

## 2022-01-05 NOTE — TELEPHONE ENCOUNTER
Pt advised he has been diagnosed w/ Parkinson's  He says his legs are weak and wants to know if you recommend he go to the gym and do leg exercises w weights?

## 2022-01-08 ENCOUNTER — IMMUNIZATIONS (OUTPATIENT)
Dept: FAMILY MEDICINE CLINIC | Facility: HOSPITAL | Age: 87
End: 2022-01-08

## 2022-01-08 DIAGNOSIS — Z23 ENCOUNTER FOR IMMUNIZATION: Primary | ICD-10-CM

## 2022-01-08 PROCEDURE — 0001A COVID-19 PFIZER VACC 0.3 ML: CPT

## 2022-01-08 PROCEDURE — 91300 COVID-19 PFIZER VACC 0.3 ML: CPT

## 2022-01-10 ENCOUNTER — TELEPHONE (OUTPATIENT)
Dept: DERMATOLOGY | Facility: CLINIC | Age: 87
End: 2022-01-10

## 2022-01-10 NOTE — TELEPHONE ENCOUNTER
02/10/2022: Telephone call to patient, In regards scheduling MOHS  Left voice message to patient to please return my call at there earliest convenience  Letter Send     01/20/2022: Telephone call to patient,Spoke to patient in regards scheduling MOHS  Per patient will talk to Dr Bozena Lynn in regards treatment  Patient stated will possible treat with Zalodonis since he's treated past skin cancer in the past      Telephone call to patient, In regards scheduling MOHS  Left voice message to patient to please return my call at there earliest convenience

## 2022-01-27 ENCOUNTER — OFFICE VISIT (OUTPATIENT)
Dept: NEUROLOGY | Facility: CLINIC | Age: 87
End: 2022-01-27
Payer: MEDICARE

## 2022-01-27 VITALS
WEIGHT: 244.4 LBS | SYSTOLIC BLOOD PRESSURE: 136 MMHG | BODY MASS INDEX: 31.38 KG/M2 | DIASTOLIC BLOOD PRESSURE: 70 MMHG | HEART RATE: 74 BPM

## 2022-01-27 DIAGNOSIS — F02.81 DEMENTIA IN OTHER DISEASES CLASSIFIED ELSEWHERE WITH BEHAVIORAL DISTURBANCE (HCC): ICD-10-CM

## 2022-01-27 DIAGNOSIS — G20 PARKINSON'S DISEASE (HCC): Primary | ICD-10-CM

## 2022-01-27 PROBLEM — F02.818 DEMENTIA IN OTHER DISEASES CLASSIFIED ELSEWHERE WITH BEHAVIORAL DISTURBANCE: Status: ACTIVE | Noted: 2022-01-27

## 2022-01-27 PROCEDURE — 99215 OFFICE O/P EST HI 40 MIN: CPT | Performed by: PSYCHIATRY & NEUROLOGY

## 2022-01-27 NOTE — ASSESSMENT & PLAN NOTE
He mild cognitive changes as seen with Parkinson's disease  He has not felt this is any worse than prior but admits to having difficulty with remembering to take medications forgetfulness with conversations  Questions regarding cognitive therapy discussed  I do think this would be beneficial and referral was made

## 2022-01-27 NOTE — ASSESSMENT & PLAN NOTE
Parkinson's disease with prominent rest tremor and bradykinesia  Tremors have never been controlled on medication, but he has not been adherent with taking medication 3 times daily  Time spent discussing Parkinson's disease, its treatment and half life of carbidopa levodopa  He is willing to try taking his medication doses more consistently  He will start taking Sinemet 25/100 tablet 3 times daily at around 10:00 a m , 4:00 p m  and 10:00 p m   If he is taking dosing from consistently and he still is having a lot of tremors in the upper bothersome we can further increase the dose to 1 5 tablets 3 times daily  He will contact the office if he develops any side effects such as lightheadedness, hallucinations, or nausea  Time spent discussing the importance of physical activity in Parkinson's disease  We discussed the potential benefits of Parkinson's specific exercises  Refer for PT after discussing the big program was placed

## 2022-01-27 NOTE — PROGRESS NOTES
Patient ID: Nicolas Trevino is a 82 y o  male  Assessment/Plan:    Parkinson's disease ([facility] )  Parkinson's disease with prominent rest tremor and bradykinesia  Tremors have never been controlled on medication, but he has not been adherent with taking medication 3 times daily  Time spent discussing Parkinson's disease, its treatment and half life of carbidopa levodopa  He is willing to try taking his medication doses more consistently  He will start taking Sinemet 25/100 tablet 3 times daily at around 10:00 a m , 4:00 p m  and 10:00 p m   If he is taking dosing from consistently and he still is having a lot of tremors in the upper bothersome we can further increase the dose to 1 5 tablets 3 times daily  He will contact the office if he develops any side effects such as lightheadedness, hallucinations, or nausea  Time spent discussing the importance of physical activity in Parkinson's disease  We discussed the potential benefits of Parkinson's specific exercises  Refer for PT after discussing the big program was placed  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)  -     Ambulatory Referral to Occupational Therapy;  Future       Total 40 minutes spent on patient visit, counseling, and documentation (30 minutes spent with patient face-to-face )    Subjective:    Mr Edvin Loaiza is a right handed male who returns for movement disorder evaluation for Parkinson's disease  To review, symptoms began in mid 2018 with left hand tremor  No known family history of tremor or diagnosed PD  Started on propranolol 60mg qDay, then reduced to 20mg BID  No benefit to tremor and he felt more "uptight" on the medication  He was later started on Sinemet with improvement  Current medications:  Sinemet 25/100mg 1tab bid-tid (10am-12pm, 8pm-1am)    Tremor present at rest much of the day  Speech is soft  There is no drooling  No difficulty chewing and swallowing  Mild slowness  noted with dressing and hygiene acts  Handwriting is micrographic  There is slight difficulty arising out of chair  Gait is unchanged  Sleeps well from 1am - 10-11am  There is no daytime sedation  There are no issues with urination or constipation  There are no experiences with lightheadedness on standing  Cognition is unchanged  There is no difficulty with household tasks  No hallucinations       Objective:    Blood pressure 136/70, pulse 74, weight 111 kg (244 lb 6 4 oz)  Physical Exam  Eyes:      Extraocular Movements: Extraocular movements intact  Pupils: Pupils are equal, round, and reactive to light  Neurological:      Mental Status: He is alert  Deep Tendon Reflexes: Strength normal          Neurological Exam  Mental Status  Alert  Oriented to person, place, time and situation  Speech: hypophonia  Language is fluent with no aphasia  Attention and concentration are normal     Cranial Nerves  CN II: Visual fields full to confrontation  CN III, IV, VI: Extraocular movements intact bilaterally  Pupils equal round and reactive to light bilaterally  CN V: Facial sensation is normal   CN VII: Full and symmetric facial movement    CN VIII: Hearing is normal   CN IX, X: Palate elevates symmetrically  CN XI: Shoulder shrug strength is normal   CN XII: Tongue midline without atrophy or fasciculations  Motor   Normal muscle tone  Strength is 5/5 throughout all four extremities  Sensory  Light touch is normal in upper and lower extremities  Coordination  Right: Finger-to-nose normal  Rapid alternating movement abnormality:  Left: Finger-to-nose normal  Rapid alternating movement abnormality:  See MDS UPDRS III  Gait  Casual gait: Able to rise from chair without using arms  Reduced arm swing and stride on left  Annel Jordan UPDRSIII                Time since last dose:   6/2/21 1/27/22   Speech  2 hoarse 2   Facial Expression       Postural Tremor (Right) 0 0   Postural Tremor (Left) 0 0   Kinetic Tremor (Right)  0 1   Kinetic Tremor (Left)  0 1   Rest tremor amplitude RUE 0 1   Rest tremor amplitude LUE 2 2   Rest tremor amplitude RLE 0 0   Rest tremor amplitude LLE 0 0   Lip/Jaw Tremor       Consistency of tremor 1 1   Finger Taps (Right)   1 1   Finger Taps (Left)  2 2   Hand Movement (Right)  0 1   Hand Movement (Left)   1 1   Pronation/Supination (Right)  2 2   Pronation/Supination (Left)   2 2   Toe Tapping (Right) 1 1   Toe Tapping (Left) 2 2   Leg Agility (Right)  1 0   Leg Agility (Left)   2 2   Rigidity - Neck       Rigidity - Upper Extremity (Right)  1 1   Rigidity - Upper Extremity (Left)   2 2   Rigidity - Lower Extremity (Right)  0 0   Rigidity - Lower Extremity (Left)   0 0   Arising from Chair   0 0    Gait   1 1   Freezing of Gait 0 0   Postural Stability       Posture 1 1   Global spontaneity of movement  1         ROS:    Review of Systems   Constitutional: Negative  Negative for appetite change and fever  HENT: Positive for voice change (Wife claims he does not speak loud enough now and then)  Negative for hearing loss, tinnitus and trouble swallowing  Eyes: Negative  Negative for photophobia and pain  Respiratory: Negative  Negative for shortness of breath  Cardiovascular: Negative  Negative for palpitations     Gastrointestinal: Negative  Negative for nausea and vomiting  Endocrine: Negative  Negative for cold intolerance  Genitourinary: Negative  Negative for dysuria, frequency and urgency  Musculoskeletal: Negative  Negative for myalgias and neck pain  Skin: Negative  Negative for rash  Allergic/Immunologic: Negative  Neurological: Positive for tremors (Now and then in Left arm notices a little more now then before)  Negative for dizziness, seizures, syncope, facial asymmetry, speech difficulty, weakness, light-headedness, numbness and headaches  Hematological: Negative  Does not bruise/bleed easily  Psychiatric/Behavioral: Negative  Negative for confusion, hallucinations and sleep disturbance  All other systems reviewed and are negative  Review of system documented by the MA, was personally reviewed

## 2022-02-10 ENCOUNTER — TELEPHONE (OUTPATIENT)
Dept: OTHER | Facility: OTHER | Age: 87
End: 2022-02-10

## 2022-02-15 ENCOUNTER — TELEPHONE (OUTPATIENT)
Dept: DERMATOLOGY | Facility: CLINIC | Age: 87
End: 2022-02-15

## 2022-02-15 NOTE — TELEPHONE ENCOUNTER
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

## 2022-03-02 ENCOUNTER — EVALUATION (OUTPATIENT)
Dept: PHYSICAL THERAPY | Facility: CLINIC | Age: 87
End: 2022-03-02
Payer: MEDICARE

## 2022-03-02 ENCOUNTER — EVALUATION (OUTPATIENT)
Dept: OCCUPATIONAL THERAPY | Facility: CLINIC | Age: 87
End: 2022-03-02
Payer: MEDICARE

## 2022-03-02 DIAGNOSIS — G20 PARKINSON'S DISEASE (HCC): ICD-10-CM

## 2022-03-02 DIAGNOSIS — G20 PARKINSON'S DISEASE (HCC): Primary | ICD-10-CM

## 2022-03-02 DIAGNOSIS — F02.81 DEMENTIA IN OTHER DISEASES CLASSIFIED ELSEWHERE WITH BEHAVIORAL DISTURBANCE (HCC): ICD-10-CM

## 2022-03-02 PROCEDURE — 97161 PT EVAL LOW COMPLEX 20 MIN: CPT

## 2022-03-02 PROCEDURE — 97166 OT EVAL MOD COMPLEX 45 MIN: CPT

## 2022-03-02 PROCEDURE — 97530 THERAPEUTIC ACTIVITIES: CPT

## 2022-03-02 NOTE — PROGRESS NOTES
PT Evaluation     Today's date: 3/2/2022  Patient name: Maria Isabel Cao  : 1935  MRN: [id number]  Referring provider: Luis Ba MD  Dx:   Encounter Diagnosis     ICD-10-CM    1  Parkinson's disease ([facility] )  [address] Ambulatory Referral to Physical Therapy       Start Time: 1030  Stop Time: 1115  Total time in clinic (min): 45 minutes    Assessment  Assessment details: Maria Isabel Cao is a 82 y o  male presenting with diagnosis of PD  Objective examination is consistent with associated impairments, including impaired balance, reduced gait speed, forward flexed posture, and diminished insight into deficits  Pt's FGA, gait speed, and 5xSTS indicate that he is at moderate risk for falls  Pt was in agreement with self-suggested plan for pt to develop a gym maintenance program that he can perform independently after discharge  Will benefit from skilled PT interventions for balance interventions, gait training, and strengthening to improve ADL performance and functional activity level  Impairments: abnormal coordination, abnormal gait, abnormal or restricted ROM, abnormal movement, activity intolerance, impaired physical strength, lacks appropriate home exercise program, safety issue and poor posture   Barriers to therapy: Insight into deficits   Understanding of Dx/Px/POC: good   Prognosis: good    Goals    Short Term Goals: In 3 weeks, the patient will:  1  Articulate 3 goals for treatment that he would like to address  2  Initiation of an independent gym program    3  Supervision with [facility] for self care  Long Term Goals: In 6 weeks, the patient will:  1  Improve 5x STS <13s to reduce risk of falls and indicate improved strength/power  2  Improve FOTO to greater than predicted value  3  Independent with gym program for maintenance  4  Improve FGA by 5 points to indicate pt has surpassed MDC to reduce his risk of falls           Plan  Plan details: Assess 6 MWT, hip extension ROM, and MCTSIB at next visit   Referral necessary: No  Planned therapy interventions: functional ROM exercises, graded activity, graded exercise, home exercise program, neuromuscular re-education, patient education, strengthening, therapeutic activities, therapeutic exercise, balance, balance/weight bearing training, postural training, coordination and ADL training  Frequency: 3x week  Duration in weeks: 4  Treatment plan discussed with: patient        Subjective  Pt arrived late to appt after initially presenting to [address]  Pt noted that his doctor wanted him to come to PT to work on his balance, though he feels as though his balance is pretty good  Pt notes the stairs are becoming more challenging and he feels weaker  He doesn't do tasks like shoveling snow or cutting grass anymore, but notes this is because he's "lazy", not because he's worried about balance  He also feels that his L hand is affected more than his R      DOI: Pt notes PD diagnosis occured approximately 3 years ago   Living Situation: Pt lives with his wife in his home  He has a two story home with stairs, and he has a chair lift and a hand rail  Dominant Hand: R handed   Pain: Pt reports no pain "that he notices"   Current exercise plan: Pt reports he's not doing anything for exercise currently, but when it's warmer he walks around his neighborhood  Pt did note that he played lacrosse and football in college at [employer]  On/off times: None noted by pt   Hobbies: walking, meeting his friends for lunch, taking his wife out to dinner    Goals: making it easier to perform stairs, maintain ability to shovel snow/mow grass, improve balance overall       Vitals: BP: 130/90, Spo2: 98, HR: 86 bpm     Objective     Balance Test    6 Minute Walk Test (ft): NV   Gait Speed: 0 869 m/s   5x Sit To Stand (s): 14 4s    TUG: NV     FGA: 17/30, notable challenge with tandem walking and stepping over obstacles     Gait Assessment: Pt ambulates with equal step length, R lateral trunk lean, forward flexed posture, and reduced L arm swing  Pt significantly challenged by tandem walking, unable to take more than 2-3 steps in tandem            Precautions: Afib, unruptured thoracic aneurysm, dementia        Manuals 3/2                                                                Neuro Re-Ed                                                                                                        Ther Ex             STS 2x5, 2x daily, HEP                                                                                                       Ther Activity                                       Gait Training                                       Modalities

## 2022-03-02 NOTE — PROGRESS NOTES
OCCUPATIONAL THERAPY INITIAL EVALUATION:    3/2/2022  Gerardo [facility]  1935  [id number]  Alba Bumpers, MD   Diagnosis ICD-10-CM Associated Orders   1  Parkinson's disease ([facility] )  [address] Ambulatory Referral to Occupational Therapy   2  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 [facility])  F02 81 Ambulatory Referral to Occupational Therapy         Assessment/Plan    Skilled Analysis:  Yari Farrar is a 82 y o  male referred to Occupational Therapy s/p Parkinson's disease ([facility] ) [place]  Pt participated in skilled OT evaluation and following formalized testing as well as clinical observation, Pt presents with the following areas of deficit:  Resting tremor L > R, slight kinetic tremor bilaterally, impaired rapid alternating movement, rigidity in LUE > RUE, bradykinesia in LUE > RUE, decreased gross ROM in LUE, decreased in hand manipulation skills, gross grasp hand weakness, decreased insight into deficits, decreased safety awareness, short term memory deficit and slight decrease in orientation skills  OTR reviewed neurology notes, believes that cognition is unchanged since he was last seen in office (~6+ months ago) and no recommendations were given as far as driving goes  Pt presented today at evaluation later due to driving to wrong office  OTR will continue to monitor this for pt's safety and reach out to Neurology  Pt will benefit from skilled Occupational Therapy services 2x/week for 10-12 weeks with focus on establishing HEP, improving fluidity of movements in LUE, improving ROM and flexibility for ADLs, education on external memory strategies, establishing increased habits/routine for medication times for consistency, LUE strengthening, and patient education for improved QOL  Yari Farrar is in agreement with POC      MOTOR SHORT TERM GOALS (4-6) WKS  Strength/Endurance  · Pt will increase L UE proximal shoulder strength to 4+/5  through the use of strengthening exercises and HEP for improved reaching OH during ADL/IADLs  · Pt will increase L UE distal wrist flexor/extensor strength to 4+/5  through the use of strengthening exercises and HEP   · Pt will demo with G tolerance to supine, seated, and in stance exercise x 30 minutes with minimal rest breaks required for increased engagement in life roles and weekly exercise regimen  · Pt will demo with Fair/50% carryover of Home Exercise Program to improve functional progression towards goals in Plan of care and improved LUE use    AROM/PROM  · Pt will be able to perform 3/4 range of proximal L UE flexion to demo increased strength and ROM of affected UE for improved ADLs/IADLs    FMC/GMC  · Pt will increase LUE rate of manipulation by 5 sec for all FM tests for improved functional performance with salient tasks  · Pt will increase Reaction time  to < 3 seconds with LUE hand to target timed trials for improved reaction time and automaticity of coordination for improved functional performance with ADLs/IADLs and salient tasks    COGNITION SHORT TERM GOALS (4-6) WKS  Temporal Orientation / Memory  · Pt will be alert and oriented x4 80% of the time to increase temporal orientation for appointments/location awareness and safe IADL practice   · Pt will demo G carryover and understanding of temporal orientation compensatory strategies and orientation of daily schedules (ie   Use of calendars, alarms, etc) for inc safety with life roles and IADL fxn  · Pt will demo increased insight into deficits more than half of the time, 75%, for overall increased safety and self awareness with ADL/IADL tasks and to encourage use of compensatory strategies  · Pt will demo G recall of 30% of verbal and written information utilizing memory strategy of choice for improved STM/delayed memory     -----------------------------------------------------------------------------------------------------      MOTOR SHORT TERM GOALS (10-12) WKS  Strength/Endurance  · Pt will increase L UE proximal shoulder strength to 5/5  through the use of strengthening exercises and HEP for improved reaching OH during ADL/IADLs  · Pt will increase L UE distal wrist flexor/extensor strength to 5/5  through the use of strengthening exercises and HEP   · Pt will demo with G tolerance to supine, seated, and in stance exercise x 60 minutes with minimal rest breaks required for increased engagement in life roles and weekly exercise regimen  · Pt will demo with Good/90% carryover of Home Exercise Program to improve functional progression towards goals in Plan of care and improved LUE use    AROM/PROM  · Pt will be able to perform > 3/4 range of proximal L UE flexion to demo increased strength and ROM of affected UE for improved ADLs/IADLs    FMC/GMC  · Pt will increase LUE rate of manipulation by 15 sec for all FM tests for improved functional performance with salient tasks  · Pt will increase Reaction time  to < 2 0 seconds with LUE hand to target timed trials for improved reaction time and automaticity of coordination for improved functional performance with ADLs/IADLs and salient tasks  · Pt will demo improved motor learning of constructional and new motor actions for improved b/l coordination and motor planning evident by > 80% accuracy for fxnl ADL/IADL performance      COGNITION SHORT TERM GOALS (10-12) WKS  Temporal Orientation / Memory  · Pt will be alert and oriented x4 > 90% of the time to increase temporal orientation for appointments/location awareness and safe IADL practice   · Pt will demo G carryover and understanding of temporal orientation compensatory strategies and orientation of daily schedules (ie   Use of calendars, alarms, etc) for inc safety with life roles and IADL fxn  · Pt will demo increased insight into deficits 90% of time for overall increased safety and self awareness with ADL/IADL tasks and to encourage use of compensatory strategies  · Pt solomon LIPSCOMB recall of 70% of verbal and written information utilizing memory strategy of choice for improved STM/delayed memory           Subjective    SUBJECTIVE: "Put it this way, I do everything that I need to do daily  Sometimes I forget to take my medications "  "I always knew I should be doing more things with my left "    PATIENT GOAL: "To improve a little more I guess, I like the way things are going "    HISTORY OF PRESENT ILLNESS:     Lubna Zavala is a 82 y o  male who was referred to Occupational Therapy s/p  Parkinson's disease ([facility] ) Oscar King has a PMH of parkinson's disease (since 2018), Dementia, HTN, A-fib  Per review, pt is not adherent with medications and taking it 3x/day  Per review, "Time spent discussing Parkinson's disease, its treatment and half life of carbidopa levodopa  He is willing to try taking his medication doses more consistently  He will start taking Sinemet 25/100 tablet 3 times daily at around 10:00 a m , 4:00 p m  and 10:00 p m  If he is taking dosing from consistently and he still is having a lot of tremors in the upper bothersome we can further increase the dose to 1 5 tablets 3 times daily  He will contact the office if he develops any side effects such as lightheadedness, hallucinations, or nausea "   David's mild cognitive changes are related to Parkinson's disease, continues with rivastigmine  Lubna Zavala lives in a 2 story home with his wife  Pt has a chair lift, does not use it (mainly for his wife)  Pt does not use a cane/RW for mobility  Pt is completing all ADL's independently without use of DME  Reports some challenges with donning socks/shoes d/t decreased flexibility  Pt's daughter is managing his medications and places them into a weekly medication container  Pt says "If I miss medications, my wife gets on my backside " Pt's wife does all cooking  Pt is continuing  role   Pt sleeps from 1 AM - 10/11 AM   Pt meets with a group of friends Tues/Fri, goes out to eat with his wife often  Lubna Zavala was a [employer]  ([employer] courses as well) and retired around [address]  Went to [place], played football and lacrosse  Today, pt reports that he is here because his doctor recommended it  Pt reports that his tremors do not bother him (mainly in L hand), occasionally has some forgetfulness with taking all medications daily/exact times, reports that he just wants to get better at all things (no specifics given)                    PMH:   Past Medical History:   Diagnosis Date    [facility]fib ([facility] )     Afib ([facility] )     [facility] 2021    Hypertension     Hypertension 2021    Left AC separation     Parkinson's disease ([facility] ) 2018         Pain Levels   Restin    With Activity:  0    Objective    Impairment Observations:    SCORE DEFICIT RANGE                                         COGNITIVE FXN                 MOCA (8 1)       Education Level > 12 years    Visuospatial/executive functioning 4/5 Drawing of chair challenging   Naming 3/3    Memory: 1st trial: 4/5    Memory: 2nd trial: 5/5    Attention/concentration 2/2    List of letters:  1/    Serial Seven Subtraction: 2/3    Language/sentence repetition: 2/2    Language Fluency:  1/    Abstract/Correlational Thinkin/2    Delayed Recall: 0/5 Unable to recall words after 5 min delay   Orientation: /6 Reported location as [facility]   Memory Index Score 4/15                                       Total Score 21/30 Mild Cognitive Impairment           RUE LUE Comments           UPPER EXTREMITY FUNCTION   Intact Impaired Dominant Hand: Right     /PINCH STRENGTH             Dynamometer    - Gross Grasp 58 lbs 37 lbs low   Pinch Meter     - Pincer 19 lbs 14 lbs low    - Tripod 15 lbs 14 lbs low    - Lateral 20 lbs  17 5 lbs low     AROM (Seated)         WFL for RUE*        Elevation  3/4 ROM    Shoulder FF  1/2 ROM    Shoulder Ext      Shoulder Abd  3/4 ROM Shoulder Add      Horizontal Abd      Horizontal Add      Elbow Flex      Elbow Ext  3/4 ROM    Pronation      Supination  1/2 ROM    Wrist Flex  3/4 ROM    Wrist Ext  3/4 ROM    Digit Flex      Digit Ex      Composite Grasp      Hook Grasp      Subluxation   None, Hx of LUE pain / clavicle       MMT             Shoulder FF 5/5 4/5    Shoulder Ext 5/5 4/5    Shoulder Abduction 4+/5 4/5    Shoulder Adduction 4+/5 4/5    Elbow Flex 5/5 4+/5    Elbow Ext 5/5 4/5    Wrist Flex 4+/5 4/5    Wrist Ext 4+/5 4/5      SENSATION     Unable to test today*   Monofilament Testing      Sharp / Dull       Proprioception      Hot/Cold Temp      Stereognosis         COORDINATION      Opposition Intact Intact    Finger to Nose Intact Intact Tremor noted with movement    Rapid Alternating Movement Intact Impaired Dysdiadochokinesia   9 Hole Peg Test 29 21 seconds 57 87 seconds abnormal   Pronated grasp during placement, bradykinesia   Fxnl Dexterity Test 49 60 seconds    Dropped 1, used board 50% of time 1 min 6 seconds    Poor translation skills, using board 75% of time abnormal   Saleem Hand Function Test         - Handwriting         - Card Turning         - Small Item p/u         - Simulated Feeding         - Stacking Checkers         - Moving Light Objects         - Moving Heavy Objects      Concentric Circles Test (tremors)  Slight tremor noted        Rigidity in RUE/LUE: Positive during passive wrist movements bilaterally      OTHER PLANNED THERAPY INTERVENTIONS:   Seated, and in stance neuro re-ed  Task-Oriented Training  Tricep AG  FMC/prehension  GMC  Proximal to distal teaming  Timed Trials  Manual tx  IASTM  Hand to target  Seated functional reach: crossing midline  Supine place and hold  Open chain activities  HEP    INTERVENTION COMMENTS:  Diagnosis: Parkinson's disease (HCC) [G20]  Precautions: Confusion, Safety  FOTO: Not completed*  Insurance: Payor: Daphene Batten / Plan: MEDICARE A AND B / Product Type: Medicare A & B Fee for Service /   1 of 10 visits, PN due 4/1/2022  POC Expires: 6/3/2022

## 2022-03-03 ENCOUNTER — NURSE TRIAGE (OUTPATIENT)
Dept: OTHER | Facility: OTHER | Age: 87
End: 2022-03-03

## 2022-03-03 NOTE — TELEPHONE ENCOUNTER
Regarding: patient needs clarificaiton on dosage of meds  ----- Message from Yuli Bliss MA sent at 3/3/2022  2:34 PM EST -----  "I am confused as to how much telmisartan I am supposed to be taking, I thought I was only supposed to be taking 20 mgs/day but when I just went to fill it, it says 40mgs/once per day   I dont remember him changing it"

## 2022-03-03 NOTE — TELEPHONE ENCOUNTER
Patient is calling for clarification of telmisartan (Micardis) dose  The script is for 40mg  He states he has 20mg tabs that he cuts in half and takes 10mg a day  There is a note from [address] [place] [address] cardiology visit 11/29/21 that he is to be taking 10mg  Patient agreed to call his [facility] cardiologist but would also like call back from office

## 2022-03-04 DIAGNOSIS — I10 ESSENTIAL HYPERTENSION: ICD-10-CM

## 2022-03-04 RX ORDER — TELMISARTAN 20 MG/1
10 TABLET ORAL DAILY
Qty: 45 TABLET | Refills: 3
Start: 2022-03-04 | End: 2022-03-13 | Stop reason: SDUPTHER

## 2022-03-04 NOTE — TELEPHONE ENCOUNTER
Let patient know I updated his med list that he is taking half of a 20 milligram tablet to equal 10 milligrams daily of telmisartan    If he has different instructions from Cardiology, he can let me know and I can update his med list

## 2022-03-07 ENCOUNTER — OFFICE VISIT (OUTPATIENT)
Dept: PHYSICAL THERAPY | Facility: CLINIC | Age: 87
End: 2022-03-07
Payer: MEDICARE

## 2022-03-07 DIAGNOSIS — G20 PARKINSON'S DISEASE (HCC): Primary | ICD-10-CM

## 2022-03-07 PROCEDURE — 97110 THERAPEUTIC EXERCISES: CPT

## 2022-03-07 PROCEDURE — 97112 NEUROMUSCULAR REEDUCATION: CPT

## 2022-03-07 NOTE — PROGRESS NOTES
Daily Note     Today's date: 3/7/2022  Patient name: Kacey Márquez  : 1935  MRN: [id number]  Referring provider: Jo Carrera MD  Dx:   Encounter Diagnosis     ICD-10-CM    1  Parkinson's disease ([facility] [address] )  G20                   Subjective: Patient noted no new complaints  Objective: See treatment diary below      Assessment: Tolerated treatment fair  VC for initiation of exercises  Patient was able to perform listed exercises without complaints  Consider donning 3lbs AW for  NV throughout visit  Consider adding foam to side stepping tandem ambulation, TM with incline, Circuit consisting of mat  Table exercises 10x for each exercise in circuit : bridges  quadruped or plank sit to stands, and floor transfer if needed  Patient would benefit from continued PT      Plan: Continue per plan of care        Precautions: Afib, unruptured thoracic aneurysm, dementia        Manuals 3/2 3/7                                                               Neuro Re-Ed             High knee marching  3 laps //           Hurdles   Fwd lat 3 laps //           Backwards ambulation                                                                 Ther Ex             STS 2x5, 2x daily, HEP 2x10 with reaching            Nustep  10 min lvl 4            Step ups  Fwd/ lat 10xea                                                                            Ther Activity                                       Gait Training             Walking with arm swings  6 x 32ft                        Modalities

## 2022-03-09 ENCOUNTER — OFFICE VISIT (OUTPATIENT)
Dept: OCCUPATIONAL THERAPY | Facility: CLINIC | Age: 87
End: 2022-03-09
Payer: MEDICARE

## 2022-03-09 ENCOUNTER — OFFICE VISIT (OUTPATIENT)
Dept: PHYSICAL THERAPY | Facility: CLINIC | Age: 87
End: 2022-03-09
Payer: MEDICARE

## 2022-03-09 DIAGNOSIS — G20 PARKINSON'S DISEASE (HCC): Primary | ICD-10-CM

## 2022-03-09 PROCEDURE — 97110 THERAPEUTIC EXERCISES: CPT

## 2022-03-09 PROCEDURE — 97112 NEUROMUSCULAR REEDUCATION: CPT

## 2022-03-09 PROCEDURE — 97535 SELF CARE MNGMENT TRAINING: CPT

## 2022-03-09 NOTE — PROGRESS NOTES
Daily Note     Today's date: 3/9/2022  Patient name: Mack Becerra  : 1935  MRN: [id number]  Referring provider: Leif Liu MD  Dx:   Encounter Diagnosis     ICD-10-CM    1  Parkinson's disease ([facility] [address] )  G20                   Subjective: Patient noted no complaints post last treatment  Objective: See treatment diary below      Assessment: Tolerated treatment fair  Ankle weights donned for entire session  Added foam to tandem and side stepping exercises today without patient complaints  Patient noted that he doesn't have much trouble getting up from the floor therefore floor transfer training not needed at this time  For Standing rotations with therapist ball pass, therapist stood behind patient and patient passed ball to therapist alternating sides to work on trunk rotation  Patient did well with progressions to exercises and added exercises this treatment without complaints  Updated HEP to include side stepping high knee marching at counter top and sit to stands update nv  Patient would benefit from continued PT            Plan: Continue per plan of care        Precautions: Afib, unruptured thoracic aneurysm, dementia        Manuals 3/2 3/7 3/9                                                              Neuro Re-Ed             High knee marching  3 laps // 3 laps //           Hurdles   Fwd lat 3 laps // Fwd/lat 3 laps ea alt 12'' 6'' hurdles           Backwards ambulation             Foam beams tandem/side stepping   3 laps ea                       Standing rotation ball pass to therapist   RMB x15ea side                       Ther Ex             STS 2x5, 2x daily, HEP 2x10 with reaching  2 x10 with reaching           Nustep  10 min lvl 4  np          Step ups  Fwd/ lat 10xea functional step up 10x ea fwd          Treadmill    1 0mph 3% incline x 10 min                                                              Ther Activity                                       Gait Training Walking with arm swings  6 x 32ft nv                       Modalities

## 2022-03-09 NOTE — PROGRESS NOTES
Daily Note     Today's date: 3/9/2022  Patient name: Laurence Richter  : 1935  MRN: [id number]  Referring provider: Ladan Keys MD  Dx:   Encounter Diagnosis   Name Primary?  Parkinson's disease ([facility] [address] ) Yes       Start Time:   Stop Time: 0900  Total time in clinic (min): 50 minutes    Subjective: "I will do whatever you say"  Objective: See treatment diary below    Pt participated in skilled OT focusing on NMRE, hand strengthening w/mixed prehension patterns improving FM skills to increase indep in ADL's and education on memory strategies and temporal awareness improving safety, med mngt, keeping to schedules and indep in IADL's  Theraputty Exercises   Patient educated and instructed on HEP for FMS/FMC with use of green/yellow resist theraputty to inc indep with ADL fxn including container management, fastener management, and item retrieval  Pt provided with demonstration and verbal instruction and demo G understanding of task  Exercises instructed as followed:  · Putty Squeezes - 10 reps - 3 sets - 1x daily - 3x weekly  · Tip Pinch with Putty - 10 reps - 3 sets - 1x daily - 3x weekly  · Finger Lumbricals with Putty - 10 reps - 3 sets - 1x daily - 3x weekly  · 3-Point Pinch with Putty - 10 reps - 3 sets - 1x daily - 3x weekly  · Finger Key  with Putty - 10 reps - 3 sets - 1x daily - 3x weekly  · Thumb Opposition with Putty - 10 reps - 3 sets - 1x daily - 3x weekly    Educated pt on memory strategies to assist with taking medication in a timely manner ie    timers and dosette box(pt currently uses) and utilization of calendar to track appts  Therapy schedule provided to pt with instructions to write all therapy appts on calendar at home to improve accurate arrival time and attendance at therapy session  Assessment: Tolerated treatment well   Pt completed 5 reps of each exercises demo-G carryover, pt provided with exercise tracker for accountability and noted progress during exercises  Patient would benefit from continued OT     Plan: Continued skilled OT per POC with focus on memory strategies and follow up of calendar task       INTERVENTION COMMENTS:  Diagnosis: Parkinson's disease ([facility] [address] ) [G20]  Precautions: Confusion, Safety  FOTO: Not completed*  Insurance: Payor: MEDICARE / Plan: MEDICARE A AND B / Product Type: Medicare A & B Fee for Service /   2 of 10 visits, PN due 4/1/2022

## 2022-03-11 ENCOUNTER — OFFICE VISIT (OUTPATIENT)
Dept: PHYSICAL THERAPY | Facility: CLINIC | Age: 87
End: 2022-03-11
Payer: MEDICARE

## 2022-03-11 DIAGNOSIS — G20 PARKINSON'S DISEASE (HCC): Primary | ICD-10-CM

## 2022-03-11 PROCEDURE — 97112 NEUROMUSCULAR REEDUCATION: CPT

## 2022-03-11 PROCEDURE — 97110 THERAPEUTIC EXERCISES: CPT

## 2022-03-11 NOTE — PROGRESS NOTES
Daily Note     Today's date: 3/11/2022  Patient name: Nisha Srivastava  : 1935  MRN: [id number]  Referring provider: Nikos Powell MD  Dx:   Encounter Diagnosis     ICD-10-CM    1  Parkinson's disease ([facility] [address] )  G20                   Subjective: Patient noted feeling a little bit tired today  Objective: See treatment diary below      Assessment: Tolerated treatment fair  Patient was able to perform listed exercises challenged with lateral side stepping over 12" hurdles  Added in standing on foam with reaching opposite side to tap post it note  Patient would benefit from continued PT      Plan: Continue per plan of care  Precautions: Afib, unruptured thoracic aneurysm, dementia        Manuals 3/2 3/7 3/9 3/11                                                             Neuro Re-Ed             High knee marching  3 laps // 3 laps //  3 laps //         Hurdles   Fwd lat 3 laps // Fwd/lat 3 laps ea alt 12'' 6'' hurdles  Fwd/lat 3 laps ea alt 12'' 6'' hurdles          Backwards ambulation             Foam beams tandem/side stepping   3 laps ea  laps ea                      Standing rotation ball pass to therapist   RMB x15ea side          Standing reaching to tap post it    X         Ther Ex             STS 2x5, 2x daily, HEP 2x10 with reaching  2 x10 with reaching  2 x10 with reaching          Nustep  10 min lvl 4  np np         Step ups  Fwd/ lat 10xea functional step up 10x ea fwd functional step up 10x ea fwd         Treadmill    1 0mph 3% incline x 10 min 1  6mph 3% incline 10 min                                                             Ther Activity                                       Gait Training             Walking with arm swings  6 x 32ft nv 2 laps around gym                      Modalities

## 2022-03-13 ENCOUNTER — TELEPHONE (OUTPATIENT)
Dept: INTERNAL MEDICINE CLINIC | Facility: CLINIC | Age: 87
End: 2022-03-13

## 2022-03-13 DIAGNOSIS — I48.19 PERSISTENT ATRIAL FIBRILLATION (HCC): ICD-10-CM

## 2022-03-13 DIAGNOSIS — G20 PARKINSON'S DISEASE (HCC): ICD-10-CM

## 2022-03-13 DIAGNOSIS — I10 ESSENTIAL HYPERTENSION: ICD-10-CM

## 2022-03-13 DIAGNOSIS — J30.1 SEASONAL ALLERGIC RHINITIS DUE TO POLLEN: Primary | ICD-10-CM

## 2022-03-13 RX ORDER — TELMISARTAN 20 MG/1
10 TABLET ORAL DAILY
Qty: 45 TABLET | Refills: 0 | Status: SHIPPED | OUTPATIENT
Start: 2022-03-13 | End: 2022-06-24 | Stop reason: SDUPTHER

## 2022-03-13 RX ORDER — LORATADINE 10 MG/1
10 TABLET ORAL DAILY
Qty: 90 TABLET | Refills: 0 | Status: SHIPPED | OUTPATIENT
Start: 2022-03-13

## 2022-03-13 RX ORDER — METOPROLOL SUCCINATE 50 MG/1
50 TABLET, EXTENDED RELEASE ORAL DAILY
Qty: 90 TABLET | Refills: 0 | Status: SHIPPED | OUTPATIENT
Start: 2022-03-13

## 2022-03-13 RX ORDER — RIVAROXABAN 15 MG/1
15 TABLET, FILM COATED ORAL
Qty: 90 TABLET | Refills: 0 | Status: SHIPPED | OUTPATIENT
Start: 2022-03-13 | End: 2022-06-17

## 2022-03-13 NOTE — TELEPHONE ENCOUNTER
Patient stated that he needed all of his current prescriptions refilled  When asked the names, he continued to just say all of them  Let him know I would put a message through, though unspecific

## 2022-03-14 RX ORDER — IPRATROPIUM BROMIDE 42 UG/1
1 SPRAY, METERED NASAL 4 TIMES DAILY PRN
Qty: 15 ML | Refills: 0 | Status: SHIPPED | OUTPATIENT
Start: 2022-03-14 | End: 2022-03-18

## 2022-03-16 ENCOUNTER — TELEPHONE (OUTPATIENT)
Dept: NEUROLOGY | Facility: CLINIC | Age: 87
End: 2022-03-16

## 2022-03-16 ENCOUNTER — APPOINTMENT (OUTPATIENT)
Dept: OCCUPATIONAL THERAPY | Facility: CLINIC | Age: 87
End: 2022-03-16
Payer: MEDICARE

## 2022-03-16 ENCOUNTER — APPOINTMENT (OUTPATIENT)
Dept: PHYSICAL THERAPY | Facility: CLINIC | Age: 87
End: 2022-03-16
Payer: MEDICARE

## 2022-03-16 DIAGNOSIS — J30.1 SEASONAL ALLERGIC RHINITIS DUE TO POLLEN: ICD-10-CM

## 2022-03-16 NOTE — TELEPHONE ENCOUNTER
MSW retrieved a voicemail from patient left at 1120AM this date  He stated that "I am unable to make my appointment today  I don't have a car "    MSW reviewed patient's Appointment Desk - it appears that he was supposed to have PT/OT appts at [address] this date and not a neurology appointment  MSW phoned patient back at [phone]  MSW asked if patient was looking for ongoing transportation options - patient said no that he has a car, but it is was just in the garage today so he declined this writer to send him information/applicaitons for an transportation option  MSW asked patient if the therapy department directed him to call me  Patient was not sure how he reached this writer  MSW reached out to Martinez Clifton to make her aware of patient's call to this writer and to inquire if they wanted us to provide transportation options to him (that he declined same)  Ozzy Venegas stated that they got a call from him too and that they have been having issues with their phone lines, so she is not sure if patient somehow got routed to this writer  Ozzy Venegas also stated that patient is "VERY confused" so it is concerning that he is driving  MSW reviewed most recent neuro note from 1/27/22 - there is no mention of driving in the note and patient is not scheduled to see neurology again until 7/722  MSW informed Ozzy Venegas of this  Ozzy Venegas stated that she will speak with the OT and will notify Dr Amy Pires directly if they feel that patient's driving needs to be addressed sooner  MSW will send above to Dr Almaz Parish as an Si Arteaga

## 2022-03-18 ENCOUNTER — TELEPHONE (OUTPATIENT)
Dept: OCCUPATIONAL THERAPY | Facility: CLINIC | Age: 87
End: 2022-03-18

## 2022-03-18 RX ORDER — IPRATROPIUM BROMIDE 42 UG/1
1 SPRAY, METERED NASAL 4 TIMES DAILY PRN
Qty: 45 ML | Refills: 0 | Status: SHIPPED | OUTPATIENT
Start: 2022-03-18 | End: 2022-06-30 | Stop reason: SDUPTHER

## 2022-03-18 NOTE — TELEPHONE ENCOUNTER
Left message for pt to remind him of appointments today and on Monday  Pt did not show up for his PT/OT appointments today

## 2022-03-21 ENCOUNTER — OFFICE VISIT (OUTPATIENT)
Dept: PHYSICAL THERAPY | Facility: CLINIC | Age: 87
End: 2022-03-21
Payer: MEDICARE

## 2022-03-21 ENCOUNTER — TELEPHONE (OUTPATIENT)
Dept: NEUROLOGY | Facility: CLINIC | Age: 87
End: 2022-03-21

## 2022-03-21 ENCOUNTER — OFFICE VISIT (OUTPATIENT)
Dept: OCCUPATIONAL THERAPY | Facility: CLINIC | Age: 87
End: 2022-03-21
Payer: MEDICARE

## 2022-03-21 DIAGNOSIS — F02.81 DEMENTIA IN OTHER DISEASES CLASSIFIED ELSEWHERE WITH BEHAVIORAL DISTURBANCE (HCC): ICD-10-CM

## 2022-03-21 DIAGNOSIS — G20 PARKINSON'S DISEASE (HCC): Primary | ICD-10-CM

## 2022-03-21 PROCEDURE — 97110 THERAPEUTIC EXERCISES: CPT

## 2022-03-21 PROCEDURE — 97112 NEUROMUSCULAR REEDUCATION: CPT

## 2022-03-21 PROCEDURE — 97530 THERAPEUTIC ACTIVITIES: CPT

## 2022-03-21 NOTE — PROGRESS NOTES
Daily Note     Today's date: 3/21/2022  Patient name: Oscar Owen  : 1935  MRN: [id number]  Referring provider: Kathryn Zambrano MD  Dx:   Encounter Diagnosis     ICD-10-CM    1  Parkinson's disease ([address] )  David Ray        Start Time: 1110  Stop Time: 1200  Total time in clinic (min): 50 minutes    Subjective: Pt reported no updates since last visit  He said he's been practicing his sit to stands regularly at home  Objective: See treatment diary below      Assessment: Tolerated treatment well  Pt was challenged by forward walking on foam and coordination of syncing arm swing with LE advancement during ambulation  He was able to increase pace on TM, reporting increased CV demand  Patient would benefit from continued PT to continue to improve balance and safe functional capacity  Plan: Continue per plan of care  Precautions: Afib, unruptured thoracic aneurysm, dementia        Manuals 3/2 3/7 3/9 3/11 3/21                                                            Neuro Re-Ed             High knee marching  3 laps // 3 laps //  3 laps // 3 laps //        Hurdles   Fwd lat 3 laps // Fwd/lat 3 laps ea alt 12'' 6'' hurdles  Fwd/lat 3 laps ea alt 12'' 6'' hurdles  Lateral on foam (2 beams) 3 laps 12" hurdles         Backwards ambulation             Foam beams tandem/side stepping   3 laps ea  laps ea 3 laps ant 1 UE                      Standing rotation ball pass to therapist   RMB x15ea side  RMB x5 each side         Standing reaching to tap post it    X         Ther Ex             STS 2x5, 2x daily, HEP 2x10 with reaching  2 x10 with reaching  2 x10 with reaching  2 x 10 with reaching         Nustep  10 min lvl 4  np np         Step ups  Fwd/ lat 10xea functional step up 10x ea fwd functional step up 10x ea fwd functional step up x10 each fwd        Treadmill    1 0mph 3% incline x 10 min 1  6mph 3% incline 10 min 1 8 mph 3% incline 10 min Ther Activity                                       Gait Training             Walking with arm swings  6 x 32ft nv 2 laps around gym 4 x 50 ft                      Modalities
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
## 2022-03-21 NOTE — PROGRESS NOTES
Occupational Therapy Daily Note:    Today's date: 3/21/2022  Patient name: Paola Doyle  : 1935  MRN: [id number]  Referring provider: Amilcar Cannon MD  Dx:   Encounter Diagnoses   Name Primary? 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 ([facility] )                   Subjective: "I normally don't have to think about the date "    Objective: There Act: Pt reports it is 2022, recalled [facility] location  Pt unable to recall HEP given last session, until specific cues were given  OTR discussed current challenges with cognition and educated pt that OT reached out to Dr Kaylie Gaines about this and it's relation to driving  OT reviewed and educated pt on writing OT/PT dates into calendar and importance of writing down / crossing off each day of the week for improved awareness  Provided a re-print of pt's calendar with new sessions for April  Pt required to then highlight upcoming appt's on calendar requiring awareness of today's date before beginning task  There Ex:  Pt then engaged in L hand strengthening using calibrated gripper on 3rd setting, required to place into board following a colored sequence given to pt verbally for improved direction following and memory recall  Downgraded task to 2nd setting for improved success  Assessment: Tolerated treatment well  Pt continues to demo challenges with alert and orientation even when provided with a visual calendar in front of patient  Pt did demo F accuracy of sequencing a 4 colored pattern after provided with verbal directions, with 90% accuracy overall  Pt may also benefit from a motor-cog task combined versus a worksheet/table top task  Patient would benefit from continued skilled OT  Plan: Continued skilled OT per POC      INTERVENTION COMMENTS:  Diagnosis: Parkinson's disease ([facility] ) [G20]  Precautions: Confusion, Safety  FOTO: completed 3/21  Insurance: Payor:  MEDICARE / Plan: MEDICARE A AND B / Product Type: Medicare A & B Fee for Service /   3 of 10 visits, PN due 4/1/2022  POC Expires: 6/3/2022

## 2022-03-23 ENCOUNTER — OFFICE VISIT (OUTPATIENT)
Dept: PHYSICAL THERAPY | Facility: CLINIC | Age: 87
End: 2022-03-23
Payer: MEDICARE

## 2022-03-23 DIAGNOSIS — G20 PARKINSON'S DISEASE (HCC): Primary | ICD-10-CM

## 2022-03-23 PROCEDURE — 97110 THERAPEUTIC EXERCISES: CPT | Performed by: PHYSICAL THERAPIST

## 2022-03-23 PROCEDURE — 97112 NEUROMUSCULAR REEDUCATION: CPT | Performed by: PHYSICAL THERAPIST

## 2022-03-23 NOTE — PROGRESS NOTES
Daily Note     Today's date: 3/23/2022  Patient name: Barton Jeremias  : 1935  MRN: [id number]  Referring provider: Negro Mendez MD  Dx:   No diagnosis found  Subjective: Pt reported no updates since last visit  He said he's been practicing his sit to stands regularly at home  Objective: See treatment diary below      Assessment: Tolerated treatment well  Pt was challenged by forward walking on foam and coordination of syncing arm swing with LE advancement during ambulation  He was able to increase pace on TM, reporting increased CV demand  Patient would benefit from continued PT to continue to improve balance and safe functional capacity  Plan: Continue per plan of care  Precautions: Afib, unruptured thoracic aneurysm, dementia        Manuals 3/2 3/7 3/9 3/11 3/21  3/23                                                          Neuro Re-Ed             High knee marching  3 laps // 3 laps //  3 laps // 3 laps //  40'x 4 4# aw      Hurdles   Fwd lat 3 laps // Fwd/lat 3 laps ea alt 12'' 6'' hurdles  Fwd/lat 3 laps ea alt 12'' 6'' hurdles  Lateral on foam (2 beams) 3 laps 12" hurdles   Fwd/lat on foam //bars 12" 3 laps ea      Backwards ambulation       4# aw 40'x 4 ea      Foam beams tandem/side stepping   3 laps ea  laps ea 3 laps ant 1 UE                      Standing rotation ball pass to therapist   RMB x15ea side  RMB x5 each side         Standing reaching to tap post it    X         Side steping       40'x 2 ea 4# aw                                Ther Ex             STS 2x5, 2x daily, HEP 2x10 with reaching  2 x10 with reaching  2 x10 with reaching  2 x 10 with reaching         Nustep  10 min lvl 4  np np         Step ups  Fwd/ lat 10xea functional step up 10x ea fwd functional step up 10x ea fwd functional step up x10 each fwd  8" 20 x ea 4# aw fwd/lat      Treadmill    1 0mph 3% incline x 10 min 1  6mph 3% incline 10 min 1 8 mph 3% incline 10 min  2 1 mph 10 min Ther Activity                                       Gait Training             Walking with arm swings  6 x 32ft nv 2 laps around gym 4 x 50 ft                      Modalities

## 2022-03-25 ENCOUNTER — OFFICE VISIT (OUTPATIENT)
Dept: OCCUPATIONAL THERAPY | Facility: CLINIC | Age: 87
End: 2022-03-25
Payer: MEDICARE

## 2022-03-25 ENCOUNTER — OFFICE VISIT (OUTPATIENT)
Dept: PHYSICAL THERAPY | Facility: CLINIC | Age: 87
End: 2022-03-25
Payer: MEDICARE

## 2022-03-25 DIAGNOSIS — G20 PARKINSON'S DISEASE (HCC): Primary | ICD-10-CM

## 2022-03-25 PROCEDURE — 97110 THERAPEUTIC EXERCISES: CPT

## 2022-03-25 PROCEDURE — 97112 NEUROMUSCULAR REEDUCATION: CPT | Performed by: PHYSICAL THERAPIST

## 2022-03-25 PROCEDURE — 97530 THERAPEUTIC ACTIVITIES: CPT

## 2022-03-25 PROCEDURE — 97110 THERAPEUTIC EXERCISES: CPT | Performed by: PHYSICAL THERAPIST

## 2022-03-25 NOTE — PROGRESS NOTES
Daily Note     Today's date: 3/25/2022  Patient name: Dago Setting  : 1935  MRN: [id number]  Referring provider: Nelly Patel MD  Dx:   Encounter Diagnosis     ICD-10-CM    1  Parkinson's disease ([facility] [address] )  G20                   Subjective: Feeling well today, frustrated about fit to drive testing      Objective: See treatment diary below      Assessment: Pt required more rest breaks compared to previous session likely due to fatigue from previous session  Unable to complete ambulation on foam without UE use today  Plan next session to attempt in solo step rather than //bars  Plan: Continue per plan of care  Precautions: Afib, unruptured thoracic aneurysm, dementia        Manuals 3/2 3/7 3/9 3/11 3/21  3/23 3/25                                                         Neuro Re-Ed             High knee marching  3 laps // 3 laps //  3 laps // 3 laps //  40'x 4 4# aw 40x'4 with 4# aw and 15# bolster carry     Hurdles   Fwd lat 3 laps // Fwd/lat 3 laps ea alt 12'' 6'' hurdles  Fwd/lat 3 laps ea alt 12'' 6'' hurdles  Lateral on foam (2 beams) 3 laps 12" hurdles   Fwd/lat on foam //bars 12" 3 laps ea Fwd/lat foam //bars 12" 3 laps ea 4# aw     Backwards ambulation       4# aw 40'x 4 ea 4# aw 40'x 4     Foam beams tandem/side stepping   3 laps ea  laps ea 3 laps ant 1 UE                      Standing rotation ball pass to therapist   RMB x15ea side  RMB x5 each side         Standing reaching to tap post it    X         Side steping       40'x 2 ea 4# aw                                Ther Ex             STS 2x5, 2x daily, HEP 2x10 with reaching  2 x10 with reaching  2 x10 with reaching  2 x 10 with reaching         Nustep  10 min lvl 4  np np         Step ups  Fwd/ lat 10xea functional step up 10x ea fwd functional step up 10x ea fwd functional step up x10 each fwd  8" 20 x ea 4# aw fwd/lat 8" 20x ea fwd/lat 4# aw     Treadmill    1 0mph 3% incline x 10 min 1  6mph 3% incline 10 min 1 8 mph 3% incline 10 min  2 1 mph 10 min 1 8 mph 10 min                                                         Ther Activity                                       Gait Training             Walking with arm swings  6 x 32ft nv 2 laps around gym 4 x 50 ft                      Modalities

## 2022-03-25 NOTE — PROGRESS NOTES
Occupational Therapy Daily Note:    Today's date: 3/25/2022  Patient name: Laurence Richter  : 1935  MRN: [id number]  Referring provider: Ladan Keys MD  Dx:   Encounter Diagnosis   Name Primary?  Parkinson's disease ([facility] ) Yes       Subjective: "Why am I doing this? Objective: Pt engaged in skilled OT treatment session with focus on fxnl cognition, short term memory and sustained/alternating attention, GMC/GMS/FM control/coordination  to increase engagement, endurance, tolerance, and independence with daily ADL and IADL tasks  CPT Code Minutes                                           Task Details        Therapeutic Activity 50 Pt completed trail making task with cipher to improve mental arithmetic, direction following, alternating attention and placekeeping  Pt completed task with 50% indep, required verbal cueing to note mistakes, though pt able to self-correct  Pt completed IQ fit task with focus on improving fm control/coordination, dexterity and targeting accuracy, pattern recognition and visual-perceptual skills  Pt completed with >75% indep, with 2 verbal cueing to inc accuracy/success  Neuro Re-Ed               Therapeutic Exercise 10 Pt tolerated 5 min x2 prograde/retrograde UEB #1 5 resistance for neuro motor re-ed, GMC/GMS, proximal UE strength/endurance, & ROM/flexibility  Manual          Self Care           Assessment: Tolerated treatment well  Pt demo good progress in sustained attention, though demo difficulty with alternating attention  With massed practice, pt demo improved alternating attention  Patient would benefit from continued skilled OT      Plan: Continued skilled OT per POC    INTERVENTION COMMENTS:  Diagnosis: Parkinson's disease ([facility] ) Rohit [place], Safety  FOTO: completed 3/21  Insurance: Payor: MEDICARE / Plan: MEDICARE A AND B / Product Type: Medicare A & B Fee for Service /   4 of 10 visits, PN due 2022  POC Expires: 6/3/2022

## 2022-03-28 ENCOUNTER — OFFICE VISIT (OUTPATIENT)
Dept: PHYSICAL THERAPY | Facility: CLINIC | Age: 87
End: 2022-03-28
Payer: MEDICARE

## 2022-03-28 DIAGNOSIS — G20 PARKINSON'S DISEASE (HCC): Primary | ICD-10-CM

## 2022-03-28 PROCEDURE — 97116 GAIT TRAINING THERAPY: CPT

## 2022-03-28 PROCEDURE — 97112 NEUROMUSCULAR REEDUCATION: CPT

## 2022-03-28 NOTE — PROGRESS NOTES
Daily Note     Today's date: 3/28/2022  Patient name: Shavon Nunez  : 1935  MRN: [id number]  Referring provider: Anyi Coburn MD  Dx:   Encounter Diagnosis     ICD-10-CM    1  Parkinson's disease ([facility] )  G20         Start Time: 1100  Stop Time: 1200  Total time in clinic (min): 60 minutes    Subjective: Patient reports feeling good today with no new complaints  Objective: See treatment diary below      Assessment: Tolerated treatment well  Forrest Carpenter participated in skilled PT session focused on gait, balance, and endurance  Patient demonstrates improved balance on solid surfaces with no increased sways or LOB  Patient continues to be challenged on complaint surfaces with some mild LOB  Patient used finger touch while ambulating on complaints surfaces and stepping over objects  Patient would continue to benefit from skilled PT interventions to address deficits with balance, gait, and endurance    Patient demonstrated fatigue post treatment      Plan: Continue per plan of care        Precautions: Afib, unruptured thoracic aneurysm, dementia        Manuals 3/2 3/7 3/9 3/11 3/21  3/23 3/25 3/28                                                        Neuro Re-Ed             High knee marching  3 laps // 3 laps //  3 laps // 3 laps //  40'x 4 4# aw 40x'4 with 4# aw and 15# bolster carry //bars (long) 4# B/L AW and 15# bolster carry x 5 laps    Hurdles   Fwd lat 3 laps // Fwd/lat 3 laps ea alt 12'' 6'' hurdles  Fwd/lat 3 laps ea alt 12'' 6'' hurdles  Lateral on foam (2 beams) 3 laps 12" hurdles   Fwd/lat on foam //bars 12" 3 laps ea Fwd/lat foam //bars 12" 3 laps ea 4# aw 12" (4) //bars (long) 4# AW B/L X 5 LAPS     Backwards ambulation       4# aw 40'x 4 ea  //bars (long) 4# AW B/L x 4 laps    Foam beams tandem/side stepping   3 laps ea  laps ea 3 laps ant 1 UE     //bars (long)  Fdw/Lat on foam beam   Lat x 3laps  Fwd x 3laps                   Standing rotation ball pass to therapist   RMB x15ea side RMB x5 each side     RMB x 8 eash side    Standing reaching to tap post it    X         Side steping       40'x 2 ea 4# aw  //bars (long) 4# B/L AW x 3 laps                              Ther Ex             STS 2x5, 2x daily, HEP 2x10 with reaching  2 x10 with reaching  2 x10 with reaching  2 x 10 with reaching         Nustep  10 min lvl 4  np np         Step ups  Fwd/ lat 10xea functional step up 10x ea fwd functional step up 10x ea fwd functional step up x10 each fwd  8" 20 x ea 4# aw fwd/lat 8" 20x ea fwd/lat 4# aw //bars (long) 4# B/L AW 8" Fwd x 10 reps each    Treadmill    1 0mph 3% incline x 10 min 1  6mph 3% incline 10 min 1 8 mph 3% incline 10 min  2 1 mph 10 min 1 8 mph 10 min 1 8 mph x 10 min                                                        Ther Activity                                       Gait Training             Walking with arm swings  6 x 32ft nv 2 laps around gym 4 x 50 ft                      Modalities

## 2022-03-30 ENCOUNTER — OFFICE VISIT (OUTPATIENT)
Dept: PHYSICAL THERAPY | Facility: CLINIC | Age: 87
End: 2022-03-30
Payer: MEDICARE

## 2022-03-30 ENCOUNTER — OFFICE VISIT (OUTPATIENT)
Dept: OCCUPATIONAL THERAPY | Facility: CLINIC | Age: 87
End: 2022-03-30
Payer: MEDICARE

## 2022-03-30 DIAGNOSIS — G20 PARKINSON'S DISEASE (HCC): Primary | ICD-10-CM

## 2022-03-30 DIAGNOSIS — F02.81 DEMENTIA IN OTHER DISEASES CLASSIFIED ELSEWHERE WITH BEHAVIORAL DISTURBANCE (HCC): ICD-10-CM

## 2022-03-30 PROCEDURE — 97110 THERAPEUTIC EXERCISES: CPT | Performed by: PHYSICAL THERAPIST

## 2022-03-30 PROCEDURE — 97112 NEUROMUSCULAR REEDUCATION: CPT | Performed by: PHYSICAL THERAPIST

## 2022-03-30 PROCEDURE — 97530 THERAPEUTIC ACTIVITIES: CPT

## 2022-03-30 PROCEDURE — 97110 THERAPEUTIC EXERCISES: CPT

## 2022-03-30 NOTE — PROGRESS NOTES
Occupational Therapy Daily Note:    Today's date: 3/30/2022  Patient name: Margarita Arguello  : 1935  MRN: [id number]  Referring provider: Leti Garcia MD  Dx:   Encounter Diagnoses   Name Primary? 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 ([facility] )                   Subjective: "you know I cant add anything past 1+1 right?"     Objective:   TE: Pt tolerated 5 min x 2 in prograde/retrograde motions at 2 2 resist seated on UBE with focus on increasing proximal UE strength, endurance, act tolerance and ROM to inc indep and safety with ADL/IADL fxn and fxnl reaching tasks  There Act: Pt engaged at [place] with demand of immediate recall of 4 word sequences x3 min each with 7 successful trials then up to 5 word sequences x3 min with 5 successful trials  OT educated pt throughout task for using internal memory strategy of repeat/rehersal with G carryover, repeating words 2-3x with cues  Discussed how this can be carried over into home env  Pt reports "my wife tells me to take out the garbage and I forget "  Educated on repeat strategy for this example  There Act: Pt then engaged in [employer] starting with card 1, 3, 4, and 6 to improve sustained attention, direction following skills while OT asking general temporal orientation questions (yes/no Q's) for improved orientation awareness  Assessment: Tolerated treatment well  Pt reported MOD challenge with UBE today, requiring several minute rest break post completion  Pt demo MOD challenge with BITS immediate memory recall tasks  Continue to follow up on application of external strategies in home env  Pt also required repeat 100% of time for directions of [place] when transitioning to a higher level  Pt also demo confusion when card was in place on game, removed card with improved success and carryover of game instructions  Pt continuing to demo challenges with immediate memory recall  Patient would benefit from continued skilled OT  Plan: Continued skilled OT per POC      INTERVENTION COMMENTS:  Diagnosis: Parkinson's disease ([facility] [address] ) Aileen Bowling  FOTO: completed 3/21  Insurance: Payor: Ruddy Barry / Plan: MEDICARE A AND B / Product Type: Medicare A & B Fee for Service /   5 JV 13 VARSHA BRANCH due 4/1/2022  POC Expires: 6/3/2022

## 2022-03-31 NOTE — PROGRESS NOTES
Daily Note     Today's date: 3/31/2022  Patient name: Rita Yuan  : 1935  MRN: [id number]  Referring provider: Edwina Mensah MD  Dx:   Encounter Diagnosis     ICD-10-CM    1  Parkinson's disease ([facility] )  G20                   Subjective: Patient reports to physical therapy with no new changes      Objective: See treatment diary below      Assessment: Pt is tolreatin more activity before becoming fatigued  He struggled with step ups onto foam without UE especially laterally  Plan: Continue per plan of care        Precautions: Afib, unruptured thoracic aneurysm, dementia        Manuals 3/2 3/7 3/9 3/11 3/21  3/23 3/25 3/28 3/30                                                       Neuro Re-Ed             High knee marching  3 laps // 3 laps //  3 laps // 3 laps //  40'x 4 4# aw 40x'4 with 4# aw and 15# bolster carry //bars (long) 4# B/L AW and 15# bolster carry x 5 laps 40'x 4 4# b/l with 15# bolster   Hurdles   Fwd lat 3 laps // Fwd/lat 3 laps ea alt 12'' 6'' hurdles  Fwd/lat 3 laps ea alt 12'' 6'' hurdles  Lateral on foam (2 beams) 3 laps 12" hurdles   Fwd/lat on foam //bars 12" 3 laps ea Fwd/lat foam //bars 12" 3 laps ea 4# aw 12" (4) //bars (long) 4# AW B/L X 5 LAPS  12" //bars 4# 4 laps ea   Backwards ambulation       4# aw 40'x 4 ea  //bars (long) 4# AW B/L x 4 laps    Foam beams tandem/side stepping   3 laps ea  laps ea 3 laps ant 1 UE     //bars (long)  Fdw/Lat on foam beam   Lat x 3laps  Fwd x 3laps                   Standing rotation ball pass to therapist   RMB x15ea side  RMB x5 each side     RMB x 8 eash side    Standing reaching to tap post it    X         Side steping       40'x 2 ea 4# aw  //bars (long) 4# B/L AW x 3 laps With overhead clap 40'x 2    Step ups          6" with foam 15 ea fwd/lat                Ther Ex             STS 2x5, 2x daily, HEP 2x10 with reaching  2 x10 with reaching  2 x10 with reaching  2 x 10 with reaching      3x10   Nustep  10 min lvl 4  np np Step ups  Fwd/ lat 10xea functional step up 10x ea fwd functional step up 10x ea fwd functional step up x10 each fwd  8" 20 x ea 4# aw fwd/lat 8" 20x ea fwd/lat 4# aw //bars (long) 4# B/L AW 8" Fwd x 10 reps each    Treadmill    1 0mph 3% incline x 10 min 1  6mph 3% incline 10 min 1 8 mph 3% incline 10 min  2 1 mph 10 min 1 8 mph 10 min 1 8 mph x 10 min 1 8 mph 10 min                                                       Ther Activity                                       Gait Training             Walking with arm swings  6 x 32ft nv 2 laps around gym 4 x 50 ft                      Modalities

## 2022-04-01 ENCOUNTER — OFFICE VISIT (OUTPATIENT)
Dept: OCCUPATIONAL THERAPY | Facility: CLINIC | Age: 87
End: 2022-04-01
Payer: MEDICARE

## 2022-04-01 ENCOUNTER — OFFICE VISIT (OUTPATIENT)
Dept: PHYSICAL THERAPY | Facility: CLINIC | Age: 87
End: 2022-04-01
Payer: MEDICARE

## 2022-04-01 DIAGNOSIS — F02.81 DEMENTIA IN OTHER DISEASES CLASSIFIED ELSEWHERE WITH BEHAVIORAL DISTURBANCE (HCC): ICD-10-CM

## 2022-04-01 DIAGNOSIS — G20 PARKINSON'S DISEASE (HCC): Primary | ICD-10-CM

## 2022-04-01 PROCEDURE — 97112 NEUROMUSCULAR REEDUCATION: CPT

## 2022-04-01 PROCEDURE — 97530 THERAPEUTIC ACTIVITIES: CPT

## 2022-04-01 PROCEDURE — 97110 THERAPEUTIC EXERCISES: CPT

## 2022-04-01 PROCEDURE — 97116 GAIT TRAINING THERAPY: CPT

## 2022-04-01 NOTE — PROGRESS NOTES
Occupational Therapy Daily Note:    Today's date: 2022  Patient name: Sukhjinder Rubio  : 1935  MRN: [id number]  Referring provider: Ba Phillips MD  Dx:   Encounter Diagnoses   Name Primary? 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 ([facility] )        Subjective: "I never was good at basket ball"  Objective: Pt engaged in skilled OT treatment session with focus on NMRE,  proximal strength/stabilization and  improving motor skills to increase indep in ADL/IADL's  CPT Code Minutes                                           Task Details        Therapeutic Activity 30 Pt engaged in dynamic activity to improve functional performance in balance, functional forward reaching, coordination w/B integration, functional mobility and transfers improving safety and engagement in home mngt and leisurely tasks  In unsupported stance pt completed BB toss x2 w/targeted demands facilitating arm swing and rocking motion during toss  Pt then required to catch BB in bucket with target positioned in various planes for added challenge  Pt noted with increased AROM during toss and w/o noted LOB  Neuro Re-Ed               Therapeutic Exercise 10 For UB exercise benefit and to increase overall cardiovascular health and endurance pt engaged in UEB for 5 min prograde/5 min retrograde on increasing resistance to L2 5 to address proximal strength, gross grasp and endurance  20   Pt engaged in HEP development with use of theraband to increase proximal UE strength, endurance, ROM, and flexibility to inc indep and safety with ADL/IADL fxn and for improved fxnl reach  Pt provided with verbal education and demonstration on exercises as listed below and demo G understanding       Exercises  · Shoulder Diagonal Horizontal Abduction 60/120 Degrees with Resistance - 10 reps - 3 sets - 1x daily - 7x weekly  · Shoulder W - External Rotation with Resistance - 10 reps - 3 sets - 1x daily - 7x weekly  · Single Arm Shoulder Extension with Anchored Resistance - 10 reps - 3 sets - 1x daily - 7x weekly  · Shoulder Horizontal Abduction with Resistance - 10 reps - 3 sets - 1x daily - 7x weekly  · Shoulder Single Arm PNF D2 Flexion with Resistance - 10 reps - 3 sets - 1x daily - 7x weekly  · Single Arm Shoulder Abduction with Resistance - 10 reps - 3 sets - 1x daily - 7x weekly  · Seated Shoulder Diagonal Pulls with Resistance - 10 reps - 3 sets - 1x daily - 7x weekly          Manual          Self Care           Assessment: Tolerated treatment well  Pt provided with green and blue theraband for use in home environment grading up as able  Pt completed each exercise 10x's demo-G understanding w/HEP handout provided  Pt reported fatigue post session  Patient would benefit from continued skilled OT      Plan: Continued skilled OT per POC    INTERVENTION COMMENTS:  Diagnosis: Parkinson's disease ([facility] ) Jane Kelly, Safety  FOTO: completed 3/21  Insurance: Payor: Tab Kemp / Plan: MEDICARE A AND B / Product Type: Medicare A & B Fee for Service /   3 HM 70 XIEULX, PN due 4/1/2022  POC Expires: 6/3/2022

## 2022-04-01 NOTE — PROGRESS NOTES
Daily Note     Today's date: 2022  Patient name: Carlos Horton  : 1935  MRN: [id number]  Referring provider: Marilyn Hodgkins, MD  Dx:   Encounter Diagnosis     ICD-10-CM    1  Parkinson's disease ([facility] )  G20        Start Time: 1100  Stop Time: 1200  Total time in clinic (min): 60 minutes    Subjective: Patient reports no new complaints  Objective: See treatment diary below      Assessment: Tolerated treatment well  Yvette Nicholson participated in skilled PT session focused on improving balance, gait, and endurance  Patient challenged with endurance this session requiring increased seated rest breaks having ESQUIVEL  Patient would continue to benefit from skilled PT interventions to address balance, gait, and endurance  Patient demonstrated fatigue post treatment      Plan: Continue per plan of care        Precautions: Afib, unruptured thoracic aneurysm, dementia        Manuals 4/1 3/7 3/9 3/11 3/21  3/23 3/25 3/28 3/30                                                       Neuro Re-Ed             High knee marching //bars (long) 4# B/L AW with 15# bolster x 3 laps // 3 laps //  3 laps // 3 laps //  40'x 4 4# aw 40x'4 with 4# aw and 15# bolster carry //bars (long) 4# B/L AW and 15# bolster carry x 5 laps 40'x 4 4# b/l with 15# bolster   Hurdles  //bars (long) 4# B/L AW 12" (6) x 4 laps Fwd  X 2 laps Lat Increased ESQUIVEL Fwd lat 3 laps // Fwd/lat 3 laps ea alt 12'' 6'' hurdles  Fwd/lat 3 laps ea alt 12'' 6'' hurdles  Lateral on foam (2 beams) 3 laps 12" hurdles   Fwd/lat on foam //bars 12" 3 laps ea Fwd/lat foam //bars 12" 3 laps ea 4# aw 12" (4) //bars (long) 4# AW B/L X 5 LAPS  12" //bars 4# 4 laps ea   Backwards ambulation //bars (long) 4# B/L AW  Foam beams Fwd/Bkw walking x 4 laps      4# aw 40'x 4 ea  //bars (long) 4# AW B/L x 4 laps    Foam beams tandem/side stepping //bars (long) 4# B/L AW x 4 laps  3 laps ea  laps ea 3 laps ant 1 UE     //bars (long)  Fdw/Lat on foam beam   Lat x 3laps  Fwd x 3laps Standing rotation ball pass to therapist RMB x 15 each side  RMB x15ea side  RMB x5 each side     RMB x 8 eash side    Standing reaching to tap post it 4# B/L AW x 10 each   X         Side steping       40'x 2 ea 4# aw  //bars (long) 4# B/L AW x 3 laps With overhead clap 40'x 2    Step ups          6" with foam 15 ea fwd/lat                Ther Ex             STS  2x10 with reaching  2 x10 with reaching  2 x10 with reaching  2 x 10 with reaching      3x10   Nustep  10 min lvl 4  np np         Step ups  Fwd/ lat 10xea functional step up 10x ea fwd functional step up 10x ea fwd functional step up x10 each fwd  8" 20 x ea 4# aw fwd/lat 8" 20x ea fwd/lat 4# aw //bars (long) 4# B/L AW 8" Fwd x 10 reps each    Treadmill  1 8 mph 3% incline x 10 min  1 0mph 3% incline x 10 min 1  6mph 3% incline 10 min 1 8 mph 3% incline 10 min  2 1 mph 10 min 1 8 mph 10 min 1 8 mph x 10 min 1 8 mph 10 min                                                       Ther Activity                                       Gait Training             Walking with arm swings No AW 50ft x4 6 x 32ft nv 2 laps around gym 4 x 50 ft                      Modalities

## 2022-04-04 ENCOUNTER — OFFICE VISIT (OUTPATIENT)
Dept: PHYSICAL THERAPY | Facility: CLINIC | Age: 87
End: 2022-04-04
Payer: MEDICARE

## 2022-04-04 ENCOUNTER — OFFICE VISIT (OUTPATIENT)
Dept: OCCUPATIONAL THERAPY | Facility: CLINIC | Age: 87
End: 2022-04-04
Payer: MEDICARE

## 2022-04-04 DIAGNOSIS — G20 PARKINSON'S DISEASE (HCC): Primary | ICD-10-CM

## 2022-04-04 DIAGNOSIS — F02.81 DEMENTIA IN OTHER DISEASES CLASSIFIED ELSEWHERE WITH BEHAVIORAL DISTURBANCE (HCC): ICD-10-CM

## 2022-04-04 PROCEDURE — 97530 THERAPEUTIC ACTIVITIES: CPT

## 2022-04-04 PROCEDURE — 97112 NEUROMUSCULAR REEDUCATION: CPT | Performed by: PHYSICAL THERAPIST

## 2022-04-04 PROCEDURE — 97110 THERAPEUTIC EXERCISES: CPT

## 2022-04-04 NOTE — PROGRESS NOTES
Occupational Therapy Daily Note:    Today's date: 2022  Patient name: Gato Feliciano  : 1935  MRN: [id number]  Referring provider: Daniela Philippe MD  Dx:   Encounter Diagnoses   Name Primary? 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 ([facility] )        Pt arrived approx 15 min late to session    Subjective: "a little tired" following balloon tap exercise     Objective: Pt engaged in skilled OT treatment session with focus on UE strengthening, UE endurance, FMC/GMC and FMS/GMS, fxnl cog and standing tolerance to increase engagement, endurance, tolerance, and independence with daily ADL and IADL tasks  CPT Code Minutes                                           Task Details        Therapeutic Activity 30 Pt engaged in [address] activities with focus to sustained attn, sequencing,  fxnl reach, UE strength, UE endurance, and standing tolerance  In stance with 1 5 lb wrist weights donned pt req to sequence alphabetical, numerical and color stimuli with sustained RUE reaching in various planes  OT upgraded x2 to inc cog demand, req alternating attn by including central fixation Pt complete with RB between trials  Pt engaged in [address] activity with focus to fxnl cog, working memory, and UE strength/end  In stance, pt req to sequence alphabetical stimuli in reverse and place to vertical target  Pt demo g sequencing throughout             Neuro Re-Ed               Therapeutic Exercise 15 Pt engaged in dowel bar overhead balloon tap with focus to FMS/FME and reaction time  Pt req to hold 1lb dowel bar bimanually to target dynamic stimuli using overhead press  Pt complete 3 trials benefit from [address] in between  Manual          Self Care           Assessment: Tolerated treatment well  Pt demo good placekeeping and sequencing throughout there activities and good standing tolerance during stance activities   Pt demo dec UE endurance during dowel bar exercises, benefiting from RB between sets  Pt req mod cues to maintain upright posture while seated on mat  Patient would benefit from continued skilled OT      Plan: Continued skilled OT per POC    INTERVENTION COMMENTS:  Diagnosis: Parkinson's disease ([facility] ) Lawence Perry, [place]  FOTO: completed 3/21  Insurance: Payor: Buck Fine / Plan: MEDICARE A AND B / Product Type: Medicare A & B Fee for Service /   6 IN 87 NEPQSR, PN due 4/1/2022  POC Expires: 6/3/2022

## 2022-04-05 PROCEDURE — 97150 GROUP THERAPEUTIC PROCEDURES: CPT | Performed by: PHYSICAL THERAPIST

## 2022-04-06 ENCOUNTER — OFFICE VISIT (OUTPATIENT)
Dept: PHYSICAL THERAPY | Facility: CLINIC | Age: 87
End: 2022-04-06
Payer: MEDICARE

## 2022-04-06 ENCOUNTER — OFFICE VISIT (OUTPATIENT)
Dept: OCCUPATIONAL THERAPY | Facility: CLINIC | Age: 87
End: 2022-04-06
Payer: MEDICARE

## 2022-04-06 DIAGNOSIS — F02.81 DEMENTIA IN OTHER DISEASES CLASSIFIED ELSEWHERE WITH BEHAVIORAL DISTURBANCE (HCC): ICD-10-CM

## 2022-04-06 DIAGNOSIS — G20 PARKINSON'S DISEASE (HCC): Primary | ICD-10-CM

## 2022-04-06 PROCEDURE — 97112 NEUROMUSCULAR REEDUCATION: CPT

## 2022-04-06 PROCEDURE — 97116 GAIT TRAINING THERAPY: CPT

## 2022-04-06 PROCEDURE — 97110 THERAPEUTIC EXERCISES: CPT

## 2022-04-06 PROCEDURE — 97530 THERAPEUTIC ACTIVITIES: CPT

## 2022-04-06 NOTE — PROGRESS NOTES
Daily Note     Today's date: 2022  Patient name: Keila Durand  : 1935  MRN: [id number]  Referring provider: Allegra Simpson MD  Dx:   Encounter Diagnosis     ICD-10-CM    1  Parkinson's disease ([facility] )  G20        Start Time: 1000  Stop Time: 1100  Total time in clinic (min): 60 minutes    Subjective: Patient reports ready to work  Patient states 0/10 pain  Patient reports not feeling well during session  Patient states he can't remember if he ate breakfast or took his medication  Objective: See treatment diary below  HR: 55 - 60 +/o session  02:  96 - 99% +/o session  Bp:  140/70 at end of session  Assessment: Tolerated treatment well  KAMILLE participated in skilled PT session focused on improving balance, gait, and endurance  Patient presents with increased fatigue this session requiring increased seated rest breaks  Patient demonstrates increased ESQUIVEL this session presenting with increased LOB  Patient would continue to benefit from skilled PT interventions to address deficits with balance, gait, and endurance    Patient demonstrated fatigue post treatment      Plan: Continue per plan of care        Precautions: Afib, unruptured thoracic aneurysm, dementia        Manuals 4/1 4/6 3/9 3/11 3/21  3/23 3/25 3/28 3/30                                                       Neuro Re-Ed             High knee marching //bars (long) 4# B/L AW with 15# bolster x //bars (long)  4# B/L AW with 15# bolster x 5 laps 3 laps //  3 laps // 3 laps //  40'x 4 4# aw 40x'4 with 4# aw and 15# bolster carry //bars (long) 4# B/L AW and 15# bolster carry x 5 laps 40'x 4 4# b/l with 15# bolster   Hurdles  //bars (long) 4# B/L AW 12" (6) x 4 laps Fwd  X 2 laps Lat Increased ESQUIVEL //bars (long) 4# B/L AW  12" (6)  X 4 laps Fwd    Lat: x 3 laps  HR 68  02 99%   Fwd/lat 3 laps ea alt 12'' 6'' hurdles  Fwd/lat 3 laps ea alt 12'' 6'' hurdles  Lateral on foam (2 beams) 3 laps 12" hurdles   Fwd/lat on foam //bars 12" 3 laps ea Fwd/lat foam //bars 12" 3 laps ea 4# aw 12" (4) //bars (long) 4# AW B/L X 5 LAPS  12" //bars 4# 4 laps ea   Backwards ambulation //bars (long) 4# B/L AW  Foam beams Fwd/Bkw walking x 4 laps //bars (long) 4# B/L AW Foam beams Fwd/Bkw walking x 3 laps     4# aw 40'x 4 ea  //bars (long) 4# AW B/L x 4 laps    Foam beams tandem/side stepping //bars (long) 4# B/L AW x 4 laps //bars (long) 4# B/L AW  sidestepping Foam beams x 3 laps ea  laps ea 3 laps ant 1 UE     //bars (long)  Fdw/Lat on foam beam   Lat x 3laps  Fwd x 3laps                   Standing rotation ball pass to therapist RMB x 15 each side  RMB x15ea side  RMB x5 each side     RMB x 8 eash side    Standing reaching to tap post it 4# B/L AW x 10 each   X         Side steping       40'x 2 ea 4# aw  //bars (long) 4# B/L AW x 3 laps With overhead clap 40'x 2    Step ups          6" with foam 15 ea fwd/lat                Ther Ex             STS  2x10 with OH reach  HR 60  02 99% Increased ESQUIVEL 2 x10 with reaching  2 x10 with reaching  2 x 10 with reaching      3x10   Nustep   np np         Step ups   functional step up 10x ea fwd functional step up 10x ea fwd functional step up x10 each fwd  8" 20 x ea 4# aw fwd/lat 8" 20x ea fwd/lat 4# aw //bars (long) 4# B/L AW 8" Fwd x 10 reps each    Treadmill  1 8 mph 3% incline x 10 min NV 1 0mph 3% incline x 10 min 1  6mph 3% incline 10 min 1 8 mph 3% incline 10 min  2 1 mph 10 min 1 8 mph 10 min 1 8 mph x 10 min 1 8 mph 10 min                                                       Ther Activity                                       Gait Training             Walking with arm swings No AW 50ft x4 NV nv 2 laps around gym 4 x 50 ft                      Modalities

## 2022-04-06 NOTE — PROGRESS NOTES
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 [facility])  F02 81          Assessment/Plan    Skilled Analysis:  Adela Baeza is a 82 y o  male referred to Occupational Therapy s/p Parkinson's disease ([facility] [address] ) Xenia Gill  Pt reports noticing improvements "except for today, I am just so tired " Per PT, pt was tired at pt's last session  Pt reports his sleep is fair, getting up sometimes during middle of night for using bathroom  OT contacted neurologist and discussed cognition concerns related to driving as pt had decreased orientation and location awareness, and MD recommended a Fit to Drive evaluation  Following formalized testing, pt is demonstrating improved orientation and awareness today (A&O x4) but less consistently during sessions, with continued awareness into deficits  Pt maintained score on the MoCA, 21/30  Pt is also demonstrating improvements with LUE strength  Pt did improve with manipulation and [address] skills in L hand during testing today  Pt is continuing to present with the following deficit: tremor in L > R, decreased insight into deficits, STM deficits, decreased ROM in LUE, decreased strength LUE, bradykinesia and rigidity in LUE > RUE  Pt will continue to benefit from skilled Occupational Therapy services 2x/week for 8 more weeks with focus on establishing HEP, improving fluidity of movements in LUE, improving ROM and flexibility for ADLs, education on external memory strategies, establishing increased habits/routine for medication times for consistency, LUE strengthening, and patient education for improved QOL  Adela Baeza is in agreement with POC        MOTOR SHORT TERM GOALS (4-6) WKS  Strength/Endurance  · Pt will increase L UE proximal shoulder strength to 4+/5 through the use of strengthening exercises and HEP for improved reaching OH during ADL/IADLs = GOAL MET  · Pt will increase L UE distal wrist flexor/extensor strength to 4+/5  through the use of strengthening exercises and HEP = GOAL MET  · Pt will demo with G tolerance to supine, seated, and in stance exercise x 30 minutes with minimal rest breaks required for increased engagement in life roles and weekly exercise regimen = PARTIALLY MET  · Pt will demo with Fair/50% carryover of Home Exercise Program to improve functional progression towards goals in Plan of care and improved LUE use = NOT MET    AROM/PROM  · Pt will be able to perform 3/4 range of proximal L UE flexion to demo increased strength and ROM of affected UE for improved ADLs/IADLs = PARTIALLY MET    FMC/GMC  · Pt will increase LUE rate of manipulation by 5 sec for all FM tests for improved functional performance with salient tasks = GOAL MET  · Pt will increase Reaction time  to < 3 seconds with LUE hand to target timed trials for improved reaction time and automaticity of coordination for improved functional performance with ADLs/IADLs and salient tasks = PARTIALLY MET    COGNITION SHORT TERM GOALS (4-6) WKS  Temporal Orientation / Memory  · Pt will be alert and oriented x4 80% of the time to increase temporal orientation for appointments/location awareness and safe IADL practice = PARTIALLY MET  · Pt will demo G carryover and understanding of temporal orientation compensatory strategies and orientation of daily schedules (ie   Use of calendars, alarms, etc) for inc safety with life roles and IADL fxn = PARTIALLY MET  · Pt will demo increased insight into deficits more than half of the time, 75%, for overall increased safety and self awareness with ADL/IADL tasks and to encourage use of compensatory strategies = NOT MET  · Pt will demo G recall of 30% of verbal and written information utilizing memory strategy of choice for improved STM/delayed memory = NOT MET    -----------------------------------------------------------------------------------------------------      MOTOR SHORT TERM GOALS (10-12) WKS  Strength/Endurance  · Pt will increase L UE proximal shoulder strength to 5/5  through the use of strengthening exercises and HEP for improved reaching OH during ADL/IADLs  · Pt will increase L UE distal wrist flexor/extensor strength to 5/5  through the use of strengthening exercises and HEP   · Pt will demo with G tolerance to supine, seated, and in stance exercise x 60 minutes with minimal rest breaks required for increased engagement in life roles and weekly exercise regimen  · Pt will demo with Good/90% carryover of Home Exercise Program to improve functional progression towards goals in Plan of care and improved LUE use    AROM/PROM  · Pt will be able to perform > 3/4 range of proximal L UE flexion to demo increased strength and ROM of affected UE for improved ADLs/IADLs    FMC/GMC  · Pt will increase LUE rate of manipulation by 15 sec for all FM tests for improved functional performance with salient tasks  · Pt will increase Reaction time  to < 2 0 seconds with LUE hand to target timed trials for improved reaction time and automaticity of coordination for improved functional performance with ADLs/IADLs and salient tasks  · Pt will demo improved motor learning of constructional and new motor actions for improved b/l coordination and motor planning evident by > 80% accuracy for fxnl ADL/IADL performance      COGNITION SHORT TERM GOALS (10-12) WKS  Temporal Orientation / Memory  · Pt will be alert and oriented x4 > 90% of the time to increase temporal orientation for appointments/location awareness and safe IADL practice   · Pt will demo G carryover and understanding of temporal orientation compensatory strategies and orientation of daily schedules (ie   Use of calendars, alarms, etc) for inc safety with life roles and IADL fxn  · Pt will demo increased insight into deficits 90% of time for overall increased safety and self awareness with ADL/IADL tasks and to encourage use of compensatory strategies  · Pt will demo G recall of 70% of verbal and written information utilizing memory strategy of choice for improved STM/delayed memory           Subjective    SUBJECTIVE: "Put it this way, I do everything that I need to do daily  Sometimes I forget to take my medications "  "I always knew I should be doing more things with my left "    PATIENT GOAL: "To improve a little more I guess, I like the way things are going "    HISTORY OF PRESENT ILLNESS:     Clau Dennis is a 82 y o  male who was referred to Occupational Therapy s/p  Parkinson's disease ([facility] [address] ) Donny Frost has a PMH of parkinson's disease (since 2018), Dementia, HTN, A-fib  Per review, pt is not adherent with medications and taking it 3x/day  Per review, "Time spent discussing Parkinson's disease, its treatment and half life of carbidopa levodopa  He is willing to try taking his medication doses more consistently  He will start taking Sinemet 25/100 tablet 3 times daily at around 10:00 a m , 4:00 p m  and 10:00 p m  If he is taking dosing from consistently and he still is having a lot of tremors in the upper bothersome we can further increase the dose to 1 5 tablets 3 times daily  He will contact the office if he develops any side effects such as lightheadedness, hallucinations, or nausea "   David's mild cognitive changes are related to Parkinson's disease, continues with rivastigmine  Clau Dennis lives in a 2 story home with his wife  Pt has a chair lift, does not use it (mainly for his wife)  Pt does not use a cane/RW for mobility  Pt is completing all ADL's independently without use of DME  Reports some challenges with donning socks/shoes d/t decreased flexibility  Pt's daughter is managing his medications and places them into a weekly medication container    Pt says "If I miss medications, my wife gets on my backside " Pt's wife does all cooking  Pt is continuing  role  Pt sleeps from 1 AM - 10/11 AM   Pt meets with a group of friends Tues/Fri, goes out to eat with his wife often  Cem Ziegler was a [employer]  ([employer] driving courses as well) and retired around [address]  Went to [employer], played football and lacrosse  Today, pt reports that he is here because his doctor recommended it  Pt reports that his tremors do not bother him (mainly in L hand), occasionally has some forgetfulness with taking all medications daily/exact times, reports that he just wants to get better at all things (no specifics given)                    PMH:   Past Medical History:   Diagnosis Date    A-fib ([facility] [address] )     Afib ([facility] [address] )     Atrial fibrillation ([facility] [address] ) 2021    Hypertension     Hypertension 2021    Left AC separation     Parkinson's disease ([facility] [address] ) 2018         Pain Levels   Restin    With Activity:  0    Objective    Impairment Observations:    SCORE DEFICIT RANGE                                         COGNITIVE FXN                 MOCA (8 2)       Education Level > 12 years    Visuospatial/executive functioning 4/5 Drawing of chair challenging   Naming 3/3    Memory: 1st trial: 4/5    Memory: 2nd trial: 5/5    Attention/concentration 2/2    List of letters:  1/1    Serial Seven Subtraction: 2/3    Language/sentence repetition: 2/2    Language Fluency:  1/1    Abstract/Correlational Thinkin/2    Delayed Recall: 0/5 Unable to recall words after 5 min delay   Orientation: 5/6 Reported correct location (improved)   Memory Index Score 4/15                                       Total Score 21/30 Mild Cognitive Impairment  (remained)           REGINO CASAS Comments           UPPER EXTREMITY FUNCTION   Intact Impaired Dominant Hand: Right     /PINCH STRENGTH             Dynamometer    - Gross Grasp 55 lbs 53 lbs low   Slight decrease in R*     Significant improvement in L*    Pinch Meter     - Pincer 17 lbs 12 5 lbs low   Slight decrease B/L    - Tripod 15 lbs 13 lbs low   Slight decrease in L    - Lateral 21 lbs  19 lbs low   Slight increases B/L     AROM (Seated)         WFL for RUE*        Elevation  Full ROM Improved   Shoulder FF  < 3/4 ROM Improved   Shoulder Ext      Shoulder Abd  > 3/4 ROM Improved   Shoulder Add      Horizontal Abd      Horizontal Add      Elbow Flex      Elbow Ext  > 3/4 ROM Improved   Pronation      Supination  3/4 ROM Improved   Wrist Flex  3/4 ROM Maintained   Wrist Ext  3/4 ROM Maintained   Digit Flex      Digit Ex      Composite Grasp      Hook Grasp      Subluxation   None, Hx of LUE pain / clavicle       MMT             Shoulder FF 5/5 4+/5 Improved in L   Shoulder Ext 5/5 4+/5 Improved in L   Shoulder Abduction 5/5 4+/5 Improved B/L   Shoulder Adduction 5/5 4+/5 Improved B/L   Elbow Flex 5/5 5/5 Improved in L   Elbow Ext 5/5 4+/5 Improved in L   Wrist Flex 5/5 4/5 Improved in R   Wrist Ext 5/5 4/5 Improved in R     SENSATION     Unable to test today*   Monofilament Testing      Sharp / Dull       Proprioception      Hot/Cold Temp      Stereognosis         COORDINATION      Opposition Intact Intact    Finger to Nose Intact Intact Tremor noted with movement    Rapid Alternating Movement Intact Impaired Dysdiadochokinesia   9 Hole Peg Test 28 64 seconds    Slight improvement  53 05 seconds    Dropped 1  abnormal   Continues with pronated grasp during placement, bradykinesia   Fxnl Dexterity Test 41 39 seconds    Dropped 1, used board < 15% of time   1 min 9 seconds    Improved translation of pegs with use of board < 10% of time, time decreased although no use of board today abnormal   Saleem Hand Function Test         - Handwriting         - Card Turning         - Small Item p/u         - Simulated Feeding         - Stacking Checkers         - Moving Light Objects         - Moving Heavy Objects      Concentric Circles Test (tremors)  Slight tremor noted        Rigidity in RUE/LUE: Positive during passive wrist movements bilaterally      OTHER PLANNED THERAPY INTERVENTIONS:   Seated, and in stance neuro re-ed  Task-Oriented Training  Tricep AG  FMC/prehension  GMC  Proximal to distal teaming  Timed Trials  Manual tx  IASTM  Hand to target  Seated functional reach: crossing midline  Supine place and hold  Open chain activities  HEP    TODAY'S TRX:   There ex: Pt tolerated 5 min x 2 in prograde/retrograde motions at 2 5 resist seated on UBE with focus on increasing proximal UE strength, endurance, act tolerance and ROM to inc indep and safety with ADL/IADL fxn and fxnl reaching tasks       INTERVENTION COMMENTS:  Diagnosis: Parkinson's disease ([facility] [address] ) Sharon Mota, Safety  FOTO: completed 3/21  Insurance: Payor: Isidro Doing / Plan: MEDICARE A AND B / Product Type: Medicare A & B Fee for Service /   1 LH 67 [id number] (change next session to 1/10 please), PN due 5/6/2022  POC Expires: 6/3/2022

## 2022-04-07 NOTE — PROGRESS NOTES
Daily Note     Today's date: 2022  Patient name: Ganga Magallanes  : 1935  MRN: [id number]  Referring provider: Elizabeth Solis MD  Dx:   Encounter Diagnosis     ICD-10-CM    1  Parkinson's disease ([facility] [address] )  G20                   Subjective: Patient reprots no new changes, feeling fine  Objective: See treatment diary below      Assessment: Patient had noted difficulty this session with remembering what day of the week or what time of day it was  Reported being fatigued several times through session, asking if session was over yet  Pt was able to finish full hour of exercise but appeared overall more confused today and likely questions of if he was done were more due to lack of awareness of how tyrell ghe had been in therapy and what time it was  Plan: Continue per plan of care        Precautions: Afib, unruptured thoracic aneurysm, dementia        Manuals 4/1 4/5      3/25 3/28 3/30                                                       Neuro Re-Ed             High knee marching //bars (long) 4# B/L AW with 15# bolster x hallway 4# B/L AW with 15# bolster x4 40'      40x'4 with 4# aw and 15# bolster carry //bars (long) 4# B/L AW and 15# bolster carry x 5 laps 40'x 4 4# b/l with 15# bolster   Hurdles  //bars (long) 4# B/L AW 12" (6) x 4 laps Fwd  X 2 laps Lat Increased ESQUIVEL /bars (long) 4# B/L AW 12" (6) x 4 laps Fwd  X 2 laps Lat Increased ESQUIVEL      Fwd/lat foam //bars 12" 3 laps ea 4# aw 12" (4) //bars (long) 4# AW B/L X 5 LAPS  12" //bars 4# 4 laps ea   Backwards ambulation //bars (long) 4# B/L AW  Foam beams Fwd/Bkw walking x 4 laps hallway with HT 4# aw LE 40'x 2       //bars (long) 4# AW B/L x 4 laps    Foam beams tandem/side stepping //bars (long) 4# B/L AW x 4 laps //bars (long) 4# B/L AW x 4 laps       //bars (long)  Fdw/Lat on foam beam   Lat x 3laps  Fwd x 3laps                   Standing rotation ball pass to therapist RMB x 15 each side        RMB x 8 eash side    Standing reaching to tap post it 4# B/L AW x 10 each            Side steping         //bars (long) 4# B/L AW x 3 laps With overhead clap 40'x 2    Step ups  8" fwd/lat 15x ea with UE //bars 4# aw        6" with foam 15 ea fwd/lat                Ther Ex             STS          3x10   Nustep             Step ups        8" 20x ea fwd/lat 4# aw //bars (long) 4# B/L AW 8" Fwd x 10 reps each    Treadmill  1 8 mph 3% incline x 10 min 1 8 mph 3% incline x 10 min      1 8 mph 10 min 1 8 mph x 10 min 1 8 mph 10 min                                                       Ther Activity                                       Gait Training             Walking with arm swings No AW 50ft x4                         Modalities

## 2022-04-08 ENCOUNTER — APPOINTMENT (OUTPATIENT)
Dept: PHYSICAL THERAPY | Facility: CLINIC | Age: 87
End: 2022-04-08
Payer: MEDICARE

## 2022-04-11 ENCOUNTER — OFFICE VISIT (OUTPATIENT)
Dept: PHYSICAL THERAPY | Facility: CLINIC | Age: 87
End: 2022-04-11
Payer: MEDICARE

## 2022-04-11 ENCOUNTER — OFFICE VISIT (OUTPATIENT)
Dept: OCCUPATIONAL THERAPY | Facility: CLINIC | Age: 87
End: 2022-04-11
Payer: MEDICARE

## 2022-04-11 DIAGNOSIS — G20 PARKINSON'S DISEASE (HCC): Primary | ICD-10-CM

## 2022-04-11 DIAGNOSIS — F02.81 DEMENTIA IN OTHER DISEASES CLASSIFIED ELSEWHERE WITH BEHAVIORAL DISTURBANCE (HCC): ICD-10-CM

## 2022-04-11 PROCEDURE — 97112 NEUROMUSCULAR REEDUCATION: CPT

## 2022-04-11 PROCEDURE — 97530 THERAPEUTIC ACTIVITIES: CPT

## 2022-04-11 PROCEDURE — 97116 GAIT TRAINING THERAPY: CPT

## 2022-04-11 PROCEDURE — 97110 THERAPEUTIC EXERCISES: CPT

## 2022-04-11 NOTE — PROGRESS NOTES
Occupational Therapy Daily Note:    Today's date: 2022  Patient name: Tello Snow  : 1935  MRN: [id number]  Referring provider: Bobby Anderson MD  Dx:   Encounter Diagnoses   Name Primary?  Parkinson's disease (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 ([facility] )                   Subjective: "This is really working my arms, I will tell ya!"    Objective: There ex:  Pt tolerated 5 min x 2 in prograde/retrograde motions at 2 5 resist seated  on UBE with focus on increasing proximal UE strength, endurance, act tolerance, b/l coordination for improved ADL's and IADL's  There ex: Pt engaged in alternating UB TE using 4# DB's alternating movement while in stance into pronated, neutral, and supinated bicep curls then scaption to 90* and shoulder abduction to 90* with demand of recall of movements for sequencing and immediate memory recall for IADL's  There act: Pt engaged in dynamic task of reaching in a split stance for nuts/bolts, then placing into board using L hand for rotational movement of turning nut into bolt for improved [address] [facility] skills, improved use of affected side  Pt concluded with dynamic reaching and grasping, using split stance to step with R or L LE and reaching with opposite arm for a golf ball and transferring to a cone/surface to improve gross motor movements, AROM of BUE's for improved ADL's and IADL's  Assessment: Tolerated treatment well  Pt demo G ability to complete UBE and UB TE today with minimal rest breaks  Pt reports increased frustration with [address] tasks in L hand today as well as with reaching over head with LUE  Pt was reminded multiple times during session of apt on Wednesday d/t decreased memory recall  Patient would benefit from continued skilled OT  Plan: Continued skilled OT per POC      INTERVENTION COMMENTS:  Diagnosis: Park[facility]'s disease ([facility] ) [G20]  Precautions: Confusion, Safety  FOTO: completed 3/21  Insurance: Payor: MEDICARE / Plan: MEDICARE A AND B / Product Type: Medicare A & B Fee for Service /   5 FF 02 PRIMO PN due 5/6/2022  POC Expires: 6/3/2022

## 2022-04-11 NOTE — PROGRESS NOTES
Daily Note     Today's date: 2022  Patient name: Carlos Horton  : 1935  MRN: [id number]  Referring provider: Marilyn Hodgkins, MD  Dx:   Encounter Diagnosis     ICD-10-CM    1  Parkinson's disease ([facility] [address] )  Jamal Rivera        Start Time: 920  Stop Time: 1002  Total time in clinic (min): 42 minutes    Subjective: Patient reports he over slept this morning, and that's why he is late  Patient 20 min late for session  Objective: See treatment diary below      Assessment: Tolerated treatment well  Yvette Nicholson participated in skilled PT session focused on improving balance, gait, and endurance  Patient with SOB this session during //bars activities, requiring longer seated rest breaks  Patient demonstrates improved gait on TM with increased speed this session and no rest breaks  Patient continues to be challenged on compliant surfaces with dynamic balance activities  Patient would continue to benefit from skilled PT interventions to address deficits with balance, gait, and endurance  Patient demonstrated fatigue post treatment      Plan: Continue per plan of care        Precautions: Afib, unruptured thoracic aneurysm, dementia        Manuals 4/1 4/6 4/11 3/11 3/21  3/23 3/25 3/28 3/30                                                       Neuro Re-Ed             High knee marching //bars (long) 4# B/L AW with 15# bolster x //bars (long)  4# B/L AW with 15# bolster x 5 laps //bars (long)  4# B/L AW  With 15# bolster x 6 laps 3 laps // 3 laps //  40'x 4 4# aw 40x'4 with 4# aw and 15# bolster carry //bars (long) 4# B/L AW and 15# bolster carry x 5 laps 40'x 4 4# b/l with 15# bolster   Hurdles  //bars (long) 4# B/L AW 12" (6) x 4 laps Fwd  X 2 laps Lat Increased ESQUIVEL //bars (long) 4# B/L AW  12" (6)  X 4 laps Fwd    Lat: x 3 laps  HR 68  02 99%   //bars (long)  4# B/L AW  12" (6)  X 5 laps Fwd  HR 42  02 99%  SOB    Lat x 3 laps   HR 62  02 99%    Fwd/lat 3 laps ea alt 12'' 6'' hurdles  Lateral on foam (2 beams) 3 laps 12" hurdles   Fwd/lat on foam //bars 12" 3 laps ea Fwd/lat foam //bars 12" 3 laps ea 4# aw 12" (4) //bars (long) 4# AW B/L X 5 LAPS  12" //bars 4# 4 laps ea   Backwards ambulation //bars (long) 4# B/L AW  Foam beams Fwd/Bkw walking x 4 laps //bars (long) 4# B/L AW Foam beams Fwd/Bkw walking x 3 laps     4# aw 40'x 4 ea  //bars (long) 4# AW B/L x 4 laps    Foam beams tandem/side stepping //bars (long) 4# B/L AW x 4 laps //bars (long) 4# B/L AW  sidestepping Foam beams x   laps ea 3 laps ant 1 UE     //bars (long)  Fdw/Lat on foam beam   Lat x 3laps  Fwd x 3laps                   Standing rotation ball pass to therapist RMB x 15 each side    RMB x5 each side     RMB x 8 eash side    Standing reaching to tap post it 4# B/L AW x 10 each   X         Side steping       40'x 2 ea 4# aw  //bars (long) 4# B/L AW x 3 laps With overhead clap 40'x 2    Step ups          6" with foam 15 ea fwd/lat                Ther Ex             STS  2x10 with OH reach  HR 60  02 99% Increased ESQUIVEL  2 x10 with reaching  2 x 10 with reaching      3x10   Nustep   np np         Step ups   //bars (long) 4# B/L AW  6" step standing on airex up/down x 10 ea  Fwd    Lat x 10 ea functional step up 10x ea fwd functional step up x10 each fwd  8" 20 x ea 4# aw fwd/lat 8" 20x ea fwd/lat 4# aw //bars (long) 4# B/L AW 8" Fwd x 10 reps each    Treadmill  1 8 mph 3% incline x 10 min NV No SOLO No AW 1  2mph 3% incline x 10 min 1  6mph 3% incline 10 min 1 8 mph 3% incline 10 min  2 1 mph 10 min 1 8 mph 10 min 1 8 mph x 10 min 1 8 mph 10 min                                                       Ther Activity                                       Gait Training             Walking with arm swings No AW 50ft x4 NV  2 laps around gym 4 x 50 ft                      Modalities

## 2022-04-13 ENCOUNTER — OFFICE VISIT (OUTPATIENT)
Dept: OCCUPATIONAL THERAPY | Facility: CLINIC | Age: 87
End: 2022-04-13
Payer: MEDICARE

## 2022-04-13 ENCOUNTER — OFFICE VISIT (OUTPATIENT)
Dept: PHYSICAL THERAPY | Facility: CLINIC | Age: 87
End: 2022-04-13
Payer: MEDICARE

## 2022-04-13 DIAGNOSIS — G20 PARKINSON'S DISEASE (HCC): Primary | ICD-10-CM

## 2022-04-13 DIAGNOSIS — F02.81 DEMENTIA IN OTHER DISEASES CLASSIFIED ELSEWHERE WITH BEHAVIORAL DISTURBANCE (HCC): ICD-10-CM

## 2022-04-13 PROCEDURE — 97168 OT RE-EVAL EST PLAN CARE: CPT

## 2022-04-13 PROCEDURE — 97112 NEUROMUSCULAR REEDUCATION: CPT

## 2022-04-13 PROCEDURE — 96125 COGNITIVE TEST BY HC PRO: CPT

## 2022-04-13 PROCEDURE — 97116 GAIT TRAINING THERAPY: CPT

## 2022-04-13 NOTE — PROGRESS NOTES
Daily Note     Today's date: 2022  Patient name: Carlos Horton  : 1935  MRN: [id number]  Referring provider: Marilyn Hodgkins, MD  Dx:   Encounter Diagnosis     ICD-10-CM    1  Parkinson's disease ([facility] [address] )  G20        Start Time: 1100  Stop Time: 1200  Total time in clinic (min): 60 minutes    Subjective: Patient upset after Speech session requiring some time for him to sit and cool down  Patient reports 0/10 pain  Objective: See treatment diary below      Assessment: Tolerated treatment well  Yvette Nicholson participated in skilled PT session focused on improving balance, gait, and endurance  Patient demonstrates improved endurance this session with decreased seated rest breaks  Patient continues to be challenged with dynamic balance on compliant surfaces with some LOB, but able to self correct  Patient would continue to benefit from skilled PT interventions to address deficits with balance, gait, and endurance   Patient demonstrated fatigue post treatment      Plan: Continue per plan of care        Precautions: Afib, unruptured thoracic aneurysm, dementia        Manuals 4/1 4/6 4/11 4/13 3/21  3/23 3/25 3/28 3/30                                                       Neuro Re-Ed             High knee marching //bars (long) 4# B/L AW with 15# bolster x //bars (long)  4# B/L AW with 15# bolster x 5 laps //bars (long)  4# B/L AW  With 15# bolster x 6 laps  3 laps //  40'x 4 4# aw 40x'4 with 4# aw and 15# bolster carry //bars (long) 4# B/L AW and 15# bolster carry x 5 laps 40'x 4 4# b/l with 15# bolster   Hurdles  //bars (long) 4# B/L AW 12" (6) x 4 laps Fwd  X 2 laps Lat Increased ESQUIVEL //bars (long) 4# B/L AW  12" (6)  X 4 laps Fwd    Lat: x 3 laps  HR 68  02 99%   //bars (long)  4# B/L AW  12" (6)  X 5 laps Fwd  HR 42  02 99%  SOB    Lat x 3 laps   HR 62  02 99%     //bars (long) 4# B/L AW 12" (5) x 4 laps Fwd    Lat x 4 laps  Lateral on foam (2 beams) 3 laps 12" hurdles   Fwd/lat on foam //bars 12" 3 laps ea Fwd/lat foam //bars 12" 3 laps ea 4# aw 12" (4) //bars (long) 4# AW B/L X 5 LAPS  12" //bars 4# 4 laps ea   Backwards ambulation //bars (long) 4# B/L AW  Foam beams Fwd/Bkw walking x 4 laps //bars (long) 4# B/L AW Foam beams Fwd/Bkw walking x 3 laps  //bars (long) walking Fwd/Bkw on Double foam beam x 5 laps   4# aw 40'x 4 ea  //bars (long) 4# AW B/L x 4 laps    Foam beams tandem/side stepping //bars (long) 4# B/L AW x 4 laps //bars (long) 4# B/L AW  sidestepping Foam beams x   //bars (long) 4# B/L AW sidestepping on foam beam with ball catch x 2 laps 3 laps ant 1 UE     //bars (long)  Fdw/Lat on foam beam   Lat x 3laps  Fwd x 3laps                   Standing rotation ball pass to therapist RMB x 15 each side    RMB x5 each side     RMB x 8 eash side    Standing reaching to tap post it 4# B/L AW x 10 each            Side steping       40'x 2 ea 4# aw  //bars (long) 4# B/L AW x 3 laps With overhead clap 40'x 2    Step ups          6" with foam 15 ea fwd/lat                Ther Ex             STS  2x10 with OH reach  HR 60  02 99% Increased ESQUIVEL    2 x 10 with reaching      3x10   Nustep   np          Step ups   //bars (long) 4# B/L AW  6" step standing on airex up/down x 10 ea  Fwd    Lat x 10 ea //bars (long) 4# B/L AW 6" step standing on foam up/down Fwd: x10 each    Lat:  X 10 each functional step up x10 each fwd  8" 20 x ea 4# aw fwd/lat 8" 20x ea fwd/lat 4# aw //bars (long) 4# B/L AW 8" Fwd x 10 reps each    Treadmill  1 8 mph 3% incline x 10 min NV No SOLO No AW 1  2mph 3% incline x 10 min No SOLO  No AW  1 5 mph 3% incline x 10 min 1 8 mph 3% incline 10 min  2 1 mph 10 min 1 8 mph 10 min 1 8 mph x 10 min 1 8 mph 10 min                                                       Ther Activity                                       Gait Training             Walking with arm swings No AW 50ft x4 NV  2 laps around gym 4 x 50 ft                      Modalities
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
## 2022-04-15 ENCOUNTER — OFFICE VISIT (OUTPATIENT)
Dept: PHYSICAL THERAPY | Facility: CLINIC | Age: 87
End: 2022-04-15
Payer: MEDICARE

## 2022-04-15 DIAGNOSIS — G20 PARKINSON'S DISEASE (HCC): Primary | ICD-10-CM

## 2022-04-15 PROCEDURE — 97110 THERAPEUTIC EXERCISES: CPT | Performed by: PHYSICAL THERAPIST

## 2022-04-15 PROCEDURE — 97112 NEUROMUSCULAR REEDUCATION: CPT | Performed by: PHYSICAL THERAPIST

## 2022-04-18 ENCOUNTER — OFFICE VISIT (OUTPATIENT)
Dept: PHYSICAL THERAPY | Facility: CLINIC | Age: 87
End: 2022-04-18
Payer: MEDICARE

## 2022-04-18 ENCOUNTER — OFFICE VISIT (OUTPATIENT)
Dept: OCCUPATIONAL THERAPY | Facility: CLINIC | Age: 87
End: 2022-04-18
Payer: MEDICARE

## 2022-04-18 DIAGNOSIS — G20 PARKINSON'S DISEASE (HCC): Primary | ICD-10-CM

## 2022-04-18 PROCEDURE — 97110 THERAPEUTIC EXERCISES: CPT

## 2022-04-18 PROCEDURE — 97112 NEUROMUSCULAR REEDUCATION: CPT

## 2022-04-18 PROCEDURE — 97164 PT RE-EVAL EST PLAN CARE: CPT

## 2022-04-18 NOTE — PROGRESS NOTES
Occupational Therapy Daily Note:    Today's date: 2022  Patient name: Nina Lynn  : 1935  MRN: [id number]  Referring provider: Ramy Yañez MD  Dx:   Encounter Diagnosis   Name Primary?  Parkinson's disease ([place] [address] ) Yes       Subjective: "my wife takes good care of me"    Objective: Pt engaged in skilled OT treatment session with focus on UE strengthening, UE endurance, FMC/GMC, FMS/GMS and fxnl cognition to increase engagement, endurance, tolerance, and independence with daily ADL and IADL tasks  CPT Code Minutes                                           Task Details        Therapeutic Activity               Neuro Re-Ed               Therapeutic Exercise 10 Pt engaged in therapeutic exercise for strengthening and cardiovascular endurance benefit, pt tolerated 5 min x 2 (prograde/retrograde motions at 1 5 resist) seated on UBE with focus on increasing proximal UE strength, endurance, act tolerance and ROM to inc indep and safety with ADL/IADL fxn and fxnl reaching tasks  [address] Pt engaged in fine motor strengthening and endurance task with focus on increasing fxnl coordination, ROM and strength with cognitive alternating trail making demand  Pt required to utilize med resist clothes pin to p/u and place marbles to target  Pt completed with increased time for placement and removal with loss of place 25% of the time        Manual          Self Care           Assessment: Tolerated treatment well  Patient would benefit from continued skilled OT      Plan: Continued skilled OT per POC    INTERVENTION COMMENTS:  Diagnosis: Parkinson's disease ([place] [address] ) Aileen Honeycutt  FOTO: completed 3/21  Insurance: Payor: Sarah Moralez / Plan: MEDICARE A AND B / Product Type: Medicare A & B Fee for Service /   2 DU VARSHA BARBER due 2022  POC Expires: 6/3/2022

## 2022-04-18 NOTE — PROGRESS NOTES
Re-Assessment Note    Today's date: 2022  Patient name: Tom Rivero  : 1935  MRN: [id number]  Referring provider: Jaylen White MD  Dx:   Encounter Diagnosis     ICD-10-CM    1  Parkinson's disease ([address] )  Eriberto Nicholson        Start Time: 1120  Stop Time: 1200  Total time in clinic (min): 40 minutes    Assessment  22: Patient has attended 14 physical therapy sessions since initial evaluation  Patient has demonstrated improvements in dynamic balance with FGA from  to   Patient with improvement in 5xSTS since initial evaluation, demonstrating improvement in LE strength and endurance, and is now at age and gender matched norm value  Patient has also improved TUG score to be below cut-off score of 13 seconds for fall risk  Patient continues to have increased fall risk due to decreased gait speed (below cut-off of 1 0m/s) and FGA score of 20/30 (cut-off score of /30)  Patient also demonstrates difficulty with foam with eyes closed situation on MCTSIB, with difficulty with vestibular integration  6MWT demonstrated slightly limited endurance compared to age and gender matches norms  Patient would benefit from skilled physical therapy to improve balance, decrease fall risk, improve cardiovascular endurance, and improve balance and vestibular integration  Assessment details: Tmo Rivero is a 82 y o  male presenting with diagnosis of PD  Objective examination is consistent with associated impairments, including impaired balance, reduced gait speed, forward flexed posture, and diminished insight into deficits  Pt's FGA, gait speed, and 5xSTS indicate that he is at moderate risk for falls  Pt was in agreement with self-suggested plan for pt to develop a gym maintenance program that he can perform independently after discharge  Will benefit from skilled PT interventions for balance interventions, gait training, and strengthening to improve ADL performance and functional activity level  Impairments: abnormal coordination, abnormal gait, abnormal or restricted ROM, abnormal movement, activity intolerance, impaired physical strength, lacks appropriate home exercise program, safety issue and poor posture   Barriers to therapy: Insight into deficits   Understanding of Dx/Px/POC: good   Prognosis: good    Goals    Short Term Goals: In 3 weeks, the patient will:  1  Articulate 3 goals for treatment that he would like to address  MET  2  Initiation of an independent gym program  PROGRESSING  3  Supervision with HEP for self care  PROGRESSING    Long Term Goals: In 6 weeks, the patient will:  1  Improve 5x STS <13s to reduce risk of falls and indicate improved strength/power  PROGRESSING  2  Improve FOTO to greater than predicted value  DISCONTINUE- FOTO not appropriate due to cognition  3  Independent with gym program for maintenance  PROGRESSING  4  Improve FGA by 5 points to indicate pt has surpassed MDC to reduce his risk of falls  PROGRESSING    NEW GOALS- as of 4/18/22:  1  Patient will improve FGA to at least 22/30 in 4 weeks to decrease risk of falls  2  Patient will improve 6MWT to at least 1350 feet in 4 weeks to improve endurance  3  Patient will be able to demonstrate independence with HEP/walking program in 4 weeks     Plan  Referral necessary: No  Planned therapy interventions: functional ROM exercises, graded activity, graded exercise, home exercise program, neuromuscular re-education, patient education, strengthening, therapeutic activities, therapeutic exercise, balance, balance/weight bearing training, postural training, coordination and ADL training  Frequency: 2-3x week  Duration in weeks: 4  Treatment plan discussed with: patient        Subjective  4/18/2022: Patient reports he has been getting out of bed easier, improving with going up and down the stairs (reports he uses the chair lift seldomly), and that his balance is improving  Perceived improvement 30-40%   He reports that his biggest problem is flexibility, and that he is not able to touch his toes  He reports that he has not been walking because it has been too cold  Not doing much exercise at home  No falls since starting therapy  Pt arrived late to appt after initially presenting to [facility]  Pt noted that his doctor wanted him to come to PT to work on his balance, though he feels as though his balance is pretty good  Pt notes the stairs are becoming more challenging and he feels weaker  He doesn't do tasks like shoveling snow or cutting grass anymore, but notes this is because he's "lazy", not because he's worried about balance  He also feels that his L hand is affected more than his R      DOI: Pt notes PD diagnosis occured approximately 3 years ago   Living Situation: Pt lives with his wife in his home  He has a two story home with stairs, and he has a chair lift and a hand rail  Dominant Hand: R handed   Pain: Pt reports no pain "that he notices"   Current exercise plan: Pt reports he's not doing anything for exercise currently, but when it's warmer he walks around his neighborhood  Pt did note that he played lacrosse and football in college at [employer]  On/off times: None noted by pt   Hobbies: walking, meeting his friends for lunch, taking his wife out to dinner    Goals: making it easier to perform stairs, maintain ability to shovel snow/mow grass, improve balance overall         Objective     Balance Test  4/18   6 Minute Walk Test (ft): NV 1200 feet, slight dypsnea SpO2 99%, HR 74   Gait Speed: 0 869 m/s 0 95m/s   5x Sit To Stand (s): 14 4s  13 2 s without UE's   TUG: NV 10 1 sec       FGA: 17/30, notable challenge with tandem walking and stepping over obstacles   4/18/22: 20/30, difficulty with tandem walking    MCTSIB:  Firm EO: 30 sec  Firm EC: 30 sec  Foam EO: 30 sec  Foam EC: 2 sec    Gait Assessment: Pt ambulates with equal step length, R lateral trunk lean, forward flexed posture, and reduced L arm swing  Pt significantly challenged by tandem walking, unable to take more than 3-4 steps in tandem  Precautions: Afib, unruptured thoracic aneurysm, dementia          Manuals 4/1 4/6 4/11 4/13 4/18                                                            Neuro Re-Ed             High knee marching //bars (long) 4# B/L AW with 15# bolster x //bars (long)  4# B/L AW with 15# bolster x 5 laps //bars (long)  4# B/L AW  With 15# bolster x 6 laps  // bars x 4 laps, no UE's        Hurdles  //bars (long) 4# B/L AW 12" (6) x 4 laps Fwd  X 2 laps Lat Increased ESQUIVEL //bars (long) 4# B/L AW  12" (6)  X 4 laps Fwd    Lat: x 3 laps  HR 68  02 99%   //bars (long)  4# B/L AW  12" (6)  X 5 laps Fwd  HR 42  02 99%  SOB    Lat x 3 laps   HR 62  02 99%     //bars (long) 4# B/L AW 12" (5) x 4 laps Fwd    Lat x 4 laps          Backwards ambulation //bars (long) 4# B/L AW  Foam beams Fwd/Bkw walking x 4 laps //bars (long) 4# B/L AW Foam beams Fwd/Bkw walking x 3 laps  //bars (long) walking Fwd/Bkw on Double foam beam x 5 laps         Foam beams tandem/side stepping //bars (long) 4# B/L AW x 4 laps //bars (long) 4# B/L AW  sidestepping Foam beams x   //bars (long) 4# B/L AW sidestepping on foam beam with ball catch x 2 laps              Tandem walking // bars (short) x 3 laps        Standing rotation ball pass to therapist RMB x 15 each side            Standing reaching to tap post it 4# B/L AW x 10 each            Side steping             Step ups                          Ther Ex             STS  2x10 with OH reach  HR 60  02 99% Increased ESQUIVEL            Nustep   np          Step ups   //bars (long) 4# B/L AW  6" step standing on airex up/down x 10 ea  Fwd    Lat x 10 ea //bars (long) 4# B/L AW 6" step standing on foam up/down Fwd: x10 each    Lat:  X 10 each         Treadmill  1 8 mph 3% incline x 10 min NV No SOLO No AW 1  2mph 3% incline x 10 min No SOLO  No AW  1 5 mph 3% incline x 10 min Ther Activity                                       Gait Training             Walking with arm swings No AW 50ft x4 NV  2 laps around gym 6MWT completed during session                     Modalities

## 2022-04-19 ENCOUNTER — TELEPHONE (OUTPATIENT)
Dept: NEUROLOGY | Facility: CLINIC | Age: 87
End: 2022-04-19

## 2022-04-19 NOTE — TELEPHONE ENCOUNTER
"Aicha Mendez MD  Neurology [place] Clinical Team 6; Peng Jaffe MA 51 minutes ago (10:21 AM)         [place] please give me driving form for [place] dot tomorrow.     Nurses FYI:   Patient called and message left for his patient to call back.   If he calls back, I was calling to review his Fitness to Drive test.  As I suspect he already is aware he had significant difficulty.     \"He scored cognitively at an 90% predicted probability of failing  a specialized on-road test.  This indicates  a cognitive competence for driving is outside the range of   normal with a higher probability of performing hazardous or extremely   dangerous maneuvers. Pt scoring cognitively at a 72% probability of   creating a hazardous situation on a specialized road test.\"     Unfortunately I have to inform him that he should no longer drive. If he wishes to further discuss we can set up a virtual or in person follow up with me or movement AP.     Routing comment        "

## 2022-04-20 ENCOUNTER — OFFICE VISIT (OUTPATIENT)
Dept: PHYSICAL THERAPY | Facility: CLINIC | Age: 87
End: 2022-04-20
Payer: MEDICARE

## 2022-04-20 DIAGNOSIS — G20 PARKINSON'S DISEASE (HCC): Primary | ICD-10-CM

## 2022-04-20 PROCEDURE — 97112 NEUROMUSCULAR REEDUCATION: CPT | Performed by: PHYSICAL THERAPIST

## 2022-04-20 PROCEDURE — 97110 THERAPEUTIC EXERCISES: CPT | Performed by: PHYSICAL THERAPIST

## 2022-04-21 NOTE — TELEPHONE ENCOUNTER
Left message on [phone] and [phone] for pt to call office back     Letter mailed to pt.      [place]illa-please complete penndot form as per dr capone

## 2022-04-22 ENCOUNTER — OFFICE VISIT (OUTPATIENT)
Dept: OCCUPATIONAL THERAPY | Facility: CLINIC | Age: 87
End: 2022-04-22
Payer: MEDICARE

## 2022-04-22 ENCOUNTER — OFFICE VISIT (OUTPATIENT)
Dept: PHYSICAL THERAPY | Facility: CLINIC | Age: 87
End: 2022-04-22
Payer: MEDICARE

## 2022-04-22 DIAGNOSIS — F02.81 DEMENTIA IN OTHER DISEASES CLASSIFIED ELSEWHERE WITH BEHAVIORAL DISTURBANCE (HCC): ICD-10-CM

## 2022-04-22 DIAGNOSIS — G20 PARKINSON'S DISEASE (HCC): Primary | ICD-10-CM

## 2022-04-22 PROCEDURE — 97110 THERAPEUTIC EXERCISES: CPT | Performed by: PHYSICAL THERAPIST

## 2022-04-22 PROCEDURE — 97530 THERAPEUTIC ACTIVITIES: CPT

## 2022-04-22 PROCEDURE — 97110 THERAPEUTIC EXERCISES: CPT

## 2022-04-22 PROCEDURE — 97112 NEUROMUSCULAR REEDUCATION: CPT | Performed by: PHYSICAL THERAPIST

## 2022-04-22 NOTE — PROGRESS NOTES
Daily Note     Today's date: 2022  Patient name: Adela Baeza  : 1935  MRN: [id number]  Referring provider: Sol Mahoney MD  Dx:   Encounter Diagnosis     ICD-10-CM    1  Parkinson's disease ([facility] [address] )  G20                   Subjective: Feeling well today, no new changes to report      Objective: See treatment diary below      Assessment: Pt was able to do more this session compared to last session  Confused throug hthe hour about what time it was and how tyrell ghis session was  Was able to do most all activities without UE  Plan: Continue per plan of care        Precautions: Afib, unruptured thoracic aneurysm, dementia        Manuals                                                           Neuro Re-Ed             High knee marching //bars (long) 4# B/L AW with 15# bolster x //bars (long)  4# B/L AW with 15# bolster x 5 laps //bars (long)  4# B/L AW  With 15# bolster x 6 laps  //bars (long)  4# B/L AW        Hurdles  //bars (long) 4# B/L AW 12" (6) x 4 laps Fwd  X 2 laps Lat Increased ESQUIVEL //bars (long) 4# B/L AW  12" (6)  X 4 laps Fwd    Lat: x 3 laps  HR 68  02 99%   //bars (long)  4# B/L AW  12" (6)  X 5 laps Fwd  HR 42  02 99%  SOB    Lat x 3 laps   HR 62  02 99%     //bars (long) 4# B/L AW 12" (5) x 4 laps Fwd    Lat x 4 laps  /bars (long) 4# B/L AW 12" (5) x 4 laps Fwd    Lat x 4 laps   /bars (long) 4# B/L AW 12" (5) x 4 laps Fwd      Backwards ambulation //bars (long) 4# B/L AW  Foam beams Fwd/Bkw walking x 4 laps //bars (long) 4# B/L AW Foam beams Fwd/Bkw walking x 3 laps  //bars (long) walking Fwd/Bkw on Double foam beam x 5 laps   //bars (long) 4# B/L AW Foam beams Fwd/Bkw walking x 3 laps      Foam beams tandem/side stepping //bars (long) 4# B/L AW x 4 laps //bars (long) 4# B/L AW  sidestepping Foam beams x   //bars (long) 4# B/L AW sidestepping on foam beam with ball catch x 2 laps Tandem walking no beams 4 laps no UE //bars                     Standing rotation ball pass to therapist RMB x 15 each side            Standing reaching to tap post it 4# B/L AW x 10 each            Side steping             Step ups                          Ther Ex             STS  2x10 with OH reach  HR 60  02 99% Increased ESQUIVEL      2x10       Nustep   np          Step ups   //bars (long) 4# B/L AW  6" step standing on airex up/down x 10 ea  Fwd    Lat x 10 ea //bars (long) 4# B/L AW 6" step standing on foam up/down Fwd: x10 each    Lat:  X 10 each 3# aw 8" step 15x ea fwd  3# aw 8" step 15x ea fwd      Treadmill  1 8 mph 3% incline x 10 min NV No SOLO No AW 1  2mph 3% incline x 10 min No SOLO  No AW  1 5 mph 3% incline x 10 min No SOLO  No AW  1 8 mph no incline x 10 min                                                            Ther Activity                                       Gait Training             Walking with arm swings No AW 50ft x4 NV  2 laps around gym                      Modalities

## 2022-04-22 NOTE — PROGRESS NOTES
Occupational Therapy Daily Note:    Today's date: 2022  Patient name: Marquise Sanchez  : 1935  MRN: [id number]  Referring provider: Brunilda Rodriguez MD  Dx:   Encounter Diagnoses   Name Primary? 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 ([facility] )        Subjective: "Lemmie Leo, this is a workout  Can you pick me up off the floor?"    Objective: Pt engaged in skilled OT treatment session with focus on NMRE,  proximal strength/stabilization, verbal direction following w/sequencing and mixed prehension patterns improving motor skills and safety awareness to increase indep in ADL/IADL's    CPT Code Minutes                                           Task Details        Therapeutic Activity 20 Pt engaged in dynamic activity to improve functional performance in dynamic standing balance/endurance, /grasp patterns, in-hand manipulation and fxl fwd/OH reach promoting BIG amplitude movements for increased AROM, accuracy and precision during FM engagement in ADL's and home mngt tasks  Pt instructed to locate words in circles in CW/CCW direction, retreive foam blocks from low surfac, locate letters to spell words and place to [place] shelf  Activity placed on vertical mirror board facilitating OH reach and activity completed in unsupported stance  Neuro Re-Ed               Therapeutic Exercise 10 For UB exercise benefit and to increase overall cardiovascular health, proximal strength, gross grasp and endurance, pt engaged in [address] for 5 min prograde/5 min retrograde increasing resistance to L3 5          25 Pt engaged in therex utilizing 4# tbar in stance focusing on proximal strength/endurance to improved quality of movement to increase AROM/joint flexibility needed for fxl reach activities to increase indep engaging in home mngt activities   Pt completed MMP w/sqat in FF, add/abduction, fwd/bwd rows, 3x10 achieving near full range w/o report of pain  (L shoulder AROM inhibited by rotator cuff issues)  Pt able to recall and sequence MMP with min visual cues and demo-provided  Manual          Self Care           Assessment: Tolerated treatment well  Pt required rest breaks during therex in stance  Pt engaged LUE for OH reach w/o report of pain, minimal droppage of blocks placing to [facility] shelf, pt unable to manipulate blocks in hand, locating letters by rotating wrist  No noted LOB in stance during activity or therex  No tremoring noted during OT session  Patient would benefit from continued skilled OT      Plan: Continued skilled OT per POC    INTERVENTION COMMENTS:  Diagnosis: Parkinson's disease ([facility] [address] ) Arianna Dumont, [place]  FOTO: completed 3/21  Insurance: Payor: Juan C Daniels / Plan: MEDICARE A AND B / Product Type: Medicare A & B Fee for Service /   2 YI 30 VARSHA LEMUS due 5/6/2022  POC Expires: 6/3/2022

## 2022-04-25 ENCOUNTER — OFFICE VISIT (OUTPATIENT)
Dept: PHYSICAL THERAPY | Facility: CLINIC | Age: 87
End: 2022-04-25
Payer: MEDICARE

## 2022-04-25 DIAGNOSIS — G20 PARKINSON'S DISEASE (HCC): Primary | ICD-10-CM

## 2022-04-25 PROCEDURE — 97110 THERAPEUTIC EXERCISES: CPT | Performed by: PHYSICAL THERAPIST

## 2022-04-25 PROCEDURE — 97112 NEUROMUSCULAR REEDUCATION: CPT | Performed by: PHYSICAL THERAPIST

## 2022-04-25 NOTE — TELEPHONE ENCOUNTER
Pt made aware of below.  He states that he completey disagrees.  States what can I do to prove that I can drive better than you people.     States that he is really upset about the whole thing.  I asked if he wanted to schedule an appt to further discuss.  He stated yes he does and then call was disconnected.  Attempted to call pt back.  No answer.  Left message for pt to call office back    Villa-please complete penndot form

## 2022-04-25 NOTE — PROGRESS NOTES
Daily Note     Today's date: 2022  Patient name: Sukhjinder Rubio  : 1935  MRN: [id number]  Referring provider: Ba Phillips MD  Dx:   Encounter Diagnosis     ICD-10-CM    1  Parkinson's disease ([facility] [address] )  G20                   Subjective: Feels well today, didn't remember he had an OT appointment today      Objective: See treatment diary below      Assessment: Pt was extremely fatigued at the end of the session  Had to take longer seated breaks  Plan: Continue per plan of care        Precautions: Afib, unruptured thoracic aneurysm, dementia        Manuals                                                          Neuro Re-Ed             High knee marching //bars (long) 4# B/L AW with 15# bolster x //bars (long)  4# B/L AW with 15# bolster x 5 laps //bars (long)  4# B/L AW  With 15# bolster x 6 laps  //bars (long)  4# B/L AW   //bars (long)  4# B/L AW     Hurdles  //bars (long) 4# B/L AW 12" (6) x 4 laps Fwd  X 2 laps Lat Increased ESQUIVEL //bars (long) 4# B/L AW  12" (6)  X 4 laps Fwd    Lat: x 3 laps  HR 68  02 99%   //bars (long)  4# B/L AW  12" (6)  X 5 laps Fwd  HR 42  02 99%  SOB    Lat x 3 laps   HR 62  02 99%     //bars (long) 4# B/L AW 12" (5) x 4 laps Fwd    Lat x 4 laps  /bars (long) 4# B/L AW 12" (5) x 4 laps Fwd    Lat x 4 laps   /bars (long) 4# B/L AW 12" (5) x 4 laps Fwd 12" fwd 4# 4 laps //bars     Backwards ambulation //bars (long) 4# B/L AW  Foam beams Fwd/Bkw walking x 4 laps //bars (long) 4# B/L AW Foam beams Fwd/Bkw walking x 3 laps  //bars (long) walking Fwd/Bkw on Double foam beam x 5 laps   //bars (long) 4# B/L AW Foam beams Fwd/Bkw walking x 3 laps      Foam beams tandem/side stepping //bars (long) 4# B/L AW x 4 laps //bars (long) 4# B/L AW  sidestepping Foam beams x   //bars (long) 4# B/L AW sidestepping on foam beam with ball catch x 2 laps Tandem walking no beams 4 laps no UE //bars                     Standing rotation ball pass to therapist RMB x 15 each side            Standing reaching to tap post it 4# B/L AW x 10 each            Side steping             Step ups        " step with airex 15x ea fwd/lat 4# aw                  Ther Ex             STS  2x10 with OH reach  HR 60  02 99% Increased ESQUIVEL      2x10       Nustep   np          Step ups   //bars (long) 4# B/L AW  6" step standing on airex up/down x 10 ea  Fwd    Lat x 10 ea //bars (long) 4# B/L AW 6" step standing on foam up/down Fwd: x10 each    Lat:  X 10 each 3# aw 8" step 15x ea fwd  3# aw 8" step 15x ea fwd      Treadmill  1 8 mph 3% incline x 10 min NV No SOLO No AW 1  2mph 3% incline x 10 min No SOLO  No AW  1 5 mph 3% incline x 10 min No SOLO  No AW  1 8 mph no incline x 10 min   No solo 1 8 mph no incline 10 min                                                         Ther Activity                                       Gait Training             Walking with arm swings No AW 50ft x4 NV  2 laps around gym                      Modalities

## 2022-04-27 ENCOUNTER — OFFICE VISIT (OUTPATIENT)
Dept: OCCUPATIONAL THERAPY | Facility: CLINIC | Age: 87
End: 2022-04-27
Payer: MEDICARE

## 2022-04-27 ENCOUNTER — OFFICE VISIT (OUTPATIENT)
Dept: PHYSICAL THERAPY | Facility: CLINIC | Age: 87
End: 2022-04-27
Payer: MEDICARE

## 2022-04-27 DIAGNOSIS — G20 PARKINSON'S DISEASE (HCC): Primary | ICD-10-CM

## 2022-04-27 PROCEDURE — 97110 THERAPEUTIC EXERCISES: CPT | Performed by: PHYSICAL THERAPIST

## 2022-04-27 PROCEDURE — 97530 THERAPEUTIC ACTIVITIES: CPT

## 2022-04-27 PROCEDURE — 97110 THERAPEUTIC EXERCISES: CPT

## 2022-04-27 PROCEDURE — 97112 NEUROMUSCULAR REEDUCATION: CPT | Performed by: PHYSICAL THERAPIST

## 2022-04-27 NOTE — PROGRESS NOTES
Occupational Therapy Daily Note:    Today's date: 2022  Patient name: Monica Tesfaye  : 1935  MRN: [id number]  Referring provider: Ashley Coleman MD  Dx:   Encounter Diagnosis   Name Primary?  [facility]'s disease ([facility] ) Yes       Subjective: "I don't know why I don't feel great, but I will try "    Objective: Pt engaged in skilled OT treatment session with focus on NMRE, proximal strength/stabilization, verbal direction following, immediate recall w/sequencing and mixed prehension patterns improving motor skills and safety awareness to increase indep in ADL/IADL's    CPT Code Minutes                                           Task Details        Therapeutic Activity 20 Pt engaged in dynamic activity to improve functional performance in dynamic standing balance/endurance, grasp patterns, in-hand manipulation and fxl fwd reach promoting BIG amplitude movements for increased AROM, accuracy, and sequencing in ADL and home mngt tasks  Pt utilized long-handled reacher to  bean bags from bucket and place to target on floor  Pt required to carry bucket and walk to colored target with verbal instructions to complete with specific color sequence  Completed task in reverse order w/ sequencing component  Pt required 4 verbal cues to recall initial sequence, then completed task with one sequencing error  20 Pt completed BITS activities focusing on proximal strengthening, BIG amplitude movements, GMC/GMS, immediate recall, verbal direction follow, listening comprehension, and sequencing  Pt completed rotator wheel with sequencing capital/lowercase letters in forward alphabetical order  Pt completed task with 83% accuracy in placekeeping demo min frustration with task  Completed cog/memory task with 3 word sequence in forward order with visual/verbal component, completed task with 93% accuracy  Act upgraded to 4 word sequence, demo 89% success   Pt donned 1 5# weight cuff to distal BUE during task for UE strengthening demand  Neuro Re-Ed               Therapeutic Exercise 5 For UB exercise benefit and to increase overall cardiovascular health, proximal strength, gross grasp and endurance, pt engaged in UEB for 2 5 min prograde/2 5 min retrograde with resistance 1 5#         15 Pt engaged in therex utilizing 3# tbar seated focusing on proximal strength/endurance to improved quality of movement to increase AROM/joint flexibility needed for functional self-care tasks in home environment  Pt completed 3 sets x 10 reps scaption, bicep curls in pronation/supination positioning, forward/backward rows  Pt able to recall sequence with 1 reminder at sequence initiation  Manual          Self Care           Assessment: Tolerated treatment well  Pt demo improved frustration tolerance and improved performance with immediate recall and sequencing based tasks  Pt cont to demo reduced endurance during beginning of session, though pt able to persist in session despite initial fatigue  Patient would benefit from continued skilled OT      Plan: Continued skilled OT per POC    INTERVENTION COMMENTS:  Diagnosis: Parkinson's disease ([facility] [address] ) Emmett Orozco, [place]  FOTO: completed 3/21  Insurance: Payor: Danielle Finley / Plan: MEDICARE A AND B / Product Type: Medicare A & B Fee for Service /   4 [place] 79 QPMFZT, PN due 5/6/2022  POC Expires: 6/3/2022

## 2022-04-27 NOTE — PROGRESS NOTES
Daily Note     Today's date: 2022  Patient name: Akira Burr  : 1935  MRN: [id number]  Referring provider: Palma Finch MD  Dx:   Encounter Diagnosis     ICD-10-CM    1  Parkinson's disease ([facility] [address] )  G20                   Subjective: Patient reports no new changes since last session, feels fine today  Arrived for appointment at wrong time  Objective: See treatment diary below      Assessment: Patient was significantly fatigued at end of session again and continued to voice that he did not feel like it was an appropriate level of fatigue for what he did  He was able to do the same amount of activities as he was in previous sessions if not slightly more  Therapist suggested followign up with MD if he felt he continued to be more fatigued than usual        Plan: Continue per plan of care        Precautions: Afib, unruptured thoracic aneurysm, dementia        Manuals                                                        Neuro Re-Ed             High knee marching //bars (long) 4# B/L AW with 15# bolster x //bars (long)  4# B/L AW with 15# bolster x 5 laps //bars (long)  4# B/L AW  With 15# bolster x 6 laps  //bars (long)  4# B/L AW   //bars (long)  4# B/L AW  //bars (long)  4# B/L AW 4 laps   Hurdles  //bars (long) 4# B/L AW 12" (6) x 4 laps Fwd  X 2 laps Lat Increased ESQUIVEL //bars (long) 4# B/L AW  12" (6)  X 4 laps Fwd    Lat: x 3 laps  HR 68  02 99%   //bars (long)  4# B/L AW  12" (6)  X 5 laps Fwd  HR 42  02 99%  SOB    Lat x 3 laps   HR 62  02 99%     //bars (long) 4# B/L AW 12" (5) x 4 laps Fwd    Lat x 4 laps  /bars (long) 4# B/L AW 12" (5) x 4 laps Fwd    Lat x 4 laps   /bars (long) 4# B/L AW 12" (5) x 4 laps Fwd 12" fwd 4# 4 laps //bars  12" fwd 4# 4 laps //bars fwd/lat ea no ue   Backwards ambulation //bars (long) 4# B/L AW  Foam beams Fwd/Bkw walking x 4 laps //bars (long) 4# B/L AW Foam beams Fwd/Bkw walking x 3 laps  //bars (long) walking Fwd/Bkw on Double foam beam x 5 laps   //bars (long) 4# B/L AW Foam beams Fwd/Bkw walking x 3 laps      Foam beams tandem/side stepping //bars (long) 4# B/L AW x 4 laps //bars (long) 4# B/L AW  sidestepping Foam beams x   //bars (long) 4# B/L AW sidestepping on foam beam with ball catch x 2 laps Tandem walking no beams 4 laps no UE //bars     //bars (long) 4# B/L AW  sidestepping Foam beams x                Standing rotation ball pass to therapist RMB x 15 each side            Standing reaching to tap post it 4# B/L AW x 10 each            Side steping             Step ups        " step with airex 15x ea fwd/lat 4# aw  8" with airex 15x ea LE                Ther Ex             STS  2x10 with OH reach  HR 60  02 99% Increased ESQUIVEL      2x10    3x10   Nustep   np          Step ups   //bars (long) 4# B/L AW  6" step standing on airex up/down x 10 ea  Fwd    Lat x 10 ea //bars (long) 4# B/L AW 6" step standing on foam up/down Fwd: x10 each    Lat:  X 10 each 3# aw 8" step 15x ea fwd  3# aw 8" step 15x ea fwd      Treadmill  1 8 mph 3% incline x 10 min NV No SOLO No AW 1  2mph 3% incline x 10 min No SOLO  No AW  1 5 mph 3% incline x 10 min No SOLO  No AW  1 8 mph no incline x 10 min   No solo 1 8 mph no incline 10 min  No solo 1 8 mph no incline 10 min                                                       Ther Activity                                       Gait Training             Walking with arm swings No AW 50ft x4 NV  2 laps around gym                      Modalities

## 2022-04-29 ENCOUNTER — OFFICE VISIT (OUTPATIENT)
Dept: INTERNAL MEDICINE CLINIC | Facility: CLINIC | Age: 87
End: 2022-04-29
Payer: MEDICARE

## 2022-04-29 VITALS
OXYGEN SATURATION: 98 % | HEIGHT: 74 IN | RESPIRATION RATE: 18 BRPM | DIASTOLIC BLOOD PRESSURE: 90 MMHG | WEIGHT: 237 LBS | SYSTOLIC BLOOD PRESSURE: 140 MMHG | HEART RATE: 76 BPM | BODY MASS INDEX: 30.42 KG/M2

## 2022-04-29 DIAGNOSIS — F02.81 DEMENTIA IN OTHER DISEASES CLASSIFIED ELSEWHERE WITH BEHAVIORAL DISTURBANCE (HCC): ICD-10-CM

## 2022-04-29 DIAGNOSIS — I10 BENIGN ESSENTIAL HYPERTENSION: ICD-10-CM

## 2022-04-29 DIAGNOSIS — I48.19 PERSISTENT ATRIAL FIBRILLATION (HCC): ICD-10-CM

## 2022-04-29 DIAGNOSIS — G20 PARKINSON'S DISEASE (HCC): ICD-10-CM

## 2022-04-29 DIAGNOSIS — F41.9 ANXIETY: Primary | ICD-10-CM

## 2022-04-29 PROCEDURE — 99214 OFFICE O/P EST MOD 30 MIN: CPT | Performed by: INTERNAL MEDICINE

## 2022-04-29 NOTE — PATIENT INSTRUCTIONS
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

## 2022-04-29 NOTE — ASSESSMENT & PLAN NOTE
No bleeding problems or palpitations, continue anticoagulation follow-up Cardiology, rate is controlled

## 2022-04-29 NOTE — ASSESSMENT & PLAN NOTE
Patient was referred for cognitive therapy by Neurology  Continue to stay active mentally, physically, and socially

## 2022-04-29 NOTE — ASSESSMENT & PLAN NOTE
Reports he does not take escitalopram on a regular basis, discussed that this is generally a once a day medication, feels he does needed, then we could stop it

## 2022-04-29 NOTE — PROGRESS NOTES
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 [facility])  Patient was referred for cognitive therapy by Neurology  Continue to stay active mentally, physically, and socially  Benign essential hypertension  Borderline, continue current meds    Persistent atrial fibrillation (HCC)  No bleeding problems or palpitations, continue anticoagulation follow-up Cardiology, rate is controlled         Diagnoses and all orders for this visit:    Anxiety    Parkinson's disease ([facility] )    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 ([facility] )    Benign essential hypertension    Persistent atrial fibrillation (HCC)        BMI Counseling: Body mass index is 30 43 kg/m²  The BMI is above normal  Nutrition recommendations include encouraging healthy choices of fruits and vegetables and moderation in carbohydrate intake  Exercise recommendations include exercising 3-5 times per week  Rationale for BMI follow-up plan is due to patient being overweight or obese  Depression Screening and Follow-up Plan: Patient was screened for depression during today's encounter  They screened negative with a PHQ-2 score of 0  Falls Plan of Care: balance, strength, and gait training instructions were provided  Subjective:      Patient ID: Alen Gee is a 82 y o  male  Pt here for follow up chronic conditions      The following portions of the patient's history were reviewed and updated as appropriate: allergies, current medications, past family history, past medical history, past social history, past surgical history and problem list     Review of Systems   Constitutional: Negative for chills, fatigue and fever  HENT: Negative for congestion, nosebleeds, postnasal drip, sore throat and trouble swallowing  Eyes: Negative for pain  Respiratory: Negative for cough, chest tightness, shortness of breath and wheezing  Cardiovascular: Negative for chest pain, palpitations and leg swelling  Gastrointestinal: Negative for abdominal pain, constipation, diarrhea, nausea and vomiting  Endocrine: Negative for polydipsia and polyuria  Genitourinary: Negative for dysuria, flank pain and hematuria  Musculoskeletal: Negative for arthralgias  Skin: Negative for rash  Neurological: Positive for tremors  Negative for dizziness, light-headedness and headaches  Hematological: Does not bruise/bleed easily  Psychiatric/Behavioral: Negative for confusion and dysphoric mood  The patient is not nervous/anxious  Objective:      /90 (BP Location: Left arm, Patient Position: Sitting, Cuff Size: Large)   Pulse 76   Resp 18   Ht 6' 2" (1 88 m)   Wt 108 kg (237 lb)   SpO2 98%   BMI 30 43 kg/m²          Physical Exam  Vitals reviewed  Constitutional:       General: He is not in acute distress  Appearance: Normal appearance  He is well-developed  HENT:      Head: Normocephalic and atraumatic  Right Ear: External ear normal       Left Ear: External ear normal       Nose: Nose normal    Eyes:      General: No scleral icterus  Conjunctiva/sclera: Conjunctivae normal    Neck:      Thyroid: No thyromegaly  Trachea: No tracheal deviation  Cardiovascular:      Rate and Rhythm: Normal rate  Rhythm irregular  Heart sounds: Normal heart sounds  No murmur heard  Pulmonary:      Effort: Pulmonary effort is normal  No respiratory distress  Breath sounds: Normal breath sounds  No wheezing or rales  Abdominal:      General: Bowel sounds are normal       Palpations: Abdomen is soft  Tenderness: There is no abdominal tenderness  There is no guarding or rebound     Musculoskeletal: Cervical back: Normal range of motion and neck supple  Right lower leg: No edema  Left lower leg: No edema  Lymphadenopathy:      Cervical: No cervical adenopathy  Skin:     Coloration: Skin is not jaundiced or pale  Neurological:      Mental Status: He is alert        Comments: Patient can get out of chair without a problem, slightly stooped posture, some decreased arm swing, no shuffling gait, but slight tendency towards it, resting tremor   Psychiatric:         Mood and Affect: Mood normal          Behavior: Behavior normal

## 2022-05-02 ENCOUNTER — OFFICE VISIT (OUTPATIENT)
Dept: OCCUPATIONAL THERAPY | Facility: CLINIC | Age: 87
End: 2022-05-02
Payer: MEDICARE

## 2022-05-02 ENCOUNTER — OFFICE VISIT (OUTPATIENT)
Dept: PHYSICAL THERAPY | Facility: CLINIC | Age: 87
End: 2022-05-02
Payer: MEDICARE

## 2022-05-02 DIAGNOSIS — G20 PARKINSON'S DISEASE (HCC): Primary | ICD-10-CM

## 2022-05-02 PROCEDURE — 97530 THERAPEUTIC ACTIVITIES: CPT

## 2022-05-02 PROCEDURE — 97112 NEUROMUSCULAR REEDUCATION: CPT | Performed by: PHYSICAL THERAPIST

## 2022-05-02 PROCEDURE — 97110 THERAPEUTIC EXERCISES: CPT | Performed by: PHYSICAL THERAPIST

## 2022-05-02 PROCEDURE — 97110 THERAPEUTIC EXERCISES: CPT

## 2022-05-02 NOTE — PROGRESS NOTES
Occupational Therapy Daily Note:    Today's date: 2022  Patient name: Natalee Crawley  : 1935  MRN: [id number]  Referring provider: Nathalia Ochoa MD  Dx:   Encounter Diagnosis   Name Primary?  Parkinson's disease ([address] ) Yes       Subjective: "You tuckered me out!"    Objective: Pt engaged in skilled OT treatment session with focus on NMRE, proximal strength/stabilization, UE endurance/strength, BIG amplitude movement, and mixed prehension patterns improving motor skills and safety awareness to increase indep in ADL/IADL's    CPT Code Minutes                                           Task Details        Therapeutic Activity 15 Pt completed task focusing on BIG amplitude movements, GMC/GMS, proximal UE strength/endurance, act tolerance/endurance  Pt completed arrow chart task x2 with use of 3# wt ball in BUE in stance to focus on improving UE strength/endurance in mixed muscle planes  25 Pt engaged in dynamic activity to improve functional performance in dynamic standing balance/endurance, grasp patterns, in-hand manipulation and fxl fwd reach promoting BIG amplitude movements for increased AROM, accuracy, and sequencing in ADL and home mngt tasks    Pt required to hold bean bag bucket and toss to targets at varying distances and heights, completed task repetitively then req to  bean bags with use of long-handled reacher and place into bucket  Pt demo improved targeting accuracy and velocity of movement with massed practice  Neuro Re-Ed               Therapeutic Exercise 10 For UB exercise benefit and to increase overall cardiovascular health, proximal strength, gross grasp and endurance, pt engaged in [address] for 5 min prograde/5 min retrograde with resistance 3 0#  Pt req rest break in between sides and additional rest break post task  10 Pt completed gross grasp, intrinsic strengthening task   Pt completed 30 reps with BUE of small silicon ball squeezes followed by 30 reps digit extension exercises with use of resistive band  Manual          Self Care           Assessment: Tolerated treatment well  Pt demo good progress in all tasks with improved act tolerance/endurance, though cont to fatigue with increased cardio output  Pt demo good progress in UE endurance/strength with BIG movements with improved targeting accuracy with massed practice  Pt Patient would benefit from continued skilled OT      Plan: Continued skilled OT per POC    INTERVENTION COMMENTS:  Diagnosis: Parkinson's disease ([facility] [address] ) Denise Phelps, Safety  FOTO: completed 3/21  Insurance: Payor: Kori Harris / Plan: MEDICARE A AND B / Product Type: Medicare A & B Fee for Service /   6 SW 37 AQIEFD, PN due 5/6/2022  POC Expires: 6/3/2022

## 2022-05-02 NOTE — PROGRESS NOTES
Daily Note     Today's date: 2022  Patient name: Wyonia Bamberger  : 1935  MRN: [id number]  Referring provider: Kevin Lake MD  Dx:   Encounter Diagnosis     ICD-10-CM    1  Parkinson's disease ([facility] [address] )  G20                   Subjective: Patient reports feeling tired today, just had OT      Objective: See treatment diary below      Assessment: Patient had fewer reports of fatigue through the session compared to the few previous  He had most difficulty with walking over foam beams  Plan: Continue per plan of care        Precautions: Afib, unruptured thoracic aneurysm, dementia        Manuals                                                        Neuro Re-Ed             High knee marching 4# aw 4 laps //bars    //bars (long)  4# B/L AW   //bars (long)  4# B/L AW  //bars (long)  4# B/L AW 4 laps   Hurdles  12" hurdles fwd/lat ea 3 laps no UE    /bars (long) 4# B/L AW 12" (5) x 4 laps Fwd    Lat x 4 laps   /bars (long) 4# B/L AW 12" (5) x 4 laps Fwd 12" fwd 4# 4 laps //bars  12" fwd 4# 4 laps //bars fwd/lat ea no ue   Backwards ambulation       //bars (long) 4# B/L AW Foam beams Fwd/Bkw walking x 3 laps      Foam beams tandem/side stepping 3 laps no UE 4# aw    Tandem walking no beams 4 laps no UE //bars     //bars (long) 4# B/L AW  sidestepping Foam beams x                Standing rotation ball pass to therapist             Standing reaching to tap post it             Side steping             Step ups 8" 15x ea fwd/lat       " step with airex 15x ea fwd/lat 4# aw  8" with airex 15x ea LE                Ther Ex             STS       2x10    3x10   Nustep             Step ups     3# aw 8" step 15x ea fwd  3# aw 8" step 15x ea fwd      Treadmill      No SOLO  No AW  1 8 mph no incline x 10 min   No solo 1 8 mph no incline 10 min  No solo 1 8 mph no incline 10 min                                                       Ther Activity                                       Gait Training             Walking with arm swings                          Modalities

## 2022-05-04 ENCOUNTER — EVALUATION (OUTPATIENT)
Dept: OCCUPATIONAL THERAPY | Facility: CLINIC | Age: 87
End: 2022-05-04
Payer: MEDICARE

## 2022-05-04 ENCOUNTER — OFFICE VISIT (OUTPATIENT)
Dept: PHYSICAL THERAPY | Facility: CLINIC | Age: 87
End: 2022-05-04
Payer: MEDICARE

## 2022-05-04 DIAGNOSIS — G20 PARKINSON'S DISEASE (HCC): Primary | ICD-10-CM

## 2022-05-04 DIAGNOSIS — F02.81 DEMENTIA IN OTHER DISEASES CLASSIFIED ELSEWHERE WITH BEHAVIORAL DISTURBANCE (HCC): ICD-10-CM

## 2022-05-04 PROCEDURE — 97530 THERAPEUTIC ACTIVITIES: CPT

## 2022-05-04 PROCEDURE — 97110 THERAPEUTIC EXERCISES: CPT | Performed by: PHYSICAL THERAPIST

## 2022-05-04 PROCEDURE — 97112 NEUROMUSCULAR REEDUCATION: CPT | Performed by: PHYSICAL THERAPIST

## 2022-05-04 NOTE — PROGRESS NOTES
OCCUPATIONAL THERAPY PROGRESS UPDATE #2:    5/4/2022  [facility]  1935  [id number]  Juana Corley MD   Diagnosis ICD-10-CM Associated Orders   1  Parkinson's disease ([facility] [address] )  G20    2  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 [facility])  F02 81          Assessment/Plan    Skilled Analysis:  Jasper Ellis is a 82 y o  male referred to Occupational Therapy s/p Parkinson's disease ([facility] [address] ) Jetty Corona  Pt engaged in OT progress update to further assess functional progression towards goals  Pt reports that "things are going good " Initially, pt unable to tell me how therapy is going and was unsure if it was helping  OT reviewed prior notes and his subjective report of sessions for improved recall  Pt reports having a new car (is not sure when it was bought - said about 2 or 6 mo ago)  Pt did engage in a Fitness to Drive evaluation this past month  Pt says his memory is "good " Says he uses a calendar for his appt's, OT has noticed improved attendance to therapy sessions  Pt says his wife reminds him of taking his medications  Today, pt is alert and oriented to person, place, and year  Reports that is April 5th, 2022 today  OT unable to assess cognition formally and had pt recall 6 words immediately and with a delay of 10 min  After 10 min, pt recalled 2/6 words  Following formalized testing and clinical observation, pt is demonstrating significant improvements with his strength, coordination, and manipulation skills  Pt has met several goals below  Pt is continuing to present with the following deficit: tremor in L > R (improving), decreased insight into deficits, STM deficits, decreased ROM in LUE, bradykinesia and rigidity in LUE > RUE, decreased functional use of LUE    Pt will continue to benefit from skilled Occupational Therapy services 2x/week for 4 more weeks with focus on establishing HEP with G carryover using external memory strategies, improving fluidity of movements in LUE, improving ROM and flexibility for ADLs, education on external memory strategies, establishing increased habits/routine for medication times for consistency (may benefit from having his wife come in for OT session), LUE strengthening, and patient education for improved QOL  Wyonia Bamberger is in agreement with POC        MOTOR SHORT TERM GOALS (4-6) WKS  Strength/Endurance  · Pt will increase L UE proximal shoulder strength to 4+/5  through the use of strengthening exercises and HEP for improved reaching OH during ADL/IADLs = GOAL MET  · Pt will increase L UE distal wrist flexor/extensor strength to 4+/5  through the use of strengthening exercises and HEP = GOAL MET  · Pt will demo with G tolerance to supine, seated, and in stance exercise x 30 minutes with minimal rest breaks required for increased engagement in life roles and weekly exercise regimen = GOAL MET  · Pt will demo with Fair/50% carryover of Home Exercise Program to improve functional progression towards goals in Plan of care and improved LUE use = PARTIALLY MET    AROM/PROM  · Pt will be able to perform 3/4 range of proximal L UE flexion to demo increased strength and ROM of affected UE for improved ADLs/IADLs = GOAL MET    FMC/GMC  · Pt will increase LUE rate of manipulation by 5 sec for all FM tests for improved functional performance with salient tasks = GOAL MET  · Pt will increase Reaction time  to < 3 seconds with LUE hand to target timed trials for improved reaction time and automaticity of coordination for improved functional performance with ADLs/IADLs and salient tasks = GOAL MET    COGNITION SHORT TERM GOALS (4-6) WKS  Temporal Orientation / Memory  · Pt will be alert and oriented x4 80% of the time to increase temporal orientation for appointments/location awareness and safe IADL practice = PARTIALLY MET  · Pt will demo G carryover and understanding of temporal orientation compensatory strategies and orientation of daily schedules (ie   Use of calendars, alarms, etc) for inc safety with life roles and IADL fxn = PARTIALLY MET  · Pt will demo increased insight into deficits more than half of the time, 75%, for overall increased safety and self awareness with ADL/IADL tasks and to encourage use of compensatory strategies = NOT MET  · Pt will demo G recall of 30% of verbal and written information utilizing memory strategy of choice for improved STM/delayed memory = PARTIALLY MET    -----------------------------------------------------------------------------------------------------      MOTOR SHORT TERM GOALS (10-12) WKS  Strength/Endurance  · Pt will increase L UE proximal shoulder strength to 5/5  through the use of strengthening exercises and HEP for improved reaching OH during ADL/IADLs = PARTIALLY MET  · Pt will increase L UE distal wrist flexor/extensor strength to 5/5  through the use of strengthening exercises and HEP   = PARTIALLY MET  · Pt will demo with G tolerance to supine, seated, and in stance exercise x 60 minutes with minimal rest breaks required for increased engagement in life roles and weekly exercise regimen  = PARTIALLY MET  · Pt will demo with Good/90% carryover of Home Exercise Program to improve functional progression towards goals in Plan of care and improved LUE use = NOT MET    AROM/PROM  · Pt will be able to perform > 3/4 range of proximal L UE flexion to demo increased strength and ROM of affected UE for improved ADLs/IADLs  = PARTIALLY MET    FMC/GMC  · Pt will increase LUE rate of manipulation by 15 sec for all FM tests for improved functional performance with salient tasks =  PARTIALLY MET  · Pt will increase Reaction time  to < 2 0 seconds with LUE hand to target timed trials for improved reaction time and automaticity of coordination for improved functional performance with ADLs/IADLs and salient tasks = GOAL MET  · Pt will demo improved motor learning of constructional and new motor actions for improved b/l coordination and motor planning evident by > 80% accuracy for fxnl ADL/IADL performance = PARTIALLY MET      COGNITION SHORT TERM GOALS (10-12) WKS  Temporal Orientation / Memory  · Pt will be alert and oriented x4 > 90% of the time to increase temporal orientation for appointments/location awareness and safe IADL practice = NOT MET  · Pt will demo G carryover and understanding of temporal orientation compensatory strategies and orientation of daily schedules (ie  Use of calendars, alarms, etc) for inc safety with life roles and IADL fxn = PARTIALLY MET  · Pt will demo increased insight into deficits 90% of time for overall increased safety and self awareness with ADL/IADL tasks and to encourage use of compensatory strategies = PARTIALLY MET  · Pt will demo G recall of 70% of verbal and written information utilizing memory strategy of choice for improved STM/delayed memory = NOT MET          Subjective    SUBJECTIVE: "Put it this way, I do everything that I need to do daily  Sometimes I forget to take my medications "  "I always knew I should be doing more things with my left "    PATIENT GOAL: "To improve a little more I guess, I like the way things are going "    HISTORY OF PRESENT ILLNESS:     Rosemarie Swartz is a 82 y o  male who was referred to Occupational Therapy s/p  Parkinson's disease ([facility] [address] ) Alie Dong has a PMH of parkinson's disease (since 2018), Dementia, HTN, A-fib  Per review, pt is not adherent with medications and taking it 3x/day  Per review, "Time spent discussing Parkinson's disease, its treatment and half life of carbidopa levodopa  He is willing to try taking his medication doses more consistently  He will start taking Sinemet 25/100 tablet 3 times daily at around 10:00 a m , 4:00 p m  and 10:00 p m  If he is taking dosing from consistently and he still is having a lot of tremors in the upper bothersome we can further increase the dose to 1 5 tablets 3 times daily    He will contact the office if he develops any side effects such as lightheadedness, hallucinations, or nausea "   David's mild cognitive changes are related to Parkinson's disease, continues with rivastigmine  Allie Patel lives in a 2 story home with his wife  Pt has a chair lift, does not use it (mainly for his wife)  Pt does not use a cane/RW for mobility  Pt is completing all ADL's independently without use of DME  Reports some challenges with donning socks/shoes d/t decreased flexibility  Pt's daughter is managing his medications and places them into a weekly medication container  Pt says "If I miss medications, my wife gets on my backside " Pt's wife does all cooking  Pt is continuing  role  Pt sleeps from 1 AM - 10/11 AM   Pt meets with a group of friends Tu/Fri, goes out to eat with his wife often  Allie Patel was a [employer]  ([employer] courses as well) and retired around [address]  Went to [employer], played football and lacrosse  Today, pt reports that he is here because his doctor recommended it  Pt reports that his tremors do not bother him (mainly in L hand), occasionally has some forgetfulness with taking all medications daily/exact times, reports that he just wants to get better at all things (no specifics given)                    PMH:   Past Medical History:   Diagnosis Date    A-fib ([facility] )     Afib ([facility] )     [facility] ) 2021    Hypertension     Hypertension 2021    Left AC separation     Parkinson's disease ([facility] ) 2018         Pain Levels   Restin    With Activity:  0    Objective    Impairment Observations:    SCORE DEFICIT RANGE                                         COGNITIVE FXN                 MOCA (8 2)       Education Level > 12 years    Visuospatial/executive functioning 4/5 Drawing of chair challenging   Naming 3/3    Memory: 1st trial:     Memory: 2nd trial:     Attention/concentration 2/2    List of letters:      Serial Seven Subtraction: 2/3    Language/sentence repetition: 2/2    Language Fluency:      Abstract/Correlational Thinkin/2    Delayed Recall: 0/5 Unable to recall words after 5 min delay   Orientation: 5/6 Reported correct location (improved)   Memory Index Score 415                                       Total Score 21/30 Mild Cognitive Impairment  (remained)        WORD RECALL WITH 10 MIN DELAY (22)   - Pt asked to immediately recall 6 words  Initially reported 100% (6/6) on 1st trial and on 2nd trial reporting 90% (5/6)  After 10 min delay, pt asked to recall words for delayed memory, recalling 2/6 and and additional 1/6 when given semantic cues          RUE LUE Comments           UPPER EXTREMITY FUNCTION   Intact Impaired Dominant Hand: Right     /PINCH STRENGTH             Dynamometer    - Gross Grasp 60 lbs 60 lbs low   Increased by 5# in R    Improved by 7# in L   Pinch Meter     - Pincer 18 lbs 16 lbs low   Increased by 1# in R  Increased by 3 5# in L      - Tripod 17 lbs 17 lbs low   Increased by 2# in R   Increased by 4# in L      - Lateral 21 5 lbs  20 lbs low   Increased by 1/2# in R  Increased by 1# in L        AROM (Seated)         WFL for RUE*        Elevation  Full ROM    Shoulder FF  3/4 ROM Improved   Shoulder Ext      Shoulder Abd  > 3/4 ROM Remained   Shoulder Add      Horizontal Abd      Horizontal Add      Elbow Flex      Elbow Ext  FULL Improved   Pronation      Supination  3/4 ROM Remained   Wrist Flex  3/4 ROM Remained   Wrist Ext  3/4 ROM Remained   Digit Flex      Digit Ex      Composite Grasp      Hook Grasp      Subluxation   None, Hx of LUE pain / clavicle       MMT             Shoulder FF 5/5 5/5 Improved   Shoulder Ext 5/5 5/5 Improved   Shoulder Abduction 5/5 4+/5 Remained   Shoulder Adduction 5/5 4+/5 Remained   Elbow Flex 5/5 5/5    Elbow Ext 5/5 5/5 Improved in L   Wrist Flex 5/5 4+/5 Improved in L   Wrist Ext 5/5 4+/5 Improved in L     SENSATION     Unable to test today* Monofilament Testing 3 61 mm diminished light touch 3 61 mm diminished light touch    Sharp / Dull       Proprioception      Hot/Cold Temp      Stereognosis         COORDINATION      Opposition Intact Intact    Finger to Nose Intact Intact Tremor noted with movement ; decreased awareness in LUE with finger to nose   Rapid Alternating Movement Intact Impaired Dysdiadochokinesia   9 Hole Peg Test 25 39 seconds      37 14 seconds       low   Improved significantly in L by 17 sec; improved speed with 0 dropped; less tremors noted    Improved in R by 3 sec   Fxnl Dexterity Test 37 55 seconds    Dropped 2 in R, improved by 4 sec    54 27 seconds    Improved by 14 sec in L; improved manipulation although nearing end of task, pt seen to fatigue, dropped 0 abnormal   Saleem Hand Function Test         - Handwriting         - Card Turning         - Small Item p/u         - Simulated Feeding         - Stacking Checkers         - Moving Light Objects         - Moving Heavy Objects      Concentric Circles Test (tremors)  Slight tremor noted        Rigidity in RUE/LUE: Positive during passive wrist movements bilaterally      OTHER PLANNED THERAPY INTERVENTIONS:   Seated, and in stance neuro re-ed  Task-Oriented Training  Tricep AG  FMC/prehension  GMC  Proximal to distal teaming  Timed Trials  Manual tx  IASTM  Hand to target  Seated functional reach: crossing midline  Supine place and hold  Open chain activities  HEP    INTERVENTION COMMENTS:  Diagnosis: Parkinson's disease ([facility] [address] ) [G20]  Precautions: Confusion, Safety  FOTO: completed 5/4  Insurance: Payor: MEDICARE / Plan: MEDICARE A AND B / Product Type: Medicare A & B Fee for Service /   8 NR 70 CHIVOFP (change next session to 1 of 10), VARSHA VANG 2/3/4820  POC Expires: 6/3/2022

## 2022-05-06 NOTE — PROGRESS NOTES
Daily Note     Today's date: 2022  Patient name: Edward Benítez  : 1935  MRN: [id number]  Referring provider: Alyssa Aragon MD  Dx:   Encounter Diagnosis     ICD-10-CM    1  Parkinson's disease ([address] )  G20                   Subjective: Pt reports no new changes since last session, feeling well today      Objective: See treatment diary below      Assessment: Pt very fatigued at end of session today, questioning changes in medication  Therapist discussed it might be a good idea to discuss with his MD  Plan to continuous monitor his HR throughout the session to assess any changes that may occur  Plan: Continue per plan of care        Precautions: Afib, unruptured thoracic aneurysm, dementia        Manuals                                                        Neuro Re-Ed             High knee marching 4# aw 4 laps //bars 4# aw 5 laps //bars   //bars (long)  4# B/L AW   //bars (long)  4# B/L AW  //bars (long)  4# B/L AW 4 laps   Hurdles  12" hurdles fwd/lat ea 3 laps no UE 12" hurdles fwd/lat ea 4 laps no UE 4# aw   /bars (long) 4# B/L AW 12" (5) x 4 laps Fwd    Lat x 4 laps   /bars (long) 4# B/L AW 12" (5) x 4 laps Fwd 12" fwd 4# 4 laps //bars  12" fwd 4# 4 laps //bars fwd/lat ea no ue   Backwards ambulation  4 laps no UE 4# aw     //bars (long) 4# B/L AW Foam beams Fwd/Bkw walking x 3 laps      Foam beams tandem/side stepping 3 laps no UE 4# aw Side stepping 4# aw min UE   Tandem walking no beams 4 laps no UE //bars     //bars (long) 4# B/L AW  sidestepping Foam beams x                Standing rotation ball pass to therapist             Standing reaching to tap post it             Side steping             Step ups 8" 15x ea fwd/lat 8" fwd/lat 15x ea      " step with airex 15x ea fwd/lat 4# aw  8" with airex 15x ea LE                Ther Ex             STS       2x10    3x10   Nustep             Step ups     3# aw 8" step 15x ea fwd  3# aw 8" step 15x ea fwd Treadmill   10 min 1 8 mph no incline   No SOLO  No AW  1 8 mph no incline x 10 min   No solo 1 8 mph no incline 10 min  No solo 1 8 mph no incline 10 min                                                       Ther Activity                                       Gait Training             Walking with arm swings                          Modalities

## 2022-05-09 ENCOUNTER — OFFICE VISIT (OUTPATIENT)
Dept: PHYSICAL THERAPY | Facility: CLINIC | Age: 87
End: 2022-05-09
Payer: MEDICARE

## 2022-05-09 ENCOUNTER — OFFICE VISIT (OUTPATIENT)
Dept: OCCUPATIONAL THERAPY | Facility: CLINIC | Age: 87
End: 2022-05-09
Payer: MEDICARE

## 2022-05-09 DIAGNOSIS — G20 PARKINSON'S DISEASE (HCC): Primary | ICD-10-CM

## 2022-05-09 DIAGNOSIS — F02.81 DEMENTIA IN OTHER DISEASES CLASSIFIED ELSEWHERE WITH BEHAVIORAL DISTURBANCE (HCC): ICD-10-CM

## 2022-05-09 PROCEDURE — 97110 THERAPEUTIC EXERCISES: CPT

## 2022-05-09 PROCEDURE — 97530 THERAPEUTIC ACTIVITIES: CPT

## 2022-05-09 PROCEDURE — 97150 GROUP THERAPEUTIC PROCEDURES: CPT | Performed by: PHYSICAL THERAPIST

## 2022-05-09 PROCEDURE — 97112 NEUROMUSCULAR REEDUCATION: CPT | Performed by: PHYSICAL THERAPIST

## 2022-05-09 NOTE — PROGRESS NOTES
Occupational Therapy Daily Note:    Today's date: 2022  Patient name: Sondra Wong  : 1935  MRN: [id number]  Referring provider: Mahesh Kearney MD  Dx:   Encounter Diagnoses   Name Primary? 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 ([facility] )                   Subjective: "Do you have 2 hrs, this is going to take me about that long to do this "    Objective:   TA: Pt started with [facility] Dex Test focusing on reaction times and timed trials to improve speed and rate of manipulation, b/l coordination, starting with unilateral LUE turning test completing the following trials:  1 min , 55 sec , 50 sec and 1 min for turning of 15 pegs only  Pt then completed the placing test 2x trials, completing in: 2 min 29 sec,  2 min 19 sec  Pt then completed turning test using B/L UE's 2x trials: 1 min 49 sec, 1 min 42 sec  TE: Pt engaged in standing TE using a 2 lb weighted ball, crossing midline in diagonal and horizontal movement requiring large gross movement, BUE strengthening, b/l coordination/movement  TA: Pt then completed throwing of bean bags to designated colors on floor using LUE, OT calling out a sequence of colors ranging up to 3-4 total with demand of performing a large arm swing to obtain bean bag on designated color, requiring immediate recall of sequence and then performing in reverse order for additional manipulation  Assessment: Tolerated treatment well  Pt demo improved ability to increase rate of manipulation during timed trial tasks today  Pt demo significant fatigue with cross body movements, decreased ROM in beginning which improved with repetition  Pt demo significant challenge of following a colored sequence pattern and when completing in reverse order due to immediate memory recall deficits  Patient would benefit from continued skilled OT  Plan: Continued skilled OT per POC      INTERVENTION COMMENTS:  Diagnosis: Parkinson's disease ([facility] [address] ) [G20]  Precautions: Confusion, Safety  FOTO: completed 5/4  Insurance: Payor: Melia Espinosa / Plan: MEDICARE A AND B / Product Type: Medicare A & B Fee for Service /   1 YC 67 DHRGXY (change next session to 1 of 10), PN VDC 2/7/6425  POC Expires: 6/3/2022

## 2022-05-11 ENCOUNTER — OFFICE VISIT (OUTPATIENT)
Dept: OCCUPATIONAL THERAPY | Facility: CLINIC | Age: 87
End: 2022-05-11
Payer: MEDICARE

## 2022-05-11 ENCOUNTER — OFFICE VISIT (OUTPATIENT)
Dept: PHYSICAL THERAPY | Facility: CLINIC | Age: 87
End: 2022-05-11
Payer: MEDICARE

## 2022-05-11 DIAGNOSIS — G20 PARKINSON'S DISEASE (HCC): Primary | ICD-10-CM

## 2022-05-11 DIAGNOSIS — F02.81 DEMENTIA IN OTHER DISEASES CLASSIFIED ELSEWHERE WITH BEHAVIORAL DISTURBANCE (HCC): ICD-10-CM

## 2022-05-11 PROCEDURE — 97150 GROUP THERAPEUTIC PROCEDURES: CPT | Performed by: PHYSICAL THERAPIST

## 2022-05-11 PROCEDURE — 97112 NEUROMUSCULAR REEDUCATION: CPT | Performed by: PHYSICAL THERAPIST

## 2022-05-11 PROCEDURE — 97530 THERAPEUTIC ACTIVITIES: CPT

## 2022-05-11 PROCEDURE — 97110 THERAPEUTIC EXERCISES: CPT

## 2022-05-11 NOTE — PROGRESS NOTES
Occupational Therapy Daily Note:    Today's date: 2022  Patient name: Jamie Hunt  : 1935  MRN: [id number]  Referring provider: Ute Murcia MD  Dx:   Encounter Diagnoses   Name Primary?  Parkinson's disease ([facility] ) Yes    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 ([facility] )        Subjective: "I think that's the hardest I worked yet"  Objective: Pt engaged in skilled OT treatment session with focus on fxnl attention, standing tolerance, standing balance, UE NMR, UE strengthening, UE endurance, FMC/GMC and FMS/GMS to increase engagement, endurance, tolerance, and independence with daily ADL and IADL tasks  Pt arrived to OT session 45 min late  CPT Code Minutes                                           Task Details        Therapeutic Activity 35 Pt engaged in dynamic activity to improve functional performance in fwd reaching, dynamic standing balance and fm skills promoting indep in sinkside grooming tasks and kitchen mngt tasks  Pt engaged in small peg pattern copy using right hand for tweezer negotiation to retrieve and insert small pegs to target board stabilizing cup with left hand, pt donning 1  5#CW for added challenge of proximal strength and tremor reduction  Neuro Re-Ed               Therapeutic Exercise 10 For UB exercise benefit and to increase overall cardiovascular health, proximal strength, gross grasp and endurance, pt engaged in UEB for 5 min prograde/5 min retrograde increasing resistance to L 2 0  Manual          Self Care           Assessment: Tolerated treatment well  Min tremors noted to LUE during activity donning 1 5# CW  Pt accurate for small peg placement stabilizing tweezers using tripod grasp with min droppage noted  Pt completed activity in stance for about 30 min w/o rest break  Patient would benefit from continued skilled OT      Plan: Continued skilled OT per POC    INTERVENTION COMMENTS:  Diagnosis: Parkinson's disease ([facility] [address] ) [G20]  Precautions: Confusion, Safety  FOTO: completed 5/4  Insurance: Payor: Hafsa Muse / Plan: MEDICARE A AND B / Product Type: Medicare A & B Fee for Service /   7  91 QRLEOR (change next session to 1 of 10), VARSHA SUTTON 3/8/7888  POC Expires: 6/3/2022

## 2022-05-11 NOTE — PROGRESS NOTES
Daily Note     Today's date: 2022  Patient name: Behzad Rollins  : 1935  MRN: [id number]  Referring provider: Kady Bazan MD  Dx:   Encounter Diagnosis     ICD-10-CM    1  Parkinson's disease ([address] )  G20                   Subjective: Pt arrived 45 min late for OT session  He reports being tired      Objective: See treatment diary below      Assessment: Pt was fatigued by therapist attempted to push him through fatigue rather than let him rest  He was able to continue to push through it  Completed outdoor walk this session up and down uneven sidewalk without instance of unsteadiness or getting feet caught on sidewalk  Completed incline walking o ntreadmill as well today with good success  PT grouped: 12:30-12:40  Unsupervised 12:40-12:50  1:1 12: 50-1:30pm    Plan: Continue per plan of care        Precautions: Afib, unruptured thoracic aneurysm, dementia        Manuals                                                        Neuro Re-Ed             High knee marching 4# aw 4 laps //bars 4# aw 5 laps //bars 4# aw 5 laps //bars  //bars (long)  4# B/L AW   //bars (long)  4# B/L AW  //bars (long)  4# B/L AW 4 laps   Hurdles  12" hurdles fwd/lat ea 3 laps no UE 12" hurdles fwd/lat ea 4 laps no UE 4# aw 12 fwd/lat ea no UE 4# aw ea  /bars (long) 4# B/L AW 12" (5) x 4 laps Fwd    Lat x 4 laps   /bars (long) 4# B/L AW 12" (5) x 4 laps Fwd 12" fwd 4# 4 laps //bars  12" fwd 4# 4 laps //bars fwd/lat ea no ue   Backwards ambulation  4 laps no UE 4# aw hallwa 40'x 2 no UE    //bars (long) 4# B/L AW Foam beams Fwd/Bkw walking x 3 laps      Foam beams tandem/side stepping 3 laps no UE 4# aw Side stepping 4# aw min UE   Tandem walking no beams 4 laps no UE //bars     //bars (long) 4# B/L AW  sidestepping Foam beams x                Standing rotation ball pass to therapist             Standing reaching to tap post it             Side steping   3 laps no UE 4# aw ea Step ups 8" 15x ea fwd/lat 8" fwd/lat 15x ea 8" fwd/lat 15x ea     " step with airex 15x ea fwd/lat 4# aw  8" with airex 15x ea LE                Ther Ex             STS       2x10    3x10   Nustep             Step ups     3# aw 8" step 15x ea fwd  3# aw 8" step 15x ea fwd      Treadmill   10 min 1 8 mph no incline 10 min 1 8 mph no ma  No SOLO  No AW  1 8 mph no incline x 10 min   No solo 1 8 mph no incline 10 min  No solo 1 8 mph no incline 10 min                                                       Ther Activity                                       Gait Training             Walking with arm swings   Outdoor sidewalk hill - 10 min                       Modalities

## 2022-05-11 NOTE — PROGRESS NOTES
Daily Note     Today's date: 2022  Patient name: Carlos Horton  : 1935  MRN: [id number]  Referring provider: Marilyn Hodgkins, MD  Dx:   Encounter Diagnosis     ICD-10-CM    1  Parkinson's disease ([facility] [address] )  G20                   Subjective: Pt reports feeling tired today  Pt showed up at wrong time and earlier forgot appointment  Eventually came to session this PM        Objective: See treatment diary below      Assessment: Pt continues to be fatigued by end of sessions  Attempted backwards walking in hallway instead of //bars today which he was able to complete without loss of balance backwards  Plan to continue to complet emore walking in ahllway without UE assist      Group: 4-:10  1:1: 4:10-:35  Self directed: :35-5    Plan: Continue per plan of care        Precautions: Afib, unruptured thoracic aneurysm, dementia        Manuals                                                        Neuro Re-Ed             High knee marching 4# aw 4 laps //bars 4# aw 5 laps //bars 4# aw 5 laps //bars  //bars (long)  4# B/L AW   //bars (long)  4# B/L AW  //bars (long)  4# B/L AW 4 laps   Hurdles  12" hurdles fwd/lat ea 3 laps no UE 12" hurdles fwd/lat ea 4 laps no UE 4# aw 12 fwd/lat ea no UE 4# aw ea  /bars (long) 4# B/L AW 12" (5) x 4 laps Fwd    Lat x 4 laps   /bars (long) 4# B/L AW 12" (5) x 4 laps Fwd 12" fwd 4# 4 laps //bars  12" fwd 4# 4 laps //bars fwd/lat ea no ue   Backwards ambulation  4 laps no UE 4# aw hallwa 40'x 2 no UE    //bars (long) 4# B/L AW Foam beams Fwd/Bkw walking x 3 laps      Foam beams tandem/side stepping 3 laps no UE 4# aw Side stepping 4# aw min UE No UE 4# aw 3 laps  Tandem walking no beams 4 laps no UE //bars     //bars (long) 4# B/L AW  sidestepping Foam beams x                Standing rotation ball pass to therapist             Standing reaching to tap post it             Side steping   3 laps no UE 4# aw ea          Step ups 8" 15x ea fwd/lat 8" fwd/lat 15x ea 8" fwd/lat 15x ea     " step with airex 15x ea fwd/lat 4# aw  8" with airex 15x ea LE                Ther Ex             STS       2x10    3x10   Nustep             Step ups     3# aw 8" step 15x ea fwd  3# aw 8" step 15x ea fwd      Treadmill   10 min 1 8 mph no incline 10 min 1 8 mph no ma  No SOLO  No AW  1 8 mph no incline x 10 min   No solo 1 8 mph no incline 10 min  No solo 1 8 mph no incline 10 min                                                       Ther Activity                                       Gait Training             Walking with arm swings                          Modalities

## 2022-05-12 ENCOUNTER — TELEPHONE (OUTPATIENT)
Dept: NEUROLOGY | Facility: CLINIC | Age: 87
End: 2022-05-12

## 2022-05-12 NOTE — TELEPHONE ENCOUNTER
Called and left detailed message to pt for upcoming appt w/ Juan Llamas  Pt has to call back to confirm

## 2022-05-16 ENCOUNTER — APPOINTMENT (OUTPATIENT)
Dept: PHYSICAL THERAPY | Facility: CLINIC | Age: 87
End: 2022-05-16
Payer: MEDICARE

## 2022-05-16 ENCOUNTER — TELEPHONE (OUTPATIENT)
Dept: INTERNAL MEDICINE CLINIC | Facility: CLINIC | Age: 87
End: 2022-05-16
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
## 2022-05-17 ENCOUNTER — TELEPHONE (OUTPATIENT)
Dept: INTERNAL MEDICINE CLINIC | Facility: CLINIC | Age: 87
End: 2022-05-17

## 2022-05-17 ENCOUNTER — OFFICE VISIT (OUTPATIENT)
Dept: INTERNAL MEDICINE CLINIC | Facility: CLINIC | Age: 87
End: 2022-05-17
Payer: MEDICARE

## 2022-05-17 VITALS
DIASTOLIC BLOOD PRESSURE: 68 MMHG | OXYGEN SATURATION: 99 % | WEIGHT: 236 LBS | HEART RATE: 63 BPM | HEIGHT: 75 IN | BODY MASS INDEX: 29.34 KG/M2 | SYSTOLIC BLOOD PRESSURE: 120 MMHG

## 2022-05-17 DIAGNOSIS — G20 PARKINSON'S DISEASE (HCC): Primary | ICD-10-CM

## 2022-05-17 DIAGNOSIS — Z91.89 DRIVING SAFETY ISSUE: ICD-10-CM

## 2022-05-17 PROCEDURE — 99214 OFFICE O/P EST MOD 30 MIN: CPT | Performed by: INTERNAL MEDICINE

## 2022-05-17 NOTE — ASSESSMENT & PLAN NOTE
Pt received a letter stating that he should not drive  Patient does have risk factors including Parkinson's and some mild cognitive impairment, he wants to do further testing for this to see about his ability to drive  Patient referred for fitness to drive evaluation, and then he could always do the driving test if they require that

## 2022-05-17 NOTE — TELEPHONE ENCOUNTER
As pt was leaving his visit today, 5/17/22, he mentioned one of his blood pressure pills (unsure which one) has been giving him a stomach ache after he takes it in the morning  He says this subsides in about 15 minutes  He denies any other symptoms  He said he forgot to mention it during his appointment, but wanted to address it with you

## 2022-05-17 NOTE — PATIENT INSTRUCTIONS
Problem List Items Addressed This Visit          Nervous and Auditory    Parkinson's disease ([facility] [address] ) - Primary     Follow up neurology           Relevant Orders    Ambulatory Referral to OT  Adaptability Evaluation       Other    Driving safety issue     Pt received a letter stating that he should not drive  Patient does have risk factors including Parkinson's and some mild cognitive impairment, he wants to do further testing for this to see about his ability to drive  Patient referred for fitness to drive evaluation, and then he could always do the driving test if they require that             Relevant Orders    Ambulatory Referral to OT  Adaptability Evaluation

## 2022-05-17 NOTE — PROGRESS NOTES
Assessment/Plan:    Parkinson's disease ([facility] [facility] )  Follow up neurology    Driving safety issue  Pt received a letter stating that he should not drive  Patient does have risk factors including Parkinson's and some mild cognitive impairment, he wants to do further testing for this to see about his ability to drive  Patient referred for fitness to drive evaluation, and then he could always do the driving test if they require that  Diagnoses and all orders for this visit:    Parkinson's disease [facility])  -     Ambulatory Referral to OT  Adaptability Evaluation; Future    Driving safety issue  -     Ambulatory Referral to OT  Adaptability Evaluation; Future        Subjective:      Patient ID: Braulio Park is a 82 y o  male  Pt upset that he got a letter that he cannot drive  Wife reports he just drives around town, and that now the burden of driving is being pushed on her  Pt reports this is a death sentence for him if he were to lose his license  The following portions of the patient's history were reviewed and updated as appropriate: allergies, current medications, past family history, past medical history, past social history, past surgical history and problem list     Review of Systems   Constitutional: Negative for chills and fever  Respiratory: Negative for shortness of breath  Cardiovascular: Negative for chest pain  Neurological: Negative for dizziness, light-headedness and headaches  Objective:      /68 (BP Location: Left arm, Patient Position: Sitting, Cuff Size: Standard)   Pulse 63   Ht 6' 2 5" (1 892 m)   Wt 107 kg (236 lb)   SpO2 99%   BMI 29 90 kg/m²          Physical Exam  Cardiovascular:      Rate and Rhythm: Normal rate  Rhythm irregular  Heart sounds: Murmur (systolic) heard  Pulmonary:      Effort: Pulmonary effort is normal  No respiratory distress  Breath sounds: Normal breath sounds  Neurological:      Mental Status: He is alert  Comments: Recall of 3 words is 1/3,  Can spell WORLD backwards after a couple tries, resting tremor, pt able to get out of chair without problem

## 2022-05-18 ENCOUNTER — APPOINTMENT (OUTPATIENT)
Dept: OCCUPATIONAL THERAPY | Facility: CLINIC | Age: 87
End: 2022-05-18
Payer: MEDICARE

## 2022-05-18 ENCOUNTER — APPOINTMENT (OUTPATIENT)
Dept: PHYSICAL THERAPY | Facility: CLINIC | Age: 87
End: 2022-05-18
Payer: MEDICARE

## 2022-05-18 NOTE — TELEPHONE ENCOUNTER
Can let patient know that I am aware of his concern about the driving notice, and at his face-to-face appointment last evening I did put in a referral to him to get that fitness to drive evaluation as he requested

## 2022-05-18 NOTE — TELEPHONE ENCOUNTER
I spoke with the patient and he will start taking food with medication  He has not done that  He also stated he received a letter from the state telling him he needs to send in his drivers license  He asked if there was something you could do   I know  [employer] had a program for testing, but I am not sure it is still done  Could you please check and advise  Patient would like a call back on his cell    Thank You

## 2022-05-21 ENCOUNTER — TELEPHONE (OUTPATIENT)
Dept: OTHER | Facility: OTHER | Age: 87
End: 2022-05-21

## 2022-05-23 ENCOUNTER — APPOINTMENT (OUTPATIENT)
Dept: PHYSICAL THERAPY | Facility: CLINIC | Age: 87
End: 2022-05-23
Payer: MEDICARE

## 2022-05-23 ENCOUNTER — TELEPHONE (OUTPATIENT)
Dept: NEUROLOGY | Facility: CLINIC | Age: 87
End: 2022-05-23

## 2022-05-25 ENCOUNTER — APPOINTMENT (OUTPATIENT)
Dept: PHYSICAL THERAPY | Facility: CLINIC | Age: 87
End: 2022-05-25
Payer: MEDICARE

## 2022-06-06 ENCOUNTER — OFFICE VISIT (OUTPATIENT)
Dept: CARDIOLOGY CLINIC | Facility: CLINIC | Age: 87
End: 2022-06-06
Payer: MEDICARE

## 2022-06-06 VITALS
DIASTOLIC BLOOD PRESSURE: 78 MMHG | HEART RATE: 65 BPM | SYSTOLIC BLOOD PRESSURE: 138 MMHG | BODY MASS INDEX: 29.71 KG/M2 | WEIGHT: 234.5 LBS

## 2022-06-06 DIAGNOSIS — I71.2 THORACIC AORTIC ANEURYSM WITHOUT RUPTURE (HCC): ICD-10-CM

## 2022-06-06 DIAGNOSIS — I48.21 PERMANENT ATRIAL FIBRILLATION (HCC): Primary | ICD-10-CM

## 2022-06-06 DIAGNOSIS — I35.0 NONRHEUMATIC AORTIC VALVE STENOSIS: ICD-10-CM

## 2022-06-06 DIAGNOSIS — I10 BENIGN ESSENTIAL HYPERTENSION: ICD-10-CM

## 2022-06-06 PROCEDURE — 93000 ELECTROCARDIOGRAM COMPLETE: CPT | Performed by: INTERNAL MEDICINE

## 2022-06-06 PROCEDURE — 99214 OFFICE O/P EST MOD 30 MIN: CPT | Performed by: INTERNAL MEDICINE

## 2022-06-06 NOTE — PROGRESS NOTES
Cardiology Consultation     Dottie Smith  [id number]  1935  [facility] [place] [address] [place] [id number]    1  Permanent atrial fibrillation (HCC)  POCT ECG   2  Benign essential hypertension     3  Thoracic aortic aneurysm without rupture ([address] )     4  Nonrheumatic aortic valve stenosis         Discussion/Summary:    1  Permanent atrial fibrillation - His heart rates to run low at times, but averaging normal   His lightheadedness appears more related orthostatic hypotension and his Parkinson's disease  He will remain on the same dose of Toprol-XL and is on Xarelto for stroke prevention  2   Mild mitral and aortic stenosis - Not changed on recent echocardiogram   No other issues from this standpoint  3   Mild dilation of the ascending aorta - This measured 4 3 cm  After our next visit we will repeat an echocardiogram   Continue beta-blocker  We will see him back in 6 months  4   Hypertension - He is on a combination of Toprol-XL, telmisartan and Imdur  Given his orthostatic lightheadedness I am going to discontinue Imdur, and atypical agent for hypertension  If needed we could up titrate telmisartan  I encouraged him to check his blood pressure regularly at home  HPI:    Mr Manpreet Burkett comes in as a new patient to establish care given his cardiac history  He has a history of permanent atrial fibrillation, mild aortic and mitral valve stenosis, and mild dilation of the thoracic aorta  He also has hypertension  He formally followed with Dr Ozzy Lockhart  We met him during a hospitalization last June in which she presented with mechanical fall without lightheadedness or syncope  His heart rates were running a little low but they were stable  This was not associated with his fall  He does have a history of Parkinson's disease and some associated memory deficits and orthostatic hypotension  He is treated for hypertension and on somewhat of an atypical regimen  After his hospitalization he wore a 48 hour Holter monitor that showed an average heart rate of 60 beats per minute  He did wear a 7 day extended ambulatory Holter, but we do not have the results of that  He did follow-up with Dr Tobie Cockayne after his hospitalization  He did have an updated echocardiogram that showed no change  He does have mild dilation of the ascending aorta and mild aortic and mitral stenosis  In follow-up his propranolol was changed over to metoprolol  Scottyjaime Brambila has not had any fallen since that hospitalization  He does get periodically lightheaded which sounds positional or ambulatory  It seems associated with orthostatic changes  He denies any near-syncope or syncope  No chest pain or any symptoms of angina  He does not feel his atrial fibrillation  No palpitations  He denies shortness of breath or any signs/symptoms of CHF        Patient Active Problem List   Diagnosis    Persistent atrial fibrillation ([facility] [address] [facility] )    Pure hypercholesterolemia    Benign essential hypertension    Tremor of left hand    Acute non-recurrent maxillary sinusitis    Bloating    Anxiety    Dyspnea on exertion    Esophageal reflux    [facility]    URI (upper respiratory infection)    Medicare annual wellness visit, subsequent    Chest pain    Acute left-sided low back pain with left-sided sciatica    Systolic murmur    Thoracic aortic aneurysm without rupture (HCC)    SDH (subdural hematoma) (HCC)    Skin lesion    Seasonal allergic rhinitis due to pollen    Lightheadedness    Hordeolum externum of right lower eyelid    Aortic stenosis    Bradycardia    Fall    Abdominal wall hematoma    Acute blood loss anemia    Left shoulder pain    Closed left clavicular fracture    Displaced fracture of shaft of left clavicle with routine healing    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 ([place] [address] )    Driving safety issue     Past Medical History:   Diagnosis Date    A-fib ([place] [address] )     Afib ([place] [address] )     Atrial fibrillation ([place] [address] ) 6/13/2021    Hypertension     Hypertension 6/13/2021    Left AC separation     Parkinson's disease ([place] [address] ) 11/01/2018     Social History     Socioeconomic History    Marital status: /Civil Union     Spouse name: Not on file    Number of children: Not on file    Years of education: Not on file    Highest education level: Not on file   Occupational History    Not on file   Tobacco Use    Smoking status: Never Smoker    Smokeless tobacco: Never Used    Tobacco comment: Quit 35-40 years ago   Vaping Use    Vaping Use: Never used   Substance and Sexual Activity    Alcohol use: Not Currently     Comment: Rare, previously moderate drinker cut down 2016       Drug use: Never    Sexual activity: Not Currently     Partners: Female     Birth control/protection: Post-menopausal   Other Topics Concern    Not on file   Social History Narrative    ** Merged History Encounter **          Social Determinants of Health     Financial Resource Strain: Not on file   Food Insecurity: Not on file   Transportation Needs: Not on file   Physical Activity: Not on file   Stress: Not on file   Social Connections: Not on file   Intimate Partner Violence: Not on file   Housing Stability: Not on file      Family History   Problem Relation Age of Onset    No Known Problems Mother     Hypertension Father     Coronary artery disease Father      Past Surgical History:   Procedure Laterality Date    CARDIOVERSION      ATRIAL    ROTATOR CUFF REPAIR      SHOULDER SURGERY         Current Outpatient Medications:     acetaminophen (TYLENOL) 325 mg tablet, Take 2 tablets (650 mg total) by mouth every 4 (four) hours as needed for mild pain, Disp: , Rfl: 0    carbidopa-levodopa (SINEMET)  mg per tablet, Take 1 tablet by mouth 3 (three) times a day, Disp: 270 tablet, Rfl: 0   escitalopram (LEXAPRO) 10 mg tablet, take 1 tablet by mouth once daily, Disp: 30 tablet, Rfl: 5    ipratropium (ATROVENT) 0 06 % nasal spray, 1 SPRAY INTO EACH NOSTRIL 4 (FOUR) TIMES A DAY AS NEEDED FOR RHINITIS, Disp: 45 mL, Rfl: 0    metoprolol succinate (TOPROL-XL) 50 mg 24 hr tablet, Take 1 tablet (50 mg total) by mouth daily, Disp: 90 tablet, Rfl: 0    Multiple Vitamins-Minerals (ICAPS AREDS 2) CAPS, Take 1 capsule by mouth 2 (two) times a day  , Disp: , Rfl:     telmisartan (MICARDIS) 20 MG tablet, Take 0 5 tablets (10 mg total) by mouth daily, Disp: 45 tablet, Rfl: 0    Xarelto 15 MG tablet, Take 1 tablet (15 mg total) by mouth daily with breakfast, Disp: 90 tablet, Rfl: 0    loratadine (CLARITIN) 10 mg tablet, Take 1 tablet (10 mg total) by mouth daily (Patient not taking: No sig reported), Disp: 90 tablet, Rfl: 0  Allergies   Allergen Reactions    Penicillins Hives    Pollen Extract Other (See Comments)              Labs:  Lab Results   Component Value Date     05/05/2014    K 3 7 10/04/2021    K 4 0 05/05/2014     10/04/2021     05/05/2014    CO2 25 10/04/2021    CO2 28 06/13/2021    BUN 15 10/04/2021    BUN 13 05/05/2014    CREATININE 0 79 10/04/2021    CREATININE 0 98 05/05/2014    GLUCOSE 120 06/13/2021    GLUCOSE 110 05/05/2014    CALCIUM 9 4 10/04/2021    CALCIUM 9 1 05/05/2014     Lab Results   Component Value Date    WBC 8 26 10/04/2021    WBC 6 46 05/05/2014    HGB 12 5 10/04/2021    HGB 13 6 05/05/2014    HCT 38 0 10/04/2021    HCT 41 1 05/05/2014    MCV 90 10/04/2021    MCV 87 05/05/2014     10/04/2021     05/05/2014     Lab Results   Component Value Date    CHOL 203 05/05/2014    TRIG 165 (H) 04/06/2019    TRIG 197 05/05/2014    HDL 40 04/06/2019    HDL 40 05/05/2014     Imaging:  ECG today shows atrial fibrillation at 65 bpm     ECHO (10/2021):  1   Mild left ventricular hypertrophy with normal systolic function, EF   ~84-12%   2   Moderate biatrial enlargement   3   Mildly elevated PA pressure   4   Calcified aortic valve with mild aortic stenosis   5   Mild mitral sclerosis with mild annular calcification and mild mitral   regurgitation   Compared to prior study April 12, 2021, there have been no major changes  Review of Systems:  Review of Systems   Constitutional: Negative  HENT: Negative  Eyes: Negative  Respiratory: Negative  Cardiovascular: Negative  Gastrointestinal: Negative  Musculoskeletal: Negative  Skin: Negative  Allergic/Immunologic: Negative  Neurological: Positive for tremors and light-headedness  Hematological: Negative  Psychiatric/Behavioral: Negative  All other systems reviewed and are negative  Vitals:    06/06/22 1312   BP: 138/78   BP Location: Right arm   Patient Position: Sitting   Cuff Size: Standard   Pulse: 65   Weight: 106 kg (234 lb 8 oz)       Physical Exam:  Physical Exam  Vitals and nursing note reviewed  Constitutional:       Appearance: He is well-developed  HENT:      Head: Normocephalic and atraumatic  Eyes:      General: No scleral icterus  Right eye: No discharge  Left eye: No discharge  Pupils: Pupils are equal, round, and reactive to light  Neck:      Thyroid: No thyromegaly  Vascular: No JVD  Cardiovascular:      Rate and Rhythm: Normal rate  Rhythm regularly irregular  No extrasystoles are present  Pulses: Normal pulses  No decreased pulses  Heart sounds: S1 normal and S2 normal  Murmur heard  Systolic murmur is present with a grade of 2/6  No friction rub  No gallop  Pulmonary:      Effort: Pulmonary effort is normal  No respiratory distress  Breath sounds: Normal breath sounds  No wheezing or rales  Abdominal:      General: Bowel sounds are normal  There is no distension  Palpations: Abdomen is soft  Tenderness: There is no abdominal tenderness     Musculoskeletal:         General: No tenderness or deformity  Normal range of motion  Cervical back: Normal range of motion and neck supple  Skin:     General: Skin is warm and dry  Findings: No rash  Neurological:      Mental Status: He is alert and oriented to person, place, and time  Cranial Nerves: No cranial nerve deficit  Psychiatric:         Thought Content: Thought content normal          Judgment: Judgment normal        Counseling / Coordination of Care  Total office time spent today 40 minutes  Greater than 50% of total time was spent with the patient and / or family counseling and / or coordination of care

## 2022-06-09 ENCOUNTER — TELEPHONE (OUTPATIENT)
Dept: INTERNAL MEDICINE CLINIC | Facility: CLINIC | Age: 87
End: 2022-06-09
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
## 2022-06-10 NOTE — TELEPHONE ENCOUNTER
Let pt know that some of his symptoms could be from Parkinsons, and I recommend he make it to his next appt with Neurology (July 7th I believe), he missed his last appt with them  Can make sure pt not taking OTC meds that can cause somebody to feel "overdrugged" such as benadryl, cough medicine, or other allergy/cold meds  He could also try decreasing his escitalopram from 10 mg daily to 5 mg daily to see if improvement  If pt developing other symptoms such as lethargy, fevers, chills, stiff neck, pain with urination, or increasing confusion, then he should get to ED as recommended previously

## 2022-06-12 DIAGNOSIS — I48.19 PERSISTENT ATRIAL FIBRILLATION (HCC): ICD-10-CM

## 2022-06-13 NOTE — TELEPHONE ENCOUNTER
Spoke with pt and advised  Pt verbalized understanding  Provided pt with the details of his neuro appointment in July, such as time, location, phone number, and provider's name  Pt is also asking if there is a doctor you can refer him to for his Parkinson's  He is requesting a male doctor

## 2022-06-14 ENCOUNTER — APPOINTMENT (EMERGENCY)
Dept: RADIOLOGY | Facility: HOSPITAL | Age: 87
End: 2022-06-14
Payer: MEDICARE

## 2022-06-14 ENCOUNTER — HOSPITAL ENCOUNTER (EMERGENCY)
Facility: HOSPITAL | Age: 87
Discharge: HOME | End: 2022-06-14
Attending: EMERGENCY MEDICINE | Admitting: EMERGENCY MEDICINE
Payer: MEDICARE

## 2022-06-14 ENCOUNTER — APPOINTMENT (EMERGENCY)
Dept: CT IMAGING | Facility: HOSPITAL | Age: 87
End: 2022-06-14
Payer: MEDICARE

## 2022-06-14 ENCOUNTER — TELEPHONE (OUTPATIENT)
Dept: INTERNAL MEDICINE CLINIC | Facility: CLINIC | Age: 87
End: 2022-06-14

## 2022-06-14 VITALS
BODY MASS INDEX: 30.61 KG/M2 | OXYGEN SATURATION: 99 % | RESPIRATION RATE: 18 BRPM | WEIGHT: 241.62 LBS | HEART RATE: 64 BPM | SYSTOLIC BLOOD PRESSURE: 176 MMHG | DIASTOLIC BLOOD PRESSURE: 95 MMHG | TEMPERATURE: 97.5 F

## 2022-06-14 DIAGNOSIS — R42 DIZZINESS: Primary | ICD-10-CM

## 2022-06-14 LAB
2HR DELTA HS TROPONIN: 0 NG/L
ALBUMIN SERPL BCP-MCNC: 4.1 G/DL (ref 3.5–5)
ALP SERPL-CCNC: 93 U/L (ref 34–104)
ALT SERPL W P-5'-P-CCNC: 10 U/L (ref 7–52)
AMPHETAMINES SERPL QL SCN: NEGATIVE
ANION GAP SERPL CALCULATED.3IONS-SCNC: 9 MMOL/L (ref 4–13)
APAP SERPL-MCNC: <10 UG/ML (ref 10–20)
AST SERPL W P-5'-P-CCNC: 16 U/L (ref 13–39)
BARBITURATES UR QL: NEGATIVE
BASOPHILS # BLD AUTO: 0.04 THOUSANDS/ΜL (ref 0–0.1)
BASOPHILS NFR BLD AUTO: 1 % (ref 0–1)
BENZODIAZ UR QL: NEGATIVE
BILIRUB SERPL-MCNC: 0.94 MG/DL (ref 0.2–1)
BILIRUB UR QL STRIP: NEGATIVE
BUN SERPL-MCNC: 16 MG/DL (ref 5–25)
CALCIUM SERPL-MCNC: 9 MG/DL (ref 8.4–10.2)
CARDIAC TROPONIN I PNL SERPL HS: 6 NG/L
CARDIAC TROPONIN I PNL SERPL HS: 6 NG/L
CHLORIDE SERPL-SCNC: 105 MMOL/L (ref 96–108)
CLARITY UR: CLEAR
CO2 SERPL-SCNC: 25 MMOL/L (ref 21–32)
COCAINE UR QL: NEGATIVE
COLOR UR: NORMAL
CREAT SERPL-MCNC: 0.84 MG/DL (ref 0.6–1.3)
EOSINOPHIL # BLD AUTO: 0.17 THOUSAND/ΜL (ref 0–0.61)
EOSINOPHIL NFR BLD AUTO: 2 % (ref 0–6)
ERYTHROCYTE [DISTWIDTH] IN BLOOD BY AUTOMATED COUNT: 13.9 % (ref 11.6–15.1)
ETHANOL SERPL-MCNC: <10 MG/DL
GFR SERPL CREATININE-BSD FRML MDRD: 79 ML/MIN/1.73SQ M
GLUCOSE SERPL-MCNC: 88 MG/DL (ref 65–140)
GLUCOSE UR STRIP-MCNC: NEGATIVE MG/DL
HCT VFR BLD AUTO: 40.4 % (ref 36.5–49.3)
HGB BLD-MCNC: 13.2 G/DL (ref 12–17)
HGB UR QL STRIP.AUTO: NEGATIVE
IMM GRANULOCYTES # BLD AUTO: 0.04 THOUSAND/UL (ref 0–0.2)
IMM GRANULOCYTES NFR BLD AUTO: 1 % (ref 0–2)
KETONES UR STRIP-MCNC: NEGATIVE MG/DL
LEUKOCYTE ESTERASE UR QL STRIP: NEGATIVE
LYMPHOCYTES # BLD AUTO: 1.86 THOUSANDS/ΜL (ref 0.6–4.47)
LYMPHOCYTES NFR BLD AUTO: 22 % (ref 14–44)
MCH RBC QN AUTO: 29.5 PG (ref 26.8–34.3)
MCHC RBC AUTO-ENTMCNC: 32.7 G/DL (ref 31.4–37.4)
MCV RBC AUTO: 90 FL (ref 82–98)
METHADONE UR QL: NEGATIVE
MONOCYTES # BLD AUTO: 0.85 THOUSAND/ΜL (ref 0.17–1.22)
MONOCYTES NFR BLD AUTO: 10 % (ref 4–12)
NEUTROPHILS # BLD AUTO: 5.36 THOUSANDS/ΜL (ref 1.85–7.62)
NEUTS SEG NFR BLD AUTO: 64 % (ref 43–75)
NITRITE UR QL STRIP: NEGATIVE
NRBC BLD AUTO-RTO: 0 /100 WBCS
OPIATES UR QL SCN: NEGATIVE
OXYCODONE+OXYMORPHONE UR QL SCN: NEGATIVE
PCP UR QL: NEGATIVE
PH UR STRIP.AUTO: 6 [PH]
PLATELET # BLD AUTO: 297 THOUSANDS/UL (ref 149–390)
PMV BLD AUTO: 9.3 FL (ref 8.9–12.7)
POTASSIUM SERPL-SCNC: 3.8 MMOL/L (ref 3.5–5.3)
PROT SERPL-MCNC: 6.9 G/DL (ref 6.4–8.4)
PROT UR STRIP-MCNC: NEGATIVE MG/DL
RBC # BLD AUTO: 4.47 MILLION/UL (ref 3.88–5.62)
SALICYLATES SERPL-MCNC: <5 MG/DL (ref 3–20)
SODIUM SERPL-SCNC: 139 MMOL/L (ref 135–147)
SP GR UR STRIP.AUTO: <=1.005 (ref 1–1.03)
THC UR QL: NEGATIVE
UROBILINOGEN UR QL STRIP.AUTO: 0.2 E.U./DL
WBC # BLD AUTO: 8.32 THOUSAND/UL (ref 4.31–10.16)

## 2022-06-14 PROCEDURE — G1004 CDSM NDSC: HCPCS

## 2022-06-14 PROCEDURE — 71045 X-RAY EXAM CHEST 1 VIEW: CPT

## 2022-06-14 PROCEDURE — 80143 DRUG ASSAY ACETAMINOPHEN: CPT | Performed by: EMERGENCY MEDICINE

## 2022-06-14 PROCEDURE — 99285 EMERGENCY DEPT VISIT HI MDM: CPT | Performed by: EMERGENCY MEDICINE

## 2022-06-14 PROCEDURE — 80179 DRUG ASSAY SALICYLATE: CPT | Performed by: EMERGENCY MEDICINE

## 2022-06-14 PROCEDURE — 70450 CT HEAD/BRAIN W/O DYE: CPT

## 2022-06-14 PROCEDURE — 85025 COMPLETE CBC W/AUTO DIFF WBC: CPT | Performed by: EMERGENCY MEDICINE

## 2022-06-14 PROCEDURE — 93005 ELECTROCARDIOGRAM TRACING: CPT

## 2022-06-14 PROCEDURE — 36415 COLL VENOUS BLD VENIPUNCTURE: CPT

## 2022-06-14 PROCEDURE — 80053 COMPREHEN METABOLIC PANEL: CPT | Performed by: EMERGENCY MEDICINE

## 2022-06-14 PROCEDURE — 99285 EMERGENCY DEPT VISIT HI MDM: CPT

## 2022-06-14 PROCEDURE — 80307 DRUG TEST PRSMV CHEM ANLYZR: CPT | Performed by: EMERGENCY MEDICINE

## 2022-06-14 PROCEDURE — 84484 ASSAY OF TROPONIN QUANT: CPT | Performed by: EMERGENCY MEDICINE

## 2022-06-14 PROCEDURE — 82077 ASSAY SPEC XCP UR&BREATH IA: CPT | Performed by: EMERGENCY MEDICINE

## 2022-06-14 NOTE — TELEPHONE ENCOUNTER
Patient walked in and was complaining that he was dizzy & light headed and took something he shouldn't have today  I took patients name & date of birth and he took a seat  I went and got [facility], they went out to the patient and started asking questions & taking his vital signs
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
## 2022-06-15 LAB
ATRIAL RATE: 241 BPM
QRS AXIS: 41 DEGREES
QRSD INTERVAL: 98 MS
QT INTERVAL: 438 MS
QTC INTERVAL: 395 MS
T WAVE AXIS: 53 DEGREES
VENTRICULAR RATE: 49 BPM

## 2022-06-15 PROCEDURE — 93010 ELECTROCARDIOGRAM REPORT: CPT | Performed by: INTERNAL MEDICINE

## 2022-06-16 ENCOUNTER — TELEPHONE (OUTPATIENT)
Dept: INTERNAL MEDICINE CLINIC | Facility: CLINIC | Age: 87
End: 2022-06-16

## 2022-06-16 NOTE — TELEPHONE ENCOUNTER
Patient's wife is asking what the  next step would be    The patient is still not feeling the greatest         Please advise

## 2022-06-16 NOTE — TELEPHONE ENCOUNTER
Lazaro Simpson patient:    Fernie Redd (wife) called in reports patient was at the ED 6/14/22 and they wanted to know what the results were to the labs, CT and EKG that was done while he was there and if he should be doing anything/directions?     Please advise

## 2022-06-16 NOTE — ED PROVIDER NOTES
History  Chief Complaint   Patient presents with    Weakness - Generalized     16-year-old male comes in for evaluation of generalized weakness  Patient states that he just has not been feeling right lately  Patient cannot verbalize what exactly does not feel right he states he just feels overall tired and weak  Patient does state that he does feel dizzy when I question him about dizziness and states that it is combination of feeling like the room is spinning and also like he is going to pass out  Patient denies any head injury  Patient denies any fever chills chest pain shortness breath nausea vomiting or diarrhea  Patient states that he recently went to the pharmacy and gotten injection and since then I have not felt right  He cannot tell me what kind of injection he got  I attempted to review his chart to find out what injection he got but I could not find any documentation about an injection being ordered  Id treat several notes where he peers to be confused about what medications he is taking and if he possibly took something he was not supposed to  I will check urine drug screen and coma panel for possible ingestion      History provided by:  Patient and medical records   used: No    Malaise - 7 years or greater  Severity:  Moderate  Onset quality:  Gradual  Timing:  Constant  Progression:  Worsening  Chronicity:  New  Context: not alcohol use, not drug use and not recent infection    Ineffective treatments:  None tried  Associated symptoms: no abdominal pain, no anorexia, no arthralgias, no ataxia, no chest pain, no cough, no dizziness, no foul-smelling urine, no headaches, no nausea and no seizures    Risk factors: neurologic disease    Risk factors: no anemia and no family hx of stroke        Prior to Admission Medications   Prescriptions Last Dose Informant Patient Reported? Taking?    Multiple Vitamins-Minerals (ICAPS AREDS 2) CAPS  Self Yes No   Sig: Take 1 capsule by mouth 2 (two) times a day     Xarelto 15 MG tablet  Self No No   Sig: Take 1 tablet (15 mg total) by mouth daily with breakfast   acetaminophen (TYLENOL) 325 mg tablet  Self No No   Sig: Take 2 tablets (650 mg total) by mouth every 4 (four) hours as needed for mild pain   carbidopa-levodopa (SINEMET)  mg per tablet  Self No No   Sig: Take 1 tablet by mouth 3 (three) times a day   escitalopram (LEXAPRO) 10 mg tablet  Self No No   Sig: take 1 tablet by mouth once daily   ipratropium (ATROVENT) 0 06 % nasal spray  Self No No   Si SPRAY INTO EACH NOSTRIL 4 (FOUR) TIMES A DAY AS NEEDED FOR RHINITIS   loratadine (CLARITIN) 10 mg tablet  Self No No   Sig: Take 1 tablet (10 mg total) by mouth daily   Patient not taking: No sig reported   metoprolol succinate (TOPROL-XL) 50 mg 24 hr tablet  Self No No   Sig: Take 1 tablet (50 mg total) by mouth daily   telmisartan (MICARDIS) 20 MG tablet  Self No No   Sig: Take 0 5 tablets (10 mg total) by mouth daily      Facility-Administered Medications: None       Past Medical History:   Diagnosis Date    A-fib ([place] [address] )     Afib (HCC)     Atrial fibrillation ([place] [address] ) 2021    Hypertension     Hypertension 2021    Left AC separation     Parkinson's disease ([place] [address] ) 2018       Past Surgical History:   Procedure Laterality Date    CARDIOVERSION      ATRIAL    ROTATOR CUFF REPAIR      SHOULDER SURGERY         Family History   Problem Relation Age of Onset    No Known Problems Mother     Hypertension Father     Coronary artery disease Father      I have reviewed and agree with the history as documented      E-Cigarette/Vaping    E-Cigarette Use Never User      E-Cigarette/Vaping Substances    Nicotine No     THC No     CBD No     Flavoring No     Other No     Unknown No      Social History     Tobacco Use    Smoking status: Never Smoker    Smokeless tobacco: Never Used    Tobacco comment: Quit 35-40 years ago   Vaping Use    Vaping Use: Never used Substance Use Topics    Alcohol use: Not Currently     Comment: Rare, previously moderate drinker cut down 2016   Drug use: Never       Review of Systems   Constitutional: Negative for activity change and appetite change  HENT: Negative for congestion and facial swelling  Eyes: Negative for discharge and redness  Respiratory: Negative for cough and wheezing  Cardiovascular: Negative for chest pain and leg swelling  Gastrointestinal: Negative for abdominal distention, abdominal pain, anorexia, blood in stool and nausea  Endocrine: Negative for cold intolerance and polydipsia  Genitourinary: Negative for difficulty urinating and hematuria  Musculoskeletal: Negative for arthralgias and gait problem  Skin: Negative for color change and rash  Allergic/Immunologic: Negative for food allergies and immunocompromised state  Neurological: Negative for dizziness, seizures and headaches  Hematological: Negative for adenopathy  Does not bruise/bleed easily  Psychiatric/Behavioral: Negative for agitation, confusion and decreased concentration  All other systems reviewed and are negative  Physical Exam  Physical Exam  Vitals and nursing note reviewed  Constitutional:       Appearance: He is well-developed  He is not toxic-appearing  HENT:      Head: Normocephalic and atraumatic  Right Ear: Tympanic membrane normal       Left Ear: Tympanic membrane normal       Nose: Nose normal    Eyes:      General: Lids are normal       Conjunctiva/sclera: Conjunctivae normal       Pupils: Pupils are equal, round, and reactive to light  Neck:      Vascular: No carotid bruit or JVD  Trachea: Trachea normal    Cardiovascular:      Rate and Rhythm: Normal rate and regular rhythm  No extrasystoles are present  Heart sounds: Normal heart sounds  Pulmonary:      Effort: Pulmonary effort is normal       Breath sounds: No decreased breath sounds, wheezing, rhonchi or rales     Chest: Chest wall: No deformity or tenderness  Abdominal:      General: Bowel sounds are normal       Palpations: Abdomen is soft  Tenderness: There is no abdominal tenderness  There is no guarding or rebound  Musculoskeletal:      Right shoulder: No swelling, deformity or tenderness  Normal range of motion  Cervical back: Normal range of motion and neck supple  No deformity, tenderness or bony tenderness  Lymphadenopathy:      Cervical: No cervical adenopathy  Skin:     General: Skin is warm and dry  Neurological:      Mental Status: He is alert  Cranial Nerves: No cranial nerve deficit  Sensory: No sensory deficit  Comments: Patient seems somewhat confused to me however patient's significant other states that he appears to be acting like himself   Psychiatric:         Speech: Speech normal          Behavior: Behavior normal          Thought Content: Thought content normal          Judgment: Judgment normal          Vital Signs  ED Triage Vitals [06/14/22 1738]   Temperature Pulse Respirations Blood Pressure SpO2   97 5 °F (36 4 °C) (!) 52 16 (!) 187/87 98 %      Temp Source Heart Rate Source Patient Position - Orthostatic VS BP Location FiO2 (%)   Oral Monitor Lying Right arm --      Pain Score       --           Vitals:    06/14/22 1738 06/14/22 2000   BP: (!) 187/87 (!) 176/95   Pulse: (!) 52 64   Patient Position - Orthostatic VS: Lying Sitting         Visual Acuity      ED Medications  Medications - No data to display    Diagnostic Studies  Results Reviewed     Procedure Component Value Units Date/Time    HS Troponin I 2hr [[id number]]  (Normal) Collected: 06/14/22 1951    Lab Status: Final result Specimen: Blood from Arm, Left Updated: 06/14/22 2023     hs TnI 2hr 6 ng/L      Delta 2hr hsTnI 0 ng/L     Acetaminophen level-If concentration is detectable, please discuss with medical  on call   [[id number]]  (Abnormal) Collected: 06/14/22 1846    Lab Status: Final result Specimen: Blood from Arm, Left Updated: 06/14/22 1931     Acetaminophen Level <43 ug/mL     Salicylate level [[id number]]  (Normal) Collected: 06/14/22 1846    Lab Status: Final result Specimen: Blood from Arm, Left Updated: 25/44/70 5373     Salicylate Lvl <5 mg/dL     UA w Reflex to Microscopic w Reflex to Culture [[id number]] Collected: 06/14/22 1847    Lab Status: Final result Specimen: Urine, Clean Catch Updated: 06/14/22 1926     Color, UA Light Yellow     Clarity, UA Clear     Specific Gravity, UA <=1 005     pH, UA 6 0     Leukocytes, UA Negative     Nitrite, UA Negative     Protein, UA Negative mg/dl      Glucose, UA Negative mg/dl      Ketones, UA Negative mg/dl      Urobilinogen, UA 0 2 E U /dl      Bilirubin, UA Negative     Blood, UA Negative    Ethanol [[id number]]  (Normal) Collected: 06/14/22 1846    Lab Status: Final result Specimen: Blood from Arm, Left Updated: 06/14/22 1918     Ethanol Lvl <10 mg/dL     Rapid drug screen, urine [[id number]]  (Normal) Collected: 06/14/22 1846    Lab Status: Final result Specimen: Urine, Clean Catch Updated: 06/14/22 1906     Amph/Meth UR Negative     Barbiturate Ur Negative     Benzodiazepine Urine Negative     Cocaine Urine Negative     Methadone Urine Negative     Opiate Urine Negative     PCP Ur Negative     THC Urine Negative     Oxycodone Urine Negative    Narrative:      FOR MEDICAL PURPOSES ONLY  IF CONFIRMATION NEEDED PLEASE CONTACT THE LAB WITHIN 5 DAYS      Drug Screen Cutoff Levels:  AMPHETAMINE/METHAMPHETAMINES  1000 ng/mL  BARBITURATES     200 ng/mL  BENZODIAZEPINES     200 ng/mL  COCAINE      300 ng/mL  METHADONE      300 ng/mL  OPIATES      300 ng/mL  PHENCYCLIDINE     25 ng/mL  THC       50 ng/mL  OXYCODONE      100 ng/mL    HS Troponin 0hr (reflex protocol) [[id number]]  (Normal) Collected: 06/14/22 1744    Lab Status: Final result Specimen: Blood from Arm, Left Updated: 06/14/22 1824     hs TnI 0hr 6 ng/L     Comprehensive metabolic panel [[id number]] Collected: 06/14/22 1744    Lab Status: Final result Specimen: Blood from Arm, Left Updated: 06/14/22 1816     Sodium 139 mmol/L      Potassium 3 8 mmol/L      Chloride 105 mmol/L      CO2 25 mmol/L      ANION GAP 9 mmol/L      BUN 16 mg/dL      Creatinine 0 84 mg/dL      Glucose 88 mg/dL      Calcium 9 0 mg/dL      AST 16 U/L      ALT 10 U/L      Alkaline Phosphatase 93 U/L      Total Protein 6 9 g/dL      Albumin 4 1 g/dL      Total Bilirubin 0 94 mg/dL      eGFR 79 ml/min/1 73sq m     Narrative:      [facility] [place] guidelines for Chronic Kidney Disease (CKD):     Stage 1 with normal or high GFR (GFR > 90 mL/min/1 73 square meters)    Stage 2 Mild CKD (GFR = 60-89 mL/min/1 73 square meters)    Stage 3A Moderate CKD (GFR = 45-59 mL/min/1 73 square meters)    Stage 3B Moderate CKD (GFR = 30-44 mL/min/1 73 square meters)    Stage 4 Severe CKD (GFR = 15-29 mL/min/1 73 square meters)    Stage 5 End Stage CKD (GFR <15 mL/min/1 73 square meters)  Note: GFR calculation is accurate only with a steady state creatinine    CBC and differential [[id number]] Collected: 06/14/22 1744    Lab Status: Final result Specimen: Blood from Arm, Left Updated: 06/14/22 1753     WBC 8 32 Thousand/uL      RBC 4 47 Million/uL      Hemoglobin 13 2 g/dL      Hematocrit 40 4 %      MCV 90 fL      MCH 29 5 pg      MCHC 32 7 g/dL      RDW 13 9 %      MPV 9 3 fL      Platelets 610 Thousands/uL      nRBC 0 /100 WBCs      Neutrophils Relative 64 %      Immat GRANS % 1 %      Lymphocytes Relative 22 %      Monocytes Relative 10 %      Eosinophils Relative 2 %      Basophils Relative 1 %      Neutrophils Absolute 5 36 Thousands/µL      Immature Grans Absolute 0 04 Thousand/uL      Lymphocytes Absolute 1 86 Thousands/µL      Monocytes Absolute 0 85 Thousand/µL      Eosinophils Absolute 0 17 Thousand/µL      Basophils Absolute 0 04 Thousands/µL                  CT head without contrast   Final Result by Carmen North MD (06/14 1955)      No acute intracranial abnormality  Workstation performed: [id number]         XR chest 1 view portable   Final Result by Sloan Miles MD (06/15 0010)      Stable enlarged cardiac silhouette without acute pulmonary abnormalities  Workstation performed: [id number]                    Procedures  ECG 12 Lead Documentation Only    Date/Time: 6/14/2022 5:44 PM  Performed by: Red Edgar DO  Authorized by: Red Edgar DO     Previous ECG:     Previous ECG:  Compared to current    Similarity:  No change  Rate:     ECG rate:  50  Rhythm:     Rhythm: atrial fibrillation               ED Course                                             MDM  Number of Diagnoses or Management Options  Dizziness: new and requires workup     Amount and/or Complexity of Data Reviewed  Clinical lab tests: ordered and reviewed  Tests in the radiology section of CPT®: ordered and reviewed  Tests in the medicine section of CPT®: ordered and reviewed  Independent visualization of images, tracings, or specimens: yes    Risk of Complications, Morbidity, and/or Mortality  General comments: Patient his wife is here I need to be hooked up to our wall oxygen  There is something wrong with her compressor we suggested that she get that looked at  Was becoming angry with the length of time the workup was taking discussed with him that we were just waiting for all the results of his labs and CT scan to come back and that as soon as everything resulted I would discuss the findings with him  Patient stated that he was not staying and wanted to go home  Fortunately everything resulted quickly after that conversation and lab work and CT all look within normal limits  Patient to follow-up with his PCP      Patient Progress  Patient progress: stable      Disposition  Final diagnoses:   Dizziness     Time reflects when diagnosis was documented in both MDM as applicable and the Disposition within this note Time User Action Codes Description Comment    6/14/2022  8:41 PM Olivia Villalta Add [R42] Dizziness       ED Disposition     ED Disposition   Discharge    Condition   Stable    Date/Time   Tue Jun 14, 2022  8:41 PM    Comment   [facility] discharge to home/self care  Follow-up Information     Follow up With Specialties Details Why Contact Info    Karen Mcdonald MD Internal Medicine Schedule an appointment as soon as possible for a visit   [address]  [phone]            Discharge Medication List as of 6/14/2022  8:42 PM      CONTINUE these medications which have NOT CHANGED    Details   acetaminophen (TYLENOL) 325 mg tablet Take 2 tablets (650 mg total) by mouth every 4 (four) hours as needed for mild pain, Starting Thu 6/17/2021, No Print      carbidopa-levodopa (SINEMET)  mg per tablet Take 1 tablet by mouth 3 (three) times a day, Starting Sun 3/13/2022, Until Sat 6/11/2022, Normal      escitalopram (LEXAPRO) 10 mg tablet take 1 tablet by mouth once daily, Normal      ipratropium (ATROVENT) 0 06 % nasal spray 1 SPRAY INTO EACH NOSTRIL 4 (FOUR) TIMES A DAY AS NEEDED FOR RHINITIS, Starting Fri 3/18/2022, Normal      loratadine (CLARITIN) 10 mg tablet Take 1 tablet (10 mg total) by mouth daily, Starting Sun 3/13/2022, Normal      metoprolol succinate (TOPROL-XL) 50 mg 24 hr tablet Take 1 tablet (50 mg total) by mouth daily, Starting Sun 3/13/2022, Normal      Multiple Vitamins-Minerals (ICAPS AREDS 2) CAPS Take 1 capsule by mouth 2 (two) times a day  , Historical Med      telmisartan (MICARDIS) 20 MG tablet Take 0 5 tablets (10 mg total) by mouth daily, Starting Sun 3/13/2022, Normal      Xarelto 15 MG tablet Take 1 tablet (15 mg total) by mouth daily with breakfast, Starting Sun 3/13/2022, Normal             No discharge procedures on file      PDMP Review       Value Time User    PDMP Reviewed  Yes 6/17/2021 12:42 PM Angelita Sarmiento PA-C          ED Provider  Electronically Signed by           Sidney Swann,   06/16/22 [facility] [facility],   06/16/22 [facility],   06/16/22 1500

## 2022-06-17 RX ORDER — RIVAROXABAN 15 MG/1
TABLET, FILM COATED ORAL
Qty: 90 TABLET | Refills: 0 | Status: SHIPPED | OUTPATIENT
Start: 2022-06-17

## 2022-06-19 NOTE — TELEPHONE ENCOUNTER
Neurology would be the doctor for Parkinsons, and he has an appt with them  When he schedules his follow up appt with neurology, he can request a male doctor

## 2022-06-20 ENCOUNTER — TELEPHONE (OUTPATIENT)
Dept: OTHER | Facility: OTHER | Age: 87
End: 2022-06-20

## 2022-06-24 DIAGNOSIS — I10 ESSENTIAL HYPERTENSION: ICD-10-CM

## 2022-06-24 RX ORDER — TELMISARTAN 20 MG/1
10 TABLET ORAL DAILY
Qty: 45 TABLET | Refills: 3 | Status: SHIPPED | OUTPATIENT
Start: 2022-06-24

## 2022-06-30 DIAGNOSIS — J30.1 SEASONAL ALLERGIC RHINITIS DUE TO POLLEN: ICD-10-CM

## 2022-06-30 RX ORDER — IPRATROPIUM BROMIDE 42 UG/1
1 SPRAY, METERED NASAL 4 TIMES DAILY PRN
Qty: 45 ML | Refills: 0 | Status: SHIPPED | OUTPATIENT
Start: 2022-06-30

## 2022-07-17 DIAGNOSIS — G20 PARKINSON'S DISEASE (HCC): ICD-10-CM

## 2022-07-25 ENCOUNTER — TELEPHONE (OUTPATIENT)
Dept: INTERNAL MEDICINE CLINIC | Facility: CLINIC | Age: 87
End: 2022-07-25
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
## 2022-07-29 RX ORDER — DOXYCYCLINE HYCLATE 100 MG/1
CAPSULE ORAL
COMMUNITY
Start: 2022-07-06

## 2022-08-03 ENCOUNTER — OFFICE VISIT (OUTPATIENT)
Dept: INTERNAL MEDICINE CLINIC | Facility: CLINIC | Age: 87
End: 2022-08-03
Payer: MEDICARE

## 2022-08-03 VITALS
WEIGHT: 227 LBS | HEART RATE: 66 BPM | DIASTOLIC BLOOD PRESSURE: 84 MMHG | BODY MASS INDEX: 28.23 KG/M2 | SYSTOLIC BLOOD PRESSURE: 140 MMHG | OXYGEN SATURATION: 97 % | HEIGHT: 75 IN

## 2022-08-03 DIAGNOSIS — G20 PARKINSON'S DISEASE (HCC): ICD-10-CM

## 2022-08-03 DIAGNOSIS — I10 BENIGN ESSENTIAL HYPERTENSION: Primary | ICD-10-CM

## 2022-08-03 DIAGNOSIS — I48.19 PERSISTENT ATRIAL FIBRILLATION (HCC): ICD-10-CM

## 2022-08-03 DIAGNOSIS — F02.81 DEMENTIA IN OTHER DISEASES CLASSIFIED ELSEWHERE WITH BEHAVIORAL DISTURBANCE: ICD-10-CM

## 2022-08-03 DIAGNOSIS — F41.9 ANXIETY: ICD-10-CM

## 2022-08-03 PROCEDURE — 99214 OFFICE O/P EST MOD 30 MIN: CPT | Performed by: INTERNAL MEDICINE

## 2022-08-03 NOTE — PROGRESS NOTES
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 ([facility] )  Continue to stay active mentally, physically, and socially  Anxiety  If pt using escitalopram intermittently like in the past, he could just stop it  Wife reports he uses it rarely, so I will remove it from his med list       Diagnoses and all orders for this visit:    Benign essential hypertension    Park[facility]'s disease ([facility] )    Persistent atrial fibrillation ([facility] 75 )    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)    Anxiety    Other orders  -     doxycycline hyclate (VIBRAMYCIN) 100 mg capsule; ONE TAB BY MOUTH TWICE A DAY FOR 14 DAYS WITH FOOD        Subjective:      Patient ID: Rita Yuan is a 82 y o  male  Patient here for regular follow-up      The following portions of the patient's history were reviewed and updated as appropriate: allergies, current medications, past family history, past medical history, past social history, past surgical history and problem list     Review of Systems   Constitutional: Negative for chills, fatigue and fever  HENT: Negative for congestion, nosebleeds, postnasal drip, sore throat and trouble swallowing  Eyes: Negative for pain  Respiratory: Negative for cough, chest tightness, shortness of breath and wheezing  Cardiovascular: Negative for chest pain, palpitations and leg swelling  Gastrointestinal: Negative for abdominal pain, constipation, diarrhea, nausea and vomiting  Endocrine: Negative for polydipsia and polyuria     Genitourinary: Negative for dysuria, flank pain and hematuria  Musculoskeletal: Negative for arthralgias  Skin: Negative for rash  Neurological: Negative for dizziness, tremors and headaches  Hematological: Does not bruise/bleed easily  Psychiatric/Behavioral: Positive for confusion (memory problems)  Negative for dysphoric mood  The patient is not nervous/anxious  Objective:      /84 (BP Location: Left arm, Patient Position: Sitting, Cuff Size: Large)   Pulse 66   Ht 6' 2 5" (1 892 m)   Wt 103 kg (227 lb)   SpO2 97%   BMI 28 76 kg/m²          Physical Exam  Constitutional:       General: He is not in acute distress  Appearance: Normal appearance  He is well-developed  HENT:      Head: Normocephalic and atraumatic  Right Ear: External ear normal       Left Ear: External ear normal    Eyes:      General: No scleral icterus  Conjunctiva/sclera: Conjunctivae normal    Neck:      Thyroid: No thyromegaly  Trachea: No tracheal deviation  Cardiovascular:      Rate and Rhythm: Normal rate  Rhythm irregular  Heart sounds: Murmur (soft systolic) heard  Pulmonary:      Effort: Pulmonary effort is normal  No respiratory distress  Breath sounds: Normal breath sounds  No wheezing or rales  Abdominal:      General: Bowel sounds are normal       Palpations: Abdomen is soft  Tenderness: There is no abdominal tenderness  There is no guarding or rebound  Musculoskeletal:      Cervical back: Normal range of motion and neck supple  Right lower leg: Edema (trace) present  Left lower leg: Edema (trace) present  Lymphadenopathy:      Cervical: No cervical adenopathy  Skin:     Coloration: Skin is not jaundiced  Neurological:      Mental Status: He is alert        Comments: Intermittent resting tremor   Psychiatric:         Mood and Affect: Mood normal       Comments: Pt seems in a good mood

## 2022-08-03 NOTE — PATIENT INSTRUCTIONS
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

## 2022-08-03 NOTE — ASSESSMENT & PLAN NOTE
Patient reports he is taking the carbidopa levodopa and he is going to follow-up neurology next October 17

## 2022-08-03 NOTE — ASSESSMENT & PLAN NOTE
If pt using escitalopram intermittently like in the past, he could just stop it    Wife reports he uses it rarely, so I will remove it from his med list

## 2022-08-08 ENCOUNTER — TELEPHONE (OUTPATIENT)
Dept: PODIATRY | Facility: CLINIC | Age: 87
End: 2022-08-08

## 2022-08-08 NOTE — TELEPHONE ENCOUNTER
As Humberto Barron has been seen by Dr Liset Castro before, I needed to look in his chart to see date last seen

## 2022-08-09 ENCOUNTER — OFFICE VISIT (OUTPATIENT)
Dept: PODIATRY | Facility: CLINIC | Age: 87
End: 2022-08-09
Payer: MEDICARE

## 2022-08-09 VITALS — BODY MASS INDEX: 28.4 KG/M2 | WEIGHT: 228.4 LBS | HEIGHT: 75 IN

## 2022-08-09 DIAGNOSIS — G62.9 PERIPHERAL NERVE DISORDER: Primary | ICD-10-CM

## 2022-08-09 PROCEDURE — 99202 OFFICE O/P NEW SF 15 MIN: CPT | Performed by: PODIATRIST

## 2022-08-09 RX ORDER — METHYLPREDNISOLONE 4 MG/1
TABLET ORAL
Qty: 21 TABLET | Refills: 0 | Status: SHIPPED | OUTPATIENT
Start: 2022-08-09 | End: 2022-08-19

## 2022-08-09 RX ORDER — GENTAMICIN SULFATE 1 MG/G
OINTMENT TOPICAL
COMMUNITY
Start: 2022-07-20

## 2022-08-09 NOTE — PROGRESS NOTES
Assessment/Plan:    Explained the patient that his symptoms seem most consistent with peripheral neuropathy of unknown origin  Patient placed on a Medrol Dosepak  He was told to hold off filling this prescription for a few days as his discomfort may dissipate  He will be reassessed in 2 weeks  No problem-specific Assessment & Plan notes found for this encounter  Diagnoses and all orders for this visit:    Peripheral nerve disorder  -     methylPREDNISolone 4 MG tablet therapy pack; Use as directed on package    Other orders  -     gentamicin (GARAMYCIN) 0 1 % ointment; APPLY TOPICALLY TO WOUND 4 TIMES DAILY          Subjective:      Patient ID: Stef Melgar is a 82 y o  male  HPI    Patient presents with a feeling of sensitivity on the soles of each foot  This discomfort began only a few days ago with no recalled trauma  Patient states that the soles of the feet were sensitive and also felt wet  He notes that he put his shoes on today and while walking felt much more comfortable  Patient denies diabetes along with back disorders  He does take Xarelto  The following portions of the patient's history were reviewed and updated as appropriate: allergies, current medications, past family history, past medical history, past social history, past surgical history and problem list     Review of Systems   Cardiovascular:        Atrial fibrillation   Gastrointestinal:        Reflux   Neurological: Positive for tremors  Parkinson's             Objective:      Ht 6' 2 5" (1 892 m)   Wt 104 kg (228 lb 6 4 oz)   BMI 28 93 kg/m²          Physical Exam  Constitutional:       Appearance: Normal appearance  Cardiovascular:      Pulses: Normal pulses  Musculoskeletal:         General: Normal range of motion  Skin:     General: Skin is warm  Neurological:      General: No focal deficit present  Mental Status: He is oriented to person, place, and time

## 2022-08-19 ENCOUNTER — NURSE TRIAGE (OUTPATIENT)
Dept: OTHER | Facility: OTHER | Age: 87
End: 2022-08-19

## 2022-08-19 ENCOUNTER — OFFICE VISIT (OUTPATIENT)
Dept: INTERNAL MEDICINE CLINIC | Facility: CLINIC | Age: 87
End: 2022-08-19
Payer: MEDICARE

## 2022-08-19 VITALS
SYSTOLIC BLOOD PRESSURE: 124 MMHG | TEMPERATURE: 98.4 F | OXYGEN SATURATION: 99 % | WEIGHT: 223.2 LBS | HEART RATE: 70 BPM | HEIGHT: 74 IN | RESPIRATION RATE: 18 BRPM | DIASTOLIC BLOOD PRESSURE: 82 MMHG | BODY MASS INDEX: 28.64 KG/M2

## 2022-08-19 DIAGNOSIS — I10 BENIGN ESSENTIAL HYPERTENSION: ICD-10-CM

## 2022-08-19 DIAGNOSIS — I48.19 PERSISTENT ATRIAL FIBRILLATION (HCC): ICD-10-CM

## 2022-08-19 DIAGNOSIS — S01.01XS LACERATION OF SCALP WITHOUT FOREIGN BODY, SEQUELA: Primary | ICD-10-CM

## 2022-08-19 DIAGNOSIS — Z91.89 DRIVING SAFETY ISSUE: ICD-10-CM

## 2022-08-19 DIAGNOSIS — G20 PARKINSON'S DISEASE (HCC): ICD-10-CM

## 2022-08-19 PROBLEM — S01.01XA LACERATION OF SCALP WITHOUT FOREIGN BODY: Status: ACTIVE | Noted: 2022-08-19

## 2022-08-19 PROCEDURE — 99214 OFFICE O/P EST MOD 30 MIN: CPT | Performed by: INTERNAL MEDICINE

## 2022-08-19 NOTE — TELEPHONE ENCOUNTER
Reason for Disposition   Care of sutured (or stapled) wound,  questions about    Answer Assessment - Initial Assessment Questions  1  LOCATION: "Where are the sutures (or staples) located?"       Several on his head  2  NUMBER: "How many sutures (or staples) are there?"       unsure    5   OTHER SYMPTOMS: "Do you have any other symptoms?" (e g , wound pain, discharge, fever?)      denies    Protocols used: SUTURE OR STAPLE QUESTIONS-ADULT-AH

## 2022-08-19 NOTE — PATIENT INSTRUCTIONS
Problem List Items Addressed This Visit          Cardiovascular and Mediastinum    Persistent atrial fibrillation (HCC)      Rate controlled, continue anticoagulation           Benign essential hypertension      Blood pressure is controlled, continue meds              Nervous and Auditory    Parkinson's disease ([address] )      Continue carbidopa levodopa and follow-up Neurology              Other    Driving safety issue     It has been explained to both pt and wife by neurology, occupational therapy, and me that he should not drive    See overview of this issue           Laceration of scalp without foreign body - Primary      Looks like patient might have had a skin lesion removed, incision is intact, no drainage, no erythema, sutures intact, he should follow up with whoever removed the skin lesion, nothing in the EHR about this yet

## 2022-08-19 NOTE — PROGRESS NOTES
Assessment/Plan:    Laceration of scalp without foreign body   Looks like patient might have had a skin lesion removed, incision is intact, no drainage, no erythema, sutures intact, he should follow up with whoever removed the skin lesion, nothing in the EHR about this yet    Benign essential hypertension   Blood pressure is controlled, continue meds    Parkinson's disease ([facility] )   Continue carbidopa levodopa and follow-up Neurology    Driving safety issue  It has been explained to both pt and wife by neurology, occupational therapy, and me that he should not drive  See overview of this issue    Persistent atrial fibrillation ([address] )   Rate controlled, continue anticoagulation       Diagnoses and all orders for this visit:    Laceration of scalp without foreign body, sequela    Benign essential hypertension    Parkinson's disease ([facility] )    Driving safety issue    Persistent atrial fibrillation ([facility] )        Subjective:      Patient ID: Juany Del Rio is a 82 y o  male  Patient stopped in to be seen today, he was concerned about stitches he had put in his head  The following portions of the patient's history were reviewed and updated as appropriate: allergies, current medications, past family history, past medical history, past social history, past surgical history and problem list     Review of Systems   Constitutional: Negative for chills, fatigue and fever  HENT: Negative for congestion, nosebleeds, postnasal drip, sore throat and trouble swallowing  Eyes: Negative for pain  Respiratory: Negative for cough, chest tightness, shortness of breath and wheezing  Cardiovascular: Negative for chest pain, palpitations and leg swelling  Gastrointestinal: Negative for abdominal pain, constipation, diarrhea, nausea and vomiting  Endocrine: Negative for polydipsia and polyuria  Genitourinary: Negative for dysuria, flank pain and hematuria  Musculoskeletal: Negative for arthralgias     Skin: Negative for rash  Neurological: Negative for dizziness, tremors and headaches  Hematological: Does not bruise/bleed easily  Psychiatric/Behavioral: Negative for dysphoric mood  The patient is not nervous/anxious  Objective:      /82   Pulse 70   Temp 98 4 °F (36 9 °C)   Resp 18   Ht 6' 2" (1 88 m)   Wt 101 kg (223 lb 3 2 oz)   SpO2 99%   BMI 28 66 kg/m²          Physical Exam  Constitutional:       General: He is not in acute distress  Appearance: Normal appearance  He is well-developed  HENT:      Head: Normocephalic and atraumatic  Right Ear: External ear normal       Left Ear: External ear normal    Eyes:      General: No scleral icterus  Conjunctiva/sclera: Conjunctivae normal    Neck:      Thyroid: No thyromegaly  Trachea: No tracheal deviation  Cardiovascular:      Rate and Rhythm: Normal rate  Rhythm irregular  Heart sounds: Murmur (systolic) heard  Pulmonary:      Effort: Pulmonary effort is normal  No respiratory distress  Breath sounds: Normal breath sounds  No wheezing or rales  Abdominal:      General: Bowel sounds are normal       Palpations: Abdomen is soft  Tenderness: There is no abdominal tenderness  There is no guarding or rebound  Musculoskeletal:      Cervical back: Normal range of motion and neck supple  Right lower leg: No edema  Left lower leg: No edema  Lymphadenopathy:      Cervical: No cervical adenopathy  Skin:     Coloration: Skin is not jaundiced  Neurological:      Mental Status: He is alert        Comments: Pill rolling tremor left hand   Psychiatric:         Behavior: Behavior normal

## 2022-08-19 NOTE — ASSESSMENT & PLAN NOTE
It has been explained to both pt and wife by neurology, occupational therapy, and me that he should not drive    See overview of this issue

## 2022-08-19 NOTE — TELEPHONE ENCOUNTER
Patient notes "I will just put a band aide on it"  Denies any pain or bleeding  Wished to disconnect call

## 2022-08-19 NOTE — TELEPHONE ENCOUNTER
Regarding: Bandage came off prematurely  ----- Message from [facility] sent at 8/19/2022  3:10 PM EDT -----  "I have a bandage that came off of my head, and I don't know what I can do?"

## 2022-08-19 NOTE — ASSESSMENT & PLAN NOTE
Looks like patient might have had a skin lesion removed, incision is intact, no drainage, no erythema, sutures intact, he should follow up with whoever removed the skin lesion, nothing in the EHR about this yet Subjective





- Date & Time of Evaluation


Date of Evaluation: 03/27/17


Time of Evaluation: 10:00





- Subjective


Subjective: 


Pt seen and examined at beside. Pt appeared to be comfortable, resting in bed, 

not using his nasal cannula at time of interview.





Pt continues to complain of SOB sporadically throughout the day, and orthopnea. 

Pt also states that he uses his nasal cannula whenever he does feel SOB, and 

states that this helps.





However, pt does admit that his condition has improved in that he is now able 

to walk to the bathroom without feeling short of breath. Pt does admit to 

minimal cough at times, but denies phlegm, subjective fever, chest pain, and 

chest congestion. 








Objective





- Vital Signs/Intake and Output


Vital Signs (last 24 hours): 


 











Temp Pulse Resp BP Pulse Ox


 


 97.8 F   79   20   103/60   99 


 


 03/27/17 07:25  03/27/17 07:25  03/27/17 07:25  03/27/17 09:38  03/27/17 07:25








Intake and Output: 


 











 03/27/17 03/27/17





 06:59 18:59


 


Intake Total 360 


 


Output Total 975 


 


Balance -615 














- Medications


Medications: 


 Current Medications





Albuterol/Ipratropium (Duoneb 3 Mg/0.5 Mg (3 Ml) Ud)  3 ml INH RQ6 [facility]


   Last Admin: 03/27/17 07:36 Dose:  Not Given


Benzocaine/Menthol (Cepacol Sore Throat)  1 keegan MT Q6H PRN


   PRN Reason: Sore Throat


   Last Admin: 03/27/17 03:51 Dose:  1 keegan


Budesonide (Pulmicort Respules)  0.5 mg INH RQ12 [facility]


   Last Admin: 03/27/17 07:36 Dose:  Not Given


Digoxin (Lanoxin)  0.125 mg PO DAILY@1800 [facility]


   Last Admin: 03/26/17 17:23 Dose:  0.125 mg


Famotidine (Pepcid)  20 mg IVP DAILY [facility]


   Last Admin: 03/27/17 09:38 Dose:  20 mg


Furosemide (Lasix)  40 mg PO DAILY [facility]


   Last Admin: 03/27/17 09:38 Dose:  40 mg


Guaifenesin (Mucinex La)  600 mg PO BID [facility]


   Last Admin: 03/27/17 09:38 Dose:  600 mg


Prednisone (Prednisone Tab)  40 mg PO DAILY [facility]


   Last Admin: 03/27/17 09:37 Dose:  40 mg


Rosuvastatin Calcium (Crestor)  10 mg PO HS [facility]


   Last Admin: 03/12/17 22:03 Dose:  10 mg


Spironolactone (Aldactone)  25 mg PO DAILY [facility]


   Last Admin: 03/27/17 09:38 Dose:  25 mg


Tiotropium Bromide (Spiriva)  18 mcg INH RQ24 [facility]


   Last Admin: 03/27/17 07:36 Dose:  Not Given











- Labs


Labs: 


 





 03/26/17 17:07 





 03/26/17 17:07 





 











PT  31.8 SECONDS (9.7-12.2)  H* D 03/26/17  17:07    


 


INR  2.8  D 03/26/17  17:07    


 


APTT  33 SECONDS (21-34)  D 03/26/17  17:07    














- Constitutional


Appears: Non-toxic, No Acute Distress





- Head Exam


Head Exam: ATRAUMATIC, NORMOCEPHALIC





- Eye Exam


Eye Exam: Normal appearance


Pupil Exam: NORMAL ACCOMODATION





- ENT Exam


ENT Exam: Mucous Membranes Moist





- Respiratory Exam


Respiratory Exam: Rhonchi (Bilateral ), NORMAL BREATHING PATTERN.  absent: 

Accessory Muscle Use, Rales, Respiratory Distress





- Cardiovascular Exam


Cardiovascular Exam: REGULAR RHYTHM, +S1, +S2





- Neurological Exam


Neurological Exam: Alert, Awake, Oriented x3





- Skin


Skin Exam: Dry, Intact, Normal Color, Warm





Assessment and Plan


(1) COPD (chronic obstructive pulmonary disease)


Assessment & Plan: 


Pt improving. Pending placement. Pt will need home oxygen upon discharge. 





Continue nebulizer, oxygen, and BiPAP as needed. 





Continue to monitor for increased SOB, productive cough, fever, and chest pain. 





Patient will be switched from Solu-Medrol to prednisone prior to discharge. 





Status: Acute





(2) Acute exacerbation of CHF (congestive heart failure)


Status: Acute





(3) Chronic atrial fibrillation


Status: Chronic

## 2022-08-22 ENCOUNTER — TELEPHONE (OUTPATIENT)
Dept: INTERNAL MEDICINE CLINIC | Facility: CLINIC | Age: 87
End: 2022-08-22

## 2022-08-22 NOTE — TELEPHONE ENCOUNTER
I called and reviewed the reasons it was recommended he not drive back in April from Neuro, OT, and me, due to Parkinson's and cognitive impairment, and the high  likelihood of failing an on the road driving test based on the fitness to drive evaluation, left message

## 2022-08-23 ENCOUNTER — APPOINTMENT (OUTPATIENT)
Dept: RADIOLOGY | Facility: HOSPITAL | Age: 87
End: 2022-08-23
Payer: MEDICARE

## 2022-08-23 ENCOUNTER — HOSPITAL ENCOUNTER (EMERGENCY)
Facility: HOSPITAL | Age: 87
Discharge: HOME | End: 2022-08-23
Attending: EMERGENCY MEDICINE
Payer: MEDICARE

## 2022-08-23 VITALS
TEMPERATURE: 97.7 F | OXYGEN SATURATION: 97 % | DIASTOLIC BLOOD PRESSURE: 72 MMHG | SYSTOLIC BLOOD PRESSURE: 167 MMHG | RESPIRATION RATE: 17 BRPM | HEART RATE: 63 BPM

## 2022-08-23 DIAGNOSIS — R53.1 WEAKNESS: Primary | ICD-10-CM

## 2022-08-23 DIAGNOSIS — R00.2 PALPITATIONS: ICD-10-CM

## 2022-08-23 LAB
2HR DELTA HS TROPONIN: 0 NG/L
ALBUMIN SERPL BCP-MCNC: 3.8 G/DL (ref 3.5–5)
ALP SERPL-CCNC: 105 U/L (ref 46–116)
ALT SERPL W P-5'-P-CCNC: 15 U/L (ref 12–78)
ANION GAP SERPL CALCULATED.3IONS-SCNC: 5 MMOL/L (ref 4–13)
AST SERPL W P-5'-P-CCNC: 21 U/L (ref 5–45)
ATRIAL RATE: 214 BPM
BASOPHILS # BLD AUTO: 0.04 THOUSANDS/ΜL (ref 0–0.1)
BASOPHILS NFR BLD AUTO: 1 % (ref 0–1)
BILIRUB SERPL-MCNC: 0.9 MG/DL (ref 0.2–1)
BUN SERPL-MCNC: 13 MG/DL (ref 5–25)
CALCIUM SERPL-MCNC: 8.8 MG/DL (ref 8.3–10.1)
CARDIAC TROPONIN I PNL SERPL HS: 6 NG/L
CARDIAC TROPONIN I PNL SERPL HS: 6 NG/L
CHLORIDE SERPL-SCNC: 110 MMOL/L (ref 96–108)
CO2 SERPL-SCNC: 25 MMOL/L (ref 21–32)
CREAT SERPL-MCNC: 0.9 MG/DL (ref 0.6–1.3)
EOSINOPHIL # BLD AUTO: 0.17 THOUSAND/ΜL (ref 0–0.61)
EOSINOPHIL NFR BLD AUTO: 2 % (ref 0–6)
ERYTHROCYTE [DISTWIDTH] IN BLOOD BY AUTOMATED COUNT: 14 % (ref 11.6–15.1)
GFR SERPL CREATININE-BSD FRML MDRD: 77 ML/MIN/1.73SQ M
GLUCOSE SERPL-MCNC: 101 MG/DL (ref 65–140)
HCT VFR BLD AUTO: 40.1 % (ref 36.5–49.3)
HGB BLD-MCNC: 13.1 G/DL (ref 12–17)
IMM GRANULOCYTES # BLD AUTO: 0.03 THOUSAND/UL (ref 0–0.2)
IMM GRANULOCYTES NFR BLD AUTO: 0 % (ref 0–2)
LYMPHOCYTES # BLD AUTO: 1.42 THOUSANDS/ΜL (ref 0.6–4.47)
LYMPHOCYTES NFR BLD AUTO: 19 % (ref 14–44)
MCH RBC QN AUTO: 29.2 PG (ref 26.8–34.3)
MCHC RBC AUTO-ENTMCNC: 32.7 G/DL (ref 31.4–37.4)
MCV RBC AUTO: 89 FL (ref 82–98)
MONOCYTES # BLD AUTO: 0.6 THOUSAND/ΜL (ref 0.17–1.22)
MONOCYTES NFR BLD AUTO: 8 % (ref 4–12)
NEUTROPHILS # BLD AUTO: 5.26 THOUSANDS/ΜL (ref 1.85–7.62)
NEUTS SEG NFR BLD AUTO: 70 % (ref 43–75)
NRBC BLD AUTO-RTO: 0 /100 WBCS
PLATELET # BLD AUTO: 297 THOUSANDS/UL (ref 149–390)
PMV BLD AUTO: 9.3 FL (ref 8.9–12.7)
POTASSIUM SERPL-SCNC: 3.8 MMOL/L (ref 3.5–5.3)
PROT SERPL-MCNC: 7.3 G/DL (ref 6.4–8.4)
QRS AXIS: 53 DEGREES
QRSD INTERVAL: 98 MS
QT INTERVAL: 432 MS
QTC INTERVAL: 434 MS
RBC # BLD AUTO: 4.49 MILLION/UL (ref 3.88–5.62)
SODIUM SERPL-SCNC: 140 MMOL/L (ref 135–147)
T WAVE AXIS: 62 DEGREES
VENTRICULAR RATE: 61 BPM
WBC # BLD AUTO: 7.52 THOUSAND/UL (ref 4.31–10.16)

## 2022-08-23 PROCEDURE — 93005 ELECTROCARDIOGRAM TRACING: CPT

## 2022-08-23 PROCEDURE — 99285 EMERGENCY DEPT VISIT HI MDM: CPT

## 2022-08-23 PROCEDURE — 80053 COMPREHEN METABOLIC PANEL: CPT | Performed by: EMERGENCY MEDICINE

## 2022-08-23 PROCEDURE — 36415 COLL VENOUS BLD VENIPUNCTURE: CPT

## 2022-08-23 PROCEDURE — 99284 EMERGENCY DEPT VISIT MOD MDM: CPT | Performed by: EMERGENCY MEDICINE

## 2022-08-23 PROCEDURE — 93010 ELECTROCARDIOGRAM REPORT: CPT | Performed by: INTERNAL MEDICINE

## 2022-08-23 PROCEDURE — 71045 X-RAY EXAM CHEST 1 VIEW: CPT

## 2022-08-23 PROCEDURE — 85025 COMPLETE CBC W/AUTO DIFF WBC: CPT | Performed by: EMERGENCY MEDICINE

## 2022-08-23 PROCEDURE — 84484 ASSAY OF TROPONIN QUANT: CPT | Performed by: EMERGENCY MEDICINE

## 2022-08-23 NOTE — ED PROVIDER NOTES
History  Chief Complaint   Patient presents with    Weakness - Generalized     Patient reports increased weakness and SOB starting this morning; he states that he typically can walk around the block but is unable to do so now     82 Y O M with PMH significant for Afib on Xarelto, HTN, dementia presents for an episode of weakness and "flutter in his heart"  Pt states that he took some medice in the morning that he is not sure of and felt a little weak after that and came to the ED to get worked up  He has similar episodes in the past where he was confused about which meds he took and came to the ED for work up  Denies fever, chills, N/V, CP, SOB, abdominal pain, urinary symptoms, headache, vision changes  Prior to Admission Medications   Prescriptions Last Dose Informant Patient Reported? Taking?    Multiple Vitamins-Minerals (ICAPS AREDS 2) CAPS  Self Yes No   Sig: Take 1 capsule by mouth 2 (two) times a day     Xarelto 15 MG tablet   No No   Sig: TAKE 1 TABLET BY MOUTH DAILY WITH BREAKFAST   acetaminophen (TYLENOL) 325 mg tablet  Self No No   Sig: Take 2 tablets (650 mg total) by mouth every 4 (four) hours as needed for mild pain   carbidopa-levodopa (SINEMET)  mg per tablet   No No   Sig: TAKE 1 TABLET BY MOUTH THREE TIMES A DAY   doxycycline hyclate (VIBRAMYCIN) 100 mg capsule   Yes No   Sig: ONE TAB BY MOUTH TWICE A DAY FOR 14 DAYS WITH FOOD   gentamicin (GARAMYCIN) 0 1 % ointment   Yes No   Sig: APPLY TOPICALLY TO WOUND 4 TIMES DAILY   ipratropium (ATROVENT) 0 06 % nasal spray   No No   Si spray into each nostril 4 (four) times a day as needed for rhinitis   loratadine (CLARITIN) 10 mg tablet   No No   Sig: Take 1 tablet (10 mg total) by mouth daily   Patient not taking: No sig reported   metoprolol succinate (TOPROL-XL) 50 mg 24 hr tablet  Self No No   Sig: Take 1 tablet (50 mg total) by mouth daily   telmisartan (MICARDIS) 20 MG tablet   No No   Sig: Take 0 5 tablets (10 mg total) by mouth daily      Facility-Administered Medications: None       Past Medical History:   Diagnosis Date    A-fib ([facility] )     Afib ([place] [address] )     Atrial fibrillation ([place] [address] ) 6/13/2021    Hypertension     Hypertension 6/13/2021    Left AC separation     Parkinson's disease ([place] [address] ) 11/01/2018       Past Surgical History:   Procedure Laterality Date    CARDIOVERSION      ATRIAL    ROTATOR CUFF REPAIR      SHOULDER SURGERY         Family History   Problem Relation Age of Onset    No Known Problems Mother     Hypertension Father     Coronary artery disease Father      I have reviewed and agree with the history as documented  E-Cigarette/Vaping    E-Cigarette Use Never User      E-Cigarette/Vaping Substances    Nicotine No     THC No     CBD No     Flavoring No     Other No     Unknown No      Social History     Tobacco Use    Smoking status: Never Smoker    Smokeless tobacco: Never Used    Tobacco comment: Quit 35-40 years ago   Vaping Use    Vaping Use: Never used   Substance Use Topics    Alcohol use: Not Currently     Comment: Rare, previously moderate drinker cut down 2016   Drug use: Never        Review of Systems   Constitutional: Negative for chills and fever  HENT: Negative for ear pain and sore throat  Eyes: Negative for pain and visual disturbance  Respiratory: Negative for cough and shortness of breath  Cardiovascular: Negative for chest pain and palpitations  Gastrointestinal: Negative for abdominal pain and vomiting  Genitourinary: Negative for dysuria and hematuria  Musculoskeletal: Negative for arthralgias and back pain  Skin: Negative for color change and rash  Neurological: Negative for seizures and syncope  All other systems reviewed and are negative        Physical Exam  ED Triage Vitals [08/23/22 1134]   Temperature Pulse Respirations Blood Pressure SpO2   97 7 °F (36 5 °C) 62 18 164/77 97 %      Temp Source Heart Rate Source Patient Position - Orthostatic VS BP Location FiO2 (%)   Oral Monitor Lying Right arm --      Pain Score       --             Orthostatic Vital Signs  Vitals:    08/23/22 1134 08/23/22 1300 08/23/22 1400   BP: 164/77 (!) 172/79 167/72   Pulse: 62 56 63   Patient Position - Orthostatic VS: Lying Lying Lying       Physical Exam  Vitals and nursing note reviewed  Constitutional:       General: He is not in acute distress  Appearance: He is well-developed  He is not ill-appearing, toxic-appearing or diaphoretic  HENT:      Head: Normocephalic and atraumatic  Eyes:      Conjunctiva/sclera: Conjunctivae normal    Cardiovascular:      Rate and Rhythm: Normal rate and regular rhythm  Pulses: Normal pulses  Heart sounds: Normal heart sounds  No murmur heard  Pulmonary:      Effort: Pulmonary effort is normal  No respiratory distress  Breath sounds: Normal breath sounds  Abdominal:      General: Abdomen is flat  There is no distension  Palpations: Abdomen is soft  Tenderness: There is no abdominal tenderness  Musculoskeletal:      Cervical back: Neck supple  Skin:     General: Skin is warm and dry  Neurological:      General: No focal deficit present  Mental Status: He is alert and oriented to person, place, and time     Psychiatric:         Mood and Affect: Mood normal          Behavior: Behavior normal          ED Medications  Medications - No data to display    Diagnostic Studies  Results Reviewed     Procedure Component Value Units Date/Time    HS Troponin I 2hr [[id number]]  (Normal) Collected: 08/23/22 1347    Lab Status: Final result Specimen: Blood from Arm, Left Updated: 08/23/22 1428     hs TnI 2hr 6 ng/L      Delta 2hr hsTnI 0 ng/L     HS Troponin 0hr (reflex protocol) [[id number]]  (Normal) Collected: 08/23/22 1152    Lab Status: Final result Specimen: Blood from Arm, Left Updated: 08/23/22 1236     hs TnI 0hr 6 ng/L     Comprehensive metabolic panel [[id number]]  (Abnormal) Collected: 08/23/22 1152    Lab Status: Final result Specimen: Blood from Arm, Left Updated: 08/23/22 1228     Sodium 140 mmol/L      Potassium 3 8 mmol/L      Chloride 110 mmol/L      CO2 25 mmol/L      ANION GAP 5 mmol/L      BUN 13 mg/dL      Creatinine 0 90 mg/dL      Glucose 101 mg/dL      Calcium 8 8 mg/dL      AST 21 U/L      ALT 15 U/L      Alkaline Phosphatase 105 U/L      Total Protein 7 3 g/dL      Albumin 3 8 g/dL      Total Bilirubin 0 90 mg/dL      eGFR 77 ml/min/1 73sq m     Narrative:      [place] guidelines for Chronic Kidney Disease (CKD):     Stage 1 with normal or high GFR (GFR > 90 mL/min/1 73 square meters)    Stage 2 Mild CKD (GFR = 60-89 mL/min/1 73 square meters)    Stage 3A Moderate CKD (GFR = 45-59 mL/min/1 73 square meters)    Stage 3B Moderate CKD (GFR = 30-44 mL/min/1 73 square meters)    Stage 4 Severe CKD (GFR = 15-29 mL/min/1 73 square meters)    Stage 5 End Stage CKD (GFR <15 mL/min/1 73 square meters)  Note: GFR calculation is accurate only with a steady state creatinine    CBC and differential [[id number]] Collected: 08/23/22 1152    Lab Status: Final result Specimen: Blood from Arm, Left Updated: 08/23/22 1212     WBC 7 52 Thousand/uL      RBC 4 49 Million/uL      Hemoglobin 13 1 g/dL      Hematocrit 40 1 %      MCV 89 fL      MCH 29 2 pg      MCHC 32 7 g/dL      RDW 14 0 %      MPV 9 3 fL      Platelets 644 Thousands/uL      nRBC 0 /100 WBCs      Neutrophils Relative 70 %      Immat GRANS % 0 %      Lymphocytes Relative 19 %      Monocytes Relative 8 %      Eosinophils Relative 2 %      Basophils Relative 1 %      Neutrophils Absolute 5 26 Thousands/µL      Immature Grans Absolute 0 03 Thousand/uL      Lymphocytes Absolute 1 42 Thousands/µL      Monocytes Absolute 0 60 Thousand/µL      Eosinophils Absolute 0 17 Thousand/µL      Basophils Absolute 0 04 Thousands/µL                  XR chest 1 view portable   Final Result by Eli Ornelas MD (08/24 9535)      No acute cardiopulmonary disease  Workstation performed: [id number]               Procedures  Procedures      ED Course             HEART Risk Score    Flowsheet Row Most Recent Value   Heart Score Risk Calculator    History 0 Filed at: 08/23/2022 1432   ECG 0 Filed at: 08/23/2022 1432   Age 2 Filed at: 08/23/2022 1432   Risk Factors 1 Filed at: 08/23/2022 1432   Troponin 0 Filed at: 08/23/2022 1432   HEART Score 3 Filed at: 08/23/2022 1432                      SBIRT 22yo+    Flowsheet Row Most Recent Value   SBIRT (25 yo +)    In order to provide better care to our patients, we are screening all of our patients for alcohol and drug use  Would it be okay to ask you these screening questions? Unable to answer at this time Filed at: 08/23/2022 1146                MDM  Number of Diagnoses or Management Options  Palpitations  Weakness  Diagnosis management comments: 82 Y O M with PMH significant for Afib on Xarelto, HTN, dementia presents for an episode of weakness and "flutter in his heart"  Cardiac workup is normal in the ED  He is not bradycardic and symptoms resolved  Basic labs and chest x-ray are normal  Pt clinically stable and is able to perform his ADLs at home  Symptoms most likely secondary to transient afib vs beta blockers  Discussed return precautions and he is agreeable to the plan  Disposition  Final diagnoses:   Weakness   Palpitations     Time reflects when diagnosis was documented in both MDM as applicable and the Disposition within this note     Time User Action Codes Description Comment    8/23/2022  2:31 PM Raina Mathis Add [R53 1] Weakness     8/23/2022  2:32 PM Raina Mathis Add [R00 2] Palpitations       ED Disposition     ED Disposition   Discharge    Condition   Stable    Date/Time   Tue Aug 23, 2022  2:33 PM    [address] discharge to home/self care                 Follow-up Information     Follow up With Specialties Details Why Contact Info Additional Information    Milagros Montes MD Internal Medicine  follow up in 3 days [address] [place]        [address] [facility] Emergency Medicine  If symptoms worsen [address] [id number]  [address] Department, [address], [place], [place], [address]          Discharge Medication List as of 8/23/2022  2:34 PM      CONTINUE these medications which have NOT CHANGED    Details   acetaminophen (TYLENOL) 325 mg tablet Take 2 tablets (650 mg total) by mouth every 4 (four) hours as needed for mild pain, Starting Thu 6/17/2021, No Print      carbidopa-levodopa (SINEMET)  mg per tablet TAKE 1 TABLET BY MOUTH THREE TIMES A DAY, Normal      doxycycline hyclate (VIBRAMYCIN) 100 mg capsule ONE TAB BY MOUTH TWICE A DAY FOR 14 DAYS WITH FOOD, Historical Med      gentamicin (GARAMYCIN) 0 1 % ointment APPLY TOPICALLY TO WOUND 4 TIMES DAILY, Historical Med      ipratropium (ATROVENT) 0 06 % nasal spray 1 spray into each nostril 4 (four) times a day as needed for rhinitis, Starting Thu 6/30/2022, Normal      loratadine (CLARITIN) 10 mg tablet Take 1 tablet (10 mg total) by mouth daily, Starting Sun 3/13/2022, Normal      metoprolol succinate (TOPROL-XL) 50 mg 24 hr tablet Take 1 tablet (50 mg total) by mouth daily, Starting Sun 3/13/2022, Normal      Multiple Vitamins-Minerals (ICAPS AREDS 2) CAPS Take 1 capsule by mouth 2 (two) times a day  , Historical Med      telmisartan (MICARDIS) 20 MG tablet Take 0 5 tablets (10 mg total) by mouth daily, Starting Fri 6/24/2022, Normal      Xarelto 15 MG tablet TAKE 1 TABLET BY MOUTH DAILY WITH BREAKFAST, Normal           No discharge procedures on file  PDMP Review       Value Time User    PDMP Reviewed  Yes 6/17/2021 12:42 PM Sindhu Gonzalez PA-C           ED Provider  Attending physically available and evaluated Kirby Haq I managed the patient along with the ED Attending      Electronically Signed by         Briana Mackay DO  08/24/22 3010

## 2022-08-23 NOTE — ED ATTENDING ATTESTATION
8/23/2022  IIsis MD, saw and evaluated the patient  I have discussed the patient with the resident/non-physician practitioner and agree with the resident's/non-physician practitioner's findings, Plan of Care, and MDM as documented in the resident's/non-physician practitioner's note, except where noted  All available labs and Radiology studies were reviewed  I was present for key portions of any procedure(s) performed by the resident/non-physician practitioner and I was immediately available to provide assistance  At this point I agree with the current assessment done in the Emergency Department  I have conducted an independent evaluation of this patient a history and physical is as follows:    ED Course         Critical Care Time  Procedures    83 yo male with hx of afib, dementia, states he felt fluttering in his chest today and sob while walking which is now resolved  Pt with no lightheadedness, no syncope  No cp  Pt feels mild sob  No abdominal pain, no n/v/d  Pt currently with no complaints  Vss, afebrile, lungs cta, rrr, abdomen soft nontender, no neuro deficits  Cardiac workup, delta trop  Reassurance  amb pulse ox

## 2022-08-25 ENCOUNTER — OFFICE VISIT (OUTPATIENT)
Dept: PODIATRY | Facility: CLINIC | Age: 87
End: 2022-08-25
Payer: MEDICARE

## 2022-08-25 VITALS
HEIGHT: 74 IN | BODY MASS INDEX: 28.62 KG/M2 | SYSTOLIC BLOOD PRESSURE: 159 MMHG | DIASTOLIC BLOOD PRESSURE: 86 MMHG | HEART RATE: 76 BPM | WEIGHT: 223 LBS

## 2022-08-25 DIAGNOSIS — G62.9 PERIPHERAL NERVE DISORDER: Primary | ICD-10-CM

## 2022-08-25 PROCEDURE — 99212 OFFICE O/P EST SF 10 MIN: CPT | Performed by: PODIATRIST

## 2022-08-25 NOTE — PROGRESS NOTES
Patient presents for pedal assessment  Patient no longer having the extreme hypersensitivity on the soles of his feet when walking  Still relates his feet feel too soft but overall discomfort is minimal   Patient uncertain as to whether he took the medication, the Medrol Dosepak, that was prescribed  Patient seems confused  No additional treatment recommended at this time  Reappoint p r n

## 2022-09-28 ENCOUNTER — OFFICE VISIT (OUTPATIENT)
Dept: INTERNAL MEDICINE CLINIC | Facility: CLINIC | Age: 87
End: 2022-09-28
Payer: MEDICARE

## 2022-09-28 VITALS
WEIGHT: 225 LBS | SYSTOLIC BLOOD PRESSURE: 130 MMHG | OXYGEN SATURATION: 99 % | DIASTOLIC BLOOD PRESSURE: 76 MMHG | HEART RATE: 58 BPM | HEIGHT: 74 IN | BODY MASS INDEX: 28.88 KG/M2

## 2022-09-28 DIAGNOSIS — Z23 NEEDS FLU SHOT: ICD-10-CM

## 2022-09-28 DIAGNOSIS — E78.00 PURE HYPERCHOLESTEROLEMIA: ICD-10-CM

## 2022-09-28 DIAGNOSIS — E55.9 VITAMIN D DEFICIENCY: ICD-10-CM

## 2022-09-28 DIAGNOSIS — Z00.00 MEDICARE ANNUAL WELLNESS VISIT, SUBSEQUENT: Primary | ICD-10-CM

## 2022-09-28 PROBLEM — S06.5XAA SDH (SUBDURAL HEMATOMA): Status: RESOLVED | Noted: 2020-02-07 | Resolved: 2022-09-28

## 2022-09-28 PROCEDURE — G0439 PPPS, SUBSEQ VISIT: HCPCS | Performed by: INTERNAL MEDICINE

## 2022-09-28 PROCEDURE — G0008 ADMIN INFLUENZA VIRUS VAC: HCPCS

## 2022-09-28 PROCEDURE — 90662 IIV NO PRSV INCREASED AG IM: CPT

## 2022-09-28 NOTE — PROGRESS NOTES
Assessment and Plan:     Problem List Items Addressed This Visit        Other    Pure hypercholesterolemia    Relevant Orders    TSH, 3rd generation with Free T4 reflex    CBC and differential    Comprehensive metabolic panel    Lipid Panel with Direct LDL reflex    Medicare annual wellness visit, subsequent - Primary      Discussed preventative health, cancer screening, immunizations, and safety issues  Patient's last colonoscopy was in 2011 with recommendations to recheck again in 10 years, will pursue patient having any symptoms  I recommend yearly flu shot  Patient may have had the Shingrix shot at the pharmacy           Other Visit Diagnoses     Vitamin D deficiency        Relevant Orders    Vitamin D 25 hydroxy    Needs flu shot        Relevant Orders    influenza vaccine, high-dose, PF 0 7 mL (FLUZONE HIGH-DOSE)           Preventive health issues were discussed with patient, and age appropriate screening tests were ordered as noted in patient's After Visit Summary  Personalized health advice and appropriate referrals for health education or preventive services given if needed, as noted in patient's After Visit Summary       History of Present Illness:     Patient presents for a Medicare Wellness Visit    HPI   Patient Care Team:  Sole Rivas MD as PCP - General (Internal Medicine)     Review of Systems:     Review of Systems     Problem List:     Patient Active Problem List   Diagnosis    Persistent atrial fibrillation ([address] )    Pure hypercholesterolemia    Benign essential hypertension    Tremor of left hand    Acute non-recurrent maxillary sinusitis    Bloating    Anxiety    Dyspnea on exertion    Esophageal reflux    Parkinson's disease ([address] )    URI (upper respiratory infection)    Medicare annual wellness visit, subsequent    Chest pain    Acute left-sided low back pain with left-sided sciatica    Systolic murmur    Thoracic aortic aneurysm without rupture ([address] )    Skin lesion    Seasonal allergic rhinitis due to pollen    Lightheadedness    Hordeolum externum of right lower eyelid    Aortic stenosis    Bradycardia    Fall    Abdominal wall hematoma    Acute blood loss anemia    Left shoulder pain    Closed left clavicular fracture    Displaced fracture of shaft of left clavicle with routine healing    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)    Driving safety issue    Laceration of scalp without foreign body      Past Medical and Surgical History:     Past Medical History:   Diagnosis Date    A-fib ([facility] )     Afib ([facility] )     Atrial fibrillation ([facility] ) 6/13/2021    Hypertension     Hypertension 6/13/2021    Left AC separation     Parkinson's disease ([facility] ) 11/01/2018    SDH (subdural hematoma) ([facility] ) 2/7/2020     Past Surgical History:   Procedure Laterality Date    CARDIOVERSION      ATRIAL    ROTATOR CUFF REPAIR      SHOULDER SURGERY        Family History:     Family History   Problem Relation Age of Onset    No Known Problems Mother     Hypertension Father     Coronary artery disease Father       Social History:     Social History     Socioeconomic History    Marital status: /Civil Union     Spouse name: None    Number of children: None    Years of education: None    Highest education level: None   Occupational History    None   Tobacco Use    Smoking status: Never Smoker    Smokeless tobacco: Never Used    Tobacco comment: Quit 35-40 years ago   Vaping Use    Vaping Use: Never used   Substance and Sexual Activity    Alcohol use: Not Currently     Comment: Rare, previously moderate drinker cut down 2016       Drug use: Never    Sexual activity: Not Currently     Partners: Female     Birth control/protection: Post-menopausal   Other Topics Concern    None   Social History Narrative    ** Merged History Encounter **          Social Determinants of Health     Financial Resource Strain: Low Risk     Difficulty of Paying Living Expenses: Not very hard   Food Insecurity: Not on file   Transportation Needs: No Transportation Needs    Lack of Transportation (Medical): No    Lack of Transportation (Non-Medical): No   Physical Activity: Not on file   Stress: Not on file   Social Connections: Not on file   Intimate Partner Violence: Not on file   Housing Stability: Not on file      Medications and Allergies:     Current Outpatient Medications   Medication Sig Dispense Refill    acetaminophen (TYLENOL) 325 mg tablet Take 2 tablets (650 mg total) by mouth every 4 (four) hours as needed for mild pain  0    carbidopa-levodopa (SINEMET)  mg per tablet TAKE 1 TABLET BY MOUTH THREE TIMES A  tablet 0    doxycycline hyclate (VIBRAMYCIN) 100 mg capsule ONE TAB BY MOUTH TWICE A DAY FOR 14 DAYS WITH FOOD      gentamicin (GARAMYCIN) 0 1 % ointment APPLY TOPICALLY TO WOUND 4 TIMES DAILY      ipratropium (ATROVENT) 0 06 % nasal spray 1 spray into each nostril 4 (four) times a day as needed for rhinitis 45 mL 0    metoprolol succinate (TOPROL-XL) 50 mg 24 hr tablet Take 1 tablet (50 mg total) by mouth daily 90 tablet 0    Multiple Vitamins-Minerals (ICAPS AREDS 2) CAPS Take 1 capsule by mouth 2 (two) times a day        telmisartan (MICARDIS) 20 MG tablet Take 0 5 tablets (10 mg total) by mouth daily 45 tablet 3    Xarelto 15 MG tablet TAKE 1 TABLET BY MOUTH DAILY WITH BREAKFAST 90 tablet 0    loratadine (CLARITIN) 10 mg tablet Take 1 tablet (10 mg total) by mouth daily (Patient not taking: No sig reported) 90 tablet 0     No current facility-administered medications for this visit  Allergies   Allergen Reactions    Penicillins Hives    Pollen Extract Other (See Comments)             Immunizations:     Immunization History   Administered Date(s) Administered    COVID-19 PFIZER VACCINE 0 3 ML IM 01/27/2021, 02/17/2021, 01/08/2022    INFLUENZA 09/04/2016, 10/26/2017, 10/26/2017, 09/13/2018    Influenza Split High Dose Preservative Free IM 1937, 10/06/2014, 09/09/2015, 10/17/2017    Influenza, high dose seasonal 0 7 mL 11/17/2021    Influenza, seasonal, injectable 10/11/2010, 10/11/2011    Pneumococcal Conjugate 13-Valent 09/27/2015    Pneumococcal Polysaccharide PPV23 01/15/2017    Zoster 01/01/2013    Zoster Vaccine Recombinant 01/01/2013    influenza, trivalent, adjuvanted 09/19/2019      Health Maintenance: There are no preventive care reminders to display for this patient  Topic Date Due    COVID-19 Vaccine (4 - Booster for Pfizer series) 05/08/2022    Influenza Vaccine (1) 09/01/2022      Medicare Screening Tests and Risk Assessments: Forrest Carpenter is here for his Subsequent Wellness visit  Health Risk Assessment:   Patient rates overall health as very good  Patient feels that their physical health rating is same  Patient is very satisfied with their life  Eyesight was rated as slightly worse  Hearing was rated as same  Patient feels that their emotional and mental health rating is same  Patients states they are never, rarely angry  Patient states they are never, rarely unusually tired/fatigued  Pain experienced in the last 7 days has been none  Patient states that he has experienced no weight loss or gain in last 6 months  Depression Screening:   PHQ-2 Score: 0      Fall Risk Screening: In the past year, patient has experienced: no history of falling in past year      Home Safety:  Patient does not have trouble with stairs inside or outside of their home  Patient has working smoke alarms and has working carbon monoxide detector  Home safety hazards include: none  Nutrition:   Current diet is Regular  Medications:   Patient is currently taking over-the-counter supplements  OTC medications include: see medication list  Patient is able to manage medications       Activities of Daily Living (ADLs)/Instrumental Activities of Daily Living (IADLs):   Walk and transfer into and out of bed and chair?: Yes  Dress and groom yourself?: Yes    Bathe or shower yourself?: Yes    Feed yourself? Yes  Do your laundry/housekeeping?: Yes  Manage your money, pay your bills and track your expenses?: Yes  Make your own meals?: Yes    Do your own shopping?: Yes    ADL comments: Wife helps with bills    Previous Hospitalizations:   Any hospitalizations or ED visits within the last 12 months?: No      Advance Care Planning:   Living will: Yes    Durable POA for healthcare: Yes    Advanced directive: Yes      Cognitive Screening:   Provider or family/friend/caregiver concerned regarding cognition?: Yes    Cognition Comments: Mostly concerns about driving where it has been documented multiple places that he not drive    PREVENTIVE SCREENINGS      Cardiovascular Screening:    General: Screening Not Indicated, History Lipid Disorder and Risks and Benefits Discussed    Due for: Lipid Panel      Diabetes Screening:     General: Screening Current and Risks and Benefits Discussed      Colorectal Cancer Screening:     General: Screening Not Indicated      Prostate Cancer Screening:    General: Screening Not Indicated      Osteoporosis Screening:    General: Screening Not Indicated      Abdominal Aortic Aneurysm (AAA) Screening:        General: Screening Not Indicated      Lung Cancer Screening:     General: Screening Not Indicated      Hepatitis C Screening:    General: Screening Not Indicated    Screening, Brief Intervention, and Referral to Treatment (SBIRT)    Screening  Typical number of drinks in a day: 0  Typical number of drinks in a week: 2  Interpretation: Low risk drinking behavior  Single Item Drug Screening:  How often have you used an illegal drug (including marijuana) or a prescription medication for non-medical reasons in the past year? never    Single Item Drug Screen Score: 0  Interpretation: Negative screen for possible drug use disorder    Brief Intervention  Alcohol & drug use screenings were reviewed  No concerns regarding substance use disorder identified  Other Counseling Topics:   Car/seat belt/driving safety, skin self-exam and sunscreen       No exam data present     Physical Exam:     /76 (BP Location: Left arm, Patient Position: Sitting, Cuff Size: Standard)   Pulse 58   Ht 6' 2" (1 88 m)   Wt 102 kg (225 lb)   SpO2 99%   BMI 28 89 kg/m²     Physical Exam  Neurological:      Comments: Pt can spell WORLD forward and backwards, 2/3 recall, pt doesn't know date, but knows month and year          Víctor Cm MD

## 2022-09-28 NOTE — ASSESSMENT & PLAN NOTE
Discussed preventative health, cancer screening, immunizations, and safety issues  Patient's last colonoscopy was in 2011 with recommendations to recheck again in 10 years, will pursue patient having any symptoms  I recommend yearly flu shot    Patient may have had the Shingrix shot at the pharmacy

## 2022-09-28 NOTE — PATIENT INSTRUCTIONS
Problem List Items Addressed This Visit          Other    Pure hypercholesterolemia    Relevant Orders    TSH, 3rd generation with Free T4 reflex    CBC and differential    Comprehensive metabolic panel    Lipid Panel with Direct LDL reflex    Medicare annual wellness visit, subsequent - Primary      Discussed preventative health, cancer screening, immunizations, and safety issues  Patient's last colonoscopy was in 2011 with recommendations to recheck again in 10 years, will pursue patient having any symptoms  I recommend yearly flu shot  Patient may have had the Shingrix shot at the pharmacy                 Other Visit Diagnoses       Vitamin D deficiency        Relevant Orders    Vitamin D 25 hydroxy    Needs flu shot        Relevant Orders    influenza vaccine, high-dose, PF 0 7 mL (FLUZONE HIGH-DOSE)            Medicare Preventive Visit Patient Instructions  Thank you for completing your Welcome to Medicare Visit or Medicare Annual Wellness Visit today  Your next wellness visit will be due in one year (9/29/2023)  The screening/preventive services that you may require over the next 5-10 years are detailed below  Some tests may not apply to you based off risk factors and/or age  Screening tests ordered at today's visit but not completed yet may show as past due  Also, please note that scanned in results may not display below  Preventive Screenings:  Service Recommendations Previous Testing/Comments   Colorectal Cancer Screening  Colonoscopy    Fecal Occult Blood Test (FOBT)/Fecal Immunochemical Test (FIT)  Fecal DNA/Cologuard Test  Flexible Sigmoidoscopy Age: 39-70 years old   Colonoscopy: every 10 years (May be performed more frequently if at higher risk)  OR  FOBT/FIT: every 1 year  OR  Cologuard: every 3 years  OR  Sigmoidoscopy: every 5 years  Screening may be recommended earlier than age 39 if at higher risk for colorectal cancer   Also, an individualized decision between you and your healthcare provider will decide whether screening between the ages of 74-80 would be appropriate  Colonoscopy: 04/28/2011  FOBT/FIT: Not on file  Cologuard: Not on file  Sigmoidoscopy: Not on file          Prostate Cancer Screening Individualized decision between patient and health care provider in men between ages of 53-78   Medicare will cover every 12 months beginning on the day after your 50th birthday PSA: No results in last 5 years           Hepatitis C Screening Once for adults born between 80 and 1965  More frequently in patients at high risk for Hepatitis C Hep C Antibody: Not on file        Diabetes Screening 1-2 times per year if you're at risk for diabetes or have pre-diabetes Fasting glucose: 126 mg/dL (6/14/2021)  A1C: 6 2 % (10/11/2021)      Cholesterol Screening Once every 5 years if you don't have a lipid disorder  May order more often based on risk factors  Lipid panel: 04/05/2021         Other Preventive Screenings Covered by Medicare:  Abdominal Aortic Aneurysm (AAA) Screening: covered once if your at risk  You're considered to be at risk if you have a family history of AAA or a male between the age of 73-68 who smoking at least 100 cigarettes in your lifetime  Lung Cancer Screening: covers low dose CT scan once per year if you meet all of the following conditions: (1) Age 50-69; (2) No signs or symptoms of lung cancer; (3) Current smoker or have quit smoking within the last 15 years; (4) You have a tobacco smoking history of at least 20 pack years (packs per day x number of years you smoked); (5) You get a written order from a healthcare provider    Glaucoma Screening: covered annually if you're considered high risk: (1) You have diabetes OR (2) Family history of glaucoma OR (3)  aged 48 and older OR (3)  American aged 72 and older  Osteoporosis Screening: covered every 2 years if you meet one of the following conditions: (1) Have a vertebral abnormality; (2) On glucocorticoid therapy for more than 3 months; (3) Have primary hyperparathyroidism; (4) On osteoporosis medications and need to assess response to drug therapy  HIV Screening: covered annually if you're between the age of 12-76  Also covered annually if you are younger than 13 and older than 72 with risk factors for HIV infection  For pregnant patients, it is covered up to 3 times per pregnancy  Immunizations:  Immunization Recommendations   Influenza Vaccine Annual influenza vaccination during flu season is recommended for all persons aged >= 6 months who do not have contraindications   Pneumococcal Vaccine   * Pneumococcal conjugate vaccine = PCV13 (Prevnar 13), PCV15 (Vaxneuvance), PCV20 (Prevnar 20)  * Pneumococcal polysaccharide vaccine = PPSV23 (Pneumovax) Adults 25-60 years old: 1-3 doses may be recommended based on certain risk factors  Adults 72 years old: 1-2 doses may be recommended based off what pneumonia vaccine you previously received   Hepatitis B Vaccine 3 dose series if at intermediate or high risk (ex: diabetes, end stage renal disease, liver disease)   Tetanus (Td) Vaccine - COST NOT COVERED BY MEDICARE PART B Following completion of primary series, a booster dose should be given every 10 years to maintain immunity against tetanus  Td may also be given as tetanus wound prophylaxis  Tdap Vaccine - COST NOT COVERED BY MEDICARE PART B Recommended at least once for all adults  For pregnant patients, recommended with each pregnancy  Shingles Vaccine (Shingrix) - COST NOT COVERED BY MEDICARE PART B  2 shot series recommended in those aged 48 and above     Health Maintenance Due:  There are no preventive care reminders to display for this patient  Immunizations Due:      Topic Date Due    COVID-19 Vaccine (4 - Booster for Pfizer series) 05/08/2022    Influenza Vaccine (1) 09/01/2022     Advance Directives   What are advance directives?   Advance directives are legal documents that state your wishes and plans for medical care  These plans are made ahead of time in case you lose your ability to make decisions for yourself  Advance directives can apply to any medical decision, such as the treatments you want, and if you want to donate organs  What are the types of advance directives? There are many types of advance directives, and each state has rules about how to use them  You may choose a combination of any of the following:  Living will: This is a written record of the treatment you want  You can also choose which treatments you do not want, which to limit, and which to stop at a certain time  This includes surgery, medicine, IV fluid, and tube feedings  Durable power of  for healthcare [facility]): This is a written record that states who you want to make healthcare choices for you when you are unable to make them for yourself  This person, called a proxy, is usually a family member or a friend  You may choose more than 1 proxy  Do not resuscitate (DNR) order:  A DNR order is used in case your heart stops beating or you stop breathing  It is a request not to have certain forms of treatment, such as CPR  A DNR order may be included in other types of advance directives  Medical directive: This covers the care that you want if you are in a coma, near death, or unable to make decisions for yourself  You can list the treatments you want for each condition  Treatment may include pain medicine, surgery, blood transfusions, dialysis, IV or tube feedings, and a ventilator (breathing machine)  Values history: This document has questions about your views, beliefs, and how you feel and think about life  This information can help others choose the care that you would choose  Why are advance directives important? An advance directive helps you control your care  Although spoken wishes may be used, it is better to have your wishes written down  Spoken wishes can be misunderstood, or not followed   Treatments may be given even if you do not want them  An advance directive may make it easier for your family to make difficult choices about your care  Weight Management   Why it is important to manage your weight:  Being overweight increases your risk of health conditions such as heart disease, high blood pressure, type 2 diabetes, and certain types of cancer  It can also increase your risk for osteoarthritis, sleep apnea, and other respiratory problems  Aim for a slow, steady weight loss  Even a small amount of weight loss can lower your risk of health problems  How to lose weight safely:  A safe and healthy way to lose weight is to eat fewer calories and get regular exercise  You can lose up about 1 pound a week by decreasing the number of calories you eat by 500 calories each day  Healthy meal plan for weight management:  A healthy meal plan includes a variety of foods, contains fewer calories, and helps you stay healthy  A healthy meal plan includes the following:  Eat whole-grain foods more often  A healthy meal plan should contain fiber  Fiber is the part of grains, fruits, and vegetables that is not broken down by your body  Whole-grain foods are healthy and provide extra fiber in your diet  Some examples of whole-grain foods are whole-wheat breads and pastas, oatmeal, brown rice, and bulgur  Eat a variety of vegetables every day  Include dark, leafy greens such as spinach, kale, mu greens, and mustard greens  Eat yellow and orange vegetables such as carrots, sweet potatoes, and winter squash  Eat a variety of fruits every day  Choose fresh or canned fruit (canned in its own juice or light syrup) instead of juice  Fruit juice has very little or no fiber  Eat low-fat dairy foods  Drink fat-free (skim) milk or 1% milk  Eat fat-free yogurt and low-fat cottage cheese  Try low-fat cheeses such as mozzarella and other reduced-fat cheeses  Choose meat and other protein foods that are low in fat    Choose beans or other legumes such as split peas or lentils  Choose fish, skinless poultry (chicken or turkey), or lean cuts of red meat (beef or pork)  Before you cook meat or poultry, cut off any visible fat  Use less fat and oil  Try baking foods instead of frying them  Add less fat, such as margarine, sour cream, regular salad dressing and mayonnaise to foods  Eat fewer high-fat foods  Some examples of high-fat foods include french fries, doughnuts, ice cream, and cakes  Eat fewer sweets  Limit foods and drinks that are high in sugar  This includes candy, cookies, regular soda, and sweetened drinks  Exercise:  Exercise at least 30 minutes per day on most days of the week  Some examples of exercise include walking, biking, dancing, and swimming  You can also fit in more physical activity by taking the stairs instead of the elevator or parking farther away from stores  Ask your healthcare provider about the best exercise plan for you  © Copyright [employer] 2018 Information is for End User's use only and may not be sold, redistributed or otherwise used for commercial purposes   All illustrations and images included in CareNotes® are the copyrighted property of [employer]  or [address] [place] [address]

## 2022-10-04 PROBLEM — F02.818 DEMENTIA ASSOCIATED WITH OTHER UNDERLYING DISEASE WITH BEHAVIORAL DISTURBANCE: Status: ACTIVE | Noted: 2022-01-27

## 2022-10-11 ENCOUNTER — OFFICE VISIT (OUTPATIENT)
Dept: INTERNAL MEDICINE CLINIC | Facility: CLINIC | Age: 87
End: 2022-10-11
Payer: MEDICARE

## 2022-10-11 VITALS
DIASTOLIC BLOOD PRESSURE: 72 MMHG | WEIGHT: 232 LBS | HEIGHT: 74 IN | OXYGEN SATURATION: 94 % | BODY MASS INDEX: 29.77 KG/M2 | HEART RATE: 59 BPM | SYSTOLIC BLOOD PRESSURE: 142 MMHG

## 2022-10-11 DIAGNOSIS — R10.84 GENERALIZED ABDOMINAL PAIN: Primary | ICD-10-CM

## 2022-10-11 PROBLEM — R10.9 ABDOMINAL PAIN: Status: ACTIVE | Noted: 2022-10-11

## 2022-10-11 PROCEDURE — 99214 OFFICE O/P EST MOD 30 MIN: CPT | Performed by: INTERNAL MEDICINE

## 2022-10-11 NOTE — PROGRESS NOTES
Name: Margarita Arguello      : 1935      MRN: [id number]  Encounter Provider: Dana Wang MD  Encounter Date: 10/11/2022   Encounter department: [facility] [address]     1  Generalized abdominal pain  Assessment & Plan:  I recommend cutting back on ice cream in case he has a component of lactose intolerance which many people develop later in life, and this can present abdominal pain cramping  I also recommend cutting out milk, and he eats cereal with milk in the morning, he could try substituting with something like almond milk to see if his abdominal pain improves    Patient worried that symptoms could be from his meds, he is taking both his blood pressure medications in the morning, telmisartan and metoprolol, he could try moving 1 of them to the evening to see how he does, then move the other to the evening to see how he does which is the only way to see if his abdominal pain is related to the blood pressure meds  Abdominal exam is benign  Patient has had a little constipation lately, discussed that he could be having some abdominal pain constipation and that he should make sure he gets good fluid intake, and a good intake of fruits, and then stool softener if needed             Subjective     Patient reports he gets abdominal pain after taking his medications in the morning  Patient brought his meds with him, he does has escitalopram, but reports he has not been taking this, and it is not on his med list   He does eat a lot of ice cream    Review of Systems   Constitutional: Negative for chills, fatigue and fever  HENT: Negative for congestion, nosebleeds, postnasal drip, sore throat and trouble swallowing  Eyes: Negative for pain  Respiratory: Negative for cough, chest tightness, shortness of breath and wheezing  Cardiovascular: Negative for chest pain, palpitations and leg swelling  Gastrointestinal: Positive for abdominal pain   Negative for blood in stool, constipation, diarrhea, nausea and vomiting  Endocrine: Negative for polydipsia and polyuria  Genitourinary: Negative for dysuria, flank pain and hematuria  Musculoskeletal: Negative for arthralgias  Skin: Negative for rash  Neurological: Positive for tremors  Negative for dizziness, light-headedness and headaches  Hematological: Does not bruise/bleed easily  Psychiatric/Behavioral: Negative for dysphoric mood  The patient is not nervous/anxious  Past Medical History:   Diagnosis Date   • A-fib ([place] [address] )    • Afib ([place] [address] )    • Atrial fibrillation ([place] [address] ) 6/13/2021   • Hypertension    • Hypertension 6/13/2021   • Left AC separation    • Parkinson's disease ([place] [address] ) 11/01/2018   • SDH (subdural hematoma) ([place] [address] ) 2/7/2020     Past Surgical History:   Procedure Laterality Date   • CARDIOVERSION      ATRIAL   • ROTATOR CUFF REPAIR     • SHOULDER SURGERY       Family History   Problem Relation Age of Onset   • No Known Problems Mother    • Hypertension Father    • Coronary artery disease Father      Social History     Socioeconomic History   • Marital status: /Civil Union     Spouse name: None   • Number of children: None   • Years of education: None   • Highest education level: None   Occupational History   • None   Tobacco Use   • Smoking status: Never Smoker   • Smokeless tobacco: Never Used   • Tobacco comment: Quit 35-40 years ago   Vaping Use   • Vaping Use: Never used   Substance and Sexual Activity   • Alcohol use: Not Currently     Comment: Rare, previously moderate drinker cut down 2016      • Drug use: Never   • Sexual activity: Not Currently     Partners: Female     Birth control/protection: Post-menopausal   Other Topics Concern   • None   Social History Narrative    ** Merged History Encounter **          Social Determinants of Health     Financial Resource Strain: Low Risk    • Difficulty of Paying Living Expenses: Not very hard   Food Insecurity: Not on file   Transportation Needs: No Transportation Needs   • Lack of Transportation (Medical): No   • Lack of Transportation (Non-Medical): No   Physical Activity: Not on file   Stress: Not on file   Social Connections: Not on file   Intimate Partner Violence: Not on file   Housing Stability: Not on file     Current Outpatient Medications on File Prior to Visit   Medication Sig   • acetaminophen (TYLENOL) 325 mg tablet Take 2 tablets (650 mg total) by mouth every 4 (four) hours as needed for mild pain   • carbidopa-levodopa (SINEMET)  mg per tablet TAKE 1 TABLET BY MOUTH THREE TIMES A DAY   • doxycycline hyclate (VIBRAMYCIN) 100 mg capsule ONE TAB BY MOUTH TWICE A DAY FOR 14 DAYS WITH FOOD   • gentamicin (GARAMYCIN) 0 1 % ointment APPLY TOPICALLY TO WOUND 4 TIMES DAILY   • ipratropium (ATROVENT) 0 06 % nasal spray 1 spray into each nostril 4 (four) times a day as needed for rhinitis   • metoprolol succinate (TOPROL-XL) 50 mg 24 hr tablet Take 1 tablet (50 mg total) by mouth daily   • Multiple Vitamins-Minerals (ICAPS AREDS 2) CAPS Take 1 capsule by mouth 2 (two) times a day     • telmisartan (MICARDIS) 20 MG tablet Take 0 5 tablets (10 mg total) by mouth daily   • Xarelto 15 MG tablet TAKE 1 TABLET BY MOUTH DAILY WITH BREAKFAST   • loratadine (CLARITIN) 10 mg tablet Take 1 tablet (10 mg total) by mouth daily (Patient not taking: No sig reported)     Allergies   Allergen Reactions   • Penicillins Hives   • Pollen Extract Other (See Comments)            Immunization History   Administered Date(s) Administered   • COVID-19 PFIZER VACCINE 0 3 ML IM 01/27/2021, 02/17/2021, 01/08/2022   • INFLUENZA 09/04/2016, 10/26/2017, 10/26/2017, 09/13/2018   • Influenza Split High Dose Preservative Free IM 1935, 10/06/2014, 09/09/2015, 10/17/2017   • Influenza, high dose seasonal 0 7 mL 11/17/2021, 09/28/2022   • Influenza, seasonal, injectable 10/11/2010, 10/11/2011   • Pneumococcal Conjugate 13-Valent 09/27/2015   • Pneumococcal Polysaccharide PPV23 01/15/2017   • Zoster 01/01/2013   • Zoster Vaccine Recombinant 01/01/2013   • influenza, trivalent, adjuvanted 09/19/2019       Objective     /72 (BP Location: Left arm, Patient Position: Sitting, Cuff Size: Standard)   Pulse 59   Ht 6' 2" (1 88 m)   Wt 105 kg (232 lb)   SpO2 94%   BMI 29 79 kg/m²     Physical Exam  Abdominal:      General: Abdomen is flat  Palpations: Abdomen is soft  Tenderness: There is no abdominal tenderness         Ml Acosta MD

## 2022-10-11 NOTE — ASSESSMENT & PLAN NOTE
I recommend cutting back on ice cream in case he has a component of lactose intolerance which many people develop later in life, and this can present abdominal pain cramping  I also recommend cutting out milk, and he eats cereal with milk in the morning, he could try substituting with something like almond milk to see if his abdominal pain improves    Patient worried that symptoms could be from his meds, he is taking both his blood pressure medications in the morning, telmisartan and metoprolol, he could try moving 1 of them to the evening to see how he does, then move the other to the evening to see how he does which is the only way to see if his abdominal pain is related to the blood pressure meds  Abdominal exam is benign    Patient has had a little constipation lately, discussed that he could be having some abdominal pain constipation and that he should make sure he gets good fluid intake, and a good intake of fruits, and then stool softener if needed

## 2022-10-11 NOTE — PATIENT INSTRUCTIONS
Problem List Items Addressed This Visit          Other    Abdominal pain - Primary     I recommend cutting back on ice cream in case he has a component of lactose intolerance which many people develop later in life, and this can present abdominal pain cramping  I also recommend cutting out milk, and he eats cereal with milk in the morning, he could try substituting with something like almond milk to see if his abdominal pain improves    Patient worried that symptoms could be from his meds, he is taking both his blood pressure medications in the morning, telmisartan and metoprolol, he could try moving 1 of them to the evening to see how he does, then move the other to the evening to see how he does which is the only way to see if his abdominal pain is related to the blood pressure meds  Abdominal exam is benign    Patient has had a little constipation lately, discussed that he could be having some abdominal pain constipation and that he should make sure he gets good fluid intake, and a good intake of fruits, and then stool softener if needed

## 2022-10-15 DIAGNOSIS — I10 ESSENTIAL HYPERTENSION: ICD-10-CM

## 2022-10-15 RX ORDER — METOPROLOL SUCCINATE 50 MG/1
TABLET, EXTENDED RELEASE ORAL
Qty: 90 TABLET | Refills: 0 | Status: SHIPPED | OUTPATIENT
Start: 2022-10-15 | End: 2022-10-26 | Stop reason: SDUPTHER

## 2022-10-17 ENCOUNTER — TELEPHONE (OUTPATIENT)
Dept: OTHER | Facility: OTHER | Age: 87
End: 2022-10-17

## 2022-10-17 NOTE — TELEPHONE ENCOUNTER
Hello,     Called pt no answer, left voicemail to please call us back at [phone] and reschedule his appointment  Unable to send a [employer] message as it's not set up yet      Thank you,     Akanksha Ayala

## 2022-10-17 NOTE — TELEPHONE ENCOUNTER
Patient is calling regarding cancelling an appointment      Date/Time:10-17-22 @ 2206    Patient was rescheduled: YES [] NO [x]    Patient requesting call back to reschedule: YES [x] NO []

## 2022-10-18 PROBLEM — S01.01XA LACERATION OF SCALP WITHOUT FOREIGN BODY: Status: RESOLVED | Noted: 2022-08-19 | Resolved: 2022-10-18

## 2022-10-18 NOTE — ASSESSMENT & PLAN NOTE
Secondary to abdominal wall hemorrhage due to fall  Care as above Xolair Counseling:  Patient informed of potential adverse effects including but not limited to fever, muscle aches, rash and allergic reactions.  The patient verbalized understanding of the proper use and possible adverse effects of Xolair.  All of the patient's questions and concerns were addressed.

## 2022-10-26 ENCOUNTER — OFFICE VISIT (OUTPATIENT)
Dept: CARDIOLOGY CLINIC | Facility: CLINIC | Age: 87
End: 2022-10-26
Payer: MEDICARE

## 2022-10-26 VITALS
SYSTOLIC BLOOD PRESSURE: 132 MMHG | BODY MASS INDEX: 29.77 KG/M2 | DIASTOLIC BLOOD PRESSURE: 62 MMHG | HEIGHT: 74 IN | WEIGHT: 232 LBS | HEART RATE: 64 BPM | OXYGEN SATURATION: 100 %

## 2022-10-26 DIAGNOSIS — I48.19 PERSISTENT ATRIAL FIBRILLATION (HCC): Primary | ICD-10-CM

## 2022-10-26 DIAGNOSIS — I10 BENIGN ESSENTIAL HYPERTENSION: ICD-10-CM

## 2022-10-26 DIAGNOSIS — I71.20 THORACIC AORTIC ANEURYSM WITHOUT RUPTURE, UNSPECIFIED PART: ICD-10-CM

## 2022-10-26 DIAGNOSIS — I10 ESSENTIAL HYPERTENSION: ICD-10-CM

## 2022-10-26 DIAGNOSIS — I35.0 NONRHEUMATIC AORTIC VALVE STENOSIS: ICD-10-CM

## 2022-10-26 PROCEDURE — 99214 OFFICE O/P EST MOD 30 MIN: CPT | Performed by: INTERNAL MEDICINE

## 2022-10-26 RX ORDER — METOPROLOL SUCCINATE 25 MG/1
25 TABLET, EXTENDED RELEASE ORAL DAILY
Qty: 90 TABLET | Refills: 3 | Status: SHIPPED | OUTPATIENT
Start: 2022-10-26

## 2022-10-26 NOTE — PROGRESS NOTES
Cardiology Follow-up     Graeme Cisse  [id number]  1935  [facility] [place] [address] [address] [place] [id number]    1  Persistent atrial fibrillation (HCC)  metoprolol succinate (TOPROL-XL) 25 mg 24 hr tablet   2  Benign essential hypertension     3  Thoracic aortic aneurysm without rupture, unspecified part     4  Nonrheumatic aortic valve stenosis     5  Essential hypertension         Discussion/Summary:    1  Permanent atrial fibrillation - His heart rates to run low at times, but averaging normal   He does have some intermittent lightheadedness, but this appears more associated with his Parkinson's disease and orthostatic changes  Regardless I am going to change his Toprol and reduced down to 25 mg daily  He will remain on Xarelto for stroke prevention  2   Mild mitral and aortic stenosis - Not changed on recent echocardiogram   No other issues from this standpoint  Next year we will repeat an echocardiogram     3   Mild dilation of the ascending aorta - This measured 4 3 cm  After our next visit we will repeat an echocardiogram   Continue beta-blocker  4   Hypertension - He is on Toprol-XL and telmisartan  He was once on Imdur but we stopped this  His lightheadedness did improve  He should periodically check his blood pressure home  As stated we are decreasing his Toprol-XL  HPI:    Mr Valentine Choudhury comes in for follow-up given his cardiac history  He has a history of permanent atrial fibrillation, mild aortic and mitral valve stenosis, and mild dilation of the thoracic aorta  He also has hypertension  He formally followed with Dr Dorinda Blood  We met him during a hospitalization in June of 2021 in which she presented with mechanical fall without lightheadedness or syncope  His heart rates were running a little low but they were stable  This was not associated with his fall    He does have a history of Parkinson's disease and some associated memory deficits and orthostatic hypotension  He is treated for hypertension and was somewhat of an atypical regimen  After his hospitalization he wore a 48 hour Holter monitor that showed an average heart rate of 60 beats per minute  He did wear a 7 day extended ambulatory Holter, but we do not have the results of that  He did follow-up with Dr Alex Garcia after his hospitalization  He did have an updated echocardiogram that showed no change  He does have mild dilation of the ascending aorta and mild aortic and mitral stenosis  In follow-up his propranolol was changed over to metoprolol  Howard Avendano has not had any fallen since that hospitalization  He does get periodically lightheaded which sounds positional or ambulatory  It seems associated with orthostatic changes  After our last visit we stop the Imdur and his lightheadedness did improve  He denies any near-syncope or syncope  No chest pain or any symptoms of angina  He does not feel his atrial fibrillation  No palpitations  He denies shortness of breath or any signs/symptoms of CHF  He has been getting nausea after his medications, but this occurring throughout the day and almost after every medication he takes        Patient Active Problem List   Diagnosis   • Persistent atrial fibrillation (HCC)   • Pure hypercholesterolemia   • Benign essential hypertension   • Tremor of left hand   • Acute non-recurrent maxillary sinusitis   • Bloating   • Anxiety   • Dyspnea on exertion   • Esophageal reflux   • Parkinson's disease ([facility] [address] )   • URI (upper respiratory infection)   • Medicare annual wellness visit, subsequent   • Chest pain   • Acute left-sided low back pain with left-sided sciatica   • Systolic murmur   • Thoracic aortic aneurysm without rupture   • Skin lesion   • Seasonal allergic rhinitis due to pollen   • Lightheadedness   • Hordeolum externum of right lower eyelid   • Aortic stenosis   • Bradycardia   • Fall   • Abdominal wall hematoma   • Acute blood loss anemia   • Left shoulder pain   • Closed left clavicular fracture   • Displaced fracture of shaft of left clavicle with routine healing   • Dementia associated with other underlying disease with behavioral disturbance   • Driving safety issue   • Abdominal pain     Past Medical History:   Diagnosis Date   • A-fib ([place] [address] )    • Afib ([place] [address] )    • Atrial fibrillation ([place] [address] ) 6/13/2021   • Hypertension    • Hypertension 6/13/2021   • Left AC separation    • Parkinson's disease ([place] [address] ) 11/01/2018   • SDH (subdural hematoma) ([place] [address] ) 2/7/2020     Social History     Socioeconomic History   • Marital status: /Civil Union     Spouse name: Not on file   • Number of children: Not on file   • Years of education: Not on file   • Highest education level: Not on file   Occupational History   • Not on file   Tobacco Use   • Smoking status: Never Smoker   • Smokeless tobacco: Never Used   • Tobacco comment: Quit 35-40 years ago   Vaping Use   • Vaping Use: Never used   Substance and Sexual Activity   • Alcohol use: Not Currently     Comment: Rare, previously moderate drinker cut down 2016  • Drug use: Never   • Sexual activity: Not Currently     Partners: Female     Birth control/protection: Post-menopausal   Other Topics Concern   • Not on file   Social History Narrative    ** Merged History Encounter **          Social Determinants of Health     Financial Resource Strain: Low Risk    • Difficulty of Paying Living Expenses: Not very hard   Food Insecurity: Not on file   Transportation Needs: No Transportation Needs   • Lack of Transportation (Medical): No   • Lack of Transportation (Non-Medical):  No   Physical Activity: Not on file   Stress: Not on file   Social Connections: Not on file   Intimate Partner Violence: Not on file   Housing Stability: Not on file      Family History   Problem Relation Age of Onset   • No Known Problems Mother    • Hypertension Father • Coronary artery disease Father      Past Surgical History:   Procedure Laterality Date   • CARDIOVERSION      ATRIAL   • ROTATOR CUFF REPAIR     • SHOULDER SURGERY         Current Outpatient Medications:   •  acetaminophen (TYLENOL) 325 mg tablet, Take 2 tablets (650 mg total) by mouth every 4 (four) hours as needed for mild pain, Disp: , Rfl: 0  •  carbidopa-levodopa (SINEMET)  mg per tablet, TAKE 1 TABLET BY MOUTH THREE TIMES A DAY, Disp: 270 tablet, Rfl: 3  •  ipratropium (ATROVENT) 0 06 % nasal spray, 1 spray into each nostril 4 (four) times a day as needed for rhinitis, Disp: 45 mL, Rfl: 0  •  metoprolol succinate (TOPROL-XL) 25 mg 24 hr tablet, Take 1 tablet (25 mg total) by mouth daily, Disp: 90 tablet, Rfl: 3  •  Multiple Vitamins-Minerals (ICAPS AREDS 2) CAPS, Take 1 capsule by mouth 2 (two) times a day  , Disp: , Rfl:   •  telmisartan (MICARDIS) 20 MG tablet, Take 0 5 tablets (10 mg total) by mouth daily, Disp: 45 tablet, Rfl: 3  •  Xarelto 15 MG tablet, TAKE 1 TABLET BY MOUTH DAILY WITH BREAKFAST, Disp: 90 tablet, Rfl: 0  •  doxycycline hyclate (VIBRAMYCIN) 100 mg capsule, ONE TAB BY MOUTH TWICE A DAY FOR 14 DAYS WITH FOOD (Patient not taking: Reported on 10/26/2022), Disp: , Rfl:   •  gentamicin (GARAMYCIN) 0 1 % ointment, APPLY TOPICALLY TO WOUND 4 TIMES DAILY (Patient not taking: Reported on 10/26/2022), Disp: , Rfl:   •  loratadine (CLARITIN) 10 mg tablet, Take 1 tablet (10 mg total) by mouth daily (Patient not taking: No sig reported), Disp: 90 tablet, Rfl: 0  Allergies   Allergen Reactions   • Penicillins Hives   • Pollen Extract Other (See Comments)              Labs:  Lab Results   Component Value Date     05/05/2014    K 3 8 08/23/2022    K 4 0 05/05/2014     (H) 08/23/2022     05/05/2014    CO2 25 08/23/2022    CO2 28 06/13/2021    BUN 13 08/23/2022    BUN 13 05/05/2014    CREATININE 0 90 08/23/2022    CREATININE 0 98 05/05/2014    GLUCOSE 120 06/13/2021    GLUCOSE 110 05/05/2014    CALCIUM 8 8 08/23/2022    CALCIUM 9 1 05/05/2014     Lab Results   Component Value Date    WBC 7 52 08/23/2022    WBC 6 46 05/05/2014    HGB 13 1 08/23/2022    HGB 13 6 05/05/2014    HCT 40 1 08/23/2022    HCT 41 1 05/05/2014    MCV 89 08/23/2022    MCV 87 05/05/2014     08/23/2022     05/05/2014     Lab Results   Component Value Date    CHOL 203 05/05/2014    TRIG 165 (H) 04/06/2019    TRIG 197 05/05/2014    HDL 40 04/06/2019    HDL 40 05/05/2014     Imaging:    ECHO (10/2021):  1   Mild left ventricular hypertrophy with normal systolic function, EF   ~58-04%   2   Moderate biatrial enlargement   3   Mildly elevated PA pressure   4   Calcified aortic valve with mild aortic stenosis   5   Mild mitral sclerosis with mild annular calcification and mild mitral   regurgitation   Compared to prior study April 12, 2021, there have been no major changes  Review of Systems:  Review of Systems   Constitutional: Negative  HENT: Negative  Eyes: Negative  Respiratory: Negative  Cardiovascular: Negative  Gastrointestinal: Positive for nausea  Musculoskeletal: Negative  Skin: Negative  Allergic/Immunologic: Negative  Neurological: Positive for tremors and light-headedness  Hematological: Negative  Psychiatric/Behavioral: Negative  All other systems reviewed and are negative  Vitals:    10/26/22 1117   BP: 132/62   BP Location: Right arm   Patient Position: Sitting   Cuff Size: Large   Pulse: 64   SpO2: 100%   Weight: 105 kg (232 lb)   Height: 6' 2" (1 88 m)       Physical Exam:  Physical Exam  Vitals and nursing note reviewed  Constitutional:       Appearance: He is well-developed  HENT:      Head: Normocephalic and atraumatic  Eyes:      General: No scleral icterus  Right eye: No discharge  Left eye: No discharge  Pupils: Pupils are equal, round, and reactive to light  Neck:      Thyroid: No thyromegaly  Vascular: No JVD  Cardiovascular:      Rate and Rhythm: Normal rate  Rhythm regularly irregular  No extrasystoles are present  Pulses: Normal pulses  No decreased pulses  Heart sounds: S1 normal and S2 normal  Murmur heard  Systolic murmur is present with a grade of 2/6  No friction rub  No gallop  Pulmonary:      Effort: Pulmonary effort is normal  No respiratory distress  Breath sounds: Normal breath sounds  No wheezing or rales  Abdominal:      General: Bowel sounds are normal  There is no distension  Palpations: Abdomen is soft  Tenderness: There is no abdominal tenderness  Musculoskeletal:         General: No tenderness or deformity  Normal range of motion  Cervical back: Normal range of motion and neck supple  Skin:     General: Skin is warm and dry  Findings: No rash  Neurological:      Mental Status: He is alert and oriented to person, place, and time  Cranial Nerves: No cranial nerve deficit  Psychiatric:         Thought Content: Thought content normal          Judgment: Judgment normal        Counseling / Coordination of Care  Total office time spent today 25 minutes  Greater than 50% of total time was spent with the patient and / or family counseling and / or coordination of care

## 2022-10-31 ENCOUNTER — OFFICE VISIT (OUTPATIENT)
Dept: URGENT CARE | Age: 87
End: 2022-10-31

## 2022-10-31 ENCOUNTER — TELEPHONE (OUTPATIENT)
Dept: INTERNAL MEDICINE CLINIC | Facility: CLINIC | Age: 87
End: 2022-10-31

## 2022-10-31 VITALS
OXYGEN SATURATION: 98 % | DIASTOLIC BLOOD PRESSURE: 70 MMHG | SYSTOLIC BLOOD PRESSURE: 135 MMHG | TEMPERATURE: 97.4 F | HEART RATE: 62 BPM | RESPIRATION RATE: 18 BRPM

## 2022-10-31 DIAGNOSIS — R42 LIGHTHEADEDNESS: Primary | ICD-10-CM

## 2022-10-31 NOTE — TELEPHONE ENCOUNTER
Pt called in complaining of dizziness, shaking, and "not feeling himself" after taking an unknown medication  Pt is unsure what he took, but said he "feels terrible" and he is shaking  Pt denies chest pain, SOB, nausea, and vomiting  Pt emphasized multiple times how "terrible" he feels  Advised pt he should have someone drive him immediately to the ER  Pt verbalized understanding

## 2022-11-01 NOTE — PATIENT INSTRUCTIONS
1  Increase oral fluids  2  Go to the ER immediately for any worsening symptoms  3  Take medications exactly as directed  4  Follow-up with primary care by phone tomorrow

## 2022-11-01 NOTE — PROGRESS NOTES
[address] [employer] [facility]        NAME: Devan Costa is a 82 y o  male  : 1935    MRN: [id number]  DATE: 2022  TIME: 8:29 PM    Assessment and Plan   Lightheadedness [R42]  1  Lightheadedness           Patient Instructions     1  Increase oral fluids  2  Go to the ER immediately for any worsening symptoms  3  Take medications exactly as directed  4  Follow-up with primary care by phone tomorrow  Chief Complaint     Chief Complaint   Patient presents with   • Headache     Dizziness, for 2 days         History of Present Illness       79-year-old male patient with the 2-3 day history of intermittent lightheadedness, mild headache  Daughter states that the patient has not been taking his medications as regularly as directed  Patient has not been eating regularly nor hydrating the way he should  Patient denies vertigo  Patient is able to ambulate steadily as needed  He denies any hemiparesis, paresthesias, subjective paralysis  No visual changes  No speech difficulties  Denies any chest pain or shortness of breath  Review of Systems   Review of Systems   Constitutional: Positive for fatigue  Negative for chills and fever  HENT: Negative for ear pain and sore throat  Eyes: Negative for photophobia, pain and visual disturbance  Respiratory: Negative for cough, chest tightness and shortness of breath  Cardiovascular: Negative for chest pain, palpitations and leg swelling  Gastrointestinal: Negative for abdominal pain and vomiting  Genitourinary: Negative for dysuria and hematuria  Musculoskeletal: Negative for arthralgias, back pain and gait problem  Skin: Negative for color change and rash  Neurological: Positive for dizziness, light-headedness and headaches  Negative for seizures, syncope, facial asymmetry, speech difficulty, weakness and numbness  Psychiatric/Behavioral: Negative for agitation, behavioral problems and sleep disturbance     All other systems reviewed and are negative          Current Medications       Current Outpatient Medications:   •  acetaminophen (TYLENOL) 325 mg tablet, Take 2 tablets (650 mg total) by mouth every 4 (four) hours as needed for mild pain, Disp: , Rfl: 0  •  carbidopa-levodopa (SINEMET)  mg per tablet, TAKE 1 TABLET BY MOUTH THREE TIMES A DAY, Disp: 270 tablet, Rfl: 3  •  ipratropium (ATROVENT) 0 06 % nasal spray, 1 spray into each nostril 4 (four) times a day as needed for rhinitis, Disp: 45 mL, Rfl: 0  •  metoprolol succinate (TOPROL-XL) 25 mg 24 hr tablet, Take 1 tablet (25 mg total) by mouth daily, Disp: 90 tablet, Rfl: 3  •  Multiple Vitamins-Minerals (ICAPS AREDS 2) CAPS, Take 1 capsule by mouth 2 (two) times a day  , Disp: , Rfl:   •  telmisartan (MICARDIS) 20 MG tablet, Take 0 5 tablets (10 mg total) by mouth daily, Disp: 45 tablet, Rfl: 3  •  Xarelto 15 MG tablet, TAKE 1 TABLET BY MOUTH DAILY WITH BREAKFAST, Disp: 90 tablet, Rfl: 0  •  doxycycline hyclate (VIBRAMYCIN) 100 mg capsule, ONE TAB BY MOUTH TWICE A DAY FOR 14 DAYS WITH FOOD (Patient not taking: Reported on 10/26/2022), Disp: , Rfl:   •  gentamicin (GARAMYCIN) 0 1 % ointment, APPLY TOPICALLY TO WOUND 4 TIMES DAILY (Patient not taking: Reported on 10/26/2022), Disp: , Rfl:   •  loratadine (CLARITIN) 10 mg tablet, Take 1 tablet (10 mg total) by mouth daily (Patient not taking: No sig reported), Disp: 90 tablet, Rfl: 0    Current Allergies     Allergies as of 10/31/2022 - Reviewed 10/31/2022   Allergen Reaction Noted   • Penicillins Hives 06/25/2010   • Pollen extract Other (See Comments) 06/25/2010            The following portions of the patient's history were reviewed and updated as appropriate: allergies, current medications, past family history, past medical history, past social history, past surgical history and problem list      Past Medical History:   Diagnosis Date   • A-fib ([facility] [address] )    • Afib ([facility] )    • Atrial fibrillation ([facility] ) 6/13/2021   • Hypertension • Hypertension 6/13/2021   • Left AC separation    • Parkinson's disease ([facility] [address] ) 11/01/2018   • SDH (subdural hematoma) ([facility] ) 2/7/2020       Past Surgical History:   Procedure Laterality Date   • CARDIOVERSION      ATRIAL   • ROTATOR CUFF REPAIR     • SHOULDER SURGERY         Family History   Problem Relation Age of Onset   • No Known Problems Mother    • Hypertension Father    • Coronary artery disease Father          Medications have been verified  Objective   /70   Pulse 62   Temp (!) 97 4 °F (36 3 °C)   Resp 18   SpO2 98%        Physical Exam     Physical Exam  Vitals and nursing note reviewed  Constitutional:       General: He is not in acute distress  Appearance: Normal appearance  He is not ill-appearing  HENT:      Head: Normocephalic and atraumatic  Nose: Nose normal  No congestion  Mouth/Throat:      Mouth: Mucous membranes are moist       Pharynx: Oropharynx is clear  Eyes:      Conjunctiva/sclera: Conjunctivae normal       Pupils: Pupils are equal, round, and reactive to light  Cardiovascular:      Rate and Rhythm: Normal rate and regular rhythm  Pulses: Normal pulses  Heart sounds:     No gallop  Pulmonary:      Effort: Pulmonary effort is normal       Breath sounds: Normal breath sounds  Abdominal:      Tenderness: There is no abdominal tenderness  Musculoskeletal:         General: Normal range of motion  Cervical back: Normal range of motion and neck supple  Skin:     General: Skin is warm and dry  Capillary Refill: Capillary refill takes less than 2 seconds  Neurological:      General: No focal deficit present  Mental Status: He is alert and oriented to person, place, and time  Cranial Nerves: Cranial nerves are intact  Sensory: Sensation is intact  Motor: Motor function is intact  Coordination: Coordination is intact  Romberg sign negative  Gait: Gait is intact        Deep Tendon Reflexes: Reflexes are normal and symmetric  Psychiatric:         Mood and Affect: Mood normal          Behavior: Behavior normal          Medical decision making note:   I suspect symptoms are due to mild dehydration, not taking his medications as regularly as he should  Physical exam is not acute  Patient to go to the ER immediately for worsening symptoms  In the meantime hydrate, eat regularly, take medicines as directed  Close follow-up with primary care tomorrow

## 2022-11-02 ENCOUNTER — RA CDI HCC (OUTPATIENT)
Dept: OTHER | Facility: HOSPITAL | Age: 87
End: 2022-11-02

## 2022-11-02 NOTE — PROGRESS NOTES
[facility] [address]  coding opportunities       Chart reviewed, no opportunity found: CHART REVIEWED, NO OPPORTUNITY FOUND        Patients Insurance     Medicare Insurance: Medicare

## 2022-11-09 ENCOUNTER — OFFICE VISIT (OUTPATIENT)
Dept: INTERNAL MEDICINE CLINIC | Facility: CLINIC | Age: 87
End: 2022-11-09

## 2022-11-09 VITALS
BODY MASS INDEX: 29.39 KG/M2 | DIASTOLIC BLOOD PRESSURE: 70 MMHG | SYSTOLIC BLOOD PRESSURE: 124 MMHG | HEIGHT: 74 IN | WEIGHT: 229 LBS

## 2022-11-09 DIAGNOSIS — I10 BENIGN ESSENTIAL HYPERTENSION: ICD-10-CM

## 2022-11-09 DIAGNOSIS — G20 PARKINSON'S DISEASE (HCC): Primary | ICD-10-CM

## 2022-11-09 DIAGNOSIS — I48.19 PERSISTENT ATRIAL FIBRILLATION (HCC): ICD-10-CM

## 2022-11-09 NOTE — PROGRESS NOTES
Name: Edward Benítez      : 1935      MRN: [id number]  Encounter Provider: Jacques William MD  Encounter Date: 2022   [facility]    Assessment & Plan     1  Parkinson's disease [facility])  Assessment & Plan:  Continue Sinemet and follow-up Neurology      2  Persistent atrial fibrillation (HCC)  Assessment & Plan:  Rate controlled, continue anticoagulation      3  Benign essential hypertension  Assessment & Plan:  Blood pressure elevated at 1st, then came down to normal range             Subjective     Pt here for regular follow up  Review of Systems   Constitutional: Negative for chills, fatigue and fever  HENT: Negative for congestion, nosebleeds, postnasal drip, sore throat and trouble swallowing  Eyes: Negative for pain  Respiratory: Negative for cough, chest tightness, shortness of breath and wheezing  Cardiovascular: Negative for chest pain, palpitations and leg swelling  Gastrointestinal: Negative for abdominal pain, constipation, diarrhea, nausea and vomiting  Endocrine: Negative for polydipsia and polyuria  Genitourinary: Negative for dysuria, flank pain and hematuria  Musculoskeletal: Negative for arthralgias  Skin: Negative for rash  Neurological: Positive for tremors  Negative for dizziness, light-headedness and headaches  Hematological: Does not bruise/bleed easily  Psychiatric/Behavioral: Positive for confusion  Negative for dysphoric mood  The patient is not nervous/anxious          Past Medical History:   Diagnosis Date   • A-fib [facility])    • Afib ([facility] )    • Atrial fibrillation ([facility] ) 2021   • Hypertension    • Hypertension 2021   • Left AC separation    • Parkinson's disease ([facility] ) 2018   • SDH (subdural hematoma) ([facility] ) 2020     Past Surgical History:   Procedure Laterality Date   • CARDIOVERSION      ATRIAL   • ROTATOR CUFF REPAIR     • SHOULDER SURGERY       Family History   Problem Relation Age of Onset   • No Known Problems Mother    • Hypertension Father    • Coronary artery disease Father      Social History     Socioeconomic History   • Marital status: /Civil Union     Spouse name: None   • Number of children: None   • Years of education: None   • Highest education level: None   Occupational History   • None   Tobacco Use   • Smoking status: Never Smoker   • Smokeless tobacco: Never Used   • Tobacco comment: Quit 35-40 years ago   Vaping Use   • Vaping Use: Never used   Substance and Sexual Activity   • Alcohol use: Not Currently     Comment: Rare, previously moderate drinker cut down 2016  • Drug use: Never   • Sexual activity: Not Currently     Partners: Female     Birth control/protection: Post-menopausal   Other Topics Concern   • None   Social History Narrative    ** Merged History Encounter **          Social Determinants of Health     Financial Resource Strain: Low Risk    • Difficulty of Paying Living Expenses: Not very hard   Food Insecurity: Not on file   Transportation Needs: No Transportation Needs   • Lack of Transportation (Medical): No   • Lack of Transportation (Non-Medical):  No   Physical Activity: Not on file   Stress: Not on file   Social Connections: Not on file   Intimate Partner Violence: Not on file   Housing Stability: Not on file     Current Outpatient Medications on File Prior to Visit   Medication Sig   • acetaminophen (TYLENOL) 325 mg tablet Take 2 tablets (650 mg total) by mouth every 4 (four) hours as needed for mild pain   • carbidopa-levodopa (SINEMET)  mg per tablet TAKE 1 TABLET BY MOUTH THREE TIMES A DAY   • ipratropium (ATROVENT) 0 06 % nasal spray 1 spray into each nostril 4 (four) times a day as needed for rhinitis   • metoprolol succinate (TOPROL-XL) 25 mg 24 hr tablet Take 1 tablet (25 mg total) by mouth daily   • Multiple Vitamins-Minerals (ICAPS AREDS 2) CAPS Take 1 capsule by mouth 2 (two) times a day     • telmisartan (MICARDIS) 20 MG tablet Take 0 5 tablets (10 mg total) by mouth daily   • Xarelto 15 MG tablet TAKE 1 TABLET BY MOUTH DAILY WITH BREAKFAST   • doxycycline hyclate (VIBRAMYCIN) 100 mg capsule ONE TAB BY MOUTH TWICE A DAY FOR 14 DAYS WITH FOOD (Patient not taking: No sig reported)   • gentamicin (GARAMYCIN) 0 1 % ointment APPLY TOPICALLY TO WOUND 4 TIMES DAILY (Patient not taking: No sig reported)   • loratadine (CLARITIN) 10 mg tablet Take 1 tablet (10 mg total) by mouth daily (Patient not taking: No sig reported)     Allergies   Allergen Reactions   • Penicillins Hives   • Pollen Extract Other (See Comments)       Immunization History   Administered Date(s) Administered   • COVID-19 PFIZER VACCINE 0 3 ML IM 01/27/2021, 02/17/2021, 01/08/2022   • INFLUENZA 09/04/2016, 10/26/2017, 10/26/2017, 09/13/2018   • Influenza Split High Dose Preservative Free IM 1935, 10/06/2014, 09/09/2015, 10/17/2017   • Influenza, high dose seasonal 0 7 mL 11/17/2021, 09/28/2022   • Influenza, seasonal, injectable 10/11/2010, 10/11/2011   • Pneumococcal Conjugate 13-Valent 09/27/2015   • Pneumococcal Polysaccharide PPV23 01/15/2017   • Zoster 01/01/2013   • Zoster Vaccine Recombinant 01/01/2013   • influenza, trivalent, adjuvanted 09/19/2019       Objective     /70   Ht 6' 2" (1 88 m)   Wt 104 kg (229 lb)   BMI 29 40 kg/m²     Physical Exam  Vitals reviewed  Constitutional:       General: He is not in acute distress  Appearance: Normal appearance  He is well-developed  HENT:      Head: Normocephalic and atraumatic  Right Ear: External ear normal       Left Ear: External ear normal    Eyes:      General: No scleral icterus  Conjunctiva/sclera: Conjunctivae normal    Neck:      Thyroid: No thyromegaly  Trachea: No tracheal deviation  Cardiovascular:      Rate and Rhythm: Normal rate  Rhythm irregular  Heart sounds: Normal heart sounds  No murmur heard    Pulmonary:      Effort: Pulmonary effort is normal  No respiratory distress  Breath sounds: Normal breath sounds  No wheezing or rales  Abdominal:      General: Bowel sounds are normal       Palpations: Abdomen is soft  Tenderness: There is no abdominal tenderness  There is no guarding or rebound  Musculoskeletal:      Cervical back: Normal range of motion and neck supple  Right lower leg: No edema  Left lower leg: No edema  Lymphadenopathy:      Cervical: No cervical adenopathy  Skin:     Coloration: Skin is not jaundiced  Neurological:      Mental Status: He is alert        Comments: Resting pill-rolling tremor       Gonzalo Leary MD

## 2022-11-09 NOTE — PATIENT INSTRUCTIONS
Problem List Items Addressed This Visit          Cardiovascular and Mediastinum    Persistent atrial fibrillation (HCC)     Rate controlled, continue anticoagulation           Benign essential hypertension     Blood pressure elevated at 1st, then came down to normal range              Nervous and Auditory    Parkinson's disease ([facility] [address] ) - Primary     Continue Sinemet and follow-up Neurology

## 2022-11-28 ENCOUNTER — OFFICE VISIT (OUTPATIENT)
Dept: URGENT CARE | Age: 87
End: 2022-11-28

## 2022-11-28 VITALS
HEART RATE: 60 BPM | WEIGHT: 229 LBS | SYSTOLIC BLOOD PRESSURE: 165 MMHG | OXYGEN SATURATION: 99 % | DIASTOLIC BLOOD PRESSURE: 85 MMHG | TEMPERATURE: 98.7 F | BODY MASS INDEX: 29.4 KG/M2 | RESPIRATION RATE: 18 BRPM

## 2022-11-28 DIAGNOSIS — R10.12 LEFT UPPER QUADRANT ABDOMINAL PAIN: Primary | ICD-10-CM

## 2022-11-28 NOTE — PROGRESS NOTES
[address] [employer] [facility]        NAME: Tello Snow is a 82 y o  male  : 1935    MRN: [id number]  DATE: 2022  TIME: 4:58 PM    Assessment and Plan   Left upper quadrant abdominal pain [R10 12]  1  Left upper quadrant abdominal pain              Patient Instructions     recommending abd U/S or CT if appropriate  Tylenol OTC prn for pain  Follow up with PCP in 3-5 days  Proceed to  ER if symptoms worsen  Chief Complaint     Chief Complaint   Patient presents with   • Abdominal Pain     Abdominal pain for 3 days  History of Present Illness       HPI   Reports abdominal pain, intermittent  X 2-3 weeks  Last episode earlier today, several hrs ago  At the time, lasted about 15 mins  Severe  He took a glass of milk and went to sleep  Sharp pain  He had a BM after that and the BM was normal  Denies problems with constipation or anal bleed  No diarrhea  No trauma    Review of Systems   Review of Systems   Constitutional: Negative for chills and fever  HENT: Negative for trouble swallowing  Respiratory: Negative for shortness of breath  Cardiovascular: Negative for chest pain  Gastrointestinal: Positive for abdominal pain  Negative for blood in stool, diarrhea, nausea and vomiting  Neurological: Negative for light-headedness           Current Medications       Current Outpatient Medications:   •  acetaminophen (TYLENOL) 325 mg tablet, Take 2 tablets (650 mg total) by mouth every 4 (four) hours as needed for mild pain, Disp: , Rfl: 0  •  carbidopa-levodopa (SINEMET)  mg per tablet, TAKE 1 TABLET BY MOUTH THREE TIMES A DAY, Disp: 270 tablet, Rfl: 3  •  doxycycline hyclate (VIBRAMYCIN) 100 mg capsule, ONE TAB BY MOUTH TWICE A DAY FOR 14 DAYS WITH FOOD (Patient not taking: No sig reported), Disp: , Rfl:   •  gentamicin (GARAMYCIN) 0 1 % ointment, APPLY TOPICALLY TO WOUND 4 TIMES DAILY (Patient not taking: No sig reported), Disp: , Rfl:   •  ipratropium (ATROVENT) 0 06 % nasal spray, 1 spray into each nostril 4 (four) times a day as needed for rhinitis, Disp: 45 mL, Rfl: 0  •  loratadine (CLARITIN) 10 mg tablet, Take 1 tablet (10 mg total) by mouth daily (Patient not taking: No sig reported), Disp: 90 tablet, Rfl: 0  •  metoprolol succinate (TOPROL-XL) 25 mg 24 hr tablet, Take 1 tablet (25 mg total) by mouth daily, Disp: 90 tablet, Rfl: 3  •  Multiple Vitamins-Minerals (ICAPS AREDS 2) CAPS, Take 1 capsule by mouth 2 (two) times a day  , Disp: , Rfl:   •  telmisartan (MICARDIS) 20 MG tablet, Take 0 5 tablets (10 mg total) by mouth daily, Disp: 45 tablet, Rfl: 3  •  Xarelto 15 MG tablet, TAKE 1 TABLET BY MOUTH DAILY WITH BREAKFAST, Disp: 90 tablet, Rfl: 0    Current Allergies     Allergies as of 11/28/2022 - Reviewed 11/28/2022   Allergen Reaction Noted   • Penicillins Hives 06/25/2010   • Pollen extract Other (See Comments) 06/25/2010            The following portions of the patient's history were reviewed and updated as appropriate: allergies, current medications, past family history, past medical history, past social history, past surgical history and problem list      Past Medical History:   Diagnosis Date   • A-fib ([facility] )    • Afib ([facility] )    • Atrial fibrillation ([facility] ) 6/13/2021   • Hypertension    • Hypertension 6/13/2021   • Left AC separation    • [facility]'s disease [facility] ) 11/01/2018   • SDH (subdural hematoma) ([facility] ) 2/7/2020       Past Surgical History:   Procedure Laterality Date   • CARDIOVERSION      ATRIAL   • ROTATOR CUFF REPAIR     • SHOULDER SURGERY         Family History   Problem Relation Age of Onset   • No Known Problems Mother    • Hypertension Father    • Coronary artery disease Father          Medications have been verified  Objective   /85   Pulse 60   Temp 98 7 °F (37 1 °C) (Temporal)   Resp 18   Wt 104 kg (229 lb)   SpO2 99%   BMI 29 40 kg/m²   No LMP for male patient         Physical Exam     Physical Exam  Constitutional:       Appearance: He is not ill-appearing or diaphoretic  Cardiovascular:      Rate and Rhythm: Rhythm irregular  Heart sounds: Normal heart sounds  Pulmonary:      Effort: Pulmonary effort is normal       Breath sounds: Normal breath sounds  Abdominal:      Tenderness: There is abdominal tenderness in the left upper quadrant  Negative signs include Gallardo's sign and Rovsing's sign

## 2022-11-30 ENCOUNTER — TELEPHONE (OUTPATIENT)
Dept: INTERNAL MEDICINE CLINIC | Facility: CLINIC | Age: 87
End: 2022-11-30

## 2022-11-30 DIAGNOSIS — R10.12 LEFT UPPER QUADRANT ABDOMINAL PAIN: Primary | ICD-10-CM

## 2022-11-30 NOTE — TELEPHONE ENCOUNTER
I called the patient and left a message on voice mail   I scheduled an appointment for 12/01 at [address] in [place] [address] abdomen complete

## 2022-12-01 ENCOUNTER — APPOINTMENT (EMERGENCY)
Dept: RADIOLOGY | Facility: HOSPITAL | Age: 87
End: 2022-12-01

## 2022-12-01 ENCOUNTER — HOSPITAL ENCOUNTER (EMERGENCY)
Facility: HOSPITAL | Age: 87
Discharge: HOME | End: 2022-12-01
Attending: EMERGENCY MEDICINE

## 2022-12-01 VITALS
OXYGEN SATURATION: 98 % | DIASTOLIC BLOOD PRESSURE: 68 MMHG | SYSTOLIC BLOOD PRESSURE: 161 MMHG | RESPIRATION RATE: 18 BRPM | TEMPERATURE: 97.6 F | HEART RATE: 59 BPM

## 2022-12-01 DIAGNOSIS — R10.9 ABDOMINAL PAIN: Primary | ICD-10-CM

## 2022-12-01 LAB
2HR DELTA HS TROPONIN: 0 NG/L
ALBUMIN SERPL BCP-MCNC: 3.5 G/DL (ref 3.5–5)
ALP SERPL-CCNC: 102 U/L (ref 46–116)
ALT SERPL W P-5'-P-CCNC: 23 U/L (ref 12–78)
ANION GAP SERPL CALCULATED.3IONS-SCNC: 5 MMOL/L (ref 4–13)
AST SERPL W P-5'-P-CCNC: 16 U/L (ref 5–45)
BASOPHILS # BLD AUTO: 0.06 THOUSANDS/ÂΜL (ref 0–0.1)
BASOPHILS NFR BLD AUTO: 1 % (ref 0–1)
BILIRUB SERPL-MCNC: 0.67 MG/DL (ref 0.2–1)
BUN SERPL-MCNC: 20 MG/DL (ref 5–25)
CALCIUM SERPL-MCNC: 9.4 MG/DL (ref 8.3–10.1)
CARDIAC TROPONIN I PNL SERPL HS: 7 NG/L
CARDIAC TROPONIN I PNL SERPL HS: 7 NG/L
CHLORIDE SERPL-SCNC: 108 MMOL/L (ref 96–108)
CO2 SERPL-SCNC: 26 MMOL/L (ref 21–32)
CREAT SERPL-MCNC: 1.07 MG/DL (ref 0.6–1.3)
EOSINOPHIL # BLD AUTO: 0.31 THOUSAND/ÂΜL (ref 0–0.61)
EOSINOPHIL NFR BLD AUTO: 4 % (ref 0–6)
ERYTHROCYTE [DISTWIDTH] IN BLOOD BY AUTOMATED COUNT: 13.9 % (ref 11.6–15.1)
GFR SERPL CREATININE-BSD FRML MDRD: 62 ML/MIN/1.73SQ M
GLUCOSE SERPL-MCNC: 111 MG/DL (ref 65–140)
HCT VFR BLD AUTO: 39.7 % (ref 36.5–49.3)
HGB BLD-MCNC: 12.6 G/DL (ref 12–17)
IMM GRANULOCYTES # BLD AUTO: 0.04 THOUSAND/UL (ref 0–0.2)
IMM GRANULOCYTES NFR BLD AUTO: 1 % (ref 0–2)
LIPASE SERPL-CCNC: 122 U/L (ref 73–393)
LYMPHOCYTES # BLD AUTO: 2.14 THOUSANDS/ÂΜL (ref 0.6–4.47)
LYMPHOCYTES NFR BLD AUTO: 26 % (ref 14–44)
MCH RBC QN AUTO: 29.3 PG (ref 26.8–34.3)
MCHC RBC AUTO-ENTMCNC: 31.7 G/DL (ref 31.4–37.4)
MCV RBC AUTO: 92 FL (ref 82–98)
MONOCYTES # BLD AUTO: 0.93 THOUSAND/ÂΜL (ref 0.17–1.22)
MONOCYTES NFR BLD AUTO: 11 % (ref 4–12)
NEUTROPHILS # BLD AUTO: 4.74 THOUSANDS/ÂΜL (ref 1.85–7.62)
NEUTS SEG NFR BLD AUTO: 57 % (ref 43–75)
NRBC BLD AUTO-RTO: 0 /100 WBCS
PLATELET # BLD AUTO: 299 THOUSANDS/UL (ref 149–390)
PMV BLD AUTO: 9.3 FL (ref 8.9–12.7)
POTASSIUM SERPL-SCNC: 4 MMOL/L (ref 3.5–5.3)
PROT SERPL-MCNC: 7.3 G/DL (ref 6.4–8.4)
RBC # BLD AUTO: 4.3 MILLION/UL (ref 3.88–5.62)
SODIUM SERPL-SCNC: 139 MMOL/L (ref 135–147)
WBC # BLD AUTO: 8.22 THOUSAND/UL (ref 4.31–10.16)

## 2022-12-01 RX ORDER — FAMOTIDINE 10 MG/ML
20 INJECTION, SOLUTION INTRAVENOUS ONCE
Status: COMPLETED | OUTPATIENT
Start: 2022-12-01 | End: 2022-12-01

## 2022-12-01 RX ORDER — ACETAMINOPHEN 325 MG/1
975 TABLET ORAL ONCE
Status: COMPLETED | OUTPATIENT
Start: 2022-12-01 | End: 2022-12-01

## 2022-12-01 RX ADMIN — IOHEXOL 100 ML: 350 INJECTION, SOLUTION INTRAVENOUS at 05:46

## 2022-12-01 RX ADMIN — ACETAMINOPHEN 975 MG: 325 TABLET ORAL at 07:08

## 2022-12-01 RX ADMIN — FAMOTIDINE 20 MG: 10 INJECTION INTRAVENOUS at 04:52

## 2022-12-01 RX ADMIN — SODIUM CHLORIDE 1000 ML: 0.9 INJECTION, SOLUTION INTRAVENOUS at 06:26

## 2022-12-01 NOTE — ED ATTENDING ATTESTATION
12/1/2022  I, Raoul Emmanuel MD, saw and evaluated the patient  I have discussed the patient with the resident/non-physician practitioner and agree with the resident's/non-physician practitioner's findings, Plan of Care, and MDM as documented in the resident's/non-physician practitioner's note, except where noted  All available labs and Radiology studies were reviewed  I was present for key portions of any procedure(s) performed by the resident/non-physician practitioner and I was immediately available to provide assistance  At this point I agree with the current assessment done in the Emergency Department  I have conducted an independent evaluation of this patient a history and physical is as follows:    ED Course  ED Course as of 12/01/22 0551   Thu Dec 01, 2022   0402 Per resident h&p 83 YO developed abrupt onset of epigastric pain that is now resolved  O: abd distended soft cv rrr I/P transient epigastric pain     Emergency Department Note- Briana Moyer 82 y o  male MRN: [id number]    Unit/Bed#: ED 29 Encounter: [id number]    Briana Moyer is a 82 y o  male who presents with   Chief Complaint   Patient presents with   • Weakness - Generalized     Pt has been feeling weak, stomach was bothering, pt was feeling SOB denies chest pain  History of Present Illness   HPI:  Briana Moyer is a 82 y o  male who presents for evaluation of:  Asthenia and abdominal pain  Patient notes that he developed abrupt onset of epigastric abdominal pain just prior to arrival   The pain lasted for several minutes and resolved  Currently is not having any sort of abdominal discomfort in the emergency department  He denies any associated nausea, vomiting, and diarrhea  The patient also felt dyspneic earlier but that has resolved as well      Review of Systems    Historical Information   Past Medical History:   Diagnosis Date   • A-fib [facility])    • Afib ([facility] [address] )    • Atrial fibrillation ([facility] [address] ) 6/13/2021   • Hypertension    • Hypertension 2021   • Left AC separation    • Parkinson's disease ([facility] [address] ) 2018   • SDH (subdural hematoma) ([facility] ) 2020     Past Surgical History:   Procedure Laterality Date   • CARDIOVERSION      ATRIAL   • ROTATOR CUFF REPAIR     • SHOULDER SURGERY       Social History   Social History     Substance and Sexual Activity   Alcohol Use Not Currently    Comment: Rare, previously moderate drinker cut down   Social History     Substance and Sexual Activity   Drug Use Never     Social History     Tobacco Use   Smoking Status Never   Smokeless Tobacco Never   Tobacco Comments    Quit 35-40 years ago     Family History:   Family History   Problem Relation Age of Onset   • No Known Problems Mother    • Hypertension Father    • Coronary artery disease Father        Meds/Allergies   PTA meds:   Prior to Admission Medications   Prescriptions Last Dose Informant Patient Reported? Taking?    Multiple Vitamins-Minerals (ICAPS AREDS 2) CAPS   Yes No   Sig: Take 1 capsule by mouth 2 (two) times a day     Xarelto 15 MG tablet   No No   Sig: TAKE 1 TABLET BY MOUTH DAILY WITH BREAKFAST   acetaminophen (TYLENOL) 325 mg tablet   No No   Sig: Take 2 tablets (650 mg total) by mouth every 4 (four) hours as needed for mild pain   carbidopa-levodopa (SINEMET)  mg per tablet   No No   Sig: TAKE 1 TABLET BY MOUTH THREE TIMES A DAY   doxycycline hyclate (VIBRAMYCIN) 100 mg capsule   Yes No   Sig: ONE TAB BY MOUTH TWICE A DAY FOR 14 DAYS WITH FOOD   Patient not taking: No sig reported   gentamicin (GARAMYCIN) 0 1 % ointment   Yes No   Sig: APPLY TOPICALLY TO WOUND 4 TIMES DAILY   Patient not taking: No sig reported   ipratropium (ATROVENT) 0 06 % nasal spray   No No   Si spray into each nostril 4 (four) times a day as needed for rhinitis   loratadine (CLARITIN) 10 mg tablet   No No   Sig: Take 1 tablet (10 mg total) by mouth daily   Patient not taking: No sig reported   metoprolol succinate (TOPROL-XL) 25 mg 24 hr tablet   No No   Sig: Take 1 tablet (25 mg total) by mouth daily   telmisartan (MICARDIS) 20 MG tablet   No No   Sig: Take 0 5 tablets (10 mg total) by mouth daily      Facility-Administered Medications: None     Allergies   Allergen Reactions   • Penicillins Hives   • Pollen Extract Other (See Comments)       Objective   First Vitals:   Blood Pressure: 161/68 (12/01/22 0343)  Pulse: 59 (12/01/22 0343)  Respirations: 18 (12/01/22 0343)  SpO2: 98 % (12/01/22 0343)    Current Vitals:   Blood Pressure: 161/68 (12/01/22 0343)  Pulse: 59 (12/01/22 0343)  Respirations: 18 (12/01/22 0343)  SpO2: 98 % (12/01/22 0343)    No intake or output data in the 24 hours ending 12/01/22 0551    Invasive Devices     Peripheral Intravenous Line  Duration           Peripheral IV 12/01/22 Right Antecubital <1 day                Physical Exam  Vitals and nursing note reviewed  Constitutional:       General: He is not in acute distress  Appearance: Normal appearance  He is well-developed  HENT:      Head: Normocephalic and atraumatic  Right Ear: External ear normal       Left Ear: External ear normal       Nose: Nose normal       Mouth/Throat:      Pharynx: No oropharyngeal exudate  Eyes:      Conjunctiva/sclera: Conjunctivae normal       Pupils: Pupils are equal, round, and reactive to light  Cardiovascular:      Rate and Rhythm: Normal rate and regular rhythm  Pulmonary:      Effort: Pulmonary effort is normal  No respiratory distress  Abdominal:      General: Abdomen is flat  There is no distension  Palpations: Abdomen is soft  Musculoskeletal:         General: No deformity  Normal range of motion  Cervical back: Normal range of motion and neck supple  Skin:     General: Skin is warm and dry  Capillary Refill: Capillary refill takes less than 2 seconds  Neurological:      General: No focal deficit present        Mental Status: He is alert and oriented to person, place, and time  Mental status is at baseline  Coordination: Coordination normal    Psychiatric:         Mood and Affect: Mood normal          Behavior: Behavior normal          Thought Content: Thought content normal          Judgment: Judgment normal            Medical Decision Makin  Abdominal pain:  Now resolved in the emergency department; CBC, CMP, troponin, lipase, CT abdomen and pelvis  2  Asthenia:  Now resolved      Recent Results (from the past 36 hour(s))   CBC and differential    Collection Time: 22  4:32 AM   Result Value Ref Range    WBC 8 22 4 31 - 10 16 Thousand/uL    RBC 4 30 3 88 - 5 62 Million/uL    Hemoglobin 12 6 12 0 - 17 0 g/dL    Hematocrit 39 7 36 5 - 49 3 %    MCV 92 82 - 98 fL    MCH 29 3 26 8 - 34 3 pg    MCHC 31 7 31 4 - 37 4 g/dL    RDW 13 9 11 6 - 15 1 %    MPV 9 3 8 9 - 12 7 fL    Platelets 419 420 - 938 Thousands/uL    nRBC 0 /100 WBCs    Neutrophils Relative 57 43 - 75 %    Immat GRANS % 1 0 - 2 %    Lymphocytes Relative 26 14 - 44 %    Monocytes Relative 11 4 - 12 %    Eosinophils Relative 4 0 - 6 %    Basophils Relative 1 0 - 1 %    Neutrophils Absolute 4 74 1 85 - 7 62 Thousands/µL    Immature Grans Absolute 0 04 0 00 - 0 20 Thousand/uL    Lymphocytes Absolute 2 14 0 60 - 4 47 Thousands/µL    Monocytes Absolute 0 93 0 17 - 1 22 Thousand/µL    Eosinophils Absolute 0 31 0 00 - 0 61 Thousand/µL    Basophils Absolute 0 06 0 00 - 0 10 Thousands/µL   Comprehensive metabolic panel    Collection Time: 22  4:32 AM   Result Value Ref Range    Sodium 139 135 - 147 mmol/L    Potassium 4 0 3 5 - 5 3 mmol/L    Chloride 108 96 - 108 mmol/L    CO2 26 21 - 32 mmol/L    ANION GAP 5 4 - 13 mmol/L    BUN 20 5 - 25 mg/dL    Creatinine 1 07 0 60 - 1 30 mg/dL    Glucose 111 65 - 140 mg/dL    Calcium 9 4 8 3 - 10 1 mg/dL    AST 16 5 - 45 U/L    ALT 23 12 - 78 U/L    Alkaline Phosphatase 102 46 - 116 U/L    Total Protein 7 3 6 4 - 8 4 g/dL    Albumin 3 5 3 5 - 5 0 g/dL    Total Bilirubin 0 67 0 20 - 1 00 mg/dL    eGFR 62 ml/min/1 73sq m   Lipase    Collection Time: 12/01/22  4:32 AM   Result Value Ref Range    Lipase 122 73 - 393 u/L   HS Troponin 0hr (reflex protocol)    Collection Time: 12/01/22  4:32 AM   Result Value Ref Range    hs TnI 0hr 7 "Refer to ACS Flowchart"- see link ng/L     CT abdomen pelvis with contrast    (Results Pending)         Portions of the record may have been created with voice recognition software  Occasional wrong word or "sound a like" substitutions may have occurred due to the inherent limitations of voice recognition software  Read the chart carefully and recognize, using context, where substitutions have occurred          Critical Care Time  Procedures

## 2022-12-01 NOTE — DISCHARGE INSTRUCTIONS
Thank you for coming to the ED for your care  Your workup was reassuring  We recommend following up with the GI specialists for further evaluation if you continue to have pain  Please return to the ED with any new or worsening symptoms

## 2022-12-01 NOTE — ED PROVIDER NOTES
History  Chief Complaint   Patient presents with   • Weakness - Generalized     Pt has been feeling weak, stomach was bothering, pt was feeling SOB denies chest pain  17-year-old male with history of AFib, hypertension, presents due to epigastric pain and shortness of breath  Patient was awoken from sleep tonight with a in epigastric pain that did not radiate anywhere it was severe, but he is unable to tell if it was throbbing or sharp  He notes that he became anxious about this so he woke his wife up and started feeling short of breath  He told her to call EMS  On their arrival, his epigastric pain had improved, but did not completely disappear  At this point in time he notes that is still present  He has had similar pains in the past, but they never were preceded by an intense pain like this 1 was  He denies any other associated symptoms  Prior to Admission Medications   Prescriptions Last Dose Informant Patient Reported? Taking?    Multiple Vitamins-Minerals (ICAPS AREDS 2) CAPS   Yes No   Sig: Take 1 capsule by mouth 2 (two) times a day     Xarelto 15 MG tablet   No No   Sig: TAKE 1 TABLET BY MOUTH DAILY WITH BREAKFAST   acetaminophen (TYLENOL) 325 mg tablet   No No   Sig: Take 2 tablets (650 mg total) by mouth every 4 (four) hours as needed for mild pain   carbidopa-levodopa (SINEMET)  mg per tablet   No No   Sig: TAKE 1 TABLET BY MOUTH THREE TIMES A DAY   doxycycline hyclate (VIBRAMYCIN) 100 mg capsule   Yes No   Sig: ONE TAB BY MOUTH TWICE A DAY FOR 14 DAYS WITH FOOD   Patient not taking: No sig reported   gentamicin (GARAMYCIN) 0 1 % ointment   Yes No   Sig: APPLY TOPICALLY TO WOUND 4 TIMES DAILY   Patient not taking: No sig reported   ipratropium (ATROVENT) 0 06 % nasal spray   No No   Si spray into each nostril 4 (four) times a day as needed for rhinitis   loratadine (CLARITIN) 10 mg tablet   No No   Sig: Take 1 tablet (10 mg total) by mouth daily   Patient not taking: No sig reported   metoprolol succinate (TOPROL-XL) 25 mg 24 hr tablet   No No   Sig: Take 1 tablet (25 mg total) by mouth daily   telmisartan (MICARDIS) 20 MG tablet   No No   Sig: Take 0 5 tablets (10 mg total) by mouth daily      Facility-Administered Medications: None       Past Medical History:   Diagnosis Date   • A-fib (HCC)    • Afib (H[facility])    • Atrial fibrillation ([place] [address] ) 6/13/2021   • Hypertension    • Hypertension 6/13/2021   • Left AC separation    • Parkinson's disease ([facility] ) 11/01/2018   • SDH (subdural hematoma) ([place] [address] ) 2/7/2020       Past Surgical History:   Procedure Laterality Date   • CARDIOVERSION      ATRIAL   • ROTATOR CUFF REPAIR     • SHOULDER SURGERY         Family History   Problem Relation Age of Onset   • No Known Problems Mother    • Hypertension Father    • Coronary artery disease Father      I have reviewed and agree with the history as documented  E-Cigarette/Vaping   • E-Cigarette Use Never User      E-Cigarette/Vaping Substances   • Nicotine No    • THC No    • CBD No    • Flavoring No    • Other No    • Unknown No      Social History     Tobacco Use   • Smoking status: Never   • Smokeless tobacco: Never   • Tobacco comments:     Quit 35-40 years ago   Vaping Use   • Vaping Use: Never used   Substance Use Topics   • Alcohol use: Not Currently     Comment: Rare, previously moderate drinker cut down 2016  • Drug use: Never        Review of Systems   Constitutional: Negative for chills and fever  HENT: Negative for ear pain and sore throat  Eyes: Negative for pain and visual disturbance  Respiratory: Negative for cough and shortness of breath  Cardiovascular: Negative for chest pain and palpitations  Gastrointestinal: Positive for abdominal pain  Negative for vomiting  Genitourinary: Negative for dysuria and hematuria  Musculoskeletal: Negative for arthralgias and back pain  Skin: Negative for color change and rash  Neurological: Negative for seizures and syncope     All other systems reviewed and are negative  Physical Exam  ED Triage Vitals   Temperature Pulse Respirations Blood Pressure SpO2   12/01/22 0617 12/01/22 0343 12/01/22 0343 12/01/22 0343 12/01/22 0343   97 6 °F (36 4 °C) 59 18 161/68 98 %      Temp Source Heart Rate Source Patient Position - Orthostatic VS BP Location FiO2 (%)   12/01/22 0617 12/01/22 0343 12/01/22 0343 12/01/22 0343 --   Oral Monitor Lying Right arm       Pain Score       12/01/22 0343       No Pain             Orthostatic Vital Signs  Vitals:    12/01/22 0343   BP: 161/68   Pulse: 59   Patient Position - Orthostatic VS: Lying       Physical Exam  Vitals and nursing note reviewed  Constitutional:       General: He is not in acute distress  Appearance: He is well-developed  HENT:      Head: Normocephalic and atraumatic  Right Ear: External ear normal       Left Ear: External ear normal       Mouth/Throat:      Mouth: Mucous membranes are moist    Eyes:      Conjunctiva/sclera: Conjunctivae normal    Cardiovascular:      Rate and Rhythm: Normal rate and regular rhythm  Heart sounds: No murmur heard  Pulmonary:      Effort: Pulmonary effort is normal  No respiratory distress  Breath sounds: Normal breath sounds  Abdominal:      General: There is distension  Palpations: Abdomen is soft  Tenderness: There is abdominal tenderness (Epigastric)  There is no guarding  Musculoskeletal:         General: No swelling  Cervical back: Neck supple  Skin:     General: Skin is warm and dry  Capillary Refill: Capillary refill takes less than 2 seconds  Neurological:      General: No focal deficit present  Mental Status: He is alert     Psychiatric:         Mood and Affect: Mood normal          ED Medications  Medications   Famotidine (PF) (PEPCID) injection 20 mg (20 mg Intravenous Given 12/1/22 3989)   iohexol (OMNIPAQUE) 350 MG/ML injection (SINGLE-DOSE) 100 mL (100 mL Intravenous Given 12/1/22 5429) acetaminophen (TYLENOL) tablet 975 mg (975 mg Oral Given 12/1/22 0708)   sodium chloride 0 9 % bolus 1,000 mL (0 mL Intravenous Stopped 12/1/22 0730)       Diagnostic Studies  Results Reviewed     Procedure Component Value Units Date/Time    HS Troponin I 2hr [[id number]]  (Normal) Collected: 12/01/22 0627    Lab Status: Final result Specimen: Blood from Arm, Right Updated: 12/01/22 0704     hs TnI 2hr 7 ng/L      Delta 2hr hsTnI 0 ng/L     Lipase [[id number]]  (Normal) Collected: 12/01/22 0432    Lab Status: Final result Specimen: Blood from Arm, Right Updated: 12/01/22 0542     Lipase 122 u/L     HS Troponin 0hr (reflex protocol) [[id number]]  (Normal) Collected: 12/01/22 0432    Lab Status: Final result Specimen: Blood from Arm, Right Updated: 12/01/22 0538     hs TnI 0hr 7 ng/L     Comprehensive metabolic panel [[id number]] Collected: 12/01/22 0432    Lab Status: Final result Specimen: Blood from Arm, Right Updated: 12/01/22 0532     Sodium 139 mmol/L      Potassium 4 0 mmol/L      Chloride 108 mmol/L      CO2 26 mmol/L      ANION GAP 5 mmol/L      BUN 20 mg/dL      Creatinine 1 07 mg/dL      Glucose 111 mg/dL      Calcium 9 4 mg/dL      AST 16 U/L      ALT 23 U/L      Alkaline Phosphatase 102 U/L      Total Protein 7 3 g/dL      Albumin 3 5 g/dL      Total Bilirubin 0 67 mg/dL      eGFR 62 ml/min/1 73sq m     Narrative:      [place] guidelines for Chronic Kidney Disease (CKD):   •  Stage 1 with normal or high GFR (GFR > 90 mL/min/1 73 square meters)  •  Stage 2 Mild CKD (GFR = 60-89 mL/min/1 73 square meters)  •  Stage 3A Moderate CKD (GFR = 45-59 mL/min/1 73 square meters)  •  Stage 3B Moderate CKD (GFR = 30-44 mL/min/1 73 square meters)  •  Stage 4 Severe CKD (GFR = 15-29 mL/min/1 73 square meters)  •  Stage 5 End Stage CKD (GFR <15 mL/min/1 73 square meters)  Note: GFR calculation is accurate only with a steady state creatinine    CBC and differential [[id number]] Collected: 12/01/22 5919    Lab Status: Final result Specimen: Blood from Arm, Right Updated: 12/01/22 0518     WBC 8 22 Thousand/uL      RBC 4 30 Million/uL      Hemoglobin 12 6 g/dL      Hematocrit 39 7 %      MCV 92 fL      MCH 29 3 pg      MCHC 31 7 g/dL      RDW 13 9 %      MPV 9 3 fL      Platelets 366 Thousands/uL      nRBC 0 /100 WBCs      Neutrophils Relative 57 %      Immat GRANS % 1 %      Lymphocytes Relative 26 %      Monocytes Relative 11 %      Eosinophils Relative 4 %      Basophils Relative 1 %      Neutrophils Absolute 4 74 Thousands/µL      Immature Grans Absolute 0 04 Thousand/uL      Lymphocytes Absolute 2 14 Thousands/µL      Monocytes Absolute 0 93 Thousand/µL      Eosinophils Absolute 0 31 Thousand/µL      Basophils Absolute 0 06 Thousands/µL                  CT abdomen pelvis with contrast   Final Result by Lyle Robles MD (12/01 1988)      No acute pathology  Small right inguinal hernia containing nonobstructed small bowel  Workstation performed: [id number]               Procedures  Procedures      ED Course               Identification of Seniors at [address] [place] [id number] Most Recent Value   (ISAR) Identification of Seniors at Risk    Before the illness or injury that brought you to the Emergency, did you need someone to help you on a regular basis? 0 Filed at: 12/01/2022 0340   In the last 24 hours, have you needed more help than usual? 0 Filed at: 12/01/2022 0340   Have you been hospitalized for one or more nights during the past 6 months? 1 Filed at: 12/01/2022 0340   In general, do you see well? 0 Filed at: 12/01/2022 0340   In general, do you have serious problems with your memory? 0 Filed at: 12/01/2022 0340   Do you take more than three different medications every day?  1 Filed at: 12/01/2022 0340   ISAR Score 2 Filed at: 12/01/2022 0340                              MDM  Number of Diagnoses or Management Options  Abdominal pain  Diagnosis management comments: 82-year-old male presenting with epigastric pain and generalized fatigue  On evaluation, patient noting that his symptoms have significantly improved compared to the initial presentation  Will obtain labs, imaging, and treat symptomatically  On re-evaluation, patient noting resolution of symptoms and at this time is requesting discharge to spent time with his wife who is also in the emergency department  With a normal workup, I agree that this is appropriate and patient was discharged with recommendations to follow-up with GI doctors and return with any new or worsening symptoms  Disposition  Final diagnoses:   Abdominal pain     Time reflects when diagnosis was documented in both MDM as applicable and the Disposition within this note     Time User Action Codes Description Comment    12/1/2022  7:04 AM Oscar Mary Add [R10 9] Abdominal pain       ED Disposition     ED Disposition   Discharge    Condition   Stable    Date/Time   Thu Dec 1, 2022  7:04 AM    Comment   [address] [facility] discharge to home/self care                 Follow-up Information     Follow up With Specialties Details Why Contact Info Additional Information    Kezia Koroma MD Internal Medicine   [address] [place]        [facility]   [address] [facility] [address] [facility] [address]  [phone] [facility] [place], [address] [place] [address], [address], [place], [place], [address]          Discharge Medication List as of 12/1/2022  7:06 AM      CONTINUE these medications which have NOT CHANGED    Details   acetaminophen (TYLENOL) 325 mg tablet Take 2 tablets (650 mg total) by mouth every 4 (four) hours as needed for mild pain, Starting Thu 6/17/2021, No Print      carbidopa-levodopa (SINEMET)  mg per tablet TAKE 1 TABLET BY MOUTH THREE TIMES A DAY, Normal      doxycycline hyclate (VIBRAMYCIN) 100 mg capsule ONE TAB BY MOUTH TWICE A DAY FOR 14 DAYS WITH FOOD, Historical Med      gentamicin (GARAMYCIN) 0 1 % ointment APPLY TOPICALLY TO WOUND 4 TIMES DAILY, Historical Med      ipratropium (ATROVENT) 0 06 % nasal spray 1 spray into each nostril 4 (four) times a day as needed for rhinitis, Starting Thu 6/30/2022, Normal      loratadine (CLARITIN) 10 mg tablet Take 1 tablet (10 mg total) by mouth daily, Starting Sun 3/13/2022, Normal      metoprolol succinate (TOPROL-XL) 25 mg 24 hr tablet Take 1 tablet (25 mg total) by mouth daily, Starting Wed 10/26/2022, Normal      Multiple Vitamins-Minerals (ICAPS AREDS 2) CAPS Take 1 capsule by mouth 2 (two) times a day  , Historical Med      telmisartan (MICARDIS) 20 MG tablet Take 0 5 tablets (10 mg total) by mouth daily, Starting Fri 6/24/2022, Normal      Xarelto 15 MG tablet TAKE 1 TABLET BY MOUTH DAILY WITH BREAKFAST, Normal               PDMP Review       Value Time User    PDMP Reviewed  Yes 6/17/2021 12:42 PM Najma Ko PA-C           ED Provider  Attending physically available and evaluated Adela Felisha SMITH managed the patient along with the ED Attending      Electronically Signed by         Jc Bueno MD  12/04/22 1021

## 2022-12-01 NOTE — ED NOTES
Patient transported to [address] [place] [address], [address] [place] [address]  12/01/22 5269

## 2022-12-02 LAB
ATRIAL RATE: 375 BPM
QRS AXIS: 47 DEGREES
QRSD INTERVAL: 94 MS
QT INTERVAL: 442 MS
QTC INTERVAL: 433 MS
T WAVE AXIS: 66 DEGREES
VENTRICULAR RATE: 58 BPM

## 2022-12-06 ENCOUNTER — NURSE TRIAGE (OUTPATIENT)
Dept: OTHER | Facility: OTHER | Age: 87
End: 2022-12-06

## 2022-12-06 ENCOUNTER — OFFICE VISIT (OUTPATIENT)
Dept: INTERNAL MEDICINE CLINIC | Facility: CLINIC | Age: 87
End: 2022-12-06

## 2022-12-06 VITALS
DIASTOLIC BLOOD PRESSURE: 84 MMHG | HEART RATE: 65 BPM | WEIGHT: 227 LBS | OXYGEN SATURATION: 98 % | SYSTOLIC BLOOD PRESSURE: 134 MMHG | BODY MASS INDEX: 29.13 KG/M2 | HEIGHT: 74 IN

## 2022-12-06 DIAGNOSIS — I48.19 PERSISTENT ATRIAL FIBRILLATION (HCC): ICD-10-CM

## 2022-12-06 DIAGNOSIS — H10.021 PINK EYE DISEASE OF RIGHT EYE: Primary | ICD-10-CM

## 2022-12-06 DIAGNOSIS — B35.1 ONYCHOMYCOSIS: ICD-10-CM

## 2022-12-06 DIAGNOSIS — G20 PARKINSON'S DISEASE (HCC): ICD-10-CM

## 2022-12-06 RX ORDER — TOBRAMYCIN AND DEXAMETHASONE 3; 1 MG/ML; MG/ML
1 SUSPENSION/ DROPS OPHTHALMIC
Qty: 5 ML | Refills: 0 | Status: SHIPPED | OUTPATIENT
Start: 2022-12-06

## 2022-12-06 NOTE — TELEPHONE ENCOUNTER
Patients daughter called in stating she would like pt seen due to eye pain and redness and swelling  States noticed pt symptoms on Saturday  States eye is red and eyelid is slightly red and puffy due to pt rubbing eye  States it seems to be irritating pt more than causing pain  Denies any vision changes fever or other symptoms  Requesting to be seen for symptoms and also foot pain that ongoing  Daughter also requesting a copy of u/s order and if can be scheduled after pt appointment  Pts other daughter will be bringing him to appointment 
Reason for Disposition  • Patient wants to be seen    Answer Assessment - Initial Assessment Questions  1  LOCATION: Location: "What's red, the eyeball or the outer eyelids?" (Note: when callers say the eye is red, they usually mean the sclera is red)      Eyelid and underneath eye is red   2  REDNESS OF SCLERA: "Is the redness in one or both eyes?" "When did the redness start?"    right eye   3  ONSET: "When did the eye become red?" (e g , hours, days)       Saturday   4  EYELIDS: "Are the eyelids red or swollen?" If Yes, ask: "How much?"    yes -slightly swolllen   5  VISION: "Is there any difficulty seeing clearly?"      Denies any difficulty   6  ITCHING: "Does it feel itchy?" If so ask: "How bad is it" (e g , Scale 1-10; or mild, moderate, severe)   moderate   7  PAIN: "Is there any pain? If Yes, ask: "How bad is it?" (e g , Scale 1-10; or mild, moderate, severe)      Unsure - more of irritant   8  CONTACT LENS: "Do you wear contacts?"     Denies   9  CAUSE: "What do you think is causing the redness?"      Unsure   10  OTHER SYMPTOMS: "Do you have any other symptoms?" (e g , fever, runny nose, cough, vomiting)        Denies   Hydrocortisone for some itching used        Feet pain as well- awhile    Protocols used: EYE - RED WITHOUT PUS-ADULT-OH
Regarding: Eye red & hurts / feet hurt  ----- Message from Shellie Cooper sent at 12/6/2022  8:33 AM EST -----  "My dad is complaining that his eye hurts and its red  He is also complaining that his feet hurt  I'd like to get him in to see his doctor today  He also had an ultra sound appt  That he missed because he was in the ER   Does he need a new order since he missed the appointment?"
Visit done
Yes

## 2022-12-06 NOTE — PATIENT INSTRUCTIONS
Problem List Items Addressed This Visit          Cardiovascular and Mediastinum    Persistent atrial fibrillation ([address] )     Rate controlled            Nervous and Auditory    Parkinson's disease [facility])     Follow-up neurology            Musculoskeletal and Integument    Onychomycosis     Has some yellow thickened toenails, will refer to podiatry  Relevant Orders    Ambulatory Referral to Podiatry       Other    Pink eye disease of right eye - Primary     Will treat with eyedrops, follow-up if not improving             Relevant Medications    tobramycin-dexamethasone (TOBRADEX) ophthalmic suspension

## 2022-12-06 NOTE — PROGRESS NOTES
Name: Mack Becerra      : 1935      MRN: [id number]  Encounter Provider: Rafal Katz MD  Encounter Date: 2022   [facility]    Assessment & Plan     1  Pink eye disease of right eye  Assessment & Plan: Will treat with eyedrops, follow-up if not improving  Orders:  -     tobramycin-dexamethasone (TOBRADEX) ophthalmic suspension; Administer 1 drop to the right eye every 4 (four) hours while awake    2  Onychomycosis  Assessment & Plan:  Has some yellow thickened toenails, will refer to podiatry  Orders:  -     Ambulatory Referral to Podiatry; Future    3  Parkinson's disease [facility])  Assessment & Plan:  Follow-up neurology      4  Persistent atrial fibrillation (HCC)  Assessment & Plan:  Rate controlled           Subjective     Patient has had some crusting and discharge from the right eye for a few days  He was also having some abdominal pain which is not present now, no urine or bowel complaints  Patient was also complaining of intermittent pain in the bottoms of his feet  Having pain in the balls of the feet  Review of Systems   Constitutional: Negative for chills, fatigue and fever  HENT: Negative for congestion, nosebleeds, postnasal drip, sore throat and trouble swallowing  Eyes: Negative for pain  Respiratory: Negative for cough, chest tightness, shortness of breath and wheezing  Cardiovascular: Negative for chest pain, palpitations and leg swelling  Gastrointestinal: Negative for abdominal pain, constipation, diarrhea, nausea and vomiting  Endocrine: Negative for polydipsia and polyuria  Genitourinary: Negative for dysuria, flank pain and hematuria  Musculoskeletal: Negative for arthralgias  Skin: Negative for rash  Neurological: Positive for tremors  Negative for dizziness, light-headedness and headaches  Hematological: Does not bruise/bleed easily     Psychiatric/Behavioral: Negative for confusion and dysphoric mood  The patient is not nervous/anxious  Past Medical History:   Diagnosis Date   • A-fib ([facility] )    • Afib ([place] [address] )    • Atrial fibrillation ([place] [address] ) 6/13/2021   • Hypertension    • Hypertension 6/13/2021   • Left AC separation    • Parkinson's disease ([place] [address] ) 11/01/2018   • SDH (subdural hematoma) ([place] [address] ) 2/7/2020     Past Surgical History:   Procedure Laterality Date   • CARDIOVERSION      ATRIAL   • ROTATOR CUFF REPAIR     • SHOULDER SURGERY       Family History   Problem Relation Age of Onset   • No Known Problems Mother    • Hypertension Father    • Coronary artery disease Father      Social History     Socioeconomic History   • Marital status: /Civil Union     Spouse name: None   • Number of children: None   • Years of education: None   • Highest education level: None   Occupational History   • None   Tobacco Use   • Smoking status: Never   • Smokeless tobacco: Never   • Tobacco comments:     Quit 35-40 years ago   Vaping Use   • Vaping Use: Never used   Substance and Sexual Activity   • Alcohol use: Not Currently     Comment: Rare, previously moderate drinker cut down 2016  • Drug use: Never   • Sexual activity: Not Currently     Partners: Female     Birth control/protection: Post-menopausal   Other Topics Concern   • None   Social History Narrative    ** Merged History Encounter **          Social Determinants of Health     Financial Resource Strain: Low Risk    • Difficulty of Paying Living Expenses: Not very hard   Food Insecurity: Not on file   Transportation Needs: No Transportation Needs   • Lack of Transportation (Medical): No   • Lack of Transportation (Non-Medical):  No   Physical Activity: Not on file   Stress: Not on file   Social Connections: Not on file   Intimate Partner Violence: Not on file   Housing Stability: Not on file     Current Outpatient Medications on File Prior to Visit   Medication Sig   • acetaminophen (TYLENOL) 325 mg tablet Take 2 tablets (650 mg total) by mouth every 4 (four) hours as needed for mild pain   • carbidopa-levodopa (SINEMET)  mg per tablet TAKE 1 TABLET BY MOUTH THREE TIMES A DAY   • ipratropium (ATROVENT) 0 06 % nasal spray 1 spray into each nostril 4 (four) times a day as needed for rhinitis   • metoprolol succinate (TOPROL-XL) 25 mg 24 hr tablet Take 1 tablet (25 mg total) by mouth daily   • Multiple Vitamins-Minerals (ICAPS AREDS 2) CAPS Take 1 capsule by mouth 2 (two) times a day     • telmisartan (MICARDIS) 20 MG tablet Take 0 5 tablets (10 mg total) by mouth daily   • Xarelto 15 MG tablet TAKE 1 TABLET BY MOUTH DAILY WITH BREAKFAST   • doxycycline hyclate (VIBRAMYCIN) 100 mg capsule ONE TAB BY MOUTH TWICE A DAY FOR 14 DAYS WITH FOOD (Patient not taking: Reported on 10/26/2022)   • gentamicin (GARAMYCIN) 0 1 % ointment APPLY TOPICALLY TO WOUND 4 TIMES DAILY (Patient not taking: Reported on 10/26/2022)   • loratadine (CLARITIN) 10 mg tablet Take 1 tablet (10 mg total) by mouth daily (Patient not taking: Reported on 4/29/2022)     Allergies   Allergen Reactions   • Penicillins Hives   • Pollen Extract Other (See Comments)       Immunization History   Administered Date(s) Administered   • COVID-19 PFIZER VACCINE 0 3 ML IM 01/27/2021, 02/17/2021, 01/08/2022   • INFLUENZA 09/04/2016, 10/26/2017, 10/26/2017, 09/13/2018   • Influenza Split High Dose Preservative Free IM 1935, 10/06/2014, 09/09/2015, 10/17/2017   • Influenza, high dose seasonal 0 7 mL 11/17/2021, 09/28/2022   • Influenza, seasonal, injectable 10/11/2010, 10/11/2011   • Pneumococcal Conjugate 13-Valent 09/27/2015   • Pneumococcal Polysaccharide PPV23 01/15/2017   • Zoster 01/01/2013   • Zoster Vaccine Recombinant 01/01/2013   • influenza, trivalent, adjuvanted 09/19/2019       Objective     /84   Pulse 65   Ht 6' 2" (1 88 m)   Wt 103 kg (227 lb)   SpO2 98%   BMI 29 15 kg/m²     Physical Exam  Constitutional:       General: He is not in acute distress       Appearance: Normal appearance  He is well-developed and well-nourished  HENT:      Head: Normocephalic and atraumatic  Right Ear: External ear normal       Left Ear: External ear normal    Eyes:      General: No scleral icterus  Right eye: Discharge present  Comments: Conjunctiva on right inflamed   Neck:      Thyroid: No thyromegaly  Trachea: No tracheal deviation  Cardiovascular:      Rate and Rhythm: Normal rate  Rhythm irregular  Heart sounds: Murmur heard  Pulmonary:      Effort: Pulmonary effort is normal  No respiratory distress  Breath sounds: Normal breath sounds  No wheezing or rales  Abdominal:      General: Bowel sounds are normal       Palpations: Abdomen is soft  Tenderness: There is no abdominal tenderness  There is no guarding or rebound  Musculoskeletal:         General: No edema  Cervical back: Normal range of motion and neck supple  Right lower leg: No edema  Left lower leg: No edema  Lymphadenopathy:      Cervical: No cervical adenopathy  Skin:     Coloration: Skin is not jaundiced  Comments: Toenails yellow and thickened   Neurological:      Mental Status: He is alert     Psychiatric:         Mood and Affect: Mood and affect normal        Milagros Montes MD

## 2022-12-07 ENCOUNTER — OFFICE VISIT (OUTPATIENT)
Dept: PODIATRY | Facility: CLINIC | Age: 87
End: 2022-12-07

## 2022-12-07 VITALS
WEIGHT: 227 LBS | SYSTOLIC BLOOD PRESSURE: 116 MMHG | BODY MASS INDEX: 29.13 KG/M2 | HEART RATE: 68 BPM | OXYGEN SATURATION: 100 % | HEIGHT: 74 IN | DIASTOLIC BLOOD PRESSURE: 89 MMHG

## 2022-12-07 DIAGNOSIS — L85.2 PUNCTATE KERATOSIS: Primary | ICD-10-CM

## 2022-12-07 DIAGNOSIS — B35.1 ONYCHOMYCOSIS: ICD-10-CM

## 2022-12-07 NOTE — PROGRESS NOTES
Assessment/Plan:    The patient's clinical examination today is consistent with onychomycosis and a tender callus to the plantar aspect of the left first metatarsophalangeal joint  There are no open skin lesions nor atrophic changes noted to either lower extremity  The pedal nail plates were sharply debrided and mechanically reduced in thickness and girth with a pair of sterile nail clippers without incident  The tender callus lesion was debrided with a sterile 15 blade without incident  Recommend good supportive shoes with cushioned insoles and use of a skin cream or lotion nightly for skin hydration  Recommend follow-up in 3 months or as needed  Diagnoses and all orders for this visit:    Punctate keratosis    Onychomycosis  -     Ambulatory Referral to Podiatry          Subjective:      Patient ID: Milo Boas is a 82 y o  male  The patient presents today with a chief complaint of thickened toenails and a tender callus to the plantar aspect of his left forefoot  He was referred to our office by his primary care physician Dr Leticia De Los Santos  He notes no other acute pedal complaints today        The following portions of the patient's history were reviewed and updated as appropriate: allergies, current medications, past family history, past medical history, past social history, past surgical history and problem list       PAST MEDICAL HISTORY:  Past Medical History:   Diagnosis Date   • A-fib ([facility] )    • Afib ([facility] )    • Atrial fibrillation ([facility] ) 6/13/2021   • Hypertension    • Hypertension 6/13/2021   • Left AC separation    • Parkinson's disease ([facility] ) 11/01/2018   • SDH (subdural hematoma) ([facility] ) 2/7/2020       PAST SURGICAL HISTORY:  Past Surgical History:   Procedure Laterality Date   • CARDIOVERSION      ATRIAL   • ROTATOR CUFF REPAIR     • SHOULDER SURGERY          ALLERGIES:  Penicillins and Pollen extract    MEDICATIONS:  Current Outpatient Medications   Medication Sig Dispense Refill   • acetaminophen (TYLENOL) 325 mg tablet Take 2 tablets (650 mg total) by mouth every 4 (four) hours as needed for mild pain  0   • carbidopa-levodopa (SINEMET)  mg per tablet TAKE 1 TABLET BY MOUTH THREE TIMES A  tablet 3   • ipratropium (ATROVENT) 0 06 % nasal spray 1 spray into each nostril 4 (four) times a day as needed for rhinitis 45 mL 0   • metoprolol succinate (TOPROL-XL) 25 mg 24 hr tablet Take 1 tablet (25 mg total) by mouth daily 90 tablet 3   • Multiple Vitamins-Minerals (ICAPS AREDS 2) CAPS Take 1 capsule by mouth 2 (two) times a day       • telmisartan (MICARDIS) 20 MG tablet Take 0 5 tablets (10 mg total) by mouth daily 45 tablet 3   • tobramycin-dexamethasone (TOBRADEX) ophthalmic suspension Administer 1 drop to the right eye every 4 (four) hours while awake 5 mL 0   • Xarelto 15 MG tablet TAKE 1 TABLET BY MOUTH DAILY WITH BREAKFAST 90 tablet 0   • doxycycline hyclate (VIBRAMYCIN) 100 mg capsule ONE TAB BY MOUTH TWICE A DAY FOR 14 DAYS WITH FOOD (Patient not taking: Reported on 10/26/2022)     • gentamicin (GARAMYCIN) 0 1 % ointment APPLY TOPICALLY TO WOUND 4 TIMES DAILY (Patient not taking: Reported on 10/26/2022)     • loratadine (CLARITIN) 10 mg tablet Take 1 tablet (10 mg total) by mouth daily (Patient not taking: Reported on 4/29/2022) 90 tablet 0     No current facility-administered medications for this visit  SOCIAL HISTORY:  Social History     Socioeconomic History   • Marital status: /Civil Union     Spouse name: None   • Number of children: None   • Years of education: None   • Highest education level: None   Occupational History   • None   Tobacco Use   • Smoking status: Never   • Smokeless tobacco: Never   • Tobacco comments:     Quit 35-40 years ago   Vaping Use   • Vaping Use: Never used   Substance and Sexual Activity   • Alcohol use: Not Currently     Comment: Rare, previously moderate drinker cut down 2016      • Drug use: Never   • Sexual activity: Not Currently     Partners: Female     Birth control/protection: Post-menopausal   Other Topics Concern   • None   Social History Narrative    ** Merged History Encounter **          Social Determinants of Health     Financial Resource Strain: Low Risk    • Difficulty of Paying Living Expenses: Not very hard   Food Insecurity: Not on file   Transportation Needs: No Transportation Needs   • Lack of Transportation (Medical): No   • Lack of Transportation (Non-Medical): No   Physical Activity: Not on file   Stress: Not on file   Social Connections: Not on file   Intimate Partner Violence: Not on file   Housing Stability: Not on file        Review of Systems   Constitutional: Negative for chills and fever  HENT: Negative for ear pain and sore throat  Eyes: Negative for pain and visual disturbance  Respiratory: Negative for cough and shortness of breath  Cardiovascular: Negative for chest pain and palpitations  Gastrointestinal: Negative for abdominal pain and vomiting  Genitourinary: Negative for dysuria and hematuria  Musculoskeletal: Negative for arthralgias and back pain  Skin: Negative for color change and rash  Neurological: Negative for seizures and syncope  Psychiatric/Behavioral: Negative  All other systems reviewed and are negative  Objective:      /89 (BP Location: Left arm, Patient Position: Sitting, Cuff Size: Standard)   Pulse 68   Ht 6' 2" (1 88 m)   Wt 103 kg (227 lb)   SpO2 100%   BMI 29 15 kg/m²          Physical Exam  Vitals reviewed  Constitutional:       Appearance: Normal appearance  HENT:      Head: Normocephalic and atraumatic  Nose: Nose normal    Eyes:      Conjunctiva/sclera: Conjunctivae normal       Pupils: Pupils are equal, round, and reactive to light  Cardiovascular:      Pulses:           Dorsalis pedis pulses are 1+ on the right side and 1+ on the left side  Posterior tibial pulses are 1+ on the right side and 1+ on the left side  Pulmonary:      Effort: Pulmonary effort is normal    Feet:      Right foot:      Skin integrity: Skin integrity normal       Toenail Condition: Right toenails are abnormally thick  Fungal disease present  Left foot:      Skin integrity: Callus present  Toenail Condition: Left toenails are abnormally thick  Fungal disease present  Comments: There is a tender punctate keratosis to the plantar aspect of the left first metatarsophalangeal joint without signs of infection; the pedal nail plates are thickened and dystrophic with subungual debris and brittleness consistent with onychomycosis; the interdigital spaces are clear and without fissuring  Skin:     General: Skin is warm  Capillary Refill: Capillary refill takes less than 2 seconds  Neurological:      General: No focal deficit present  Mental Status: He is alert and oriented to person, place, and time  Psychiatric:         Mood and Affect: Mood normal          Behavior: Behavior normal          Thought Content:  Thought content normal

## 2022-12-08 ENCOUNTER — TELEPHONE (OUTPATIENT)
Dept: INTERNAL MEDICINE CLINIC | Facility: CLINIC | Age: 87
End: 2022-12-08

## 2022-12-08 NOTE — TELEPHONE ENCOUNTER
Sabrina Sickle  patients daughter called in said patient is getting sick - cough up stuff, congestions with chills  She is aware today is your half day and this may be addressed first thing tomorrow  He has been at hospital all week with wife she is not doing well but family is concern he is getting worse so they asked if something can be called in?      Tata - Justina - [phone]

## 2022-12-09 DIAGNOSIS — J06.9 UPPER RESPIRATORY TRACT INFECTION, UNSPECIFIED TYPE: Primary | ICD-10-CM

## 2022-12-09 RX ORDER — AZITHROMYCIN 250 MG/1
250 TABLET, FILM COATED ORAL EVERY 24 HOURS
Qty: 6 TABLET | Refills: 0 | Status: SHIPPED | OUTPATIENT
Start: 2022-12-09 | End: 2022-12-14

## 2022-12-11 ENCOUNTER — NURSE TRIAGE (OUTPATIENT)
Dept: OTHER | Facility: OTHER | Age: 87
End: 2022-12-11

## 2022-12-11 NOTE — TELEPHONE ENCOUNTER
Patient with panic attack and depression  Patient's wife recently passed away, patient is grieving  Today patient had a panic attack and c/o abdominal pain  Family called for an ambulance, patient was evaluated  Vitals were stable and symptoms subsided  Patient stayed home and did not go to the hospital  Patient's daughter states he was on lexapro before and he might need to go back on it again  Family is requesting a call back tomorrow to discuss medication option to help with patient's depression and panic attacks  Please follow up  Reason for Disposition  • Patient sounds very upset or troubled to the triager    Answer Assessment - Initial Assessment Questions  1  CONCERN: "What happened that made you call today?"      Panic attack    2  ANXIETY SYMPTOM SCREENING: "Can you describe how you have been feeling?"  (e g , tense, restless, panicky, anxious, keyed up, trouble sleeping, trouble concentrating)      Anxious, trouble concentrating    3  ONSET: "How long have you been feeling this way?"      Wife recently passed away    4  RECURRENT: "Have you felt this way before?"  If Yes, ask: "What happened that time?" "What helped these feelings go away in the past?"       Yes, panic attacks in the past when close friends passed away    5   RISK OF HARM - SUICIDAL IDEATION:  "Do you ever have thoughts of hurting or killing yourself?"  (e g , yes, no, no but preoccupation with thoughts about death)    - INTENT:  "Do you have thoughts of hurting or killing yourself right NOW?" (e g , yes, no, N/A)    - PLAN: "Do you have a specific plan for how you would do this?" (e g , gun, knife, overdose, no plan, N/A)      N/A    6  RISK OF HARM - HOMICIDAL IDEATION:  "Do you ever have thoughts of hurting or killing someone else?"  (e g , yes, no, no but preoccupation with thoughts about death)    - INTENT:  "Do you have thoughts of hurting or killing someone right NOW?" (e g , yes, no, N/A)    - PLAN: "Do you have a specific plan for how you would do this?" (e g , gun, knife, no plan, N/A)       N/A    7  FUNCTIONAL IMPAIRMENT: "How have things been going for you overall? Have you had more difficulty than usual doing your normal daily activities?"  (e g , better, same, worse; self-care, school, work, interactions)   Worse       8  SUPPORT: "Who is with you now?" "Who do you live with?" "Do you have family or friends who you can talk to?"       Family support    9  THERAPIST: "Do you have a counselor or therapist? Name?"      Unknown    10  STRESSORS: "Has there been any new stress or recent changes in your life?"        Grieving     11  CAFFEINE USE: "Do you drink caffeinated beverages, and how much each day?" (e g , coffee, tea, jamel)      Denies      12  ALCOHOL USE OR SUBSTANCE USE (DRUG USE): "Do you drink alcohol or use any illegal drugs?"        Denies    13  OTHER SYMPTOMS: "Do you have any other physical symptoms right now?" (e g , chest pain, palpitations, difficulty breathing, fever)        Abdominal pain    14   PREGNANCY: "Is there any chance you are pregnant?" "When was your last menstrual period?"        N/A    Protocols used: ANXIETY AND PANIC ATTACK-ADULT-

## 2022-12-11 NOTE — TELEPHONE ENCOUNTER
Regarding: depression, stomach pains, grieving, ambulance just called  ----- Message from Kerri Cohen sent at 12/11/2022 11:50 AM EST -----  "My father is having abdominal pains and we called the ambulance; my Mom passed away a few days ago and I believe this is panic attacks    He admits he is depressed, crying out "help me", staring off into space and is only eating because we are making him eat "

## 2022-12-12 DIAGNOSIS — F41.9 ANXIETY: Primary | ICD-10-CM

## 2022-12-12 DIAGNOSIS — I48.19 PERSISTENT ATRIAL FIBRILLATION (HCC): ICD-10-CM

## 2022-12-12 RX ORDER — ESCITALOPRAM OXALATE 10 MG/1
10 TABLET ORAL DAILY
Qty: 90 TABLET | Refills: 3 | Status: SHIPPED | OUTPATIENT
Start: 2022-12-12

## 2022-12-12 RX ORDER — ALPRAZOLAM 0.25 MG/1
0.25 TABLET ORAL 3 TIMES DAILY PRN
Qty: 30 TABLET | Refills: 0 | Status: SHIPPED | OUTPATIENT
Start: 2022-12-12

## 2022-12-12 RX ORDER — RIVAROXABAN 15 MG/1
15 TABLET, FILM COATED ORAL
Qty: 90 TABLET | Refills: 3 | Status: SHIPPED | OUTPATIENT
Start: 2022-12-12

## 2022-12-12 NOTE — TELEPHONE ENCOUNTER
Spoke with pt's daughter Odilia Gabriel and advised  She has concerns about her father getting through the  on Wednesday  She is asking if there is anything else that can be sent that will work more immediately such as Xanax or Ativan  She states she didn't want anything too strong but is asking for something to help calm him down as he is having too many panic attacks  She is asking that you call her  Her phone number is [phone]

## 2022-12-19 ENCOUNTER — TELEPHONE (OUTPATIENT)
Dept: NEUROLOGY | Facility: CLINIC | Age: 87
End: 2022-12-19

## 2022-12-21 ENCOUNTER — HOSPITAL ENCOUNTER (INPATIENT)
Facility: HOSPITAL | Age: 87
LOS: 1 days | Discharge: HOME HEALTH | End: 2022-12-22
Attending: EMERGENCY MEDICINE | Admitting: HOSPITALIST

## 2022-12-21 ENCOUNTER — APPOINTMENT (INPATIENT)
Dept: CT IMAGING | Facility: HOSPITAL | Age: 87
End: 2022-12-21

## 2022-12-21 ENCOUNTER — APPOINTMENT (EMERGENCY)
Dept: RADIOLOGY | Facility: HOSPITAL | Age: 87
End: 2022-12-21

## 2022-12-21 DIAGNOSIS — F03.94: ICD-10-CM

## 2022-12-21 DIAGNOSIS — U07.1 COVID: ICD-10-CM

## 2022-12-21 DIAGNOSIS — F43.20 EMOTIONAL CRISIS: ICD-10-CM

## 2022-12-21 DIAGNOSIS — F41.9 ANXIETY: ICD-10-CM

## 2022-12-21 DIAGNOSIS — U07.1 COVID-19: Primary | ICD-10-CM

## 2022-12-21 DIAGNOSIS — F41.0 PANIC ATTACKS: ICD-10-CM

## 2022-12-21 DIAGNOSIS — G20 PARKINSON'S DISEASE (HCC): ICD-10-CM

## 2022-12-21 DIAGNOSIS — F02.818 DEMENTIA ASSOCIATED WITH OTHER UNDERLYING DISEASE WITH BEHAVIORAL DISTURBANCE: ICD-10-CM

## 2022-12-21 PROBLEM — F43.0: Status: ACTIVE | Noted: 2022-12-21

## 2022-12-21 LAB
2HR DELTA HS TROPONIN: -1 NG/L
ANION GAP SERPL CALCULATED.3IONS-SCNC: 7 MMOL/L (ref 4–13)
BASOPHILS # BLD AUTO: 0.02 THOUSANDS/ÂΜL (ref 0–0.1)
BASOPHILS NFR BLD AUTO: 0 % (ref 0–1)
BUN SERPL-MCNC: 12 MG/DL (ref 5–25)
CALCIUM SERPL-MCNC: 8.9 MG/DL (ref 8.4–10.2)
CARDIAC TROPONIN I PNL SERPL HS: 5 NG/L
CARDIAC TROPONIN I PNL SERPL HS: 6 NG/L
CHLORIDE SERPL-SCNC: 103 MMOL/L (ref 96–108)
CK SERPL-CCNC: 44 U/L (ref 39–308)
CO2 SERPL-SCNC: 25 MMOL/L (ref 21–32)
CREAT SERPL-MCNC: 0.8 MG/DL (ref 0.6–1.3)
D DIMER PPP FEU-MCNC: 0.47 UG/ML FEU
EOSINOPHIL # BLD AUTO: 0.07 THOUSAND/ÂΜL (ref 0–0.61)
EOSINOPHIL NFR BLD AUTO: 1 % (ref 0–6)
ERYTHROCYTE [DISTWIDTH] IN BLOOD BY AUTOMATED COUNT: 13.7 % (ref 11.6–15.1)
FLUAV RNA RESP QL NAA+PROBE: NEGATIVE
FLUBV RNA RESP QL NAA+PROBE: NEGATIVE
GFR SERPL CREATININE-BSD FRML MDRD: 81 ML/MIN/1.73SQ M
GLUCOSE SERPL-MCNC: 103 MG/DL (ref 65–140)
HCT VFR BLD AUTO: 38.8 % (ref 36.5–49.3)
HGB BLD-MCNC: 13.2 G/DL (ref 12–17)
IMM GRANULOCYTES # BLD AUTO: 0.03 THOUSAND/UL (ref 0–0.2)
IMM GRANULOCYTES NFR BLD AUTO: 0 % (ref 0–2)
LYMPHOCYTES # BLD AUTO: 1.18 THOUSANDS/ÂΜL (ref 0.6–4.47)
LYMPHOCYTES NFR BLD AUTO: 17 % (ref 14–44)
MCH RBC QN AUTO: 29.7 PG (ref 26.8–34.3)
MCHC RBC AUTO-ENTMCNC: 34 G/DL (ref 31.4–37.4)
MCV RBC AUTO: 87 FL (ref 82–98)
MONOCYTES # BLD AUTO: 0.64 THOUSAND/ÂΜL (ref 0.17–1.22)
MONOCYTES NFR BLD AUTO: 9 % (ref 4–12)
NEUTROPHILS # BLD AUTO: 5.21 THOUSANDS/ÂΜL (ref 1.85–7.62)
NEUTS SEG NFR BLD AUTO: 73 % (ref 43–75)
NRBC BLD AUTO-RTO: 0 /100 WBCS
PLATELET # BLD AUTO: 392 THOUSANDS/UL (ref 149–390)
PMV BLD AUTO: 9.4 FL (ref 8.9–12.7)
POTASSIUM SERPL-SCNC: 4.3 MMOL/L (ref 3.5–5.3)
RBC # BLD AUTO: 4.44 MILLION/UL (ref 3.88–5.62)
RSV RNA RESP QL NAA+PROBE: NEGATIVE
SARS-COV-2 RNA RESP QL NAA+PROBE: POSITIVE
SODIUM SERPL-SCNC: 135 MMOL/L (ref 135–147)
WBC # BLD AUTO: 7.15 THOUSAND/UL (ref 4.31–10.16)

## 2022-12-21 RX ORDER — LOSARTAN POTASSIUM 50 MG/1
100 TABLET ORAL DAILY
Status: DISCONTINUED | OUTPATIENT
Start: 2022-12-21 | End: 2022-12-22 | Stop reason: HOSPADM

## 2022-12-21 RX ORDER — ESCITALOPRAM OXALATE 10 MG/1
10 TABLET ORAL DAILY
Status: DISCONTINUED | OUTPATIENT
Start: 2022-12-21 | End: 2022-12-22 | Stop reason: HOSPADM

## 2022-12-21 RX ORDER — SODIUM CHLORIDE 9 MG/ML
50 INJECTION, SOLUTION INTRAVENOUS CONTINUOUS
Status: DISCONTINUED | OUTPATIENT
Start: 2022-12-21 | End: 2022-12-22 | Stop reason: HOSPADM

## 2022-12-21 RX ORDER — GUAIFENESIN/DEXTROMETHORPHAN 100-10MG/5
10 SYRUP ORAL EVERY 4 HOURS PRN
Status: DISCONTINUED | OUTPATIENT
Start: 2022-12-21 | End: 2022-12-22 | Stop reason: HOSPADM

## 2022-12-21 RX ORDER — METOPROLOL SUCCINATE 25 MG/1
25 TABLET, EXTENDED RELEASE ORAL DAILY
Status: DISCONTINUED | OUTPATIENT
Start: 2022-12-21 | End: 2022-12-22 | Stop reason: HOSPADM

## 2022-12-21 RX ORDER — BENZONATATE 100 MG/1
100 CAPSULE ORAL 3 TIMES DAILY PRN
Status: DISCONTINUED | OUTPATIENT
Start: 2022-12-21 | End: 2022-12-22 | Stop reason: HOSPADM

## 2022-12-21 RX ORDER — ALPRAZOLAM 0.25 MG/1
0.25 TABLET ORAL 3 TIMES DAILY PRN
Status: DISCONTINUED | OUTPATIENT
Start: 2022-12-21 | End: 2022-12-22 | Stop reason: HOSPADM

## 2022-12-21 RX ADMIN — SODIUM CHLORIDE 50 ML/HR: 0.9 INJECTION, SOLUTION INTRAVENOUS at 18:09

## 2022-12-21 RX ADMIN — LOSARTAN POTASSIUM 100 MG: 50 TABLET, FILM COATED ORAL at 18:09

## 2022-12-21 RX ADMIN — METOPROLOL SUCCINATE 25 MG: 25 TABLET, EXTENDED RELEASE ORAL at 18:09

## 2022-12-21 RX ADMIN — CARBIDOPA AND LEVODOPA 1 TABLET: 25; 100 TABLET ORAL at 18:09

## 2022-12-21 RX ADMIN — SODIUM CHLORIDE 500 ML: 0.9 INJECTION, SOLUTION INTRAVENOUS at 10:31

## 2022-12-21 RX ADMIN — ESCITALOPRAM OXALATE 10 MG: 10 TABLET ORAL at 18:09

## 2022-12-21 RX ADMIN — ALPRAZOLAM 0.25 MG: 0.25 TABLET ORAL at 18:25

## 2022-12-21 NOTE — ASSESSMENT & PLAN NOTE
She has cold and flulike symptoms for the past 1 week,  Patient was recently started on Z-[place] 12/10 which she completed  Complains of sensation of congestion and cough, denies fever, shortness of breath  COVID-positive this admission, negative for flu and RSV  Chest x-ray unremarkable, troponins negative,    PLAN  Monitor oxygen saturation, CRP, TROP  Patient already on Xarelto 15

## 2022-12-21 NOTE — H&P
[facility]  H&P- Stefania Germain 1935, 82 y o  male MRN: [id number]  Unit/Bed#: ED-32 Encounter: [id number]  Primary Care Provider: Janeth Morales MD   Date and time admitted to hospital: 12/21/2022  9:37 AM    * Suhail Thibodeaux 24  She has cold and flulike symptoms for the past 1 week,  Patient was recently started on Z-Newton 12/10 which she completed  Complains of sensation of congestion and cough, denies fever, shortness of breath  COVID-positive this admission, negative for flu and RSV  Chest x-ray unremarkable, troponins negative,    PLAN  Monitor oxygen saturation, CRP, TROP  Patient already on Xarelto 15    Dementia with anxiety  Assessment & Plan  Patient with a history of Parkinson disease follows up with neurology  Family admits patient has been recently more confused, and forgetful  Patient currently going through grief    Following the loss of his wife 2 weeks ago, has underlying anxiety and adjustment disorder/depression  No recent falls, no syncope, no chest pain no blurred vision, no headaches  Transient during this admission oriented x4, occasionally confused in conversation  Dementia might be related to age, neurodegenerative, Parkinson's disease, depression, no evidence of toxic component  CT head pending,      Plan  Continue Lexapro 10 at bedtime Xanax 0 5 mg as needed  Psychiatry consult  Consult case management to assist family in placement  Delirium precaution  PT OT    Parkinson's disease [facility])  Assessment & Plan  Patient is following up with Parkinson's disease  On Sinemet     Benign essential hypertension  Assessment & Plan  Blood pressure slightly elevated during this admission, patient presented with panic attack  Has been going through grief, anxiety and depression following the passing of his wife  Will continue telmisartan 10 mg daily and Toprol 25 mg daily    Persistent atrial fibrillation [facility])  Assessment & Plan  Patient is rate controlled on metoprolol 25  Mg daily  Anticoagulation with Xarelto 15 mg daily  Patient does not have symptomatic atrial fibrillation at this time-continue home regimen      VTE Pharmacologic Prophylaxis:   High Risk (Score >/= 5) - Pharmacological DVT Prophylaxis Ordered: rivaroxaban (Xarelto)  Sequential Compression Devices Ordered  Code Status: Prior   Discussion with family: Updated  (daughter and son in law) at bedside  Anticipated Length of Stay: Patient will be admitted on an inpatient basis with an anticipated length of stay of greater than 2 midnights secondary to COVID, physical deconditioning and assiting with placement  Chief Complaint: panic attack    History of Present Illness: Deanne Tuttle is a 82 y o  male with a PMH of permanent A  fib, hypertension, dyslipidemia, Parkinson's disease,  Patient called 911 this morning because he felt like he was going through a panic attack  Patient reports not feeling well for the past week, associated with congestion cough, and unable to clear his throat  Patient denies fever, chest pain, shortness of breath, palpitations, dizziness or syncope  He just recently completed azithromycin course for sinus infection 12/5  He also denies nausea, vomiting,, but reported 1 episode of loose stools yesterday  He denies any urinary symptoms  Overall patient is going through a grieving process as he lost his wife about 2 weeks ago  He was seen at [facility] 12/11 for chest pain-negative for MI work-up , thought to have related to a panic attack  Patient lives in his house alone, family is worried he is unable to take care of himself  Since increasing the more confused, and forgetful as described by his daughter  They are seeking further options, as to help patient currently taking care of  At the ED, saturating normally on room air, COVID-positive    Chest x-ray unremarkable    Review of Systems:  Review of Systems   Constitutional: Negative for chills and fever  HENT: Positive for congestion  Negative for ear pain and sore throat  Eyes: Negative for pain and visual disturbance  Respiratory: Positive for cough  Negative for shortness of breath  Cardiovascular: Negative for chest pain and palpitations  Gastrointestinal: Negative for abdominal pain, constipation, diarrhea and vomiting  Genitourinary: Negative for dysuria and hematuria  Musculoskeletal: Negative for arthralgias and back pain  Skin: Negative for color change and rash  Neurological: Negative for seizures and syncope  All other systems reviewed and are negative  Past Medical and Surgical History:   Past Medical History:   Diagnosis Date   • A-fib [facility])    • Afib ([facility] )    • Atrial fibrillation ([place] [address] ) 6/13/2021   • Hypertension    • Hypertension 6/13/2021   • Left AC separation    • Parkinson's disease ([place] [address] ) 11/01/2018   • SDH (subdural hematoma) ([place] [address] ) 2/7/2020       Past Surgical History:   Procedure Laterality Date   • CARDIOVERSION      ATRIAL   • ROTATOR CUFF REPAIR     • SHOULDER SURGERY         Meds/Allergies:  Prior to Admission medications    Medication Sig Start Date End Date Taking?  Authorizing Provider   ALPRAZolam Mickeal Beards) 0 25 mg tablet Take 1 tablet (0 25 mg total) by mouth 3 (three) times a day as needed for anxiety 12/12/22  Yes Jody Ireland MD   carbidopa-levodopa (SINEMET)  mg per tablet TAKE 1 TABLET BY MOUTH THREE TIMES A DAY 10/17/22  Yes Jody Ireland MD   doxycycline hyclate (VIBRAMYCIN) 100 mg capsule  7/6/22  Yes Historical Provider, MD   escitalopram (Lexapro) 10 mg tablet Take 1 tablet (10 mg total) by mouth daily 12/12/22  Yes Jody Ireland MD   gentamicin (GARAMYCIN) 0 1 % ointment  7/20/22  Yes Historical Provider, MD   metoprolol succinate (TOPROL-XL) 25 mg 24 hr tablet Take 1 tablet (25 mg total) by mouth daily 10/26/22  Yes Keren Jose MD   telmisartan (MICARDIS) 20 MG tablet Take 0 5 tablets (10 mg total) by mouth daily 6/24/22  Yes Lelia Llamas MD   Xarelto 15 MG tablet Take 1 tablet (15 mg total) by mouth daily with breakfast 12/12/22  Yes Doreen Bright MD   acetaminophen (TYLENOL) 325 mg tablet Take 2 tablets (650 mg total) by mouth every 4 (four) hours as needed for mild pain  Patient not taking: Reported on 12/21/2022 6/17/21   Robbie Cheng PA-C   ipratropium (ATROVENT) 0 06 % nasal spray 1 spray into each nostril 4 (four) times a day as needed for rhinitis 6/30/22   RAFFI Barkley   loratadine (CLARITIN) 10 mg tablet Take 1 tablet (10 mg total) by mouth daily  Patient not taking: Reported on 4/29/2022 3/13/22   Doreen Bright MD   Multiple Vitamins-Minerals (ICAPS AREDS 2) CAPS Take 1 capsule by mouth 2 (two) times a day    Patient not taking: Reported on 12/21/2022    Historical Provider, MD   tobramycin-dexamethasone [facility]) ophthalmic suspension Administer 1 drop to the right eye every 4 (four) hours while awake  Patient not taking: Reported on 12/21/2022 12/6/22   Doreen Bright MD     I have reviewed home medications with patient family member  Allergies: Allergies   Allergen Reactions   • Penicillins Hives   • Pollen Extract Other (See Comments)       Social History:  Marital Status: /Civil Union   Occupation:   Patient Pre-hospital Living Situation: Home  Patient Pre-hospital Level of Mobility: walks with cane  Patient Pre-hospital Diet Restrictions:   Substance Use History:   Social History     Substance and Sexual Activity   Alcohol Use Not Currently    Comment: Rare, previously moderate drinker cut down 2016        Social History     Tobacco Use   Smoking Status Never   Smokeless Tobacco Never   Tobacco Comments    Quit 35-40 years ago     Social History     Substance and Sexual Activity   Drug Use Never       Family History:  Family History   Problem Relation Age of Onset   • No Known Problems Mother    • Hypertension Father    • Coronary artery disease Father        Physical Exam:     Vitals:   Blood Pressure: 140/83 (12/21/22 1200)  Pulse: 68 (12/21/22 1200)  Temperature: 98 8 °F (37 1 °C) (12/21/22 0938)  Temp Source: Oral (12/21/22 3583)  Respirations: 16 (12/21/22 1200)  SpO2: 99 % (12/21/22 1200)    Physical Exam  Vitals and nursing note reviewed  Constitutional:       General: He is not in acute distress  Appearance: He is well-developed  HENT:      Head: Normocephalic and atraumatic  Eyes:      Conjunctiva/sclera: Conjunctivae normal    Cardiovascular:      Rate and Rhythm: Normal rate and regular rhythm  Heart sounds: No murmur heard  Pulmonary:      Effort: Pulmonary effort is normal  No respiratory distress  Breath sounds: Normal breath sounds  Abdominal:      Palpations: Abdomen is soft  Tenderness: There is no abdominal tenderness  Musculoskeletal:         General: No swelling  Cervical back: Neck supple  Skin:     General: Skin is warm and dry  Capillary Refill: Capillary refill takes less than 2 seconds  Neurological:      Mental Status: He is alert     Psychiatric:         Mood and Affect: Mood normal           Additional Data:     Lab Results:  Results from last 7 days   Lab Units 12/21/22  1031   WBC Thousand/uL 7 15   HEMOGLOBIN g/dL 13 2   HEMATOCRIT % 38 8   PLATELETS Thousands/uL 392*   NEUTROS PCT % 73   LYMPHS PCT % 17   MONOS PCT % 9   EOS PCT % 1     Results from last 7 days   Lab Units 12/21/22  1031   SODIUM mmol/L 135   POTASSIUM mmol/L 4 3   CHLORIDE mmol/L 103   CO2 mmol/L 25   BUN mg/dL 12   CREATININE mg/dL 0 80   ANION GAP mmol/L 7   CALCIUM mg/dL 8 9   GLUCOSE RANDOM mg/dL 103                       Lines/Drains:  Invasive Devices     Peripheral Intravenous Line  Duration           Peripheral IV 12/21/22 Left Wrist <1 day                    Imaging: Reviewed radiology reports from this admission including: chest xray  XR chest 1 view portable   Final Result by Jr Christiansen MD (12/21 1223)      Unchanged cardiomegaly without acute findings  Workstation performed: [id number]         CT head without contrast    (Results Pending)       EKG and Other Studies Reviewed on Admission:   · EKG: Atrial fibrillation  HR 63, atrial; rate 267  ** Please Note: This note has been constructed using a voice recognition system   **

## 2022-12-21 NOTE — ED PROVIDER NOTES
History  Chief Complaint   Patient presents with   • Nasal Congestion     Increased phlegm production the past day unable to clear it     Patient is an 21-year-old male with history of longitudinal atrial fibrillation on Xarelto, parkinsonism presented to emergency department for evaluation of congestion  Patient states that he has had some congestion that he is unable to cough out for an unknown duration of time  He presents emergency department asking "do anything he can give me for this congestion?  "  Patient remarks that his wife passed away within the last week and that this has been very challenging for him  He does state that he is able to take care of himself and has a good support network with his family and friends  He denies thoughts of wanting to harm himself at this time  He denies any shortness of breath, abdominal pain  Denies any fevers  Prior to Admission Medications   Prescriptions Last Dose Informant Patient Reported? Taking?    ALPRAZolam (XANAX) 0 25 mg tablet Past Week  No Yes   Sig: Take 1 tablet (0 25 mg total) by mouth 3 (three) times a day as needed for anxiety   Multiple Vitamins-Minerals (ICAPS AREDS 2) CAPS Not Taking  Yes No   Sig: Take 1 capsule by mouth 2 (two) times a day     Patient not taking: Reported on 12/21/2022   Xarelto 15 MG tablet 12/20/2022  No Yes   Sig: Take 1 tablet (15 mg total) by mouth daily with breakfast   acetaminophen (TYLENOL) 325 mg tablet Not Taking  No No   Sig: Take 2 tablets (650 mg total) by mouth every 4 (four) hours as needed for mild pain   Patient not taking: Reported on 12/21/2022   carbidopa-levodopa (SINEMET)  mg per tablet 12/20/2022  No Yes   Sig: TAKE 1 TABLET BY MOUTH THREE TIMES A DAY   doxycycline hyclate (VIBRAMYCIN) 100 mg capsule 12/21/2022  Yes Yes   escitalopram (Lexapro) 10 mg tablet 12/20/2022  No Yes   Sig: Take 1 tablet (10 mg total) by mouth daily   gentamicin (GARAMYCIN) 0 1 % ointment 12/21/2022  Yes Yes ipratropium (ATROVENT) 0 06 % nasal spray   No No   Si spray into each nostril 4 (four) times a day as needed for rhinitis   loratadine (CLARITIN) 10 mg tablet More than a month  No No   Sig: Take 1 tablet (10 mg total) by mouth daily   Patient not taking: Reported on 2022   metoprolol succinate (TOPROL-XL) 25 mg 24 hr tablet 2022  No Yes   Sig: Take 1 tablet (25 mg total) by mouth daily   telmisartan (MICARDIS) 20 MG tablet 2022  No Yes   Sig: Take 0 5 tablets (10 mg total) by mouth daily   tobramycin-dexamethasone (TOBRADEX) ophthalmic suspension Not Taking  No No   Sig: Administer 1 drop to the right eye every 4 (four) hours while awake   Patient not taking: Reported on 2022      Facility-Administered Medications: None       Past Medical History:   Diagnosis Date   • A-fib ([facility] )    • Afib ([facility] )    • Atrial fibrillation ([facility] ) 2021   • Hypertension    • Hypertension 2021   • Left AC separation    • Parkinson's disease ([facility] ) 2018   • SDH (subdural hematoma) ([facility] ) 2020       Past Surgical History:   Procedure Laterality Date   • CARDIOVERSION      ATRIAL   • ROTATOR CUFF REPAIR     • SHOULDER SURGERY         Family History   Problem Relation Age of Onset   • No Known Problems Mother    • Hypertension Father    • Coronary artery disease Father      I have reviewed and agree with the history as documented  E-Cigarette/Vaping   • E-Cigarette Use Never User      E-Cigarette/Vaping Substances   • Nicotine No    • THC No    • CBD No    • Flavoring No    • Other No    • Unknown No      Social History     Tobacco Use   • Smoking status: Never   • Smokeless tobacco: Never   • Tobacco comments:     Quit 35-40 years ago   Vaping Use   • Vaping Use: Never used   Substance Use Topics   • Alcohol use: Not Currently     Comment: Rare, previously moderate drinker cut down 2016  • Drug use: Never        Review of Systems   Constitutional: Negative      HENT: Positive for congestion  Eyes: Negative  Respiratory: Negative  Cardiovascular: Negative  Gastrointestinal: Negative  Endocrine: Negative  Genitourinary: Negative  Musculoskeletal: Negative  Skin: Negative  Allergic/Immunologic: Negative  Neurological: Negative  Hematological: Negative  Psychiatric/Behavioral: Negative  All other systems reviewed and are negative  Physical Exam  ED Triage Vitals [12/21/22 0938]   Temperature Pulse Respirations Blood Pressure SpO2   98 8 °F (37 1 °C) 68 18 149/76 98 %      Temp Source Heart Rate Source Patient Position - Orthostatic VS BP Location FiO2 (%)   Oral Monitor Sitting Left arm --      Pain Score       No Pain             Orthostatic Vital Signs  Vitals:    12/21/22 2128 12/21/22 2237 12/21/22 2238 12/22/22 0653   BP: 143/81 127/78 127/78 137/84   Pulse: 67 62 (!) 47 56   Patient Position - Orthostatic VS:           Physical Exam  Vitals and nursing note reviewed  Constitutional:       General: He is not in acute distress  Appearance: Normal appearance  He is not ill-appearing, toxic-appearing or diaphoretic  HENT:      Head: Normocephalic and atraumatic  Eyes:      General: No scleral icterus  Right eye: No discharge  Left eye: No discharge  Extraocular Movements: Extraocular movements intact  Conjunctiva/sclera: Conjunctivae normal       Pupils: Pupils are equal, round, and reactive to light  Cardiovascular:      Rate and Rhythm: Normal rate  Pulses: Normal pulses  Heart sounds: Normal heart sounds  No murmur heard  No friction rub  No gallop  Pulmonary:      Effort: Pulmonary effort is normal  No respiratory distress  Breath sounds: Normal breath sounds  No stridor  No wheezing, rhonchi or rales  Abdominal:      General: Abdomen is flat  Bowel sounds are normal  There is no distension  Palpations: Abdomen is soft  Tenderness: There is no abdominal tenderness   There is no guarding or rebound  Musculoskeletal:         General: No swelling  Normal range of motion  Cervical back: Normal range of motion  No rigidity  Right lower leg: No edema  Left lower leg: No edema  Skin:     General: Skin is warm and dry  Capillary Refill: Capillary refill takes less than 2 seconds  Coloration: Skin is not jaundiced  Findings: No bruising or lesion  Neurological:      General: No focal deficit present  Mental Status: He is alert and oriented to person, place, and time  Mental status is at baseline  Psychiatric:         Mood and Affect: Mood normal          Behavior: Behavior normal          Thought Content:  Thought content normal          Judgment: Judgment normal          ED Medications  Medications   ALPRAZolam (XANAX) tablet 0 25 mg (0 25 mg Oral Given 12/21/22 1825)   carbidopa-levodopa (SINEMET)  mg per tablet 1 tablet (1 tablet Oral Given 12/22/22 0910)   escitalopram (LEXAPRO) tablet 10 mg (10 mg Oral Given 12/22/22 0910)   metoprolol succinate (TOPROL-XL) 24 hr tablet 25 mg (25 mg Oral Given 12/22/22 0910)   losartan (COZAAR) tablet 100 mg (100 mg Oral Given 12/22/22 0909)   rivaroxaban (XARELTO) tablet 15 mg (15 mg Oral Given 12/22/22 0910)   sodium chloride 0 9 % infusion (50 mL/hr Intravenous New Bag 12/21/22 1809)   benzonatate (TESSALON PERLES) capsule 100 mg (has no administration in time range)   dextromethorphan-guaiFENesin (ROBITUSSIN DM) oral syrup 10 mL (has no administration in time range)   sodium chloride 0 9 % bolus 500 mL (0 mL Intravenous Stopped 12/21/22 1151)       Diagnostic Studies  Results Reviewed     Procedure Component Value Units Date/Time    Protime-INR [[id number]]  (Abnormal) Collected: 12/22/22 0544    Lab Status: Final result Specimen: Blood from Arm, Right Updated: 12/22/22 0642     Protime 15 5 seconds      INR 4 30    Basic metabolic panel [[id number]] Collected: 12/22/22 0544    Lab Status: Final result Specimen: Blood from Arm, Right Updated: 12/22/22 1887     Sodium 135 mmol/L      Potassium 3 9 mmol/L      Chloride 104 mmol/L      CO2 23 mmol/L      ANION GAP 8 mmol/L      BUN 12 mg/dL      Creatinine 0 79 mg/dL      Glucose 102 mg/dL      Calcium 8 9 mg/dL      eGFR 81 ml/min/1 73sq m     Narrative:      [place] guidelines for Chronic Kidney Disease (CKD):   •  Stage 1 with normal or high GFR (GFR > 90 mL/min/1 73 square meters)  •  Stage 2 Mild CKD (GFR = 60-89 mL/min/1 73 square meters)  •  Stage 3A Moderate CKD (GFR = 45-59 mL/min/1 73 square meters)  •  Stage 3B Moderate CKD (GFR = 30-44 mL/min/1 73 square meters)  •  Stage 4 Severe CKD (GFR = 15-29 mL/min/1 73 square meters)  •  Stage 5 End Stage CKD (GFR <15 mL/min/1 73 square meters)  Note: GFR calculation is accurate only with a steady state creatinine    Magnesium [[id number]]  (Normal) Collected: 12/22/22 0544    Lab Status: Final result Specimen: Blood from Arm, Right Updated: 12/22/22 0642     Magnesium 2 1 mg/dL     CBC (With Platelets) [[id number]]  (Normal) Collected: 12/22/22 0544    Lab Status: Final result Specimen: Blood from Arm, Right Updated: 12/22/22 0623     WBC 7 43 Thousand/uL      RBC 4 23 Million/uL      Hemoglobin 12 8 g/dL      Hematocrit 37 7 %      MCV 89 fL      MCH 30 3 pg      MCHC 34 0 g/dL      RDW 13 7 %      Platelets 375 Thousands/uL      MPV 9 3 fL     TSH, 3rd generation [[id number]]     Lab Status: No result Specimen: Blood     Vitamin B12 [[id number]]     Lab Status: No result Specimen: Blood     Folate [[id number]]     Lab Status: No result Specimen: Blood     HS Troponin I 2hr [[id number]]  (Normal) Collected: 12/21/22 1241    Lab Status: Final result Specimen: Blood from Arm, Left Updated: 12/21/22 1320     hs TnI 2hr 5 ng/L      Delta 2hr hsTnI -1 ng/L     D-dimer, quantitative [[id number]]  (Normal) Collected: 12/21/22 1241    Lab Status: Final result Specimen: Blood from Arm, Left Updated: 12/21/22 1312     D-Dimer, Quant 0 47 ug/ml FEU     Narrative: In the evaluation for possible pulmonary embolism, in the appropriate (Well's Score of 4 or less) patient, the age adjusted d-dimer cutoff for this patient can be calculated as:    Age x 0 01 (in ug/mL) for Age-adjusted D-dimer exclusion threshold for a patient over 50 years  C-reactive protein [[id number]]     Lab Status: No result Specimen: Blood     HS Troponin I 4hr [[id number]]     Lab Status: No result Specimen: Blood     FLU/RSV/COVID - if FLU/RSV clinically relevant [[id number]]  (Abnormal) Collected: 12/21/22 1031    Lab Status: Final result Specimen: Nares from Nose Updated: 12/21/22 1124     SARS-CoV-2 Positive     INFLUENZA A PCR Negative     INFLUENZA B PCR Negative     RSV PCR Negative    Narrative:      FOR PEDIATRIC PATIENTS - copy/paste COVID Guidelines URL to browser: [email or web address]  [employer]    SARS-CoV-2 assay is a Nucleic Acid Amplification assay intended for the  qualitative detection of nucleic acid from SARS-CoV-2 in nasopharyngeal  swabs  Results are for the presumptive identification of SARS-CoV-2 RNA  Positive results are indicative of infection with SARS-CoV-2, the virus  causing COVID-19, but do not rule out bacterial infection or co-infection  with other viruses  Laboratories within the [place] and its  territories are required to report all positive results to the appropriate  public health authorities  Negative results do not preclude SARS-CoV-2  infection and should not be used as the sole basis for treatment or other  patient management decisions  Negative results must be combined with  clinical observations, patient history, and epidemiological information  This test has not been FDA cleared or approved  This test has been authorized by FDA under an Emergency Use Authorization  (EUA)   This test is only authorized for the duration of time the  declaration that circumstances exist justifying the authorization of the  emergency use of an in vitro diagnostic tests for detection of SARS-CoV-2  virus and/or diagnosis of COVID-19 infection under section 564(b)(1) of  the Act, 21 U  S C  875QDV-6(I)(7), unless the authorization is terminated  or revoked sooner  The test has been validated but independent review by FDA  and CLIA is pending  Test performed using [employer] Gene[employer]pert: This RT-PCR assay targets N2,  a region unique to SARS-CoV-2  A conserved region in the E-gene was chosen  for pan-Sarbecovirus detection which includes SARS-CoV-2  According to CMS-2020-01-R, this platform meets the definition of high-throughput technology      HS Troponin 0hr (reflex protocol) [[id number]]  (Normal) Collected: 12/21/22 1031    Lab Status: Final result Specimen: Blood from Arm, Left Updated: 12/21/22 1122     hs TnI 0hr 6 ng/L     Basic metabolic panel [[id number]] Collected: 12/21/22 1031    Lab Status: Final result Specimen: Blood from Arm, Left Updated: 12/21/22 1100     Sodium 135 mmol/L      Potassium 4 3 mmol/L      Chloride 103 mmol/L      CO2 25 mmol/L      ANION GAP 7 mmol/L      BUN 12 mg/dL      Creatinine 0 80 mg/dL      Glucose 103 mg/dL      Calcium 8 9 mg/dL      eGFR 81 ml/min/1 73sq m     Narrative:      [place] guidelines for Chronic Kidney Disease (CKD):   •  Stage 1 with normal or high GFR (GFR > 90 mL/min/1 73 square meters)  •  Stage 2 Mild CKD (GFR = 60-89 mL/min/1 73 square meters)  •  Stage 3A Moderate CKD (GFR = 45-59 mL/min/1 73 square meters)  •  Stage 3B Moderate CKD (GFR = 30-44 mL/min/1 73 square meters)  •  Stage 4 Severe CKD (GFR = 15-29 mL/min/1 73 square meters)  •  Stage 5 End Stage CKD (GFR <15 mL/min/1 73 square meters)  Note: GFR calculation is accurate only with a steady state creatinine    CK (with reflex to MB) [[id number]]  (Normal) Collected: 12/21/22 1031    Lab Status: Final result Specimen: Blood from Arm, Left Updated: 12/21/22 1100     Total CK 44 U/L     CBC and differential [[id number]]  (Abnormal) Collected: 12/21/22 1031    Lab Status: Final result Specimen: Blood from Arm, Left Updated: 12/21/22 1038     WBC 7 15 Thousand/uL      RBC 4 44 Million/uL      Hemoglobin 13 2 g/dL      Hematocrit 38 8 %      MCV 87 fL      MCH 29 7 pg      MCHC 34 0 g/dL      RDW 13 7 %      MPV 9 4 fL      Platelets 263 Thousands/uL      nRBC 0 /100 WBCs      Neutrophils Relative 73 %      Immat GRANS % 0 %      Lymphocytes Relative 17 %      Monocytes Relative 9 %      Eosinophils Relative 1 %      Basophils Relative 0 %      Neutrophils Absolute 5 21 Thousands/µL      Immature Grans Absolute 0 03 Thousand/uL      Lymphocytes Absolute 1 18 Thousands/µL      Monocytes Absolute 0 64 Thousand/µL      Eosinophils Absolute 0 07 Thousand/µL      Basophils Absolute 0 02 Thousands/µL                  CT head without contrast   Final Result by Ayan Porter MD (12/21 1333)      No evidence of acute intracranial process  Chronic microangiopathy  Workstation performed: [id number]         XR chest 1 view portable   Final Result by Isak Monique MD (12/21 1223)      Unchanged cardiomegaly without acute findings  Workstation performed: [id number]               Procedures  Procedures      ED Course                             SBIRT 22yo+    Flowsheet Row Most Recent Value   SBIRT (25 yo +)    In order to provide better care to our patients, we are screening all of our patients for alcohol and drug use  Would it be okay to ask you these screening questions?  No Filed at: 12/21/2022 2619                MDM  Number of Diagnoses or Management Options  COVID-19: new and requires workup  Emotional crisis: new and requires workup  Diagnosis management comments: Patient presented to emergency department for evaluation of congestion  -Physical exam findings as noted above  -Interactions with the patient leading me to believe that patient not currently able to care for himself  Patient's son is present in the emergency department and relays to the treatment team that he has concerns that the patient was trying to "underwire his house "recently and that he is not currently in a position to be able to care for himself given his wife's recent death  I agree with this, and share his concerns   -I reached out to [facility] and related this patient should be admitted given his current emotional crisis    They agreed, patient was admitted to [address] [place] [address] 201 and/or Complexity of Data Reviewed  Clinical lab tests: ordered and reviewed  Tests in the medicine section of CPT®: reviewed and ordered  Decide to obtain previous medical records or to obtain history from someone other than the patient: yes  Review and summarize past medical records: yes  Discuss the patient with other providers: yes  Independent visualization of images, tracings, or specimens: yes        Disposition  Final diagnoses:   COVID-19   Emotional crisis     Time reflects when diagnosis was documented in both MDM as applicable and the Disposition within this note     Time User Action Codes Description Comment    12/21/2022 12:15 PM Gina Slocumb Add [U07 1] COVID-19     12/21/2022 12:16 PM Gina Slocumb Add [F43 20] Emotional crisis     12/21/2022  1:50 PM Stevo Pedroza S Add [F03 94] Dementia with anxiety     12/21/2022  1:50 PM Stevo Montgomery, [facility] [U07 1] [facility]       ED Disposition     ED Disposition   Admit    Condition   Stable    Date/Time   Wed Dec 21, 2022 12:15 PM    Comment   Case was discussed with evaristo and the patient's admission status was agreed to be Admission Status: inpatient status to the service of Dr Can Nath             Follow-up Information    None         Current Discharge Medication List      CONTINUE these medications which have NOT CHANGED Details   ALPRAZolam (XANAX) 0 25 mg tablet Take 1 tablet (0 25 mg total) by mouth 3 (three) times a day as needed for anxiety  Qty: 30 tablet, Refills: 0    Associated Diagnoses: Anxiety      carbidopa-levodopa (SINEMET)  mg per tablet TAKE 1 TABLET BY MOUTH THREE TIMES A DAY  Qty: 270 tablet, Refills: 3    Associated Diagnoses: Parkinson's disease (HCC)      doxycycline hyclate (VIBRAMYCIN) 100 mg capsule       escitalopram (Lexapro) 10 mg tablet Take 1 tablet (10 mg total) by mouth daily  Qty: 90 tablet, Refills: 3    Associated Diagnoses: Anxiety      gentamicin (GARAMYCIN) 0 1 % ointment       metoprolol succinate (TOPROL-XL) 25 mg 24 hr tablet Take 1 tablet (25 mg total) by mouth daily  Qty: 90 tablet, Refills: 3    Associated Diagnoses: Persistent atrial fibrillation (HCC)      telmisartan (MICARDIS) 20 MG tablet Take 0 5 tablets (10 mg total) by mouth daily  Qty: 45 tablet, Refills: 3    Associated Diagnoses: Essential hypertension      Xarelto 15 MG tablet Take 1 tablet (15 mg total) by mouth daily with breakfast  Qty: 90 tablet, Refills: 3    Associated Diagnoses: Persistent atrial fibrillation (HCC)      acetaminophen (TYLENOL) 325 mg tablet Take 2 tablets (650 mg total) by mouth every 4 (four) hours as needed for mild pain  Refills: 0    Associated Diagnoses: Fall, initial encounter      ipratropium (ATROVENT) 0 06 % nasal spray 1 spray into each nostril 4 (four) times a day as needed for rhinitis  Qty: 45 mL, Refills: 0    Associated Diagnoses: Seasonal allergic rhinitis due to pollen      loratadine (CLARITIN) 10 mg tablet Take 1 tablet (10 mg total) by mouth daily  Qty: 90 tablet, Refills: 0    Associated Diagnoses: Seasonal allergic rhinitis due to pollen      Multiple Vitamins-Minerals (ICAPS AREDS 2) CAPS Take 1 capsule by mouth 2 (two) times a day        tobramycin-dexamethasone (TOBRADEX) ophthalmic suspension Administer 1 drop to the right eye every 4 (four) hours while awake  Qty: 5 mL, Refills: 0    Associated Diagnoses: Pink eye disease of right eye           No discharge procedures on file  PDMP Review       Value Time User    PDMP Reviewed  Yes 12/12/2022 10:52 AM Leida Vera MD           ED Provider  Attending physically available and evaluated Sandra Chavarria  I managed the patient along with the ED Attending      Electronically Signed by         Kavitha Alcantara,   12/22/22 [facility],   12/22/22 5426

## 2022-12-21 NOTE — ED ATTENDING ATTESTATION
12/21/2022  Jurgen SMITH MD, saw and evaluated the patient  I have discussed the patient with the resident/non-physician practitioner and agree with the resident's/non-physician practitioner's findings, Plan of Care, and MDM as documented in the resident's/non-physician practitioner's note, except where noted  All available labs and Radiology studies were reviewed  I was present for key portions of any procedure(s) performed by the resident/non-physician practitioner and I was immediately available to provide assistance  At this point I agree with the current assessment done in the Emergency Department  I have conducted an independent evaluation of this patient a history and physical is as follows:    81 yo male with h/o A-fib on Xarelto p/w congestion and cough and feeling vaguely unwell  Pt recently lost wife and admits to being "down in the dumps"  Per family pt also more confused than baseline  No vision/speech/motor changes  No recent falls or trauma  Family reports pt has been having increased issues with short term memory, forgetting where he is, and is exhibiting confused behavior  Patient lives along, with family "tyring to cross cover "  Family notes that pt was attempting to re-wire house and pulling at wires which they found concerning  On exam pt ill appearing but not toxic  Lungs are clear however significant congestion noted  Pt is confused but non-focal   Pt did test positive for COVID  No ambulatory hypoxia, however given pt lives along and recent mental status changes will admit for possible placement and further work up/testing as indicated  HD stable throughout  ED Course  ED Course as of 12/21/22 1239   Wed Dec 21, 2022   1687 Basic metabolic panel  Reassuring, no end organ damage, no AG, normal bicarb  1105 CBC and differential(!)  No significant leukocytosis reassuring diff, normal H/H, mildly elevated platelets        1226 XR chest 1 view portable     IMPRESSION:     Unchanged cardiomegaly without acute findings                  Workstation performed: [id number]            Critical Care Time  Procedures

## 2022-12-21 NOTE — ASSESSMENT & PLAN NOTE
Patient with a history of Parkinson disease follows up with neurology  Family admits patient has been recently more confused, and forgetful  Patient currently going through grief    Following the loss of his wife 2 weeks ago, has underlying anxiety and adjustment disorder/depression  No recent falls, no syncope, no chest pain no blurred vision, no headaches  Transient during this admission oriented x4, occasionally confused in conversation  Dementia might be related to age, neurodegenerative, Parkinson's disease, depression, no evidence of toxic component  CT head pending,      Plan  Continue Lexapro 10 at bedtime Xanax 0 5 mg as needed  Psychiatry consult  Consult case management to assist family in placement  Delirium precaution  PT OT

## 2022-12-21 NOTE — ASSESSMENT & PLAN NOTE
Blood pressure slightly elevated during this admission, patient presented with panic attack  Has been going through grief, anxiety and depression following the passing of his wife  Will continue telmisartan 10 mg daily and Toprol 25 mg daily

## 2022-12-21 NOTE — ED NOTES
Ambulated pt in hallway  O2 remained 95%, pt complained of feeling light-headed and mild SOB with walking  Provider aware        Deanna Lombardo RN  12/21/22 8236

## 2022-12-21 NOTE — ASSESSMENT & PLAN NOTE
Patient is rate controlled on metoprolol 25  Mg daily  Anticoagulation with Xarelto 15 mg daily  Patient does not have symptomatic atrial fibrillation at this time-continue home regimen

## 2022-12-22 ENCOUNTER — HOME HEALTH ADMISSION (OUTPATIENT)
Dept: HOME HEALTH SERVICES | Facility: HOME HEALTHCARE | Age: 87
End: 2022-12-22

## 2022-12-22 VITALS
TEMPERATURE: 98.1 F | HEART RATE: 66 BPM | RESPIRATION RATE: 18 BRPM | DIASTOLIC BLOOD PRESSURE: 91 MMHG | OXYGEN SATURATION: 94 % | SYSTOLIC BLOOD PRESSURE: 131 MMHG

## 2022-12-22 PROBLEM — F41.0 PANIC ATTACKS: Status: ACTIVE | Noted: 2022-12-22

## 2022-12-22 LAB
ANION GAP SERPL CALCULATED.3IONS-SCNC: 8 MMOL/L (ref 4–13)
ATRIAL RATE: 267 BPM
BUN SERPL-MCNC: 12 MG/DL (ref 5–25)
CALCIUM SERPL-MCNC: 8.9 MG/DL (ref 8.4–10.2)
CHLORIDE SERPL-SCNC: 104 MMOL/L (ref 96–108)
CO2 SERPL-SCNC: 23 MMOL/L (ref 21–32)
CREAT SERPL-MCNC: 0.79 MG/DL (ref 0.6–1.3)
ERYTHROCYTE [DISTWIDTH] IN BLOOD BY AUTOMATED COUNT: 13.7 % (ref 11.6–15.1)
GFR SERPL CREATININE-BSD FRML MDRD: 81 ML/MIN/1.73SQ M
GLUCOSE SERPL-MCNC: 102 MG/DL (ref 65–140)
HCT VFR BLD AUTO: 37.7 % (ref 36.5–49.3)
HGB BLD-MCNC: 12.8 G/DL (ref 12–17)
INR PPP: 1.21 (ref 0.84–1.19)
MAGNESIUM SERPL-MCNC: 2.1 MG/DL (ref 1.9–2.7)
MCH RBC QN AUTO: 30.3 PG (ref 26.8–34.3)
MCHC RBC AUTO-ENTMCNC: 34 G/DL (ref 31.4–37.4)
MCV RBC AUTO: 89 FL (ref 82–98)
PLATELET # BLD AUTO: 364 THOUSANDS/UL (ref 149–390)
PMV BLD AUTO: 9.3 FL (ref 8.9–12.7)
POTASSIUM SERPL-SCNC: 3.9 MMOL/L (ref 3.5–5.3)
PROTHROMBIN TIME: 15.5 SECONDS (ref 11.6–14.5)
QRS AXIS: 61 DEGREES
QRSD INTERVAL: 94 MS
QT INTERVAL: 428 MS
QTC INTERVAL: 437 MS
RBC # BLD AUTO: 4.23 MILLION/UL (ref 3.88–5.62)
SODIUM SERPL-SCNC: 135 MMOL/L (ref 135–147)
T WAVE AXIS: 62 DEGREES
VENTRICULAR RATE: 63 BPM
WBC # BLD AUTO: 7.43 THOUSAND/UL (ref 4.31–10.16)

## 2022-12-22 RX ADMIN — LOSARTAN POTASSIUM 100 MG: 50 TABLET, FILM COATED ORAL at 09:09

## 2022-12-22 RX ADMIN — SODIUM CHLORIDE 50 ML/HR: 0.9 INJECTION, SOLUTION INTRAVENOUS at 13:35

## 2022-12-22 RX ADMIN — ESCITALOPRAM OXALATE 10 MG: 10 TABLET ORAL at 09:10

## 2022-12-22 RX ADMIN — CARBIDOPA AND LEVODOPA 1 TABLET: 25; 100 TABLET ORAL at 16:50

## 2022-12-22 RX ADMIN — RIVAROXABAN 15 MG: 15 TABLET, FILM COATED ORAL at 09:10

## 2022-12-22 RX ADMIN — METOPROLOL SUCCINATE 25 MG: 25 TABLET, EXTENDED RELEASE ORAL at 09:10

## 2022-12-22 RX ADMIN — CARBIDOPA AND LEVODOPA 1 TABLET: 25; 100 TABLET ORAL at 09:10

## 2022-12-22 NOTE — DISCHARGE INSTR - AVS FIRST PAGE
Dear Sabrina Mckoy,     It was our pleasure to care for you here at [facility]  It is our hope that we were always able to exceed the expected standards for your care during your stay  You were hospitalized due to ***  You were cared for on the *** floor by Shelly Bernardo MD under the service of Deysi Cotto MD with the [facility] who covers for your primary care physician (PCP), Gabriel Wong MD, while you were hospitalized  If you have any questions or concerns related to this hospitalization, you may contact us at [phone]  For follow up as well as any medication refills, we recommend that you follow up with your primary care physician  A registered nurse will reach out to you by phone within a few days after your discharge to answer any additional questions that you may have after going home  However, at this time we provide for you here, the most important instructions / recommendations at discharge:     Notable Medication Adjustments -   Please continue taking Lexapro 10 mg by mouth every day  Please continue taking Xanax 0 25 mg by mouth 3 times a day as needed for anxiety and panic attacks  Testing Required after Discharge -   None  Important follow up information -   Follow-up with your primary care provider in 1 week  Please follow-up with psychiatry outpatient as needed  Other Instructions -   Please get well soon  If his symptoms resume or worsen please do not hesitate to call 911 or go to the nearest emergency room  Please review this entire after visit summary as additional general instructions including medication list, appointments, activity, diet, any pertinent wound care, and other additional recommendations from your care team that may be provided for you        Sincerely,     Shelly Bernardo MD

## 2022-12-22 NOTE — ASSESSMENT & PLAN NOTE
She has cold and flulike symptoms for the past 1 week,  Patient was recently started on Z-Newton 12/10 which she completed  Complains of sensation of congestion and cough, denies fever, shortness of breath  COVID-positive this admission, negative for flu and RSV  Chest x-ray unremarkable, troponins negative,    PLAN:  Monitor oxygen saturation  Continue Xarelto 15 mg p o  daily

## 2022-12-22 NOTE — PLAN OF CARE
Problem: Potential for Falls  Goal: Patient will remain free of falls  Description: INTERVENTIONS:  - Educate patient/family on patient safety including physical limitations  - Instruct patient to call for assistance with activity   - Consult OT/PT to assist with strengthening/mobility   - Keep Call bell within reach  - Keep bed low and locked with side rails adjusted as appropriate  - Keep care items and personal belongings within reach  - Initiate and maintain comfort rounds  - Make Fall Risk Sign visible to staff  - Offer Toileting every 2 Hours, in advance of need  - Initiate/Maintain alarm  - Obtain necessary fall risk management equipment:   - Apply yellow socks and bracelet for high fall risk patients  - Consider moving patient to room near nurses station  Outcome: Progressing     Problem: MOBILITY - ADULT  Goal: Maintain or return to baseline ADL function  Description: INTERVENTIONS:  -  Assess patient's ability to carry out ADLs; assess patient's baseline for ADL function and identify physical deficits which impact ability to perform ADLs (bathing, care of mouth/teeth, toileting, grooming, dressing, etc )  - Assess/evaluate cause of self-care deficits   - Assess range of motion  - Assess patient's mobility; develop plan if impaired  - Assess patient's need for assistive devices and provide as appropriate  - Encourage maximum independence but intervene and supervise when necessary  - Involve family in performance of ADLs  - Assess for home care needs following discharge   - Consider OT consult to assist with ADL evaluation and planning for discharge  - Provide patient education as appropriate  Outcome: Progressing  Goal: Maintains/Returns to pre admission functional level  Description: INTERVENTIONS:  - Perform BMAT or MOVE assessment daily    - Set and communicate daily mobility goal to care team and patient/family/caregiver     - Collaborate with rehabilitation services on mobility goals if consulted  - Perform Range of Motion 2 times a day  - Reposition patient every 2 hours  - Dangle patient 2 times a day  - Stand patient 2 times a day  - Ambulate patient 2 times a day  - Out of bed to chair 2 times a day   - Out of bed for meals 2 times a day  - Out of bed for toileting  - Record patient progress and toleration of activity level   Outcome: Progressing     Problem: COPING  Goal: Pt/Family able to verbalize concerns and demonstrate effective coping strategies  Description: INTERVENTIONS:  - Assist patient/family to identify coping skills, available support systems and cultural and spiritual values  - Provide emotional support, including active listening and acknowledgement of concerns of patient and caregivers  - Reduce environmental stimuli, as able  - Provide patient education  - Assess for spiritual pain/suffering and initiate spiritual care, including notification of Pastoral Care or rebekah based community as needed  - Assess effectiveness of coping strategies  Outcome: Progressing  Goal: Will report anxiety at manageable levels  Description: INTERVENTIONS:  - Administer medication as ordered  - Teach and encourage coping skills  - Provide emotional support  - Assess patient/family for anxiety and ability to cope  Outcome: Progressing     Problem: Depression - IP adult  Goal: Effects of depression will be minimized  Description: INTERVENTIONS:  - Assess impact of patient's symptoms on level of functioning, self-care needs and offer support as indicated  - Assess patient/family knowledge of depression, impact on illness and need for teaching  - Provide emotional support, presence and reassurance  - Assess for possible suicidal thoughts, ideation or self-harm   If patient expresses suicidal thoughts or statements do not leave alone, notify physician/AP immediately, initiate Suicide Precautions, and determine need for continual observation   - Initiate consults and referrals as appropriate (a mental health professional, Spiritual Care)  - Administer medication as ordered  Outcome: Progressing     Problem: RESPIRATORY - ADULT  Goal: Achieves optimal ventilation and oxygenation  Description: INTERVENTIONS:  - Assess for changes in respiratory status  - Assess for changes in mentation and behavior  - Position to facilitate oxygenation and minimize respiratory effort  - Oxygen administered by appropriate delivery if ordered  - Initiate smoking cessation education as indicated  - Encourage broncho-pulmonary hygiene including cough, deep breathe, Incentive Spirometry  - Assess the need for suctioning and aspirate as needed  - Assess and instruct to report SOB or any respiratory difficulty  - Respiratory Therapy support as indicated  Outcome: Progressing

## 2022-12-22 NOTE — PLAN OF CARE
Problem: Potential for Falls  Goal: Patient will remain free of falls  Description: INTERVENTIONS:  - Educate patient/family on patient safety including physical limitations  - Instruct patient to call for assistance with activity   - Consult OT/PT to assist with strengthening/mobility   - Keep Call bell within reach  - Keep bed low and locked with side rails adjusted as appropriate  - Keep care items and personal belongings within reach  - Initiate and maintain comfort rounds  - Make Fall Risk Sign visible to staff  - Offer Toileting every 2 Hours, in advance of need  - Initiate/Maintain bed and chair alarm  - Obtain necessary fall risk management equipment  - Apply yellow socks and bracelet for high fall risk patients  - Consider moving patient to room near nurses station  Outcome: Progressing     Problem: MOBILITY - ADULT  Goal: Maintain or return to baseline ADL function  Description: INTERVENTIONS:  -  Assess patient's ability to carry out ADLs; assess patient's baseline for ADL function and identify physical deficits which impact ability to perform ADLs (bathing, care of mouth/teeth, toileting, grooming, dressing, etc )  - Assess/evaluate cause of self-care deficits   - Assess range of motion  - Assess patient's mobility; develop plan if impaired  - Assess patient's need for assistive devices and provide as appropriate  - Encourage maximum independence but intervene and supervise when necessary  - Involve family in performance of ADLs  - Assess for home care needs following discharge   - Consider OT consult to assist with ADL evaluation and planning for discharge  - Provide patient education as appropriate  Outcome: Progressing  Goal: Maintains/Returns to pre admission functional level  Description: INTERVENTIONS:  - Perform BMAT or MOVE assessment daily    - Set and communicate daily mobility goal to care team and patient/family/caregiver     - Collaborate with rehabilitation services on mobility goals if consulted  - Stand patient 2 times a day  - Ambulate patient 3 times a day  - Out of bed to chair 2 times a day   - Out of bed for meals 2 times a day  - Out of bed for toileting  - Record patient progress and toleration of activity level   Outcome: Progressing     Problem: COPING  Goal: Pt/Family able to verbalize concerns and demonstrate effective coping strategies  Description: INTERVENTIONS:  - Assist patient/family to identify coping skills, available support systems and cultural and spiritual values  - Provide emotional support, including active listening and acknowledgement of concerns of patient and caregivers  - Reduce environmental stimuli, as able  - Provide patient education  - Assess for spiritual pain/suffering and initiate spiritual care, including notification of Pastoral Care or rebekah based community as needed  - Assess effectiveness of coping strategies  Outcome: Progressing  Goal: Will report anxiety at manageable levels  Description: INTERVENTIONS:  - Administer medication as ordered  - Teach and encourage coping skills  - Provide emotional support  - Assess patient/family for anxiety and ability to cope  Outcome: Progressing     Problem: Depression - IP adult  Goal: Effects of depression will be minimized  Description: INTERVENTIONS:  - Assess impact of patient's symptoms on level of functioning, self-care needs and offer support as indicated  - Assess patient/family knowledge of depression, impact on illness and need for teaching  - Provide emotional support, presence and reassurance  - Assess for possible suicidal thoughts, ideation or self-harm   If patient expresses suicidal thoughts or statements do not leave alone, notify physician/AP immediately, initiate Suicide Precautions, and determine need for continual observation   - Initiate consults and referrals as appropriate (a mental health professional, Spiritual Care)  - Administer medication as ordered  Outcome: Progressing

## 2022-12-22 NOTE — ASSESSMENT & PLAN NOTE
Patient with a history of Parkinson disease follows up with neurology  Family admits patient has been recently more confused, and forgetful  Patient currently going through grief  Following the loss of his wife 2 weeks ago, has underlying anxiety and adjustment disorder/depression  No recent falls, no syncope, no chest pain no blurred vision, no headaches  Transient during this admission oriented x4, occasionally confused in conversation  Dementia might be related to age, neurodegenerative, Parkinson's disease, depression, no evidence of toxic component  CT head without acute pathology  MoCA test showed mild cognitive decline       Plan  Continue Lexapro 10 at bedtime Xanax 0 5 mg as needed  Consult case management to assist family in placement  Delirium precaution  PT OT  Psychiatry consult

## 2022-12-22 NOTE — PROGRESS NOTES
[facility]  Progress Note - Comfort Sprague 1935, 82 y o  male MRN: [id number]  Unit/Bed#: S -01 Encounter: [id number]  Primary Care Provider: Alejo Jimenez MD   Date and time admitted to hospital: 12/21/2022  9:37 AM    * Panic attacks  Assessment & Plan  Patient is reported to have had several severe panic attacks requiring several ED visits and alerting EMS  Panick attacks may be related to recent loss of wife about 2 weeks ago and now patient lives alone  Past medical history of Parkinson's disease, dementia, anxiety  Was recently started on Lexapro 10 mg p o  daily and Xanax 0 25 mg p o  3 times daily as needed however, symptoms are refractory  MoCA score showed mild cognitive decline  Plan:  Continue Lexapro 10 mg p o  daily  Continue Xanax 0 25 mg p o  3 times daily as needed anxiety  Psychiatry consult  COVID  Assessment & Plan  She has cold and flulike symptoms for the past 1 week,  Patient was recently started on Z-Newton 12/10 which she completed  Complains of sensation of congestion and cough, denies fever, shortness of breath  COVID-positive this admission, negative for flu and RSV  Chest x-ray unremarkable, troponins negative,    PLAN:  Monitor oxygen saturation  Continue Xarelto 15 mg p o  daily    Dementia with anxiety  Assessment & Plan  Patient with a history of Parkinson disease follows up with neurology  Family admits patient has been recently more confused, and forgetful  Patient currently going through grief  Following the loss of his wife 2 weeks ago, has underlying anxiety and adjustment disorder/depression  No recent falls, no syncope, no chest pain no blurred vision, no headaches  Transient during this admission oriented x4, occasionally confused in conversation  Dementia might be related to age, neurodegenerative, Parkinson's disease, depression, no evidence of toxic component  CT head without acute pathology    MoCA test showed mild cognitive decline  Plan  Continue Lexapro 10 at bedtime Xanax 0 5 mg as needed  Consult case management to assist family in placement  Delirium precaution  PT OT  Psychiatry consult    Parkinson's disease [facility])  Assessment & Plan  Patient is following up with Parkinson's disease    Plan:  Continue home Sinemet     Benign essential hypertension  Assessment & Plan  Blood pressure slightly elevated during this admission, patient presented with panic attack  Has been going through grief, anxiety and depression following the passing of his wife  Will continue telmisartan 10 mg daily and Toprol 25 mg daily    Persistent atrial fibrillation ([address] )  Assessment & Plan  Patient is rate controlled on metoprolol 25  Mg daily  Anticoagulation with Xarelto 15 mg daily  Patient does not have symptomatic atrial fibrillation at this time-continue home regimen  [id number] score of 3  Continue metoprolol 25 mg p o  daily  Continue Xarelto 15 mg p o  daily        VTE Pharmacologic Prophylaxis:     High Risk (Score >/= 5) - Pharmacological DVT Prophylaxis Ordered: Rivaroxaban (Xarelto)  Sequential Compression Devices Ordered  Mechanical VTE Prophylaxis in Place: Yes    Patient Centered Rounds: I have performed bedside rounds with nursing staff today  Discussions with Specialists or Other Care Team Provider: Psychiatry team     Education and Discussions with Family / Patient: Updated  (son) at bedside  Current Length of Stay: 1 day(s)    Current Patient Status: Inpatient     Discharge Plan / Estimated Discharge Date: Anticipate discharge in 48-72 hrs to discharge location to be determined pending rehab evaluations  Code Status: Level 1 - Full Code      Subjective:   Patient was awake and sitting up in the recliner  He states that he is has not been coughing  Confirmed by nursing overnight  He denies fever, chills, HA, CP, SOB,  palpitations, Abd pain, n/v/d    Patient's family at bedside would like to get a psychiatry evaluation for his baseline cognitive health and to also get evaluated if he is safe to continue living alone since the passing of his wife about 2 weeks ago  They are interested in having him placed in a facility  Objective:     Vitals:   Temp (24hrs), Av 2 °F (36 8 °C), Min:97 6 °F (36 4 °C), Max:98 7 °F (37 1 °C)    Temp:  [97 6 °F (36 4 °C)-98 7 °F (37 1 °C)] 97 6 °F (36 4 °C)  HR:  [47-67] 56  Resp:  [18] 18  BP: (126-143)/(78-85) 137/84  SpO2:  [97 %-100 %] 100 %  There is no height or weight on file to calculate BMI  Input and Output Summary (last 24 hours): Intake/Output Summary (Last 24 hours) at 2022 1522  Last data filed at 2022 1335  Gross per 24 hour   Intake 1370 ml   Output --   Net 1370 ml       Physical Exam:     Physical Exam  Vitals and nursing note reviewed  Constitutional:       General: He is not in acute distress  Appearance: Normal appearance  He is not ill-appearing, toxic-appearing or diaphoretic  HENT:      Head: Normocephalic and atraumatic  Nose: Nose normal    Eyes:      General: No scleral icterus  Right eye: No discharge  Left eye: No discharge  Extraocular Movements: Extraocular movements intact  Pupils: Pupils are equal, round, and reactive to light  Neck:      Vascular: No carotid bruit  Cardiovascular:      Rate and Rhythm: Normal rate and regular rhythm  Pulses: Normal pulses  Heart sounds: Normal heart sounds  No murmur heard  No friction rub  No gallop  Pulmonary:      Effort: Pulmonary effort is normal       Breath sounds: Normal breath sounds  No wheezing, rhonchi or rales  Abdominal:      General: Bowel sounds are normal       Palpations: Abdomen is soft  Tenderness: There is no abdominal tenderness  There is no guarding or rebound  Musculoskeletal:         General: No swelling or tenderness  Cervical back: Normal range of motion and neck supple  No rigidity   No muscular tenderness  Right lower leg: No edema  Left lower leg: No edema  Skin:     Capillary Refill: Capillary refill takes less than 2 seconds  Coloration: Skin is not jaundiced  Findings: No bruising, erythema, lesion or rash  Neurological:      General: No focal deficit present  Mental Status: He is alert and oriented to person, place, and time  Motor: No weakness  Gait: Gait normal    Psychiatric:         Mood and Affect: Mood normal          Behavior: Behavior normal          Thought Content: Thought content normal          Judgment: Judgment normal           Additional Data:     Labs:  Results from last 7 days   Lab Units 12/22/22  0544 12/21/22  1031   WBC Thousand/uL 7 43 7 15   HEMOGLOBIN g/dL 12 8 13 2   HEMATOCRIT % 37 7 38 8   PLATELETS Thousands/uL 364 392*   NEUTROS PCT %  --  73   LYMPHS PCT %  --  17   MONOS PCT %  --  9   EOS PCT %  --  1     Results from last 7 days   Lab Units 12/22/22  0544   SODIUM mmol/L 135   POTASSIUM mmol/L 3 9   CHLORIDE mmol/L 104   CO2 mmol/L 23   BUN mg/dL 12   CREATININE mg/dL 0 79   ANION GAP mmol/L 8   CALCIUM mg/dL 8 9   GLUCOSE RANDOM mg/dL 102     Results from last 7 days   Lab Units 12/22/22  0544   INR  1 21*                   Imaging: No pertinent imaging reviewed      Recent Cultures (last 7 days):           Lines/Drains:  Invasive Devices     Peripheral Intravenous Line  Duration           Peripheral IV 12/21/22 Left Wrist 1 day                Telemetry:        Last 24 Hours Medication List:   Current Facility-Administered Medications   Medication Dose Route Frequency Provider Last Rate   • ALPRAZolam  0 25 mg Oral TID PRN Christie Ward MD     • benzonatate  100 mg Oral TID PRN Christie Ward MD     • carbidopa-levodopa  1 tablet Oral TID Christie Ward MD     • dextromethorphan-guaiFENesin  10 mL Oral Q4H PRN Christie Ward MD     • escitalopram  10 mg Oral Daily Nanci Hopkins MD     • losartan  100 mg Oral Daily Nanci Hopkins MD     • metoprolol succinate  25 mg Oral Daily Nanci Hopkins MD     • rivaroxaban  15 mg Oral Daily With Breakfast Nanci Hopkins MD     • sodium chloride  50 mL/hr Intravenous Continuous Nanci Hopkins MD 50 mL/hr (12/22/22 9035)     Nutrition Assessment and Intervention:     Reviewed food recall journal      Physical Activity Assessment and Intervention:    Activity journal reviewed      Emotional and Mental Well-being, Sleep, Connectedness Assessment and Intervention:    Sleep/stress assessment performed    Depression and anxiety screening performed and reviewed      Tobacco and Toxic Substance Assessment and Intervention:     Tobacco use screening performed    Alcohol and drug use screening performed         Today, Patient Was Seen By: Victoria Freed MD    ** Please Note: This note has been constructed using a voice recognition system   **

## 2022-12-22 NOTE — CONSULTS
TELEConsultation [address] 82 y o  male MRN: [id number]  Unit/Bed#: S -01 Encounter: [id number]    REQUIRED DOCUMENTATION:     1  This service was provided via Telemedicine  2  Provider located in [place]  3  TeleMed provider: Breanna Reyes DO   4  Identify all parties in room with patient during tele consult: Resident  5  After connecting through [employer], patient was identified by name and date of birth  Parent/patient was then informed that this was being conducted confidentially over secure lines  My office door was closed  Parties in the room listed above as per #4  Patient acknowledged consent and understanding of privacy and security of the Telemedicine visit  The patient is aware this is a billable service and understands that he can discontinue the visit at any time  I informed the patient that I have reviewed their record in Epic and presented the opportunity for them to ask any questions regarding the visit today  The patient agreed to participate  *Please note that I was located remotely but my resident was at the bedside at the time of my interview**       Chief Complaint: "I guess I had a panic attack"    History of Present Illness   Physician Requesting Consult: Letty Arellano MD    Reason for Consult / Principal Problem: Panic    Patient is a 81 y/o white male with a history of Parkinson's Disease, who is being seen by Psychiatry today for progressively worsening panic attacks  Symptoms include heart racing and worrying  They have worsened over the past 2 weeks  Of note, patient lost his wife approximately 2 weeks ago and has been struggling with grief at home  His family is concerned that he has been "down" lately  He is reportedly more forgetful, confused, and having increasing difficulty taking care of himself at home  Patient called police after having a recent panic attack   He states he has had panic attacks throughout his life intermittently, but he has never been diagnosed with any psychiatric disorders  He thinks he may have seen a psychiatrist once or twice but has not ever routinely followed with one  He denies current symptoms of depression  He says "I think I am getting by okay " He denies suicidal thoughts or behaviors - both now and in the past  He still finds madhuri in life  He socializes with a group of friends  Patient reports good effect from Xanax and Lexapro on his panic/anxiety  Psychiatric Review Of Systems:  Medication side effects: none  Sleep: no change  Appetite: increased  Hygiene: able to tend to instrumental and basic ADLs  Anxiety Symptoms: panic symptoms as per HPI  Psychotic Symptoms: denies  Depression Symptoms: denies  Manic Symptoms: denies  PTSD Symptoms: denies  Suicidal Thoughts: denies  Homicidal Thoughts: denies    Historical Information   Psychiatric History:   Diagnoses: Parkinson's Disease  Inpatient Hx: Denies  Outpatient Hx: Denies; appears to be getting psychotropic medications from PCP  Medications/Trials: Lexapro and Xanax    Substance Abuse History:  Social History     Substance and Sexual Activity   Alcohol Use Not Currently    Comment: Rare, previously moderate drinker cut down 2016  Social History     Substance and Sexual Activity   Drug Use Never       I discussed substance abuse with the patient and, if pertinent, discussed risks vs benefits of decreasing frequency of use  Family History:   Family History   Problem Relation Age of Onset   • No Known Problems Mother    • Hypertension Father    • Coronary artery disease Father        Social History  Currently living: alone in a 4 bedroom house  Relationships: Wife passed away approx  2 weeks ago  Children: 3 (2 daughters 1 son)  Occupation: Retired, previously worked as a school  and   Also sold real estate       Social History     Socioeconomic History   • Marital status: /Civil Union     Spouse name: Not on file • Number of children: Not on file   • Years of education: Not on file   • Highest education level: Not on file   Occupational History   • Not on file   Tobacco Use   • Smoking status: Never   • Smokeless tobacco: Never   • Tobacco comments:     Quit 35-40 years ago   Vaping Use   • Vaping Use: Never used   Substance and Sexual Activity   • Alcohol use: Not Currently     Comment: Rare, previously moderate drinker cut down 2016  • Drug use: Never   • Sexual activity: Not Currently     Partners: Female     Birth control/protection: Post-menopausal   Other Topics Concern   • Not on file   Social History Narrative    ** Merged History Encounter **          Social Determinants of Health     Financial Resource Strain: Low Risk    • Difficulty of Paying Living Expenses: Not very hard   Food Insecurity: Not on file   Transportation Needs: No Transportation Needs   • Lack of Transportation (Medical): No   • Lack of Transportation (Non-Medical): No   Physical Activity: Not on file   Stress: Not on file   Social Connections: Not on file   Intimate Partner Violence: Not on file   Housing Stability: Not on file       Trauma History:   Denies    Past Medical History:   Diagnosis Date   • A-fib [facility])    • Afib ([facility] )    • Atrial fibrillation ([facility] ) 6/13/2021   • Hypertension    • Hypertension 6/13/2021   • Left AC separation    • Parkinson's disease ([facility] ) 11/01/2018   • SDH (subdural hematoma) ([place] [id number] ) 2/7/2020       Medical Review Of Systems:  Patient denies headache or dizziness  Patient denies chest pain or palpitations  Patient denies difficulty breathing or wheezing  Patient denies nausea, vomiting, or diarrhea  Patient denies polyuria or polydipsia  Patient denies weakness or numbness  Pertinent positives as per HPI  Meds/Allergies   Allergies   Allergen Reactions   • Penicillins Hives   • Pollen Extract Other (See Comments)            Current Facility-Administered Medications   Medication Dose Route Frequency   • ALPRAZolam (XANAX) tablet 0 25 mg  0 25 mg Oral TID PRN   • benzonatate (TESSALON PERLES) capsule 100 mg  100 mg Oral TID PRN   • carbidopa-levodopa (SINEMET)  mg per tablet 1 tablet  1 tablet Oral TID   • dextromethorphan-guaiFENesin (ROBITUSSIN DM) oral syrup 10 mL  10 mL Oral Q4H PRN   • escitalopram (LEXAPRO) tablet 10 mg  10 mg Oral Daily   • losartan (COZAAR) tablet 100 mg  100 mg Oral Daily   • metoprolol succinate (TOPROL-XL) 24 hr tablet 25 mg  25 mg Oral Daily   • rivaroxaban (XARELTO) tablet 15 mg  15 mg Oral Daily With Breakfast   • sodium chloride 0 9 % infusion  50 mL/hr Intravenous Continuous       Current Medications:  Current medications as per above  All medications have been reviewed  Risks, benefits, alternatives, and possible side effects of patient's psychiatric medications were discussed with patient  Objective   Vital signs in last 24 hours:  Temp:  [97 6 °F (36 4 °C)-98 7 °F (37 1 °C)] 97 6 °F (36 4 °C)  HR:  [47-67] 56  Resp:  [18] 18  BP: (126-143)/(78-85) 137/84    Mental Status Exam:  Appearance: casually dressed, consistent with stated age  Motor: +mild psychomotor retardation Behavior: cooperative, answers questions appropriately  Speech: soft, normal rhythm  Mood: "okay"  Affect: constricted  Thought Process: linear and goal-oriented  Thought Content: denies auditory or visual hallucinations  Perception: denies delusions or other perceptual disturbances  Risk Potential: denies suicidal ideation, plan, or intent  Denies homicidal ideation  Sensorium: Oriented to person, place, time, and situation  Cognition: cognitive ability appears intact but was not quantitatively tested  Consciousness: alert and awake  Attention: intact, able to focus without difficulty  Insight: fair  Judgement: fair        Laboratory results:  I have personally reviewed all pertinent laboratory/tests results    Recent Results (from the past 48 hour(s))   ECG 12 lead    Collection Time: 12/21/22  9:47 AM   Result Value Ref Range    Ventricular Rate 63 BPM    Atrial Rate 267 BPM    AZ Interval  ms    QRSD Interval 94 ms    QT Interval 428 ms    QTC Interval 437 ms    P Axis  degrees    QRS Axis 61 degrees    T Wave Axis 62 degrees   FLU/RSV/COVID - if FLU/RSV clinically relevant    Collection Time: 12/21/22 10:31 AM    Specimen: Nose; Nares   Result Value Ref Range    SARS-CoV-2 Positive (A) Negative    INFLUENZA A PCR Negative Negative    INFLUENZA B PCR Negative Negative    RSV PCR Negative Negative   CBC and differential    Collection Time: 12/21/22 10:31 AM   Result Value Ref Range    WBC 7 15 4 31 - 10 16 Thousand/uL    RBC 4 44 3 88 - 5 62 Million/uL    Hemoglobin 13 2 12 0 - 17 0 g/dL    Hematocrit 38 8 36 5 - 49 3 %    MCV 87 82 - 98 fL    MCH 29 7 26 8 - 34 3 pg    MCHC 34 0 31 4 - 37 4 g/dL    RDW 13 7 11 6 - 15 1 %    MPV 9 4 8 9 - 12 7 fL    Platelets 025 (H) 226 - 390 Thousands/uL    nRBC 0 /100 WBCs    Neutrophils Relative 73 43 - 75 %    Immat GRANS % 0 0 - 2 %    Lymphocytes Relative 17 14 - 44 %    Monocytes Relative 9 4 - 12 %    Eosinophils Relative 1 0 - 6 %    Basophils Relative 0 0 - 1 %    Neutrophils Absolute 5 21 1 85 - 7 62 Thousands/µL    Immature Grans Absolute 0 03 0 00 - 0 20 Thousand/uL    Lymphocytes Absolute 1 18 0 60 - 4 47 Thousands/µL    Monocytes Absolute 0 64 0 17 - 1 22 Thousand/µL    Eosinophils Absolute 0 07 0 00 - 0 61 Thousand/µL    Basophils Absolute 0 02 0 00 - 0 10 Thousands/µL   Basic metabolic panel    Collection Time: 12/21/22 10:31 AM   Result Value Ref Range    Sodium 135 135 - 147 mmol/L    Potassium 4 3 3 5 - 5 3 mmol/L    Chloride 103 96 - 108 mmol/L    CO2 25 21 - 32 mmol/L    ANION GAP 7 4 - 13 mmol/L    BUN 12 5 - 25 mg/dL    Creatinine 0 80 0 60 - 1 30 mg/dL    Glucose 103 65 - 140 mg/dL    Calcium 8 9 8 4 - 10 2 mg/dL    eGFR 81 ml/min/1 73sq m   HS Troponin 0hr (reflex protocol)    Collection Time: 12/21/22 10:31 AM   Result Value Ref Range    hs TnI 0hr 6 "Refer to ACS Flowchart"- see link ng/L   CK (with reflex to MB)    Collection Time: 12/21/22 10:31 AM   Result Value Ref Range    Total CK 44 39 - 308 U/L   HS Troponin I 2hr    Collection Time: 12/21/22 12:41 PM   Result Value Ref Range    hs TnI 2hr 5 "Refer to ACS Flowchart"- see link ng/L    Delta 2hr hsTnI -1 <20 ng/L   D-dimer, quantitative    Collection Time: 12/21/22 12:41 PM   Result Value Ref Range    D-Dimer, Quant 0 47 <0 50 ug/ml FEU   Basic metabolic panel    Collection Time: 12/22/22  5:44 AM   Result Value Ref Range    Sodium 135 135 - 147 mmol/L    Potassium 3 9 3 5 - 5 3 mmol/L    Chloride 104 96 - 108 mmol/L    CO2 23 21 - 32 mmol/L    ANION GAP 8 4 - 13 mmol/L    BUN 12 5 - 25 mg/dL    Creatinine 0 79 0 60 - 1 30 mg/dL    Glucose 102 65 - 140 mg/dL    Calcium 8 9 8 4 - 10 2 mg/dL    eGFR 81 ml/min/1 73sq m   Magnesium    Collection Time: 12/22/22  5:44 AM   Result Value Ref Range    Magnesium 2 1 1 9 - 2 7 mg/dL   CBC (With Platelets)    Collection Time: 12/22/22  5:44 AM   Result Value Ref Range    WBC 7 43 4 31 - 10 16 Thousand/uL    RBC 4 23 3 88 - 5 62 Million/uL    Hemoglobin 12 8 12 0 - 17 0 g/dL    Hematocrit 37 7 36 5 - 49 3 %    MCV 89 82 - 98 fL    MCH 30 3 26 8 - 34 3 pg    MCHC 34 0 31 4 - 37 4 g/dL    RDW 13 7 11 6 - 15 1 %    Platelets 212 022 - 299 Thousands/uL    MPV 9 3 8 9 - 12 7 fL   Protime-INR    Collection Time: 12/22/22  5:44 AM   Result Value Ref Range    Protime 15 5 (H) 11 6 - 14 5 seconds    INR 1 21 (H) 0 84 - 1 19          Assessment/Plan     Assessment: Patient is a 81 y/o white male with a history of Parkinson's Disease, who is being seen by Psychiatry today for progressively worsening panic attacks  These are acute-on-chronic and have occurred in the setting of grief as the patient recently lost his wife approximately 2 weeks ago  Overall, he feels he is getting by   He denies any acute psychiatric symptoms and displays no evidence of psychosis, xuan, or depression on exam  He is appropriately grieving the loss of his wife  He is AAOx4 and demonstrates no neurocognitive impairment on our brief exam  Collectively, patient does not demonstrate symptoms that warrant inpatient psychiatric hospitalization  Diagnosis: Parkinson's Disease, Adjustment Disorder    Recommendations:   --Patient is psychiatrically stable for discharge home  He can follow up outpatient with PCP or Psychiatry for continued medication management  He can continue taking his Lexapro and Xanax as currently prescribed  Would caution the use of Xanax given his age but this can be tapered in the outpatient setting  --Please TigerText with any questions or concerns  Diagnoses, available treatment options, alternatives to treatment, and risks vs  benefits of current psychiatric treatment plan were discussed with the patient  Prior records were reviewed in [address] [place] [address]  The case was discussed with the primary team     Andrzej Gutierrez, DO    This note has been constructed using a voice recognition system  There may be translation, syntax, or grammatical errors  If you have any questions, please contact the dictating provider

## 2022-12-22 NOTE — CASE MANAGEMENT
Case Management Assessment & Discharge Planning Note    Patient name Joe Haque  Location S /S -01 MRN [id number]  : 1935 Date 2022       Current Admission Date: 2022  Current Admission Diagnosis:Panic attacks   Patient Active Problem List    Diagnosis Date Noted   • Panic attacks 2022   • Dementia with anxiety 2022   • COVID 2022   • Pink eye disease of right eye 2022   • Onychomycosis 2022   • Abdominal pain 10/11/2022   • Driving safety issue 2022   • Dementia associated with other underlying disease with behavioral disturbance 2022   • Displaced fracture of shaft of left clavicle with routine healing 07/15/2021   • Closed left clavicular fracture 2021   • Left shoulder pain 2021   • Abdominal wall hematoma 2021   • Acute blood loss anemia 2021   • Aortic stenosis 2021   • Bradycardia 2021   • Fall 2021   • Hordeolum externum of right lower eyelid 2021   • Lightheadedness 2021   • Seasonal allergic rhinitis due to pollen 2020   • Skin lesion    • Systolic murmur    • Thoracic aortic aneurysm without rupture 2020   • Acute left-sided low back pain with left-sided sciatica 2019   • Chest pain 2019   • Medicare annual wellness visit, subsequent 2019   • URI (upper respiratory infection) 2019   • Parkinson's disease ([facility] ) 2018   • Anxiety 2018   • Dyspnea on exertion 2018   • Bloating 2018   • Tremor of left hand 2018   • Acute non-recurrent maxillary sinusitis 2018   • Persistent atrial fibrillation ([address] ) 2016   • Pure hypercholesterolemia 2016   • Benign essential hypertension 2016   • Esophageal reflux 2016      LOS (days): 1  Geometric Mean LOS (GMLOS) (days): 3 60  Days to GMLOS:2 4     OBJECTIVE:    Risk of Unplanned Readmission Score: 12 56         Current admission status: Inpatient  Referral Reason: Placement within 24-48 hours    Preferred Pharmacy:   [address], [address] [facility] [address]  Phone: [phone] Fax: [phone]    [address] [place] [address] [id number] Kezia [address]   [address]  [facility] [address]  Phone: [phone] Fax: [address] [facility], [address]  [address]  [facility] [address]  Phone: [phone] Fax: [phone]    Primary Care Provider: Gabriel Wong MD    Primary Insurance: MEDICARE  Secondary Insurance: [facility] HEALTH OPTIONS PROGRAM    ASSESSMENT:  Active Health Care Proxies    There are no active Health Care Proxies on file                        Patient Information  Admitted from[de-identified] Home  Mental Status: Alert  During Assessment patient was accompanied by: Daughter Alexandria Lopez, and son-in-law, Rancho Lynch, outside of room)  Assessment information provided by[de-identified] Daughter  Primary Caregiver: Self  Support Systems: Daughter  Type of Current Residence: 2 story home  In the last 12 months, was there a time when you were not able to pay the mortgage or rent on time?: No  In the last 12 months, how many places have you lived?: 1  In the last 12 months, was there a time when you did not have a steady place to sleep or slept in a shelter (including now)?: No  Homeless/housing insecurity resource given?: N/A  Living Arrangements: Lives Alone    Activities of Daily Living Prior to Admission  Functional Status: Independent  Completes ADLs independently?: Yes  Ambulates independently?: Yes  Does patient use assisted devices?: No  Does patient have a history of Outpatient Therapy (PT/OT)?: Yes  Does the patient have a history of Short-Term Rehab?: No  Does patient have a history of HHC?: No  Does patient currently have [facility]?: No         Patient Information Continued  Does patient have prescription coverage?: Yes  Within the past 12 months, you worried that your food would run out before you got the money to buy more : Never true  Within the past 12 months, the food you bought just didn't last and you didn't have money to get more : Never true  Food insecurity resource given?: N/A  Does patient receive dialysis treatments?: No  Does patient have a history of substance abuse?: No         Means of Transportation  [facility] Family transport  In the past 12 months, has lack of transportation kept you from medical appointments or from getting medications?: No  In the past 12 months, has lack of transportation kept you from meetings, work, or from getting things needed for daily living?: No  Was application for public transport provided?: N/A        DISCHARGE DETAILS:    Discharge planning discussed with[de-identified] patient's POA/daughter, Rose Stanley, and son-in-law, Ashely Inman, outside of room  Freedom of Choice: Yes (re: home care)  Comments - [place] of Choice: [facility]  CM contacted family/caregiver?: Yes  Were Treatment Team discharge recommendations reviewed with patient/caregiver?: Yes  Did patient/caregiver verbalize understanding of patient care needs?: N/A- going to facility  Were patient/caregiver advised of the risks associated with not following Treatment Team discharge recommendations?: Yes         Requested [address]         Is the patient interested in [place] [address] at discharge?: Yes  [address] requested[de-identified] Nursing, Occupational Therapy, Physical [address] Name[de-identified] [address] [place] [address] Provider[de-identified] PCP  Home Health Services Needed[de-identified] Evaluate Functional Status and Safety, Gait/ADL Training, Strengthening/Theraputic Exercises to Improve Function  Homebound Criteria Met[de-identified] Uses an Assist Device (i e  cane, walker, etc), Requires the Assistance of Another Person for Safe Ambulation or to Leave the Home  Supporting Clincal Findings[de-identified] Cognitive Deficit Requiring the Assistance of Others, Fatigues Eas[employer]y in [employer]         Other Referral/Resources/Interventions Provided:  Interventions: HHC, Outpatient   Referral Comments: Patient admitted due to panic attack, COVID+ - family reports concern about ability to care for himself, so PT/OT evals ordered, psych consulted  PT/OT recommending home care services and family support  Met with patient's daughter/POA, Gary Harding, and son-in-law, Marisol Frey, outside of room to discuss d/c plan  Family confirms that patient lives alone, as spouse recently passed away 12/8  Patient's family has been staying with him - report increasing paranoia, confusion at home  Reviewed PT/OT recommendation and insurance coverage for home care services and rehab  Family reports that patient's preference is to remain home, so they plan to look into additional services for him  Agreeable for home care referrals to be made; no preference for provided  Provided listing of private-duty agencies, assisted living/personal care, assistance programs for long-term care ([facility] placement, [employer] and [facility]), life alert systems, and information on MOW - also emailed information to son-in-law ([email or web address])  Spoke with Tuesday re: [facility] who confirmed patient qualifies - [facility] able to accept for home care services and family accepting of MOW and [facility] at discharge  Referral sent for out-patient  - family in agreement with same  Family to bring patient home at d/c  Referral place via [facility] for [address] [place] [address]  Would you like to participate in our [address] service program?  : No - Declined    Treatment Team Recommendation: Home with [address] [facility]  Discharge Destination Plan[de-identified] Home with [address] [facility] ([facility] )  Transport at Discharge : Family           Patient meets criteria for [facility] services with Dr Jody Grier acceptance  Notification to [facility]    Patient accepted initial MOW service with [facility] program  Referral placed via [employer]

## 2022-12-22 NOTE — DISCHARGE SUMMARY
[facility]  Discharge- Sb Flowers 1935, 82 y o  male MRN: [id number]  Unit/Bed#: S -01 Encounter: [id number]  Primary Care Provider: Sherren Esters, MD   Date and time admitted to hospital: 12/21/2022  9:37 AM    * Panic attacks  Assessment & Plan  Patient is reported to have had several severe panic attacks requiring several ED visits and alerting EMS  Panick attacks may be related to recent loss of wife about 2 weeks ago and now patient lives alone  Past medical history of Parkinson's disease, dementia, anxiety  Was recently started on Lexapro 10 mg p o  daily and Xanax 0 25 mg p o  3 times daily as needed however, symptoms are refractory  MoCA score showed mild cognitive decline  Psych eval inpatient and patient noted to have anxiety on adequate therapy  Plan:  Continue Lexapro 10 mg p o  daily  Continue Xanax 0 25 mg p o  3 times daily as needed anxiety  Psychiatry f/u outpatient recommended  COVID  Assessment & Plan  She has cold and flulike symptoms for the past 1 week,  Patient was recently started on Z-Newton 12/10 which she completed  Complains of sensation of congestion and cough, denies fever, shortness of breath  COVID-positive this admission, negative for flu and RSV  Chest x-ray unremarkable, troponins negative,    PLAN:  Monitor oxygen saturation  Continue Xarelto 15 mg p o  daily    Dementia with anxiety  Assessment & Plan  Patient with a history of Parkinson disease follows up with neurology  Family admits patient has been recently more confused, and forgetful  Patient currently going through grief    Following the loss of his wife 2 weeks ago, has underlying anxiety and adjustment disorder/depression  No recent falls, no syncope, no chest pain no blurred vision, no headaches  Transient during this admission oriented x4, occasionally confused in conversation  Dementia might be related to age, neurodegenerative, Parkinson's disease, depression, no evidence of toxic component  CT head without acute pathology  MoCA test showed mild cognitive decline  Plan  Continue Lexapro 10 at bedtime Xanax 0 5 mg as needed  Consult case management to assist family in placement  Delirium precaution  PT OT  Psychiatry consult    Parkinson's disease [facility])  Assessment & Plan  Patient is following up with Parkinson's disease    Plan:  Continue home Sinemet     Benign essential hypertension  Assessment & Plan  Blood pressure slightly elevated during this admission, patient presented with panic attack  Has been going through grief, anxiety and depression following the passing of his wife  Will continue telmisartan 10 mg daily and Toprol 25 mg daily    Persistent atrial fibrillation ([address] )  Assessment & Plan  Patient is rate controlled on metoprolol 25  Mg daily  Anticoagulation with Xarelto 15 mg daily  Patient does not have symptomatic atrial fibrillation at this time-continue home regimen  [id number] score of 3  Continue metoprolol 25 mg p o  daily  Continue Xarelto 15 mg p o  daily      Discharging Resident Physician: Milton Pressley MD  Attending: Celio Rubin MD  PCP: Caroline Sen MD  Admission Date: 12/21/2022  Discharge Date: 12/22/22    Disposition:     Home    Reason for Admission: Panic Attacks and Covid Infection    Consultations During Hospital Stay:  · Psychiatry    Procedures Performed:     · None    Significant Findings / Test Results:     XR chest 1 view portable Result Date: 12/21/2022 Impression: Unchanged cardiomegaly without acute findings  CT head without contrast Result Date: 12/21/2022 Impression: No evidence of acute intracranial process  Chronic microangiopathy  XR chest 1 view portable Result Date: 12/21/2022 Impression Unchanged cardiomegaly without acute findings  Incidental Findings:   · None    Test Results Pending at Discharge (will require follow up):    · None     Outpatient Tests Requested:  · None    Complications:  None    Hospital Course: Kevin Sepulveda is a 82 y o  male patient with PMH of permanent A  fib on Xarelto, HTN, HLD, PVD, anxiety who originally presented to the hospital on 12/21/2022 due to severe panic attack that started the same day patient presented to the ED  Patient states that for the past week he has had URI symptoms and mild SOB with weakness  He recently completed a regimen of Z-Newton for possible sinus infection that he had on 12/5  Patient states that he has had worsening episodes of panic attacks since the loss of his wife about 2 weeks ago  He lives alone however does have a daughter who checks in on him  Patient admits to having 1 episode of loose stools the day before panic attack occurred  Patient was recently started on Lexapro and Xanax for his panic attacks however symptoms seem to be refractory  In the ED patient was afebrile and hemodynamically stable with SPO2 of 98% on room air  Labs were unremarkable except for being COVID-positive  CXR was benign  Patient was admitted for COVID infection and panic attacks  Psych consult was also placed  During hospitalization patient remained in stable condition and and continued Lexapro 10 mg and Xanax 0 25 mg p o  3 times daily as needed anxiety  Psychiatry eval was significant for mild cognitive decline but otherwise stable  Patient was encouraged to follow-up outpatient with psychiatry but stated that he was more comfortable getting treatment for his panic attacks by his PCP  Family was at bedside and patient was deemed stable for discharge      Condition at Discharge: stable     Discharge Day Visit / Exam:     Subjective:    Vitals: Blood Pressure: 137/84 (12/22/22 0653)  Pulse: 56 (12/22/22 0653)  Temperature: 97 6 °F (36 4 °C) (12/22/22 0653)  Temp Source: Oral (12/21/22 0568)  Respirations: 18 (12/22/22 0653)  SpO2: 100 % (12/22/22 0980)  Exam:   Physical Exam  Vitals and nursing note reviewed  Constitutional:       General: He is not in acute distress  Appearance: Normal appearance  He is not ill-appearing, toxic-appearing or diaphoretic  HENT:      Head: Normocephalic and atraumatic  Nose: Nose normal       Mouth/Throat:      Mouth: Mucous membranes are moist       Pharynx: No oropharyngeal exudate or posterior oropharyngeal erythema  Eyes:      General: No scleral icterus  Right eye: No discharge  Left eye: No discharge  Pupils: Pupils are equal, round, and reactive to light  Neck:      Vascular: No carotid bruit  Cardiovascular:      Rate and Rhythm: Normal rate and regular rhythm  Pulses: Normal pulses  Heart sounds: Normal heart sounds  No murmur heard  No friction rub  No gallop  Pulmonary:      Effort: Pulmonary effort is normal  No respiratory distress  Breath sounds: Normal breath sounds  No stridor  No wheezing, rhonchi or rales  Abdominal:      General: Bowel sounds are normal       Palpations: Abdomen is soft  Tenderness: There is no abdominal tenderness  There is no right CVA tenderness, left CVA tenderness, guarding or rebound  Musculoskeletal:         General: No swelling, tenderness or deformity  Cervical back: Normal range of motion and neck supple  No rigidity  No muscular tenderness  Right lower leg: No edema  Left lower leg: No edema  Lymphadenopathy:      Cervical: No cervical adenopathy  Skin:     General: Skin is warm and dry  Capillary Refill: Capillary refill takes less than 2 seconds  Coloration: Skin is not jaundiced  Findings: No lesion or rash  Neurological:      General: No focal deficit present  Mental Status: He is alert and oriented to person, place, and time  Mental status is at baseline  Motor: No weakness  Psychiatric:         Mood and Affect: Mood normal          Behavior: Behavior normal          Thought Content:  Thought content normal  Judgment: Judgment normal          Discussion with Family: Family at bedside  Discharge instructions/Information to patient and family:   See after visit summary for information provided to patient and family  Provisions for Follow-Up Care:  See after visit summary for information related to follow-up care and any pertinent home health orders  Planned Readmission: None     Discharge Medications:  See after visit summary for reconciled discharge medications provided to patient and family        ** Please Note: This note has been constructed using a voice recognition system **

## 2022-12-22 NOTE — ASSESSMENT & PLAN NOTE
Patient is rate controlled on metoprolol 25  Mg daily  Anticoagulation with Xarelto 15 mg daily  Patient does not have symptomatic atrial fibrillation at this time-continue home regimen  [id number] score of 3  Continue metoprolol 25 mg p o  daily    Continue Xarelto 15 mg p o  daily

## 2022-12-22 NOTE — ASSESSMENT & PLAN NOTE
Patient is reported to have had several severe panic attacks requiring several ED visits and alerting EMS  Panick attacks may be related to recent loss of wife about 2 weeks ago and now patient lives alone  Past medical history of Parkinson's disease, dementia, anxiety  Was recently started on Lexapro 10 mg p o  daily and Xanax 0 25 mg p o  3 times daily as needed however, symptoms are refractory  MoCA score showed mild cognitive decline  Plan:  Continue Lexapro 10 mg p o  daily  Continue Xanax 0 25 mg p o  3 times daily as needed anxiety  Psychiatry consult

## 2022-12-22 NOTE — OCCUPATIONAL THERAPY NOTE
Occupational Therapy Evaluation     Patient Name: Nati Valderrama  PHSUG'G Date: 12/22/2022  Problem List  Principal Problem:    COVID  Active Problems:    Persistent atrial fibrillation (HCC)    Benign essential hypertension    Parkinson's disease ([facility] )    Dementia with anxiety    Past Medical History  Past Medical History:   Diagnosis Date    A-fib ([facility] )     Afib ([facility] )     Atrial fibrillation ([facility] ) 6/13/2021    Hypertension     Hypertension 6/13/2021    Left AC separation     Parkinson's disease ([facility] ) 11/01/2018    SDH (subdural hematoma) ([facility] ) 2/7/2020     Past Surgical History  Past Surgical History:   Procedure Laterality Date    CARDIOVERSION      ATRIAL    ROTATOR CUFF REPAIR      SHOULDER SURGERY          12/22/22 1136   OT Last Visit   OT Visit Date 12/22/22  (Thursday)   Note Type   Note type Evaluation  (and cog eval)   Pain Assessment   Pain Assessment Tool 0-10   Pain Score No Pain   Restrictions/Precautions   Weight Bearing Precautions Per Order No   Home Living   Type of [address] Two level;Bed/bath upstairs  ([address])   Bathroom Shower/Tub Tub/shower unit  ("I think that it is a tub I step into")   Bathroom Equipment   (not using DME)   Bathroom Accessibility Accessible  (up flight of stairs)   Home Equipment   (not usig DME)   Additional Comments Pt reports living in 2 SH alone since his wife passed away recently   Prior Function   Level of [place] Independent with ADLs; Needs assistance with IADLS  (pt reports they restricted my driving)   Lives With (S)  Alone  (wife recently passed away)   Receives Help From Family   IADLs Family/Friend/Other provides transportation; Family/Friend/Other provides medication management   Falls in the last 6 months   (pt denies  "I am pretty good for my age")   Vocational Retired   Comments Pt reports no AD for functional mobility and I w/ ADLs at baseline   Pt reports difficulty since his wife passed away   Lifestyle   Autonomy Pt reports I w/ ADLs at baseline   Reciprocal Relationships Pt reports supportive family  Service 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   Pt reports that he played football and lacrosse at [employer] 6 reportts enjoying going out to eat at the [place] with his friends  Pt added that he has not been doing well since his wife passed away   General   Family/Caregiver Present No   Subjective   Subjective "I recently lost my wife and that was a crushing blow for me  She is my sweetheart"   ADL   Where Assessed Chair   Eating Assistance 6  Modified independent   Eating Deficit Setup   Grooming Assistance 6  Modified Independent   Grooming Deficit Setup; Increased time to complete   UB Bathing Assistance Unable to assess  (anticipate mod I seated after set- up based on fxal obs skills)   LB Bathing Assistance Unable to assess  (anticipate S, + time seated after set- up based on fxal obs skills)   UB Dressing Assistance 6  Modified independent   [address]; Increased time to complete;Verbal cueing  (due to L UE IV, multiple lines)   LB Dressing Assistance 4  Minimal Assistance  ("I am not flexible")   LB Dressing Deficit Setup;Don/doff R sock; Don/doff L sock; Increased time to complete;Verbal cueing   Toileting Assistance    (denied need to void)   Additional Comments Engaged in LBD w/ + time while seated to doff  / don socks   Bed Mobility   Supine to Sit Unable to assess   Sit to Supine Unable to assess   Additional Comments Pt seated OOB In chair upon arrival and post eval w/ needs met, call bell in reach and PT RJ present  Chair alarm activated   Transfers   Sit to Stand 5  Supervision   Additional items Assist x 1; Armrests; Increased time required   Stand to Sit 5  Supervision   Additional items Assist x 1; Increased time required;Armrests   Additional Comments Engaged in 30 second sit <> stand w/ score of 11   Scores <8 for men 85-89 indicates + fall risk   Functional Mobility Functional Mobility 5  Supervision   Additional Comments engaged in functional mobility w/ in room w/ S w/ out use of AD   Additional items   (no AD)   Balance   Static Sitting Good   Static Standing Fair   Ambulatory Fair -   Activity Tolerance   Activity Tolerance Patient limited by fatigue   Medical Staff Made Aware spoke to PT, Halima Franics   Nurse Made Aware Per RNPrabha appropriate to see pt   RUE Assessment   RUE Assessment WFL   RUE Strength   RUE Overall Strength Within Functional Limits - able to perform ADL tasks with strength   LUE Assessment   LUE Assessment WFL   LUE Strength   LUE Overall Strength Within Functional Limits - able to perform ADL tasks with strength   Hand Function   Gross Motor Coordination Functional   Fine Motor Coordination Impaired   Hand Function Comments L UE tremor > R   Sensation   Light Touch Not tested   Psychosocial   Patient Behaviors/Mood Cooperative;Flat affect;Sad;Pleasant   Cognition   Overall Cognitive Status Impaired   Arousal/Participation Alert; Cooperative   Attention Attends with cues to redirect   Orientation Level Oriented to person;Oriented to place  (generally to situation and year)   Memory Decreased short term memory   Following Commands Follows multistep commands without difficulty   Comments Identified pt by full name and birthdate  Alert and able to follow directions, communicate wants / needs  Pt added that he has not been doing well since recent loss of his wife  Engaged in [facility] w/ score of 18  Please see below for details   Cognition Assessment Tools MOCA   Score 18   Assessment   Limitation Decreased ADL status; Decreased endurance;Decreased high-level ADLs   Assessment Pt is an 83yo male [facility] w/ confusion, anxiety, and inability to care for himself  Diagnosed w/ COVID  Significant PMH impacting his occupational performance includes Parkinson's disease, a-fib, HTN, L UE AC separation, SDH (02/2020), rotator cuff repair   Pt w/ active OT Orders and activity orders  Personal factors impacting his performance includes lives alone (sine wife passed away recently), multi level home environment, difficulty completing IADLs and ADLs, advanced age  Pt reports living alone in [facility] w/ 1 [address] PTA and I w/ ADLs at baseline w/ out use of AD  Pt does not drive  Upon eval, pt alert and generally oriented to person, place, and situation  Able to follow directions and engaged in [facility] w/ score of 18 indicating mild cognitive impairment  Pt completed UBD w/ mod I, LBD w/ min A, sit <> stand and functional mobility w/ S  Pt presents w/ decreased cognition, limited insight into deficits, decreased activity tolerance / endurance impacting his I w/ dressing, bathing, food prep / clean up, community mobility, clothing mgmt, functional mobility, functional transfers  Based on Barthel Index, [facility] 6 clicks, MOCA recommend DC home w/ family support / assist and OT services  Will continue to follow   Goals   Patient Goals Pt stated that he would like to continue to spend time with his friends  Plan   Treatment Interventions ADL retraining;Functional transfer training; Endurance training;Cognitive reorientation;Patient/family training; Compensatory technique education;Continued evaluation; Energy conservation; Activityengagement;Equipment evaluation/education   Goal Expiration Date 12/29/22   OT Frequency 1-2x/wk   Recommendation   OT Discharge Recommendation Home with home health rehabilitation  (vs OPOT; increased famile support / A)   Equipment Recommended Shower/Tub chair with back ($)   AM-PAC Daily Activity Inpatient   Lower Body Dressing 3   Bathing 3   Toileting 3   Upper Body Dressing 4   Grooming 4   Eating 4   Daily Activity Raw Score 21   Daily Activity Standardized Score (Calc for Raw Score >=11) 44 27   AM-PAC Applied Cognition Inpatient   Following a Speech/Presentation 3   Understanding Ordinary Conversation 4   Taking Medications 3   Remembering Where Things Are Placed or Put Away 4   Remembering List of 4-5 Errands 2   Taking Care of Complicated Tasks 2   Applied Cognition Raw Score 18   Applied Cognition Standardized Score 38 07   MOCA   Version 8 1   Visuopatial/Executive 3   Naming 3   Memory 0   Attention: Digits 2   Attention: Letters 1   Attention: Serial 3   Language: Repeat 1   Language: Fluency 1   Abstraction 1   Delayed Recall 0  (MIS 9/15)   Orientation 3   Does patient have less than or equal to 12 years of education? 0   MOCA Total Score 18   MOCA Comments Unable to connect number to letter in consecutive order or copy cube accurately  Able to immedicately recall 5 words but unable to recall after few minutes w/ out cue or mutliple choice  Pt oriented to year, place and city  Pt reports that he has not been paying attention to dates since his wife passed, and added that he has lost interest   Barthel Index   Feeding 10   Bathing 0   Grooming Score 5   Dressing Score 5   Bladder Score 10   Bowels Score 10   Toilet Use Score 5   Transfers (Bed/Chair) Score 10   Mobility (Level Surface) Score 10   Stairs Score 5   Barthel Index Score 70   Modified [place] Scale   Modified [place]ivan Scale 3         The patient's raw score on the AM-PAC Daily Activity inpatient short form is 21, standardized score is 44 27, greater than 39 4  Patients at this level are likely to benefit from discharge to home  Please refer to the recommendation of the Occupational Therapist for safe discharge planning      Pt goals to be met by 12/29/22 to max I w/ ADLs and improve engagement to return home alone includes:    -Pt will demonstrate good attention and participation in ongoing eval of functional cognitive skills to assist in [address] [place] [address]    -Pt will complete bed mobility supine <> sit w/ mod I to max I w/ ADLs    -Pt will complete functional transfers to bed, chair, and toilet w/ mod I using AD, DME as needed    -Pt will consistently engage in functional mobility household distances w/ mod I to max I w/ ADLs and return home alone    -Pt will demonstrate improved activity tolerance OOB In chair for all meals    -Pt will participate in leisure activity exploration and will identify at least 3 activities /interests to improve engagement to return home alone    -Pt will consistently follow multi step directions during ADLs w/ good recall to max I and improve engagement    [facility], OTR/L

## 2022-12-22 NOTE — PLAN OF CARE
Problem: Potential for Falls  Goal: Patient will remain free of falls  Description: INTERVENTIONS:  - Educate patient/family on patient safety including physical limitations  - Instruct patient to call for assistance with activity   - Consult OT/PT to assist with strengthening/mobility   - Keep Call bell within reach  - Keep bed low and locked with side rails adjusted as appropriate  - Keep care items and personal belongings within reach  - Initiate and maintain comfort rounds  - Make Fall Risk Sign visible to staff  - Offer Toileting every 2 Hours, in advance of need  - Initiate/Maintain alarm  - Obtain necessary fall risk management equipment:   - Apply yellow socks and bracelet for high fall risk patients  - Consider moving patient to room near nurses station  12/22/2022 1802 by Janina Pedroza  Outcome: Progressing  12/22/2022 1340 by Janina Pedroza  Outcome: Progressing     Problem: MOBILITY - ADULT  Goal: Maintain or return to baseline ADL function  Description: INTERVENTIONS:  -  Assess patient's ability to carry out ADLs; assess patient's baseline for ADL function and identify physical deficits which impact ability to perform ADLs (bathing, care of mouth/teeth, toileting, grooming, dressing, etc )  - Assess/evaluate cause of self-care deficits   - Assess range of motion  - Assess patient's mobility; develop plan if impaired  - Assess patient's need for assistive devices and provide as appropriate  - Encourage maximum independence but intervene and supervise when necessary  - Involve family in performance of ADLs  - Assess for home care needs following discharge   - Consider OT consult to assist with ADL evaluation and planning for discharge  - Provide patient education as appropriate  12/22/2022 1802 by Janina Pedroza  Outcome: Progressing  12/22/2022 1340 by Janina Pedroza  Outcome: Progressing  Goal: Maintains/Returns to pre admission functional level  Description: INTERVENTIONS:  - Perform BMAT or MOVE assessment daily    - Set and communicate daily mobility goal to care team and patient/family/caregiver  - Collaborate with rehabilitation services on mobility goals if consulted  - Perform Range of Motion 2 times a day  - Reposition patient every 2 hours    - Dangle patient 2 times a day  - Stand patient 2 times a day  - Ambulate patient 2 times a day  - Out of bed to chair 2 times a day   - Out of bed for meals 2 times a day  - Out of bed for toileting  - Record patient progress and toleration of activity level   12/22/2022 1802 by Nicole Henriquez  Outcome: Progressing  12/22/2022 1340 by Nicole Henriquez  Outcome: Progressing     Problem: COPING  Goal: Pt/Family able to verbalize concerns and demonstrate effective coping strategies  Description: INTERVENTIONS:  - Assist patient/family to identify coping skills, available support systems and cultural and spiritual values  - Provide emotional support, including active listening and acknowledgement of concerns of patient and caregivers  - Reduce environmental stimuli, as able  - Provide patient education  - Assess for spiritual pain/suffering and initiate spiritual care, including notification of Pastoral Care or rebekah based community as needed  - Assess effectiveness of coping strategies  12/22/2022 1802 by Nicole Henriquez  Outcome: Progressing  12/22/2022 1340 by Nicole Henriquez  Outcome: Progressing  Goal: Will report anxiety at manageable levels  Description: INTERVENTIONS:  - Administer medication as ordered  - Teach and encourage coping skills  - Provide emotional support  - Assess patient/family for anxiety and ability to cope  12/22/2022 1802 by Nicole Henriquez  Outcome: Progressing  12/22/2022 1340 by Nicole Henriquez  Outcome: Progressing     Problem: Depression - IP adult  Goal: Effects of depression will be minimized  Description: INTERVENTIONS:  - Assess impact of patient's symptoms on level of functioning, self-care needs and offer support as indicated  - Assess patient/family knowledge of depression, impact on illness and need for teaching  - Provide emotional support, presence and reassurance  - Assess for possible suicidal thoughts, ideation or self-harm   If patient expresses suicidal thoughts or statements do not leave alone, notify [phone] immediately, initiate Suicide Precautions, and determine need for continual observation   - Initiate consults and referrals as appropriate (a mental health professional, [facility])  - Administer medication as ordered  12/22/2022 1802 by Nasir Sheth  Outcome: Progressing  12/22/2022 1340 by Nasir Sheth  Outcome: Progressing     Problem: RESPIRATORY - ADULT  Goal: Achieves optimal ventilation and oxygenation  Description: INTERVENTIONS:  - Assess for changes in respiratory status  - Assess for changes in mentation and behavior  - Position to facilitate oxygenation and minimize respiratory effort  - Oxygen administered by appropriate delivery if ordered  - Initiate smoking cessation education as indicated  - Encourage broncho-pulmonary hygiene including cough, deep breathe, Incentive Spirometry  - Assess the need for suctioning and aspirate as needed  - Assess and instruct to report SOB or any respiratory difficulty  - Respiratory Therapy support as indicated  12/22/2022 1802 by Nasir Sheth  Outcome: Progressing  12/22/2022 1340 by Nasir Sheth  Outcome: Progressing

## 2022-12-22 NOTE — PLAN OF CARE
Problem: OCCUPATIONAL THERAPY ADULT  Goal: Performs self-care activities at highest level of function for planned discharge setting  See evaluation for individualized goals  Description: Treatment Interventions: ADL retraining, Functional transfer training, Endurance training, Cognitive reorientation, Patient/family training, Compensatory technique education, Continued evaluation, Energy conservation, Activityengagement, Equipment evaluation/education  Equipment Recommended: Shower/Tub chair with back ($)       See flowsheet documentation for full assessment, interventions and recommendations  Note: Limitation: Decreased ADL status, Decreased endurance, Decreased high-level ADLs     Assessment: Pt is an 83yo male [facility] w/ confusion, anxiety, and inability to care for himself  Diagnosed w/ COVID  Significant PMH impacting his occupational performance includes Parkinson's disease, a-fib, HTN, L UE AC separation, SDH (02/2020), rotator cuff repair  Pt w/ active OT Orders and activity orders  Personal factors impacting his performance includes lives alone (sine wife passed away recently), multi level home environment, difficulty completing IADLs and ADLs, advanced age  Pt reports living alone in [address] [place] [address] w/ 1 [employer] PTA and I w/ ADLs at baseline w/ out use of AD  Pt does not drive  Upon eval, pt alert and generally oriented to person, place, and situation  Able to follow directions and engaged in [facility] w/ score of 18 indicating mild cognitive impairment  Pt completed UBD w/ mod I, LBD w/ min A, sit <> stand and functional mobility w/ S  Pt presents w/ decreased cognition, limited insight into deficits, decreased activity tolerance / endurance impacting his I w/ dressing, bathing, food prep / clean up, community mobility, clothing mgmt, functional mobility, functional transfers  Based on Barthel Index, [facility] 6 clicks, MOCA recommend DC home w/ family support / assist and OT services   Will continue to follow     OT Discharge Recommendation: Home with home health rehabilitation (vs OPOT; increased famile support / A)

## 2022-12-22 NOTE — PLAN OF CARE
Problem: Potential for Falls  Goal: Patient will remain free of falls  Description: INTERVENTIONS:  - Educate patient/family on patient safety including physical limitations  - Instruct patient to call for assistance with activity   - Consult OT/PT to assist with strengthening/mobility   - Keep Call bell within reach  - Keep bed low and locked with side rails adjusted as appropriate  - Keep care items and personal belongings within reach  - Initiate and maintain comfort rounds  - Make Fall Risk Sign visible to staff  - Offer Toileting every  Hours, in advance of need  - Initiate/Maintain alarm  - Obtain necessary fall risk management equipment:   - Apply yellow socks and bracelet for high fall risk patients  - Consider moving patient to room near nurses station  Outcome: Progressing     Problem: MOBILITY - ADULT  Goal: Maintain or return to baseline ADL function  Description: INTERVENTIONS:  -  Assess patient's ability to carry out ADLs; assess patient's baseline for ADL function and identify physical deficits which impact ability to perform ADLs (bathing, care of mouth/teeth, toileting, grooming, dressing, etc )  - Assess/evaluate cause of self-care deficits   - Assess range of motion  - Assess patient's mobility; develop plan if impaired  - Assess patient's need for assistive devices and provide as appropriate  - Encourage maximum independence but intervene and supervise when necessary  - Involve family in performance of ADLs  - Assess for home care needs following discharge   - Consider OT consult to assist with ADL evaluation and planning for discharge  - Provide patient education as appropriate  Outcome: Progressing  Goal: Maintains/Returns to pre admission functional level  Description: INTERVENTIONS:  - Perform BMAT or MOVE assessment daily    - Set and communicate daily mobility goal to care team and patient/family/caregiver     - Collaborate with rehabilitation services on mobility goals if consulted  - Perform Range of Motion  times a day  - Reposition patient every  hours  - Dangle patient  times a day  - Stand patient  times a day  - Ambulate patient  times a day  - Out of bed to chair  times a day   - Out of bed for meals times a day  - Out of bed for toileting  - Record patient progress and toleration of activity level   Outcome: Progressing     Problem: COPING  Goal: Pt/Family able to verbalize concerns and demonstrate effective coping strategies  Description: INTERVENTIONS:  - Assist patient/family to identify coping skills, available support systems and cultural and spiritual values  - Provide emotional support, including active listening and acknowledgement of concerns of patient and caregivers  - Reduce environmental stimuli, as able  - Provide patient education  - Assess for spiritual pain/suffering and initiate spiritual care, including notification of Pastoral Care or rebekah based community as needed  - Assess effectiveness of coping strategies  Outcome: Progressing  Goal: Will report anxiety at manageable levels  Description: INTERVENTIONS:  - Administer medication as ordered  - Teach and encourage coping skills  - Provide emotional support  - Assess patient/family for anxiety and ability to cope  Outcome: Progressing     Problem: Depression - IP adult  Goal: Effects of depression will be minimized  Description: INTERVENTIONS:  - Assess impact of patient's symptoms on level of functioning, self-care needs and offer support as indicated  - Assess patient/family knowledge of depression, impact on illness and need for teaching  - Provide emotional support, presence and reassurance  - Assess for possible suicidal thoughts, ideation or self-harm   If patient expresses suicidal thoughts or statements do not leave alone, notify physician/AP immediately, initiate Suicide Precautions, and determine need for continual observation   - Initiate consults and referrals as appropriate (a mental health professional, Spirit[facility])  - Administer medication as ordered  Outcome: Progressing     Problem: RESPIRATORY - ADULT  Goal: Achieves optimal ventilation and oxygenation  Description: INTERVENTIONS:  - Assess for changes in respiratory status  - Assess for changes in mentation and behavior  - Position to facilitate oxygenation and minimize respiratory effort  - Oxygen administered by appropriate delivery if ordered  - Initiate smoking cessation education as indicated  - Encourage broncho-pulmonary hygiene including cough, deep breathe, Incentive Spirometry  - Assess the need for suctioning and aspirate as needed  - Assess and instruct to report SOB or any respiratory difficulty  - Respiratory Therapy support as indicated  Outcome: Progressing

## 2022-12-22 NOTE — ASSESSMENT & PLAN NOTE
Patient is reported to have had several severe panic attacks requiring several ED visits and alerting EMS  Panick attacks may be related to recent loss of wife about 2 weeks ago and now patient lives alone  Past medical history of Parkinson's disease, dementia, anxiety  Was recently started on Lexapro 10 mg p o  daily and Xanax 0 25 mg p o  3 times daily as needed however, symptoms are refractory  MoCA score showed mild cognitive decline  Psych eval inpatient and patient noted to have anxiety on adequate therapy  Plan:  Continue Lexapro 10 mg p o  daily  Continue Xanax 0 25 mg p o  3 times daily as needed anxiety  Psychiatry f/u outpatient recommended

## 2022-12-22 NOTE — ED PROVIDER NOTES
History  Chief Complaint   Patient presents with   • Nasal Congestion     Increased phlegm production the past day unable to clear it     Patient is an 51-year-old male with history of longitudinal atrial fibrillation on Xarelto, parkinsonism presented to emergency department for evaluation of congestion  Patient states that he has had some congestion that he is unable to cough out for an unknown duration of time  He presents emergency department asking "do anything he can give me for this congestion?  "  Patient remarks that his wife passed away within the last week and that this has been very challenging for him  He does state that he is able to take care of himself and has a good support network with his family and friends  He denies thoughts of wanting to harm himself at this time  He denies any shortness of breath, abdominal pain  Denies any fevers  Prior to Admission Medications   Prescriptions Last Dose Informant Patient Reported? Taking?    ALPRAZolam (XANAX) 0 25 mg tablet Past Week  No Yes   Sig: Take 1 tablet (0 25 mg total) by mouth 3 (three) times a day as needed for anxiety   Multiple Vitamins-Minerals (ICAPS AREDS 2) CAPS Not Taking  Yes No   Sig: Take 1 capsule by mouth 2 (two) times a day     Patient not taking: Reported on 12/21/2022   Xarelto 15 MG tablet 12/20/2022  No Yes   Sig: Take 1 tablet (15 mg total) by mouth daily with breakfast   acetaminophen (TYLENOL) 325 mg tablet Not Taking  No No   Sig: Take 2 tablets (650 mg total) by mouth every 4 (four) hours as needed for mild pain   Patient not taking: Reported on 12/21/2022   carbidopa-levodopa (SINEMET)  mg per tablet 12/20/2022  No Yes   Sig: TAKE 1 TABLET BY MOUTH THREE TIMES A DAY   doxycycline hyclate (VIBRAMYCIN) 100 mg capsule 12/21/2022  Yes Yes   escitalopram (Lexapro) 10 mg tablet 12/20/2022  No Yes   Sig: Take 1 tablet (10 mg total) by mouth daily   gentamicin (GARAMYCIN) 0 1 % ointment 12/21/2022  Yes Yes ipratropium (ATROVENT) 0 06 % nasal spray   No No   Si spray into each nostril 4 (four) times a day as needed for rhinitis   loratadine (CLARITIN) 10 mg tablet More than a month  No No   Sig: Take 1 tablet (10 mg total) by mouth daily   Patient not taking: Reported on 2022   metoprolol succinate (TOPROL-XL) 25 mg 24 hr tablet 2022  No Yes   Sig: Take 1 tablet (25 mg total) by mouth daily   telmisartan (MICARDIS) 20 MG tablet 2022  No Yes   Sig: Take 0 5 tablets (10 mg total) by mouth daily   tobramycin-dexamethasone (TOBRADEX) ophthalmic suspension Not Taking  No No   Sig: Administer 1 drop to the right eye every 4 (four) hours while awake   Patient not taking: Reported on 2022      Facility-Administered Medications: None       Past Medical History:   Diagnosis Date   • A-fib ([facility] )    • Afib ([facility] )    • Atrial fibrillation ([facility] ) 2021   • Hypertension    • Hypertension 2021   • Left AC separation    • Parkinson's disease ([facility] ) 2018   • SDH (subdural hematoma) ([facility] ) 2020       Past Surgical History:   Procedure Laterality Date   • CARDIOVERSION      ATRIAL   • ROTATOR CUFF REPAIR     • SHOULDER SURGERY         Family History   Problem Relation Age of Onset   • No Known Problems Mother    • Hypertension Father    • Coronary artery disease Father      I have reviewed and agree with the history as documented  E-Cigarette/Vaping   • E-Cigarette Use Never User      E-Cigarette/Vaping Substances   • Nicotine No    • THC No    • CBD No    • Flavoring No    • Other No    • Unknown No      Social History     Tobacco Use   • Smoking status: Never   • Smokeless tobacco: Never   • Tobacco comments:     Quit 35-40 years ago   Vaping Use   • Vaping Use: Never used   Substance Use Topics   • Alcohol use: Not Currently     Comment: Rare, previously moderate drinker cut down 2016  • Drug use: Never        Review of Systems   Constitutional: Negative      HENT: Positive for congestion  Eyes: Negative  Respiratory: Negative  Cardiovascular: Negative  Gastrointestinal: Negative  Endocrine: Negative  Genitourinary: Negative  Musculoskeletal: Negative  Skin: Negative  Allergic/Immunologic: Negative  Neurological: Negative  Hematological: Negative  Psychiatric/Behavioral: Negative  All other systems reviewed and are negative  Physical Exam  ED Triage Vitals [12/21/22 0938]   Temperature Pulse Respirations Blood Pressure SpO2   98 8 °F (37 1 °C) 68 18 149/76 98 %      Temp Source Heart Rate Source Patient Position - Orthostatic VS BP Location FiO2 (%)   Oral Monitor Sitting Left arm --      Pain Score       No Pain             Orthostatic Vital Signs  Vitals:    12/21/22 2128 12/21/22 2237 12/21/22 2238 12/22/22 0653   BP: 143/81 127/78 127/78 137/84   Pulse: 67 62 (!) 47 56   Patient Position - Orthostatic VS:           Physical Exam  Vitals and nursing note reviewed  Constitutional:       General: He is not in acute distress  Appearance: Normal appearance  He is not ill-appearing, toxic-appearing or diaphoretic  HENT:      Head: Normocephalic and atraumatic  Eyes:      General: No scleral icterus  Right eye: No discharge  Left eye: No discharge  Extraocular Movements: Extraocular movements intact  Conjunctiva/sclera: Conjunctivae normal       Pupils: Pupils are equal, round, and reactive to light  Cardiovascular:      Rate and Rhythm: Normal rate  Pulses: Normal pulses  Heart sounds: Normal heart sounds  No murmur heard  No friction rub  No gallop  Pulmonary:      Effort: Pulmonary effort is normal  No respiratory distress  Breath sounds: Normal breath sounds  No stridor  No wheezing, rhonchi or rales  Abdominal:      General: Abdomen is flat  Bowel sounds are normal  There is no distension  Palpations: Abdomen is soft  Tenderness: There is no abdominal tenderness   There is no guarding or rebound  Musculoskeletal:         General: No swelling  Normal range of motion  Cervical back: Normal range of motion  No rigidity  Right lower leg: No edema  Left lower leg: No edema  Skin:     General: Skin is warm and dry  Capillary Refill: Capillary refill takes less than 2 seconds  Coloration: Skin is not jaundiced  Findings: No bruising or lesion  Neurological:      General: No focal deficit present  Mental Status: He is alert and oriented to person, place, and time  Mental status is at baseline  Psychiatric:         Mood and Affect: Mood normal          Behavior: Behavior normal          Thought Content:  Thought content normal          Judgment: Judgment normal          ED Medications  Medications   ALPRAZolam (XANAX) tablet 0 25 mg (0 25 mg Oral Given 12/21/22 1825)   carbidopa-levodopa (SINEMET)  mg per tablet 1 tablet (1 tablet Oral Not Given 12/21/22 2021)   escitalopram (LEXAPRO) tablet 10 mg (10 mg Oral Given 12/21/22 1809)   metoprolol succinate (TOPROL-XL) 24 hr tablet 25 mg (25 mg Oral Given 12/21/22 1809)   losartan (COZAAR) tablet 100 mg (100 mg Oral Given 12/21/22 1809)   rivaroxaban (XARELTO) tablet 15 mg (has no administration in time range)   sodium chloride 0 9 % infusion (50 mL/hr Intravenous New Bag 12/21/22 1809)   benzonatate (TESSALON PERLES) capsule 100 mg (has no administration in time range)   dextromethorphan-guaiFENesin (ROBITUSSIN DM) oral syrup 10 mL (has no administration in time range)   sodium chloride 0 9 % bolus 500 mL (0 mL Intravenous Stopped 12/21/22 1151)       Diagnostic Studies  Results Reviewed     Procedure Component Value Units Date/Time    Protime-INR [[id number]]  (Abnormal) Collected: 12/22/22 0544    Lab Status: Final result Specimen: Blood from Arm, Right Updated: 12/22/22 0642     Protime 15 5 seconds      INR 4 49    Basic metabolic panel [[id number]] Collected: 12/22/22 0544    Lab Status: Final result Specimen: Blood from Arm, Right Updated: 12/22/22 4162     Sodium 135 mmol/L      Potassium 3 9 mmol/L      Chloride 104 mmol/L      CO2 23 mmol/L      ANION GAP 8 mmol/L      BUN 12 mg/dL      Creatinine 0 79 mg/dL      Glucose 102 mg/dL      Calcium 8 9 mg/dL      eGFR 81 ml/min/1 73sq m     Narrative:      [place] guidelines for Chronic Kidney Disease (CKD):   •  Stage 1 with normal or high GFR (GFR > 90 mL/min/1 73 square meters)  •  Stage 2 Mild CKD (GFR = 60-89 mL/min/1 73 square meters)  •  Stage 3A Moderate CKD (GFR = 45-59 mL/min/1 73 square meters)  •  Stage 3B Moderate CKD (GFR = 30-44 mL/min/1 73 square meters)  •  Stage 4 Severe CKD (GFR = 15-29 mL/min/1 73 square meters)  •  Stage 5 End Stage CKD (GFR <15 mL/min/1 73 square meters)  Note: GFR calculation is accurate only with a steady state creatinine    Magnesium [[id number]]  (Normal) Collected: 12/22/22 0544    Lab Status: Final result Specimen: Blood from Arm, Right Updated: 12/22/22 0642     Magnesium 2 1 mg/dL     CBC (With Platelets) [[id number]]  (Normal) Collected: 12/22/22 0544    Lab Status: Final result Specimen: Blood from Arm, Right Updated: 12/22/22 0623     WBC 7 43 Thousand/uL      RBC 4 23 Million/uL      Hemoglobin 12 8 g/dL      Hematocrit 37 7 %      MCV 89 fL      MCH 30 3 pg      MCHC 34 0 g/dL      RDW 13 7 %      Platelets 129 Thousands/uL      MPV 9 3 fL     TSH, 3rd generation [[id number]]     Lab Status: No result Specimen: Blood     Vitamin B12 [[id number]]     Lab Status: No result Specimen: Blood     Folate [[id number]]     Lab Status: No result Specimen: Blood     HS Troponin I 2hr [[id number]]  (Normal) Collected: 12/21/22 1241    Lab Status: Final result Specimen: Blood from Arm, Left Updated: 12/21/22 1320     hs TnI 2hr 5 ng/L      Delta 2hr hsTnI -1 ng/L     D-dimer, quantitative [[id number]]  (Normal) Collected: 12/21/22 1241    Lab Status: Final result Specimen: Blood from Arm, Left Updated: 12/21/22 1312     D-Dimer, Quant 0 47 ug/ml FEU     Narrative: In the evaluation for possible pulmonary embolism, in the appropriate (Well's Score of 4 or less) patient, the age adjusted d-dimer cutoff for this patient can be calculated as:    Age x 0 01 (in ug/mL) for Age-adjusted D-dimer exclusion threshold for a patient over 50 years  C-reactive protein [[id number]]     Lab Status: No result Specimen: Blood     HS Troponin I 4hr [[id number]]     Lab Status: No result Specimen: Blood     FLU/RSV/COVID - if FLU/RSV clinically relevant [[id number]]  (Abnormal) Collected: 12/21/22 1031    Lab Status: Final result Specimen: Nares from Nose Updated: 12/21/22 1124     SARS-CoV-2 Positive     INFLUENZA A PCR Negative     INFLUENZA B PCR Negative     RSV PCR Negative    Narrative:      FOR PEDIATRIC PATIENTS - copy/paste COVID Guidelines URL to browser: [email or web address]  360Tx    SARS-CoV-2 assay is a Nucleic Acid Amplification assay intended for the  qualitative detection of nucleic acid from SARS-CoV-2 in nasopharyngeal  swabs  Results are for the presumptive identification of SARS-CoV-2 RNA  Positive results are indicative of infection with SARS-CoV-2, the virus  causing COVID-19, but do not rule out bacterial infection or co-infection  with other viruses  Laboratories within the [place] and its  territories are required to report all positive results to the appropriate  public health authorities  Negative results do not preclude SARS-CoV-2  infection and should not be used as the sole basis for treatment or other  patient management decisions  Negative results must be combined with  clinical observations, patient history, and epidemiological information  This test has not been FDA cleared or approved  This test has been authorized by FDA under an Emergency Use Authorization  (EUA)   This test is only authorized for the duration of time the  declaration that circumstances exist justifying the authorization of the  emergency use of an in vitro diagnostic tests for detection of SARS-CoV-2  virus and/or diagnosis of COVID-19 infection under section 564(b)(1) of  the Act, 21 U  [place] C  517QNF-1(I)(4), unless the authorization is terminated  or revoked sooner  The test has been validated but independent review by FDA  and CLIA is pending  Test performed using [employer] GeneXpert: This RT-PCR assay targets N2,  a region unique to SARS-CoV-2  A conserved region in the E-gene was chosen  for pan-Sarbecovirus detection which includes SARS-CoV-2  According to CMS-2020-01-R, this platform meets the definition of high-throughput technology      HS Troponin 0hr (reflex protocol) [[id number]]  (Normal) Collected: 12/21/22 1031    Lab Status: Final result Specimen: Blood from Arm, Left Updated: 12/21/22 1122     hs TnI 0hr 6 ng/L     Basic metabolic panel [[id number]] Collected: 12/21/22 1031    Lab Status: Final result Specimen: Blood from Arm, Left Updated: 12/21/22 1100     Sodium 135 mmol/L      Potassium 4 3 mmol/L      Chloride 103 mmol/L      CO2 25 mmol/L      ANION GAP 7 mmol/L      BUN 12 mg/dL      Creatinine 0 80 mg/dL      Glucose 103 mg/dL      Calcium 8 9 mg/dL      eGFR 81 ml/min/1 73sq m     Narrative:      [place] guidelines for Chronic Kidney Disease (CKD):   •  Stage 1 with normal or high GFR (GFR > 90 mL/min/1 73 square meters)  •  Stage 2 Mild CKD (GFR = 60-89 mL/min/1 73 square meters)  •  Stage 3A Moderate CKD (GFR = 45-59 mL/min/1 73 square meters)  •  Stage 3B Moderate CKD (GFR = 30-44 mL/min/1 73 square meters)  •  Stage 4 Severe CKD (GFR = 15-29 mL/min/1 73 square meters)  •  Stage 5 End Stage CKD (GFR <15 mL/min/1 73 square meters)  Note: GFR calculation is accurate only with a steady state creatinine    CK (with reflex to MB) [[id number]]  (Normal) Collected: 12/21/22 1031    Lab Status: Final result Specimen: Blood from Arm, Left Updated: 12/21/22 1100     Total CK 44 U/L     CBC and differential [[id number]]  (Abnormal) Collected: 12/21/22 1031    Lab Status: Final result Specimen: Blood from Arm, Left Updated: 12/21/22 1038     WBC 7 15 Thousand/uL      RBC 4 44 Million/uL      Hemoglobin 13 2 g/dL      Hematocrit 38 8 %      MCV 87 fL      MCH 29 7 pg      MCHC 34 0 g/dL      RDW 13 7 %      MPV 9 4 fL      Platelets 033 Thousands/uL      nRBC 0 /100 WBCs      Neutrophils Relative 73 %      Immat GRANS % 0 %      Lymphocytes Relative 17 %      Monocytes Relative 9 %      Eosinophils Relative 1 %      Basophils Relative 0 %      Neutrophils Absolute 5 21 Thousands/µL      Immature Grans Absolute 0 03 Thousand/uL      Lymphocytes Absolute 1 18 Thousands/µL      Monocytes Absolute 0 64 Thousand/µL      Eosinophils Absolute 0 07 Thousand/µL      Basophils Absolute 0 02 Thousands/µL                  CT head without contrast   Final Result by Silverio Andujar MD (12/21 1333)      No evidence of acute intracranial process  Chronic microangiopathy  Workstation performed: [id number]         XR chest 1 view portable   Final Result by Na Maldonado MD (12/21 1223)      Unchanged cardiomegaly without acute findings  Workstation performed: [id number]               Procedures  Procedures      ED Course                             SBIRT 22yo+    Flowsheet Row Most Recent Value   SBIRT (25 yo +)    In order to provide better care to our patients, we are screening all of our patients for alcohol and drug use  Would it be okay to ask you these screening questions?  No Filed at: 12/21/2022 5311                MDM  Number of Diagnoses or Management Options  COVID-19: new and requires workup  Emotional crisis: new and requires workup      Disposition  Final diagnoses:   COVID-19   Emotional crisis     Time reflects when diagnosis was documented in both MDM as applicable and the Disposition within this note     Time User Action Codes Description Comment    12/21/2022 12:15 PM Mary Rubio Add [U07 1] COVID-19     12/21/2022 12:16 PM Mary Rubio Add [F43 20] Emotional crisis     12/21/2022  1:50 PM Stevo Jackie Samaniego Add [F03 94] Dementia with anxiety     12/21/2022  1:50 PM Shayna Araujo Add [U07 1] COVID       ED Disposition     ED Disposition   Admit    Condition   Stable    Date/Time   Wed Dec 21, 2022 12:15 PM    Comment   Case was discussed with evaristo and the patient's admission status was agreed to be Admission Status: inpatient status to the service of Dr Glen Oconnor             Follow-up Information    None         Current Discharge Medication List      CONTINUE these medications which have NOT CHANGED    Details   ALPRAZolam (XANAX) 0 25 mg tablet Take 1 tablet (0 25 mg total) by mouth 3 (three) times a day as needed for anxiety  Qty: 30 tablet, Refills: 0    Associated Diagnoses: Anxiety      carbidopa-levodopa (SINEMET)  mg per tablet TAKE 1 TABLET BY MOUTH THREE TIMES A DAY  Qty: 270 tablet, Refills: 3    Associated Diagnoses: Parkinson's disease (HCC)      doxycycline hyclate (VIBRAMYCIN) 100 mg capsule       escitalopram (Lexapro) 10 mg tablet Take 1 tablet (10 mg total) by mouth daily  Qty: 90 tablet, Refills: 3    Associated Diagnoses: Anxiety      gentamicin (GARAMYCIN) 0 1 % ointment       metoprolol succinate (TOPROL-XL) 25 mg 24 hr tablet Take 1 tablet (25 mg total) by mouth daily  Qty: 90 tablet, Refills: 3    Associated Diagnoses: Persistent atrial fibrillation (HCC)      telmisartan (MICARDIS) 20 MG tablet Take 0 5 tablets (10 mg total) by mouth daily  Qty: 45 tablet, Refills: 3    Associated Diagnoses: Essential hypertension      Xarelto 15 MG tablet Take 1 tablet (15 mg total) by mouth daily with breakfast  Qty: 90 tablet, Refills: 3    Associated Diagnoses: Persistent atrial fibrillation (HCC)      acetaminophen (TYLENOL) 325 mg tablet Take 2 tablets (650 mg total) by mouth every 4 (four) hours as needed for mild pain  Refills: 0    Associated Diagnoses: Fall, initial encounter      ipratropium (ATROVENT) 0 06 % nasal spray 1 spray into each nostril 4 (four) times a day as needed for rhinitis  Qty: 45 mL, Refills: 0    Associated Diagnoses: Seasonal allergic rhinitis due to pollen      loratadine (CLARITIN) 10 mg tablet Take 1 tablet (10 mg total) by mouth daily  Qty: 90 tablet, Refills: 0    Associated Diagnoses: Seasonal allergic rhinitis due to pollen      Multiple Vitamins-Minerals (ICAPS AREDS 2) CAPS Take 1 capsule by mouth 2 (two) times a day        tobramycin-dexamethasone (TOBRADEX) ophthalmic suspension Administer 1 drop to the right eye every 4 (four) hours while awake  Qty: 5 mL, Refills: 0    Associated Diagnoses: Pink eye disease of right eye           No discharge procedures on file  PDMP Review       Value Time User    PDMP Reviewed  Yes 12/12/2022 10:52 AM Tacos Briones MD           ED Provider  Attending physically available and evaluated Regine Mendez I managed the patient along with the ED Attending      Electronically Signed by

## 2022-12-22 NOTE — PLAN OF CARE
Problem: PHYSICAL THERAPY ADULT  Goal: Performs mobility at highest level of function for planned discharge setting  See evaluation for individualized goals  Description: Treatment/Interventions: Functional transfer training, LE strengthening/ROM, Elevations, Therapeutic exercise, Endurance training, Patient/family training, Gait training, Bed mobility          See flowsheet documentation for full assessment, interventions and recommendations  Note:    Problem List: Decreased strength, Decreased endurance, Impaired balance, Decreased mobility, Decreased safety awareness, Impaired tone  Assessment: Pt felt like he was going through a panic attack  Reports not feeling well for the past week, associated with congestion cough, and unable to clear his throat  Dx: [id number]  Dementia, Parkinson's, HTN, and a-fib  order placed for PT eval and tx  pt presents w/ comorbidities of a-fib, HTN, left AC separation, PD, dyslipidemia, and SDH and personal factors of advanced age, living in 2 story house, stair(s) to enter home and limited home support  pt presents w/ weakness, decreased endurance, impaired balance, gait deviations, impaired tone, decreased safety awareness and fall risk  these impairments are evident in findings from physical examination (weakness and impaired tone), mobility assessment (need for standby assist w/ all phases of mobility when usually mobilizing independently, tolerance to only 60 feet of ambulation and need for cueing for mobility technique), and Barthel Index: 70/100  pt needed input for task focus and mobility technique  pt is at risk for falls due to physical and safety awareness deficits  pt's clinical presentation is unstable/unpredictable (evident in need for assist w/ all phases of mobility when usually mobilizing independently, tolerance to only 60 feet of ambulation and need for input for mobility technique/safety)   pt needs inpatient PT tx to improve mobility deficits and progress mobility training as appropriate  discharge recommendation is for home PT to reduce fall risk and maximize level of functional independence  PT Discharge Recommendation: Home with home health rehabilitation    See flowsheet documentation for full assessment

## 2022-12-22 NOTE — ASSESSMENT & PLAN NOTE
Patient is rate controlled on metoprolol 25  Mg daily  Anticoagulation with Xarelto 15 mg daily  Patient does not have symptomatic atrial fibrillation at this time-continue home regimen  [id number] score of 3  Continue metoprolol 25 mg p o  daily    Continue Xarelto 15 mg p o  daily

## 2022-12-22 NOTE — PHYSICAL THERAPY NOTE
PHYSICAL THERAPY EVALUATION NOTE    Patient Name: Sherrill Cohen  SPFFV'X Date: 12/22/2022  AGE:   82 y o  Mrn:   [id number]  ADMIT DX:  Emotional crisis [F43 20]  Chest pain [R07 9]  COVID [U07 1]  Dementia with anxiety [F03 94]  COVID-19 [U07 1]    Past Medical History:   Diagnosis Date    A-fib ([facility] )     Afib ([place] [address] )     Atrial fibrillation ([facility] ) 6/13/2021    Hypertension     Hypertension 6/13/2021    Left AC separation     Parkinson's disease ([facility] ) 11/01/2018    SDH (subdural hematoma) ([place] [address] ) 2/7/2020     Length Of Stay: 1  PHYSICAL THERAPY EVALUATION :    12/22/22 1146   Pain Assessment   Pain Assessment Tool 0-10   Pain Score No Pain   Restrictions/Precautions   Other Precautions Airborne/isolation;Contact/isolation; Chair Alarm; Bed Alarm;Multiple lines; Fall Risk   Home Living   Type of [address] [place] [address] Two level;Bed/bath upstairs; Other (Comment)  ([address])   Additional Comments lives alone  ambulates w/o device  independent w/ ADLs  family assists w/ IADLs  no falls in last 6 months  General   Additional Pertinent History 12/21/22 at 27:38 heart rate was 47 BPM    Family/Caregiver Present No   Cognition   Arousal/Participation Alert   Orientation Level Oriented to person; Other (Comment)  (pt was identified w/ full name, birth date)   Following Commands Follows one step commands with increased time or repetition   Subjective   Subjective pt seen supine in bed  agreed to participate in PT eval  states wanting to go home     RLE Assessment   RLE Assessment WFL  (3+ to 4-/5)   LLE Assessment   LLE Assessment WFL  (3+ to 4-/5)   Coordination   Movements are Fluid and Coordinated 0   Coordination and Movement Description intermittent tremor L UE   Light Touch   RLE Light Touch Grossly intact   LLE Light Touch Grossly intact   Transfers   Sit to Stand 5  Supervision   Additional items Increased time required   Stand to Sit 5 Supervision   Additional items Increased time required   Ambulation/Elevation   Gait pattern Short stride   Gait Assistance 5  Supervision   Additional items Verbal cues  (for full step length)   Assistive Device None   Distance 60 feet   Stair Management Assistance 5  Supervision   Additional items Verbal cues; Increased time required  (for foot clearance)   Stair Management Technique One rail L;Step to pattern   Number of Stairs 3   Balance   Static Sitting Good   Dynamic Sitting Fair   Static Standing Fair   Dynamic Standing Fair   Ambulatory Fair -   Activity Tolerance   Activity Tolerance Patient limited by fatigue   Nurse Made Aware spoke to [facility] OT   Assessment   Problem List Decreased strength;Decreased endurance; Impaired balance;Decreased mobility; Decreased safety awareness; Impaired tone   Assessment Pt felt like he was going through a panic attack  Reports not feeling well for the past week, associated with congestion cough, and unable to clear his throat  Dx: [id number]  Dementia, Parkinson's, HTN, and a-fib  order placed for PT eval and tx  pt presents w/ comorbidities of a-fib, HTN, left AC separation, PD, dyslipidemia, and SDH and personal factors of advanced age, living in 2 story house, stair(s) to enter home and limited home support  pt presents w/ weakness, decreased endurance, impaired balance, gait deviations, impaired tone, decreased safety awareness and fall risk  these impairments are evident in findings from physical examination (weakness and impaired tone), mobility assessment (need for standby assist w/ all phases of mobility when usually mobilizing independently, tolerance to only 60 feet of ambulation and need for cueing for mobility technique), and Barthel Index: 70/100  pt needed input for task focus and mobility technique  pt is at risk for falls due to physical and safety awareness deficits   pt's clinical presentation is unstable/unpredictable (evident in need for assist w/ all phases of mobility when usually mobilizing independently, tolerance to only 60 feet of ambulation and need for input for mobility technique/safety)  pt needs inpatient PT tx to improve mobility deficits and progress mobility training as appropriate  discharge recommendation is for home PT to reduce fall risk and maximize level of functional independence  Goals   Patient Goals go home   STG Expiration Date 01/01/22   Short Term Goal #1 pt will: Increase bilateral LE strength 1/2 grade to facilitate independent mobility, Perform bed mobility independently to increase level of independence, Perform all transfers independently to improve independence, Ambulate 200 ft  without assistive device independently w/o LOB to improve functional independence, Navigate 10 stair(s) independently with unilateral handrail to facilitate return to previous living environment, Increase ambulatory balance 1/2 grade to decrease risk for falls, Complete exercise program independently to increase strength and endurance, Tolerate 3 hr OOB to faciliate upright tolerance and Improve Barthel Index score to 90 or greater to facilitate independence   PT Treatment Day 0   Plan   Treatment/Interventions Functional transfer training;LE strengthening/ROM; Elevations; Therapeutic exercise; Endurance training;Patient/family training;Gait training;Bed mobility   PT Frequency 3-5x/wk   Recommendation   PT Discharge Recommendation Home with home health rehabilitation   [address] [place] [address] Mobility Inpatient   Turning in Bed Without Bedrails 4   Lying on Back to Sitting on Edge of Flat Bed 3   Moving Bed to Chair 3   Standing Up From Chair 3   Walk in Room 3   Climb 3-5 Stairs 3   Basic Mobility Inpatient Raw Score 19   Basic Mobility Standardized Score 42 48   Highest Level Of Mobility   JH-HLM Goal 6: Walk 10 steps or more   JH-HLM Achieved 7: Walk 25 feet or more   Barthel Index   Feeding 10   Bathing 0   Grooming Score 5   Dressing Score 5   Bladder Score 10   Bowels Score 10   Toilet Use Score 5   Transfers (Bed/Chair) Score 10   Mobility (Level Surface) Score 10   Stairs Score 5   Barthel Index Score 70   End of Consult   Patient Position at End of Consult Bedside chair;Bed/Chair alarm activated; All needs within reach     The patient's AM-PAC Basic Mobility Inpatient Short Form Raw Score is 19  A Raw score of greater than 16 suggests the patient may benefit from discharge to home  Please also refer to the recommendation of the Physical Therapist for safe discharge planning  Skilled PT recommended while in hospital and upon DC to progress pt toward treatment goals       Bam Medel, PT

## 2022-12-23 ENCOUNTER — HOME CARE VISIT (OUTPATIENT)
Dept: HOME HEALTH SERVICES | Facility: HOME HEALTHCARE | Age: 87
End: 2022-12-23

## 2022-12-23 ENCOUNTER — TELEPHONE (OUTPATIENT)
Dept: OTHER | Facility: OTHER | Age: 87
End: 2022-12-23

## 2022-12-23 DIAGNOSIS — Z78.9 NEED FOR FOLLOW-UP BY SOCIAL WORKER: Primary | ICD-10-CM

## 2022-12-23 NOTE — TELEPHONE ENCOUNTER
I called to talk with them, I got voicemail  I left a message acknowledging the difficult situation, and let them know I would try them back on the phone soon

## 2022-12-23 NOTE — TELEPHONE ENCOUNTER
I called again and spoke with son-in-law  They are wondering how to break the news to him  I reviewed techniques for this including family support and grieving with him  Discussed counseling with [facility]  Also discussed [employer] as an option  Discussed option of ED if pt reacts in a severe manner

## 2022-12-23 NOTE — TELEPHONE ENCOUNTER
Pt's daughter and son in law called in stating pt's daughter  unexpectedly this morning and they are very concerned about breaking the news to the patient since his wife just  two weeks ago  Daughter is requesting a call back from Dr Mathew Rangel today

## 2022-12-24 ENCOUNTER — HOME CARE VISIT (OUTPATIENT)
Dept: HOME HEALTH SERVICES | Facility: HOME HEALTHCARE | Age: 87
End: 2022-12-24

## 2022-12-27 ENCOUNTER — TRANSITIONAL CARE MANAGEMENT (OUTPATIENT)
Dept: INTERNAL MEDICINE CLINIC | Facility: CLINIC | Age: 87
End: 2022-12-27

## 2022-12-28 ENCOUNTER — PATIENT OUTREACH (OUTPATIENT)
Dept: INTERNAL MEDICINE CLINIC | Facility: CLINIC | Age: 87
End: 2022-12-28

## 2022-12-28 ENCOUNTER — HOME CARE VISIT (OUTPATIENT)
Dept: HOME HEALTH SERVICES | Facility: HOME HEALTHCARE | Age: 87
End: 2022-12-28

## 2022-12-28 VITALS
OXYGEN SATURATION: 98 % | HEART RATE: 66 BPM | RESPIRATION RATE: 18 BRPM | TEMPERATURE: 98.1 F | SYSTOLIC BLOOD PRESSURE: 129 MMHG | DIASTOLIC BLOOD PRESSURE: 63 MMHG

## 2022-12-28 NOTE — PROGRESS NOTES
Return phone call from dtr  Dtr reports family will speak with patient about a caregiver in the home today  Patient is a   SW discussed Aid and Attendance Pension  Dtr reports that patient has SS and Pension income of about $4800 monthly  Patient is not eligible for Options or Waiver  Dtr requested resources to be emailed to [email or web address] as well as mailed to her address at [address] [place] [address]  Resources sent include non-skilled home care agency lists, [employer], [employer], [employer], [employer] [facility] Resource guide, and contact information for the local [address] [place] [address] office  Dtr is concerned about patient processing the death of his wife, the death of his dtr 2 weeks later, as well as the change in his life in that he is now responsible for his own care  BERNIE placed [employer] message to [address] [facility] therapist Donny Simms to see if he may be able to schedule patient for some therapy visits  CLAUDE is also current in the home at this time  Dtr has this SW contact information if additional assistance is needed  SW remains available

## 2022-12-28 NOTE — PROGRESS NOTES
OPCM SW received referral to follow up on in home needs for patient after d/c from hospital       Chart review completed  Patient resides alone in a 2 story home  Patient completes ADLs independently and ambulates without assistive device  Family transports patient  Patient has concerns about his ability to care for self  Patient's dtr, Carole Mora, is POA  Wife recently passed away on 12/8  Family has been staying with patient in the home since  [facility]  Phone call placed to patient  Voicemail received and message left with reason for phone call and this  contact information  Patient attempted return phone call to this  and did not leave a message  SW placed phone call back to patient  Patient reports that he is able to bathe and dress independently  Dtr, Carole Mora, lives nearby and provides meals  Dtr will also be helping with the cleaning and laundry  Patient reports that either his children or friends provide transportation to medical appointments and to the grocery and pharmacy  Patient reports that he does not need assistance at this time  Patient reports that his children assist and if anything changes he will call this        Phone call placed to dtr, Carole Mora  Dtr reports that not everything will be covered when she returns to work in January  Dtr is a teacher  Dtr reports that patient's wife passed away 12/8 and his dtr passed away about a week ago  Patient is processing wife's death  Dtr reports that patient is more factual about his dtr's death  Dtr feels that patient may need some MH follow up for processing present circumstances  Dtr did speak with Luc Doherty from [facility] who advised they would need 4h time blocks to assist patient in the home  Dtr reports she needs someone to come in around 11am to make sure patient eats and takes his medications       Dtr reports that colt is a nursing student at [employer] and a message was sent out for students looking for extra work  Dtr will be contacting the director to place a similar request   Dtr has also spoken with a neighbor who is a retired nurse who may be interested in providing care for a few hours a day  Patient is not able to do laundry, clean, or cook  Dtr reports that for several months she has been providing packaged meals for her parents to heat up for meals  Dtr reports patient does not leave the house except for on Tuesday and Friday when patient meets with a group of friends  Dtr does not think they need more than about 2h a day right now in the home  Dtr wants to start out slowly with patient  Dtr needed to end phone call and will call back to continue conversation

## 2022-12-29 ENCOUNTER — HOME CARE VISIT (OUTPATIENT)
Dept: HOME HEALTH SERVICES | Facility: HOME HEALTHCARE | Age: 87
End: 2022-12-29

## 2022-12-29 ENCOUNTER — NURSING HOME VISIT (OUTPATIENT)
Dept: GERIATRICS | Facility: OTHER | Age: 87
End: 2022-12-29

## 2022-12-29 ENCOUNTER — OFFICE VISIT (OUTPATIENT)
Dept: NEUROLOGY | Facility: CLINIC | Age: 87
End: 2022-12-29

## 2022-12-29 VITALS — OXYGEN SATURATION: 96 % | SYSTOLIC BLOOD PRESSURE: 144 MMHG | DIASTOLIC BLOOD PRESSURE: 88 MMHG

## 2022-12-29 VITALS
RESPIRATION RATE: 18 BRPM | OXYGEN SATURATION: 99 % | TEMPERATURE: 98.6 F | DIASTOLIC BLOOD PRESSURE: 82 MMHG | HEART RATE: 76 BPM | SYSTOLIC BLOOD PRESSURE: 142 MMHG

## 2022-12-29 VITALS
DIASTOLIC BLOOD PRESSURE: 77 MMHG | TEMPERATURE: 98.2 F | BODY MASS INDEX: 28.49 KG/M2 | HEART RATE: 64 BPM | HEIGHT: 74 IN | SYSTOLIC BLOOD PRESSURE: 139 MMHG | WEIGHT: 222 LBS

## 2022-12-29 DIAGNOSIS — G20 PARKINSON'S DISEASE (HCC): Primary | ICD-10-CM

## 2022-12-29 DIAGNOSIS — F41.0 PANIC ATTACKS: ICD-10-CM

## 2022-12-29 DIAGNOSIS — F03.94: ICD-10-CM

## 2022-12-29 DIAGNOSIS — G20 PARKINSON'S DISEASE (HCC): ICD-10-CM

## 2022-12-29 DIAGNOSIS — F02.818 DEMENTIA ASSOCIATED WITH OTHER UNDERLYING DISEASE WITH BEHAVIORAL DISTURBANCE: ICD-10-CM

## 2022-12-29 DIAGNOSIS — U07.1 COVID: Primary | ICD-10-CM

## 2022-12-29 NOTE — ASSESSMENT & PLAN NOTE
· Patient with Covid positive test during hospitalization 12/21/22  · Patient afebrile with no respiratory symptoms during [email or web address] visit today  · Continue to monitor patient for acute changes in condition

## 2022-12-29 NOTE — PROGRESS NOTES
Patient ID: Marvin Yañez is a 82 y o  male  Assessment/Plan:    Parkinson's disease ([facility] )  Parkinson's disease with prominent rest tremor and bradykinesia  More recently patient has been more consistent with his medications as family has been administering medication, however patient currently taking his sinemet of BID basis  He continues to note break through tremor, he admits to feeling "slow moving" at times  We did again discuss timing of sinemet dosing, recommend to take sinemet 25/100 mg 1 tab TID (approx every 5-6 hours), if no improvement in symptoms can consider increase to 1 5 tabs TID  Recommend taking medication 30 mins prior to meals or 60-90 mins following meal  Would continue with home PT/OT  Plan for follow up in 4 months  To contact the office sooner with any concerns or worsening symptoms  Dementia with anxiety  Patient with worsening cognitive decline since last visit  After visit family spoke with me separately to discuss increased confusion, some paranoid behavior and delusions over the past few months  His wife and daughter recently passed away this month which has worsened his anxiety/depression  He was recently started on lexapro for symptoms  He does have appt with counselor in the near future, family would also be interested in any grief support group-will reach out to [facility] [place] for assistance  Given his decline and increasing behaviors we did discuss considering a medication such as donepezil to assist  Given his cardiac history will reach out to his cardiologist prior to starting  Family is looking into more supervision for patient in the home, he does not drive and they have taken much of the household tasks for patient  We did discuss additional safety measure such as cameras in the home, life alert as they do not feel patient is appropriate for 24/7 care or facility placement currently  Will also reach out to [employer] for additional information on caregiver assistance   Will plan to obtain updated [address] next visit  Diagnoses and all orders for this visit:    Parkinson's disease ([address] )    Dementia with anxiety         I have spent 55 minutes with the patient and family/ caregiver and in review of chart/diagnostic studies today  in which greater than 50% of this time was spent in counseling and/or coordination of care regarding diagnoses, test results, impression, plan and potential medication side effects  Subjective:    HPI    Mr Hai Molina is a right handed male who returns for movement disorder evaluation for Parkinson's disease  To review, symptoms began in mid 2018 with left hand tremor  No known family history of tremor or diagnosed PD  Started on propranolol 60mg qDay, then reduced to 20mg BID  No benefit to tremor and he felt more "uptight" on the medication   He was later started on Sinemet with improvement  Last office visit 1/2022 in which he was to take his medication more consistent, was referred for cognitive rehab, he also was referred for FTD testing  Current medications:  Sinemet 25/100mg 1tab in the morning and 2 tabs at dinner  Interval History:  He presents today with his daugther who assists with history  He has been more consistent with his sinemet the past month  He does not pay attention much to his symptoms  He feels his walking and balance are ok, he does shuffle  He denies any freezing  No recent falls  He sleeps well  He his sleep schedule varies  No daytime fatigue  No hallucinations  No trouble swallowing  He does have softer speech  There has some complaints of memory complaints over the years  He does meet with friends twice a week for lunch  He does live alone  He does have family member that stays with him overnight  His family does his pill planner, his family does remind him to take his medication  His family does assist with management of finances  His family takes care of cooking and cleaning   He can prepare some simple meals  He is independent with ADLs  He currently dose have PT/OT in the home  He has home nursing as well  Family does not he has had some increasing confusion, some paranoid behaviors, putting locks on the house etc       He tells me he feels "overdrugged" He feels like he is slow moving at times-no pattern to time of day and is not daily  No dizziness or fatigue, no brain fog  He is on lexapro daily-started 2 weeks ago and he does take xanax nightly as needed  He did have FTD testing in april, did score poorly ""He scored cognitively at an 90% predicted probability of failing  a specialized on-road test   This indicates  a cognitive competence for driving is outside the range of   normal with a higher probability of performing hazardous or extremely   dangerous maneuvers  Pt scoring cognitively at a 72% probability of   creating a hazardous situation on a specialized road test "  Form was sent to [facility]  He is no longer driving  He was admitted to the hospital last week for panic attack, was COVID positive  He has been having increased anxiety and panic attacks since death of his wife a few months ago   His wife did pass away this past month, he has appt with grief counselor next month  The following portions of the patient's history were reviewed and updated as appropriate: allergies, current medications, past family history, past medical history, past social history, past surgical history and problem list        Objective:    Blood pressure 139/77, pulse 64, temperature 98 2 °F (36 8 °C), temperature source Tympanic, height 6' 2" (1 88 m), weight 101 kg (222 lb)  Physical Exam  Eyes:      General: Lids are normal       Extraocular Movements: Extraocular movements intact  Pupils: Pupils are equal, round, and reactive to light  Neurological:      Mental Status: He is alert        Motor: Motor strength is normal          Neurological Exam  Mental Status  Alert  Oriented only to person, place and situation  Speech: hypophonia  Language is fluent with no aphasia  Attention and concentration are normal     Cranial Nerves  CN II: Visual fields full to confrontation  CN III, IV, VI: Extraocular movements intact bilaterally  Normal lids and orbits bilaterally  Pupils equal round and reactive to light bilaterally  CN V: Facial sensation is normal   CN VII: Full and symmetric facial movement  CN VIII: Hearing is normal   CN IX, X: Palate elevates symmetrically  CN XI: Shoulder shrug strength is normal   CN XII: Tongue midline without atrophy or fasciculations  Motor   Normal muscle tone  Strength is 5/5 throughout all four extremities  Sensory  Light touch is normal in upper and lower extremities  Coordination  Right: Finger-to-nose normal  Rapid alternating movement abnormality:Left: Finger-to-nose normal  Rapid alternating movement abnormality:  See MDS UPDRS III  Gait  Casual gait: Able to rise from chair without using arms  Reduced arm swing and stride on left  Sindhunicholas Severino     UPDRSIII                Time since last dose:  12/29/22 1/27/22   Speech  2 2   Facial Expression  2     Postural Tremor (Right) 1 0   Postural Tremor (Left) 1 0   Kinetic Tremor (Right)  0 1   Kinetic Tremor (Left)  0 1   Rest tremor amplitude RUE 1 1   Rest tremor amplitude LUE 2 2   Rest tremor amplitude RLE 0 0   Rest tremor amplitude LLE 0 0   Lip/Jaw Tremor  1     Consistency of tremor 3 1   Finger Taps (Right)   2 1   Finger Taps (Left)  3 2   Hand Movement (Right)  1 1   Hand Movement (Left)   1 1   Pronation/Supination (Right)  1 2   Pronation/Supination (Left)   3 2   Toe Tapping (Right) 1 1   Toe Tapping (Left) 2 2   Leg Agility (Right)  0 0   Leg Agility (Left)   2 2   Rigidity - Neck  0     Rigidity - Upper Extremity (Right)  1 1   Rigidity - Upper Extremity (Left)   2 2   Rigidity - Lower Extremity (Right)  0 0   Rigidity - Lower Extremity (Left)   0 0   Arising from Chair   0 0    Gait   1 1   Freezing of Gait 0 0   Postural Stability       Posture 1 1   Global spontaneity of movement 1 1       I have personally reviewed the ROS performed by the MA     ROS:    Review of Systems   Constitutional: Negative for appetite change and fever  HENT: Negative  Negative for hearing loss, tinnitus, trouble swallowing and voice change  Eyes: Negative  Negative for photophobia and pain  Respiratory: Negative  Negative for shortness of breath  Cardiovascular: Negative  Negative for palpitations  Gastrointestinal: Negative  Negative for nausea and vomiting  Endocrine: Negative  Negative for cold intolerance  Genitourinary: Negative  Negative for dysuria, frequency and urgency  Musculoskeletal: Negative  Negative for myalgias and neck pain  Skin: Negative  Negative for rash  Neurological: Positive for tremors  Negative for dizziness, seizures, syncope, facial asymmetry, speech difficulty, weakness, light-headedness, numbness and headaches  Tremors in left hand   Hematological: Negative  Does not bruise/bleed easily  Psychiatric/Behavioral: Negative  Negative for confusion, hallucinations and sleep disturbance  All other systems reviewed and are negative

## 2022-12-29 NOTE — CASE COMMUNICATION
Faxed Dr Collins Cavazos regarding the delay in initiating OT within 7 days  Anticipated date to begin is 1/6/23 or sooner  OT to contact the patient to schedule the visit

## 2022-12-29 NOTE — PATIENT INSTRUCTIONS
Recommend to take sinemet 1 tab 3x/day day 10 am, 4 pm, 10pm for 1 week then increase to 1 5 tabs 3x/day  If no improvement in symptoms please call or any side effect concerns  Recommend taking medication 30 mins prior to meals or 60-90 mins prior  Will reach out to  about grief support groups         Look into life alert

## 2022-12-29 NOTE — ASSESSMENT & PLAN NOTE
· Patient with reported "panic attacks" with multiple ED visits and calls to EMS  · Patient with recent loss of wife x 2 weeks ago and now living alone at home  · Continue supportive measures  · Patient recently started on Lexapro and Xanax  · Continue to monitor for acute changes in condition  · Follow up with PCP and Neurology

## 2022-12-29 NOTE — PROGRESS NOTES
[facility]  [address] [place] [address]  [phone]  HEAL AT HOME VISIT NOTE        NAME: Rk Crowder  AGE: 82 y o  SEX: male  :  1935  DATE OF ENCOUNTER: 2022    Chief Complaint   Patient seen and examined for follow up on chronic conditions  History of Present Illness     Rk Crowder is a patient being seen for Heal at home program via secure Teams video visit in conjuction with A nurse Ariel Roque , who was in the home to perform assessment  The patient is being seen and examined today for follow-up on acute and chronic medical conditions s/p most recent hospitalization  Patient denies pain, sob, chest pain, abdominal pain, fever, chills, nausea/vomiting, diarrhea/constipation, or dysuria  The patient reports a good appetite and is drinking an adequate amount of fluids  Nursing reports that the patient is doing well overall s/p Covid, remains afebrile and reports no respiratory symptoms  The patient states he is feeling a little "down" due to recent loss of wife but his family is planning on moving in with him soon and he is happy about this and his house will feel "less lonely "  The patient has a supportive daughter who visits daily and assists with filling med planners weekly  The patient will be following up with Neurology this afternoon  The following portions of the patient's history were reviewed and updated as appropriate: allergies, current medications, past family history, past medical history, past social history, past surgical history and problem list     Review of Systems     A 12-point comprehensive review of symptoms was obtained with pertinent positives and negatives noted per HPI      History     Past Medical History:   Diagnosis Date   • A-fib [facility])    • Afib ([facility] )    • [facility]rial [facility] ) 2021   • Hypertension    • Hypertension 2021   • Left AC separation    • Parkinson's disease ([facility] ) 2018   • SD[facility]) [facility] ) 2/7/2020     Past Surgical History:   Procedure Laterality Date   • CARDIOVERSION      ATRIAL   • ROTATOR CUFF REPAIR     • SHOULDER SURGERY       Family History   Problem Relation Age of Onset   • No Known Problems Mother    • Hypertension Father    • Coronary artery disease Father      Social History     Socioeconomic History   • Marital status: /Civil Union     Spouse name: Not on file   • Number of children: Not on file   • Years of education: Not on file   • Highest education level: Not on file   Occupational History   • Not on file   Tobacco Use   • Smoking status: Never   • Smokeless tobacco: Never   • Tobacco comments:     Quit 35-40 years ago   Vaping Use   • Vaping Use: Never used   Substance and Sexual Activity   • Alcohol use: Not Currently     Comment: Rare, previously moderate drinker cut down 2016  • Drug use: Never   • Sexual activity: Not Currently     Partners: Female     Birth control/protection: Post-menopausal   Other Topics Concern   • Not on file   Social History Narrative    ** Merged History Encounter **          Social Determinants of Health     Financial Resource Strain: Low Risk    • Difficulty of Paying Living Expenses: Not very hard   Food Insecurity: No Food Insecurity   • Worried About Running Out of Food in the Last Year: Never true   • Ran Out of Food in the Last Year: Never true   Transportation Needs: No Transportation Needs   • Lack of Transportation (Medical): No   • Lack of Transportation (Non-Medical): No   Physical Activity: Not on file   Stress: Not on file   Social Connections: Not on file   Intimate Partner Violence: Not on file   Housing Stability: Low Risk    • Unable to Pay for Housing in the Last Year: No   • Number of Places Lived in the Last Year: 1   • Unstable Housing in the Last Year: No     Allergies   Allergen Reactions   • Penicillins Hives   • Pollen Extract Other (See Comments)              Objective     Vital Signs  BP:142/82       HR:76 T:98 6    RR:18 O2Sat:99%   General: NAD, Well Nourished, Well Developed  Oral: Oropharynx Moist and Clear  Neck: Supple, +ROM  CV: S1, S2, normal rate, regular rhythm, no murmur appreciated  Pulmonary: Lung sounds clear to air, no wheezing, rhonchi, rales  Abdominal:BS + x4 in all quadrants, soft, no mass, no tenderness  Extremities: No edema, +ROM, +weakness, Left hand tremor  Skin: Warm, Dry, no visible lesions, no rash, no erythema present, no ecchymosis present  Neurological/Psych: Alert and oriented times 3, anxious, depressed mood, normal affect, fair judgement    Pertinent Laboratory/Diagnostic Studies have been independently reviewed  Current Medications     Current Medications Reviewed and updated in Nursing Home EMR      Assessment and Plan     COVID  · Patient with Covid positive test during hospitalization 12/21/22  · Patient afebrile with no respiratory symptoms during Nahia@[facility] visit today  · Continue to monitor patient for acute changes in condition    Panic attacks  · Patient with reported "panic attacks" with multiple ED visits and calls to EMS  · Patient with recent loss of wife x 2 weeks ago and now living alone at home  · Continue supportive measures  · Patient recently started on Lexapro and Xanax  · Continue to monitor for acute changes in condition  · Follow up with PCP and Neurology    Dementia associated with other underlying disease with behavioral disturbance  · Continue to reorient, redirect, and reassure patient  · Encourage engagement and activity  · Patient recently lost wife 2 weeks ago and is living alone  · Patient's daughter to move in and is checking in on patient daily  · Continue to monitor for acute changes in condition  · Follow up with PCP/Neurology      Parkinson's disease ([facility] )  · Continue supportive measures  · Continue Sinemet  · Patient with essential tremor of right hand  · Continue PT/OT services with home health  · Follow up with PCP/Neurology      [facility]  Geriatric Medicine  12/29/2022

## 2022-12-30 ENCOUNTER — TELEPHONE (OUTPATIENT)
Dept: NEUROLOGY | Facility: CLINIC | Age: 87
End: 2022-12-30

## 2022-12-30 NOTE — TELEPHONE ENCOUNTER
MSW phoned Vi Quezada at [phone] she is agreeable to receive list of home care agencies (private pay) and support groups for grief via email to :  [email or web address]  [email or web address]    Additionally she is interested in this writer to send a referral to AAA as pt exceed income criteria for waiver       List of Private pay companies:     [address] and home care- [phone]   9118 [address], [place] [address]     0836 [facility] family   [phone]     [facility] [phone]  99 [facility], [facility], 32-36 [facility]- [phone]  [address] [place], [place] [address]     [address] W [facility]3] [address]8Th Floor 25, [place], [address]     5900 [facility] family   [phone]     Always [address] of [address]  [address] Perkins 94, [place] [address]     34 [facility]    3950 [facility] 1001 [facility], [address]  [phone]     [facility] 2 12 [facility], [address]  [address]  [phone]     Caregivers Ambar  [facility] 41, 325 E [facility]  ÞorSaint [facility], [address]  [phone]     Caregivers of the [facility]  [address], 4864 [facility], [address]  [address], [address], [address]  [address] [facility] [address] 19, [place] 3  [facility] [place], [facility] requests [address] requests [phone]     [facility]  [address], [address]8Th Floor 200  [place], [address]  [phone]     Home Helpers  [address]  Graeme [address] 3  [phone]     Home Instead 1499 [facility], 800 [facility], 210 [facility]  [phone]     Home Instead 1810 [facility] 82,Jon 100, [address]  [place], [facility] [address]  60456 [facility] 2600 [place], [place] 360 [place] [facility], [address]  [phone]  20 50 per hour with a 3 hour minimum  47 00 for 2 hours of care     Quality Care for 6020 [facility], [place] [address]  ÞorSt. [facility], [address]  [phone]     Right at [address] [place] [address], 801 [place], Fl 2  [place], [address] [place]  [phone]     Senior Helpers of the [facility]  [address] [facility] [address], 509 N [facility]  ÞorSt. [facility], [address]  [phone]     Seniors Helping Seniors  [address]  [place], [address] [place]  [address] at 999 [place] [address]  [phone]

## 2022-12-30 NOTE — TELEPHONE ENCOUNTER
email of community resources was sent to Debra as requested via email to   [email or web address]  [email or web address]    Kari Sarmiento,    It was a pleasure to have a discussion with you via phone regarding community resources  Please review information below  Please let me know if you have any questions        -Please review attached flyer for PD Steps program that provide education to help people (and family) with Parkinson’s stay active and engaged  Meetings are held quarterly, in-person, at the [address] [place] [address] the third Wednesday of each quarter at 6:00 pm, light food/refreshment provided  Alzheimers association at [phone] they recommend to visit the website below who they collaborate with to obtain list of support groups   [facility]  org/      For information about Nati Desouza \A Chronology of [place] [facility]\"" grief support group please visit:  Fevick      Bereavement program at 3516 [facility] [phone]     Additional Places to [address] Below is a list of free bereavement services in the area:     Because of the pandemic and the need for physical distancing, many resources may only be offered virtually  Please contact our bereavement department at [phone] if you need additional support  Adult Support Information 225 [facility] offers bereavement support for LGBT people who have experienced loss  For more information, please visit their website at 0979 [facility] or email at [email or web address]  The Compassionate Friends offers ongoing support for parents, grandparents and adult siblings grieving the loss of a child, grandchild or sibling  Visit www  thecompassionatefriends  org  Enter your zip code to locate the local chapter and  near you        [facility] offers a variety of support groups and programs that change seasonally  Visit their website at www do[facility] org/hospital/services/bereavement or call (923) 1844-197  Gentle Yoga for Grief, Stress and Life Transitions is currently being offered virtually by DEANDRE Mathur Ed , LPC, RYT  Visit www givingAravo SolutionsiefEuro Freelancers or call [phone]  Be sure to indicate if your loved one was a [facility]) patient  [facility] [place] [facility] offers groups and services that change seasonally  For more information, call [phone]  Grief Share is a rebekah-based support group offered throughout the [facility]  Visit their website at www  griefshare  org and enter your zip code to find the  and program near you  Radha Gooden [facility] offers a variety of support groups to those grieving a loss due to substance related causes  Therapist-led meetings are held in-person and virtually for bereaved parents and young adults (siblings, partners, friends, etc )  Groups for bereaved youth ages 5-13 and bereaved caregivers raising bereaved children are offered in-person  For dates and times, visit www ACE Portal  org, call 734-570-8243 or email at John Paul@[employer]  org to register  GRASP (Grief Recovery After a Substance Passing) currently offers two virtual evening meetings per month, plus an afternoon meeting at the [facility]  Call [phone] or Call/Email at Lanette@[email or web address] or [phone] or Bruno Samaniego at [email or web address] or ([phone] [address] offers many groups and services for adults and children between the ages of 6-17  For more information, call [phone]  For information about program offerings in the [facility], call [phone] or email [email or web address]  Suicide Loss:     The [employer] for Suicide Prevention has excellent information on risk factors, statistics and education about suicide  Visit www  Dining Secretarysp org and enter your zip code to find the chapter/ near you  All chapters are run by people who have experienced the suicide of a loved one  Cancer Support Community of the [facility] cancersupBHC [facility] org Find an individual therapist at: [employer] We encourage you to call the number on the back of your insurance card to find resources paid by your individual insurance plan           List of some local private pay companies in the [place]:     [address] and home care- [phone] 6989 [address], Suhail Estevez 28 family   [phone]     [facility] [phone]  99 [facility], [facility], 32-36 [facility]- [phone]  [address] [place] [address], [address]     [address], [address],8Th Floor 25, [place], [address]     5900 [facility] family   [phone]     Always [address] of [address]  [address] 94, [place] 3     34     3950 [facility] 1001 [facility], [address] [place], [place] [address]  [phone]     [facility] 2 12 [facility], [address]  [address]  [phone]     Caregivers [facility] 41, 325 E [facility]  Þ[facility], [address]  [phone]     Caregivers of the [facility]  [address], 4864 [facility] [place] [facility], [address]  7[address], [address], [address]  915 East [facility] Street Patrick 19, [place] 3  [facility] requests 510 [facility] requests [phone]     [facility]  [address]  [address], [address]8Th Floor 200  [facility], [address]  [phone]     Home Helpers  1322 [facility] [address], [facility] 3  [phone]     Home Instead 1499 [facility], 800 [facility], 210 [facility]  [phone]     Home Instead 1810 [facility] 82,Jon 100, [address]4 [place], [address] [facility] [address], [address]  671 es [facility], [address] [address]  [phone]  20 50 per hour with a 3 hour minimum  47 00 for 2 hours of care     Quality Care for You  [address], [facility] 72  [facility], [address]  [phone]     Right at [address], [address] NEUROREHAB [place], 4420 [facility]  [phone]     Senior Helpers of the [facility]  1324 [facility], 509 N [facility], [address]  [phone]     Seniors Helping Seniors  [address] [facility] NEUROREHAB [place], 4420 [facility]  [address] at 999 [facility], [facility] 3  Shavonne Sam MSW     [facility]  6135 0[facility]  119 [facility] [address] [phone] phone/ [phone] fax  www [facility]  org

## 2022-12-30 NOTE — TELEPHONE ENCOUNTER
RAFFI Barba [facility]   Hello,   I recently saw this patient for follow up for parkinson's disease  Unfortunately both his wife and daughter passed away in the past month  He is going to follow up with counseling services, however I am not sure if you would have any information about any local grief support groups for the patient  Also patient's dementia has been progressing given recent events  He now lives alone  Family is looking into getting a few hours of assistance for the patient at home (cooking, cleaning, making sure he is taking his medication)  Could you please reach out to family and see if they want any additional contact information for any type of in home caregiver assistance services  Thanks!    Tiffanie Deshpande

## 2022-12-30 NOTE — TELEPHONE ENCOUNTER
Help at home    potential services available from a Waiver - in home aide assistance, home modifications, transportation, etc  patient will need to qualify based on their level of physical assistance required and their finances (must meet Medical Assistance guidelines - income must be less than $2385 per month and assets less than $8000 which excludes one house and one car)  application process can take up to 3 months  If interested in applying to call [place] PAM patient will have two phone assessments to determine eligibility, will need to complete an MA application, and will need to provide proof of finances in order for her eligibility to be determined  [facility] on aging   Options program and Caregiver support program  -Funded primarily through the [facility], the Catina Molina ([facility]) serves individuals who are either financially or clinically ineligible for Medical Assistance (MA) Long-Term Services and Supports  OPTIONS services are provided to eligible consumers aged 60+ to assist them in maintaining independence at the highest level of functioning in the community and help delay the need for more costly care/services    Private pay home care   -home care services can range between $25 to $30 an hour    Placement   Care Uche CHAO/ Chidi Bahena 41  [phone]   recommend appropriate levels of care such as; Independent Living, Assisted Living, Memory Care or [place]

## 2022-12-30 NOTE — TELEPHONE ENCOUNTER
Berevement program at [address] [place] [address]  [phone]    Additional Places to Find Support and Share Your Adin Vu Below is a list of free bereavement services in the area  Because of the pandemic and the need for physical distancing, many resources may only be offered virtually  Please contact our bereavement department at [phone] if you need additional support  Adult Support Information [facility] [place] offers bereavement support for LGBT people who have experienced loss  For more information, please visit their website at [facility]uro[facility] or email at [employer]  The Compassionate Friends offers ongoing support for parents, grandparents and adult siblings grieving the loss of a child, grandchild or sibling  Visit www  thecompassionatefriends  org  Enter your zip code to locate the local chapter and  near you  [facility] offers a variety of support groups and programs that change seasonally  Visit their website at www [facility] org/hospitals/services/bereavement or call (228) 7028-546  Gentle Yoga for Grief, Stress and Life Transitions is currently being offered virtually by DEANDRE Gallegos Ed , LPC, RYT  Visit www givingAutonomic Technologiesiefavoice  TVShow Time or call [phone]  Be sure to indicate if your loved one was a [facility] ([facility]) patient  [facility] offers groups and services that change seasonally  For more information, call [phone]  Grief Share is a rebekah-based support group offered throughout the [facility]  Visit their website at www  griefshare  org and enter your zip code to find the  and program near you  Radha Ruiz Medicannie offers a variety of support groups to those grieving a loss due to substance related causes   Therapist-led meetings are held in-person and virtually for bereaved parents and young adults (siblings, partners, friends, etc )  Groups for bereaved youth ages 5-13 and bereaved caregivers raising bereaved children are offered in-person  For dates and times, visit www [employer]  org, call 621-376-5019 or email at Blessing@[employer] [place]  org to register  GRASP (Grief Recovery After a Substance Passing) currently offers two virtual evening meetings per month, plus an afternoon meeting at the [facility]  Call [phone] or Call/Email at Lillian@[employer] or [phone] or Yonny Kerr at [email or web address] or ([phone] [address] offers many groups and services for adults and children between the ages of 6-17  For more information, call [phone]  For information about program offerings in the [facility], call 652-081-3776 or email Troy [email or web address]@[email or web address]  Suicide Loss:     The SezWho for Suicide Prevention has excellent information on risk factors, statistics and education about suicide  Visit www  afsp org and enter your zip code to find the chapter/ near you  All chapters are run by people who have experienced the suicide of a loved one  Cancer Support Community of the [address] [place] [address] cancersupportglv org Find an individual therapist at: PsychologyBarnebys We encourage you to call the number on the back of your insurance card to find resources paid by your individual insurance plan         Darrin pena

## 2023-01-02 ENCOUNTER — HOME CARE VISIT (OUTPATIENT)
Dept: HOME HEALTH SERVICES | Facility: HOME HEALTHCARE | Age: 88
End: 2023-01-02

## 2023-01-02 NOTE — ASSESSMENT & PLAN NOTE
· Continue supportive measures  · Continue Sinemet  · Patient with essential tremor of left hand  · Continue PT/OT services with home health  · Follow up with PCP/Neurology

## 2023-01-02 NOTE — ASSESSMENT & PLAN NOTE
· Continue to reorient, redirect, and reassure patient  · Encourage engagement and activity  · Patient recently lost wife 2 weeks ago and is living alone  · Patient's daughter to move in and is checking in on patient daily  · Continue to monitor for acute changes in condition  · Follow up with PCP/Neurology

## 2023-01-03 ENCOUNTER — HOME CARE VISIT (OUTPATIENT)
Dept: HOME HEALTH SERVICES | Facility: HOME HEALTHCARE | Age: 88
End: 2023-01-03

## 2023-01-03 ENCOUNTER — TELEPHONE (OUTPATIENT)
Dept: INTERNAL MEDICINE CLINIC | Facility: CLINIC | Age: 88
End: 2023-01-03

## 2023-01-03 VITALS — HEART RATE: 86 BPM | OXYGEN SATURATION: 96 %

## 2023-01-03 NOTE — TELEPHONE ENCOUNTER
Daughter is asking about whether or not to take him to the "[place] [facility]" for pt's daughter who just  suddenly  I recommend they discuss it with pt to see what he would like to do (understanding that he has memory problems and labile mood swings)

## 2023-01-03 NOTE — CASE COMMUNICATION
OT evaluation completed this date and no further skilled intervention planned at this time  Patient performing routine ADLs such as bathing, dressing, toileting independently  Recommendations made to patient and dtr for DME and grab bars for fall prevention but patient declines pursuing at this time  OT discharging  Patient verbalizes understanding that new orders can be obtained if issues arise during VNA episode  Admission book u kindra to be located  Would SN or PT please provide next visit

## 2023-01-03 NOTE — ASSESSMENT & PLAN NOTE
Parkinson's disease with prominent rest tremor and bradykinesia  More recently patient has been more consistent with his medications as family has been administering medication, however patient currently taking his sinemet of BID basis  He continues to note break through tremor, he admits to feeling "slow moving" at times  We did again discuss timing of sinemet dosing, recommend to take sinemet 25/100 mg 1 tab TID (approx every 5-6 hours), if no improvement in symptoms can consider increase to 1 5 tabs TID  Recommend taking medication 30 mins prior to meals or 60-90 mins following meal  Would continue with home PT/OT  Plan for follow up in 4 months  To contact the office sooner with any concerns or worsening symptoms

## 2023-01-03 NOTE — ASSESSMENT & PLAN NOTE
Patient with worsening cognitive decline since last visit  After visit family spoke with me separately to discuss increased confusion, some paranoid behavior and delusions over the past few months  His wife and daughter recently passed away this month which has worsened his anxiety/depression  He was recently started on lexapro for symptoms  He does have appt with counselor in the near future, family would also be interested in any grief support group-will reach out to MSW for assistance  Given his decline and increasing behaviors we did discuss considering a medication such as donepezil to assist  Given his cardiac history will reach out to his cardiologist prior to starting  Family is looking into more supervision for patient in the home, he does not drive and they have taken much of the household tasks for patient  We did discuss additional safety measure such as cameras in the home, life alert as they do not feel patient is appropriate for 24/7 care or facility placement currently  Will also reach out to MSW for additional information on caregiver assistance  Will plan to obtain updated [address] next visit

## 2023-01-04 ENCOUNTER — HOME CARE VISIT (OUTPATIENT)
Dept: HOME HEALTH SERVICES | Facility: HOME HEALTHCARE | Age: 88
End: 2023-01-04

## 2023-01-04 VITALS — HEART RATE: 57 BPM | SYSTOLIC BLOOD PRESSURE: 153 MMHG | DIASTOLIC BLOOD PRESSURE: 81 MMHG | OXYGEN SATURATION: 99 %

## 2023-01-05 ENCOUNTER — HOME CARE VISIT (OUTPATIENT)
Dept: HOME HEALTH SERVICES | Facility: HOME HEALTHCARE | Age: 88
End: 2023-01-05

## 2023-01-05 ENCOUNTER — TELEMEDICINE (OUTPATIENT)
Dept: GERIATRICS | Facility: OTHER | Age: 88
End: 2023-01-05

## 2023-01-05 VITALS
RESPIRATION RATE: 16 BRPM | TEMPERATURE: 97.2 F | HEART RATE: 78 BPM | OXYGEN SATURATION: 95 % | DIASTOLIC BLOOD PRESSURE: 78 MMHG | SYSTOLIC BLOOD PRESSURE: 142 MMHG

## 2023-01-05 DIAGNOSIS — F02.84 LATE ONSET ALZHEIMER'S DEMENTIA WITH ANXIETY, UNSPECIFIED DEMENTIA SEVERITY (HCC): Primary | ICD-10-CM

## 2023-01-05 DIAGNOSIS — G30.1 LATE ONSET ALZHEIMER'S DEMENTIA WITH ANXIETY, UNSPECIFIED DEMENTIA SEVERITY (HCC): Primary | ICD-10-CM

## 2023-01-05 DIAGNOSIS — Z91.89 DRIVING SAFETY ISSUE: ICD-10-CM

## 2023-01-05 DIAGNOSIS — R54 FRAILTY SYNDROME IN GERIATRIC PATIENT: ICD-10-CM

## 2023-01-05 DIAGNOSIS — R07.9 CHEST PAIN, UNSPECIFIED TYPE: ICD-10-CM

## 2023-01-05 NOTE — TELEPHONE ENCOUNTER
NOAH phoned with [address] [place] [address] on Aging  And lvm at [phone]  requesting a call in order for this writer to place referral

## 2023-01-06 ENCOUNTER — HOME CARE VISIT (OUTPATIENT)
Dept: HOME HEALTH SERVICES | Facility: HOME HEALTHCARE | Age: 88
End: 2023-01-06

## 2023-01-06 NOTE — TELEPHONE ENCOUNTER
MSW phoned with [address] [place] [address] on [address] and spoke with Lacho Bowden who completed referral for Options  A CM will be contacting Sylvia Holbrook

## 2023-01-07 ENCOUNTER — HOME CARE VISIT (OUTPATIENT)
Dept: HOME HEALTH SERVICES | Facility: HOME HEALTHCARE | Age: 88
End: 2023-01-07

## 2023-01-07 VITALS — HEART RATE: 63 BPM | DIASTOLIC BLOOD PRESSURE: 94 MMHG | OXYGEN SATURATION: 99 % | SYSTOLIC BLOOD PRESSURE: 151 MMHG

## 2023-01-08 PROBLEM — R54 FRAILTY SYNDROME IN GERIATRIC PATIENT: Status: ACTIVE | Noted: 2023-01-08

## 2023-01-09 ENCOUNTER — HOME CARE VISIT (OUTPATIENT)
Dept: HOME HEALTH SERVICES | Facility: HOME HEALTHCARE | Age: 88
End: 2023-01-09

## 2023-01-09 ENCOUNTER — PATIENT OUTREACH (OUTPATIENT)
Dept: INTERNAL MEDICINE CLINIC | Facility: CLINIC | Age: 88
End: 2023-01-09

## 2023-01-09 VITALS
HEART RATE: 76 BPM | SYSTOLIC BLOOD PRESSURE: 138 MMHG | OXYGEN SATURATION: 98 % | TEMPERATURE: 97.8 F | DIASTOLIC BLOOD PRESSURE: 82 MMHG | RESPIRATION RATE: 18 BRPM

## 2023-01-09 NOTE — ASSESSMENT & PLAN NOTE
· Patient has been to Neurology appts and per recommendations and assessment of OT instructed to no longer drive  · Patient's daughter reports that patient is no longer driving at this time and she is providing transportation to appt  · MSW will visit to provide additional resources

## 2023-01-09 NOTE — ASSESSMENT & PLAN NOTE
• Patient with multiple Comorbidities  • Patient with recent loss of wife and daughter in the last month  • Patient lives alone  • Patient continues to require supportive services and assist with ADLs, patient's dtr is looking into additional help but does not think 24/7 LTC is needed at this point  • Maintain fall and safety precautions  • Continue to monitor patient for decline

## 2023-01-09 NOTE — ASSESSMENT & PLAN NOTE
· Continue to reorient, redirect, and reassure patient  · Encourage engagement and activity  · Patient continues to require assistance in which supportive services and ADLs can be provided  · Information on supportive services to be provided by MSW  · Will discuss case at next [facility] meeting for Outpatient Case Management referral for additional resources  · Continue Lexapro and Xanax  · Patient had Neurology appt where discussion of Donezapil therapy was suggested  Cardiology will need to review if this is appropriate given medications/cardiac history    · Continue to monitor for acute changes in condition

## 2023-01-09 NOTE — PROGRESS NOTES
[address] [address]  [phone]  HEAL AT HOME VISIT NOTE        NAME: Jo Sweeney  AGE: 82 y o  SEX: male  :  1935  DATE OF ENCOUNTER: 2023    Chief Complaint   Patient seen and examined for follow up on chronic conditions  History of Present Illness     Jo Sweeney is a patient being seen for Heal at home program via secure Teams video visit in conjuction with  Adela  , who was in the home to perform assessment  The patient is being seen and examined today for follow-up on acute and chronic medical conditions s/p most recent hospitalization  Patient denies pain, sob, chest pain, abdominal pain, fever, chills, nausea/vomiting, diarrhea/constipation, or dysuria  The patient reports a good appetite and is drinking an adequate amount of fluids  The patient was seen by Neurology last week with no changes to medications  The patient's daughter is present for visit today  We discussed anxiety management today with patient, but this seemed to make patient more anxious  The following portions of the patient's history were reviewed and updated as appropriate: allergies, current medications, past family history, past medical history, past social history, past surgical history and problem list     Review of Systems     A 12-point comprehensive review of symptoms was obtained with pertinent positives and negatives noted per HPI      History     Past Medical History:   Diagnosis Date   • A-fib [facility] )    • Afib ([facility] )    • [facility]   • Frailty syndrome in geriatric patient 2023   • Hypertension    • Hypertension 2021   • Left AC separation    • Parkinson's disease ([facility] ) 2018   • SDH (subdural hematoma) ([facility] ) 2020     Past Surgical History:   Procedure Laterality Date   • CARDIOVERSION      ATRIAL   • ROTATOR CUFF REPAIR     • SHOULDER SURGERY       Family History   Problem Relation Age of Onset   • No Known Problems Mother    • Hypertension Father    • Coronary artery disease Father      Social History     Socioeconomic History   • Marital status: /Civil Union     Spouse name: Not on file   • Number of children: Not on file   • Years of education: Not on file   • Highest education level: Not on file   Occupational History   • Not on file   Tobacco Use   • Smoking status: Never   • Smokeless tobacco: Never   • Tobacco comments:     Quit 35-40 years ago   Vaping Use   • Vaping Use: Never used   Substance and Sexual Activity   • Alcohol use: Not Currently     Comment: Rare, previously moderate drinker cut down 2016  • Drug use: Never   • Sexual activity: Not Currently     Partners: Female     Birth control/protection: Post-menopausal   Other Topics Concern   • Not on file   Social History Narrative    ** Merged History Encounter **          Social Determinants of Health     Financial Resource Strain: Low Risk    • Difficulty of Paying Living Expenses: Not very hard   Food Insecurity: No Food Insecurity   • Worried About Running Out of Food in the Last Year: Never true   • Ran Out of Food in the Last Year: Never true   Transportation Needs: No Transportation Needs   • Lack of Transportation (Medical): No   • Lack of Transportation (Non-Medical): No   Physical Activity: Not on file   Stress: Not on file   Social Connections: Not on file   Intimate Partner Violence: Not on file   Housing Stability: Low Risk    • Unable to Pay for Housing in the Last Year: No   • Number of Places Lived in the Last Year: 1   • Unstable Housing in the Last Year: No     Allergies   Allergen Reactions   • Penicillins Hives   • Pollen Extract Other (See Comments)              Objective     Vital Signs  BP: 142/78       HR:78 T:97 2    RR:16 O2Sat:95% W:218  General: NAD, Well Nourished, Well Developed  Oral: Oropharynx Moist and Clear  Neck: Supple, +ROM  CV: S1, S2, normal rate, regular rhythm, no murmur appreciated  Pulmonary: Lung sounds clear to air, no wheezing, rhonchi, rales  Abdominal:BS + x4 in all quadrants, soft, no mass, no tenderness  Extremities: No edema, +ROM, +Weakness  Skin: Warm, Dry, no lesions, no rash, no erythema present, no ecchymosis present  Neurological/Psych: Alert and oriented times 2, forgetful, anxious mood, no affect, poor judgement    Pertinent Laboratory/Diagnostic Studies have been independently reviewed  Current Medications     Current Medications Reviewed and updated in Nursing Home EMR  Assessment and Plan     Dementia with anxiety  · Continue to reorient, redirect, and reassure patient  · Encourage engagement and activity  · Patient continues to require assistance in which supportive services and ADLs can be provided  · Information on supportive services to be provided by MSW  · Will discuss case at next [facility] meeting for Outpatient Case Management referral for additional resources  · Continue Lexapro and Xanax  · Patient had Neurology appt where discussion of Donezapil therapy was suggested  Cardiology will need to review if this is appropriate given medications/cardiac history    · Continue to monitor for acute changes in condition      Chest pain  · Patient dtr reports patient has c/o CP but when patient asked to explain further he describes as tightness occurring at night and resolves  · Discussed with pt and daughter several options, trial of Pepcid for GERD  · Follow up with Cardiology to r/o Angina  · The more discussion about this the more anxious patient became  · Patient daughter states she believes that CP is actually anxiety and patient may need to take Xanax that is prescribed prn but he does not always take  · Continue to monitor    Frailty syndrome in geriatric patient  • Patient with multiple Comorbidities  • Patient with recent loss of wife and daughter in the last month  • Patient lives alone  • Patient continues to require supportive services and assist with ADLs, patient's dtr is looking into additional help but does not think 24/7 LTC is needed at this point  • Maintain fall and safety precautions  • Continue to monitor patient for decline      Driving safety issue  · Patient has been to Neurology appts and per recommendations and assessment of OT instructed to no longer drive  · Patient's daughter reports that patient is no longer driving at this time and she is providing transportation to appt  · MSW will visit to provide additional resources      Beto Pace [place] [id number] Medicine  1/5/2023

## 2023-01-09 NOTE — ASSESSMENT & PLAN NOTE
· Patient dtr reports patient has c/o CP but when patient asked to explain further he describes as tightness occurring at night and resolves  · Discussed with pt and daughter several options, trial of Pepcid for GERD  · Follow up with Cardiology to r/o Angina  · The more discussion about this the more anxious patient became  · Patient daughter states she believes that CP is actually anxiety and patient may need to take Xanax that is prescribed prn but he does not always take  · Continue to monitor

## 2023-01-10 ENCOUNTER — HOME CARE VISIT (OUTPATIENT)
Dept: HOME HEALTH SERVICES | Facility: HOME HEALTHCARE | Age: 88
End: 2023-01-10

## 2023-01-11 VITALS — HEART RATE: 68 BPM | DIASTOLIC BLOOD PRESSURE: 84 MMHG | SYSTOLIC BLOOD PRESSURE: 142 MMHG

## 2023-01-12 ENCOUNTER — TELEPHONE (OUTPATIENT)
Dept: NEUROLOGY | Facility: CLINIC | Age: 88
End: 2023-01-12

## 2023-01-12 ENCOUNTER — NURSE TRIAGE (OUTPATIENT)
Dept: OTHER | Facility: OTHER | Age: 88
End: 2023-01-12

## 2023-01-12 ENCOUNTER — HOME CARE VISIT (OUTPATIENT)
Dept: HOME HEALTH SERVICES | Facility: HOME HEALTHCARE | Age: 88
End: 2023-01-12

## 2023-01-12 ENCOUNTER — TELEMEDICINE (OUTPATIENT)
Dept: GERIATRICS | Facility: OTHER | Age: 88
End: 2023-01-12

## 2023-01-12 VITALS
OXYGEN SATURATION: 95 % | SYSTOLIC BLOOD PRESSURE: 130 MMHG | HEART RATE: 95 BPM | TEMPERATURE: 98.1 F | DIASTOLIC BLOOD PRESSURE: 76 MMHG | RESPIRATION RATE: 20 BRPM

## 2023-01-12 DIAGNOSIS — K59.09 OTHER CONSTIPATION: ICD-10-CM

## 2023-01-12 DIAGNOSIS — F03.A4 MILD DEMENTIA WITH ANXIETY, UNSPECIFIED DEMENTIA TYPE: Primary | ICD-10-CM

## 2023-01-12 DIAGNOSIS — F02.84 LATE ONSET ALZHEIMER'S DEMENTIA WITH ANXIETY, UNSPECIFIED DEMENTIA SEVERITY (HCC): Primary | ICD-10-CM

## 2023-01-12 DIAGNOSIS — I10 BENIGN ESSENTIAL HYPERTENSION: ICD-10-CM

## 2023-01-12 DIAGNOSIS — G20 PARKINSON'S DISEASE (HCC): ICD-10-CM

## 2023-01-12 DIAGNOSIS — R54 FRAILTY SYNDROME IN GERIATRIC PATIENT: ICD-10-CM

## 2023-01-12 DIAGNOSIS — G30.1 LATE ONSET ALZHEIMER'S DEMENTIA WITH ANXIETY, UNSPECIFIED DEMENTIA SEVERITY (HCC): Primary | ICD-10-CM

## 2023-01-12 RX ORDER — DONEPEZIL HYDROCHLORIDE 5 MG/1
5 TABLET, FILM COATED ORAL
Qty: 30 TABLET | Refills: 2 | Status: SHIPPED | OUTPATIENT
Start: 2023-01-12

## 2023-01-12 NOTE — PROGRESS NOTES
[facility]  [phone]  [facility] VISIT NOTE    Virtual Regular Visit     Verification of patient location:     Patient is located in the following state in which I hold an active license [place]    NAME: Nisha Srivastava  AGE: 82 y o  SEX: male  :  1935  DATE OF ENCOUNTER: 2023    Chief Complaint   Patient seen and examined for follow up on chronic conditions  The patient was identified by name and date of birth  Nisha Srivastava was informed that this is a telemedicine visit and that the visit is being conducted through [employer] and patient was informed that this is a secure, HIPAA-compliant platform  He agrees to proceed     My office door was closed  No one else was in the room  He acknowledged consent and understanding of privacy and security of the video platform  The patient has agreed to participate and understands they can discontinue the visit at any time  Patient is aware this is a billable service  History of Present Illness     Nisha Srivastava is a patient being seen for Heal at home program via secure Teams video visit in conjuction with [facility] nurse Gabriel Cleary , who was in the home to perform assessment  The patient is being seen and examined today for follow-up on acute and chronic medical conditions s/p most recent hospitalization  Patient denies pain, sob, chest pain, abdominal pain, fever, chills, nausea/vomiting, diarrhea/constipation, or dysuria  The patient reports a good appetite and is drinking an adequate amount of fluids  Nursing reports that the patient had called EMS last night r/t stomach pain per his daughter  The patient was evaluated but was not taken to the ED  The patient was found to be constipated and after having some milk of magnesia had a BM and felt better  The patient did not recall this incident  The patient does report some anxiety today      The following portions of the patient's history were reviewed and updated as appropriate: allergies, current medications, past family history, past medical history, past social history, past surgical history and problem list     Review of Systems     A 12-point comprehensive review of symptoms was obtained with pertinent positives and negatives noted per HPI  History     Past Medical History:   Diagnosis Date   • A-fib ([place] [address] )    • Afib ([place] [address] )    • Atrial fibrillation ([place] [address] ) 6/13/2021   • Frailty syndrome in geriatric patient 1/8/2023   • Hypertension    • Hypertension 6/13/2021   • Left AC separation    • Other constipation 1/14/2023   • Parkinson's disease ([place] [address] ) 11/01/2018   • SDH (subdural hematoma) ([place] [address] ) 2/7/2020     Past Surgical History:   Procedure Laterality Date   • CARDIOVERSION      ATRIAL   • ROTATOR CUFF REPAIR     • SHOULDER SURGERY       Family History   Problem Relation Age of Onset   • No Known Problems Mother    • Hypertension Father    • Coronary artery disease Father      Social History     Socioeconomic History   • Marital status: /Civil Union     Spouse name: Not on file   • Number of children: Not on file   • Years of education: Not on file   • Highest education level: Not on file   Occupational History   • Not on file   Tobacco Use   • Smoking status: Never   • Smokeless tobacco: Never   • Tobacco comments:     Quit 35-40 years ago   Vaping Use   • Vaping Use: Never used   Substance and Sexual Activity   • Alcohol use: Not Currently     Comment: Rare, previously moderate drinker cut down 2016      • Drug use: Never   • Sexual activity: Not Currently     Partners: Female     Birth control/protection: Post-menopausal   Other Topics Concern   • Not on file   Social History Narrative    ** Merged History Encounter **          Social Determinants of Health     Financial Resource Strain: Low Risk    • Difficulty of Paying Living Expenses: Not very hard   Food Insecurity: No Food Insecurity   • Worried About Running Out of Food in the Last Year: Never true   • Ran Out of Food in the Last Year: Never true   Transportation Needs: No Transportation Needs   • Lack of Transportation (Medical): No   • Lack of Transportation (Non-Medical): No   Physical Activity: Not on file   Stress: Not on file   Social Connections: Not on file   Intimate Partner Violence: Not on file   Housing Stability: Low Risk    • Unable to Pay for Housing in the Last Year: No   • Number of Places Lived in the Last Year: 1   • Unstable Housing in the Last Year: No     Allergies   Allergen Reactions   • Penicillins Hives   • Pollen Extract Other (See Comments)       Objective     Vital Signs  BP: 130/76       HR:95 T:98 1    RR:20 O2Sat:95%    General: NAD, Well Nourished, Well Developed  Oral: Oropharynx Moist and Clear  Neck: Supple, +ROM  CV: S1, S2, normal rate, regular rhythm, no murmur appreciated  Pulmonary: Lung sounds clear to air, no wheezing, rhonchi, rales  Abdominal:BS + x4 in all quadrants, soft, no mass, no tenderness  Extremities: No edema, +ROM, +Weakness  Skin: Warm, Dry, no lesions, no rash, no erythema present, no ecchymosis present  Neurological/Psych: Alert and oriented times 2, forgetful, anxious mood, no affect, poor judgement    Pertinent Laboratory/Diagnostic Studies have been independently reviewed  Current Medications     Current Medications Reviewed and updated      Assessment and Plan     Dementia with anxiety  ·  Continue to reorient, redirect, and reassure patient  · Encourage engagement and activity  · Patient continues to require assistance in which supportive services and ADLs can be provided  · MSW through [facility] met today and are to provide additional support grief services for patient/family r/t   · Will discuss case with Outpatient Case Management for referral for additional resources  · Continue Lexapro and Xanax  · Follow up with Cardiology regarding Donepezil intiation, will need to review if this is appropriate given medications/cardiac history  · Follow up with Neurology  · Continue to monitor for acute changes in condition    Frailty syndrome in geriatric patient  ·  Patient with multiple Comorbidities  · Patient with recent loss of wife and daughter in the last month  · Patient lives alone with supportive daughter who assist prn  · Patient continues to require supportive services and assist with ADLs, patient's dtr is looking into additional help but does not think 24/7 LTC is needed at this point  · Maintain fall and safety precautions  · Continue to monitor patient for decline    Benign essential hypertension  · BP today within acceptable limits  · Continue Metoprolol  · Avoid hypotension  · Continue to monitor BMP      Parkinson's disease ([facility] [address] )  · Continue supportive services  · Continue Sinemet TID at current dose, as discussed with Neurology may need to increase to 1 5 tabs TID if no improvements noted in tremor  Contact Neurology for further instruct    · Continue PT/OT services with home health  · Follow up with Neurology    Other constipation  • Encourage fluid intake  • Encourage mobility with assistance  • Encourage fruit and fiber intake  • Continue Milk of magnesia for constipation        [facility]  Geriatric Medicine  1/12/2023

## 2023-01-12 NOTE — ASSESSMENT & PLAN NOTE
·  Patient with multiple Comorbidities  · Patient with recent loss of wife and daughter in the last month  · Patient lives alone with supportive daughter who assist prn  · Patient continues to require supportive services and assist with ADLs, patient's dtr is looking into additional help but does not think 24/7 LTC is needed at this point  · Maintain fall and safety precautions  · Continue to monitor patient for decline

## 2023-01-12 NOTE — TELEPHONE ENCOUNTER
Reason for Disposition  • Requesting to talk with a counselor (mental health worker, psychiatrist, etc )    Answer Assessment - Initial Assessment Questions  1  CONCERN: "What happened that made you call today?"    Depression   Feels anxious due to recent loss of his wife and would like medicine    2  DEPRESSION SYMPTOM SCREENING: "How are you feeling overall?" (e g , decreased energy, increased sleeping or difficulty sleeping, difficulty concentrating, feelings of sadness, guilt, hopelessness, or worthlessness)     Anxious    3  RISK OF HARM - SUICIDAL IDEATION:  "Do you ever have thoughts of hurting or killing yourself?"  (e g , yes, no, no but preoccupation with thoughts about death)    - INTENT:  "Do you have thoughts of hurting or killing yourself right NOW?" (e g , yes, no, N/A)    - PLAN: "Do you have a specific plan for how you would do this?" (e g , gun, knife, overdose, no plan, N/A)      Denies    4  RISK OF HARM - HOMICIDAL IDEATION:  "Do you ever have thoughts of hurting or killing someone else?"  (e g , yes, no, no but preoccupation with thoughts about death)    - INTENT:  "Do you have thoughts of hurting or killing someone right NOW?" (e g , yes, no, N/A)    - PLAN: "Do you have a specific plan for how you would do this?" (e g , gun, knife, no plan, N/A)      Denies     5  FUNCTIONAL IMPAIRMENT: "How have things been going for you overall? Have you had more difficulty than usual doing your normal daily activities?"  (e g , better, same, worse; self-care, school, work, interactions)       Difficult with loss of my wife    6  SUPPORT: "Who is with you now?" "Who do you live with?" "Do you have family or friends who you can talk to?"        friends but lives alone     7  THERAPIST: "Do you have a counselor or therapist? Name?"      Denies     8  STRESSORS: "Has there been any new stress or recent changes in your life?"     Wife recently     5   ALCOHOL USE OR SUBSTANCE USE (DRUG USE): "Do you drink alcohol or use any illegal drugs?"     Denies    10   OTHER: "Do you have any other physical symptoms right now?" (e g , fever)       denies    Protocols used: DEPRESSION-ADULT-AH

## 2023-01-12 NOTE — ASSESSMENT & PLAN NOTE
·  Continue to reorient, redirect, and reassure patient  · Encourage engagement and activity  · Patient continues to require assistance in which supportive services and ADLs can be provided  · MSW through [facility] met today and are to provide additional support grief services for patient/family r/t   · Will discuss case with Outpatient Case Management for referral for additional resources  · Continue Lexapro and Xanax  · Follow up with Cardiology regarding Donepezil intiation, will need to review if this is appropriate given medications/cardiac history  · Follow up with Neurology  · Continue to monitor for acute changes in condition

## 2023-01-12 NOTE — TELEPHONE ENCOUNTER
Regarding: depression  ----- Message from Yolanda Woods sent at 1/12/2023  4:04 PM EST -----  "I recently lost my wife and I am dealing with a lot of depression "

## 2023-01-12 NOTE — TELEPHONE ENCOUNTER
Patient called in requesting medicine for anxiety   Patient states his wife recently passed away and he's been anxious  Denies suicidal ideation  I offered the patient two appointment for 1/13/2023 but he declined and said he will callback  I recommended he evaluated within 3 days

## 2023-01-12 NOTE — ASSESSMENT & PLAN NOTE
· Continue supportive services  · Continue Sinemet TID at current dose, as discussed with Neurology may need to increase to 1 5 tabs TID if no improvements noted in tremor  Contact Neurology for further instruct    · Continue PT/OT services with home health  · Follow up with Neurology

## 2023-01-12 NOTE — ASSESSMENT & PLAN NOTE
· BP today within acceptable limits  · Continue Metoprolol  · Avoid hypotension  · Continue to monitor BMP

## 2023-01-12 NOTE — TELEPHONE ENCOUNTER
Spoke with patient's daughter Sonja Amanda about ok to start aricept from cardiology standpoint (see message below)  Will start aricept 5 mg 1 tab qhs, potential side effects reviewed that she should report  Should contact the office after 1 month to see if we want to increase medication to 10 mg  Medication sent to pharmacy      ----- Message from Francis Shah MD sent at 1/11/2023  3:08 PM EST -----  Regarding: RE: medication question  Thank you for getting in touch  I appreciated  I think it would be okay for the patient to start Aricept from 5 perspective  Thank you   ----- Message -----  From: RAFFI Quinonez  Sent: 12/30/2022   1:57 PM EST  To: Francis Shah MD  Subject: medication question                              Geraldine Starks,  I recently saw Idris Fernandez for follow up for his parkinson's disease  Family has been noting some increasing behaviors, confusion, paranoia, delusions consistent with dementia  Family was agreeable to starting patient on aricept to see if this would help some of his behaviors  I wanted to reach out to you to see if starting aricept would be ok from your standpoint given his cardiac history      Thanks,  RAFFI Quinonez, neurology

## 2023-01-13 ENCOUNTER — HOME CARE VISIT (OUTPATIENT)
Dept: HOME HEALTH SERVICES | Facility: HOME HEALTHCARE | Age: 88
End: 2023-01-13

## 2023-01-13 NOTE — TELEPHONE ENCOUNTER
I called and left a message that escitalopram also known as Lexapro, 10 mg daily is the medication for anxiety

## 2023-01-14 PROBLEM — K59.09 OTHER CONSTIPATION: Status: ACTIVE | Noted: 2023-01-14

## 2023-01-15 NOTE — ASSESSMENT & PLAN NOTE
• Encourage fluid intake  • Encourage mobility with assistance  • Encourage fruit and fiber intake  • Continue Milk of magnesia for constipation

## 2023-01-16 ENCOUNTER — HOME CARE VISIT (OUTPATIENT)
Dept: HOME HEALTH SERVICES | Facility: HOME HEALTHCARE | Age: 88
End: 2023-01-16

## 2023-01-16 ENCOUNTER — NURSE TRIAGE (OUTPATIENT)
Dept: OTHER | Facility: OTHER | Age: 88
End: 2023-01-16

## 2023-01-16 VITALS
RESPIRATION RATE: 18 BRPM | TEMPERATURE: 97.3 F | SYSTOLIC BLOOD PRESSURE: 144 MMHG | OXYGEN SATURATION: 98 % | DIASTOLIC BLOOD PRESSURE: 66 MMHG | HEART RATE: 63 BPM

## 2023-01-16 NOTE — TELEPHONE ENCOUNTER
Regarding: anxiety  ----- Message from Hyacinth Duke sent at 1/16/2023  4:05 PM EST -----  Patient just recently lost his wife and is having a hard time coping  He is very anxious

## 2023-01-16 NOTE — TELEPHONE ENCOUNTER
Regarding: anxiety  ----- Message from Cruz Carbajal sent at 1/16/2023  2:37 PM EST -----  Patient just recently lost his wife and is having a hard time coping  He is very anxious

## 2023-01-19 ENCOUNTER — TELEMEDICINE (OUTPATIENT)
Dept: GERIATRICS | Facility: OTHER | Age: 88
End: 2023-01-19

## 2023-01-19 ENCOUNTER — HOME CARE VISIT (OUTPATIENT)
Dept: HOME HEALTH SERVICES | Facility: HOME HEALTHCARE | Age: 88
End: 2023-01-19

## 2023-01-19 ENCOUNTER — TELEPHONE (OUTPATIENT)
Dept: GASTROENTEROLOGY | Facility: CLINIC | Age: 88
End: 2023-01-19

## 2023-01-19 VITALS
SYSTOLIC BLOOD PRESSURE: 152 MMHG | RESPIRATION RATE: 16 BRPM | OXYGEN SATURATION: 99 % | DIASTOLIC BLOOD PRESSURE: 82 MMHG | HEART RATE: 62 BPM | TEMPERATURE: 97.8 F

## 2023-01-19 DIAGNOSIS — F41.9 ANXIETY: Primary | ICD-10-CM

## 2023-01-19 DIAGNOSIS — I10 BENIGN ESSENTIAL HYPERTENSION: ICD-10-CM

## 2023-01-19 DIAGNOSIS — I48.19 PERSISTENT ATRIAL FIBRILLATION (HCC): ICD-10-CM

## 2023-01-19 DIAGNOSIS — G20 PARKINSON'S DISEASE (HCC): ICD-10-CM

## 2023-01-19 NOTE — PROGRESS NOTES
Virtual Regular Visit    Verification of patient location:    Patient is located in the following state in which I hold an active license [place]      Assessment/Plan:    Problem List Items Addressed This Visit        Cardiovascular and Mediastinum    Benign essential hypertension     · BP today 152/82  · Continue Metoprolol and Micardis  · Avoid hypotension  · Follow up with PCP as scheduled           Persistent atrial fibrillation (HCC)     · Continue Metoprolol for rate control  · Continue Xarelto for anticoag  · Follow-up with PCP as scheduled              Nervous and Auditory    Parkinson's disease ([facility] )     · Continue Sinemet  · Follow up with Neurology            Other    Anxiety - Primary     • Continue reassurance and supportive measures  • Continue anti-anxiety medications, Lexapro and Xanax  • Encourage activity and engagement  • Follow up with Psych as scheduled                   Reason for visit is   Chief Complaint   Patient presents with   • Virtual Regular Visit        [facility]    Provider located at [address] [place] [address]  [phone]      Recent Visits  Date Type Provider Dept   02/02/23 Telephone Edmundo Alanis MD [place] [id number]   01/30/23 Office Visit Edmundo Alanis MD [address] [facility] recent visits within past 7 days and meeting all other requirements  Future Appointments  No visits were found meeting these conditions  Showing future appointments within next 150 days and meeting all other requirements       The patient was identified by name and date of birth  Marmargot Arely was informed that this is a telemedicine visit and that the visit is being conducted through the OB10  He agrees to proceed     My office door was closed  No one else was in the room    He acknowledged consent and understanding of privacy and security of the video platform  The patient has agreed to participate and understands they can discontinue the visit at any time  Patient is aware this is a billable service  Subjective  Comfort Sprague is a 82 y o  male    being seen for [facility] program in conjuction with VNA nurse Monika Rivas, who was in the home to perform physical assessment  The patient is being seen and examined today for follow-up on acute and chronic medical conditions s/p most recent hospitalization  Patient denies pain, sob, chest pain, abdominal pain, fever, chills, nausea/vomiting, diarrhea/constipation, or dysuria  The patient reports a good appetite and is drinking an adequate amount of fluids  HPI     Patient has a follow up appt with psych 1/24/23 and PCP 1/30/23  Continues to report feeling anxious      Past Medical History:   Diagnosis Date   • A-fib [facility])    • Afib ([facility] )    • Atrial fibrillation ([facility] ) 6/13/2021   • Frailty syndrome in geriatric patient 1/8/2023   • Hypertension    • Hypertension 6/13/2021   • Left AC separation    • Other constipation 1/14/2023   • [facility]'s disease ([facility] ) 11/01/2018   • SDH (subdural hematoma) ([facility] ) 2/7/2020       Past Surgical History:   Procedure Laterality Date   • CARDIOVERSION      ATRIAL   • ROTATOR CUFF REPAIR     • SHOULDER SURGERY         Current Outpatient Medications   Medication Sig Dispense Refill   • acetaminophen (TYLENOL) 325 mg tablet Take 2 tablets (650 mg total) by mouth every 4 (four) hours as needed for mild pain  0   • ALPRAZolam (XANAX) 0 25 mg tablet Take 1 tablet (0 25 mg total) by mouth 3 (three) times a day as needed for anxiety 30 tablet 0   • carbidopa-levodopa (SINEMET)  mg per tablet TAKE 1 TABLET BY MOUTH THREE TIMES A  tablet 3   • donepezil (ARICEPT) 5 mg tablet TAKE 1 TABLET BY MOUTH AT BEDTIME 90 tablet 1   • escitalopram (Lexapro) 10 mg tablet Take 1 tablet (10 mg total) by mouth daily 90 tablet 3   • furosemide (LASIX) 20 mg tablet Take 1 tablet (20 mg total) by mouth daily 30 tablet 5   • ipratropium (ATROVENT) 0 06 % nasal spray 1 spray into each nostril 4 (four) times a day as needed for rhinitis 45 mL 0   • metoprolol succinate (TOPROL-XL) 25 mg 24 hr tablet Take 1 tablet (25 mg total) by mouth daily 90 tablet 3   • Multiple Vitamins-Minerals (ICAPS AREDS 2) CAPS Take 1 capsule by mouth 2 (two) times a day   (Patient not taking: Reported on 12/29/2022)     • potassium chloride (MICRO-K) 10 MEQ CR capsule Take 1 capsule (10 mEq total) by mouth daily 30 capsule 5   • telmisartan (MICARDIS) 20 MG tablet Take 0 5 tablets (10 mg total) by mouth daily 45 tablet 3   • Xarelto 15 MG tablet Take 1 tablet (15 mg total) by mouth daily with breakfast 90 tablet 3     No current facility-administered medications for this visit  Allergies   Allergen Reactions   • Penicillins Hives   • Pollen Extract Other (See Comments)       Review of Systems    Video Exam    /82   Resp 16   SpO2 99 %   Pulse 62   Temp 97 8 °F (36 6 °C)         Physical Exam  HENT:      Head: Normocephalic  Right Ear: Ear canal normal    Eyes:      Conjunctiva/sclera: Conjunctivae normal    Cardiovascular:      Rate and Rhythm: Normal rate and regular rhythm  Pulses: Normal pulses  Heart sounds: Normal heart sounds  Pulmonary:      Effort: Pulmonary effort is normal       Breath sounds: Normal breath sounds  Abdominal:      Palpations: Abdomen is soft  Musculoskeletal:         General: Normal range of motion  Cervical back: Normal range of motion  Skin:     General: Skin is warm and dry  Neurological:      General: No focal deficit present  Mental Status: He is alert  Mental status is at baseline        Coordination: Coordination abnormal       Gait: Gait abnormal       Comments: forgetful   Psychiatric:      Comments: anxious          I spent 45 minutes today in which greater than 50% of the time was spent in counseling/coordination of care regarding acute and chronic medical medical conditions r/t discharge instructions including follow up appointments and coordination of ancillary resources

## 2023-01-23 ENCOUNTER — HOME CARE VISIT (OUTPATIENT)
Dept: HOME HEALTH SERVICES | Facility: HOME HEALTHCARE | Age: 88
End: 2023-01-23

## 2023-01-23 VITALS
RESPIRATION RATE: 20 BRPM | HEART RATE: 56 BPM | DIASTOLIC BLOOD PRESSURE: 84 MMHG | TEMPERATURE: 97.8 F | SYSTOLIC BLOOD PRESSURE: 138 MMHG | OXYGEN SATURATION: 96 %

## 2023-01-26 ENCOUNTER — HOME CARE VISIT (OUTPATIENT)
Dept: HOME HEALTH SERVICES | Facility: HOME HEALTHCARE | Age: 88
End: 2023-01-26

## 2023-01-26 VITALS
RESPIRATION RATE: 18 BRPM | DIASTOLIC BLOOD PRESSURE: 82 MMHG | SYSTOLIC BLOOD PRESSURE: 140 MMHG | OXYGEN SATURATION: 99 % | TEMPERATURE: 96.9 F | HEART RATE: 84 BPM

## 2023-01-27 ENCOUNTER — PATIENT OUTREACH (OUTPATIENT)
Dept: INTERNAL MEDICINE CLINIC | Facility: CLINIC | Age: 88
End: 2023-01-27

## 2023-01-27 ENCOUNTER — TELEPHONE (OUTPATIENT)
Dept: GASTROENTEROLOGY | Facility: CLINIC | Age: 88
End: 2023-01-27

## 2023-01-27 ENCOUNTER — HOME CARE VISIT (OUTPATIENT)
Dept: HOME HEALTH SERVICES | Facility: HOME HEALTHCARE | Age: 88
End: 2023-01-27

## 2023-01-27 DIAGNOSIS — F41.9 ANXIETY: ICD-10-CM

## 2023-01-27 DIAGNOSIS — F02.818 DEMENTIA ASSOCIATED WITH OTHER UNDERLYING DISEASE WITH BEHAVIORAL DISTURBANCE: Primary | ICD-10-CM

## 2023-01-27 DIAGNOSIS — F03.A4 MILD DEMENTIA WITH ANXIETY, UNSPECIFIED DEMENTIA TYPE: ICD-10-CM

## 2023-01-27 DIAGNOSIS — F43.21 GRIEF REACTION: ICD-10-CM

## 2023-01-27 NOTE — PROGRESS NOTES
OPCM SW received referral from PCP  Patient is experiencing grief over loss of wife and dtr  Chart review completed  Dtr reports that patient has MH follow up next week  Patient has tried group counseling which was not a good fit for him  SW has provided resources for home aides, [employer], [employer], [employer] [facility], resource guides  Phone call placed to dtr  Call was answered with voices in background but no response  SW will attempt call at another time

## 2023-01-30 ENCOUNTER — OFFICE VISIT (OUTPATIENT)
Dept: INTERNAL MEDICINE CLINIC | Facility: CLINIC | Age: 88
End: 2023-01-30

## 2023-01-30 VITALS
SYSTOLIC BLOOD PRESSURE: 136 MMHG | HEART RATE: 70 BPM | WEIGHT: 219 LBS | DIASTOLIC BLOOD PRESSURE: 76 MMHG | OXYGEN SATURATION: 99 % | BODY MASS INDEX: 28.11 KG/M2 | HEIGHT: 74 IN

## 2023-01-30 DIAGNOSIS — F43.21 GRIEF REACTION: Primary | ICD-10-CM

## 2023-01-30 DIAGNOSIS — I48.91 ATRIAL FIBRILLATION, UNSPECIFIED TYPE (HCC): ICD-10-CM

## 2023-01-30 DIAGNOSIS — I48.19 PERSISTENT ATRIAL FIBRILLATION (HCC): ICD-10-CM

## 2023-01-30 DIAGNOSIS — F02.818 DEMENTIA ASSOCIATED WITH OTHER UNDERLYING DISEASE WITH BEHAVIORAL DISTURBANCE: ICD-10-CM

## 2023-01-30 DIAGNOSIS — G20 PARKINSON'S DISEASE (HCC): ICD-10-CM

## 2023-01-30 PROBLEM — F43.20 GRIEF REACTION: Status: ACTIVE | Noted: 2023-01-30

## 2023-01-30 RX ORDER — DONEPEZIL HYDROCHLORIDE 5 MG/1
TABLET, FILM COATED ORAL
Qty: 90 TABLET | Refills: 1 | Status: SHIPPED | OUTPATIENT
Start: 2023-01-30

## 2023-01-30 NOTE — PROGRESS NOTES
Name: Regine Mendez      : 1935      MRN: [id number]  Encounter Provider: Tacos Briones MD  Encounter Date: 2023   [facility] [address]     1  Grief reaction  Assessment & Plan:  Pt just lost wife and daughter  Discussed continuing with family support, continuing escitalopram, getting to counselor  Discussed option of going to hospital if he feels that he cannot deal with the grief  Pt has alprazolam which family wanted to have on hand for the funerals, and has not been using it  Also discussed possibilities of treatment groups  Orders:  -     [facility]; Future    2  Parkinson's disease [facility])  Assessment & Plan:  Continue meds and follow up neuro      3  Atrial fibrillation, unspecified type ([facility] )    4  Dementia associated with other underlying disease with behavioral disturbance    5  Persistent atrial fibrillation (HCC)  Assessment & Plan:  Continue anticoagulation      BMI Counseling: Body mass index is 28 12 kg/m²  The BMI is above normal  Nutrition recommendations include encouraging healthy choices of fruits and vegetables and moderation in carbohydrate intake  Exercise recommendations include exercising 3-5 times per week  Rationale for BMI follow-up plan is due to patient being overweight or obese  Subjective     Pt had recent life stressors of wife dying and daughter dying  Patient is living alone, has visiting nurses coming through the end of February  Pt and family have also been working with medical social worker  He is going to see counselor, but appt need to be rescheduled  Pt is crying a lot  Depression  Pertinent negatives include no abdominal pain, arthralgias, chest pain, chills, congestion, coughing, fatigue, fever, headaches, nausea, rash, sore throat or vomiting  Review of Systems   Constitutional: Negative for chills, fatigue and fever     HENT: Negative for congestion, nosebleeds, postnasal drip, sore throat and trouble swallowing  Eyes: Negative for pain  Respiratory: Negative for cough, chest tightness, shortness of breath and wheezing  Cardiovascular: Negative for chest pain, palpitations and leg swelling  Gastrointestinal: Negative for abdominal pain, constipation, diarrhea, nausea and vomiting  Endocrine: Negative for polydipsia and polyuria  Genitourinary: Negative for dysuria, flank pain and hematuria  Musculoskeletal: Negative for arthralgias  Skin: Negative for rash  Neurological: Positive for tremors  Negative for dizziness, light-headedness and headaches  Hematological: Does not bruise/bleed easily  Psychiatric/Behavioral: Positive for confusion, depression and dysphoric mood  Negative for sleep disturbance  The patient is nervous/anxious          Past Medical History:   Diagnosis Date   • A-fib ([place] [address] )    • Afib ([facility] )    • Atrial fibrillation ([facility] ) 6/13/2021   • Frailty syndrome in geriatric patient 1/8/2023   • Hypertension    • Hypertension 6/13/2021   • Left AC separation    • Other constipation 1/14/2023   • Parkinson's disease ([facility] ) 11/01/2018   • SDH (subdural hematoma) ([facility] ) 2/7/2020     Past Surgical History:   Procedure Laterality Date   • CARDIOVERSION      ATRIAL   • ROTATOR CUFF REPAIR     • SHOULDER SURGERY       Family History   Problem Relation Age of Onset   • No Known Problems Mother    • Hypertension Father    • Coronary artery disease Father      Social History     Socioeconomic History   • Marital status: /Civil Union     Spouse name: None   • Number of children: None   • Years of education: None   • Highest education level: None   Occupational History   • None   Tobacco Use   • Smoking status: Never   • Smokeless tobacco: Never   • Tobacco comments:     Quit 35-40 years ago   Vaping Use   • Vaping Use: Never used   Substance and Sexual Activity   • Alcohol use: Not Currently     Comment: Rare, previously moderate drinker cut down 2016  • Drug use: Never   • Sexual activity: Not Currently     Partners: Female     Birth control/protection: Post-menopausal   Other Topics Concern   • None   Social History Narrative    ** Merged History Encounter **          Social Determinants of Health     Financial Resource Strain: Low Risk    • Difficulty of Paying Living Expenses: Not very hard   Food Insecurity: No Food Insecurity   • Worried About Running Out of Food in the Last Year: Never true   • Ran Out of Food in the Last Year: Never true   Transportation Needs: No Transportation Needs   • Lack of Transportation (Medical): No   • Lack of Transportation (Non-Medical):  No   Physical Activity: Not on file   Stress: Not on file   Social Connections: Not on file   Intimate Partner Violence: Not on file   Housing Stability: Low Risk    • Unable to Pay for Housing in the Last Year: No   • Number of Places Lived in the Last Year: 1   • Unstable Housing in the Last Year: No     Current Outpatient Medications on File Prior to Visit   Medication Sig   • acetaminophen (TYLENOL) 325 mg tablet Take 2 tablets (650 mg total) by mouth every 4 (four) hours as needed for mild pain   • ALPRAZolam (XANAX) 0 25 mg tablet Take 1 tablet (0 25 mg total) by mouth 3 (three) times a day as needed for anxiety   • carbidopa-levodopa (SINEMET)  mg per tablet TAKE 1 TABLET BY MOUTH THREE TIMES A DAY   • donepezil (ARICEPT) 5 mg tablet TAKE 1 TABLET BY MOUTH AT BEDTIME   • escitalopram (Lexapro) 10 mg tablet Take 1 tablet (10 mg total) by mouth daily   • ipratropium (ATROVENT) 0 06 % nasal spray 1 spray into each nostril 4 (four) times a day as needed for rhinitis   • metoprolol succinate (TOPROL-XL) 25 mg 24 hr tablet Take 1 tablet (25 mg total) by mouth daily   • telmisartan (MICARDIS) 20 MG tablet Take 0 5 tablets (10 mg total) by mouth daily   • Xarelto 15 MG tablet Take 1 tablet (15 mg total) by mouth daily with breakfast   • Multiple Vitamins-Minerals (ICAPS AREDS 2) CAPS Take 1 capsule by mouth 2 (two) times a day   (Patient not taking: Reported on 12/29/2022)   • [DISCONTINUED] donepezil (ARICEPT) 5 mg tablet Take 1 tablet (5 mg total) by mouth daily at bedtime     Allergies   Allergen Reactions   • Penicillins Hives   • Pollen Extract Other (See Comments)       Immunization History   Administered Date(s) Administered   • COVID-19 PFIZER VACCINE 0 3 ML IM 01/27/2021, 02/17/2021, 01/08/2022   • INFLUENZA 09/04/2016, 10/26/2017, 10/26/2017, 09/13/2018   • Influenza Split High Dose Preservative Free IM 1935, 10/06/2014, 09/09/2015, 10/17/2017   • Influenza, high dose seasonal 0 7 mL 11/17/2021, 09/28/2022   • Influenza, seasonal, injectable 10/11/2010, 10/11/2011   • Pneumococcal Conjugate 13-Valent 09/27/2015   • Pneumococcal Polysaccharide PPV23 01/15/2017   • Zoster 01/01/2013   • Zoster Vaccine Recombinant 01/01/2013   • influenza, trivalent, adjuvanted 09/19/2019       Objective     /76 (BP Location: Left arm, Patient Position: Sitting, Cuff Size: Standard)   Pulse 70   Ht 6' 2" (1 88 m)   Wt 99 3 kg (219 lb)   SpO2 99%   BMI 28 12 kg/m²     Physical Exam  Constitutional:       General: He is not in acute distress  Appearance: He is well-developed  HENT:      Head: Normocephalic and atraumatic  Right Ear: External ear normal       Left Ear: External ear normal    Eyes:      General: No scleral icterus  Conjunctiva/sclera: Conjunctivae normal    Neck:      Thyroid: No thyromegaly  Trachea: No tracheal deviation  Cardiovascular:      Rate and Rhythm: Normal rate  Rhythm irregular  Heart sounds: Murmur (systolic) heard  Pulmonary:      Effort: Pulmonary effort is normal  No respiratory distress  Breath sounds: Normal breath sounds  No wheezing or rales  Abdominal:      General: Bowel sounds are normal       Palpations: Abdomen is soft  Tenderness: There is no abdominal tenderness  There is no guarding or rebound  Musculoskeletal:      Cervical back: Normal range of motion and neck supple  Right lower leg: No edema  Left lower leg: No edema  Lymphadenopathy:      Cervical: No cervical adenopathy  Skin:     Coloration: Skin is not jaundiced or pale  Neurological:      Mental Status: He is alert  Psychiatric:         Mood and Affect: Mood is depressed  Affect is tearful         Colin Williamson MD

## 2023-01-30 NOTE — PATIENT INSTRUCTIONS
Problem List Items Addressed This Visit          Cardiovascular and Mediastinum    Persistent atrial fibrillation (HCC)     Continue anticoagulation            Nervous and Auditory    Parkinson's disease ([facility] )     Continue meds and follow up neuro         Dementia associated with other underlying disease with behavioral disturbance       Other    Grief reaction - Primary     Pt just lost wife and daughter  Discussed continuing with family support, continuing escitalopram, getting to counselor  Discussed option of going to hospital if he feels that he cannot deal with the grief  Pt has alprazolam which family wanted to have on hand for the funerals, and has not been using it  Also discussed possibilities of treatment groups           Relevant Orders    Ambulatory Referral to [facility]     Other Visit Diagnoses       Atrial fibrillation, unspecified type ([facility] )

## 2023-01-30 NOTE — ASSESSMENT & PLAN NOTE
Pt just lost wife and daughter  Discussed continuing with family support, continuing escitalopram, getting to counselor  Discussed option of going to hospital if he feels that he cannot deal with the grief  Pt has alprazolam which family wanted to have on hand for the funerals, and has not been using it  Also discussed possibilities of treatment groups

## 2023-02-02 ENCOUNTER — HOME CARE VISIT (OUTPATIENT)
Dept: HOME HEALTH SERVICES | Facility: HOME HEALTHCARE | Age: 88
End: 2023-02-02

## 2023-02-02 ENCOUNTER — TELEPHONE (OUTPATIENT)
Dept: INTERNAL MEDICINE CLINIC | Facility: CLINIC | Age: 88
End: 2023-02-02

## 2023-02-02 VITALS
RESPIRATION RATE: 18 BRPM | OXYGEN SATURATION: 96 % | HEART RATE: 72 BPM | DIASTOLIC BLOOD PRESSURE: 68 MMHG | TEMPERATURE: 97.8 F | SYSTOLIC BLOOD PRESSURE: 130 MMHG

## 2023-02-02 NOTE — TELEPHONE ENCOUNTER
Jluis Cordoba from [address] called in to report pitting edema bilateral his vitals are ok and patient was told to elevate legs  Jluis Cordoba has been to see patient a few times and there has not been any improvement   Please advise

## 2023-02-03 DIAGNOSIS — R60.0 EDEMA OF EXTREMITIES: Primary | ICD-10-CM

## 2023-02-03 RX ORDER — POTASSIUM CHLORIDE 750 MG/1
10 CAPSULE, EXTENDED RELEASE ORAL DAILY
Qty: 30 CAPSULE | Refills: 5 | Status: SHIPPED | OUTPATIENT
Start: 2023-02-03

## 2023-02-03 RX ORDER — FUROSEMIDE 20 MG/1
20 TABLET ORAL DAILY
Qty: 30 TABLET | Refills: 5 | Status: SHIPPED | OUTPATIENT
Start: 2023-02-03

## 2023-02-03 NOTE — TELEPHONE ENCOUNTER
Patient is returning a call the office  He is stepping out and would like a call back on his cell number

## 2023-02-03 NOTE — TELEPHONE ENCOUNTER
Spoke with pt advise per Dr Munoz     Patient can stay active with leg exercises, can try compression stockings, elevate legs 3 times a day for 30 minutes, and furosemide 20 mg daily and potassium 10 mEq daily sent to pharmacy to help with this   Let them know      Kelvin Sandhoff

## 2023-02-04 PROBLEM — H10.021 PINK EYE DISEASE OF RIGHT EYE: Status: RESOLVED | Noted: 2022-12-06 | Resolved: 2023-02-04

## 2023-02-05 NOTE — ASSESSMENT & PLAN NOTE
• Patient with multiple Comorbidities  • Patient continues to require assist in which supportive services and some ADLs can be provided   • Maintain fall and safety precautions  • Continue to monitor patient for decline

## 2023-02-05 NOTE — ASSESSMENT & PLAN NOTE
· Continue Metoprolol for rate control  · Continue Xarelto for anticoag  · Follow-up with PCP as scheduled

## 2023-02-05 NOTE — ASSESSMENT & PLAN NOTE
· BP today 152/82  · Continue Metoprolol and Micardis  · Avoid hypotension  · Follow up with PCP as scheduled

## 2023-02-05 NOTE — ASSESSMENT & PLAN NOTE
• Continue reassurance and supportive measures  • Continue anti-anxiety medications, Lexapro and Xanax  • Encourage activity and engagement  • Follow up with Psych as scheduled

## 2023-02-06 ENCOUNTER — HOME CARE VISIT (OUTPATIENT)
Dept: HOME HEALTH SERVICES | Facility: HOME HEALTHCARE | Age: 88
End: 2023-02-06

## 2023-02-06 VITALS
TEMPERATURE: 96.8 F | DIASTOLIC BLOOD PRESSURE: 68 MMHG | SYSTOLIC BLOOD PRESSURE: 130 MMHG | HEART RATE: 60 BPM | OXYGEN SATURATION: 98 % | RESPIRATION RATE: 18 BRPM

## 2023-02-10 ENCOUNTER — PATIENT OUTREACH (OUTPATIENT)
Dept: INTERNAL MEDICINE CLINIC | Facility: CLINIC | Age: 88
End: 2023-02-10

## 2023-02-10 NOTE — PROGRESS NOTES
OPCM SW placed 2nd outreach phone call to follow upon on [facility]  MSW visit  Dtr answered and reports that she is working and would be available between 12pm-1pm or after 4pm       SW will attempt call at those times

## 2023-02-16 ENCOUNTER — SOCIAL WORK (OUTPATIENT)
Dept: BEHAVIORAL/MENTAL HEALTH CLINIC | Facility: CLINIC | Age: 88
End: 2023-02-16

## 2023-02-16 DIAGNOSIS — F43.21 GRIEF REACTION: Primary | ICD-10-CM

## 2023-02-16 NOTE — PSYCH
Assessment/Plan:      Diagnoses and all orders for this visit:    Grief reaction          Subjective: Moriah Garcia has been struggling following the death of his wife  Has been struggling with periods of sadness, crying spells and some irritability since her passing  Struggles with loneliness and states "he lost the best partner you could have"  Some memory issues evident and has some tramors related to Parkinson's  Responds well to support and focusing on the positive life he built with his wife and their family  Has very good support system via family  Denies any acute depressive symptoms but tearful at times during session  Patient ID: Stefania Germain is a 82 y o  male  HPI:     Pre-morbid level of function and History of Present Illness: less mood sings- less crying spells and irritability  Previous Psychiatric/psychological treatment/year: none  Current Psychiatrist/Therapist: none  Outpatient and/or Partial and Other Community Resources Used (CTT, ICM, VNA): N/A      Problem Assessment:     SOCIAL/VOCATION:  Family Constellation (include parents, relationship with each and pertinent Psych/Medical History):     Family History   Problem Relation Age of Onset   • No Known Problems Mother    • Hypertension Father    • Coronary artery disease Father        Mother:   Spouse: wife of 58 years passed several months ago  Appears to be his [primary stressors and trigger  Father: alcoholic -   Children: one adult son and adult daughter  Very close to death  Predeceased by one daughter due to myriad of physical health issues  Other: basically raised by grandparents    Moriah Garcia relates best to family  he lives with alone with family supports    Domestic Violence: No past history of domestic violence    Additional Comments related to family/relationships/peer support: very close with his children and grandchildren  Has peers he sees for lunch twice a week        School or Work History (strengths/limitations/needs): Graduated from [employer] and attended [employer] where he played football and lacrosse  Spent two years in the [employer] at [facility] [employer]  Worked as a   at [address] [place] [address]  Coached football at both schools  His highest grade level achieved was bachelor's degree     history includes two years at [employer]    Financial status includes retired/independent    LEISURE ASSESSMENT (Include past and present hobbies/interests and level of involvement (Ex: Group/Club Affiliations): Spends time with friends and family  Frequently goes out to eat with friends and family  Active in his family's lives  his primary language is [place]  Preferred language is [place]  Ethnic considerations are   Religions affiliations and level of involvement [employer]   Does spirituality help you cope? Yes     FUNCTIONAL STATUS: There has been a recent change in Melody Mercer ability to do the following: does not need- family assists    Level of Assistance Needed/By Whom?: N/A    Melody Mercer learns best by  all learning metods- having memory issues related to dementia    SUBSTANCE ABUSE ASSESSMENT: no substance abuse    Substance/Route/Age/Amount/Frequency/Last Use: none    DETOX HISTORY: none    Previous detox/rehab treatment: none    HEALTH ASSESSMENT: no referral to PCP needed    LEGAL: none    Prenatal History: uneventful pregnancy    Delivery History: N/A    Developmental Milestones: All milestones met  Temperament as an infant was N/A  Temperament as a toddler was N/A  Temperament at school age was N/A  Temperament as a teenager was N/A      Risk Assessment:   The following ratings are based on my interview(s) with Cr Pereyra of Harm to Self:   Demographic risk factors include  and male  Historical Risk Factors include none  Recent Specific Risk Factors include recent loss of wife  Additional Factors for a Child or Adolescent gender: male (more likely to succeed)    Risk of Harm to Others:   Demographic Risk Factors include male  Historical Risk Factors include none  Recent Specific Risk Factors include none    Access to Weapons:   Marli Martin has access to the following weapons: none   The following steps have been taken to ensure weapons are properly secured: N/A    Based on the above information, the client presents the following risk of harm to self or others:  low    The following interventions are recommended:   no intervention changes    Notes regarding this Risk Assessment: Very invested and engaged with his family        Review Of Systems:     Mood Depression   Behavior Normal    Thought Content Normal   General Emotional Problems   Personality Normal   Other Psych Symptoms Normal   Constitutional As Noted in HPI   ENT As Noted in HPI   Cardiovascular As Noted in HPI   Respiratory As Noted in HPI   Gastrointestinal As Noted in HPI   Genitourinary As Noted in HPI   Musculoskeletal As Noted in HPI   Integumentary As Noted in HPI   Neurological As Noted in HPI   Endocrine N/A         Mental status:  Appearance calm and cooperative , adequate hygiene and grooming and good eye contact    Mood depressed   Affect affect was tearful   Speech a normal rate and fluent   Thought Processes normal thought processes   Hallucinations no hallucinations present    Thought Content no delusions   Abnormal Thoughts no suicidal thoughts  and no homicidal thoughts    Orientation  oriented to person and place and time   Remote Memory short term memory impaired   Attention Span concentration intact   Intellect Appears to be of [place] of Knowledge adequate fund of knowledge regarding past history and adequate fund of knowledge regarding vocabulary    Insight Insight intact   Judgement judgment was intact   Muscle Strength Normal gait    Language some repetitive stroried   Pain none   Pain Scale 0     Visit start and stop times: 4:00-4:45    02/16/23

## 2023-02-21 ENCOUNTER — SOCIAL WORK (OUTPATIENT)
Dept: BEHAVIORAL/MENTAL HEALTH CLINIC | Facility: CLINIC | Age: 88
End: 2023-02-21

## 2023-02-21 DIAGNOSIS — I48.19 PERSISTENT ATRIAL FIBRILLATION (HCC): ICD-10-CM

## 2023-02-21 DIAGNOSIS — F43.21 GRIEF REACTION: Primary | ICD-10-CM

## 2023-02-21 RX ORDER — METOPROLOL SUCCINATE 25 MG/1
25 TABLET, EXTENDED RELEASE ORAL DAILY
Qty: 90 TABLET | Refills: 3 | Status: SHIPPED | OUTPATIENT
Start: 2023-02-21

## 2023-02-21 NOTE — PSYCH
Behavioral Health Psychotherapy Progress Note    Psychotherapy Provided: Individual Psychotherapy     1  Grief reaction            Goals addressed in session: Goal 1 Manage grief issues    DATA: Eloisa Wright continues to struggle to manage his feelings of sadness and loneliness related to his wife's passing rather unexpectedly  Happily  58 years  Living alone in their home  Unable to drive so has less contact with others than he would like  Tried to avoid discussions related to his wife and refrain from crying but continue to reinforce need for both as a release but as part of the grieving process  Has good family supports  Denies any SI  Trying to lean on his rebekah at this time  During this session, this clinician used the following therapeutic modalities: Supportive Psychotherapy    Substance Abuse was not addressed during this session  If the client is diagnosed with a co-occurring substance use disorder, please indicate any changes in the frequency or amount of use: none  Stage of change for addressing substance use diagnoses: No substance use/Not applicable    ASSESSMENT:  Mack Becerra presents with a Anxious and depressed mood  his affect is Tearful, which is congruent, with his mood and the content of the session  The client has not made progress on their goals  Mack Becerra presents with a minimal risk of suicide, minimal risk of self-harm, and minimal risk of harm to others  For any risk assessment that surpasses a "low" rating, a safety plan must be developed  A safety plan was indicated: no  If yes, describe in detail N/A    PLAN: Between sessions, Mack Becerra will continue to process his feelings of grief in his interactions with family and in his sessions with me  Have discussed appropriate outlets and expression for his grief  Will continue to validate and normalize his struggles related to grief   At the next session, the therapist will use Supportive Psychotherapy to address grief  Behavioral Health Treatment Plan and Discharge Planning: Tate Yo is aware of and agrees to continue to work on their treatment plan  They have identified and are working toward their discharge goals   yes    Visit start and stop times: 3:00-3:55    02/21/23

## 2023-02-22 ENCOUNTER — PATIENT OUTREACH (OUTPATIENT)
Dept: INTERNAL MEDICINE CLINIC | Facility: CLINIC | Age: 88
End: 2023-02-22

## 2023-03-03 ENCOUNTER — NURSE TRIAGE (OUTPATIENT)
Dept: OTHER | Facility: OTHER | Age: 88
End: 2023-03-03

## 2023-03-03 ENCOUNTER — PATIENT OUTREACH (OUTPATIENT)
Dept: INTERNAL MEDICINE CLINIC | Facility: CLINIC | Age: 88
End: 2023-03-03

## 2023-03-03 NOTE — LETTER
[address] [facility] [id number]        Re: Mark Smith to Reach       3/3/2023       Dear Moriah Garcia and Lexie Loving,    I have tried to reach you by phone and was unfortunately unable to reach you  Please contact me if you are in need of additional resources or assistance  I can be reached at [phone]  Thank you        Sincerely,         Duncan Seo, LISA

## 2023-03-03 NOTE — TELEPHONE ENCOUNTER
Pt c/o right sided scrotal edema  Pt states that right testicle is the size of 2 golf balls  He denies and pain or difficulty urinating  On call provider contacted  Per Dr Vicki Saba pt should proceed to the ED  Pt verbalized understanding  He will have his daughter drive him  He wants to go to [facility] since it is closest to his home  Reason for Disposition  • [1] Scrotum swelling AND [2] no pain    Additional Information  • Swollen scrotum OR lump in the scrotum/groin area    Answer Assessment - Initial Assessment Questions  1  SYMPTOM: "What's the main symptom you're concerned about?" (e g , discharge from penis, rash, pain, itching, swelling)      "Swelling of my right testicle  It is the size of 2 golf balls "  2  LOCATION: "Where is the swelling located?"      Right side of scrotum  3  ONSET: "When did swelling  start?"      Today, I just noticed it  4  PAIN: "Is there any pain?" If Yes, ask: "How bad is it?"  (Scale 1-10; or mild, moderate, severe)      no  5  URINE: "Any difficulty passing urine?" If Yes, ask: "When was the last time?"      no  6  CAUSE: "What do you think is causing the symptoms?"      I don't know   7   OTHER SYMPTOMS: "Do you have any other symptoms?" (e g , fever, abdominal pain, blood in urine)      no    Protocols used: SCROTUM SWELLING-ADULT-, PENIS AND SCROTUM [facility]

## 2023-03-03 NOTE — PROGRESS NOTES
SW was not able to connect [facility] dtr on the last 2 calls  There has been no response for voicemails left for dtr  VNA and this SW have provided multiple resources to dtr on behalf of patient  Unable to reach letter sent and referral closed and SW is unable to connect with dtr

## 2023-03-07 ENCOUNTER — OFFICE VISIT (OUTPATIENT)
Dept: INTERNAL MEDICINE CLINIC | Facility: CLINIC | Age: 88
End: 2023-03-07

## 2023-03-07 VITALS
TEMPERATURE: 97.6 F | DIASTOLIC BLOOD PRESSURE: 74 MMHG | HEART RATE: 77 BPM | WEIGHT: 219 LBS | BODY MASS INDEX: 28.11 KG/M2 | SYSTOLIC BLOOD PRESSURE: 124 MMHG | OXYGEN SATURATION: 99 % | HEIGHT: 74 IN

## 2023-03-07 DIAGNOSIS — K40.90 RIGHT INGUINAL HERNIA: Primary | ICD-10-CM

## 2023-03-07 DIAGNOSIS — F02.818 DEMENTIA ASSOCIATED WITH OTHER UNDERLYING DISEASE WITH BEHAVIORAL DISTURBANCE: ICD-10-CM

## 2023-03-07 NOTE — PROGRESS NOTES
Name: Russel Handy      : 1935      MRN: [id number]  Encounter Provider: Thao Banks MD  Encounter Date: 3/7/2023   [facility]     1  Right inguinal hernia  Assessment & Plan:  I recommend evaluation with surgeon  Patient does have underlying cognitive impairment  If severe pain, to get to the emergency room    Orders:  -     Ambulatory Referral to General Surgery; Future    2  Dementia associated with other underlying disease with behavioral disturbance           Subjective     Patient feeling swollen scrotum for 4 days, no significant pain    Review of Systems   Constitutional: Negative for chills and fever  Gastrointestinal: Negative for constipation  Genitourinary: Positive for scrotal swelling  Negative for difficulty urinating, dysuria and testicular pain         Past Medical History:   Diagnosis Date   • A-fib ([facility] [address] )    • Afib ([facility] )    • Atrial fibrillation ([facility] ) 2021   • Frailty syndrome in geriatric patient 2023   • Hypertension    • Hypertension 2021   • Left AC separation    • Other constipation 2023   • Parkinson's disease ([facility] ) 2018   • SDH (subdural hematoma) ([facility] [address] [facility] ) 2020     Past Surgical History:   Procedure Laterality Date   • CARDIOVERSION      ATRIAL   • ROTATOR CUFF REPAIR     • SHOULDER SURGERY       Family History   Problem Relation Age of Onset   • No Known Problems Mother    • Hypertension Father    • Coronary artery disease Father      Social History     Socioeconomic History   • Marital status: /Civil Union     Spouse name: None   • Number of children: None   • Years of education: None   • Highest education level: None   Occupational History   • None   Tobacco Use   • Smoking status: Never   • Smokeless tobacco: Never   • Tobacco comments:     Quit 35-40 years ago   Vaping Use   • Vaping Use: Never used   Substance and Sexual Activity   • Alcohol use: Not Currently Comment: Rare, previously moderate drinker cut down 2016  • Drug use: Never   • Sexual activity: Not Currently     Partners: Female     Birth control/protection: Post-menopausal   Other Topics Concern   • None   Social History Narrative    ** Merged History Encounter **          Social Determinants of Health     Financial Resource Strain: Low Risk    • Difficulty of Paying Living Expenses: Not very hard   Food Insecurity: No Food Insecurity   • Worried About Running Out of Food in the Last Year: Never true   • Ran Out of Food in the Last Year: Never true   Transportation Needs: No Transportation Needs   • Lack of Transportation (Medical): No   • Lack of Transportation (Non-Medical):  No   Physical Activity: Not on file   Stress: Not on file   Social Connections: Not on file   Intimate Partner Violence: Not on file   Housing Stability: Low Risk    • Unable to Pay for Housing in the Last Year: No   • Number of Places Lived in the Last Year: 1   • Unstable Housing in the Last Year: No     Current Outpatient Medications on File Prior to Visit   Medication Sig   • acetaminophen (TYLENOL) 325 mg tablet Take 2 tablets (650 mg total) by mouth every 4 (four) hours as needed for mild pain   • ALPRAZolam (XANAX) 0 25 mg tablet Take 1 tablet (0 25 mg total) by mouth 3 (three) times a day as needed for anxiety   • carbidopa-levodopa (SINEMET)  mg per tablet TAKE 1 TABLET BY MOUTH THREE TIMES A DAY (Patient taking differently: 1 5 tablets 1 5 tablets in AM, 1 5 tablets in afternoon and 1 tablets in PM)   • escitalopram (Lexapro) 10 mg tablet Take 1 tablet (10 mg total) by mouth daily   • ipratropium (ATROVENT) 0 06 % nasal spray 1 spray into each nostril 4 (four) times a day as needed for rhinitis   • metoprolol succinate (TOPROL-XL) 25 mg 24 hr tablet Take 1 tablet (25 mg total) by mouth daily   • telmisartan (MICARDIS) 20 MG tablet Take 0 5 tablets (10 mg total) by mouth daily   • Xarelto 15 MG tablet Take 1 tablet (15 mg total) by mouth daily with breakfast   • donepezil (ARICEPT) 5 mg tablet TAKE 1 TABLET BY MOUTH AT BEDTIME (Patient not taking: Reported on 3/7/2023)   • furosemide (LASIX) 20 mg tablet TAKE 1 TABLET BY MOUTH EVERY DAY (Patient not taking: Reported on 3/7/2023)   • Multiple Vitamins-Minerals (ICAPS AREDS 2) CAPS Take 1 capsule by mouth 2 (two) times a day   (Patient not taking: Reported on 12/29/2022)   • potassium chloride (MICRO-K) 10 MEQ CR capsule TAKE 1 CAPSULE BY MOUTH EVERY DAY (Patient not taking: Reported on 3/7/2023)     Allergies   Allergen Reactions   • Penicillins Hives   • Pollen Extract Other (See Comments)       Immunization History   Administered Date(s) Administered   • COVID-19 PFIZER VACCINE 0 3 ML IM 01/27/2021, 02/17/2021, 01/08/2022   • INFLUENZA 09/04/2016, 10/26/2017, 10/26/2017, 09/13/2018   • Influenza Split High Dose Preservative Free IM 1935, 10/06/2014, 09/09/2015, 10/17/2017   • Influenza, high dose seasonal 0 7 mL 11/17/2021, 09/28/2022   • Influenza, seasonal, injectable 10/11/2010, 10/11/2011   • Pneumococcal Conjugate 13-Valent 09/27/2015   • Pneumococcal Polysaccharide PPV23 01/15/2017   • Zoster 01/01/2013   • Zoster Vaccine Recombinant 01/01/2013   • influenza, trivalent, adjuvanted 09/19/2019       Objective     /74   Pulse 77   Temp 97 6 °F (36 4 °C) (Tympanic)   Ht 6' 2" (1 88 m)   Wt 99 3 kg (219 lb)   SpO2 99%   BMI 28 12 kg/m²     Physical Exam  Constitutional:       General: He is not in acute distress  Genitourinary:     Comments: Large reducible hernia on the right    Only reducible when patient lies back, no tenderness on exam      Jennifer Bolden MD

## 2023-03-07 NOTE — ASSESSMENT & PLAN NOTE
I recommend evaluation with surgeon  Patient does have underlying cognitive impairment    If severe pain, to get to the emergency room

## 2023-03-07 NOTE — PATIENT INSTRUCTIONS
Detail Level: Simple
Problem List Items Addressed This Visit          Nervous and Auditory    Dementia associated with other underlying disease with behavioral disturbance       Other    Right inguinal hernia - Primary     I recommend evaluation with surgeon  Patient does have underlying cognitive impairment    If severe pain, to get to the emergency room         Relevant Orders    Ambulatory Referral to General Surgery
Detail Level: Generalized
Detail Level: Detailed

## 2023-03-09 ENCOUNTER — NURSE TRIAGE (OUTPATIENT)
Dept: OTHER | Facility: OTHER | Age: 88
End: 2023-03-09

## 2023-03-09 NOTE — TELEPHONE ENCOUNTER
I spoke with the patient and he made an appointment tomorrow with pcp  He also states if Tylenol does not lessen the pain he is having currently 4/10 with no medication on board, he plans to be seen in urgent care or Ed tonight  No redness , no swelling of feet   Acute pain 4/10  Painful to walk

## 2023-03-09 NOTE — TELEPHONE ENCOUNTER
Reason for Disposition  • Foot pain    Answer Assessment - Initial Assessment Questions  1  ONSET: "When did the pain start?"       Started this morning  2  LOCATION: "Where is the pain located?"       Both feet  3  PAIN: "How bad is the pain?"    (Scale 1-10; or mild, moderate, severe)   - MILD (1-3): doesn't interfere with normal activities  - MODERATE (4-7): interferes with normal activities (e g , work or school) or awakens from sleep, limping     - SEVERE (8-10): excruciating pain, unable to do any normal activities, unable to walk  4/10  4  WORK OR EXERCISE: "Has there been any recent work or exercise that involved this part of the body?"       none  5  CAUSE: "What do you think is causing the foot pain?"      unsure  6  OTHER SYMPTOMS: "Do you have any other symptoms?" (e g , leg pain, rash, fever, numbness)      No other symptoms  He developed pain on bottom of  both feet today unsure of the cause  No swelling no redness   They look normal    Protocols used: FOOT PAIN-ADULT-

## 2023-03-09 NOTE — TELEPHONE ENCOUNTER
I was speaking with the patient then he took another call  He did not come back on the line  I will try to reach him again

## 2023-03-09 NOTE — TELEPHONE ENCOUNTER
Regarding: foot issues  ----- Message from Sudheer Been sent at 3/9/2023  4:55 PM EST -----  "I am calling because today the bottom of both of my feet are very tender and It hurts to  walk" I don't know what caused this I am calling for some advice

## 2023-03-10 ENCOUNTER — TELEPHONE (OUTPATIENT)
Dept: NEUROLOGY | Facility: CLINIC | Age: 88
End: 2023-03-10

## 2023-03-10 DIAGNOSIS — G20 PARKINSON'S DISEASE (HCC): ICD-10-CM

## 2023-03-10 NOTE — TELEPHONE ENCOUNTER
Called patient's daughter Silvio Proctor back  Asking for updated script for Sinemet   Current dosing is 1 5 tabs in the AM, 1 5 tabs in the PM, and 1 tab at HS  PAtient is doing well on this dosing but recently started having a slight increase in tremors so they would like to adjust medication to recommended dosing at LOV  On 12/29:    Recommend to take sinemet 1 tab 3x/day day 10 am, 4 pm, 10pm for 1 week then increase to 1 5 tabs 3x/day  If no improvement in symptoms please call or any side effect concerns  Nando Proctor- Please send updated script to reflect above dosing if agreeable to   1 5 tabs at 10am, 1 5 tabs at 4 pm, and 1 5 tabs at 10 pm to [facility] on [address]     Thank you

## 2023-03-10 NOTE — TELEPHONE ENCOUNTER
Updated script sent to pharmacy, if his tremors do not improve and no side effects could consider increase to 2 tabs TID

## 2023-03-10 NOTE — TELEPHONE ENCOUNTER
Daughter Has called to get dosage clarification  Can you please call [facility] at [phone]      Thank you in advance,     Vidhya Watkins

## 2023-03-14 ENCOUNTER — OFFICE VISIT (OUTPATIENT)
Dept: SURGERY | Facility: CLINIC | Age: 88
End: 2023-03-14

## 2023-03-14 VITALS
BODY MASS INDEX: 27.59 KG/M2 | DIASTOLIC BLOOD PRESSURE: 83 MMHG | SYSTOLIC BLOOD PRESSURE: 142 MMHG | WEIGHT: 215 LBS | HEART RATE: 66 BPM | HEIGHT: 74 IN

## 2023-03-14 DIAGNOSIS — K40.90 RIGHT INGUINAL HERNIA: ICD-10-CM

## 2023-03-14 DIAGNOSIS — K40.90 INGUINAL HERNIA OF RIGHT SIDE WITHOUT OBSTRUCTION OR GANGRENE: Primary | ICD-10-CM

## 2023-03-14 NOTE — LETTER
March 14, 2023     Indira Rushing, [address] [place] [address]    Patient: Qing Rojas   YOB: 1935   Date of Visit: 3/14/2023       Dear Dr Andrea Alcantar: Thank you for referring Fina Burgess to me for evaluation  Below are my notes for this consultation  If you have questions, please do not hesitate to call me  I look forward to following your patient along with you  Sincerely,        Rubina Beck MD        CC: Brunilda Dakin, MD Deniece Knack, MD  3/14/2023  2:55 PM  Incomplete  Assessment/Plan:  Patient is an elderly gentleman with a history of atrial fibrillation on Xarelto  He had [facility] admission in December 2022  There is a history for Parkinson's and mild dementia  He presents with a large right inguinal hernia with a scrotal component  It has been present for several years  He states that it is not causing symptoms  He was inquiring about operative repair  Examination there is a large right inguinal hernia with scrotal component  This is not reducible while standing  It is nontender  We discussed operative repair  Essentially it should be determined based on cardiac risk  He would need to discontinue his Xarelto for 3 days before surgery  He has a history for mild aortic stenosis and on his last echocardiogram the ejection fraction is 60%  I plan to contact his cardiologist and inquire to his level of risk  If it is high, then continued observation would be recommended  Low to moderate risk would warrant operative repair  Family is in agreement  Diagnoses and all orders for this visit:    Right inguinal hernia  -     Ambulatory Referral to General Surgery        Subjective:     Patient ID: Qing Rjoas is a 82 y o  male  Patient presents for right inguinal hernia consult  States he has had a bulge RLQ for 2 weeks  Denies pain      CT abdomen pelvis 12/1/2022 ABDOMINAL WALL/INGUINAL REGIONS:  Small right inguinal hernia containing portion of a nonobstructed small bowel loop  The following portions of the patient's history were reviewed and updated as appropriate:     He  has a past medical history of A-fib ([facility] [address] ), [facility] Atrial fibrillation ([facility] ) (6/13/2021), Frailty syndrome in geriatric patient (1/8/2023), Hypertension, Hypertension (6/13/2021), Left AC separation, Other constipation (1/14/2023), Parkinson's disease ([facility] ) (11/01/2018), and SDH (subdural hematoma) ([facility] ) (2/7/2020)  He  has a past surgical history that includes Cardioversion; Rotator cuff repair; and Shoulder surgery  His family history includes Coronary artery disease in his father; Hypertension in his father; No Known Problems in his mother  He  reports that he has never smoked  He has never used smokeless tobacco  He reports that he does not currently use alcohol  He reports that he does not use drugs    Current Outpatient Medications   Medication Sig Dispense Refill   • acetaminophen (TYLENOL) 325 mg tablet Take 2 tablets (650 mg total) by mouth every 4 (four) hours as needed for mild pain  0   • ALPRAZolam (XANAX) 0 25 mg tablet Take 1 tablet (0 25 mg total) by mouth 3 (three) times a day as needed for anxiety 30 tablet 0   • carbidopa-levodopa (SINEMET)  mg per tablet Take 1 5 tabs  tablet 2   • donepezil (ARICEPT) 5 mg tablet TAKE 1 TABLET BY MOUTH AT BEDTIME (Patient not taking: Reported on 3/7/2023) 90 tablet 1   • escitalopram (Lexapro) 10 mg tablet Take 1 tablet (10 mg total) by mouth daily 90 tablet 3   • ipratropium (ATROVENT) 0 06 % nasal spray 1 spray into each nostril 4 (four) times a day as needed for rhinitis 45 mL 0   • metoprolol succinate (TOPROL-XL) 25 mg 24 hr tablet Take 1 tablet (25 mg total) by mouth daily 90 tablet 3   • Multiple Vitamins-Minerals (ICAPS AREDS 2) CAPS Take 1 capsule by mouth 2 (two) times a day   (Patient not taking: Reported on 12/29/2022)     • telmisartan (MICARDIS) 20 MG tablet Take 0 5 tablets (10 mg total) by mouth daily 45 tablet 3   • Xarelto 15 MG tablet Take 1 tablet (15 mg total) by mouth daily with breakfast 90 tablet 3     No current facility-administered medications for this visit  He is allergic to penicillins and pollen extract       Review of Systems   Constitutional: Negative  Negative for activity change  HENT: Negative  Eyes: Negative  Respiratory: Negative  Cardiovascular: Negative  Gastrointestinal: Negative  Endocrine: Negative  Genitourinary: Negative  Musculoskeletal: Negative  Skin: Negative  Allergic/Immunologic: Negative  Neurological: Positive for tremors and weakness  Psychiatric/Behavioral: Negative for agitation, behavioral problems and confusion  The patient is not nervous/anxious  Objective:      /83   Pulse 66   Ht 6' 2" (1 88 m)   Wt 97 5 kg (215 lb)   BMI 27 60 kg/m²         Physical Exam  Constitutional:       Appearance: He is well-developed  He is not diaphoretic  HENT:      Head: Normocephalic and atraumatic  Eyes:      General: No scleral icterus  Right eye: No discharge  Left eye: No discharge  Extraocular Movements: Extraocular movements intact  Conjunctiva/sclera: Conjunctivae normal    Neck:      Thyroid: No thyromegaly  Trachea: No tracheal deviation  Cardiovascular:      Rate and Rhythm: Normal rate  Rhythm irregular  Heart sounds: Normal heart sounds  No murmur heard  No friction rub  Pulmonary:      Effort: Pulmonary effort is normal  No respiratory distress  Breath sounds: Normal breath sounds  No stridor  No wheezing  Chest:      Chest wall: No tenderness  Abdominal:      General: There is no distension  Palpations: There is no mass  Tenderness: There is no abdominal tenderness  There is no guarding or rebound  Hernia: A hernia (, Large right inguinal hernia associated with scrotal component ) is present     Musculoskeletal: General: No tenderness  Right lower leg: No edema  Left lower leg: No edema  Lymphadenopathy:      Cervical: No cervical adenopathy  Skin:     General: Skin is warm and dry  Findings: No erythema or rash  Neurological:      Mental Status: He is alert and oriented to person, place, and time  Cranial Nerves: No cranial nerve deficit        Coordination: Coordination normal    Psychiatric:         Behavior: Behavior normal          Judgment: Judgment normal

## 2023-03-14 NOTE — PROGRESS NOTES
Assessment/Plan:  Patient is an elderly gentleman with a history of atrial fibrillation on Xarelto  He had [facility] admission in December 2022  There is a history for Parkinson's and mild dementia  He presents with a large right inguinal hernia with a scrotal component  It has been present for several years  He states that it is not causing symptoms  He was inquiring about operative repair  Examination there is a large right inguinal hernia with scrotal component  This is not reducible while standing  It is nontender  We discussed operative repair  Essentially it should be determined based on cardiac risk  He would need to discontinue his Xarelto for 3 days before surgery  He has a history for mild aortic stenosis and on his last echocardiogram the ejection fraction is 60%  I plan to contact his cardiologist and inquire to his level of risk  If it is high, then continued observation would be recommended  Low to moderate risk would warrant operative repair  Family is in agreement  Diagnoses and all orders for this visit:    Right inguinal hernia  -     Ambulatory Referral to General Surgery        Subjective:      Patient ID: Regine Mendez is a 82 y o  male  Patient presents for right inguinal hernia consult  States he has had a bulge RLQ for 2 weeks  Denies pain  CT abdomen pelvis 12/1/2022 ABDOMINAL WALL/INGUINAL REGIONS:  Small right inguinal hernia containing portion of a nonobstructed small bowel loop  The following portions of the patient's history were reviewed and updated as appropriate:     He  has a past medical history of A-fib ([address] ), Afib ([facility] [address] ), Atrial fibrillation ([facility] [address] ) (6/13/2021), Frailty syndrome in geriatric patient (1/8/2023), Hypertension, Hypertension (6/13/2021), Left AC separation, Other constipation (1/14/2023), Parkinson's disease ([address] ) (11/01/2018), and SDH (subdural hematoma) ([address] ) (2/7/2020)    He  has a past surgical history that includes Cardioversion; Rotator cuff repair; and Shoulder surgery  His family history includes Coronary artery disease in his father; Hypertension in his father; No Known Problems in his mother  He  reports that he has never smoked  He has never used smokeless tobacco  He reports that he does not currently use alcohol  He reports that he does not use drugs  Current Outpatient Medications   Medication Sig Dispense Refill   • acetaminophen (TYLENOL) 325 mg tablet Take 2 tablets (650 mg total) by mouth every 4 (four) hours as needed for mild pain  0   • ALPRAZolam (XANAX) 0 25 mg tablet Take 1 tablet (0 25 mg total) by mouth 3 (three) times a day as needed for anxiety 30 tablet 0   • carbidopa-levodopa (SINEMET)  mg per tablet Take 1 5 tabs  tablet 2   • donepezil (ARICEPT) 5 mg tablet TAKE 1 TABLET BY MOUTH AT BEDTIME (Patient not taking: Reported on 3/7/2023) 90 tablet 1   • escitalopram (Lexapro) 10 mg tablet Take 1 tablet (10 mg total) by mouth daily 90 tablet 3   • ipratropium (ATROVENT) 0 06 % nasal spray 1 spray into each nostril 4 (four) times a day as needed for rhinitis 45 mL 0   • metoprolol succinate (TOPROL-XL) 25 mg 24 hr tablet Take 1 tablet (25 mg total) by mouth daily 90 tablet 3   • Multiple Vitamins-Minerals (ICAPS AREDS 2) CAPS Take 1 capsule by mouth 2 (two) times a day   (Patient not taking: Reported on 12/29/2022)     • telmisartan (MICARDIS) 20 MG tablet Take 0 5 tablets (10 mg total) by mouth daily 45 tablet 3   • Xarelto 15 MG tablet Take 1 tablet (15 mg total) by mouth daily with breakfast 90 tablet 3     No current facility-administered medications for this visit  He is allergic to penicillins and pollen extract       Review of Systems   Constitutional: Negative  Negative for activity change  HENT: Negative  Eyes: Negative  Respiratory: Negative  Cardiovascular: Negative  Gastrointestinal: Negative  Endocrine: Negative  Genitourinary: Negative  Musculoskeletal: Negative  Skin: Negative  Allergic/Immunologic: Negative  Neurological: Positive for tremors and weakness  Psychiatric/Behavioral: Negative for agitation, behavioral problems and confusion  The patient is not nervous/anxious  Objective:      /83   Pulse 66   Ht 6' 2" (1 88 m)   Wt 97 5 kg (215 lb)   BMI 27 60 kg/m²          Physical Exam  Constitutional:       Appearance: He is well-developed  He is not diaphoretic  HENT:      Head: Normocephalic and atraumatic  Eyes:      General: No scleral icterus  Right eye: No discharge  Left eye: No discharge  Extraocular Movements: Extraocular movements intact  Conjunctiva/sclera: Conjunctivae normal    Neck:      Thyroid: No thyromegaly  Trachea: No tracheal deviation  Cardiovascular:      Rate and Rhythm: Normal rate  Rhythm irregular  Heart sounds: Normal heart sounds  No murmur heard  No friction rub  Pulmonary:      Effort: Pulmonary effort is normal  No respiratory distress  Breath sounds: Normal breath sounds  No stridor  No wheezing  Chest:      Chest wall: No tenderness  Abdominal:      General: There is no distension  Palpations: There is no mass  Tenderness: There is no abdominal tenderness  There is no guarding or rebound  Hernia: A hernia (, Large right inguinal hernia associated with scrotal component ) is present  Musculoskeletal:         General: No tenderness  Right lower leg: No edema  Left lower leg: No edema  Lymphadenopathy:      Cervical: No cervical adenopathy  Skin:     General: Skin is warm and dry  Findings: No erythema or rash  Neurological:      Mental Status: He is alert and oriented to person, place, and time  Cranial Nerves: No cranial nerve deficit        Coordination: Coordination normal    Psychiatric:         Behavior: Behavior normal          Judgment: Judgment normal

## 2023-03-15 ENCOUNTER — TELEPHONE (OUTPATIENT)
Dept: OTHER | Facility: OTHER | Age: 88
End: 2023-03-15

## 2023-03-15 NOTE — TELEPHONE ENCOUNTER
Patient is calling regarding cancelling an appointment      Date/Time:3/16/23 at 1330    Patient was rescheduled: YES [] NO [x]    Patient requesting call back to reschedule: YES [x] NO []  Please call daughter back to reschedule

## 2023-03-16 NOTE — TELEPHONE ENCOUNTER
Spoke with patient's daughter to cancel appt as she explained her father went to ER for issues with hernia  I advised her to call us back if she would still like an appointment after he is discharged

## 2023-03-22 ENCOUNTER — SOCIAL WORK (OUTPATIENT)
Dept: BEHAVIORAL/MENTAL HEALTH CLINIC | Facility: CLINIC | Age: 88
End: 2023-03-22

## 2023-03-22 DIAGNOSIS — F43.21 GRIEF REACTION: Primary | ICD-10-CM

## 2023-03-22 NOTE — PATIENT INSTRUCTIONS
Continue to process and normalize his struggles with grief  Responds well to support/life review  Affect a little brighter  Will continue to provide these types of interventions

## 2023-03-22 NOTE — PSYCH
Assessment/Plan:      Diagnoses and all orders for this visit:    Grief reaction          Subjective: Dayna Pollard has continued to struggle since his wife's unexpected passing after 58 years of marriage  Has been struggling with periods of sadness and lonlieness  Having some cognitive issues as well  Seen today and seems to be making some progress  Still tearful when discussing wife but able to focus on the many good memories he has  Family very active in his life and has good peer supports too  Patient ID: Yari Farrar is a 82 y o  male  HPI:     Pre-morbid level of function and History of Present Illness: Less sadness, tearfulness  Despite current struggles, remains active and engaged with family and peers  Previous Psychiatric/psychological treatment/year: none  Current Psychiatrist/Therapist: PCP  Outpatient and/or Partial and Other Community Resources Used (CTT, ICM, VNA): none      Problem Assessment:     SOCIAL/VOCATION:  Family Constellation (include parents, relationship with each and pertinent Psych/Medical History):     Family History   Problem Relation Age of Onset   • No Known Problems Mother    • Hypertension Father    • Coronary artery disease Father        Mother: - dies when he was child in childbirth with one of siblings  Spouse: Very happily  for 58 years- struggling to manage grief from this  Father: - was an alcoholic and not active father  Children: Dayna Pollard has a son and a daughter  Close to both  Daughter lives locally and is his primary care taker  Dayna Pollard predeceased by a daughter  Other: Has regular contact with his grandchildren    Dayna Pollard relates best to daughter  he lives with alone  Domestic Violence: There is not suspected domestic violence    Additional Comments related to family/relationships/peer support: Has very good family support and peer support system       School or Work History (strengths/limitations/needs): Graduated with degree in education from [employer] in [address] [facility] where he also played football for three years and one year of lacrosse  Worked as a teacher and as a  for several [address] including [address] [facility] and [address]  Also worked in real estate  Has been retired from many years  Her highest grade level achieved was college graduate with [address] history includes in army for two years    Financial status includes retired and lives in his own home    [address] (Include past and present hobbies/interests and level of involvement (Ex: Group/Club Affiliations): Long-standing involvement in his local community and as an alumni of [employer]  his primary language is English  Preferred language is [employer]  Ethnic considerations are Cauc[place]  Religions affiliations and level of involvement Zoroastrian   Does spirituality help you cope? Yes     FUNCTIONAL STATUS: There has been a recent change in Mookie [place]en[place] ability to do the following: no needs    Level of Assistance Needed/By Whom?: none    Mookie [place] learns best by  all learning methods- some cognitive issues present    SUBSTANCE ABUSE ASSESSMENT: no substance abuse    Substance/Route/Age/Amount/Frequency/Last Use: none    DETOX HISTORY: none    Previous detox/rehab treatment: none    HEALTH ASSESSMENT: no referral to PCP needed    LEGAL: none    Prenatal History: N/A    Delivery History: N/A    Developmental Milestones: All developmental milestones met  Temperament as an infant was N/A  Temperament as a toddler was N/A  Temperament at school age was N/A  Temperament as a teenager was N/A      Risk Assessment:   The following ratings are based on my interview(s) with Mookie Monten[place]ro and contact with his daughter    Risk of Harm to Self:   Demographic risk factors include never  or  status, male and elderly (76 or older)   Historical Risk Factors include none  Recent Specific Risk Factors include recent losses wife's passing  Additional Factors for a Child or Adolescent gender: male (more likely to succeed)    Risk of Harm to Others:   Demographic Risk Factors include male  Historical Risk Factors include none  Recent Specific Risk Factors include none    Access to Weapons:   Radha Casas has access to the following weapons: none  The following steps have been taken to ensure weapons are properly secured: N/A    Based on the above information, the client presents the following risk of harm to self or others:  low    The following interventions are recommended:   no intervention changes    Notes regarding this Risk Assessment: has very good support system involved with him daily  He remains very invested in his family          Review Of Systems:     Mood Depression   Behavior Normal    Thought Content Normal   General Emotional Problems   Personality Normal   Other Psych Symptoms Normal   Constitutional N/A   ENT As Noted in HPI   Cardiovascular As Noted in HPI   Respiratory As Noted in HPI   Gastrointestinal As Noted in HPI   Genitourinary As Noted in HPI   Musculoskeletal As Noted in HPI   Integumentary As Noted in HPI   Neurological As Noted in HPI   Endocrine N/A         Mental status:  Appearance calm and cooperative  and good eye contact    Mood depressed   Affect affect was tearful   Speech fluent   Thought Processes normal thought processes   Hallucinations no hallucinations present    Thought Content no delusions   Abnormal Thoughts no suicidal thoughts  and no homicidal thoughts    Orientation  oriented to person, oriented to place and oriented to time- some forgetfulness noted   Remote Memory short term memory impaired and long term memory impaired   Attention Span concentration intact   Intellect Appears to be of Average Intelligence   Fund of Knowledge displays adequate knowledge of current events, adequate fund of knowledge regarding past history and adequate fund of knowledge regarding vocabulary    Insight Insight intact   Judgement judgment was intact   Muscle Strength Normal gait    Language no difficulties   Pain none   Pain Scale 0     Visit start and stop times: 4:00-5:00    03/22/23

## 2023-04-05 ENCOUNTER — PREP FOR PROCEDURE (OUTPATIENT)
Dept: SURGERY | Facility: CLINIC | Age: 88
End: 2023-04-05

## 2023-04-05 DIAGNOSIS — K40.90 INGUINAL HERNIA: Primary | ICD-10-CM

## 2023-04-07 ENCOUNTER — TELEPHONE (OUTPATIENT)
Dept: DERMATOLOGY | Facility: CLINIC | Age: 88
End: 2023-04-07

## 2023-04-07 NOTE — TELEPHONE ENCOUNTER
Faxed cover sheet to Dr Celestina Henry office requesting documentation patient had been treated for area biopsied on 12/21/21 on frontal scalp SCC in situ  Patient was notified the results and stated he would follow up at Dr Celestina Henry

## 2023-04-26 NOTE — PROGRESS NOTES
Virtual Regular Visit    Verification of patient location:    Patient is located at Home in the following state in which I hold an active license [place]      Assessment/Plan:    Problem List Items Addressed This Visit        Nervous and Auditory    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[facility] [address] ) - Primary     • Continue to reorient, redirect, and reassure patient  • Encourage engagement and activity  • Patient continues to require assistance in which supportive services and ADLs can be provided  • Information on supportive services to be provided by MSW  • Will discuss case at next [facility] meeting for Outpatient Case Management referral for additional resources  • Continue Lexapro and Xanax  • Patient had Neurology appt where discussion of Donezapil therapy was suggested  Cardiology will need to review if this is appropriate given medications/cardiac history    • Continue to monitor for acute changes in condition            Other    Chest pain     • Patient dtr reports patient has c/o CP but when patient asked to explain further he describes as tightness occurring at night and resolves  • Discussed with pt and daughter several options, trial of Pepcid for GERD  • Follow up with Cardiology to r/o Angina  • The more discussion about this the more anxious patient became  • Patient daughter states she believes that CP is actually anxiety and patient may need to take Xanax that is prescribed prn but he does not always take  • Continue to monitor         Driving safety issue     • Patient has been to Neurology appts and per recommendations and assessment of OT instructed to no longer drive  • Patient's daughter reports that patient is no longer driving at this time and she is providing transportation to appt  • MSW will visit to provide additional resources         Frailty syndrome in geriatric patient     • Patient with multiple Comorbidities  • Patient with recent loss of wife and daughter in the last month  • Patient lives alone  • Patient continues to require supportive services and assist with ADLs, patient's dtr is looking into additional help but does not think 24/7 LTC is needed at this point  • Maintain fall and safety precautions  • Continue to monitor patient for decline                 Reason for visit is   Chief Complaint   Patient presents with   • Virtual Regular Visit        Encounter provider RAFFI [place]    Provider located at [place] [address] [address] [facility] [address] [facility] [address]  [phone]      Recent Visits  No visits were found meeting these conditions  Showing recent visits within past 7 days and meeting all other requirements  Future Appointments  No visits were found meeting these conditions  Showing future appointments within next 150 days and meeting all other requirements       The patient was identified by name and date of birth  Paola Gordon was informed that this is a telemedicine visit and that the visit is being conducted through the [employer]  He agrees to proceed     My office door was closed  No one else was in the room  He acknowledged consent and understanding of privacy and security of the video platform  The patient has agreed to participate and understands they can discontinue the visit at any time  Patient is aware this is a billable service  Subjective  Paola Gordon is a 82 y o  male  being seen for [facility] program in conjuction with [address] Bentley, who was in the home to perform physical assessment  The patient is being seen and examined today for follow-up on acute and chronic medical conditions s/p most recent hospitalization  Patient denies pain, sob, chest pain, abdominal pain, fever, chills, nausea/vomiting, diarrhea/constipation, or dysuria  The patient reports a good appetite and is drinking an adequate amount of fluids        The patient was seen by Neurology last week with notes reviewed, no changes to medications  The patient's daughter is present for visit today  We discussed anxiety management and coping mechanisms today with patient, but this seemed to make patient more anxious  The patient has been overusing EMS services r/t panic attacks and excessive 911 calls  He is not remembering calling 911 all of the time and this has been of concern to his daughter      HPI     Past Medical History:   Diagnosis Date   • A-fib ([facility] )    • Afib ([facility] )    • Atrial fibrillation ([place] [address] ) 6/13/2021   • Frailty syndrome in geriatric patient 1/8/2023   • Hypertension    • Hypertension 6/13/2021   • Left AC separation    • Other constipation 1/14/2023   • Parkinson's disease ([facility] ) 11/01/2018   • SDH (subdural hematoma) ([place] [address] ) 2/7/2020       Past Surgical History:   Procedure Laterality Date   • CARDIOVERSION      ATRIAL   • ROTATOR CUFF REPAIR     • SHOULDER SURGERY         Current Outpatient Medications   Medication Sig Dispense Refill   • acetaminophen (TYLENOL) 325 mg tablet Take 2 tablets (650 mg total) by mouth every 4 (four) hours as needed for mild pain  0   • ALPRAZolam (XANAX) 0 25 mg tablet Take 1 tablet (0 25 mg total) by mouth 3 (three) times a day as needed for anxiety 30 tablet 0   • carbidopa-levodopa (SINEMET)  mg per tablet Take 1 5 tabs  tablet 2   • donepezil (ARICEPT) 5 mg tablet TAKE 1 TABLET BY MOUTH AT BEDTIME (Patient not taking: Reported on 3/7/2023) 90 tablet 1   • escitalopram (Lexapro) 10 mg tablet Take 1 tablet (10 mg total) by mouth daily 90 tablet 3   • ipratropium (ATROVENT) 0 06 % nasal spray 1 spray into each nostril 4 (four) times a day as needed for rhinitis 45 mL 0   • metoprolol succinate (TOPROL-XL) 25 mg 24 hr tablet Take 1 tablet (25 mg total) by mouth daily 90 tablet 3   • Multiple Vitamins-Minerals (ICAPS AREDS 2) CAPS Take 1 capsule by mouth 2 (two) times a day   (Patient not taking: Reported on 12/29/2022)     • telmisartan (MICARDIS) 20 MG tablet Take 0 5 tablets (10 mg total) by mouth daily 45 tablet 3   • Xarelto 15 MG tablet Take 1 tablet (15 mg total) by mouth daily with breakfast 90 tablet 3     No current facility-administered medications for this visit  Allergies   Allergen Reactions   • Penicillins Hives   • Pollen Extract Other (See Comments)       Review of Systems    Video Exam    BP: 142/78            HR:78        T:97 2     RR:16           O2Sat:95%      W:218    Physical Exam  Vitals reviewed  HENT:      Head: Normocephalic  Cardiovascular:      Rate and Rhythm: Normal rate and regular rhythm  Heart sounds: Normal heart sounds  Pulmonary:      Effort: Pulmonary effort is normal       Breath sounds: No wheezing, rhonchi or rales  Abdominal:      General: Bowel sounds are normal       Palpations: Abdomen is soft  Musculoskeletal:      Right lower leg: No edema  Left lower leg: No edema  Skin:     General: Skin is warm and dry  Neurological:      Mental Status: He is alert  Mental status is at baseline  Motor: Weakness present  Psychiatric:         Mood and Affect: Mood is anxious  Cognition and Memory: Cognition is impaired  Memory is impaired  Judgment: Judgment is impulsive  Visit Time  Total Visit Duration: I spent 45 minutes today in which greater than 50% of the time was spent in counseling/coordination of care regarding acute and chronic medical medical conditions r/t most recent hospitalization

## 2023-04-26 NOTE — ASSESSMENT & PLAN NOTE
• Continue to reorient, redirect, and reassure patient  • Encourage engagement and activity  • Patient continues to require assistance in which supportive services and ADLs can be provided  • Information on supportive services to be provided by MSW  • Will discuss case at next [facility] meeting for Outpatient Case Management referral for additional resources  • Continue Lexapro and Xanax  • Patient had Neurology appt where discussion of Donezapil therapy was suggested  Cardiology will need to review if this is appropriate given medications/cardiac history    • Continue to monitor for acute changes in condition

## 2023-04-26 NOTE — ASSESSMENT & PLAN NOTE
• Patient dtr reports patient has c/o CP but when patient asked to explain further he describes as tightness occurring at night and resolves  • Discussed with pt and daughter several options, trial of Pepcid for GERD  • Follow up with Cardiology to r/o Angina  • The more discussion about this the more anxious patient became  • Patient daughter states she believes that CP is actually anxiety and patient may need to take Xanax that is prescribed prn but he does not always take  • Continue to monitor

## 2023-04-26 NOTE — ASSESSMENT & PLAN NOTE
• Patient has been to Neurology appts and per recommendations and assessment of OT instructed to no longer drive  • Patient's daughter reports that patient is no longer driving at this time and she is providing transportation to appt  • MSW will visit to provide additional resources

## 2023-04-28 ENCOUNTER — SOCIAL WORK (OUTPATIENT)
Dept: BEHAVIORAL/MENTAL HEALTH CLINIC | Facility: CLINIC | Age: 88
End: 2023-04-28

## 2023-04-28 DIAGNOSIS — F43.21 GRIEF REACTION: Primary | ICD-10-CM

## 2023-04-28 NOTE — PSYCH
"Behavioral Health Psychotherapy Progress Note    Psychotherapy Provided: Individual Psychotherapy     1  Grief reaction            Goals addressed in session: Goal 1 Manage grief    DATA: Jing Oquendo appears to be struggling more with his grief related to his wife's passing  More tearful and focused on her passing and the loneliness this has created  Has very good support and contact with his family  Has regular contact weekly with friends  Has steffen spending more time indoors alone and this may have been making things more difficult  Has started to go for short walks outside and encouraged more of this activity within reason to break pattern of being alone inside his home  Processed his emotions during session- validation, normalization and supportive therapy provided  Reviewed many good things that were related to his marriage including the supportive family they created  During this session, this clinician used the following therapeutic modalities: Solution-Focused Therapy and Supportive Psychotherapy    Substance Abuse was addressed during this session  If the client is diagnosed with a co-occurring substance use disorder, please indicate any changes in the frequency or amount of use: none  Stage of change for addressing substance use diagnoses: No substance use/Not applicable    ASSESSMENT:  Saleem Logan presents with a Depressed mood  his affect is Tearful, which is congruent, with his mood and the content of the session  The client has made progress on their goals  Saleem Logan presents with a minimal risk of suicide, minimal risk of self-harm, and minimal risk of harm to others  For any risk assessment that surpasses a \"low\" rating, a safety plan must be developed  A safety plan was indicated: no  If yes, describe in detail n/a    PLAN: Between sessions, Saelem Logan will not fight to suppress his emotions and needs to cry   Discussed establishing specific crying periods throughout the " day  Reviewed coping strategies to continue to utilize to manage grief issues  At the next session, the therapist will use Solution-Focused Therapy and Supportive Psychotherapy to address grief  Behavioral Health Treatment Plan and Discharge Planning: Arleth Goff is aware of and agrees to continue to work on their treatment plan  They have identified and are working toward their discharge goals   yes    Visit start and stop times: 3:00-4:00    04/28/23

## 2023-05-05 ENCOUNTER — APPOINTMENT (OUTPATIENT)
Dept: LAB | Age: 88
End: 2023-05-05

## 2023-05-05 ENCOUNTER — OFFICE VISIT (OUTPATIENT)
Dept: NEUROLOGY | Facility: CLINIC | Age: 88
End: 2023-05-05

## 2023-05-05 VITALS
HEART RATE: 60 BPM | TEMPERATURE: 98 F | WEIGHT: 217.7 LBS | DIASTOLIC BLOOD PRESSURE: 88 MMHG | SYSTOLIC BLOOD PRESSURE: 153 MMHG | BODY MASS INDEX: 27.94 KG/M2 | HEIGHT: 74 IN | OXYGEN SATURATION: 99 %

## 2023-05-05 DIAGNOSIS — G20 PARKINSON'S DISEASE (HCC): Primary | ICD-10-CM

## 2023-05-05 DIAGNOSIS — K40.90 INGUINAL HERNIA: ICD-10-CM

## 2023-05-05 DIAGNOSIS — F02.818 DEMENTIA ASSOCIATED WITH OTHER UNDERLYING DISEASE WITH BEHAVIORAL DISTURBANCE (HCC): ICD-10-CM

## 2023-05-05 DIAGNOSIS — F41.9 ANXIETY: ICD-10-CM

## 2023-05-05 LAB
ERYTHROCYTE [DISTWIDTH] IN BLOOD BY AUTOMATED COUNT: 14 % (ref 11.6–15.1)
HCT VFR BLD AUTO: 40.1 % (ref 36.5–49.3)
HGB BLD-MCNC: 12.8 G/DL (ref 12–17)
MCH RBC QN AUTO: 29.1 PG (ref 26.8–34.3)
MCHC RBC AUTO-ENTMCNC: 31.9 G/DL (ref 31.4–37.4)
MCV RBC AUTO: 91 FL (ref 82–98)
PLATELET # BLD AUTO: 354 THOUSANDS/UL (ref 149–390)
PMV BLD AUTO: 9.8 FL (ref 8.9–12.7)
RBC # BLD AUTO: 4.4 MILLION/UL (ref 3.88–5.62)
WBC # BLD AUTO: 9.07 THOUSAND/UL (ref 4.31–10.16)

## 2023-05-05 NOTE — H&P (VIEW-ONLY)
Patient ID: Cheyenne Jackman is a 82 y o  male  Assessment/Plan:    Anxiety  Improved with Lexapro and counseling, he should continue as prescribed  Parkinson's disease ([facility] [address] )  Parkinson's disease with prominent rest tremor and bradykinesia  He has noted improvement in tremor with increases in Sinemet, he denies any wearing off  On exam he does continue with left hand resting tremor and moderate left-sided bradykinesia  No difficulty with gait or recent falls  We did discuss  increasing Sinemet 25/100 mg to 2 tabs TID to assist with tremor and bradykinesia in which patient will consider  He would like to see if being more active this summer assist with any of his symptoms  If he does increase medication he should watch for any potential side effects of fatigue, dizziness, hallucinations, dyskinesias  Plan for follow up in 4 months  To contact the office sooner with any concerns or worsening symptoms  Dementia associated with other underlying disease with behavioral disturbance  Patient's  cognition and behaviors improved from last visit  His anxiety is under better control which has likely assisted with this  MoCA score 18/30 which is stable from his testing that was previously performed in 2018  He should continue his treatment for anxiety  The addition of donepezil was being considered last visit due to his behaviors of paranoia, delusions and increased confusion however given improvement of this would hold off on adding this medication at this time  His daughter does question if the patient has occasional hallucinations and illusions, however he has poor eyesight which might be contributing  We can continue to monitor clinically  Diagnoses and all orders for this visit:    Parkinson's disease ([facility] [address] )    Dementia associated with other underlying disease with behavioral disturbance [facility] )    Anxiety           Subjective:    HPI    Mr Bryant Sánchez is a right handed male who returns for "movement disorder evaluation for Parkinson's disease  To review, symptoms began in mid 2018 with left hand tremor  No known family history of tremor or diagnosed PD  Started on propranolol 60mg qDay, then reduced to 20mg BID  No benefit to tremor and he felt more \"uptight\"on the medication  He was later started on Sinemet with improvement  Last office visit 12/2022 in which his Sinemet was increased   Also noted behaviors of confusion, paranoia and delusions, patient was having increased stress at the time and was on Lexapro   Was to consider Aricept after discussion with cardiologist          Current medications:  Sinemet 25/100mg 1 5 tabs TID        Interval History:  He presents today with his daughter who assists with history  He never started aricept as he was having stomach pain and issues and due to potential GI side effects he did not start  He lives alone  His family does check in on him frequently  His family manages his medications, his granddaughter will call him to remind him to take his medication  He is independent with ADLs  His family supplies him with meals  He goes out to lunch with friends too  He does not use the stove  He does not drive  No clear hallucinations-may see shadows or illusions but does have macular degeneration which can affect his vision  He sleep well, no RBD behaviors  He feels tremor is overall stable  He does feel improvement in there tremor with taking TID dosing  He denies any wearing off but he has asked for the medication early a handful of times in the past few month  He feels his walking and balance are stable, very occasional shuffling, no freezing, no recent falls  He does walk a few times around his neighborhood a week  No trouble swallowing, or drooling  His speech is soft  He feels his mood is good, he remains on lexapro, he denies any recent anxiety/panic attacks  He does see a counselor once or twice a month      Per his " "daughter he has had less paranoia and delusions then previous  4 months ago he did call the  as he thought someone was in the house but since then no issues with paranoia or delusions  The following portions of the patient's history were reviewed and updated as appropriate: allergies, current medications, past family history, past medical history, past social history, past surgical history and problem list           Objective:    Blood pressure 153/88, pulse 60, temperature 98 °F (36 7 °C), temperature source Temporal, height 6' 2\" (1 88 m), weight 98 7 kg (217 lb 11 2 oz), SpO2 99 %  Physical Exam  Constitutional:       General: He is awake  HENT:      Right Ear: Hearing normal    Eyes:      General: Lids are normal       Extraocular Movements: Extraocular movements intact  Pupils: Pupils are equal, round, and reactive to light  Neurological:      Mental Status: He is alert  Motor: Motor strength is normal          Neurological Exam  Mental Status  Awake and alert  Oriented only to person, place and situation  Memory: 0/5 delayed recall  Unable to copy figure  Clock drawing is abnormal  Speech: hypophonia  Able to name objects  Follows complex commands  Able to perform serial calculations  Digit span was 5 forward and 3 backward  MOCA 18/30  Cranial Nerves  CN III, IV, VI: Extraocular movements intact bilaterally  Normal lids and orbits bilaterally  Pupils equal round and reactive to light bilaterally  CN V: Facial sensation is normal   CN VII: Full and symmetric facial movement  CN VIII:  Right: Hearing is normal   Left: Hearing is decreased  CN IX, X: Palate elevates symmetrically  CN XI: Shoulder shrug strength is normal   CN XII: Tongue midline without atrophy or fasciculations  Motor   Increased muscle tone  LUE  Strength is 5/5 throughout all four extremities  Sensory  Light touch is normal in upper and lower extremities       Coordination  Right: Finger-to-nose normal  " Rapid alternating movement abnormality:Left: Finger-to-nose normal  Rapid alternating movement abnormality:  See MDS UPDRS III    Left hand resting tremor, present throughout majority of visit  Gait  Casual gait: Able to rise from chair without using arms  Reduced arm swing and stride on left  No freezing  UPDRSIII                Time since last dose:  5/5/23 1/27/22   Speech  2 2   Facial Expression  2     Postural Tremor (Right) 0 0   Postural Tremor (Left) 2 0   Kinetic Tremor (Right)  1 1   Kinetic Tremor (Left)  1 1   Rest tremor amplitude RUE 1 1   Rest tremor amplitude LUE 2 2   Rest tremor amplitude RLE 0 0   Rest tremor amplitude LLE 0 0   Lip/Jaw Tremor  2     Consistency of tremor 3 1   Finger Taps (Right)   1 1   Finger Taps (Left)  2 2   Hand Movement (Right)  1 1   Hand Movement (Left)   2 1   Pronation/Supination (Right)  1 2   Pronation/Supination (Left)   2 2   Toe Tapping (Right) 1 1   Toe Tapping (Left) 2-3 2   Leg Agility (Right)  1 0   Leg Agility (Left)   2 2   Rigidity - Neck       Rigidity - Upper Extremity (Right)  1 1   Rigidity - Upper Extremity (Left)   2 2   Rigidity - Lower Extremity (Right)  0 0   Rigidity - Lower Extremity (Left)   0 0   Arising from Chair   0 0    Gait   1 1   Freezing of Gait 0 0   Postural Stability       Posture 1 1   Global spontaneity of movement 1-2 1          I have personally reviewed the ROS performed by the MA     ROS:    Review of Systems   Constitutional: Negative  Negative for appetite change and fever  HENT: Negative  Negative for hearing loss, tinnitus, trouble swallowing and voice change  Eyes: Negative  Negative for photophobia, pain and visual disturbance  Respiratory: Negative  Negative for shortness of breath  Cardiovascular: Negative  Negative for palpitations  Gastrointestinal: Negative  Negative for nausea and vomiting  Endocrine: Negative  Negative for cold intolerance  Genitourinary: Negative    Negative for dysuria, frequency and urgency  Musculoskeletal: Negative  Negative for gait problem, myalgias and neck pain  Skin: Negative  Negative for rash  Allergic/Immunologic: Negative  Neurological: Negative  Negative for dizziness, tremors, seizures, syncope, facial asymmetry, speech difficulty, weakness, light-headedness, numbness and headaches  Hematological: Negative  Does not bruise/bleed easily  Psychiatric/Behavioral: Negative  Negative for confusion, hallucinations and sleep disturbance  Memory   All other systems reviewed and are negative

## 2023-05-05 NOTE — PROGRESS NOTES
Patient ID: Derek Ford is a 82 y o  male  Assessment/Plan:    Anxiety  Improved with Lexapro and counseling, he should continue as prescribed  Parkinson's disease ([facility] [address] )  Parkinson's disease with prominent rest tremor and bradykinesia  He has noted improvement in tremor with increases in Sinemet, he denies any wearing off  On exam he does continue with left hand resting tremor and moderate left-sided bradykinesia  No difficulty with gait or recent falls  We did discuss  increasing Sinemet 25/100 mg to 2 tabs TID to assist with tremor and bradykinesia in which patient will consider  He would like to see if being more active this summer assist with any of his symptoms  If he does increase medication he should watch for any potential side effects of fatigue, dizziness, hallucinations, dyskinesias  Plan for follow up in 4 months  To contact the office sooner with any concerns or worsening symptoms  Dementia associated with other underlying disease with behavioral disturbance  Patient's  cognition and behaviors improved from last visit  His anxiety is under better control which has likely assisted with this  MoCA score 18/30 which is stable from his testing that was previously performed in 2018  He should continue his treatment for anxiety  The addition of donepezil was being considered last visit due to his behaviors of paranoia, delusions and increased confusion however given improvement of this would hold off on adding this medication at this time  His daughter does question if the patient has occasional hallucinations and illusions, however he has poor eyesight which might be contributing  We can continue to monitor clinically  Diagnoses and all orders for this visit:    Parkinson's disease ([facility] [address] )    D[facility] associated with other underlying disease with [facility]    Anxiety           Subjective:    HPI    Mr Cecile Muse is a right handed male who returns for "movement disorder evaluation for Parkinson's disease  To review, symptoms began in mid 2018 with left hand tremor  No known family history of tremor or diagnosed PD  Started on propranolol 60mg qDay, then reduced to 20mg BID  No benefit to tremor and he felt more \"uptight\"on the medication  He was later started on Sinemet with improvement  Last office visit 12/2022 in which his Sinemet was increased   Also noted behaviors of confusion, paranoia and delusions, patient was having increased stress at the time and was on Lexapro   Was to consider Aricept after discussion with cardiologist          Current medications:  Sinemet 25/100mg 1 5 tabs TID        Interval History:  He presents today with his daughter who assists with history  He never started aricept as he was having stomach pain and issues and due to potential GI side effects he did not start  He lives alone  His family does check in on him frequently  His family manages his medications, his granddaughter will call him to remind him to take his medication  He is independent with ADLs  His family supplies him with meals  He goes out to lunch with friends too  He does not use the stove  He does not drive  No clear hallucinations-may see shadows or illusions but does have macular degeneration which can affect his vision  He sleep well, no RBD behaviors  He feels tremor is overall stable  He does feel improvement in there tremor with taking TID dosing  He denies any wearing off but he has asked for the medication early a handful of times in the past few month  He feels his walking and balance are stable, very occasional shuffling, no freezing, no recent falls  He does walk a few times around his neighborhood a week  No trouble swallowing, or drooling  His speech is soft  He feels his mood is good, he remains on lexapro, he denies any recent anxiety/panic attacks  He does see a counselor once or twice a month      Per his " "daughter he has had less paranoia and delusions then previous  4 months ago he did call the  as he thought someone was in the house but since then no issues with paranoia or delusions  The following portions of the patient's history were reviewed and updated as appropriate: allergies, current medications, past family history, past medical history, past social history, past surgical history and problem list           Objective:    Blood pressure 153/88, pulse 60, temperature 98 °F (36 7 °C), temperature source Temporal, height 6' 2\" (1 88 m), weight 98 7 kg (217 lb 11 2 oz), SpO2 99 %  Physical Exam  Constitutional:       General: He is awake  HENT:      Right Ear: Hearing normal    Eyes:      General: Lids are normal       Extraocular Movements: Extraocular movements intact  Pupils: Pupils are equal, round, and reactive to light  Neurological:      Mental Status: He is alert  Motor: Motor strength is normal          Neurological Exam  Mental Status  Awake and alert  Oriented only to person, place and situation  Memory: 0/5 delayed recall  Unable to copy figure  Clock drawing is abnormal  Speech: hypophonia  Able to name objects  Follows complex commands  Able to perform serial calculations  Digit span was 5 forward and 3 backward  MOCA 18/30  Cranial Nerves  CN III, IV, VI: Extraocular movements intact bilaterally  Normal lids and orbits bilaterally  Pupils equal round and reactive to light bilaterally  CN V: Facial sensation is normal   CN VII: Full and symmetric facial movement  CN VIII:  Right: Hearing is normal   Left: Hearing is decreased  CN IX, X: Palate elevates symmetrically  CN XI: Shoulder shrug strength is normal   CN XII: Tongue midline without atrophy or fasciculations  Motor   Increased muscle tone  LUE  Strength is 5/5 throughout all four extremities  Sensory  Light touch is normal in upper and lower extremities       Coordination  Right: Finger-to-nose normal  " Rapid alternating movement abnormality:Left: Finger-to-nose normal  Rapid alternating movement abnormality:  See MDS UPDRS III    Left hand resting tremor, present throughout majority of visit  Gait  Casual gait: Able to rise from chair without using arms  Reduced arm swing and stride on left  No freezing  UPDRSIII                Time since last dose:  5/5/23 1/27/22   Speech  2 2   Facial Expression  2     Postural Tremor (Right) 0 0   Postural Tremor (Left) 2 0   Kinetic Tremor (Right)  1 1   Kinetic Tremor (Left)  1 1   Rest tremor amplitude RUE 1 1   Rest tremor amplitude LUE 2 2   Rest tremor amplitude RLE 0 0   Rest tremor amplitude LLE 0 0   Lip/Jaw Tremor  2     Consistency of tremor 3 1   Finger Taps (Right)   1 1   Finger Taps (Left)  2 2   Hand Movement (Right)  1 1   Hand Movement (Left)   2 1   Pronation/Supination (Right)  1 2   Pronation/Supination (Left)   2 2   Toe Tapping (Right) 1 1   Toe Tapping (Left) 2-3 2   Leg Agility (Right)  1 0   Leg Agility (Left)   2 2   Rigidity - Neck       Rigidity - Upper Extremity (Right)  1 1   Rigidity - Upper Extremity (Left)   2 2   Rigidity - Lower Extremity (Right)  0 0   Rigidity - Lower Extremity (Left)   0 0   Arising from Chair   0 0    Gait   1 1   Freezing of Gait 0 0   Postural Stability       Posture 1 1   Global spontaneity of movement 1-2 1          I have personally reviewed the ROS performed by the MA     ROS:    Review of Systems   Constitutional: Negative  Negative for appetite change and fever  HENT: Negative  Negative for hearing loss, tinnitus, trouble swallowing and voice change  Eyes: Negative  Negative for photophobia, pain and visual disturbance  Respiratory: Negative  Negative for shortness of breath  Cardiovascular: Negative  Negative for palpitations  Gastrointestinal: Negative  Negative for nausea and vomiting  Endocrine: Negative  Negative for cold intolerance  Genitourinary: Negative    Negative for dysuria, frequency and urgency  Musculoskeletal: Negative  Negative for gait problem, myalgias and neck pain  Skin: Negative  Negative for rash  Allergic/Immunologic: Negative  Neurological: Negative  Negative for dizziness, tremors, seizures, syncope, facial asymmetry, speech difficulty, weakness, light-headedness, numbness and headaches  Hematological: Negative  Does not bruise/bleed easily  Psychiatric/Behavioral: Negative  Negative for confusion, hallucinations and sleep disturbance  Memory   All other systems reviewed and are negative

## 2023-05-05 NOTE — PATIENT INSTRUCTIONS
Continue current dose of sinemet-if you feel that tremor, slowness of left side is not improving or worsening with activity increase can increase sinemet to 2 tabs 3x/day  With increase watch for any nausea, dizziness, fatigue, hallucinations  Can start donepezil to help stabilize congition-if having dizziness, GI side effects, sleep disturbance let me know

## 2023-05-05 NOTE — ASSESSMENT & PLAN NOTE
Patient's  cognition and behaviors improved from last visit  His anxiety is under better control which has likely assisted with this  MoCA score 18/30 which is stable from his testing that was previously performed in 2018  He should continue his treatment for anxiety  The addition of donepezil was being considered last visit due to his behaviors of paranoia, delusions and increased confusion however given improvement of this would hold off on adding this medication at this time  His daughter does question if the patient has occasional hallucinations and illusions, however he has poor eyesight which might be contributing  We can continue to monitor clinically

## 2023-05-05 NOTE — ASSESSMENT & PLAN NOTE
Parkinson's disease with prominent rest tremor and bradykinesia  He has noted improvement in tremor with increases in Sinemet, he denies any wearing off  On exam he does continue with left hand resting tremor and moderate left-sided bradykinesia  No difficulty with gait or recent falls  We did discuss  increasing Sinemet 25/100 mg to 2 tabs TID to assist with tremor and bradykinesia in which patient will consider  He would like to see if being more active this summer assist with any of his symptoms  If he does increase medication he should watch for any potential side effects of fatigue, dizziness, hallucinations, dyskinesias  Plan for follow up in 4 months  To contact the office sooner with any concerns or worsening symptoms

## 2023-05-06 ENCOUNTER — NURSE TRIAGE (OUTPATIENT)
Dept: OTHER | Facility: OTHER | Age: 88
End: 2023-05-06

## 2023-05-06 LAB
ALBUMIN SERPL BCP-MCNC: 4 G/DL (ref 3.5–5)
ALP SERPL-CCNC: 99 U/L (ref 46–116)
ALT SERPL W P-5'-P-CCNC: 27 U/L (ref 12–78)
ANION GAP SERPL CALCULATED.3IONS-SCNC: 2 MMOL/L (ref 4–13)
AST SERPL W P-5'-P-CCNC: 23 U/L (ref 5–45)
BILIRUB SERPL-MCNC: 1 MG/DL (ref 0.2–1)
BUN SERPL-MCNC: 16 MG/DL (ref 5–25)
CALCIUM SERPL-MCNC: 9.2 MG/DL (ref 8.3–10.1)
CHLORIDE SERPL-SCNC: 105 MMOL/L (ref 96–108)
CO2 SERPL-SCNC: 27 MMOL/L (ref 21–32)
CREAT SERPL-MCNC: 0.95 MG/DL (ref 0.6–1.3)
GFR SERPL CREATININE-BSD FRML MDRD: 72 ML/MIN/1.73SQ M
GLUCOSE P FAST SERPL-MCNC: 83 MG/DL (ref 65–99)
POTASSIUM SERPL-SCNC: 4.2 MMOL/L (ref 3.5–5.3)
PROT SERPL-MCNC: 7.3 G/DL (ref 6.4–8.4)
SODIUM SERPL-SCNC: 134 MMOL/L (ref 135–147)

## 2023-05-06 NOTE — TELEPHONE ENCOUNTER
Patient calling with bilateral stabbing foot pain that started today  Patient rates his pain 5-6/10 and he has not tried taking anything such as Tylenol or Motrin yet  Pt states he normally will walk a few blocks around his house but now he has hard time walking down the street due to the pain  Per protocol recommended patient try taking Tylenol or Motrin to see if that improves his pain  Instructed him to call back if pain does not improve or worsens  Also told him if he would like he could also go to urgent care today at [facility] for evaluation as they are open today until 8pm  Patient verbalized understanding

## 2023-05-06 NOTE — TELEPHONE ENCOUNTER
"Regarding: feet hurt  ----- Message from Sylvia Unger sent at 5/6/2023  4:06 PM EDT -----  Pt called, \" when I walk on my feet, they both hurt  \"    "

## 2023-05-06 NOTE — TELEPHONE ENCOUNTER
"    Reason for Disposition   Foot pain    Answer Assessment - Initial Assessment Questions  1  ONSET: \"When did the pain start? \"       Today     2  LOCATION: \"Where is the pain located? \"      Bilateral      3  PAIN: \"How bad is the pain? \"    (Scale 1-10; or mild, moderate, severe)   - MILD (1-3): doesn't interfere with normal activities  - MODERATE (4-7): interferes with normal activities (e g , work or school) or awakens from sleep, limping     - SEVERE (8-10): excruciating pain, unable to do any normal activities, unable to walk        5-6/10- stabbing pain     4  WORK OR EXERCISE: \"Has there been any recent work or exercise that involved this part of the body? \"       Denies     5  CAUSE: \"What do you think is causing the foot pain? \"      Unknown    6  OTHER SYMPTOMS: \"Do you have any other symptoms? \" (e g , leg pain, rash, fever, numbness)      Denies    Protocols used: FOOT PAIN-ADULT-AH      "

## 2023-05-08 LAB
ATRIAL RATE: 277 BPM
QRS AXIS: 164 DEGREES
QRSD INTERVAL: 90 MS
QT INTERVAL: 416 MS
QTC INTERVAL: 488 MS
T WAVE AXIS: 138 DEGREES
VENTRICULAR RATE: 83 BPM

## 2023-05-12 ENCOUNTER — TELEPHONE (OUTPATIENT)
Dept: FAMILY MEDICINE CLINIC | Facility: CLINIC | Age: 88
End: 2023-05-12

## 2023-05-12 NOTE — TELEPHONE ENCOUNTER
Spoke with patient - patient will call back to r/s appointment with Steffen Arrant for today   Cancelled due to provider being out

## 2023-05-15 NOTE — PRE-PROCEDURE INSTRUCTIONS
Pre-Surgery Instructions:   Medication Instructions   • acetaminophen (TYLENOL) 325 mg tablet Hold day of surgery  • ALPRAZolam (XANAX) 0 25 mg tablet Uses PRN- OK to take day of surgery   • carbidopa-levodopa (SINEMET)  mg per tablet Take day of surgery  • escitalopram (Lexapro) 10 mg tablet Take night before surgery   • ipratropium (ATROVENT) 0 06 % nasal spray Take day of surgery  • metoprolol succinate (TOPROL-XL) 25 mg 24 hr tablet Take day of surgery  • telmisartan (MICARDIS) 20 MG tablet Take night before surgery   • Xarelto 15 MG tablet Instructions provided by MD    Pt denies sob, sore throat and cough per daughter  Pt instructed to stop nsaids and supplements one week prior to surgery  Pt daughter verbalized understanding of shower and med instructions  Last dose of xarelto per CC is 5/16/23

## 2023-05-16 NOTE — DISCHARGE INSTR - AVS FIRST PAGE
Please call the office when you leave to schedule an appointment for 2 weeks  Please call [phone]                      [facility] [address], [facility], [address]  [address] between [facility] and [facility]  May resume Xarelto tomorrow    Activity:    May lift 10 lb as many times as desired the 1st week,       20 lb in 2 weeks,       30 lb in 3 weeks  Walking is encouraged  Normal daily activities including climbing steps are okay          Diet:   You may return to your normal healthy diet  Wound Care: Your wound is closed with dissolvable stitches and glue  It is okay to shower  Wash incision gently with soap and water and pat dry  Do not soak incisions in bath water or swim for two weeks  Do not apply any creams or ointments  Pain Medication:   Please take as directed if needed  May use Advil or Motrin in addition  Recall, the pain medicine and anesthesia is associated with constipation  Other: It is normal to developed a “healing ridge” / firm incision after surgery  This is your body making scar tissue  It is a good sign  Constipation is very common after general anesthesia  Please use milk of magnesia as needed in order to help prevent constipation  It is normal to get bruising after surgery  If you have questions after discharge please call the office      If you have increased pain, fever >101 5, increased drainage, redness or a bad smell at your surgery site, please call us immediately or come directly to the Emergency Room

## 2023-05-17 ENCOUNTER — SOCIAL WORK (OUTPATIENT)
Dept: BEHAVIORAL/MENTAL HEALTH CLINIC | Facility: CLINIC | Age: 88
End: 2023-05-17

## 2023-05-17 DIAGNOSIS — F43.21 GRIEF REACTION: Primary | ICD-10-CM

## 2023-05-17 NOTE — PSYCH
"Behavioral Health Psychotherapy Progress Note    Psychotherapy Provided: Individual Psychotherapy     1  Grief reaction            Goals addressed in session: Goal 1     DATA: Benitez Canales continues to struggle with periods of grief and sadness related to passing of his wife  Fixated on former student he believes is his pharmacist and concerned he may be doing things with medication  Denies being paranoid and his fixation with this diminished  Continues to have memory deficits evident in each session  However, his affect in somewhat brighter today  Continue to process his feelings and emotions and do some life review  Continues to respond favorably to this type of supportive therapy  Has very supportive family who are actively involved  Has consistent contact with his limited peer supports  Denies any acute depressive symptoms  No SI   dDuring this session, this clinician used the following therapeutic modalities: Supportive Psychotherapy    Substance Abuse was not addressed during this session  If the client is diagnosed with a co-occurring substance use disorder, please indicate any changes in the frequency or amount of use: n/a  Stage of change for addressing substance use diagnoses: No substance use/Not applicable    ASSESSMENT:  Jorge Vanessa presents with a Anxious mood  his affect is Normal range and intensity, which is congruent, with his mood and the content of the session  The client has made progress on their goals  Jorge Vanessa presents with a minimal risk of suicide, minimal risk of self-harm, and minimal risk of harm to others  For any risk assessment that surpasses a \"low\" rating, a safety plan must be developed  A safety plan was indicated: no  If yes, describe in detail n/a    PLAN: Between sessions, Jorge Vanessa will continue to maintain daily contact/interaction with family  Have encouraged him to increase contact/interactions with his peers and neighbor as well     At the next " session, the therapist will use Supportive Psychotherapy to address   Behavioral Health Treatment Plan and Discharge Planning: Kadie Cordoba is aware of and agrees to continue to work on their treatment plan  They have identified and are working toward their discharge goals   yes    Visit start and stop times: 4:15-5:00    05/17/23

## 2023-05-18 ENCOUNTER — ANESTHESIA EVENT (OUTPATIENT)
Dept: PERIOP | Facility: AMBULARY SURGERY CENTER | Age: 88
End: 2023-05-18

## 2023-05-19 ENCOUNTER — HOSPITAL ENCOUNTER (OUTPATIENT)
Facility: AMBULARY SURGERY CENTER | Age: 88
Setting detail: OUTPATIENT SURGERY
Discharge: HOME | End: 2023-05-19
Attending: SURGERY | Admitting: SURGERY

## 2023-05-19 ENCOUNTER — ANESTHESIA (OUTPATIENT)
Dept: PERIOP | Facility: AMBULARY SURGERY CENTER | Age: 88
End: 2023-05-19

## 2023-05-19 VITALS
HEIGHT: 74 IN | DIASTOLIC BLOOD PRESSURE: 80 MMHG | WEIGHT: 217 LBS | RESPIRATION RATE: 18 BRPM | BODY MASS INDEX: 27.85 KG/M2 | HEART RATE: 74 BPM | SYSTOLIC BLOOD PRESSURE: 152 MMHG | OXYGEN SATURATION: 98 % | TEMPERATURE: 97.9 F

## 2023-05-19 DIAGNOSIS — R21 RASH: ICD-10-CM

## 2023-05-19 DIAGNOSIS — K40.90 INGUINAL HERNIA OF RIGHT SIDE WITHOUT OBSTRUCTION OR GANGRENE: Primary | ICD-10-CM

## 2023-05-19 DEVICE — MODIFIED ONFLEX MESH
Type: IMPLANTABLE DEVICE | Site: GROIN | Status: FUNCTIONAL
Brand: MODIFIED ONFLEX MESH

## 2023-05-19 RX ORDER — FENTANYL CITRATE 50 UG/ML
INJECTION, SOLUTION INTRAMUSCULAR; INTRAVENOUS AS NEEDED
Status: DISCONTINUED | OUTPATIENT
Start: 2023-05-19 | End: 2023-05-19

## 2023-05-19 RX ORDER — MAGNESIUM HYDROXIDE 1200 MG/15ML
LIQUID ORAL AS NEEDED
Status: DISCONTINUED | OUTPATIENT
Start: 2023-05-19 | End: 2023-05-19 | Stop reason: HOSPADM

## 2023-05-19 RX ORDER — HYDROMORPHONE HCL/PF 1 MG/ML
0.5 SYRINGE (ML) INJECTION
Status: DISCONTINUED | OUTPATIENT
Start: 2023-05-19 | End: 2023-05-19 | Stop reason: HOSPADM

## 2023-05-19 RX ORDER — KETOROLAC TROMETHAMINE 30 MG/ML
INJECTION, SOLUTION INTRAMUSCULAR; INTRAVENOUS AS NEEDED
Status: DISCONTINUED | OUTPATIENT
Start: 2023-05-19 | End: 2023-05-19

## 2023-05-19 RX ORDER — OXYCODONE HYDROCHLORIDE AND ACETAMINOPHEN 5; 325 MG/1; MG/1
1 TABLET ORAL EVERY 4 HOURS PRN
Qty: 10 TABLET | Refills: 0 | Status: SHIPPED | OUTPATIENT
Start: 2023-05-19

## 2023-05-19 RX ORDER — BUPIVACAINE HYDROCHLORIDE 2.5 MG/ML
INJECTION, SOLUTION EPIDURAL; INFILTRATION; INTRACAUDAL AS NEEDED
Status: DISCONTINUED | OUTPATIENT
Start: 2023-05-19 | End: 2023-05-19 | Stop reason: HOSPADM

## 2023-05-19 RX ORDER — FENTANYL CITRATE/PF 50 MCG/ML
25 SYRINGE (ML) INJECTION
Status: DISCONTINUED | OUTPATIENT
Start: 2023-05-19 | End: 2023-05-19 | Stop reason: HOSPADM

## 2023-05-19 RX ORDER — NYSTATIN 100000 [USP'U]/G
POWDER TOPICAL 2 TIMES DAILY
Qty: 15 G | Refills: 3 | Status: SHIPPED | OUTPATIENT
Start: 2023-05-19

## 2023-05-19 RX ORDER — EPHEDRINE SULFATE 50 MG/ML
INJECTION INTRAVENOUS AS NEEDED
Status: DISCONTINUED | OUTPATIENT
Start: 2023-05-19 | End: 2023-05-19

## 2023-05-19 RX ORDER — SODIUM CHLORIDE, SODIUM LACTATE, POTASSIUM CHLORIDE, CALCIUM CHLORIDE 600; 310; 30; 20 MG/100ML; MG/100ML; MG/100ML; MG/100ML
125 INJECTION, SOLUTION INTRAVENOUS CONTINUOUS
Status: DISCONTINUED | OUTPATIENT
Start: 2023-05-19 | End: 2023-05-19 | Stop reason: HOSPADM

## 2023-05-19 RX ORDER — OXYCODONE HYDROCHLORIDE AND ACETAMINOPHEN 5; 325 MG/1; MG/1
1 TABLET ORAL EVERY 4 HOURS PRN
Status: DISCONTINUED | OUTPATIENT
Start: 2023-05-19 | End: 2023-05-19 | Stop reason: HOSPADM

## 2023-05-19 RX ORDER — ONDANSETRON 2 MG/ML
INJECTION INTRAMUSCULAR; INTRAVENOUS AS NEEDED
Status: DISCONTINUED | OUTPATIENT
Start: 2023-05-19 | End: 2023-05-19

## 2023-05-19 RX ORDER — DEXAMETHASONE SODIUM PHOSPHATE 10 MG/ML
INJECTION, SOLUTION INTRAMUSCULAR; INTRAVENOUS AS NEEDED
Status: DISCONTINUED | OUTPATIENT
Start: 2023-05-19 | End: 2023-05-19

## 2023-05-19 RX ORDER — ACETAMINOPHEN 325 MG/1
650 TABLET ORAL EVERY 4 HOURS PRN
Status: DISCONTINUED | OUTPATIENT
Start: 2023-05-19 | End: 2023-05-19 | Stop reason: HOSPADM

## 2023-05-19 RX ORDER — LIDOCAINE HYDROCHLORIDE 20 MG/ML
INJECTION, SOLUTION EPIDURAL; INFILTRATION; INTRACAUDAL; PERINEURAL AS NEEDED
Status: DISCONTINUED | OUTPATIENT
Start: 2023-05-19 | End: 2023-05-19

## 2023-05-19 RX ORDER — PROPOFOL 10 MG/ML
INJECTION, EMULSION INTRAVENOUS AS NEEDED
Status: DISCONTINUED | OUTPATIENT
Start: 2023-05-19 | End: 2023-05-19

## 2023-05-19 RX ORDER — CEFAZOLIN SODIUM 2 G/50ML
2000 SOLUTION INTRAVENOUS ONCE
Status: COMPLETED | OUTPATIENT
Start: 2023-05-19 | End: 2023-05-19

## 2023-05-19 RX ORDER — ONDANSETRON 2 MG/ML
4 INJECTION INTRAMUSCULAR; INTRAVENOUS EVERY 4 HOURS PRN
Status: DISCONTINUED | OUTPATIENT
Start: 2023-05-19 | End: 2023-05-19 | Stop reason: HOSPADM

## 2023-05-19 RX ORDER — LIDOCAINE HYDROCHLORIDE 10 MG/ML
0.5 INJECTION, SOLUTION EPIDURAL; INFILTRATION; INTRACAUDAL; PERINEURAL ONCE AS NEEDED
Status: DISCONTINUED | OUTPATIENT
Start: 2023-05-19 | End: 2023-05-19 | Stop reason: HOSPADM

## 2023-05-19 RX ADMIN — LIDOCAINE HYDROCHLORIDE 100 MG: 20 INJECTION, SOLUTION EPIDURAL; INFILTRATION; INTRACAUDAL at 10:36

## 2023-05-19 RX ADMIN — EPHEDRINE SULFATE 5 MG: 50 INJECTION, SOLUTION INTRAVENOUS at 11:05

## 2023-05-19 RX ADMIN — FENTANYL CITRATE 25 MCG: 50 INJECTION, SOLUTION INTRAMUSCULAR; INTRAVENOUS at 10:41

## 2023-05-19 RX ADMIN — PROPOFOL 50 MG: 10 INJECTION, EMULSION INTRAVENOUS at 10:37

## 2023-05-19 RX ADMIN — FENTANYL CITRATE 25 MCG: 50 INJECTION, SOLUTION INTRAMUSCULAR; INTRAVENOUS at 11:40

## 2023-05-19 RX ADMIN — CEFAZOLIN SODIUM 2000 MG: 2 SOLUTION INTRAVENOUS at 10:32

## 2023-05-19 RX ADMIN — EPHEDRINE SULFATE 5 MG: 50 INJECTION, SOLUTION INTRAVENOUS at 10:49

## 2023-05-19 RX ADMIN — FENTANYL CITRATE 25 MCG: 50 INJECTION, SOLUTION INTRAMUSCULAR; INTRAVENOUS at 11:19

## 2023-05-19 RX ADMIN — PROPOFOL 100 MG: 10 INJECTION, EMULSION INTRAVENOUS at 10:36

## 2023-05-19 RX ADMIN — KETOROLAC TROMETHAMINE 15 MG: 30 INJECTION, SOLUTION INTRAMUSCULAR at 11:38

## 2023-05-19 RX ADMIN — EPHEDRINE SULFATE 5 MG: 50 INJECTION, SOLUTION INTRAVENOUS at 10:53

## 2023-05-19 RX ADMIN — ONDANSETRON 4 MG: 2 INJECTION INTRAMUSCULAR; INTRAVENOUS at 10:41

## 2023-05-19 RX ADMIN — DEXAMETHASONE SODIUM PHOSPHATE 10 MG: 10 INJECTION, SOLUTION INTRAMUSCULAR; INTRAVENOUS at 10:39

## 2023-05-19 RX ADMIN — FENTANYL CITRATE 25 MCG: 50 INJECTION, SOLUTION INTRAMUSCULAR; INTRAVENOUS at 11:00

## 2023-05-19 RX ADMIN — SODIUM CHLORIDE, SODIUM LACTATE, POTASSIUM CHLORIDE, AND CALCIUM CHLORIDE: .6; .31; .03; .02 INJECTION, SOLUTION INTRAVENOUS at 10:10

## 2023-05-19 NOTE — INTERVAL H&P NOTE
H&P reviewed  After examining the patient I find no changes in the patients condition since the H&P had been written  Right inguinal hernia for repair    Vitals:    05/19/23 0919   BP: 130/80   Pulse: (!) 108   Resp: 16   Temp: (!) 96 8 °F (36 °C)   SpO2: 97%

## 2023-05-19 NOTE — OP NOTE
OPERATIVE REPORT  PATIENT NAME: Corina Del Rio    :  1935  MRN: [id number]  Pt Location: [facility] OR ROOM 04    SURGERY DATE: 2023    Surgeon(s) and Role:     * Siobhan Lanza MD - Primary     * Lucía Colindres PA-C - Assisting is no qualified surgical resident is available  Her assistance is needed for exposure and retraction  Preop Diagnosis:  Inguinal hernia [K40 90]    Post-Op Diagnosis Codes:     * Inguinal hernia [K40 90]    Procedure(s):  Right - REPAIR HERNIA INGUINAL with mesh    Specimen(s):  * No specimens in log *    Estimated Blood Loss:   Minimal    Drains:  * No LDAs found *    Anesthesia Type:   General    Operative Indications:  Inguinal hernia [K40 90]      Independent, non-smoker, ASA 3, wound class I, BMI 28, weight 220, height 74  (+) Aortic stenosis   (+) Benign essential hypertension   (+) Chest pain   (+) Dyspnea on exertion   (+) Persistent atrial fibrillation (HCC)   (+) Pure hypercholesterolemia   (+) Systolic murmur   (+) Thoracic aortic aneurysm without rupture (HCC)       GI/HEPATIC   (+) Esophageal reflux       HEMATOLOGY   (+) Acute blood loss anemia       MUSCULOSKELETAL   (+) Acute left-sided low back pain with left-sided sciatica       NEURO/PSYCH   (+) Anxiety   (+) 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)   (+) 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HCC)   (+) Panic attacks       PULMONARY   (+) Dyspnea on exertion   (+) URI (upper respiratory infection)       Nervous and Auditory   (+) Parkinson's disease (HCC)                          Complications:   None    Procedure and Technique: Corina Del Rio is a 82 y o  male was brought into the operative suite and identified visually and by arm band  The patient was placed in the supine position  Careful attention was made towards padding and positioning of the extremities  After sterile prep and drape, a timeout was completed  The prep was dry  Antibiotics were provided  Athrombic pumps in place  0 25% Marcaine with epinephrine was utilized throughout the procedure  An incision was made  within the right inguinal region  The incision was carried down through the skin and subcutaneous tissue  The superficial epigastric vein was clamped, ligated and  divided    The external oblique fibers were identified  The external ring was identified  The external oblique fibers were then split in the direction of their fibers  Hemostats were placed on the cut edges  A self-retaining retractor was then placed  Exploration of the inguinal canal commenced  The cremasteric fibers were then divided along the fiber direction of its fibers the cord structures  The cord was encircled in a atraumatic Penrose drain and preserved during dissection  Identification of a indirect inguinal hernia was performed  High dissection was completed at the level of the internal ring  Preperitoneal dissection commenced identifying and preserving the inferior epigastric vessels  A preperitoneal mesh was then inserted  The branch of the genitofemoral nerve was divided in order to help prevent postoperative neuralgia  The inguinal floor was then repaired with interrupted figure-of-eight 0 Vicryl suture  The indirect inguinal ring was repositioned more superiorly while repairing the inguinal transversalis muscular floor  An onlay mesh was then secured to the tendon overlying the lateral pubic tubercle The external oblique fascia was closed with a running 3-0 PDS suture  Additional 0 25% Marcaine with epinephrine was injected over the ilioinguinal and iliohypogastric nerves  3-0 Vicryl subcutaneous suture was placed into the Frankie's fascia   4-0 Monocryl suture was used for the subcuticular layer  Dermabond was then applied  The patient was then awakened from general anesthesia and transferred to the recovery room in stable condition  Sponge and instrument count correct ×2       I was present for the entire procedure      Patient Disposition:  PACU         SIGNATURE: Hammad Ayala MD  DATE: May 19, 2023  TIME: 11:57 AM

## 2023-05-19 NOTE — ANESTHESIA POSTPROCEDURE EVALUATION
Post-Op Assessment Note    CV Status:  Stable  Pain Score: 0    Pain management: adequate     Mental Status:  Arousable and sleepy   Hydration Status:  Stable   PONV Controlled:  Controlled   Airway Patency:  Patent   Two or more mitigation strategies used for obstructive sleep apnea   Post Op Vitals Reviewed: Yes      Staff: CRNA         No notable events documented      BP   133/72   Temp 97 6   Pulse 47   Resp   14   SpO2   99

## 2023-05-19 NOTE — ANESTHESIA PREPROCEDURE EVALUATION
Procedure:  REPAIR HERNIA INGUINAL (Right: Groin)    Relevant Problems   ANESTHESIA (within normal limits)      CARDIO  Echo: 55-60% EF,  Mitral Valve   Mitral valve structure is normal, sclerotic There is mild annular calcification  There is mild regurgitation  There is no evidence of mitral valve stenosis  Tricuspid Valve   Tricuspid valve structure is normal  There is mild to moderate regurgitation  There is no evidence of tricuspid valve stenosis  Aortic Valve   The aortic valve is trileaflet  The leaflets exhibit at least mildly reduced excursion  The leaflets are moderately calcified  Dimensionless index = 0 32  There is no regurgitation  There is mild stenosis  Pulmonic Valve   Pulmonic valve structure is normal  There is mild regurgitation  There is no evidence of pulmonic valve stenosis  The estimated pulmonary artery systolic pressure is ~70-86 mmHg  (+) Aortic stenosis   (+) Benign essential hypertension   (+) Chest pain   (+) Dyspnea on exertion   (+) Persistent atrial fibrillation (HCC)   (+) Pure hypercholesterolemia   (+) Systolic murmur   (+) Thoracic aortic aneurysm without rupture (HCC)      GI/HEPATIC   (+) Esophageal reflux      HEMATOLOGY   (+) Acute blood loss anemia      MUSCULOSKELETAL   (+) Acute left-sided low back pain with left-sided sciatica      NEURO/PSYCH   (+) Anxiety   (+) 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)   (+) 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HCC)   (+) Panic attacks      PULMONARY   (+) Dyspnea on exertion   (+) URI (upper respiratory infection)      Nervous and Auditory   (+) Parkinson's disease (HCC)        Physical Exam    Airway    Mallampati score: II  TM Distance: >3 FB  Neck ROM: full     Dental   No notable dental hx     Cardiovascular      Pulmonary      Other Findings        Anesthesia Plan  ASA Score- 3     Anesthesia Type- general with ASA Monitors  Additional Monitors:   Airway Plan: LMA            Plan Factors-Exercise tolerance (METS): >4 METS  Chart reviewed  EKG reviewed  Existing labs reviewed  Patient summary reviewed  Patient is not a current smoker  Induction- intravenous  Postoperative Plan-     Informed Consent- Anesthetic plan and risks discussed with patient  I personally reviewed this patient with the CRNA  Discussed and agreed on the Anesthesia Plan with the CRNA  Jyoti Cuello

## 2023-05-23 ENCOUNTER — TELEPHONE (OUTPATIENT)
Dept: PODIATRY | Facility: CLINIC | Age: 88
End: 2023-05-23

## 2023-05-23 NOTE — TELEPHONE ENCOUNTER
Caller: Mary Castro    Doctor: Dr Baron Coleman    Reason for call: appt/checked chart for last date seen/2022/GARCÍA/booked appt      Call back#: NA

## 2023-06-05 ENCOUNTER — OFFICE VISIT (OUTPATIENT)
Dept: SURGERY | Facility: CLINIC | Age: 88
End: 2023-06-05

## 2023-06-05 DIAGNOSIS — K40.90 INGUINAL HERNIA OF RIGHT SIDE WITHOUT OBSTRUCTION OR GANGRENE: Primary | ICD-10-CM

## 2023-06-05 PROCEDURE — 99024 POSTOP FOLLOW-UP VISIT: CPT | Performed by: SURGERY

## 2023-06-05 NOTE — PROGRESS NOTES
Assessment/Plan: Patient is status post inguinal hernia repair  He feels well  He offers no complaints  Incisions clean healing nicely  All questions were answered  Patient is cleared to undergo colonoscopy in July  There are no diagnoses linked to this encounter  Pathology: None sent      Postoperative restrictions reviewed  All questions answered  ______________________________________________________  HPI: Patient presents post operatively  Right inguinal hernia repair 5/19/2023  ROS:  General ROS: negative for - chills, fatigue, fever or night sweats, weight loss  Respiratory ROS: no cough, shortness of breath, or wheezing  Cardiovascular ROS: no chest pain or dyspnea on exertion  Genito-Urinary ROS: no dysuria, trouble voiding, or hematuria  Musculoskeletal ROS: negative for - gait disturbance, joint pain or muscle pain  Neurological ROS: no TIA or stroke symptoms  GI ROS: see HPI  Skin ROS: no new rashes or lesions   Lymphatic ROS: no new adenopathy noted by pt     GYN ROS: see HPI, no new GYN history or bleeding noted  Psy ROS: no new mental or behavioral disturbances         Patient Active Problem List   Diagnosis   • Persistent atrial fibrillation (HCC)   • Pure hypercholesterolemia   • Benign essential hypertension   • Tremor of left hand   • Acute non-recurrent maxillary sinusitis   • Bloating   • Anxiety   • Dyspnea on exertion   • Esophageal reflux   • Parkinson's disease ([facility] )   • URI (upper respiratory infection)   • Medicare annual wellness visit, subsequent   • Chest pain   • Acute left-sided low back pain with left-sided sciatica   • Systolic murmur   • Thoracic aortic aneurysm without rupture (HCC)   • Skin lesion   • Seasonal allergic rhinitis due to pollen   • Lightheadedness   • Hordeolum externum of right lower eyelid   • Aortic stenosis   • Bradycardia   • Fall   • Abdominal wall hematoma   • Acute blood loss anemia   • Left shoulder pain   • Closed left clavicular fracture   • Displaced fracture of shaft of left clavicle with routine healing   • 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)   • Driving safety issue   • Abdominal pain   • Onychomycosis   • 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HCC)   • COVID   • Panic attacks   • Frailty syndrome in geriatric patient   • Other constipation   • Grief reaction   • Inguinal hernia of right side without obstruction or gangrene       Allergies:  Penicillins and Pollen extract      Current Outpatient Medications:   •  acetaminophen (TYLENOL) 325 mg tablet, Take 2 tablets (650 mg total) by mouth every 4 (four) hours as needed for mild pain, Disp: , Rfl: 0  •  ALPRAZolam (XANAX) 0 25 mg tablet, Take 1 tablet (0 25 mg total) by mouth 3 (three) times a day as needed for anxiety, Disp: 30 tablet, Rfl: 0  •  carbidopa-levodopa (SINEMET)  mg per tablet, Take 1 5 tabs TID, Disp: 405 tablet, Rfl: 2  •  escitalopram (Lexapro) 10 mg tablet, Take 1 tablet (10 mg total) by mouth daily (Patient taking differently: Take 10 mg by mouth daily at bedtime), Disp: 90 tablet, Rfl: 3  •  ipratropium (ATROVENT) 0 06 % nasal spray, 1 spray into each nostril 4 (four) times a day as needed for rhinitis, Disp: 45 mL, Rfl: 0  •  metoprolol succinate (TOPROL-XL) 25 mg 24 hr tablet, Take 1 tablet (25 mg total) by mouth daily, Disp: 90 tablet, Rfl: 3  •  nystatin (MYCOSTATIN) powder, Apply topically 2 (two) times a day, Disp: 15 g, Rfl: 3  •  nystatin (MYCOSTATIN) powder, Apply topically 2 (two) times a day, Disp: 15 g, Rfl: 3  •  oxyCODONE-acetaminophen (PERCOCET) 5-325 mg per tablet, Take 1 tablet by mouth every 4 (four) hours as needed for moderate pain for up to 10 doses Max Daily Amount: 6 tablets, Disp: 10 tablet, Rfl: 0  •  telmisartan (MICARDIS) 20 MG tablet, Take 0 5 tablets (10 mg total) by mouth daily, Disp: 45 tablet, Rfl: 3  •  Xarelto 15 MG tablet, Take 1 tablet (15 mg total) by mouth daily with breakfast, Disp: 90 tablet, Rfl: 3    Past Medical History:   Diagnosis Date   • A-fib ([facility] )    • Afib ([facility] )    • Atrial fibrillation ([facility] ) 6/13/2021   • Frailty syndrome in geriatric patient 1/8/2023   • Hypertension    • Hypertension 6/13/2021   • Left AC separation    • Other constipation 1/14/2023   • Parkinson's disease ([facility] [address] ) 11/01/2018   • SDH (subdural hematoma) ([facility] ) 2/7/2020       Past Surgical History:   Procedure Laterality Date   • CARDIOVERSION      ATRIAL   • [place] RPR 1ST INGUN HRNA AGE 7 YRS/> REDUCIBLE Right 5/19/2023    Procedure: REPAIR HERNIA INGUINAL;  Surgeon: Hunter Mcdowell MD;  [facility];  Service: General   • ROTATOR CUFF REPAIR     • SHOULDER SURGERY         Family History   Problem Relation Age of Onset   • No Known Problems Mother    • Hypertension Father    • Coronary artery disease Father         reports that he has never smoked  He has never used smokeless tobacco  He reports that he does not currently use alcohol  He reports that he does not use drugs  PHYSICAL EXAM    There were no vitals taken for this visit      General: normal, cooperative, no distress  Abdominal: soft, nondistended or nontender  Incision: clean, dry, and intact and healing well      Hunter Mcdowell MD    Date: 6/5/2023 Time: 4:02 PM

## 2023-06-13 ENCOUNTER — OFFICE VISIT (OUTPATIENT)
Dept: PODIATRY | Facility: CLINIC | Age: 88
End: 2023-06-13
Payer: MEDICARE

## 2023-06-13 VITALS
BODY MASS INDEX: 27.46 KG/M2 | OXYGEN SATURATION: 99 % | SYSTOLIC BLOOD PRESSURE: 142 MMHG | DIASTOLIC BLOOD PRESSURE: 80 MMHG | WEIGHT: 214 LBS | HEIGHT: 74 IN | HEART RATE: 52 BPM

## 2023-06-13 DIAGNOSIS — L85.2 PUNCTATE KERATOSIS: Primary | ICD-10-CM

## 2023-06-13 PROCEDURE — 99212 OFFICE O/P EST SF 10 MIN: CPT | Performed by: PODIATRIST

## 2023-06-13 NOTE — PROGRESS NOTES
Assessment/Plan:     The patient's clinical examination today significant for a tender callus lesion to the plantar aspect of his left great toe joint  It is tender with palpation and lateral squeeze  There are no signs of infection noted  The interdigital spaces are clear without maceration  Pedal pulses are palpable but diminished bilaterally  The tender callus lesion was sharply debrided with a sterile #15 blade without complication  There are no other acute pedal issues noted  Recommend follow-up on as-needed basis for continued management of his tender callus  Diagnoses and all orders for this visit:    Punctate keratosis          Subjective:     Patient ID: Denise Baer is a 82 y o  male  The patient presents today with a chief complaint of a tender callus lesion to the plantar aspect of his left foot that has been present now for several weeks  The lesion is causing him discomfort and difficulty with ambulation  He does state that he likes to walk for exercise        PAST MEDICAL HISTORY:  Past Medical History:   Diagnosis Date   • A-fib [facility])    • Afib ([facility] )    • Atrial fibrillation ([facility] ) 6/13/2021   • Frailty syndrome in geriatric patient 1/8/2023   • Hypertension    • Hypertension 6/13/2021   • Left AC separation    • Other constipation 1/14/2023   • Parkinson's disease ([facility] [address] ) 11/01/2018   • SDH (subdural hematoma) ([facility] [address] ) 2/7/2020       PAST SURGICAL HISTORY:  Past Surgical History:   Procedure Laterality Date   • CARDIOVERSION      ATRIAL   • [place] RPR 1ST INGUN HRNA AGE 7 YRS/> REDUCIBLE Right 5/19/2023    Procedure: REPAIR HERNIA INGUINAL;  Surgeon: Edwin Willams MD;  Location: [facility] MAIN OR;  Service: General   • ROTATOR CUFF REPAIR     • SHOULDER SURGERY          ALLERGIES:  Penicillins and Pollen extract    MEDICATIONS:  Current Outpatient Medications   Medication Sig Dispense Refill   • acetaminophen (TYLENOL) 325 mg tablet Take 2 tablets (650 mg total) by mouth every 4 (four) hours as needed for mild pain  0   • carbidopa-levodopa (SINEMET)  mg per tablet Take 1 5 tabs  tablet 2   • metoprolol succinate (TOPROL-XL) 25 mg 24 hr tablet Take 1 tablet (25 mg total) by mouth daily 90 tablet 3   • telmisartan (MICARDIS) 20 MG tablet Take 0 5 tablets (10 mg total) by mouth daily 45 tablet 3   • Xarelto 15 MG tablet Take 1 tablet (15 mg total) by mouth daily with breakfast 90 tablet 3   • ALPRAZolam (XANAX) 0 25 mg tablet Take 1 tablet (0 25 mg total) by mouth 3 (three) times a day as needed for anxiety (Patient not taking: Reported on 6/13/2023) 30 tablet 0   • escitalopram (Lexapro) 10 mg tablet Take 1 tablet (10 mg total) by mouth daily (Patient not taking: Reported on 6/13/2023) 90 tablet 3   • ipratropium (ATROVENT) 0 06 % nasal spray 1 spray into each nostril 4 (four) times a day as needed for rhinitis (Patient not taking: Reported on 6/13/2023) 45 mL 0   • nystatin (MYCOSTATIN) powder Apply topically 2 (two) times a day (Patient not taking: Reported on 6/13/2023) 15 g 3   • nystatin (MYCOSTATIN) powder Apply topically 2 (two) times a day (Patient not taking: Reported on 6/13/2023) 15 g 3   • oxyCODONE-acetaminophen (PERCOCET) 5-325 mg per tablet Take 1 tablet by mouth every 4 (four) hours as needed for moderate pain for up to 10 doses Max Daily Amount: 6 tablets (Patient not taking: Reported on 6/13/2023) 10 tablet 0     No current facility-administered medications for this visit         SOCIAL HISTORY:  Social History     Socioeconomic History   • Marital status: /Civil Union     Spouse name: None   • Number of children: None   • Years of education: None   • Highest education level: None   Occupational History   • None   Tobacco Use   • Smoking status: Never   • Smokeless tobacco: Never   • Tobacco comments:     Quit 35-40 years ago   Vaping Use   • Vaping Use: Never used   Substance and Sexual Activity   • Alcohol use: Not Currently   • Drug use: Never   • Sexual activity: Not Currently     Partners: Female     Birth control/protection: Post-menopausal   Other Topics Concern   • None   Social History Narrative    ** Merged History Encounter **          Social Determinants of Health     Financial Resource Strain: Low Risk  (9/28/2022)    Overall Financial Resource Strain (CARDIA)    • Difficulty of Paying Living Expenses: Not very hard   Food Insecurity: No Food Insecurity (12/22/2022)    Hunger Vital Sign    • Worried About Running Out of Food in the Last Year: Never true    • Ran Out of Food in the Last Year: Never true   Transportation Needs: No Transportation Needs (12/22/2022)    PRAPARE - Transportation    • Lack of Transportation (Medical): No    • Lack of Transportation (Non-Medical): No   Physical Activity: Not on file   Stress: Not on file   Social Connections: Not on file   Intimate Partner Violence: Not on file   Housing Stability: Low Risk  (12/22/2022)    Housing Stability Vital Sign    • Unable to Pay for Housing in the Last Year: No    • Number of Places Lived in the Last Year: 1    • Unstable Housing in the Last Year: No        Review of Systems   Constitutional: Negative  HENT: Negative  Eyes: Negative  Respiratory: Negative  Cardiovascular: Negative  Endocrine: Negative  Musculoskeletal: Negative  Neurological: Negative  Hematological: Negative  Psychiatric/Behavioral: Negative  Objective:     Physical Exam  Vitals reviewed  Constitutional:       Appearance: Normal appearance  HENT:      Head: Normocephalic and atraumatic  Nose: Nose normal    Eyes:      Conjunctiva/sclera: Conjunctivae normal       Pupils: Pupils are equal, round, and reactive to light  Cardiovascular:      Pulses:           Dorsalis pedis pulses are 1+ on the right side and 1+ on the left side  Posterior tibial pulses are 0 on the right side and 1+ on the left side     Pulmonary:      Effort: Pulmonary effort is normal    Feet: Comments: The patient's clinical examination today significant for a tender callus lesion to the plantar aspect of his left great toe joint  It is tender with palpation and lateral squeeze  There are no signs of infection noted  The interdigital spaces are clear without maceration  Pedal pulses are palpable but diminished bilaterally  Skin:     General: Skin is warm  Capillary Refill: Capillary refill takes 2 to 3 seconds  Neurological:      General: No focal deficit present  Mental Status: He is alert and oriented to person, place, and time  Psychiatric:         Mood and Affect: Mood normal          Behavior: Behavior normal          Thought Content:  Thought content normal

## 2023-06-14 ENCOUNTER — SOCIAL WORK (OUTPATIENT)
Dept: BEHAVIORAL/MENTAL HEALTH CLINIC | Facility: CLINIC | Age: 88
End: 2023-06-14
Payer: MEDICARE

## 2023-06-14 DIAGNOSIS — F43.21 GRIEF REACTION: Primary | ICD-10-CM

## 2023-06-14 PROCEDURE — 90834 PSYTX W PT 45 MINUTES: CPT | Performed by: SOCIAL WORKER

## 2023-06-14 NOTE — PSYCH
"Behavioral Health Psychotherapy Progress Note    Psychotherapy Provided: Individual Psychotherapy     1  Grief reaction            Goals addressed in session: Goal 1     DATA: Tawnya Barraza continues to have periods of sadness related to his wife's passing  However, today is first sessions where he was not tearful discussing this issue  Able to process and verbalize his thankfulness for their years together  Also would like establish some companionship with female he needs just for company and nothing else but this has not moved forward  Normalized this desire  Cognition an issue as he repeats himself at times during session  Remains somewhat fixated on issues with pharmacist but able to redirect him away from these  Very thankful for his relationships with his children and grandchildren  Has been walking daily and still goes to lunch with friends twice a week  Denies any acute depressive symptoms  No SI  During this session, this clinician used the following therapeutic modalities: Solution-Focused Therapy and Supportive Psychotherapy    Substance Abuse was addressed during this session  If the client is diagnosed with a co-occurring substance use disorder, please indicate any changes in the frequency or amount of use: none  Stage of change for addressing substance use diagnoses: No substance use/Not applicable    ASSESSMENT:  Melvin Najera presents with a Euthymic/ normal mood  his affect is Normal range and intensity, which is congruent, with his mood and the content of the session  The client has made progress on their goals  Melvin Najera presents with a minimal risk of suicide, minimal risk of self-harm, and minimal risk of harm to others  For any risk assessment that surpasses a \"low\" rating, a safety plan must be developed      A safety plan was indicated: no  If yes, describe in detail n/a    PLAN: Between sessions, Melvin Najera will continue to utilize social outlets and activities on an " increasing basis to manage grief  Continue to review grief process during sessions and normalize his struggles as being normal part of this difficult process    At the next session, the therapist will use Solution-Focused Therapy and Supportive Psychotherapy to address   Behavioral Health Treatment Plan and Discharge Planning: Tanmay An is aware of and agrees to continue to work on their treatment plan  They have identified and are working toward their discharge goals   yes    Visit start and stop times: 4:05-4:50    06/14/23

## 2023-06-29 ENCOUNTER — TELEPHONE (OUTPATIENT)
Dept: CARDIOLOGY CLINIC | Facility: CLINIC | Age: 88
End: 2023-06-29

## 2023-06-29 NOTE — TELEPHONE ENCOUNTER
Spoke with patient's daughter Tripp Ann who stated that her father is having a tooth extraction next Friday, 7/7  She is asking if patient needs to hold the Xarelto before extraction  After consulting with nurse, returned call to daughter Tripp Ann & left a message asking if the oral surgeon had asked for patient to hold the Xarelto? Requested daughter  return call to office

## 2023-06-30 NOTE — TELEPHONE ENCOUNTER
Returned call to daughter Theoplis Schaumann who stated that she spoke with [facility] who is supposed to call cardiology office regarding hold for patient's tooth extraction 7/7  Communicated [facility] has not notified office as of yet  Theoplis Schaumann stated that she will call [facility] again to follow through with cardiology

## 2023-07-05 ENCOUNTER — RA CDI HCC (OUTPATIENT)
Dept: OTHER | Facility: HOSPITAL | Age: 88
End: 2023-07-05

## 2023-07-05 ENCOUNTER — TELEPHONE (OUTPATIENT)
Dept: CARDIOLOGY CLINIC | Facility: CLINIC | Age: 88
End: 2023-07-05

## 2023-07-05 NOTE — PROGRESS NOTES
[facility] coding opportunities       Chart reviewed, no opportunity found: CHART REVIEWED, NO OPPORTUNITY FOUND        Patients Insurance     Medicare Insurance: Medicare

## 2023-07-05 NOTE — TELEPHONE ENCOUNTER
Patient's daughter Jessica Vuong called to check if [facility] had sent a letter ot cardiology asking to hold Xarelto before tooth extraction on Fridat. Stated that office did not receive communication from [address] [place] [address]. Jessica Vuong made a three way call to [address] [place] [address] who stated that patient is having a consultation on Friday & does not need Xarelto held. Instructed Jessica Vuong to contact office with any further questions or concerns.

## 2023-07-11 ENCOUNTER — OFFICE VISIT (OUTPATIENT)
Dept: INTERNAL MEDICINE CLINIC | Facility: CLINIC | Age: 88
End: 2023-07-11
Payer: MEDICARE

## 2023-07-11 VITALS
BODY MASS INDEX: 27.4 KG/M2 | OXYGEN SATURATION: 97 % | SYSTOLIC BLOOD PRESSURE: 124 MMHG | DIASTOLIC BLOOD PRESSURE: 78 MMHG | WEIGHT: 213.4 LBS | HEART RATE: 75 BPM

## 2023-07-11 DIAGNOSIS — I71.20 THORACIC AORTIC ANEURYSM WITHOUT RUPTURE, UNSPECIFIED PART (HCC): ICD-10-CM

## 2023-07-11 DIAGNOSIS — I48.19 PERSISTENT ATRIAL FIBRILLATION (HCC): ICD-10-CM

## 2023-07-11 DIAGNOSIS — G20 PARKINSON'S DISEASE (HCC): ICD-10-CM

## 2023-07-11 DIAGNOSIS — Z01.818 PREOP EXAM FOR INTERNAL MEDICINE: Primary | ICD-10-CM

## 2023-07-11 DIAGNOSIS — I10 BENIGN ESSENTIAL HYPERTENSION: ICD-10-CM

## 2023-07-11 DIAGNOSIS — F02.818 DEMENTIA ASSOCIATED WITH OTHER UNDERLYING DISEASE WITH BEHAVIORAL DISTURBANCE (HCC): ICD-10-CM

## 2023-07-11 PROCEDURE — 99213 OFFICE O/P EST LOW 20 MIN: CPT | Performed by: INTERNAL MEDICINE

## 2023-07-11 RX ORDER — OFLOXACIN 3 MG/ML
SOLUTION/ DROPS OPHTHALMIC
COMMUNITY
Start: 2023-07-10

## 2023-07-11 RX ORDER — PREDNISOLONE ACETATE 10 MG/ML
SUSPENSION/ DROPS OPHTHALMIC
COMMUNITY
Start: 2023-07-10

## 2023-07-11 NOTE — PROGRESS NOTES
Name: Leida Jimenez      : 1935      MRN: [id number]  Encounter Provider: Modesta Camarena MD  Encounter Date: 2023   [facility]     1. Preop exam for internal medicine  Assessment & Plan:  Patient is medically cleared for cataract surgery, no cardiopulmonary complaints, exam today is okay except for the chronic cognitive impairment. He does have family support to help with any specific postop instructions as he would have difficulty following these instructions on his own. Patient is on Xarelto for atrial fibrillation, and he can stop this medication 2 days before procedure, then restart after procedure. His chronic conditions of hypertension and Parkinson's are under control      2. Persistent atrial fibrillation (HCC)  Assessment & Plan:  Rate controlled      3. Benign essential hypertension  Assessment & Plan:  Well-controlled, continue meds      4. Dementia associated with other underlying disease with behavioral disturbance [facility])  Assessment & Plan:  Continue with family support      5. Thoracic aortic aneurysm without rupture, unspecified part [facility])  Assessment & Plan:  Mild enlargement on echo       6. Parkinson's disease [facility])  Assessment & Plan:  Continue medications and follow-up neurology             Subjective     Patient here for medical clearance for cardiac surgery. Has the expected easy bleeding due to being on Xarelto. No reported problems with anesthesia in the past    Review of Systems   Constitutional: Negative for chills, fatigue and fever. HENT: Negative for congestion, nosebleeds, postnasal drip, sore throat and trouble swallowing. Eyes: Negative for pain. Respiratory: Negative for cough, chest tightness, shortness of breath and wheezing. Cardiovascular: Negative for chest pain, palpitations and leg swelling.    Gastrointestinal: Negative for abdominal pain, constipation, diarrhea, nausea and vomiting. Endocrine: Negative for polydipsia and polyuria. Genitourinary: Negative for dysuria, flank pain and hematuria. Musculoskeletal: Negative for arthralgias. Skin: Negative for rash. Neurological: Positive for tremors. Negative for dizziness and headaches. Hematological: Does not bruise/bleed easily. Psychiatric/Behavioral: Positive for confusion. Negative for dysphoric mood. The patient is nervous/anxious.         Past Medical History:   Diagnosis Date   • A-fib [facility])    • Afib ([address])    • Atrial fibrillation ([address]) 6/13/2021   • Frailty syndrome in geriatric patient 1/8/2023   • Hypertension    • Hypertension 6/13/2021   • Left AC separation    • Other constipation 1/14/2023   • Parkinson's disease ([address]) 11/01/2018   • SDH (subdural hematoma) ([address]) 2/7/2020     Past Surgical History:   Procedure Laterality Date   • CARDIOVERSION      ATRIAL   • NM RPR 1ST INGUN HRNA AGE 5 YRS/> REDUCIBLE Right 5/19/2023    Procedure: REPAIR HERNIA INGUINAL;  Surgeon: Alicja Lau MD;  Location: [facility] MAIN OR;  Service: General   • ROTATOR CUFF REPAIR     • SHOULDER SURGERY       Family History   Problem Relation Age of Onset   • No Known Problems Mother    • Hypertension Father    • Coronary artery disease Father      Social History     Socioeconomic History   • Marital status: /Civil Union     Spouse name: None   • Number of children: None   • Years of education: None   • Highest education level: None   Occupational History   • None   Tobacco Use   • Smoking status: Never   • Smokeless tobacco: Never   • Tobacco comments:     Quit 35-40 years ago   Vaping Use   • Vaping Use: Never used   Substance and Sexual Activity   • Alcohol use: Not Currently   • Drug use: Never   • Sexual activity: Not Currently     Partners: Female     Birth control/protection: Post-menopausal   Other Topics Concern   • None   Social History Narrative    ** Merged History Encounter **          Social Determinants of Health Financial Resource Strain: Low Risk  (9/28/2022)    Overall Financial Resource Strain (CARDIA)    • Difficulty of Paying Living Expenses: Not very hard   Food Insecurity: No Food Insecurity (12/22/2022)    Hunger Vital Sign    • Worried About Running Out of Food in the Last Year: Never true    • Ran Out of Food in the Last Year: Never true   Transportation Needs: No Transportation Needs (12/22/2022)    PRAPARE - Transportation    • Lack of Transportation (Medical): No    • Lack of Transportation (Non-Medical):  No   Physical Activity: Not on file   Stress: Not on file   Social Connections: Not on file   Intimate Partner Violence: Not on file   Housing Stability: Low Risk  (12/22/2022)    Housing Stability Vital Sign    • Unable to Pay for Housing in the Last Year: No    • Number of Places Lived in the Last Year: 1    • Unstable Housing in the Last Year: No     Current Outpatient Medications on File Prior to Visit   Medication Sig   • acetaminophen (TYLENOL) 325 mg tablet Take 2 tablets (650 mg total) by mouth every 4 (four) hours as needed for mild pain   • carbidopa-levodopa (SINEMET)  mg per tablet Take 1.5 tabs TID (Patient taking differently: Take 2 tablets by mouth 3 (three) times a day Take 1.5 tabs TID)   • escitalopram (Lexapro) 10 mg tablet Take 1 tablet (10 mg total) by mouth daily   • ipratropium (ATROVENT) 0.06 % nasal spray 1 spray into each nostril 4 (four) times a day as needed for rhinitis   • metoprolol succinate (TOPROL-XL) 25 mg 24 hr tablet Take 1 tablet (25 mg total) by mouth daily   • nystatin (MYCOSTATIN) powder Apply topically 2 (two) times a day   • telmisartan (MICARDIS) 20 MG tablet Take 0.5 tablets (10 mg total) by mouth daily   • Xarelto 15 MG tablet Take 1 tablet (15 mg total) by mouth daily with breakfast   • ALPRAZolam (XANAX) 0.25 mg tablet Take 1 tablet (0.25 mg total) by mouth 3 (three) times a day as needed for anxiety (Patient not taking: Reported on 6/13/2023)   • nystatin (MYCOSTATIN) powder Apply topically 2 (two) times a day (Patient not taking: Reported on 6/13/2023)   • ofloxacin (OCUFLOX) 0.3 % ophthalmic solution    • oxyCODONE-acetaminophen (PERCOCET) 5-325 mg per tablet Take 1 tablet by mouth every 4 (four) hours as needed for moderate pain for up to 10 doses Max Daily Amount: 6 tablets (Patient not taking: Reported on 6/13/2023)   • prednisoLONE acetate (PRED FORTE) 1 % ophthalmic suspension      Allergies   Allergen Reactions   • Penicillins Hives     Ancef ok   • Pollen Extract Other (See Comments)     . Immunization History   Administered Date(s) Administered   • COVID-19 PFIZER VACCINE 0.3 ML IM 01/27/2021, 02/17/2021, 01/08/2022   • INFLUENZA 09/04/2016, 10/26/2017, 10/26/2017, 09/13/2018   • Influenza Split High Dose Preservative Free IM 1935, 10/06/2014, 09/09/2015, 10/17/2017   • Influenza, high dose seasonal 0.7 mL 11/17/2021, 09/28/2022   • Influenza, seasonal, injectable 10/11/2010, 10/11/2011   • Pneumococcal 01/01/1999   • Pneumococcal Conjugate 13-Valent 09/27/2015   • Pneumococcal Polysaccharide PPV23 01/15/2017   • Zoster 01/01/2013   • Zoster Vaccine Recombinant 01/01/2013   • influenza, trivalent, adjuvanted 09/19/2019       Objective     /78   Pulse 75   Wt 96.8 kg (213 lb 6.4 oz)   SpO2 97%   BMI 27.40 kg/m²     Physical Exam  Constitutional:       General: He is not in acute distress. Appearance: Normal appearance. He is well-developed. HENT:      Head: Normocephalic and atraumatic. Right Ear: External ear normal.      Left Ear: External ear normal.   Eyes:      General: No scleral icterus. Conjunctiva/sclera: Conjunctivae normal.   Neck:      Thyroid: No thyromegaly. Trachea: No tracheal deviation. Cardiovascular:      Rate and Rhythm: Normal rate. Rhythm irregular. Heart sounds: Normal heart sounds. No murmur heard. Pulmonary:      Effort: Pulmonary effort is normal. No respiratory distress. Breath sounds: Normal breath sounds. No wheezing or rales. Musculoskeletal:      Cervical back: Normal range of motion and neck supple. Right lower leg: No edema. Left lower leg: No edema. Lymphadenopathy:      Cervical: No cervical adenopathy. Skin:     Coloration: Skin is not jaundiced. Neurological:      Mental Status: He is alert.    Psychiatric:         Mood and Affect: Mood normal.       Chrissy Pressley MD

## 2023-07-11 NOTE — LETTER
2023     Rima Houser,   [address] [facility]. [address] [address]    Patient: Leida Jimenez   YOB: 1935   Date of Visit: 2023       Dear Dr. Dorys Rivera:    Thank you for referring Adonay Bagley to me for evaluation. Below are my notes for this consultation. If you have questions, please do not hesitate to call me. I look forward to following your patient along with you. Sincerely,        Modesta Camarena MD        CC: No Recipients    Modesta Camarena MD  2023 11:02 AM  Incomplete  Name: Leida Jimenez      : 1935      MRN: [id number]  Encounter Provider: Modesta Camarena MD  Encounter Date: 2023   Enc[facility] department: [facility]     1. Preop exam for internal medicine  Assessment & Plan:  Patient is medically cleared for cataract surgery, no cardiopulmonary complaints, exam today is okay except for the chronic cognitive impairment. He does have family support to help with any specific postop instructions as he would have difficulty following these instructions on his own. Patient is on Xarelto for atrial fibrillation, and he can stop this medication 2 days before procedure, then restart after procedure. His chronic conditions of hypertension and Parkinson's are under control      2. Persistent atrial fibrillation (HCC)  Assessment & Plan:  Rate controlled      3. Benign essential hypertension  Assessment & Plan:  Well-controlled, continue meds      4. Dementia associated with other underlying disease with behavioral disturbance [facility])  Assessment & Plan:  Continue with family support      5. Thoracic aortic aneurysm without rupture, unspecified part [facility])  Assessment & Plan:  Mild enlargement on echo       6. Parkinson's disease [facility])  Assessment & Plan:  Continue medications and follow-up neurology           Subjective     Patient here for medical clearance for cardiac surgery.   Has the expected easy bleeding due to being on Xarelto. No reported problems with anesthesia in the past    Review of Systems   Constitutional: Negative for chills, fatigue and fever. HENT: Negative for congestion, nosebleeds, postnasal drip, sore throat and trouble swallowing. Eyes: Negative for pain. Respiratory: Negative for cough, chest tightness, shortness of breath and wheezing. Cardiovascular: Negative for chest pain, palpitations and leg swelling. Gastrointestinal: Negative for abdominal pain, constipation, diarrhea, nausea and vomiting. Endocrine: Negative for polydipsia and polyuria. Genitourinary: Negative for dysuria, flank pain and hematuria. Musculoskeletal: Negative for arthralgias. Skin: Negative for rash. Neurological: Positive for tremors. Negative for dizziness and headaches. Hematological: Does not bruise/bleed easily. Psychiatric/Behavioral: Positive for confusion. Negative for dysphoric mood. The patient is nervous/anxious.         Past Medical History:   Diagnosis Date   • A-fib [facility])    • Afib ([address])    • Atrial fibrillation ([address]) 6/13/2021   • Frailty syndrome in geriatric patient 1/8/2023   • Hypertension    • Hypertension 6/13/2021   • Left AC separation    • Other constipation 1/14/2023   • Parkinson's disease ([address]) 11/01/2018   • SDH (subdural hematoma) ([address]) 2/7/2020     Past Surgical History:   Procedure Laterality Date   • CARDIOVERSION      ATRIAL   • RI RPR 1ST INGUN HRNA AGE 5 YRS/> REDUCIBLE Right 5/19/2023    Procedure: REPAIR HERNIA INGUINAL;  Surgeon: Royer Harden MD;  Location: [facility] MAIN OR;  Service: General   • ROTATOR CUFF REPAIR     • SHOULDER SURGERY       Family History   Problem Relation Age of Onset   • No Known Problems Mother    • Hypertension Father    • Coronary artery disease Father      Social History     Socioeconomic History   • Marital status: /Civil Union     Spouse name: None   • Number of children: None   • Years of education: None   • Highest education level: None   Occupational History   • None   Tobacco Use   • Smoking status: Never   • Smokeless tobacco: Never   • Tobacco comments:     Quit 35-40 years ago   Vaping Use   • Vaping Use: Never used   Substance and Sexual Activity   • Alcohol use: Not Currently   • Drug use: Never   • Sexual activity: Not Currently     Partners: Female     Birth control/protection: Post-menopausal   Other Topics Concern   • None   Social History Narrative    ** Merged History Encounter **          Social Determinants of Health     Financial Resource Strain: Low Risk  (9/28/2022)    Overall Financial Resource Strain (CARDIA)    • Difficulty of Paying Living Expenses: Not very hard   Food Insecurity: No Food Insecurity (12/22/2022)    Hunger Vital Sign    • Worried About Running Out of Food in the Last Year: Never true    • Ran Out of Food in the Last Year: Never true   Transportation Needs: No Transportation Needs (12/22/2022)    PRAPARE - Transportation    • Lack of Transportation (Medical): No    • Lack of Transportation (Non-Medical):  No   Physical Activity: Not on file   Stress: Not on file   Social Connections: Not on file   Intimate Partner Violence: Not on file   Housing Stability: Low Risk  (12/22/2022)    Housing Stability Vital Sign    • Unable to Pay for Housing in the Last Year: No    • Number of Places Lived in the Last Year: 1    • Unstable Housing in the Last Year: No     Current Outpatient Medications on File Prior to Visit   Medication Sig   • acetaminophen (TYLENOL) 325 mg tablet Take 2 tablets (650 mg total) by mouth every 4 (four) hours as needed for mild pain   • carbidopa-levodopa (SINEMET)  mg per tablet Take 1.5 tabs TID (Patient taking differently: Take 2 tablets by mouth 3 (three) times a day Take 1.5 tabs TID)   • escitalopram (Lexapro) 10 mg tablet Take 1 tablet (10 mg total) by mouth daily   • ipratropium (ATROVENT) 0.06 % nasal spray 1 spray into each nostril 4 (four) times a day as needed for rhinitis   • metoprolol succinate (TOPROL-XL) 25 mg 24 hr tablet Take 1 tablet (25 mg total) by mouth daily   • nystatin (MYCOSTATIN) powder Apply topically 2 (two) times a day   • telmisartan (MICARDIS) 20 MG tablet Take 0.5 tablets (10 mg total) by mouth daily   • Xarelto 15 MG tablet Take 1 tablet (15 mg total) by mouth daily with breakfast   • ALPRAZolam (XANAX) 0.25 mg tablet Take 1 tablet (0.25 mg total) by mouth 3 (three) times a day as needed for anxiety (Patient not taking: Reported on 6/13/2023)   • nystatin (MYCOSTATIN) powder Apply topically 2 (two) times a day (Patient not taking: Reported on 6/13/2023)   • ofloxacin (OCUFLOX) 0.3 % ophthalmic solution    • oxyCODONE-acetaminophen (PERCOCET) 5-325 mg per tablet Take 1 tablet by mouth every 4 (four) hours as needed for moderate pain for up to 10 doses Max Daily Amount: 6 tablets (Patient not taking: Reported on 6/13/2023)   • prednisoLONE acetate (PRED FORTE) 1 % ophthalmic suspension      Allergies   Allergen Reactions   • Penicillins Hives     Ancef ok   • Pollen Extract Other (See Comments)     . Immunization History   Administered Date(s) Administered   • COVID-19 PFIZER VACCINE 0.3 ML IM 01/27/2021, 02/17/2021, 01/08/2022   • INFLUENZA 09/04/2016, 10/26/2017, 10/26/2017, 09/13/2018   • Influenza Split High Dose Preservative Free IM 1935, 10/06/2014, 09/09/2015, 10/17/2017   • Influenza, high dose seasonal 0.7 mL 11/17/2021, 09/28/2022   • Influenza, seasonal, injectable 10/11/2010, 10/11/2011   • Pneumococcal 01/01/1999   • Pneumococcal Conjugate 13-Valent 09/27/2015   • Pneumococcal Polysaccharide PPV23 01/15/2017   • Zoster 01/01/2013   • Zoster Vaccine Recombinant 01/01/2013   • influenza, trivalent, adjuvanted 09/19/2019       Objective     /78   Pulse 75   Wt 96.8 kg (213 lb 6.4 oz)   SpO2 97%   BMI 27.40 kg/m²     Physical Exam  Constitutional:       General: He is not in acute distress.      Appearance: Normal appearance. He is well-developed. HENT:      Head: Normocephalic and atraumatic. Right Ear: External ear normal.      Left Ear: External ear normal.   Eyes:      General: No scleral icterus. Conjunctiva/sclera: Conjunctivae normal.   Neck:      Thyroid: No thyromegaly. Trachea: No tracheal deviation. Cardiovascular:      Rate and Rhythm: Normal rate. Rhythm irregular. Heart sounds: Normal heart sounds. No murmur heard. Pulmonary:      Effort: Pulmonary effort is normal. No respiratory distress. Breath sounds: Normal breath sounds. No wheezing or rales. Musculoskeletal:      Cervical back: Normal range of motion and neck supple. Right lower leg: No edema. Left lower leg: No edema. Lymphadenopathy:      Cervical: No cervical adenopathy. Skin:     Coloration: Skin is not jaundiced. Neurological:      Mental Status: He is alert.    Psychiatric:         Mood and Affect: Mood normal.       Guzman Perales MD

## 2023-07-11 NOTE — PATIENT INSTRUCTIONS
Problem List Items Addressed This Visit          Cardiovascular and Mediastinum    Persistent atrial fibrillation (HCC)     Rate controlled         Benign essential hypertension     Well-controlled, continue meds         Thoracic aortic aneurysm without rupture (HCC)     Mild enlargement on echo 2021            Nervous and Auditory    Parkinson's disease ([address])     Continue medications and follow-up neurology         Dementia associated with other underlying disease with behavioral disturbance ([address])     Continue with family support            Other    Preop exam for internal medicine - Primary     Patient is medically cleared for cataract surgery, no cardiopulmonary complaints, exam today is okay except for the chronic cognitive impairment. He does have family support to help with any specific postop instructions as he would have difficulty following these instructions on his own. Patient is on Xarelto for atrial fibrillation, and he can stop this medication 2 days before procedure, then restart after procedure.   His chronic conditions of hypertension and Parkinson's are under control

## 2023-07-11 NOTE — ASSESSMENT & PLAN NOTE
Patient is medically cleared for cataract surgery, no cardiopulmonary complaints, exam today is okay except for the chronic cognitive impairment. He does have family support to help with any specific postop instructions as he would have difficulty following these instructions on his own. Patient is on Xarelto for atrial fibrillation, and he can stop this medication 2 days before procedure, then restart after procedure.   His chronic conditions of hypertension and Parkinson's are under control

## 2023-07-12 ENCOUNTER — SOCIAL WORK (OUTPATIENT)
Dept: BEHAVIORAL/MENTAL HEALTH CLINIC | Facility: CLINIC | Age: 88
End: 2023-07-12
Payer: MEDICARE

## 2023-07-12 DIAGNOSIS — F43.21 GRIEF REACTION: Primary | ICD-10-CM

## 2023-07-12 PROCEDURE — 90837 PSYTX W PT 60 MINUTES: CPT | Performed by: SOCIAL WORKER

## 2023-07-12 NOTE — PSYCH
Behavioral Health Psychotherapy Progress Note    Psychotherapy Provided: Individual Psychotherapy     1. Grief reaction            Goals addressed in session: Goal 1     DATA: Drew Geller appears to be doing better from a mood standpoint. He is not tearful at any point during sessions even when discussing his wife. Affect appears to be brighter as well. Has been walking more regularly and maintaining social contact with family and peers. Repetitive with his stories and at times he can catch himself and realize he has already discussed this but this appears to be more of an issue. He is able to do life review and look at his life in very positive and fulfilling terms. Has periods of sadness related to his loss but appears to be managing the best he can. Denies any acute depressive symptoms. During this session, this clinician used the following therapeutic modalities: Solution-Focused Therapy and Supportive Psychotherapy    Substance Abuse was addressed during this session. If the client is diagnosed with a co-occurring substance use disorder, please indicate any changes in the frequency or amount of use: none. Stage of change for addressing substance use diagnoses: No substance use/Not applicable    ASSESSMENT:  Barrington Joy presents with a Euthymic/ normal mood. his affect is Normal range and intensity, which is congruent, with his mood and the content of the session. The client has made progress on their goals. Barrington Joy presents with a minimal risk of suicide, minimal risk of self-harm, and minimal risk of harm to others. For any risk assessment that surpasses a "low" rating, a safety plan must be developed. A safety plan was indicated: no  If yes, describe in detail n/a    PLAN: Between sessions, Barrington Joy will continue to maintain consistent social contact and activity with his family and peers. Modeled gratitude strategies to utilize in reference to the "good life" he has had. . At the next session, the therapist will use Solution-Focused Therapy and Supportive Psychotherapy to address grief. Behavioral Health Treatment Plan and Discharge Planning: Joo Zimmerman is aware of and agrees to continue to work on their treatment plan. They have identified and are working toward their discharge goals.  yes    Visit start and stop times: 4:05-5:05    07/12/23

## 2023-07-12 NOTE — BH TREATMENT PLAN
Outpatient [address]  1935     Date of Initial Psychotherapy Assessment: 02/16/23  Date of Current Treatment Plan: 07/12/23  Treatment Plan Target Date: 01/12/24  Treatment Plan Expiration Date: N/A    Diagnosis:   1. Grief reaction            Area(s) of Need: increase socialization    Long Term Goal 1 (in the client's own words): Got to get over losing my wife    Stage of Change: Action    Target Date for completion: 01/12/24     Anticipated therapeutic modalities: Life review, supportive psychotherapy, CBT     People identified to complete this goal: Ean Tam and Robert Acosta LCSW      Objective 1: (identify the means of measuring success in meeting the objective): Ean Tam will utilize appropriate expressions/outlets daily for his grief and mood issues to maintain current daily functioning level      Objective 2: (identify the means of measuring success in meeting the objective): Ean Tam will utilize cognitive restructuring strategies modeled during session to continue to view life in positive terms to manage any grief              I am currently under the care of a [facility] psychiatric provider: no    My [facility] psychiatric provider is: PCP    I am currently taking psychiatric medications: Yes, as prescribed    I feel that I will be ready for discharge from mental health care when I reach the following (measurable goal/objective): 60 consecutive days with no grief or anxiety issues    For children and adults who have a legal guardian:   Has there been any change to custody orders and/or guardianship status? NA. If yes, attach updated documentation. I have created my Crisis Plan and have been offered a copy of this plan    [id number]: Diagnosis and Treatment Plan explained to Bon Carpenter acknowledges an understanding of their diagnosis. Lidia Skinner agrees to this treatment plan.     I have been offered a copy of this Treatment Plan. yes

## 2023-07-13 ENCOUNTER — TELEPHONE (OUTPATIENT)
Dept: CARDIOLOGY CLINIC | Facility: CLINIC | Age: 88
End: 2023-07-13

## 2023-07-13 NOTE — TELEPHONE ENCOUNTER
How many days prior to tooth extraction would you advise patient to hold Xarelto? Patient will have a tooth extracted on 7/29/23 @ [address] [place] [address] by Dr Maria Elena Espinoza. Please advise.

## 2023-07-14 NOTE — TELEPHONE ENCOUNTER
Called patient's daughter Bienvenido Mas & left message communicating Dr Anthony's message for a two day hold of patient's Xarelto prior to tooth extraction w/instructions to call office with any further questions or concerns.

## 2023-08-16 ENCOUNTER — SOCIAL WORK (OUTPATIENT)
Dept: BEHAVIORAL/MENTAL HEALTH CLINIC | Facility: CLINIC | Age: 88
End: 2023-08-16
Payer: MEDICARE

## 2023-08-16 DIAGNOSIS — F43.21 GRIEF REACTION: Primary | ICD-10-CM

## 2023-08-16 PROCEDURE — 90834 PSYTX W PT 45 MINUTES: CPT | Performed by: SOCIAL WORKER

## 2023-08-16 NOTE — PSYCH
Behavioral Health Psychotherapy Progress Note    Psychotherapy Provided: Individual Psychotherapy     1. Grief reaction            Goals addressed in session: Goal 1     DATA: Hien Dow continues to struggle with grief and periods of sadness related to his wife's passing. Living alone has been difficult but he does maintain some consistent social contact with his daughter and peers. Still has periods of crying spells. No SI. Cognition an issue and he is forgetful at times and repetitive during session. Responds well to processing his emotions and the support and intervention provided. During this session, this clinician used the following therapeutic modalities: Solution-Focused Therapy and Supportive Psychotherapy    Substance Abuse was addressed during this session. If the client is diagnosed with a co-occurring substance use disorder, please indicate any changes in the frequency or amount of use: none. Stage of change for addressing substance use diagnoses: No substance use/Not applicable    ASSESSMENT:  Eduardo Ireland presents with a Dysthymic mood. his affect is Tearful, which is congruent, with his mood and the content of the session. The client has made progress on their goals. Eduardo Ireland presents with a minimal risk of suicide, minimal risk of self-harm, and minimal risk of harm to others. For any risk assessment that surpasses a "low" rating, a safety plan must be developed. A safety plan was indicated: no  If yes, describe in detail n/a    PLAN: Between sessions, Eduardo Ireland will maintain consistent level of social contact/interaction to counterbalance struggles of living alone. Discussed utilizing his rebekah and Taoist to assist in this process as well. At the next session, the therapist will use Solution-Focused Therapy and Supportive Psychotherapy to address grief.     Behavioral Health Treatment Plan and Discharge Planning: Eduardo Ireland is aware of and agrees to continue to work on their treatment plan. They have identified and are working toward their discharge goals.  no    Visit start and stop times: 4:10-5:00    08/16/23

## 2023-08-16 NOTE — BH CRISIS PLAN
Client Name: Lidia Skinner       Client YOB: 1935  : 1935    Treatment Team (include name and contact information):     Psychotherapist: Robert Acosta LCSW    Psychiatrist: none   Release of information completed: no    " none   Release of information completed: no    Other (Specify Role): none    Release of information completed: no    Other (Specify Role): none   Release of information completed: no    Healthcare Provider  Jai Saab MD  [address]  [phone]    Type of Plan   * Child plans (children 15 yo and younger) must be completed and signed by the child's legal guardian   * Plans for all individuals 15 yo and above must be signed by the client. Plan Type: adolescent/adult (15 and over) Initial      My Personal Strengths are (in the client's own words):  Resiliency, work ethic  The stressors and triggers that may put me at risk are:  death of his spouse    Coping skills I can use to keep myself calm and safe:  Physical activity, Call a friend or family member, [place]/meditate and Increased contact with professional supports    Coping skills/supports I can use to maintain abstinence from substance use:   Not Applicable    The people that provide me with help and support: (Include name, contact, and how they can help)   Support person #1: Daughter, Marion Gama    * Phone number: in cell phone    * How can they help me? Emotional support   Support person #2:Son    * Phone number: in cell phone    * How can they help me? Emotional support     Support person #3: Friends/neighbors    * Phone number: in cell phone    * How can they help me?  Emotional support    In the past, the following has helped me in times of crisis:    Being with other people, Calling a friend, Calling a family member and Taking a walk or exercising      If it is an emergency and you need immediate help, call     If there is a possibility of danger to yourself or others, call the following crisis hotline resources:     Adult Crisis Numbers  Suicide Prevention Hotline - Dial 9-8-8  [facility]: [address] [facility]: [address]: [address] [facility] [address]: [phone]  [address] [place] [address] Street: [phone]  [facility]: [address]: [address]: [phone] (daytime). [phone] (after hours, weekends, holidays)     Child/Adolescent Crisis Numbers   [facility]: [address]: [phone]   [facility]: [phone]   [address] [place] [address] Street: [phone]    Please note: Some [facility] do not have a separate number for Child/Adolescent specific crisis. If your county is not listed under Child/Adolescent, please call the adult number for your county     National Talk to Text Line   All Ages - [phone]    In the event your feelings become unmanageable, and you cannot reach your support system, you will call 911 immediately or go to the nearest hospital emergency room.

## 2023-09-04 DIAGNOSIS — I10 ESSENTIAL HYPERTENSION: ICD-10-CM

## 2023-09-05 RX ORDER — TELMISARTAN 20 MG/1
10 TABLET ORAL DAILY
Qty: 45 TABLET | Refills: 2 | Status: SHIPPED | OUTPATIENT
Start: 2023-09-05

## 2023-09-15 ENCOUNTER — NURSE TRIAGE (OUTPATIENT)
Age: 88
End: 2023-09-15

## 2023-09-15 NOTE — TELEPHONE ENCOUNTER
Patient has follow up appt. 9/20    Reason for Disposition  • Minor cut or scratch    Answer Assessment - Initial Assessment Questions  1. APPEARANCE of INJURY: "What does the injury look like?"       reddish   2. SIZE: "How large is the cut?"      Half dollar size  3. BLEEDING: "Is it bleeding now?" If Yes, ask: "Is it difficult to stop?"     no  4. LOCATION: "Where is the injury located?"       left arm  5. ONSET: "How long ago did the injury occur?"       today  6. MECHANISM: "Tell me how it happened."       Pulled tape off arm patient on blood thinner  7.  TETANUS: "When was the last tetanus booster?"   unknown    Protocols used: CUTS AND LACERATIONS-ADULT-

## 2023-09-20 ENCOUNTER — OFFICE VISIT (OUTPATIENT)
Dept: INTERNAL MEDICINE CLINIC | Facility: CLINIC | Age: 88
End: 2023-09-20
Payer: MEDICARE

## 2023-09-20 VITALS
BODY MASS INDEX: 26.53 KG/M2 | WEIGHT: 206.6 LBS | RESPIRATION RATE: 16 BRPM | HEART RATE: 60 BPM | TEMPERATURE: 96.7 F | OXYGEN SATURATION: 99 % | SYSTOLIC BLOOD PRESSURE: 138 MMHG | DIASTOLIC BLOOD PRESSURE: 70 MMHG

## 2023-09-20 DIAGNOSIS — G20.A1 PARKINSON'S DISEASE: ICD-10-CM

## 2023-09-20 DIAGNOSIS — I48.19 PERSISTENT ATRIAL FIBRILLATION (HCC): ICD-10-CM

## 2023-09-20 DIAGNOSIS — S41.112A SKIN TEAR OF LEFT UPPER ARM WITHOUT COMPLICATION, INITIAL ENCOUNTER: Primary | ICD-10-CM

## 2023-09-20 DIAGNOSIS — F02.818 DEMENTIA ASSOCIATED WITH OTHER UNDERLYING DISEASE WITH BEHAVIORAL DISTURBANCE (HCC): ICD-10-CM

## 2023-09-20 PROCEDURE — 99214 OFFICE O/P EST MOD 30 MIN: CPT | Performed by: INTERNAL MEDICINE

## 2023-09-20 PROCEDURE — 90471 IMMUNIZATION ADMIN: CPT

## 2023-09-20 PROCEDURE — 90715 TDAP VACCINE 7 YRS/> IM: CPT

## 2023-09-20 NOTE — ASSESSMENT & PLAN NOTE
Blood pressures controlled, continue meds, there is questionable compliance at times with his confusion

## 2023-09-20 NOTE — PROGRESS NOTES
Name: Akanksha Hurt      : 1935      MRN: [id number]  Encounter Provider: Juan Alberto Najera MD  Encounter Date: 2023   Encounter department: [facility]     1. Skin tear of left upper arm without complication, initial encounter  Assessment & Plan:  Covered with nonadherent bandage, with dressing changes daily. No signs of skin infection. Wound is very superficial, but will give Tdap vaccination since I do not see 1 in his record. Discussed wound care if not improving    Orders:  -     TDAP VACCINE GREATER THAN OR EQUAL TO 8YO IM    2. Persistent atrial fibrillation (HCC)  Assessment & Plan:  No reported falls, continue anticoagulation, rate controlled      3. Parkinson's disease [facility])  Assessment & Plan:  Patient does have a resting tremor, continue Sinemet      4. Dementia associated with other underlying disease with behavioral disturbance [facility])  Assessment & Plan:  Continue with family support             Subjective     HPI  Review of Systems   Constitutional: Negative for chills and fever. Respiratory: Negative for shortness of breath. Cardiovascular: Negative for chest pain. Skin: Positive for wound. Psychiatric/Behavioral: Positive for confusion.        Past Medical History:   Diagnosis Date   • A-fib [facility])    • Afib ([address])    • Atrial fibrillation ([address]) 2021   • Frailty syndrome in geriatric patient 2023   • Hypertension    • Hypertension 2021   • Left AC separation    • Other constipation 2023   • Parkinson's disease ([address]) 2018   • SDH (subdural hematoma) ([address]) 2020     Past Surgical History:   Procedure Laterality Date   • CARDIOVERSION      ATRIAL   • WA RPR 1ST INGUN HRNA AGE 5 YRS/> REDUCIBLE Right 2023    Procedure: REPAIR HERNIA INGUINAL;  Surgeon: Gill Seymour MD;  Location: [facility] MA[facility];  Service: General   • ROTATOR CUFF REPAIR     • SHOULDER SURGERY       Family History   Problem Relation Age of Onset   • No Known Problems Mother    • Hypertension Father    • Coronary artery disease Father      Social History     Socioeconomic History   • Marital status: /Civil Union     Spouse name: None   • Number of children: None   • Years of education: None   • Highest education level: None   Occupational History   • None   Tobacco Use   • Smoking status: Never   • Smokeless tobacco: Never   • Tobacco comments:     Quit 35-40 years ago   Vaping Use   • Vaping Use: Never used   Substance and Sexual Activity   • Alcohol use: Not Currently   • Drug use: Never   • Sexual activity: Not Currently     Partners: Female     Birth control/protection: Post-menopausal   Other Topics Concern   • None   Social History Narrative    ** Merged History Encounter **          Social Determinants of Health     Financial Resource Strain: Low Risk  (9/28/2022)    Overall Financial Resource Strain (CARDIA)    • Difficulty of Paying Living Expenses: Not very hard   Food Insecurity: No Food Insecurity (12/22/2022)    Hunger Vital Sign    • Worried About Running Out of Food in the Last Year: Never true    • Ran Out of Food in the Last Year: Never true   Transportation Needs: No Transportation Needs (12/22/2022)    PRAPARE - Transportation    • Lack of Transportation (Medical): No    • Lack of Transportation (Non-Medical):  No   Physical Activity: Not on file   Stress: Not on file   Social Connections: Not on file   Intimate Partner Violence: Not on file   Housing Stability: Low Risk  (12/22/2022)    Housing Stability Vital Sign    • Unable to Pay for Housing in the Last Year: No    • Number of Places Lived in the Last Year: 1    • Unstable Housing in the Last Year: No     Current Outpatient Medications on File Prior to Visit   Medication Sig   • carbidopa-levodopa (SINEMET)  mg per tablet Take 1.5 tabs TID (Patient taking differently: Take 2 tablets by mouth 3 (three) times a day Take 1.5 tabs TID)   • escitalopram (Lexapro) 10 mg tablet Take 1 tablet (10 mg total) by mouth daily   • metoprolol succinate (TOPROL-XL) 25 mg 24 hr tablet Take 1 tablet (25 mg total) by mouth daily   • telmisartan (MICARDIS) 20 MG tablet TAKE 1/2 TABLET BY MOUTH EVERY DAY   • Xarelto 15 MG tablet Take 1 tablet (15 mg total) by mouth daily with breakfast   • acetaminophen (TYLENOL) 325 mg tablet Take 2 tablets (650 mg total) by mouth every 4 (four) hours as needed for mild pain   • ALPRAZolam (XANAX) 0.25 mg tablet Take 1 tablet (0.25 mg total) by mouth 3 (three) times a day as needed for anxiety (Patient not taking: Reported on 6/13/2023)   • ipratropium (ATROVENT) 0.06 % nasal spray 1 spray into each nostril 4 (four) times a day as needed for rhinitis   • nystatin (MYCOSTATIN) powder Apply topically 2 (two) times a day (Patient not taking: Reported on 6/13/2023)   • nystatin (MYCOSTATIN) powder Apply topically 2 (two) times a day   • ofloxacin (OCUFLOX) 0.3 % ophthalmic solution    • oxyCODONE-acetaminophen (PERCOCET) 5-325 mg per tablet Take 1 tablet by mouth every 4 (four) hours as needed for moderate pain for up to 10 doses Max Daily Amount: 6 tablets (Patient not taking: Reported on 6/13/2023)   • prednisoLONE acetate (PRED FORTE) 1 % ophthalmic suspension      Allergies   Allergen Reactions   • Penicillins Hives     Ancef ok   • Pollen Extract Other (See Comments)     .      Immunization History   Administered Date(s) Administered   • COVID-19 PFIZER VACCINE 0.3 ML IM 01/27/2021, 02/17/2021, 01/08/2022   • INFLUENZA 09/04/2016, 10/26/2017, 10/26/2017, 09/13/2018   • Influenza Split High Dose Preservative Free IM 1935, 10/06/2014, 09/09/2015, 10/17/2017   • Influenza, high dose seasonal 0.7 mL 11/17/2021, 09/28/2022   • Influenza, seasonal, injectable 10/11/2010, 10/11/2011   • Pneumococcal 01/01/1999   • Pneumococcal Conjugate 13-Valent 09/27/2015   • Pneumococcal Polysaccharide PPV23 01/15/2017   • Zoster 01/01/2013   • Zoster Vaccine Recombinant 01/01/2013   • influenza, trivalent, adjuvanted 09/19/2019       Objective     /70   Pulse 60   Temp (!) 96.7 °F (35.9 °C)   Resp 16   Wt 93.7 kg (206 lb 9.6 oz)   SpO2 99%   BMI 26.53 kg/m²     Physical Exam  Constitutional:       General: He is not in acute distress. Cardiovascular:      Rate and Rhythm: Normal rate. Rhythm irregular. Heart sounds: Murmur (systolic) heard. Pulmonary:      Effort: Pulmonary effort is normal. No respiratory distress. Breath sounds: Normal breath sounds. Musculoskeletal:      Right lower leg: No edema. Left lower leg: No edema.    Skin:     Comments: Left forearm skin tear, superficial, no purulent drainage, no erythema   Psychiatric:         Mood and Affect: Mood normal.       Baron Guerrero MD

## 2023-09-20 NOTE — ASSESSMENT & PLAN NOTE
Covered with nonadherent bandage, with dressing changes daily. No signs of skin infection. Wound is very superficial, but will give Tdap vaccination since I do not see 1 in his record.   Discussed wound care if not improving

## 2023-09-20 NOTE — PATIENT INSTRUCTIONS
Problem List Items Addressed This Visit          Cardiovascular and Mediastinum    Persistent atrial fibrillation (HCC)     No reported falls, continue anticoagulation, rate controlled            Nervous and Auditory    Parkinson's disease ([address])     Patient does have a resting tremor, continue Sinemet         Dementia associated with other underlying disease with behavioral disturbance ([address])     Continue with family support            Musculoskeletal and Integument    Skin tear of left upper arm without complication - Primary     Covered with nonadherent bandage, with dressing changes daily. No signs of skin infection. Wound is very superficial, but will give Tdap vaccination since I do not see 1 in his record.   Discussed wound care if not improving         Relevant Orders    TDAP VACCINE GREATER THAN OR EQUAL TO 6YO IM

## 2023-10-03 ENCOUNTER — OFFICE VISIT (OUTPATIENT)
Dept: INTERNAL MEDICINE CLINIC | Facility: CLINIC | Age: 88
End: 2023-10-03
Payer: MEDICARE

## 2023-10-03 VITALS
HEART RATE: 56 BPM | SYSTOLIC BLOOD PRESSURE: 122 MMHG | OXYGEN SATURATION: 95 % | BODY MASS INDEX: 26.54 KG/M2 | WEIGHT: 206.8 LBS | HEIGHT: 74 IN | DIASTOLIC BLOOD PRESSURE: 70 MMHG

## 2023-10-03 DIAGNOSIS — I10 BENIGN ESSENTIAL HYPERTENSION: ICD-10-CM

## 2023-10-03 DIAGNOSIS — I48.19 PERSISTENT ATRIAL FIBRILLATION (HCC): ICD-10-CM

## 2023-10-03 DIAGNOSIS — G20.A1 PARKINSON'S DISEASE, UNSPECIFIED WHETHER DYSKINESIA PRESENT, UNSPECIFIED WHETHER MANIFESTATIONS FLUCTUATE: Primary | ICD-10-CM

## 2023-10-03 DIAGNOSIS — Z00.00 MEDICARE ANNUAL WELLNESS VISIT, SUBSEQUENT: ICD-10-CM

## 2023-10-03 DIAGNOSIS — F02.818 DEMENTIA ASSOCIATED WITH OTHER UNDERLYING DISEASE WITH BEHAVIORAL DISTURBANCE (HCC): ICD-10-CM

## 2023-10-03 DIAGNOSIS — Z23 NEEDS FLU SHOT: ICD-10-CM

## 2023-10-03 PROCEDURE — G0008 ADMIN INFLUENZA VIRUS VAC: HCPCS

## 2023-10-03 PROCEDURE — G0439 PPPS, SUBSEQ VISIT: HCPCS | Performed by: INTERNAL MEDICINE

## 2023-10-03 PROCEDURE — 90662 IIV NO PRSV INCREASED AG IM: CPT

## 2023-10-03 PROCEDURE — 99214 OFFICE O/P EST MOD 30 MIN: CPT | Performed by: INTERNAL MEDICINE

## 2023-10-03 NOTE — PROGRESS NOTES
Assessment and Plan:     Problem List Items Addressed This Visit        Cardiovascular and Mediastinum    Persistent atrial fibrillation ([address])     Rate controlled, continue anticoagulation         Benign essential hypertension     Blood pressures controlled, continue meds            Nervous and Auditory    Parkinson's disease - Primary     Continue meds and follow up neuro. Dementia associated with other underlying disease with behavioral disturbance ([address])     Continue with family support            Other    Medicare annual wellness visit, subsequent     Discussed preventative health, cancer screening, immunizations, and safety issues. I recommend yearly flu shot. Patient had the Shingrix vaccines. Other Visit Diagnoses     Needs flu shot        Relevant Orders    influenza vaccine, high-dose, PF 0.7 mL (FLUZONE HIGH-DOSE)           Preventive health issues were discussed with patient, and age appropriate screening tests were ordered as noted in patient's After Visit Summary. Personalized health advice and appropriate referrals for health education or preventive services given if needed, as noted in patient's After Visit Summary. History of Present Illness:     Patient presents for a Medicare Wellness Visit    Patient here for Medicare wellness exam     Patient Care Team:  Baron Guerrero MD as PCP - General (Internal Medicine)     Review of Systems:     Review of Systems   Constitutional: Negative for fatigue. Respiratory: Negative for shortness of breath. Cardiovascular: Negative for chest pain. Psychiatric/Behavioral: Positive for confusion.         Problem List:     Patient Active Problem List   Diagnosis   • Persistent atrial fibrillation (HCC)   • Pure hypercholesterolemia   • Benign essential hypertension   • Tremor of left hand   • Acute non-recurrent maxillary sinusitis   • Bloating   • Anxiety   • Dyspnea on exertion   • Esophageal reflux   • Parkinson's disease   • URI (upper respiratory infection)   • Medicare annual wellness visit, subsequent   • Chest pain   • Acute left-sided low back pain with left-sided sciatica   • Systolic murmur   • Thoracic aortic aneurysm without rupture ([facility])   • Skin lesion   • Seasonal allergic rhinitis due to pollen   • Lightheadedness   • Hordeolum externum of right lower eyelid   • Aortic stenosis   • Bradycardia   • Fall   • Abdominal wall hematoma   • Acute blood loss anemia   • Left shoulder pain   • Closed left clavicular fracture   • Displaced fracture of shaft of left clavicle with routine healing   • Dementia associated with other underlying disease with behavioral disturbance ([facility])   • Driving safety issue   • Abdominal pain   • Onychomycosis   • 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[facility])   • COVID   • Panic attacks   • Frailty syndrome in geriatric patient   • Other constipation   • Grief reaction   • Inguinal hernia of right side without obstruction or gangrene   • Preop exam for internal medicine   • Skin tear of left upper arm without complication      Past Medical and Surgical History:     Past Medical History:   Diagnosis Date   • A-fib ([address])    • Afib ([address])    • Atrial fibrillation ([address]) 6/13/2021   • Frailty syndrome in geriatric patient 1/8/2023   • Hypertension    • Hypertension 6/13/2021   • Left AC separation    • Other constipation 1/14/2023   • Parkinson's disease 11/01/2018   • SDH (subdural hematoma) ([address]) 2/7/2020     Past Surgical History:   Procedure Laterality Date   • CARDIOVERSION      ATRIAL   • IA RPR 1ST INGUN HRNA AGE 7 YRS/> REDUCIBLE Right 5/19/2023    Procedure: REPAIR HERNIA INGUINAL;  Surgeon: Gill Seymour MD;  Location: [facility] MAIN OR;  Service: General   • ROTATOR CUFF REPAIR     • SHOULDER SURGERY        Family History:     Family History   Problem Relation Age of Onset   • No Known Problems Mother    • Hypertension Father    • Coronary artery disease Father       Social History:     Social History     Socioeconomic History   • Marital status:      Spouse name: None   • Number of children: None   • Years of education: None   • Highest education level: None   Occupational History   • None   Tobacco Use   • Smoking status: Never   • Smokeless tobacco: Never   • Tobacco comments:     Quit 35-40 years ago   Vaping Use   • Vaping Use: Never used   Substance and Sexual Activity   • Alcohol use: Not Currently   • Drug use: Never   • Sexual activity: Not Currently     Partners: Female     Birth control/protection: Post-menopausal   Other Topics Concern   • None   Social History Narrative    ** Merged History Encounter **          Social Determinants of Health     Financial Resource Strain: Low Risk  (10/3/2023)    Overall Financial Resource Strain (CARDIA)    • Difficulty of Paying Living Expenses: Not hard at all   Food Insecurity: No Food Insecurity (12/22/2022)    Hunger Vital Sign    • Worried About Running Out of Food in the Last Year: Never true    • Ran Out of Food in the Last Year: Never true   Transportation Needs: No Transportation Needs (10/3/2023)    PRAPARE - Transportation    • Lack of Transportation (Medical): No    • Lack of Transportation (Non-Medical):  No   Physical Activity: Not on file   Stress: Not on file   Social Connections: Not on file   Intimate Partner Violence: Not on file   Housing Stability: Low Risk  (12/22/2022)    Housing Stability Vital Sign    • Unable to Pay for Housing in the Last Year: No    • Number of Places Lived in the Last Year: 1    • Unstable Housing in the Last Year: No      Medications and Allergies:     Current Outpatient Medications   Medication Sig Dispense Refill   • acetaminophen (TYLENOL) 325 mg tablet Take 2 tablets (650 mg total) by mouth every 4 (four) hours as needed for mild pain  0   • carbidopa-levodopa (SINEMET)  mg per tablet Take 1.5 tabs TID (Patient taking differently: Take 2 tablets by mouth 3 (three) times a day Take 1.5 tabs TID) 405 tablet 2   • escitalopram (Lexapro) 10 mg tablet Take 1 tablet (10 mg total) by mouth daily 90 tablet 3   • ipratropium (ATROVENT) 0.06 % nasal spray 1 spray into each nostril 4 (four) times a day as needed for rhinitis 45 mL 0   • metoprolol succinate (TOPROL-XL) 25 mg 24 hr tablet Take 1 tablet (25 mg total) by mouth daily 90 tablet 3   • nystatin (MYCOSTATIN) powder Apply topically 2 (two) times a day 15 g 3   • ofloxacin (OCUFLOX) 0.3 % ophthalmic solution      • prednisoLONE acetate (PRED FORTE) 1 % ophthalmic suspension      • telmisartan (MICARDIS) 20 MG tablet TAKE 1/2 TABLET BY MOUTH EVERY DAY 45 tablet 2   • Xarelto 15 MG tablet Take 1 tablet (15 mg total) by mouth daily with breakfast 90 tablet 3   • ALPRAZolam (XANAX) 0.25 mg tablet Take 1 tablet (0.25 mg total) by mouth 3 (three) times a day as needed for anxiety (Patient not taking: Reported on 6/13/2023) 30 tablet 0   • nystatin (MYCOSTATIN) powder Apply topically 2 (two) times a day (Patient not taking: Reported on 6/13/2023) 15 g 3   • oxyCODONE-acetaminophen (PERCOCET) 5-325 mg per tablet Take 1 tablet by mouth every 4 (four) hours as needed for moderate pain for up to 10 doses Max Daily Amount: 6 tablets (Patient not taking: Reported on 6/13/2023) 10 tablet 0     No current facility-administered medications for this visit. Allergies   Allergen Reactions   • Penicillins Hives     Ancef ok   • Pollen Extract Other (See Comments)     .       Immunizations:     Immunization History   Administered Date(s) Administered   • COVID-19 PFIZER VACCINE 0.3 ML IM 01/27/2021, 02/17/2021, 01/08/2022   • INFLUENZA 09/04/2016, 10/26/2017, 10/26/2017, 09/13/2018   • Influenza Split High Dose Preservative Free IM 1935, 10/06/2014, 09/09/2015, 10/17/2017   • Influenza, high dose seasonal 0.7 mL 11/17/2021, 09/28/2022   • Influenza, seasonal, injectable 10/11/2010, 10/11/2011   • Pneumococcal 01/01/1999   • Pneumococcal Conjugate 13-Valent 09/27/2015   • Pneumococcal Polysaccharide PPV23 01/15/2017   • Tdap 09/20/2023   • Zoster 01/01/2013   • Zoster Vaccine Recombinant 01/01/2013   • influenza, trivalent, adjuvanted 09/19/2019      Health Maintenance: There are no preventive care reminders to display for this patient. Topic Date Due   • COVID-19 Vaccine (4 - Pfizer series) 03/05/2022   • Influenza Vaccine (1) 09/01/2023      Medicare Screening Tests and Risk Assessments: Femi Zuñiga is here for his Subsequent Wellness visit. Health Risk Assessment:   Patient rates overall health as very good. Patient feels that their physical health rating is same. Patient is satisfied with their life. Eyesight was rated as same. Hearing was rated as same. Patient feels that their emotional and mental health rating is same. Patients states they are sometimes angry. Patient states they are sometimes unusually tired/fatigued. Pain experienced in the last 7 days has been none. Patient states that he has experienced no weight loss or gain in last 6 months. Fall Risk Screening: In the past year, patient has experienced: no history of falling in past year      Home Safety:  Patient does not have trouble with stairs inside or outside of their home. Patient has working smoke alarms and has working carbon monoxide detector. Home safety hazards include: none. Nutrition:   Current diet is Regular. Medications:   Patient is not currently taking any over-the-counter supplements. Patient is not able to manage medications. Activities of Daily Living (ADLs)/Instrumental Activities of Daily Living (IADLs):   Walk and transfer into and out of bed and chair?: Yes  Dress and groom yourself?: Yes    Bathe or shower yourself?: Yes    Feed yourself?  Yes  Do your laundry/housekeeping?: No  Manage your money, pay your bills and track your expenses?: Yes  Make your own meals?: No    Do your own shopping?: No    Previous Hospitalizations:   Any hospitalizations or ED visits within the last 12 months?: No      Advance Care Planning:   Living will: Yes    Advanced directive: Yes      Cognitive Screening:   Provider or family/friend/caregiver concerned regarding cognition?: Yes    PREVENTIVE SCREENINGS      Cardiovascular Screening:    General: Screening Not Indicated and History Lipid Disorder      Diabetes Screening:     General: Screening Current and Risks and Benefits Discussed      Colorectal Cancer Screening:     General: Screening Not Indicated      Prostate Cancer Screening:    General: Screening Not Indicated      Osteoporosis Screening:    General: Screening Not Indicated      Abdominal Aortic Aneurysm (AAA) Screening:        General: Screening Not Indicated      Lung Cancer Screening:     General: Screening Not Indicated      Hepatitis C Screening:    General: Screening Not Indicated    Screening, Brief Intervention, and Referral to Treatment (SBIRT)    Screening  Typical number of drinks in a day: 0  Typical number of drinks in a week: 0  Interpretation: Low risk drinking behavior. Other Counseling Topics:   Car/seat belt/driving safety, skin self-exam and sunscreen. No results found. Physical Exam:     /70   Pulse 56   Ht 6' 2" (1.88 m)   Wt 93.8 kg (206 lb 12.8 oz)   SpO2 95%   BMI 26.55 kg/m²     Physical Exam  Vitals reviewed. Constitutional:       General: He is not in acute distress. Cardiovascular:      Rate and Rhythm: Normal rate. Rhythm irregular. Heart sounds: Murmur (soft systolic) heard. Pulmonary:      Effort: Pulmonary effort is normal. No respiratory distress. Breath sounds: Normal breath sounds. No rhonchi. Musculoskeletal:      Right lower leg: No edema. Left lower leg: No edema. Skin:     Coloration: Skin is not jaundiced.    Neurological:      Comments: jm Joy MD

## 2023-10-03 NOTE — ASSESSMENT & PLAN NOTE
Discussed preventative health, cancer screening, immunizations, and safety issues. I recommend yearly flu shot. Patient had the Shingrix vaccines.

## 2023-10-03 NOTE — PATIENT INSTRUCTIONS
Problem List Items Addressed This Visit          Cardiovascular and Mediastinum    Persistent atrial fibrillation (HCC)     Rate controlled, continue anticoagulation         Benign essential hypertension     Blood pressures controlled, continue meds            Nervous and Auditory    Parkinson's disease - Primary     Continue meds and follow up neuro. Dementia associated with other underlying disease with behavioral disturbance ([address])     Continue with family support            Other    Medicare annual wellness visit, subsequent     Discussed preventative health, cancer screening, immunizations, and safety issues. I recommend yearly flu shot. Patient had the Shingrix vaccines. Other Visit Diagnoses       Needs flu shot        Relevant Orders    influenza vaccine, high-dose, PF 0.7 mL (FLUZONE HIGH-DOSE)            Medicare Preventive Visit Patient Instructions  Thank you for completing your Welcome to Medicare Visit or Medicare Annual Wellness Visit today. Your next wellness visit will be due in one year (10/3/2024). The screening/preventive services that you may require over the next 5-10 years are detailed below. Some tests may not apply to you based off risk factors and/or age. Screening tests ordered at today's visit but not completed yet may show as past due. Also, please note that scanned in results may not display below. Preventive Screenings:  Service Recommendations Previous Testing/Comments   Colorectal Cancer Screening  Colonoscopy    Fecal Occult Blood Test (FOBT)/Fecal Immunochemical Test (FIT)  Fecal DNA/Cologuard Test  Flexible Sigmoidoscopy Age: 43-73 years old   Colonoscopy: every 10 years (May be performed more frequently if at higher risk)  OR  FOBT/FIT: every 1 year  OR  Cologuard: every 3 years  OR  Sigmoidoscopy: every 5 years  Screening may be recommended earlier than age 39 if at higher risk for colorectal cancer.  Also, an individualized decision between you and your healthcare provider will decide whether screening between the ages of 77-80 would be appropriate. Colonoscopy: 04/28/2011  FOBT/FIT: Not on file  Cologuard: Not on file  Sigmoidoscopy: Not on file          Prostate Cancer Screening Individualized decision between patient and health care provider in men between ages of 53-66   Medicare will cover every 12 months beginning on the day after your 50th birthday PSA: No results in last 5 years           Hepatitis C Screening Once for adults born between 80 and 1965  More frequently in patients at high risk for Hepatitis C Hep C Antibody: Not on file        Diabetes Screening 1-2 times per year if you're at risk for diabetes or have pre-diabetes Fasting glucose: 83 mg/dL (5/5/2023)  A1C: 6.2 % (10/11/2021)      Cholesterol Screening Once every 5 years if you don't have a lipid disorder. May order more often based on risk factors. Lipid panel: 04/05/2021         Other Preventive Screenings Covered by Medicare:  Abdominal Aortic Aneurysm (AAA) Screening: covered once if your at risk. You're considered to be at risk if you have a family history of AAA or a male between the age of 70-76 who smoking at least 100 cigarettes in your lifetime. Lung Cancer Screening: covers low dose CT scan once per year if you meet all of the following conditions: (1) Age 48-67; (2) No signs or symptoms of lung cancer; (3) Current smoker or have quit smoking within the last 15 years; (4) You have a tobacco smoking history of at least 20 pack years (packs per day x number of years you smoked); (5) You get a written order from a healthcare provider.   Glaucoma Screening: covered annually if you're considered high risk: (1) You have diabetes OR (2) Family history of glaucoma OR (3)  aged 48 and older OR (3)  American aged 72 and older  Osteoporosis Screening: covered every 2 years if you meet one of the following conditions: (1) Have a vertebral abnormality; (2) On glucocorticoid therapy for more than 3 months; (3) Have primary hyperparathyroidism; (4) On osteoporosis medications and need to assess response to drug therapy. HIV Screening: covered annually if you're between the age of 14-79. Also covered annually if you are younger than 13 and older than 72 with risk factors for HIV infection. For pregnant patients, it is covered up to 3 times per pregnancy. Immunizations:  Immunization Recommendations   Influenza Vaccine Annual influenza vaccination during flu season is recommended for all persons aged >= 6 months who do not have contraindications   Pneumococcal Vaccine   * Pneumococcal conjugate vaccine = PCV13 (Prevnar 13), PCV15 (Vaxneuvance), PCV20 (Prevnar 20)  * Pneumococcal polysaccharide vaccine = PPSV23 (Pneumovax) Adults 20-63 years old: 1-3 doses may be recommended based on certain risk factors  Adults 72 years old: 1-2 doses may be recommended based off what pneumonia vaccine you previously received   Hepatitis B Vaccine 3 dose series if at intermediate or high risk (ex: diabetes, end stage renal disease, liver disease)   Tetanus (Td) Vaccine - COST NOT COVERED BY MEDICARE PART B Following completion of primary series, a booster dose should be given every 10 years to maintain immunity against tetanus. Td may also be given as tetanus wound prophylaxis. Tdap Vaccine - COST NOT COVERED BY MEDICARE PART B Recommended at least once for all adults. For pregnant patients, recommended with each pregnancy. Shingles Vaccine (Shingrix) - COST NOT COVERED BY MEDICARE PART B  2 shot series recommended in those aged 48 and above     Health Maintenance Due:  There are no preventive care reminders to display for this patient. Immunizations Due:      Topic Date Due    COVID-19 Vaccine (4 - Pfizer series) 03/05/2022    Influenza Vaccine (1) 09/01/2023     Advance Directives   What are advance directives?   Advance directives are legal documents that state your wishes and plans for medical care. These plans are made ahead of time in case you lose your ability to make decisions for yourself. Advance directives can apply to any medical decision, such as the treatments you want, and if you want to donate organs. What are the types of advance directives? There are many types of advance directives, and each state has rules about how to use them. You may choose a combination of any of the following:  Living will: This is a written record of the treatment you want. You can also choose which treatments you do not want, which to limit, and which to stop at a certain time. This includes surgery, medicine, IV fluid, and tube feedings. Durable power of  for healthcare [facility]): This is a written record that states who you want to make healthcare choices for you when you are unable to make them for yourself. This person, called a proxy, is usually a family member or a friend. You may choose more than 1 proxy. Do not resuscitate (DNR) order:  A DNR order is used in case your heart stops beating or you stop breathing. It is a request not to have certain forms of treatment, such as CPR. A DNR order may be included in other types of advance directives. Medical directive: This covers the care that you want if you are in a coma, near death, or unable to make decisions for yourself. You can list the treatments you want for each condition. Treatment may include pain medicine, surgery, blood transfusions, dialysis, IV or tube feedings, and a ventilator (breathing machine). Values history: This document has questions about your views, beliefs, and how you feel and think about life. This information can help others choose the care that you would choose. Why are advance directives important? An advance directive helps you control your care. Although spoken wishes may be used, it is better to have your wishes written down. Spoken wishes can be misunderstood, or not followed.  Treatments may be given even if you do not want them. An advance directive may make it easier for your family to make difficult choices about your care. Weight Management   Why it is important to manage your weight:  Being overweight increases your risk of health conditions such as heart disease, high blood pressure, type 2 diabetes, and certain types of cancer. It can also increase your risk for osteoarthritis, sleep apnea, and other respiratory problems. Aim for a slow, steady weight loss. Even a small amount of weight loss can lower your risk of health problems. How to lose weight safely:  A safe and healthy way to lose weight is to eat fewer calories and get regular exercise. You can lose up about 1 pound a week by decreasing the number of calories you eat by 500 calories each day. Healthy meal plan for weight management:  A healthy meal plan includes a variety of foods, contains fewer calories, and helps you stay healthy. A healthy meal plan includes the following:  Eat whole-grain foods more often. A healthy meal plan should contain fiber. Fiber is the part of grains, fruits, and vegetables that is not broken down by your body. Whole-grain foods are healthy and provide extra fiber in your diet. Some examples of whole-grain foods are whole-wheat breads and pastas, oatmeal, brown rice, and bulgur. Eat a variety of vegetables every day. Include dark, leafy greens such as spinach, kale, mu greens, and mustard greens. Eat yellow and orange vegetables such as carrots, sweet potatoes, and winter squash. Eat a variety of fruits every day. Choose fresh or canned fruit (canned in its own juice or light syrup) instead of juice. Fruit juice has very little or no fiber. Eat low-fat dairy foods. Drink fat-free (skim) milk or 1% milk. Eat fat-free yogurt and low-fat cottage cheese. Try low-fat cheeses such as mozzarella and other reduced-fat cheeses. Choose meat and other protein foods that are low in fat.   Choose beans or other legumes such as split peas or lentils. Choose fish, skinless poultry (chicken or turkey), or lean cuts of red meat (beef or pork). Before you cook meat or poultry, cut off any visible fat. Use less fat and oil. Try baking foods instead of frying them. Add less fat, such as margarine, sour cream, regular salad dressing and mayonnaise to foods. Eat fewer high-fat foods. Some examples of high-fat foods include french fries, doughnuts, ice cream, and cakes. Eat fewer sweets. Limit foods and drinks that are high in sugar. This includes candy, cookies, regular soda, and sweetened drinks. Exercise:  Exercise at least 30 minutes per day on most days of the week. Some examples of exercise include walking, biking, dancing, and swimming. You can also fit in more physical activity by taking the stairs instead of the elevator or parking farther away from stores. Ask your healthcare provider about the best exercise plan for you. © Copyright [address] 2018 Information is for End User's use only and may not be sold, redistributed or otherwise used for commercial purposes.  All illustrations and images included in CareNotes® are the copyrighted property of A.D.A.M., Inc. or  [employer]

## 2023-11-08 ENCOUNTER — TELEPHONE (OUTPATIENT)
Dept: NEUROLOGY | Facility: CLINIC | Age: 88
End: 2023-11-08

## 2023-11-14 ENCOUNTER — DOCUMENTATION (OUTPATIENT)
Dept: PSYCHIATRY | Facility: CLINIC | Age: 88
End: 2023-11-14

## 2023-11-14 NOTE — PROGRESS NOTES
Psychotherapy Discharge Summary    Preferred Name: Lamine Smart  YOB: 1935    Admission date to psychotherapy: 2/16/23    Referred by: PCP    Presenting Problem: Grief/loss issues, cognition    Course of treatment included : individual therapy     Progress/Outcome of Treatment Goals (brief summary of course of treatment) Seen for 8 sessions the last being on 8/16/23. At that time, he was still struggling to manage grief issues and confusion was more prevalent. Continues to respond well to supportive sessions to process his emotions regarding his aging and loss issues. Not long after this session, I informed his daughter that I would be leaving my position. Plan is to have him seen by my replacement at his PCP site. Treatment Complications (if any): cognitive issues    Treatment Progress: fair    Current [facility] Psychiatric Provider: none    Discharge Medications include: Lexapro via PCP    Discharge Date: 11/14/23    Discharge Diagnosis: No diagnosis found.     Criteria for Discharge: need to be transferred to another service/level of care within the system    Aftercare recommendations include (include specific referral names and phone numbers, if appropriate): none    Prognosis: fair

## 2023-11-28 DIAGNOSIS — F41.9 ANXIETY: ICD-10-CM

## 2023-11-28 RX ORDER — ESCITALOPRAM OXALATE 10 MG/1
10 TABLET ORAL DAILY
Qty: 90 TABLET | Refills: 1 | Status: SHIPPED | OUTPATIENT
Start: 2023-11-28

## 2023-12-26 DIAGNOSIS — I48.19 PERSISTENT ATRIAL FIBRILLATION (HCC): ICD-10-CM

## 2023-12-27 RX ORDER — RIVAROXABAN 15 MG/1
15 TABLET, FILM COATED ORAL
Qty: 90 TABLET | Refills: 0 | Status: SHIPPED | OUTPATIENT
Start: 2023-12-27

## 2024-01-05 ENCOUNTER — OFFICE VISIT (OUTPATIENT)
Dept: INTERNAL MEDICINE CLINIC | Facility: CLINIC | Age: 89
End: 2024-01-05
Payer: MEDICARE

## 2024-01-05 VITALS
BODY MASS INDEX: 27.04 KG/M2 | DIASTOLIC BLOOD PRESSURE: 63 MMHG | HEART RATE: 72 BPM | OXYGEN SATURATION: 98 % | SYSTOLIC BLOOD PRESSURE: 133 MMHG | WEIGHT: 210.6 LBS

## 2024-01-05 DIAGNOSIS — R09.81 NASAL CONGESTION: Primary | ICD-10-CM

## 2024-01-05 PROCEDURE — 99213 OFFICE O/P EST LOW 20 MIN: CPT | Performed by: INTERNAL MEDICINE

## 2024-01-05 NOTE — ASSESSMENT & PLAN NOTE
Patient can try a short course of Flonase to help with any worsening nasal congestion, and an over-the-counter cough medication like Robitussin or Mucinex to help with any cough.  Lung exam is normal, patient does not have the classic complaints as seen with COVID or flu or RSV, but all of these are very prevalent in the community right now.  Patient should follow-up if worsening, no signs of bacterial infection on exam.

## 2024-01-05 NOTE — PROGRESS NOTES
Name: David Crowley      : 1935      MRN: [id number]  Encounter Provider: Ganga Munoz MD  Encounter Date: 2024   Encounter department: [facility]    Assessment & Plan     1. Nasal congestion  Assessment & Plan:  Patient can try a short course of Flonase to help with any worsening nasal congestion, and an over-the-counter cough medication like Robitussin or Mucinex to help with any cough.  Lung exam is normal, patient does not have the classic complaints as seen with COVID or flu or RSV, but all of these are very prevalent in the community right now.  Patient should follow-up if worsening, no signs of bacterial infection on exam.          Depression Screening and Follow-up Plan: Patient was screened for depression during today's encounter. They screened negative with a PHQ-9 score of 0.        Subjective     Onset of some cold symptoms about 3 days ago.      Review of Systems   Constitutional:  Negative for chills and fever.   HENT:  Positive for congestion and rhinorrhea. Negative for sore throat.    Respiratory:  Positive for cough. Negative for shortness of breath.    Cardiovascular:  Negative for chest pain.   Musculoskeletal:  Negative for arthralgias and myalgias.       Past Medical History:   Diagnosis Date   • A-fib ([facility])    • Afib (HCC)    • Atrial fibrillation ([facility]) 2021   • Frailty syndrome in geriatric patient 2023   • Hypertension    • Hypertension 2021   • Left AC separation    • Other constipation 2023   • Parkinson's disease 2018   • SDH (subdural hematoma) ([facility]) 2020     Past Surgical History:   Procedure Laterality Date   • CARDIOVERSION      ATRIAL   • IN RPR 1ST INGUN HRNA AGE 7 YRS/> REDUCIBLE Right 2023    Procedure: REPAIR HERNIA INGUINAL;  Surgeon: Maikel Gaston MD;  Location: [facility] MAIN OR;  Service: General   • ROTATOR CUFF REPAIR     • SHOULDER SURGERY       Family History   Problem Relation Age of Onset   •  No Known Problems Mother    • Hypertension Father    • Coronary artery disease Father      Social History     Socioeconomic History   • Marital status:      Spouse name: None   • Number of children: None   • Years of education: None   • Highest education level: None   Occupational History   • None   Tobacco Use   • Smoking status: Never   • Smokeless tobacco: Never   • Tobacco comments:     Quit 35-40 years ago   Vaping Use   • Vaping status: Never Used   Substance and Sexual Activity   • Alcohol use: Not Currently   • Drug use: Never   • Sexual activity: Not Currently     Partners: Female     Birth control/protection: Post-menopausal   Other Topics Concern   • None   Social History Narrative    ** Merged History Encounter **          Social Determinants of Health     Financial Resource Strain: Low Risk  (10/3/2023)    Overall Financial Resource Strain (CARDIA)    • Difficulty of Paying Living Expenses: Not hard at all   Food Insecurity: No Food Insecurity (12/22/2022)    Hunger Vital Sign    • Worried About Running Out of Food in the Last Year: Never true    • Ran Out of Food in the Last Year: Never true   Transportation Needs: No Transportation Needs (10/3/2023)    PRAPARE - Transportation    • Lack of Transportation (Medical): No    • Lack of Transportation (Non-Medical): No   Physical Activity: Not on file   Stress: Not on file   Social Connections: Not on file   Intimate Partner Violence: Not on file   Housing Stability: Low Risk  (12/22/2022)    Housing Stability Vital Sign    • Unable to Pay for Housing in the Last Year: No    • Number of Places Lived in the Last Year: 1    • Unstable Housing in the Last Year: No     Current Outpatient Medications on File Prior to Visit   Medication Sig   • acetaminophen (TYLENOL) 325 mg tablet Take 2 tablets (650 mg total) by mouth every 4 (four) hours as needed for mild pain   • carbidopa-levodopa (SINEMET)  mg per tablet Take 1.5 tabs TID (Patient taking  differently: Take 2 tablets by mouth 3 (three) times a day Take 1.5 tabs TID)   • escitalopram (LEXAPRO) 10 mg tablet TAKE 1 TABLET BY MOUTH EVERY DAY   • ipratropium (ATROVENT) 0.06 % nasal spray 1 spray into each nostril 4 (four) times a day as needed for rhinitis   • metoprolol succinate (TOPROL-XL) 25 mg 24 hr tablet Take 1 tablet (25 mg total) by mouth daily   • telmisartan (MICARDIS) 20 MG tablet TAKE 1/2 TABLET BY MOUTH EVERY DAY   • Xarelto 15 MG tablet TAKE 1 TABLET BY MOUTH DAILY WITH BREAKFAST   • ALPRAZolam (XANAX) 0.25 mg tablet Take 1 tablet (0.25 mg total) by mouth 3 (three) times a day as needed for anxiety (Patient not taking: Reported on 1/5/2024)   • nystatin (MYCOSTATIN) powder Apply topically 2 (two) times a day (Patient not taking: Reported on 6/13/2023)   • nystatin (MYCOSTATIN) powder Apply topically 2 (two) times a day (Patient not taking: Reported on 1/5/2024)   • ofloxacin (OCUFLOX) 0.3 % ophthalmic solution  (Patient not taking: Reported on 1/5/2024)   • oxyCODONE-acetaminophen (PERCOCET) 5-325 mg per tablet Take 1 tablet by mouth every 4 (four) hours as needed for moderate pain for up to 10 doses Max Daily Amount: 6 tablets (Patient not taking: Reported on 6/13/2023)   • prednisoLONE acetate (PRED FORTE) 1 % ophthalmic suspension  (Patient not taking: Reported on 1/5/2024)     Allergies   Allergen Reactions   • Penicillins Hives     Ancef ok   • Pollen Extract Other (See Comments)     .     Immunization History   Administered Date(s) Administered   • COVID-19 PFIZER VACCINE 0.3 ML IM 01/27/2021, 02/17/2021, 01/08/2022   • INFLUENZA 09/04/2016, 10/26/2017, 10/26/2017, 09/13/2018   • Influenza Split High Dose Preservative Free IM 1935, 10/06/2014, 09/09/2015, 10/17/2017   • Influenza, high dose seasonal 0.7 mL 11/17/2021, 09/28/2022, 10/03/2023   • Influenza, seasonal, injectable 10/11/2010, 10/11/2011   • Pneumococcal 01/01/1999   • Pneumococcal Conjugate 13-Valent 09/27/2015   •  Pneumococcal Polysaccharide PPV23 01/15/2017   • Tdap 09/20/2023   • Zoster 01/01/2013   • Zoster Vaccine Recombinant 01/01/2013   • influenza, trivalent, adjuvanted 09/19/2019       Objective     /63   Pulse 72   Wt 95.5 kg (210 lb 9.6 oz)   SpO2 98%   BMI 27.04 kg/m²     Physical Exam  Constitutional:       General: He is not in acute distress.     Appearance: Normal appearance. He is not ill-appearing.   HENT:      Mouth/Throat:      Mouth: Mucous membranes are moist.      Pharynx: No oropharyngeal exudate or posterior oropharyngeal erythema.   Cardiovascular:      Rate and Rhythm: Normal rate and regular rhythm.      Heart sounds: Murmur (soft systolic) heard.   Pulmonary:      Effort: Pulmonary effort is normal. No respiratory distress.      Breath sounds: Normal breath sounds. No wheezing or rhonchi.   Neurological:      Mental Status: He is alert.       Ganga Munoz MD

## 2024-01-05 NOTE — PATIENT INSTRUCTIONS
Problem List Items Addressed This Visit          Other    Nasal congestion - Primary     Patient can try a short course of Flonase to help with any worsening nasal congestion, and an over-the-counter cough medication like Robitussin or Mucinex to help with any cough.  Lung exam is normal, patient does not have the classic complaints as seen with COVID or flu or RSV, but all of these are very prevalent in the community right now.  Patient should follow-up if worsening, no signs of bacterial infection on exam.

## 2024-02-13 ENCOUNTER — TELEPHONE (OUTPATIENT)
Dept: NEUROLOGY | Facility: CLINIC | Age: 89
End: 2024-02-13

## 2024-02-13 NOTE — TELEPHONE ENCOUNTER
Pt's daughter Hector called to confirm her fathers appt for tomorrow, 2/14/24 @ 1:30 with Dr. Francisco.  She stated if father calls to try to cancel she does not want it canceled.

## 2024-02-14 ENCOUNTER — OFFICE VISIT (OUTPATIENT)
Dept: NEUROLOGY | Facility: CLINIC | Age: 89
End: 2024-02-14
Payer: MEDICARE

## 2024-02-14 ENCOUNTER — TELEPHONE (OUTPATIENT)
Dept: NEUROLOGY | Facility: CLINIC | Age: 89
End: 2024-02-14

## 2024-02-14 VITALS — BODY MASS INDEX: 27.6 KG/M2 | DIASTOLIC BLOOD PRESSURE: 76 MMHG | SYSTOLIC BLOOD PRESSURE: 134 MMHG | WEIGHT: 215 LBS

## 2024-02-14 DIAGNOSIS — F02.818 DEMENTIA ASSOCIATED WITH OTHER UNDERLYING DISEASE WITH BEHAVIORAL DISTURBANCE (HCC): ICD-10-CM

## 2024-02-14 DIAGNOSIS — G20.A1 PARKINSON'S DISEASE, UNSPECIFIED WHETHER DYSKINESIA PRESENT, UNSPECIFIED WHETHER MANIFESTATIONS FLUCTUATE: Primary | ICD-10-CM

## 2024-02-14 PROCEDURE — 99214 OFFICE O/P EST MOD 30 MIN: CPT | Performed by: PSYCHIATRY & NEUROLOGY

## 2024-02-14 NOTE — PROGRESS NOTES
"Patient ID: David Crowley is a 88 y.o. male.    Assessment/Plan:    Parkinson's disease (HCC)  Parkinsonian symptoms vary, likely related to difficulties with medication adherence. He lives alone but family looks in on him daily in the evening.   We discussed trying a medication alarm dispensing system to remind him of am and night doses.   Discussed the potential benefits of having an aide come in daily for a few hours or having him attend an adult day program.   Instructed to continue carbidopa/levodopa 25/100 2 tabs 3 times daily (taken 6 hours apart from awakening)  If taking consistently and still has difficulty with mobility we will increase to 2.5 tabs 3 time daily      Will have MSW reach out with regards to information in assistance at home or daytime programs.      Dementia associated with other underlying disease with behavioral disturbance  Progressive cognitive decline. Lives independently with family support but having difficulty remembering to take medications. Reviewed medications used in dementia such as donepezil. Weighed potential side effects versus benefits and history of difficulty with medications and opted not to start at this time.        There are no diagnoses linked to this encounter.       Subjective:    Mr. Crowley is a right handed male with macular degeneration who returns for movement disorder follow up for Parkinson's disease with mild dementia.  To review, symptoms began in mid 2018 with left hand tremor.  Previous trial of propranolol without improvement tremor and he felt more \"uptight\" on the medication.  He was later started on carbidopa/levodopa with improvement.  Previously with periods of paranoia and delusions in the setting of anxiety.  Later development of mild cognitive symptoms.    Current PD medications:  Carbidopa/levodopa 25/100 2 tabs 3 times daily (11am-12pm, after dinner 7:30pm, 12-1am)- tends to miss and the night dose at times    He presents today with his son " in law who helped with history.  Awakening later in the am. Gets to bed later.   He lives alone and administers his own medication. Medication adherence is an issue.   They have spoke about having an aide or going to an adult day program.  He does hang with a negrito who lives nearby.    He has two checkbooks. Bills are paid by his daughter and there is another checkbook/account used for enoc and dinner, etc. Family does his taxes.  He is active with friend Tuesday and Fridays.    Meals are provided by family.   Family assist with household activities.            Objective:    Blood pressure 134/76, weight 97.5 kg (215 lb).    Physical Exam  Vitals reviewed.   Eyes:      Extraocular Movements: Extraocular movements intact.   Neurological:      Mental Status: He is alert.      Motor: Motor strength is normal.        Neurological Exam  Mental Status  Alert. Oriented only to person and situation. Orientation: Does not know year, month , date . Speech: hypophonia. Language is fluent with no aphasia. Attention and concentration are normal.    Cranial Nerves  CN III, IV, VI: Extraocular movements intact bilaterally.  CN VII: Full and symmetric facial movement.  CN VIII: Hearing is normal.  CN IX, X: Palate elevates symmetrically  CN XI: Shoulder shrug strength is normal.  CN XII: Tongue midline without atrophy or fasciculations.    Motor   Increased muscle tone. Strength is 5/5 throughout all four extremities.    Sensory  Light touch is normal in upper and lower extremities.     Coordination    See motor UPDRS.    Gait   Able to rise from chair without using arms.  Reduced stride and arm swing on left.   En bloc turn. No freezing. .     MDS UPDRS III                                       Time since last dose:    Speech  2   Facial Expression  2   Rigidity - Neck  3   Rigidity - Upper Extremity (R)  1   Rigidity - Upper Extremity (L)   2   Rigidity - Lower Extremity (R)  1   Rigidity - Lower Extremity (L)   1   Finger  Taps (R)   2   Finger Taps (L)   3   Hand Movement (R)  0   Hand Movement (L)   1   Pronation/Supination (R)  2   Pronation/Supination (L)   2   Toe Tapping (R) 2   Toe Tapping (L) 3   Leg Agility (R)  2   Leg Agility (L)   3   Arising from Chair   0   Gait   1   Freezing of Gait 0   Postural Stability      Posture 2   Global spontaneity of movement 2   Postural Tremor (Ri 0   Postural Tremor (L) 1   Kinetic Tremor (R)  1   Kinetic Tremor (L)  2   Rest tremor amplitude RUE 0   Rest tremor amplitude LUE 2   Rest tremor amplitude RLE 0   Reset tremor amplitude LLE 0   Lip/Jaw Tremor  0   Consistency of tremor 1   Motor Exam Total:             ROS:    Review of Systems   Constitutional:  Negative for appetite change, fatigue and fever.   HENT: Negative.  Negative for hearing loss, tinnitus, trouble swallowing and voice change.    Eyes: Negative.  Negative for photophobia, pain and visual disturbance.   Respiratory: Negative.  Negative for shortness of breath.    Cardiovascular: Negative.  Negative for palpitations.   Gastrointestinal: Negative.  Negative for nausea and vomiting.   Endocrine: Negative.  Negative for cold intolerance.   Genitourinary: Negative.  Negative for dysuria, frequency and urgency.   Musculoskeletal:  Negative for back pain, gait problem, myalgias, neck pain and neck stiffness.   Skin: Negative.  Negative for rash.   Allergic/Immunologic: Negative.    Neurological:  Positive for dizziness (Now and Then). Negative for tremors, seizures, syncope, facial asymmetry, speech difficulty, weakness, light-headedness, numbness and headaches.   Hematological: Negative.  Does not bruise/bleed easily.   Psychiatric/Behavioral:  Negative for confusion, hallucinations and sleep disturbance.         Memory Issues, Short term issues     All other systems reviewed and are negative.

## 2024-02-14 NOTE — TELEPHONE ENCOUNTER
Called patient back after speaking with Dr. Francisco. She is unsure if he needs continued care since he has not been seen in almost a year. I relayed to patient that he can either reschedule with her and continue to see [facility], or he can defer back to his PCP. Patient said he would prefer to just continue care with PCP, Dr. Munoz.

## 2024-02-14 NOTE — PATIENT INSTRUCTIONS
Medication adherence is important.   Can try a medication alarm dispensing system.   Continue carbidopa/levodopa 25/100 2 tabs 3 times daily (taken 6 hours apart from awakening)  If taking consistently and still has difficulty with mobility we will increase to 2.5 tabs 3 time daily      Will have MSW reach out with regards to information in assistance at home or daytime programs.

## 2024-02-14 NOTE — TELEPHONE ENCOUNTER
Spoke to patient and he requested a call from Dr. Francisco about whether he needs to be seen or not. Cancelling appointment for today, as requested.

## 2024-02-15 NOTE — ASSESSMENT & PLAN NOTE
Parkinsonian symptoms vary, likely related to difficulties with medication adherence. He lives alone but family looks in on him daily in the evening.   We discussed trying a medication alarm dispensing system to remind him of am and night doses.   Discussed the potential benefits of having an aide come in daily for a few hours or having him attend an adult day program.   Instructed to continue carbidopa/levodopa 25/100 2 tabs 3 times daily (taken 6 hours apart from awakening)  If taking consistently and still has difficulty with mobility we will increase to 2.5 tabs 3 time daily      Will have MSW reach out with regards to information in assistance at home or daytime programs.

## 2024-02-15 NOTE — ASSESSMENT & PLAN NOTE
Progressive cognitive decline. Lives independently with family support but having difficulty remembering to take medications. Reviewed medications used in dementia such as donepezil. Weighed potential side effects versus benefits and history of difficulty with medications and opted not to start at this time.

## 2024-02-19 ENCOUNTER — OFFICE VISIT (OUTPATIENT)
Dept: INTERNAL MEDICINE CLINIC | Facility: CLINIC | Age: 89
End: 2024-02-19
Payer: MEDICARE

## 2024-02-19 VITALS
SYSTOLIC BLOOD PRESSURE: 132 MMHG | OXYGEN SATURATION: 99 % | BODY MASS INDEX: 26.76 KG/M2 | DIASTOLIC BLOOD PRESSURE: 80 MMHG | WEIGHT: 208.4 LBS | HEART RATE: 54 BPM

## 2024-02-19 DIAGNOSIS — F41.9 ANXIETY: ICD-10-CM

## 2024-02-19 DIAGNOSIS — G20.A1 PARKINSON'S DISEASE, UNSPECIFIED WHETHER DYSKINESIA PRESENT, UNSPECIFIED WHETHER MANIFESTATIONS FLUCTUATE: Primary | ICD-10-CM

## 2024-02-19 DIAGNOSIS — I10 BENIGN ESSENTIAL HYPERTENSION: ICD-10-CM

## 2024-02-19 DIAGNOSIS — I48.19 PERSISTENT ATRIAL FIBRILLATION (HCC): ICD-10-CM

## 2024-02-19 DIAGNOSIS — I71.20 THORACIC AORTIC ANEURYSM WITHOUT RUPTURE, UNSPECIFIED PART (HCC): ICD-10-CM

## 2024-02-19 PROBLEM — R14.0 BLOATING: Status: RESOLVED | Noted: 2018-06-19 | Resolved: 2024-02-19

## 2024-02-19 PROBLEM — R42 LIGHTHEADEDNESS: Status: RESOLVED | Noted: 2021-05-24 | Resolved: 2024-02-19

## 2024-02-19 PROBLEM — J01.00 ACUTE NON-RECURRENT MAXILLARY SINUSITIS: Status: RESOLVED | Noted: 2018-05-21 | Resolved: 2024-02-19

## 2024-02-19 PROBLEM — U07.1 COVID: Status: RESOLVED | Noted: 2022-12-21 | Resolved: 2024-02-19

## 2024-02-19 PROBLEM — R09.81 NASAL CONGESTION: Status: RESOLVED | Noted: 2024-01-05 | Resolved: 2024-02-19

## 2024-02-19 PROBLEM — R06.09 DYSPNEA ON EXERTION: Status: RESOLVED | Noted: 2018-06-22 | Resolved: 2024-02-19

## 2024-02-19 PROBLEM — M25.512 LEFT SHOULDER PAIN: Status: RESOLVED | Noted: 2021-06-16 | Resolved: 2024-02-19

## 2024-02-19 PROBLEM — S41.112A SKIN TEAR OF LEFT UPPER ARM WITHOUT COMPLICATION: Status: RESOLVED | Noted: 2023-09-20 | Resolved: 2024-02-19

## 2024-02-19 PROBLEM — H00.012 HORDEOLUM EXTERNUM OF RIGHT LOWER EYELID: Status: RESOLVED | Noted: 2021-06-02 | Resolved: 2024-02-19

## 2024-02-19 PROBLEM — K40.90 INGUINAL HERNIA OF RIGHT SIDE WITHOUT OBSTRUCTION OR GANGRENE: Status: RESOLVED | Noted: 2023-03-07 | Resolved: 2024-02-19

## 2024-02-19 PROBLEM — J06.9 URI (UPPER RESPIRATORY INFECTION): Status: RESOLVED | Noted: 2019-01-02 | Resolved: 2024-02-19

## 2024-02-19 PROBLEM — R07.9 CHEST PAIN: Status: RESOLVED | Noted: 2019-04-09 | Resolved: 2024-02-19

## 2024-02-19 PROBLEM — R10.9 ABDOMINAL PAIN: Status: RESOLVED | Noted: 2022-10-11 | Resolved: 2024-02-19

## 2024-02-19 PROBLEM — K59.09 OTHER CONSTIPATION: Status: RESOLVED | Noted: 2023-01-14 | Resolved: 2024-02-19

## 2024-02-19 PROCEDURE — 99214 OFFICE O/P EST MOD 30 MIN: CPT | Performed by: INTERNAL MEDICINE

## 2024-02-19 NOTE — ASSESSMENT & PLAN NOTE
Recent note from neurology reviewed including continuing carbidopa levodopa 25/100 mg 2 tablets 3 times a day, taken 6 hours apart, and the possibility of increasing to 2-1/2 tablets 3 times a day if needed.  There were discussions with family at that time about strategies to help with accurate dosing with patient's memory difficulties

## 2024-02-19 NOTE — PROGRESS NOTES
Name: David Crowley      : 1935      MRN: [id number]  Encounter Provider: Ganga Munoz MD  Encounter Date: 2024   Encounter department: [facility]    Assessment & Plan     1. Parkinson's disease, unspecified whether dyskinesia present, unspecified whether manifestations fluctuate  Assessment & Plan:  Recent note from neurology reviewed including continuing carbidopa levodopa 25/100 mg 2 tablets 3 times a day, taken 6 hours apart, and the possibility of increasing to 2-1/2 tablets 3 times a day if needed.  There were discussions with family at that time about strategies to help with accurate dosing with patient's memory difficulties      2. Persistent atrial fibrillation (HCC)  Assessment & Plan:  Continue anticoagulation      3. Benign essential hypertension  Assessment & Plan:  Blood pressure is controlled, continue current meds      4. Thoracic aortic aneurysm without rupture, unspecified part (HCC)  Assessment & Plan:  Follow-up cardiology      5. Anxiety  Assessment & Plan:  Continue escitalopram along with family support             Subjective     Patient here for regular follow-up      Review of Systems   Constitutional:  Negative for chills, fatigue and fever.   HENT:  Negative for congestion, nosebleeds, postnasal drip, sore throat and trouble swallowing.    Eyes:  Negative for pain.   Respiratory:  Negative for cough, chest tightness, shortness of breath and wheezing.    Cardiovascular:  Negative for chest pain, palpitations and leg swelling.   Gastrointestinal:  Negative for abdominal pain, constipation, diarrhea, nausea and vomiting.   Endocrine: Negative for polydipsia and polyuria.   Genitourinary:  Negative for dysuria, flank pain and hematuria.   Musculoskeletal:  Negative for arthralgias.   Skin:  Negative for rash.   Neurological:  Negative for dizziness, tremors, light-headedness and headaches.   Hematological:  Does not bruise/bleed easily.    Psychiatric/Behavioral:  Positive for confusion. Negative for dysphoric mood. The patient is not nervous/anxious.        Past Medical History:   Diagnosis Date   • A-fib (HCC)    • Abdominal pain    • Acute non-recurrent maxillary sinusitis    • Afib (HCC)    • Atrial fibrillation (HCC) 06/13/2021   • Chest pain    • COVID    • Frailty syndrome in geriatric patient 01/08/2023   • Hordeolum externum of right lower eyelid    • Hypertension    • Hypertension 06/13/2021   • Inguinal hernia of right side without obstruction or gangrene    • Left AC separation    • Left shoulder pain    • Lightheadedness    • Other constipation 01/14/2023   • Parkinson's disease 11/01/2018   • SDH (subdural hematoma) (HCC) 02/07/2020     Past Surgical History:   Procedure Laterality Date   • CARDIOVERSION      ATRIAL   • MO RPR 1ST INGUN HRNA AGE 5 YRS/> REDUCIBLE Right 5/19/2023    Procedure: REPAIR HERNIA INGUINAL;  Surgeon: Maikel Gaston MD;  Location: [facility] MAIN OR;  Service: General   • ROTATOR CUFF REPAIR     • SHOULDER SURGERY       Family History   Problem Relation Age of Onset   • No Known Problems Mother    • Hypertension Father    • Coronary artery disease Father      Social History     Socioeconomic History   • Marital status:      Spouse name: None   • Number of children: None   • Years of education: None   • Highest education level: None   Occupational History   • None   Tobacco Use   • Smoking status: Never   • Smokeless tobacco: Never   • Tobacco comments:     Quit 35-40 years ago   Vaping Use   • Vaping status: Never Used   Substance and Sexual Activity   • Alcohol use: Not Currently   • Drug use: Never   • Sexual activity: Not Currently     Partners: Female     Birth control/protection: Post-menopausal   Other Topics Concern   • None   Social History Narrative    ** Merged History Encounter **          Social Determinants of Health     Financial Resource Strain: Low Risk  (10/3/2023)    Overall Financial  Resource Strain (CARDIA)    • Difficulty of Paying Living Expenses: Not hard at all   Food Insecurity: No Food Insecurity (12/22/2022)    Hunger Vital Sign    • Worried About Running Out of Food in the Last Year: Never true    • Ran Out of Food in the Last Year: Never true   Transportation Needs: No Transportation Needs (10/3/2023)    PRAPARE - Transportation    • Lack of Transportation (Medical): No    • Lack of Transportation (Non-Medical): No   Physical Activity: Not on file   Stress: Not on file   Social Connections: Not on file   Intimate Partner Violence: Not on file   Housing Stability: Low Risk  (12/22/2022)    Housing Stability Vital Sign    • Unable to Pay for Housing in the Last Year: No    • Number of Places Lived in the Last Year: 1    • Unstable Housing in the Last Year: No     Current Outpatient Medications on File Prior to Visit   Medication Sig   • acetaminophen (TYLENOL) 325 mg tablet Take 2 tablets (650 mg total) by mouth every 4 (four) hours as needed for mild pain   • carbidopa-levodopa (SINEMET)  mg per tablet Take 1.5 tabs TID (Patient taking differently: Take 2 tablets by mouth 3 (three) times a day Take 1.5 tabs TID)   • escitalopram (LEXAPRO) 10 mg tablet TAKE 1 TABLET BY MOUTH EVERY DAY   • ipratropium (ATROVENT) 0.06 % nasal spray 1 spray into each nostril 4 (four) times a day as needed for rhinitis   • metoprolol succinate (TOPROL-XL) 25 mg 24 hr tablet Take 1 tablet (25 mg total) by mouth daily   • telmisartan (MICARDIS) 20 MG tablet TAKE 1/2 TABLET BY MOUTH EVERY DAY   • Xarelto 15 MG tablet TAKE 1 TABLET BY MOUTH DAILY WITH BREAKFAST   • nystatin (MYCOSTATIN) powder Apply topically 2 (two) times a day (Patient not taking: Reported on 6/13/2023)   • nystatin (MYCOSTATIN) powder Apply topically 2 (two) times a day (Patient not taking: Reported on 1/5/2024)   • ofloxacin (OCUFLOX) 0.3 % ophthalmic solution  (Patient not taking: Reported on 1/5/2024)   • oxyCODONE-acetaminophen  (PERCOCET) 5-325 mg per tablet Take 1 tablet by mouth every 4 (four) hours as needed for moderate pain for up to 10 doses Max Daily Amount: 6 tablets (Patient not taking: Reported on 6/13/2023)   • prednisoLONE acetate (PRED FORTE) 1 % ophthalmic suspension  (Patient not taking: Reported on 1/5/2024)     Allergies   Allergen Reactions   • Penicillins Hives     Ancef ok   • Pollen Extract Other (See Comments)     .     Immunization History   Administered Date(s) Administered   • COVID-19 PFIZER VACCINE 0.3 ML IM 01/27/2021, 02/17/2021, 01/08/2022   • INFLUENZA 09/04/2016, 10/26/2017, 10/26/2017, 09/13/2018   • Influenza Split High Dose Preservative Free IM 1935, 10/06/2014, 09/09/2015, 10/17/2017   • Influenza, high dose seasonal 0.7 mL 11/17/2021, 09/28/2022, 10/03/2023   • Influenza, seasonal, injectable 10/11/2010, 10/11/2011   • Pneumococcal 01/01/1999   • Pneumococcal Conjugate 13-Valent 09/27/2015   • Pneumococcal Polysaccharide PPV23 01/15/2017   • Tdap 09/20/2023   • Zoster 01/01/2013   • Zoster Vaccine Recombinant 01/01/2013   • influenza, trivalent, adjuvanted 09/19/2019       Objective     /80 (BP Location: Left arm, Patient Position: Sitting, Cuff Size: Large)   Pulse (!) 54   Wt 94.5 kg (208 lb 6.4 oz)   SpO2 99%   BMI 26.76 kg/m²     Physical Exam  Constitutional:       General: He is not in acute distress.     Appearance: Normal appearance. He is well-developed.   HENT:      Head: Normocephalic and atraumatic.      Right Ear: External ear normal.      Left Ear: External ear normal.   Eyes:      General: No scleral icterus.     Conjunctiva/sclera: Conjunctivae normal.   Neck:      Thyroid: No thyromegaly.      Trachea: No tracheal deviation.   Cardiovascular:      Rate and Rhythm: Normal rate. Rhythm irregular.      Heart sounds: Murmur (soft systolic) heard.   Pulmonary:      Effort: Pulmonary effort is normal. No respiratory distress.      Breath sounds: Normal breath sounds. No  wheezing or rales.   Abdominal:      General: Bowel sounds are normal.      Palpations: Abdomen is soft.      Tenderness: There is no abdominal tenderness. There is no guarding or rebound.   Musculoskeletal:      Cervical back: Normal range of motion and neck supple.      Right lower leg: No edema.      Left lower leg: No edema.   Lymphadenopathy:      Cervical: No cervical adenopathy.   Skin:     Coloration: Skin is not jaundiced or pale.   Neurological:      Mental Status: He is alert.   Psychiatric:         Mood and Affect: Mood normal.         Behavior: Behavior normal.       Ganga Munoz MD

## 2024-02-19 NOTE — PATIENT INSTRUCTIONS
Problem List Items Addressed This Visit          Cardiovascular and Mediastinum    Persistent atrial fibrillation (HCC)     Continue anticoagulation         Benign essential hypertension     Blood pressure is controlled, continue current meds         Thoracic aortic aneurysm without rupture (HCC)     Follow-up cardiology            Nervous and Auditory    Parkinson's disease - Primary     Recent note from neurology reviewed including continuing carbidopa levodopa 25/100 mg 2 tablets 3 times a day, taken 6 hours apart, and the possibility of increasing to 2-1/2 tablets 3 times a day if needed.  There were discussions with family at that time about strategies to help with accurate dosing with patient's memory difficulties            Other    Anxiety     Continue escitalopram along with family support

## 2024-02-21 PROBLEM — Z00.00 MEDICARE ANNUAL WELLNESS VISIT, SUBSEQUENT: Status: RESOLVED | Noted: 2019-03-08 | Resolved: 2024-02-21

## 2024-03-03 DIAGNOSIS — G20.A1 PARKINSON'S DISEASE: ICD-10-CM

## 2024-03-03 DIAGNOSIS — I48.19 PERSISTENT ATRIAL FIBRILLATION (HCC): ICD-10-CM

## 2024-03-04 RX ORDER — METOPROLOL SUCCINATE 25 MG/1
25 TABLET, EXTENDED RELEASE ORAL DAILY
Qty: 90 TABLET | Refills: 1 | Status: SHIPPED | OUTPATIENT
Start: 2024-03-04

## 2024-03-04 RX ORDER — RIVAROXABAN 15 MG/1
15 TABLET, FILM COATED ORAL
Qty: 30 TABLET | Refills: 5 | Status: SHIPPED | OUTPATIENT
Start: 2024-03-04

## 2024-03-06 ENCOUNTER — TELEPHONE (OUTPATIENT)
Dept: BEHAVIORAL/MENTAL HEALTH CLINIC | Facility: CLINIC | Age: 89
End: 2024-03-06

## 2024-04-04 ENCOUNTER — OFFICE VISIT (OUTPATIENT)
Dept: CARDIOLOGY CLINIC | Facility: CLINIC | Age: 89
End: 2024-04-04
Payer: MEDICARE

## 2024-04-04 VITALS
BODY MASS INDEX: 27.86 KG/M2 | WEIGHT: 217 LBS | SYSTOLIC BLOOD PRESSURE: 136 MMHG | HEART RATE: 59 BPM | OXYGEN SATURATION: 98 % | DIASTOLIC BLOOD PRESSURE: 68 MMHG

## 2024-04-04 DIAGNOSIS — I10 BENIGN ESSENTIAL HYPERTENSION: ICD-10-CM

## 2024-04-04 DIAGNOSIS — I35.0 NONRHEUMATIC AORTIC VALVE STENOSIS: Primary | ICD-10-CM

## 2024-04-04 DIAGNOSIS — E78.00 PURE HYPERCHOLESTEROLEMIA: ICD-10-CM

## 2024-04-04 DIAGNOSIS — I71.20 THORACIC AORTIC ANEURYSM WITHOUT RUPTURE, UNSPECIFIED PART (HCC): ICD-10-CM

## 2024-04-04 DIAGNOSIS — I48.19 PERSISTENT ATRIAL FIBRILLATION (HCC): ICD-10-CM

## 2024-04-04 PROCEDURE — 99214 OFFICE O/P EST MOD 30 MIN: CPT | Performed by: INTERNAL MEDICINE

## 2024-04-04 NOTE — PROGRESS NOTES
Cardiology Follow-up     David Crowley  [id number]  1935  HEART & VASCULAR [facility] [place]  [address]  [facility] [id number]    1. Nonrheumatic aortic valve stenosis  Echo complete w/ contrast if indicated      2. Persistent atrial fibrillation (HCC)  Echo complete w/ contrast if indicated      3. Benign essential hypertension        4. Thoracic aortic aneurysm without rupture, unspecified part (HCC)        5. Pure hypercholesterolemia              Discussion/Summary:    1.  Permanent atrial fibrillation - His heart rates to run low at times, but averaging normal.  He has not had any lightheadedness after discontinuing Imdur and decreasing the Toprol-XL.  I am not going to make any changes today.  He is on Xarelto for stroke prevention.    2.  Mild mitral and aortic stenosis - He is due for an updated echocardiogram and we ordered 1 today.    3.  Mild dilation of the ascending aorta - This measured 4.3 cm.  He remains on metoprolol.  We ordered an updated echocardiogram.    4.  Hypertension - He is on Toprol-XL and telmisartan.  He was once on Imdur but we stopped this.  He should periodically check his blood pressure at home.  We will see him back in 1 year.      HPI:    Mr. Crowley comes in for follow-up given his cardiac history.  He has a history of permanent atrial fibrillation, mild aortic and mitral valve stenosis, and mild dilation of the thoracic aorta.  He also has hypertension.  He formally followed with Dr. Hollingsworth.  We met him during a hospitalization in June of 2021 in which she presented with mechanical fall without lightheadedness or syncope.  His heart rates were running a little low but they were stable.  This was not associated with his fall.  He does have a history of Parkinson's disease and some associated memory deficits and orthostatic hypotension.  He is treated for hypertension and was somewhat of an  atypical regimen.    After his hospitalization he wore a 48 hour Holter monitor that showed an average heart rate of 60 beats per minute.  He did wear a 7 day extended ambulatory Holter, but we do not have the results of that.  He did follow-up with Dr. Hollingsworth after his hospitalization.  He did have an updated echocardiogram that showed no change.  He does have mild dilation of the ascending aorta and mild aortic and mitral stenosis.  In follow-up his propranolol was changed over to metoprolol.  As of late his blood pressure has been under good control.    David has not had any fallen since that hospitalization.  He was having some lightheadedness and signs of orthostasis, but we stopped the Imdur which helped.  At our last visit we reduced Toprol-XL down to 25 mg daily.  He has not had any lightheadedness since.  At times he will feel somewhat fatigued and groggy, associated with some of the medications he is on.  He denies any near-syncope or syncope.  No chest pain or any symptoms of angina.  He does not feel his atrial fibrillation.  No palpitations.  He denies shortness of breath or any signs/symptoms of CHF.       Patient Active Problem List   Diagnosis    Persistent atrial fibrillation (HCC)    Pure hypercholesterolemia    Benign essential hypertension    Tremor of left hand    Anxiety    Esophageal reflux    Parkinson's disease    Acute left-sided low back pain with left-sided sciatica    Systolic murmur    Thoracic aortic aneurysm without rupture (HCC)    Skin lesion    Seasonal allergic rhinitis due to pollen    Aortic stenosis    Bradycardia    Fall    Abdominal wall hematoma    Acute blood loss anemia    Closed left clavicular fracture    Displaced fracture of shaft of left clavicle with routine healing    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)    Driving safety issue    Onychomycosis    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HCC)    Panic attacks    Frailty syndrome in  geriatric patient    Grief reaction    Preop exam for internal medicine     Past Medical History:   Diagnosis Date    A-fib (HCC)     Abdominal pain     Acute non-recurrent maxillary sinusitis     Afib (HCC)     Atrial fibrillation (HCC) 06/13/2021    Chest pain     COVID     Frailty syndrome in geriatric patient 01/08/2023    Hordeolum externum of right lower eyelid     Hypertension     Hypertension 06/13/2021    Inguinal hernia of right side without obstruction or gangrene     Left AC separation     Left shoulder pain     Lightheadedness     Other constipation 01/14/2023    Parkinson's disease 11/01/2018    SDH (subdural hematoma) ([facility]) 02/07/2020     Social History     Socioeconomic History    Marital status:      Spouse name: Not on file    Number of children: Not on file    Years of education: Not on file    Highest education level: Not on file   Occupational History    Not on file   Tobacco Use    Smoking status: Never    Smokeless tobacco: Never    Tobacco comments:     Quit 35-40 years ago   Vaping Use    Vaping status: Never Used   Substance and Sexual Activity    Alcohol use: Not Currently    Drug use: Never    Sexual activity: Not Currently     Partners: Female     Birth control/protection: Post-menopausal   Other Topics Concern    Not on file   Social History Narrative    ** Merged History Encounter **          Social Determinants of Health     Financial Resource Strain: Low Risk  (10/3/2023)    Overall Financial Resource Strain (CARDIA)     Difficulty of Paying Living Expenses: Not hard at all   Food Insecurity: No Food Insecurity (12/22/2022)    Hunger Vital Sign     Worried About Running Out of Food in the Last Year: Never true     Ran Out of Food in the Last Year: Never true   Transportation Needs: No Transportation Needs (10/3/2023)    PRAPARE - Transportation     Lack of Transportation (Medical): No     Lack of Transportation (Non-Medical): No   Physical Activity: Not on file   Stress: Not  on file   Social Connections: Not on file   Intimate Partner Violence: Not on file   Housing Stability: Low Risk  (12/22/2022)    Housing Stability Vital Sign     Unable to Pay for Housing in the Last Year: No     Number of Places Lived in the Last Year: 1     Unstable Housing in the Last Year: No      Family History   Problem Relation Age of Onset    No Known Problems Mother     Hypertension Father     Coronary artery disease Father      Past Surgical History:   Procedure Laterality Date    CARDIOVERSION      ATRIAL    HI RPR 1ST INGUN HRNA AGE 5 YRS/> REDUCIBLE Right 5/19/2023    Procedure: REPAIR HERNIA INGUINAL;  Surgeon: Maikel Gaston MD;  Location: [facility] MAIN OR;  Service: General    ROTATOR CUFF REPAIR      SHOULDER SURGERY         Current Outpatient Medications:     acetaminophen (TYLENOL) 325 mg tablet, Take 2 tablets (650 mg total) by mouth every 4 (four) hours as needed for mild pain, Disp: , Rfl: 0    carbidopa-levodopa (SINEMET)  mg per tablet, Take 2 tabs TID, Disp: 540 tablet, Rfl: 2    escitalopram (LEXAPRO) 10 mg tablet, TAKE 1 TABLET BY MOUTH EVERY DAY, Disp: 90 tablet, Rfl: 1    ipratropium (ATROVENT) 0.06 % nasal spray, 1 spray into each nostril 4 (four) times a day as needed for rhinitis, Disp: 45 mL, Rfl: 0    metoprolol succinate (TOPROL-XL) 25 mg 24 hr tablet, TAKE 1 TABLET (25 MG TOTAL) BY MOUTH DAILY., Disp: 90 tablet, Rfl: 1    telmisartan (MICARDIS) 20 MG tablet, TAKE 1/2 TABLET BY MOUTH EVERY DAY, Disp: 45 tablet, Rfl: 2    Xarelto 15 MG tablet, TAKE 1 TABLET BY MOUTH EVERY DAY WITH BREAKFAST, Disp: 30 tablet, Rfl: 5    nystatin (MYCOSTATIN) powder, Apply topically 2 (two) times a day (Patient not taking: Reported on 6/13/2023), Disp: 15 g, Rfl: 3    nystatin (MYCOSTATIN) powder, Apply topically 2 (two) times a day (Patient not taking: Reported on 1/5/2024), Disp: 15 g, Rfl: 3    ofloxacin (OCUFLOX) 0.3 % ophthalmic solution, , Disp: , Rfl:     oxyCODONE-acetaminophen  (PERCOCET) 5-325 mg per tablet, Take 1 tablet by mouth every 4 (four) hours as needed for moderate pain for up to 10 doses Max Daily Amount: 6 tablets (Patient not taking: Reported on 6/13/2023), Disp: 10 tablet, Rfl: 0    prednisoLONE acetate (PRED FORTE) 1 % ophthalmic suspension, , Disp: , Rfl:   Allergies   Allergen Reactions    Penicillins Hives     Ancef ok    Pollen Extract Other (See Comments)     .       Labs:  Lab Results   Component Value Date     05/05/2014    K 3.8 07/09/2023     07/09/2023    CO2 25 07/09/2023    BUN 15 07/09/2023    CREATININE 0.78 07/09/2023    GLUCOSE 120 06/13/2021    GLUCOSE 110 05/05/2014    CALCIUM 9.3 07/09/2023     Lab Results   Component Value Date    WBC 9.07 05/05/2023    WBC 6.46 05/05/2014    HGB 12.8 05/05/2023    HGB 13.6 05/05/2014    HCT 40.1 05/05/2023    HCT 41.1 05/05/2014    MCV 91 05/05/2023    MCV 87 05/05/2014     05/05/2023     05/05/2014     Lab Results   Component Value Date    CHOL 203 05/05/2014    TRIG 165 (H) 04/06/2019    TRIG 197 05/05/2014    HDL 40 04/06/2019    HDL 40 05/05/2014     Imaging:    ECHO (10/2021):  1.  Mild left ventricular hypertrophy with normal systolic function, EF   ~55-60%   2.  Moderate biatrial enlargement   3.  Mildly elevated PA pressure   4.  Calcified aortic valve with mild aortic stenosis   5.  Mild mitral sclerosis with mild annular calcification and mild mitral   regurgitation   Compared to prior study April 12, 2021, there have been no major changes.      Review of Systems:  Review of Systems   Constitutional: Negative.    HENT: Negative.     Eyes: Negative.    Respiratory: Negative.     Cardiovascular: Negative.    Gastrointestinal:  Positive for nausea.   Musculoskeletal: Negative.    Skin: Negative.    Allergic/Immunologic: Negative.    Neurological:  Positive for tremors and light-headedness.   Hematological: Negative.    Psychiatric/Behavioral: Negative.     All other systems reviewed and  are negative.    Vitals:    04/04/24 1621   BP: 136/68   BP Location: Right arm   Patient Position: Sitting   Cuff Size: Large   Pulse: 59   SpO2: 98%   Weight: 98.4 kg (217 lb)       Physical Exam:  Physical Exam  Vitals and nursing note reviewed.   Constitutional:       Appearance: He is well-developed.   HENT:      Head: Normocephalic and atraumatic.   Eyes:      General: No scleral icterus.        Right eye: No discharge.         Left eye: No discharge.      Pupils: Pupils are equal, round, and reactive to light.   Neck:      Thyroid: No thyromegaly.      Vascular: No JVD.   Cardiovascular:      Rate and Rhythm: Normal rate. Rhythm regularly irregular. No extrasystoles are present.     Pulses: Normal pulses. No decreased pulses.      Heart sounds: S1 normal and S2 normal. Murmur heard.      Systolic murmur is present with a grade of 2/6.      No friction rub. No gallop.   Pulmonary:      Effort: Pulmonary effort is normal. No respiratory distress.      Breath sounds: Normal breath sounds. No wheezing or rales.   Abdominal:      General: Bowel sounds are normal. There is no distension.      Palpations: Abdomen is soft.      Tenderness: There is no abdominal tenderness.   Musculoskeletal:         General: No tenderness or deformity. Normal range of motion.      Cervical back: Normal range of motion and neck supple.   Skin:     General: Skin is warm and dry.      Findings: No rash.   Neurological:      Mental Status: He is alert and oriented to person, place, and time.      Cranial Nerves: No cranial nerve deficit.   Psychiatric:         Thought Content: Thought content normal.         Judgment: Judgment normal.       Counseling / Coordination of Care  Total office time spent today 25 minutes.  Greater than 50% of total time was spent with the patient and / or family counseling and / or coordination of care.

## 2024-04-17 ENCOUNTER — TELEPHONE (OUTPATIENT)
Age: 89
End: 2024-04-17

## 2024-04-17 NOTE — TELEPHONE ENCOUNTER
Patient wants to know when he was diagnosed with Parkinson's disease. Made aware that his diagnosis was in 2018. No further questions.

## 2024-05-15 ENCOUNTER — HOSPITAL ENCOUNTER (OUTPATIENT)
Dept: NON INVASIVE DIAGNOSTICS | Facility: CLINIC | Age: 89
Discharge: HOME | End: 2024-05-15
Payer: MEDICARE

## 2024-05-15 VITALS
WEIGHT: 217 LBS | SYSTOLIC BLOOD PRESSURE: 136 MMHG | HEIGHT: 74 IN | DIASTOLIC BLOOD PRESSURE: 68 MMHG | BODY MASS INDEX: 27.85 KG/M2 | HEART RATE: 70 BPM

## 2024-05-15 DIAGNOSIS — I35.0 NONRHEUMATIC AORTIC VALVE STENOSIS: ICD-10-CM

## 2024-05-15 DIAGNOSIS — I48.19 PERSISTENT ATRIAL FIBRILLATION (HCC): ICD-10-CM

## 2024-05-15 LAB
AORTIC ROOT: 3.3 CM
AORTIC VALVE MEAN VELOCITY: 26.5 M/S
APICAL FOUR CHAMBER EJECTION FRACTION: 54 %
AV AREA BY CONTINUOUS VTI: 0.7 CM2
AV AREA PEAK VELOCITY: 0.8 CM2
AV LVOT MEAN GRADIENT: 2 MMHG
AV LVOT PEAK GRADIENT: 3 MMHG
AV MEAN GRADIENT: 30 MMHG
AV PEAK GRADIENT: 50 MMHG
AV VALVE AREA: 0.74 CM2
AV VELOCITY RATIO: 0.25
BSA FOR ECHO PROCEDURE: 2.25 M2
DOP CALC AO PEAK VEL: 3.53 M/S
DOP CALC AO VTI: 87.89 CM
DOP CALC LVOT AREA: 3.14 CM2
DOP CALC LVOT CARDIAC INDEX: 1.83 L/MIN/M2
DOP CALC LVOT CARDIAC OUTPUT: 4.12 L/MIN
DOP CALC LVOT DIAMETER: 2 CM
DOP CALC LVOT PEAK VEL VTI: 20.8 CM
DOP CALC LVOT PEAK VEL: 0.9 M/S
DOP CALC LVOT STROKE INDEX: 29.8 ML/M2
DOP CALC LVOT STROKE VOLUME: 65.31
FRACTIONAL SHORTENING: 28 (ref 28–44)
INTERVENTRICULAR SEPTUM IN DIASTOLE (PARASTERNAL SHORT AXIS VIEW): 1 CM
INTERVENTRICULAR SEPTUM: 1 CM (ref 0.6–1.1)
LAAS-AP4: 37.8 CM2
LEFT ATRIUM SIZE: 4.5 CM
LEFT INTERNAL DIMENSION IN SYSTOLE: 3.6 CM (ref 2.1–4)
LEFT VENTRICULAR INTERNAL DIMENSION IN DIASTOLE: 5 CM (ref 3.5–6)
LEFT VENTRICULAR POSTERIOR WALL IN END DIASTOLE: 1.1 CM
LEFT VENTRICULAR STROKE VOLUME: 68 ML
LVSV (TEICH): 68 ML
MV E'TISSUE VEL-LAT: 12 CM/S
MV E'TISSUE VEL-SEP: 13 CM/S
RA PRESSURE ESTIMATED: 5 MMHG
RIGHT ATRIAL 2D VOLUME: 132 ML
RIGHT ATRIUM AREA SYSTOLE A4C: 38.2 CM2
RIGHT VENTRICLE ID DIMENSION: 4.6 CM
RV PSP: 42 MMHG
SL CV LV EF: 55
SL CV PED ECHO LEFT VENTRICLE DIASTOLIC VOLUME (MOD BIPLANE) 2D: 120 ML
SL CV PED ECHO LEFT VENTRICLE SYSTOLIC VOLUME (MOD BIPLANE) 2D: 53 ML
TR MAX PG: 37 MMHG
TR PEAK VELOCITY: 3 M/S
TRICUSPID ANNULAR PLANE SYSTOLIC EXCURSION: 2.8 CM
TRICUSPID VALVE PEAK REGURGITATION VELOCITY: 3.03 M/S

## 2024-05-15 PROCEDURE — 93306 TTE W/DOPPLER COMPLETE: CPT | Performed by: INTERNAL MEDICINE

## 2024-05-15 PROCEDURE — 93306 TTE W/DOPPLER COMPLETE: CPT

## 2024-05-23 NOTE — PROGRESS NOTES
Daily Note     Today's date: 4/15/2022  Patient name: Jamie Hunt  : 1935  MRN: [id number]  Referring provider: Ute Murcia MD  Dx:   Encounter Diagnosis     ICD-10-CM    1  Parkinson's disease ([facility] [address] )  G20                   Subjective: Pt reports no new changes since last session      Objective: See treatment diary below      Assessment:       Plan: Continue per plan of care  Progress note during next visit  Precautions: Afib, unruptured thoracic aneurysm, dementia        Manuals                                                              Neuro Re-Ed             High knee marching //bars (long) 4# B/L AW with 15# bolster x //bars (long)  4# B/L AW with 15# bolster x 5 laps //bars (long)  4# B/L AW  With 15# bolster x 6 laps          Hurdles  //bars (long) 4# B/L AW 12" (6) x 4 laps Fwd  X 2 laps Lat Increased ESQUIVEL //bars (long) 4# B/L AW  12" (6)  X 4 laps Fwd    Lat: x 3 laps  HR 68  02 99%   //bars (long)  4# B/L AW  12" (6)  X 5 laps Fwd  HR 42  02 99%  SOB    Lat x 3 laps   HR 62  02 99%     //bars (long) 4# B/L AW 12" (5) x 4 laps Fwd    Lat x 4 laps          Backwards ambulation //bars (long) 4# B/L AW  Foam beams Fwd/Bkw walking x 4 laps //bars (long) 4# B/L AW Foam beams Fwd/Bkw walking x 3 laps  //bars (long) walking Fwd/Bkw on Double foam beam x 5 laps         Foam beams tandem/side stepping //bars (long) 4# B/L AW x 4 laps //bars (long) 4# B/L AW  sidestepping Foam beams x   //bars (long) 4# B/L AW sidestepping on foam beam with ball catch x 2 laps                      Standing rotation ball pass to therapist RMB x 15 each side            Standing reaching to tap post it 4# B/L AW x 10 each            Side steping             Step ups                          Ther Ex             STS  2x10 with OH reach  HR 60  02 99% Increased ESQUIVEL            Nustep   np          Step ups   //bars (long) 4# B/L AW  6" step standing on airex up/down x 10 ea   Fwd    Lat x 10 ea Subjective   Patient ID: Laury Levine is a 66 y.o. female who presents for Follow-up.    67 yo F with seropositive RA (RF 58, ) and ILD     Current IS   Remicade every 6 weeks since 2014  MTX 8 pills (Sunday)   Folic acid (daily)  Celebrex 200 mg twice daily     HISTORY  Pt was diagnosed with RA at the age of 27 and doing well on celebrex, methotrexate, and Remicade up until Oct 2013 when Remicade stopped for partial colon resection surgery after which pt began to have pain in hands and wrists, and was also found to have active synovitis, so Remicade was restarted in 2014.     TODAY:  Patient reports doing well since getting back on Remicade (was switched to Inflectra per her insurance). Says she always has morning stiffness requiring her to soak in the bath every morning but lasts ~15 mins. Denies any joint pain or swelling currently.     Next infusion scheduled for 6/26 this is longer than 6 weeks as she is traveling for a cruise but says she is comfortable with this as the effects typically last.     She was recently diagnosed with ILD (incidental finding on calcium score screening), she saw pulmonologist at [facility]; she has regular follow up scheduled. Reports from CT chest and Echo reviewed. She has no current respiratory symptoms. Is due for 1 year follow up with Pulm at [facility].     Objective   Physical Exam  Constitutional:       General: She is not in acute distress.     Appearance: She is not toxic-appearing.   HENT:      Head: Normocephalic and atraumatic.      Mouth/Throat:      Mouth: Mucous membranes are moist.      Pharynx: Oropharynx is clear.   Eyes:      Extraocular Movements: Extraocular movements intact.      Pupils: Pupils are equal, round, and reactive to light.   Cardiovascular:      Rate and Rhythm: Normal rate and regular rhythm.      Heart sounds: Normal heart sounds.   Pulmonary:      Effort: Pulmonary effort is normal.      Breath sounds: Normal breath sounds. No wheezing.    Abdominal:      General: Bowel sounds are normal. There is no distension.      Palpations: Abdomen is soft.      Tenderness: There is no abdominal tenderness.   Musculoskeletal:         General: No swelling, tenderness or deformity. Normal range of motion.      Cervical back: Normal range of motion and neck supple.   Skin:     General: Skin is warm and dry.      Findings: No erythema, lesion or rash.   Neurological:      General: No focal deficit present.      Mental Status: She is alert and oriented to person, place, and time.   Psychiatric:         Mood and Affect: Mood normal.         Thought Content: Thought content normal.       Assessment/Plan       67 yo F with long-standing history of seropositive RA (RF 58, ) and ILD. Overall controlled on Infliximab q6 weeks since 2014. Doing well after switching back to Remicade from Inflectra.      Plan:  - Lab prior to next Infliximab infusion  - Continue MTX 8 pills (Sunday)  - Continue Folic acid (daily)  - Continue Celebrex 200 mg 1-2 times daily with food  - has spine clinic appt for her LBP; given lidocaine patches to have for her cruise   -advised to schedule follow up with pulm at [facility]     Jacquie Son DO   PGY-IV Rheumatology      Patient seen and discussed with attending, Dr. Rodriguez       //bars (long) 4# B/L AW 6" step standing on foam up/down Fwd: x10 each    Lat:  X 10 each         Treadmill  1 8 mph 3% incline x 10 min NV No SOLO No AW 1  2mph 3% incline x 10 min No SOLO  No AW  1 5 mph 3% incline x 10 min                                                             Ther Activity                                       Gait Training             Walking with arm swings No AW 50ft x4 NV  2 laps around gym                      Modalities

## 2024-05-25 DIAGNOSIS — I10 ESSENTIAL HYPERTENSION: ICD-10-CM

## 2024-05-25 RX ORDER — TELMISARTAN 20 MG/1
10 TABLET ORAL DAILY
Qty: 45 TABLET | Refills: 1 | Status: SHIPPED | OUTPATIENT
Start: 2024-05-25

## 2024-06-05 ENCOUNTER — TELEPHONE (OUTPATIENT)
Dept: NEUROLOGY | Facility: CLINIC | Age: 89
End: 2024-06-05

## 2024-06-05 NOTE — TELEPHONE ENCOUNTER
Spoke to Hector to confirm pt's appt. Radha wanted to r/s pt's appt due to being in school. Pt is currently scheduled on 8/2/24 divya Lopez in [place].

## 2024-06-11 PROBLEM — G20.A1 DEMENTIA DUE TO PARKINSON'S DISEASE WITH BEHAVIORAL DISTURBANCE (HCC): Status: ACTIVE | Noted: 2022-01-27

## 2024-06-11 PROBLEM — F02.818 DEMENTIA DUE TO PARKINSON'S DISEASE WITH BEHAVIORAL DISTURBANCE (HCC): Status: ACTIVE | Noted: 2022-01-27

## 2024-06-11 PROBLEM — Z01.818 PREOP EXAM FOR INTERNAL MEDICINE: Status: RESOLVED | Noted: 2023-07-11 | Resolved: 2024-06-11

## 2024-06-11 PROBLEM — G20.A1 DEMENTIA DUE TO PARKINSON'S DISEASE WITH BEHAVIORAL DISTURBANCE (HCC): Status: ACTIVE | Noted: 2024-06-11

## 2024-06-11 PROBLEM — F02.818 DEMENTIA DUE TO PARKINSON'S DISEASE WITH BEHAVIORAL DISTURBANCE (HCC): Status: ACTIVE | Noted: 2024-06-11

## 2024-06-18 ENCOUNTER — RA CDI HCC (OUTPATIENT)
Dept: OTHER | Facility: HOSPITAL | Age: 89
End: 2024-06-18

## 2024-08-12 ENCOUNTER — TELEPHONE (OUTPATIENT)
Age: 89
End: 2024-08-12

## 2024-08-12 NOTE — TELEPHONE ENCOUNTER
PT' daughter called to inquiry on PT's last pneumonia shot. She was informed and acknowlegded 2017.

## 2024-08-15 ENCOUNTER — NURSING HOME VISIT (OUTPATIENT)
Dept: GERIATRICS | Facility: OTHER | Age: 89
End: 2024-08-15
Payer: MEDICARE

## 2024-08-15 VITALS
RESPIRATION RATE: 18 BRPM | SYSTOLIC BLOOD PRESSURE: 116 MMHG | TEMPERATURE: 98 F | DIASTOLIC BLOOD PRESSURE: 88 MMHG | HEART RATE: 77 BPM | OXYGEN SATURATION: 90 %

## 2024-08-15 DIAGNOSIS — F02.818 DEMENTIA DUE TO PARKINSON'S DISEASE WITH BEHAVIORAL DISTURBANCE (HCC): ICD-10-CM

## 2024-08-15 DIAGNOSIS — G30.1 LATE ONSET ALZHEIMER'S DEMENTIA WITH ANXIETY, UNSPECIFIED DEMENTIA SEVERITY (HCC): ICD-10-CM

## 2024-08-15 DIAGNOSIS — I35.0 NONRHEUMATIC AORTIC VALVE STENOSIS: ICD-10-CM

## 2024-08-15 DIAGNOSIS — I48.19 PERSISTENT ATRIAL FIBRILLATION (HCC): Primary | ICD-10-CM

## 2024-08-15 DIAGNOSIS — G20.A1 DEMENTIA DUE TO PARKINSON'S DISEASE WITH BEHAVIORAL DISTURBANCE (HCC): ICD-10-CM

## 2024-08-15 DIAGNOSIS — F02.84 LATE ONSET ALZHEIMER'S DEMENTIA WITH ANXIETY, UNSPECIFIED DEMENTIA SEVERITY (HCC): ICD-10-CM

## 2024-08-15 DIAGNOSIS — F43.21 GRIEF REACTION: ICD-10-CM

## 2024-08-15 DIAGNOSIS — L98.9 SKIN LESION: ICD-10-CM

## 2024-08-15 DIAGNOSIS — I10 BENIGN ESSENTIAL HYPERTENSION: ICD-10-CM

## 2024-08-15 PROCEDURE — 99344 HOME/RES VST NEW MOD MDM 60: CPT | Performed by: FAMILY MEDICINE

## 2024-08-15 NOTE — ASSESSMENT & PLAN NOTE
Patient expresses emotional distress at the passing of his wife. Patient reports he does nto fully remember being told his wife had passed away but he is grieving and emotionally processing her loss. Per nursing staff, patient wife passed 3 weeks ago along with daughter.     Plan:  Continue 1:1  Continue escitalopram 10 mg daily

## 2024-08-15 NOTE — ASSESSMENT & PLAN NOTE
Patient has history of dementia and parkinsons disease. Patient appears agitated and confused on examination today with emotional lability. Patient oriented only to self. [place] 18/30 in May 2023.     Plan:  Continue 1:1  Continue carbidopa-levodopa  mg  Continue Divalproex 250 mg TID   Continue Escitalopram 10 mg at bedtime  Continue quetiapine 100 mg   Continue quetiapine 25 mg daily  Frequent reorienting   Maintain sleep wake cycle  Encourage family visitation

## 2024-08-15 NOTE — ASSESSMENT & PLAN NOTE
Patient has systolic murmur consistent with aortic stenosis. Denies chest pain, shortness of breath, lightheadedness or dizziness on examination today. 5/15/24 LVEF 55% with severe aortic stenosis.    Plan:  Continue imdur for blood pressure control  Follow up with cardiology in April 2025

## 2024-08-15 NOTE — ASSESSMENT & PLAN NOTE
Blood Pressure: 116/88    Patient has history of hypertension previously on metoprolol which was discontinued during last admission. Patient discharged on imdur 30 mg daily .     Plan:  Follow up with cardiology in April 2025  Continue imdur 30 mg for now  Continue to monitor daily bp

## 2024-08-15 NOTE — ASSESSMENT & PLAN NOTE
Patient has persistent afib on xarelto 15 mg daily. Patient was previously on metoprolol but this was discontinued at his most recent hospitalization.     Plan:  Continue xarelto  Follow up with cardiology in April 2025  Continue daily vitals

## 2024-08-15 NOTE — ASSESSMENT & PLAN NOTE
Patient has history of dementia and parkinsons disease. Patient appears agitated and confused on examination today with emotional lability. Patient oriented only to self. [place] 18/30 in May 2023. Patient high risk for delirium given previous incident of delirium and current dementia.    Plan:  Continue 1:1  Continue carbidopa-levodopa  mg  Continue Divalproex 250 mg TID   Continue Escitalopram 10 mg at bedtime  Continue quetiapine 100 mg   Continue quetiapine 25 mg daily  Frequent reorienting   Maintain sleep wake cycle, minimize overnight interruptions  Monitor for fecal and urinary retention which could precipitate delirium  Encourage ambulation  Encourage hydration and nutrition  Encourage family visitation

## 2024-08-15 NOTE — PROGRESS NOTES
[facility]  [address]  [place] [address]   [place] [facility] [place]  [id number]  History and Physical    NAME: David Crowley  AGE: 89 y.o. SEX: male [id number]    DATE OF ENCOUNTER: 8/15/2024    Code status:  No CPR    Assessment and Plan     1. Persistent atrial fibrillation (HCC)  Assessment & Plan:  Patient has persistent afib on xarelto 15 mg daily. Patient was previously on metoprolol but this was discontinued at his most recent hospitalization.     Plan:  Continue xarelto  Follow up with cardiology in April 2025  Continue daily vitals  2. Benign essential hypertension  Assessment & Plan:  Blood Pressure: 116/88    Patient has history of hypertension previously on metoprolol which was discontinued during last admission. Patient discharged on imdur 30 mg daily .     Plan:  Follow up with cardiology in April 2025  Continue imdur 30 mg for now  Continue to monitor daily bp    3. Nonrheumatic aortic valve stenosis  Assessment & Plan:  Patient has systolic murmur consistent with aortic stenosis. Denies chest pain, shortness of breath, lightheadedness or dizziness on examination today. 5/15/24 LVEF 55% with severe aortic stenosis.    Plan:  Continue imdur for blood pressure control  Follow up with cardiology in April 2025  4. Late onset Alzheimer's dementia with anxiety, unspecified dementia severity (HCC)  Assessment & Plan:  Patient has history of dementia and parkinsons disease. Patient appears agitated and confused on examination today with emotional lability. Patient oriented only to self. [place] 18/30 in May 2023. Patient high risk for delirium given previous incident of delirium and current dementia.    Plan:  Continue 1:1  Continue carbidopa-levodopa  mg  Continue Divalproex 250 mg TID   Continue Escitalopram 10 mg at bedtime  Continue quetiapine 100 mg   Continue quetiapine 25 mg daily  Frequent reorienting   Maintain sleep wake cycle, minimize overnight  interruptions  Monitor for fecal and urinary retention which could precipitate delirium  Encourage ambulation  Encourage hydration and nutrition  Encourage family visitation  5. 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 Patient appears agitated and confused on examination today with emotional lability. Patient oriented only to self. [place] 18/30 in May 2023.     Plan:  Continue 1:1  Continue carbidopa-levodopa  mg  Continue Divalproex 250 mg TID   Continue Escitalopram 10 mg at bedtime  Continue quetiapine 100 mg   Continue quetiapine 25 mg daily  Frequent reorienting   Maintain sleep wake cycle  Encourage family visitation    6. Skin lesion  Assessment & Plan:  Patient has bilateral skin wounds on arms.     Plan:  Local wound care    7. Grief reaction  Assessment & Plan:  Patient expresses emotional distress at the passing of his wife. Patient reports he does nto fully remember being told his wife had passed away but he is grieving and emotionally processing her loss. Per nursing staff, patient wife passed 3 weeks ago along with daughter.     Plan:  Continue 1:1  Continue escitalopram 10 mg daily       All medications and routine orders were reviewed and updated as needed.    Plan discussed with: Patient    Chief Complaint     Seen for admission at Nursing Facility    History of Present Illness     Patient is an 89-year-old male with past medical history significant for hypertension, aortic stenosis, atrial fibrillation, dementia with anxiety, Parkinson's disease seen for admission to memory care unit. Patient was admitted 8/5-8/12 at [facility] for altered mental status. He was recently placed in a nursing home and became confused, packing all of his belongings and stating he was leaving. Patient was agitated and aggressive with staff requiring locked restraints and later weaned to soft wrist restraints with a  "1:1. Patient was evaluated by psychiatry as well. CBC, BMP and UA within normal limits. CT head showing no acute findings. Patient required IM zyprexa and adjustment of psychiatric medications. Through shared decision making patient was discharged to memory care unit.      Patient seen on unit this afternoon. Patient is emotional stating his wife must be dead because she would've come for him by now. He states he is tired of being \"locked up\" and feels his freedom has been taken from him during \"the best years of my life\".  Patient is not sure of current location, states he thinks he is in the hospital and it is October 1935.     HISTORY:  Past Medical History:   Diagnosis Date    A-fib ([facility])     Abdominal pain     Acute non-recurrent maxillary sinusitis     Afib ([facility])     Atrial fibrillation ([facility]) 06/13/2021    Chest pain     COVID     Frailty syndrome in geriatric patient 01/08/2023    Hordeolum externum of right lower eyelid     Hypertension     Hypertension 06/13/2021    Inguinal hernia of right side without obstruction or gangrene     Left AC separation     Left shoulder pain     Lightheadedness     Other constipation 01/14/2023    Parkinson's disease 11/01/2018    Preop exam for internal medicine 07/11/2023    SDH (subdural hematoma) ([facility]) 02/07/2020     Family History   Problem Relation Age of Onset    No Known Problems Mother     Hypertension Father     Coronary artery disease Father      Social History     Socioeconomic History    Marital status:      Spouse name: Not on file    Number of children: Not on file    Years of education: Not on file    Highest education level: Not on file   Occupational History    Not on file   Tobacco Use    Smoking status: Never    Smokeless tobacco: Never    Tobacco comments:     Quit 35-40 years ago   Vaping Use    Vaping status: Never Used   Substance and Sexual Activity    Alcohol use: Not Currently    Drug use: Never    Sexual activity: Not Currently     Partners: " Female     Birth control/protection: Post-menopausal   Other Topics Concern    Not on file   Social History Narrative    ** Merged History Encounter **          Social Determinants of Health     Financial Resource Strain: Low Risk  (8/6/2024)    Received from [facility]    Overall Financial Resource Strain (CARDIA)     Difficulty of Paying Living Expenses: Not very hard   Food Insecurity: No Food Insecurity (8/6/2024)    Received from [facility]    Hunger Vital Sign     Worried About Running Out of Food in the Last Year: Never true     Ran Out of Food in the Last Year: Never true   Transportation Needs: No Transportation Needs (8/6/2024)    Received from [facility]    PRAPARE - Transportation     Lack of Transportation (Medical): No     Lack of Transportation (Non-Medical): No   Physical Activity: Not on file   Stress: Not on file   Social Connections: Not on file   Intimate Partner Violence: Not At Risk (8/6/2024)    Received from [facility]    Humiliation, Afraid, Rape, and Kick questionnaire     Fear of Current or Ex-Partner: No     Emotionally Abused: No     Physically Abused: No     Sexually Abused: No   Housing Stability: Low Risk  (12/22/2022)    Housing Stability Vital Sign     Unable to Pay for Housing in the Last Year: No     Number of Places Lived in the Last Year: 1     Unstable Housing in the Last Year: No       Allergies:  Allergies   Allergen Reactions    Penicillins Hives     Ancef ok    Pollen Extract Other (See Comments)     .       Review of Systems     Review of Systems   Constitutional:  Negative for chills and fever.   HENT:  Negative for congestion and rhinorrhea.    Eyes:  Negative for pain and redness.   Respiratory:  Negative for cough and shortness of breath.    Cardiovascular:  Negative for chest pain and palpitations.   Gastrointestinal:  Negative for diarrhea and nausea.   Genitourinary:  Negative for dysuria and  hematuria.   Musculoskeletal:  Negative for arthralgias, back pain and myalgias.   Skin:  Negative for rash.   Neurological:  Negative for dizziness and headaches.       Medications and orders     All medications reviewed and updated in [facility] EMR.      Objective     Vitals:   Vitals:    08/15/24 1307   BP: 116/88   Pulse: 77   Resp: 18   Temp: 98 °F (36.7 °C)   SpO2: 90%       Physical Exam  Vitals reviewed.   HENT:      Head: Normocephalic and atraumatic.      Right Ear: External ear normal.      Left Ear: External ear normal.      Nose: Nose normal.      Mouth/Throat:      Mouth: Mucous membranes are moist.   Eyes:      Extraocular Movements: Extraocular movements intact.   Cardiovascular:      Rate and Rhythm: Normal rate. Rhythm irregular.      Heart sounds: Murmur heard.   Pulmonary:      Effort: Pulmonary effort is normal.      Breath sounds: Normal breath sounds. No wheezing, rhonchi or rales.   Abdominal:      General: Abdomen is flat. Bowel sounds are normal.      Palpations: Abdomen is soft.   Musculoskeletal:      Right lower leg: No edema.      Left lower leg: No edema.   Skin:     Comments: Bilateral arms bandaged   Neurological:      Mental Status: He is alert. He is disoriented.      Comments: Oriented only to self. States it is 1935 and patient is unsure of current location, thinks he is in a locked unit in a hospital.    Psychiatric:         Mood and Affect: Affect is labile, angry and tearful.         Speech: Speech normal.         Behavior: Behavior is agitated.      Comments: Emotionally labile, patient becomes angry stating his is contained and his liberties have been taken and then patient becomes very tearful talking about his  wife         Pertinent Laboratory/Diagnostic Studies:   The following labs/studies were reviewed please see chart or hospital paperwork for details.  Lab Results   Component Value Date    SODIUM 136 2024    K 3.9 2024      05/19/2024    CO2 24 05/19/2024    BUN 21 05/19/2024    CREATININE 0.85 05/19/2024    GLUC 104 (H) 05/19/2024    CALCIUM 9.6 05/19/2024     Lab Results   Component Value Date    WBC 9.07 05/05/2023    HGB 12.8 05/05/2023    HCT 40.1 05/05/2023    MCV 91 05/05/2023     05/05/2023           Swetha Lopez M.D.  [facility] PGY2  8/15/2024, 2:46 PM

## 2024-09-09 NOTE — ASSESSMENT & PLAN NOTE
- Continue current medication regimen with plan to resume home medication therapy on discharge  - Outpatient follow-up with PCP  Abnormal Bands: > 10%

## 2024-09-12 DIAGNOSIS — I48.19 PERSISTENT ATRIAL FIBRILLATION (HCC): ICD-10-CM

## 2024-09-12 RX ORDER — METOPROLOL SUCCINATE 25 MG/1
25 TABLET, EXTENDED RELEASE ORAL DAILY
Qty: 90 TABLET | Refills: 1 | Status: SHIPPED | OUTPATIENT
Start: 2024-09-12

## 2024-10-17 ENCOUNTER — NURSING HOME VISIT (OUTPATIENT)
Dept: GERIATRICS | Facility: OTHER | Age: 89
End: 2024-10-17
Payer: MEDICARE

## 2024-10-17 DIAGNOSIS — I48.91 ATRIAL FIBRILLATION, UNSPECIFIED TYPE (HCC): ICD-10-CM

## 2024-10-17 DIAGNOSIS — G20.A1 DEMENTIA DUE TO PARKINSON'S DISEASE WITH BEHAVIORAL DISTURBANCE (HCC): Primary | ICD-10-CM

## 2024-10-17 DIAGNOSIS — G20.A1 PARKINSON'S DISEASE, UNSPECIFIED WHETHER DYSKINESIA PRESENT, UNSPECIFIED WHETHER MANIFESTATIONS FLUCTUATE (HCC): ICD-10-CM

## 2024-10-17 DIAGNOSIS — F41.9 ANXIETY: ICD-10-CM

## 2024-10-17 DIAGNOSIS — F02.818 DEMENTIA DUE TO PARKINSON'S DISEASE WITH BEHAVIORAL DISTURBANCE (HCC): Primary | ICD-10-CM

## 2024-10-17 DIAGNOSIS — I10 BENIGN ESSENTIAL HYPERTENSION: ICD-10-CM

## 2024-10-17 PROCEDURE — 99349 HOME/RES VST EST MOD MDM 40: CPT | Performed by: FAMILY MEDICINE

## 2024-10-17 NOTE — PROGRESS NOTES
[facility]  [address] [address] [phone]  [facility]  POS [address]      NAME: David Crowley  AGE: 89 y.o. SEX: male [phone]    DATE OF ENCOUNTER: 10/17/2024    Assessment and Plan     1. 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 Benign essential hypertension  - controlled  - cont Isosorbide mononitrate 30 mg po qd    3. Parkinson's disease, unspecified whether dyskinesia present, unspecified whether manifestations fluctuate (HCC)  - stable  - cont Sinemet  mg 2 tabs po tid    4. Atrial fibrillation, unspecified type (HCC)  - rate controlled  - cont xarelto 15 mg po qd    5. Anxiety  - stable  - cont Escitalopram 10 mg po qd      - Counseling Documentation: patient was counseled regarding: prognosis    Chief Complaint     Routine Long term follow-up visit.    History of Present Illness     HPI  David CARRERA, a 88 y/o male is a long term resident at [facility], seen and examined, stable. He found sitting in his room. Staff says he is depressed, hardly comes out of his room. He is verbal, able to answer simple questions. He wants to go home.   He is eating less, not participating in activities. Staff denies falls or recent hospitalizations.  He walks well, no assistive devices used.    The following portions of the patient's history were reviewed and updated as appropriate: allergies, current medications, past family history, past medical history, past social history, past surgical history and problem list.    Review of Systems     Review of Systems   Unable to perform ROS: Dementia   As in HPI.    Active Problem List     Patient Active Problem List   Diagnosis    Persistent atrial fibrillation (HCC)    Pure hypercholesterolemia    Benign essential hypertension    Tremor of left  hand    Anxiety    Esophageal reflux    Parkinson's disease (HCC)    Acute left-sided low back pain with left-sided sciatica    Systolic murmur    Thoracic aortic aneurysm without rupture (HCC)    Skin lesion    Seasonal allergic rhinitis due to pollen    Aortic stenosis    Bradycardia    Fall    Abdominal wall hematoma    Acute blood loss anemia    Closed left clavicular fracture    Displaced fracture of shaft of left clavicle with routine healing    Driving safety issue    Onychomycosis    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HCC)    Panic attacks    Frailty syndrome in geriatric patient    Grief reaction    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   Constitutional:       General: He is not in acute distress.     Appearance: Normal appearance. He is well-developed. He is not diaphoretic.   HENT:      Head: Normocephalic and atraumatic.      Nose: Nose normal.      Mouth/Throat:      Mouth: Mucous membranes are moist.      Pharynx: Oropharynx is clear. No oropharyngeal exudate.   Eyes:      General: No scleral icterus.        Right eye: No discharge.         Left eye: No discharge.      Extraocular Movements: Extraocular movements intact.      Conjunctiva/sclera: Conjunctivae normal.   Cardiovascular:      Rate and Rhythm: Normal rate and regular rhythm.      Heart sounds: Murmur heard.   Pulmonary:      Effort: Pulmonary effort is normal. No respiratory distress.      Breath sounds: Normal breath sounds. No wheezing.   Chest:      Chest wall: No tenderness.   Abdominal:      General: Bowel sounds are normal. There is no distension.      Palpations: Abdomen is soft.      Tenderness: There is no abdominal tenderness. There is no guarding.   Musculoskeletal:         General: No tenderness or deformity. Normal range of motion.      Cervical back: Normal range of  motion.      Right lower leg: No edema.      Left lower leg: No edema.   Skin:     General: Skin is warm and dry.   Neurological:      Mental Status: He is alert.      Cranial Nerves: No cranial nerve deficit.      Motor: No abnormal muscle tone.      Coordination: Coordination normal.      Comments: Oriented to self  Verbal, clear speech  Able to answer questions well.   Psychiatric:         Behavior: Behavior normal.         Pertinent Laboratory/Diagnostic Studies:  Refer to facility chart.    Current Medications   Medications reviewed and updated in facility chart.
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
## 2024-10-22 RX ORDER — ACETAMINOPHEN 325 MG/1
650 TABLET ORAL EVERY 4 HOURS PRN
Refills: 0 | Status: CANCELLED | OUTPATIENT
Start: 2024-10-22

## 2024-10-27 RX ORDER — DIVALPROEX SODIUM 250 MG/1
250 TABLET, DELAYED RELEASE ORAL EVERY 12 HOURS SCHEDULED
COMMUNITY

## 2024-10-27 RX ORDER — QUETIAPINE FUMARATE 25 MG/1
25 TABLET, FILM COATED ORAL 2 TIMES DAILY
COMMUNITY

## 2024-10-27 RX ORDER — ISOSORBIDE MONONITRATE 30 MG/1
30 TABLET, EXTENDED RELEASE ORAL DAILY
COMMUNITY

## 2024-12-15 ENCOUNTER — NURSING HOME VISIT (OUTPATIENT)
Dept: GERIATRICS | Facility: OTHER | Age: 89
End: 2024-12-15
Payer: MEDICARE

## 2024-12-15 DIAGNOSIS — F41.9 ANXIETY: ICD-10-CM

## 2024-12-15 DIAGNOSIS — G20.A1 DEMENTIA DUE TO PARKINSON'S DISEASE WITH BEHAVIORAL DISTURBANCE (HCC): Primary | ICD-10-CM

## 2024-12-15 DIAGNOSIS — F02.818 DEMENTIA DUE TO PARKINSON'S DISEASE WITH BEHAVIORAL DISTURBANCE (HCC): Primary | ICD-10-CM

## 2024-12-15 DIAGNOSIS — I48.91 ATRIAL FIBRILLATION, UNSPECIFIED TYPE (HCC): ICD-10-CM

## 2024-12-15 DIAGNOSIS — G20.A1 PARKINSON'S DISEASE, UNSPECIFIED WHETHER DYSKINESIA PRESENT, UNSPECIFIED WHETHER MANIFESTATIONS FLUCTUATE (HCC): ICD-10-CM

## 2024-12-15 DIAGNOSIS — I10 BENIGN ESSENTIAL HYPERTENSION: ICD-10-CM

## 2024-12-15 PROCEDURE — 99349 HOME/RES VST EST MOD MDM 40: CPT | Performed by: FAMILY MEDICINE

## 2024-12-16 NOTE — PROGRESS NOTES
[facility]  [address] [address]  [phone]  [facility]  POS [address]      NAME: David Crowley  AGE: 89 y.o. SEX: male [phone]    DATE OF ENCOUNTER: 12/31/2024    Assessment and Plan     1. 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 Anxiety/depression  - stable  - cont escitalopram 20 mg po qd    3. Atrial fibrillation, unspecified type (HCC)  - rate controlled  - cont xarelto 15 mg po qd    4. Benign essential hypertension  - not controlled  - will recheck BP and adjust meds  - cont isosorbide mononitrate 30 mg po qd    5. Parkinson's disease, unspecified whether dyskinesia present, unspecified whether manifestations fluctuate (HCC)  - stable  - no rigidity or tremors noted  - no falls notified  - cont carbidopa-levodopa  mg 2 tabs po tid    CBC, BMP ordered.    - Counseling Documentation: patient was counseled regarding: risk factor reductions and prognosis    Chief Complaint     Routine Long term follow-up visit.    History of Present Illness     HPI  David Crowley, a 89 y/o male is a long term resident at [facility], seen and examined, stable. He found laying in bed. He is verbal, he is unable to give any h/o due to dementia.   Staff says he doesn't come out much, needs assistance with ADLs, can self feed with set up.     The following portions of the patient's history were reviewed and updated as appropriate: allergies, current medications, past family history, past medical history, past social history, past surgical history and problem list.    Review of Systems     Review of Systems   Unable to perform ROS: Dementia   As in HPI.    Active Problem List     Patient Active Problem List   Diagnosis   • Persistent atrial fibrillation (HCC)   • Pure hypercholesterolemia   • Benign essential hypertension   • Tremor of  left hand   • Anxiety   • Esophageal reflux   • Parkinson's disease (HCC)   • Acute left-sided low back pain with left-sided sciatica   • Systolic murmur   • Thoracic aortic aneurysm without rupture ([facility])   • Skin lesion   • Seasonal allergic rhinitis due to pollen   • Aortic stenosis   • Bradycardia   • Fall   • Abdominal wall hematoma   • Acute blood loss anemia   • Closed left clavicular fracture   • Displaced fracture of shaft of left clavicle with routine healing   • Driving safety issue   • Onychomycosis   • 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[facility])   • Panic attacks   • Frailty syndrome in geriatric patient   • Grief reaction   • Dementia due to Parkinson's disease with behavioral disturbance ([facility])       Objective     Vitals: T: 96.8, P: 68, R: 16, BP: 167/94,  98% on RA, Wt: 202.6 lbs    Physical Exam  Vitals and nursing note reviewed.   Constitutional:       General: He is not in acute distress.     Appearance: He is well-developed. He is obese. He is not diaphoretic.   HENT:      Head: Normocephalic and atraumatic.      Nose: Nose normal.      Mouth/Throat:      Mouth: Mucous membranes are moist.      Pharynx: Oropharynx is clear. No oropharyngeal exudate.   Eyes:      General: No scleral icterus.        Right eye: No discharge.         Left eye: No discharge.      Extraocular Movements: Extraocular movements intact.      Conjunctiva/sclera: Conjunctivae normal.   Cardiovascular:      Rate and Rhythm: Normal rate and regular rhythm.      Heart sounds: Normal heart sounds. No murmur heard.  Pulmonary:      Effort: Pulmonary effort is normal. No respiratory distress.      Breath sounds: Normal breath sounds. No wheezing.   Chest:      Chest wall: No tenderness.   Abdominal:      General: Bowel sounds are normal. There is no distension.      Palpations: Abdomen is soft.      Tenderness: There is no abdominal tenderness. There is no guarding.   Musculoskeletal:         General: No tenderness or deformity. Normal range of  motion.      Cervical back: Normal range of motion.      Right lower leg: No edema.      Left lower leg: No edema.   Skin:     General: Skin is warm and dry.   Neurological:      Mental Status: He is alert.      Cranial Nerves: No cranial nerve deficit.      Motor: No abnormal muscle tone.      Coordination: Coordination normal.      Comments: Oriented to self  Verbal, able to answer simple questions  Able to follow commands   Psychiatric:         Mood and Affect: Mood normal.         Behavior: Behavior normal.         Pertinent Laboratory/Diagnostic Studies:  Refer to facility chart.    Current Medications   Medications reviewed and updated in facility chart.

## 2025-02-07 ENCOUNTER — NURSING HOME VISIT (OUTPATIENT)
Dept: GERIATRICS | Facility: OTHER | Age: 89
End: 2025-02-07
Payer: MEDICARE

## 2025-02-07 DIAGNOSIS — F41.9 ANXIETY: ICD-10-CM

## 2025-02-07 DIAGNOSIS — F02.818 DEMENTIA DUE TO PARKINSON'S DISEASE WITH BEHAVIORAL DISTURBANCE (HCC): ICD-10-CM

## 2025-02-07 DIAGNOSIS — G20.A1 DEMENTIA DUE TO PARKINSON'S DISEASE WITH BEHAVIORAL DISTURBANCE (HCC): ICD-10-CM

## 2025-02-07 DIAGNOSIS — G20.A1 PARKINSON'S DISEASE, UNSPECIFIED WHETHER DYSKINESIA PRESENT, UNSPECIFIED WHETHER MANIFESTATIONS FLUCTUATE (HCC): Primary | ICD-10-CM

## 2025-02-07 DIAGNOSIS — I10 BENIGN ESSENTIAL HYPERTENSION: ICD-10-CM

## 2025-02-07 DIAGNOSIS — I48.19 PERSISTENT ATRIAL FIBRILLATION (HCC): ICD-10-CM

## 2025-02-07 PROCEDURE — 99349 HOME/RES VST EST MOD MDM 40: CPT | Performed by: NURSE PRACTITIONER

## 2025-02-08 VITALS
RESPIRATION RATE: 20 BRPM | BODY MASS INDEX: 24.78 KG/M2 | HEART RATE: 72 BPM | DIASTOLIC BLOOD PRESSURE: 57 MMHG | WEIGHT: 193 LBS | TEMPERATURE: 97.3 F | OXYGEN SATURATION: 95 % | SYSTOLIC BLOOD PRESSURE: 127 MMHG

## 2025-02-08 NOTE — ASSESSMENT & PLAN NOTE
HR regular and controlled   +murmur on exam   Continue on Xarelto 15 mg daily for AC  Not on Beta Blocker or antiarrythmic (previously on metoprolol but stopped during hospital stay in Aug. 2024)

## 2025-02-08 NOTE — ASSESSMENT & PLAN NOTE
Long standing hx of anxiety   Has been on Escitalopram 20 mg daily at residential  Will decrease Escitalopram to 10 mg daily for GDR   Monitor mood at [facility]

## 2025-02-08 NOTE — PROGRESS NOTES
[facility]  [address] [address]  [phone]  [place] [facility] [place]  [address]        NAME: David Crowley  AGE: 89 y.o. SEX: male CODE STATUS: No CPR    DATE OF ENCOUNTER: 2/7/2025    Assessment and Plan     1. Parkinson's disease, unspecified whether dyskinesia present, unspecified whether manifestations fluctuate (HCC)  Assessment & Plan:  Stable   Continue Carbidopa-Levodopa 25mg-100mg- give 2 tabs PO TID  2. 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)  Assessment & Plan:  Appears stable   Staff states he is pleasant, likes to mostly be in bed, gets out with encouragement  Does have history of agitation and takes Depakote and Seroquel for this ---> appears to be well controlled at this time  Continues on Carbidopa-Levodopa for Parkinson's disease   Continue Depakote 250 mg BID and Seroquel 25 mg in AM and 50 mg every PM  Provide redirection, reorientation, distraction techniques  Fall Precautions  Assist with ADLs/IADLs  Avoid deliriogenic medications such as tramadol, benzodiazepines, anticholinergics, benadryl  Encourage Hydration/ Nutrition  Implement sleep hygiene, limit night time interuptions   Encourage participation in group activities when appropriate   3. Anxiety  Assessment & Plan:  Long standing hx of anxiety   Has been on Escitalopram 20 mg daily at [facility]  Will decrease Escitalopram to 10 mg daily for GDR   Monitor mood at [facility]  4. Persistent atrial fibrillation (HCC)  Assessment & Plan:  HR regular and controlled   +murmur on exam   Continue on Xarelto 15 mg daily for AC  Not on Beta Blocker or antiarrythmic (previously on metoprolol but stopped during hospital stay in Aug. 2024)  5. Benign essential hypertension  Assessment & Plan:  BP acceptable ---> 127/57  Continue Imdur 30 mg daily   Monitor Vitals monthly at [facility]       All medications and routine orders were reviewed and updated as needed.    Chief Complaint     LTC follow up visit  "    History of Present Illness     David Crowley is a 89 y.o. male , she/he is a LTC resident of [facility]. Past Medical Hx including but not limited to Parkinson's disease, Dementia due to Parkinson's, HTN, Afib, Anxiety,  seen in collaboration with nursing for medical mgmt and LTC follow up.     Seen and examined at bedside today. Patient is a poor historian due to Alzheimer's/Dementia. History is obtained from review of records and staff. Patient is resting in bed, nursing staff asked if he wants to watch the Super bowl this weekend and resident replied, \"I'll be there\" with a smile on his face. 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 Staff states his mood is stable, no behaviors. He does tend to like to be in bed and stay in his room. Eating well per staff. Patient is pleasant and cooperative. He denies pain. He is verbal, pleasant. He ambulates with assistance, feeds himself with set up.               The patient's allergies, past medical, surgical, social and family history were reviewed and unchanged.    Review of Systems     Review of Systems   Unable to perform ROS: Dementia         Objective     Vitals:   Vitals:    02/08/25 1120   BP: 127/57   Pulse: 72   Resp: 20   Temp: (!) 97.3 °F (36.3 °C)   SpO2: 95%         Physical Exam  Vitals and nursing note reviewed.   Constitutional:       General: He is not in acute distress.     Appearance: Normal appearance.   HENT:      Head: Normocephalic and atraumatic.      Nose: No congestion or rhinorrhea.      Mouth/Throat:      Mouth: Mucous membranes are moist.   Eyes:      General: No scleral icterus.     Extraocular Movements: Extraocular movements intact.      Conjunctiva/sclera: Conjunctivae normal.      Pupils: Pupils are equal, round, and reactive to light.   Cardiovascular:      Rate and Rhythm: Normal rate and regular rhythm.      Pulses: Normal pulses.      Heart sounds: Normal heart sounds. No murmur heard.  Pulmonary: "      Effort: Pulmonary effort is normal.      Breath sounds: Normal breath sounds. No wheezing, rhonchi or rales.   Abdominal:      General: Bowel sounds are normal. There is no distension.      Palpations: Abdomen is soft.      Tenderness: There is no abdominal tenderness.   Musculoskeletal:         General: No swelling or signs of injury.   Lymphadenopathy:      Cervical: No cervical adenopathy.   Skin:     General: Skin is warm and dry.      Findings: No erythema.   Neurological:      Mental Status: He is alert. He is disoriented.      GCS: GCS eye subscore is 4. GCS verbal subscore is 4. GCS motor subscore is 6.      Sensory: No sensory deficit.      Motor: Weakness present.      Gait: Gait abnormal.   Psychiatric:         Attention and Perception: Attention normal.         Mood and Affect: Mood normal.         Speech: Speech normal.         Behavior: Behavior normal. Behavior is cooperative.         Thought Content: Thought content normal.         Cognition and Memory: Cognition is impaired. Memory is impaired.         Pertinent Laboratory/Diagnostic Studies:  Reviewed in facility chart-stable     Current Medications   Medications reviewed and updated see facility MAR for details.      Current Outpatient Medications:     acetaminophen (TYLENOL) 325 mg tablet, Take 2 tablets (650 mg total) by mouth every 4 (four) hours as needed for mild pain, Disp: , Rfl: 0    carbidopa-levodopa (SINEMET)  mg per tablet, Take 2 tabs TID, Disp: 540 tablet, Rfl: 2    divalproex sodium (DEPAKOTE) 250 mg DR tablet, Take 250 mg by mouth every 12 (twelve) hours, Disp: , Rfl:     escitalopram (LEXAPRO) 10 mg tablet, TAKE 1 TABLET BY MOUTH EVERY DAY, Disp: 90 tablet, Rfl: 1    isosorbide mononitrate (IMDUR) 30 mg 24 hr tablet, Take 30 mg by mouth daily, Disp: , Rfl:     QUEtiapine (SEROquel) 25 mg tablet, Take 25 mg by mouth 2 (two) times a day, Disp: , Rfl:     Xarelto 15 MG tablet, TAKE 1 TABLET BY MOUTH EVERY DAY WITH  "BREAKFAST, Disp: 30 tablet, Rfl: 5     Please note:  Voice-recognition software may have been used in the preparation of this document.  Occasional wrong word or \"sound-alike\" substitutions may have occurred due to the inherent limitations of voice recognition software.  Interpretation should be guided by context.         RAFFI Haque      "

## 2025-02-08 NOTE — ASSESSMENT & PLAN NOTE
Appears stable   Staff states he is pleasant, likes to mostly be in bed, gets out with encouragement  Does have history of agitation and takes Depakote and Seroquel for this ---> appears to be well controlled at this time  Continues on Carbidopa-Levodopa for Parkinson's disease   Continue Depakote 250 mg BID and Seroquel 25 mg in AM and 50 mg every PM  Provide redirection, reorientation, distraction techniques  Fall Precautions  Assist with ADLs/IADLs  Avoid deliriogenic medications such as tramadol, benzodiazepines, anticholinergics, benadryl  Encourage Hydration/ Nutrition  Implement sleep hygiene, limit night time interuptions   Encourage participation in group activities when appropriate

## 2025-04-03 ENCOUNTER — NURSING HOME VISIT (OUTPATIENT)
Dept: GERIATRICS | Facility: OTHER | Age: 89
End: 2025-04-03
Payer: MEDICARE

## 2025-04-03 DIAGNOSIS — F02.818 DEMENTIA DUE TO PARKINSON'S DISEASE WITH BEHAVIORAL DISTURBANCE (HCC): Primary | ICD-10-CM

## 2025-04-03 DIAGNOSIS — I10 BENIGN ESSENTIAL HYPERTENSION: ICD-10-CM

## 2025-04-03 DIAGNOSIS — F41.9 ANXIETY: ICD-10-CM

## 2025-04-03 DIAGNOSIS — I48.91 ATRIAL FIBRILLATION, UNSPECIFIED TYPE (HCC): ICD-10-CM

## 2025-04-03 DIAGNOSIS — G20.B2 PARKINSON'S DISEASE WITH DYSKINESIA AND FLUCTUATING MANIFESTATIONS (HCC): ICD-10-CM

## 2025-04-03 DIAGNOSIS — G20.A1 DEMENTIA DUE TO PARKINSON'S DISEASE WITH BEHAVIORAL DISTURBANCE (HCC): Primary | ICD-10-CM

## 2025-04-03 PROCEDURE — 99349 HOME/RES VST EST MOD MDM 40: CPT | Performed by: FAMILY MEDICINE

## 2025-04-04 NOTE — PROGRESS NOTES
[facility]  [address] [address]  [phone]  [facility]  [address]      NAME: David Crowley  AGE: 89 y.o. SEX: male [id number]    DATE OF ENCOUNTER: 4/7/2025    Assessment and Plan     1. 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 hydration/nutrition  - Encouraged to participate in activities  - Continue divalproex 250 mg p.o. twice daily for behaviors  - Continue quetiapine 25 mg and 50 mg p.o. daily    2. Benign essential hypertension  -Controlled  - Continue isosorbide mononitrate 30 mg p.o. daily    3. Atrial fibrillation, unspecified type (HCC)  -Stable  - Rate controlled  - Continue Xarelto 15 mg p.o. daily    4. Parkinson's disease with dyskinesia and fluctuating manifestations (HCC)  -Stable  - Coarse tremors noted, but able to function  - Continue carbidopa levodopa  mg 2 tablets p.o. 3 times daily    5. Anxiety  -Stable with intermittent episodes  - Continue escitalopram 20 mg p.o. daily      - Counseling Documentation: patient was counseled regarding: risk factor reductions and prognosis    Chief Complaint     Routine Long term follow-up visit.    History of Present Illness     HPI  David Crowley, a 89 y/o male is a long-term resident at [facility], seen and examined, stable.  He found laying in his bed, comfortable.  He is able to involve in the visit, answers simple questions. He is asking when he will be out from here. Says he is feeling well, he had breakfast.  He has no complaints at this time.  He walks independently.  Staff have no concerns at this time.  He mostly stays in his room, need assistance with ADLs, can self feed with set up, denies any recent falls or hospitalizations.    The following portions of the patient's history were reviewed and updated as appropriate: allergies, current medications, past family history,  past medical history, past social history, past surgical history and problem list.    Review of Systems     Review of Systems   Unable to perform ROS: Dementia   As in HPI.    Active Problem List     Patient Active Problem List   Diagnosis   • Persistent atrial fibrillation (HCC)   • Pure hypercholesterolemia   • Benign essential hypertension   • Tremor of left hand   • Anxiety   • Esophageal reflux   • Parkinson's disease (HCC)   • Acute left-sided low back pain with left-sided sciatica   • Systolic murmur   • Thoracic aortic aneurysm without rupture (HCC)   • Skin lesion   • Seasonal allergic rhinitis due to pollen   • Aortic stenosis   • Bradycardia   • Fall   • Abdominal wall hematoma   • Acute blood loss anemia   • Closed left clavicular fracture   • Displaced fracture of shaft of left clavicle with routine healing   • Driving safety issue   • Onychomycosis   • 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HCC)   • Panic attacks   • Frailty syndrome in geriatric patient   • Grief reaction   • 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)       Objective     Vitals: T: 97.4, P: 73, R: 16, BP: 143/64, 99% on RA    Physical Exam  Vitals and nursing note reviewed.   Constitutional:       General: He is not in acute distress.     Appearance: Normal appearance. He is well-developed. He is not diaphoretic.   HENT:      Head: Normocephalic and atraumatic.      Nose: Nose normal.      Mouth/Throat:      Mouth: Mucous membranes are moist.      Pharynx: Oropharynx is clear. No oropharyngeal exudate.   Eyes:      General: No scleral icterus.        Right eye: No discharge.         Left eye: No discharge.      Extraocular Movements: Extraocular movements intact.      Conjunctiva/sclera: Conjunctivae normal.   Cardiovascular:      Rate and Rhythm: Normal rate and regular rhythm.      Heart sounds: Murmur heard.   Pulmonary:      Effort: Pulmonary effort is normal. No respiratory distress.      Breath sounds: Normal breath  sounds. No wheezing.   Abdominal:      General: Bowel sounds are normal. There is no distension.      Palpations: Abdomen is soft.      Tenderness: There is no abdominal tenderness. There is no guarding.   Musculoskeletal:         General: No tenderness or deformity. Normal range of motion.      Cervical back: Normal range of motion.      Right lower leg: No edema.      Left lower leg: No edema.   Skin:     General: Skin is warm and dry.   Neurological:      Mental Status: He is alert.      Cranial Nerves: No cranial nerve deficit.      Motor: No abnormal muscle tone.      Coordination: Coordination normal.      Comments: Oriented to self  Verbal, clear speech  Able to follow simple commands  Able to hold conversation  Coarse tremors noted in left hand   Psychiatric:         Mood and Affect: Mood normal.         Behavior: Behavior normal.      Comments: Intermittent confusion         Pertinent Laboratory/Diagnostic Studies:  Refer to facility chart.    Current Medications   Medications reviewed and updated in facility chart.

## 2025-04-25 ENCOUNTER — DOCUMENTATION (OUTPATIENT)
Dept: ADMINISTRATIVE | Facility: OTHER | Age: 89
End: 2025-04-25

## 2025-04-25 NOTE — PROGRESS NOTES
04/25/25 10:23 AM    Annual Wellness Visit outreach is not required, patient is living in a nursing home/ long term care facility/ other facility.     Thank you.  Paola Sosa MA  [facility]

## 2025-05-29 ENCOUNTER — NURSING HOME VISIT (OUTPATIENT)
Dept: GERIATRICS | Facility: OTHER | Age: 89
End: 2025-05-29
Payer: MEDICARE

## 2025-05-29 DIAGNOSIS — I35.0 NONRHEUMATIC AORTIC VALVE STENOSIS: ICD-10-CM

## 2025-05-29 DIAGNOSIS — G20.A1 PARKINSON'S DISEASE, UNSPECIFIED WHETHER DYSKINESIA PRESENT, UNSPECIFIED WHETHER MANIFESTATIONS FLUCTUATE (HCC): ICD-10-CM

## 2025-05-29 DIAGNOSIS — G20.A1 DEMENTIA DUE TO PARKINSON'S DISEASE WITH BEHAVIORAL DISTURBANCE (HCC): ICD-10-CM

## 2025-05-29 DIAGNOSIS — I48.19 PERSISTENT ATRIAL FIBRILLATION (HCC): Primary | ICD-10-CM

## 2025-05-29 DIAGNOSIS — F02.818 DEMENTIA DUE TO PARKINSON'S DISEASE WITH BEHAVIORAL DISTURBANCE (HCC): ICD-10-CM

## 2025-05-29 PROCEDURE — 99349 HOME/RES VST EST MOD MDM 40: CPT | Performed by: NURSE PRACTITIONER

## 2025-05-29 NOTE — PROGRESS NOTES
[facility]  [address] [address] [phone]  [facility]  [address]        NAME: David Crowley  AGE: 89 y.o. SEX: male CODE STATUS: No CPR    DATE OF ENCOUNTER: 05/29/25    Assessment and Plan     1. Persistent atrial fibrillation (HCC)  Assessment & Plan:  HR regular and controlled   +murmur on exam   Continue on Xarelto 15 mg daily for AC  Not on Beta Blocker or antiarrythmic (previously on metoprolol but stopped during hospital stay in Aug. 2024)  2. Nonrheumatic aortic valve stenosis  Assessment & Plan:  + murmur  Poor historian but denies CP/SOB on exam, appears comfortable , not in distress  3. Parkinson's disease, unspecified whether dyskinesia present, unspecified whether manifestations fluctuate (HCC)  Assessment & Plan:  Stable   Continue Carbidopa-Levodopa 25mg-100mg- give 2 tabs PO TID  4. 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)  Assessment & Plan:  Appears stable   Staff states he is pleasant, likes to mostly be in bed, gets out with encouragement  Does have history of agitation and takes Depakote and Seroquel for this ---> appears to be well controlled at this time  Continues on Carbidopa-Levodopa for Parkinson's disease   Continue Depakote 250 mg BID and Seroquel 25 mg in AM and 50 mg every PM  Provide redirection, reorientation, distraction techniques  Fall Precautions  Assist with ADLs/IADLs  Avoid deliriogenic medications such as tramadol, benzodiazepines, anticholinergics, benadryl  Encourage Hydration/ Nutrition  Implement sleep hygiene, limit night time interuptions   Encourage participation in group activities when appropriate        All medications and routine orders were reviewed and updated as needed.    Chief Complaint     LTC follow up visit     History of Present Illness     David Crowley is a 89 y.o. male , she/he is a LTC resident of [facility]. Past Medical Hx including but not limited to  "Parkinson's disease, Dementia due to Parkinson's, HTN, Afib, Anxiety,  seen in collaboration with nursing for medical mgmt and LTC follow up.     Seen and examined at bedside today. Patient is a poor historian due to Alzheimer's/Dementia. History is obtained from review of records and staff. Patient is resting in bed, nursing staff asked if he wants to watch the Super bowl this weekend and resident replied, \"I'll be there\" with a smile on his face. 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 Staff states his mood is stable, no behaviors. He does tend to like to be in bed and stay in his room. Eating well per staff. Patient is pleasant and cooperative. He denies pain. He is verbal, pleasant. He ambulates with assistance, feeds himself with set up.             The patient's allergies, past medical, surgical, social and family history were reviewed and unchanged.    Review of Systems     Review of Systems   Unable to perform ROS: Dementia         Objective     Vitals:   There were no vitals filed for this visit.        Physical Exam  Vitals and nursing note reviewed.   Constitutional:       General: He is not in acute distress.     Appearance: Normal appearance.   HENT:      Head: Normocephalic and atraumatic.      Nose: No congestion or rhinorrhea.      Mouth/Throat:      Mouth: Mucous membranes are moist.     Eyes:      General: No scleral icterus.     Extraocular Movements: Extraocular movements intact.      Conjunctiva/sclera: Conjunctivae normal.      Pupils: Pupils are equal, round, and reactive to light.       Cardiovascular:      Rate and Rhythm: Normal rate and regular rhythm.      Pulses: Normal pulses.      Heart sounds: Normal heart sounds. No murmur heard.  Pulmonary:      Effort: Pulmonary effort is normal.      Breath sounds: Normal breath sounds. No wheezing, rhonchi or rales.   Abdominal:      General: Bowel sounds are normal. There is no distension.      Palpations: Abdomen is soft.      " "Tenderness: There is no abdominal tenderness.     Musculoskeletal:         General: No swelling or signs of injury.   Lymphadenopathy:      Cervical: No cervical adenopathy.     Skin:     General: Skin is warm and dry.      Findings: No erythema.     Neurological:      Mental Status: He is alert. He is disoriented.      GCS: GCS eye subscore is 4. GCS verbal subscore is 4. GCS motor subscore is 6.      Sensory: No sensory deficit.      Motor: Weakness present.      Gait: Gait abnormal.     Psychiatric:         Attention and Perception: Attention normal.         Mood and Affect: Mood normal.         Speech: Speech normal.         Behavior: Behavior normal. Behavior is cooperative.         Thought Content: Thought content normal.         Cognition and Memory: Cognition is impaired. Memory is impaired.       Pertinent Laboratory/Diagnostic Studies:  Reviewed in facility chart-stable     Current Medications   Medications reviewed and updated see facility MAR for details.      Current Outpatient Medications:   •  acetaminophen (TYLENOL) 325 mg tablet, Take 2 tablets (650 mg total) by mouth every 4 (four) hours as needed for mild pain, Disp: , Rfl: 0  •  carbidopa-levodopa (SINEMET)  mg per tablet, Take 2 tabs TID, Disp: 540 tablet, Rfl: 2  •  divalproex sodium (DEPAKOTE) 250 mg DR tablet, Take 250 mg by mouth every 12 (twelve) hours, Disp: , Rfl:   •  escitalopram (LEXAPRO) 10 mg tablet, TAKE 1 TABLET BY MOUTH EVERY DAY, Disp: 90 tablet, Rfl: 1  •  isosorbide mononitrate (IMDUR) 30 mg 24 hr tablet, Take 30 mg by mouth daily, Disp: , Rfl:   •  QUEtiapine (SEROquel) 25 mg tablet, Take 25 mg by mouth 2 (two) times a day, Disp: , Rfl:   •  Xarelto 15 MG tablet, TAKE 1 TABLET BY MOUTH EVERY DAY WITH BREAKFAST, Disp: 30 tablet, Rfl: 5     Please note:  Voice-recognition software may have been used in the preparation of this document.  Occasional wrong word or \"sound-alike\" substitutions may have occurred due to the " inherent limitations of voice recognition software.  Interpretation should be guided by context.         RAFFI Haque

## 2025-07-25 ENCOUNTER — NURSING HOME VISIT (OUTPATIENT)
Dept: GERIATRICS | Facility: OTHER | Age: 89
End: 2025-07-25
Payer: MEDICARE

## 2025-07-25 DIAGNOSIS — I48.91 ATRIAL FIBRILLATION, UNSPECIFIED TYPE (HCC): ICD-10-CM

## 2025-07-25 DIAGNOSIS — G20.A1 DEMENTIA DUE TO PARKINSON'S DISEASE WITH BEHAVIORAL DISTURBANCE (HCC): Primary | ICD-10-CM

## 2025-07-25 DIAGNOSIS — F02.818 DEMENTIA DUE TO PARKINSON'S DISEASE WITH BEHAVIORAL DISTURBANCE (HCC): Primary | ICD-10-CM

## 2025-07-25 DIAGNOSIS — I10 BENIGN ESSENTIAL HYPERTENSION: ICD-10-CM

## 2025-07-25 DIAGNOSIS — G20.A1 PARKINSON'S DISEASE, UNSPECIFIED WHETHER DYSKINESIA PRESENT, UNSPECIFIED WHETHER MANIFESTATIONS FLUCTUATE (HCC): ICD-10-CM

## 2025-07-25 DIAGNOSIS — F41.9 ANXIETY: ICD-10-CM

## 2025-07-25 PROCEDURE — 99349 HOME/RES VST EST MOD MDM 40: CPT | Performed by: FAMILY MEDICINE

## 2025-07-31 ENCOUNTER — VBI (OUTPATIENT)
Dept: ADMINISTRATIVE | Facility: OTHER | Age: 89
End: 2025-07-31

## (undated) DEVICE — GLOVE SRG BIOGEL ECLIPSE 7

## (undated) DEVICE — BETHLEHEM UNIVERSAL MINOR GEN: Brand: CARDINAL HEALTH

## (undated) DEVICE — ADHESIVE SKIN HIGH VISCOSITY EXOFIN 1ML

## (undated) DEVICE — SUT VICRYL 2-0 SH 27 IN UNDYED J417H

## (undated) DEVICE — SUT VICRYL 3-0 SH 27 IN J416H

## (undated) DEVICE — DRAPE EQUIPMENT RF WAND

## (undated) DEVICE — INTENDED FOR TISSUE SEPARATION, AND OTHER PROCEDURES THAT REQUIRE A SHARP SURGICAL BLADE TO PUNCTURE OR CUT.: Brand: BARD-PARKER SAFETY BLADES SIZE 15, STERILE

## (undated) DEVICE — PENCIL ELECTROSURG E-Z CLEAN -0035H

## (undated) DEVICE — UNDYED BRAIDED (POLYGLACTIN 910), SYNTHETIC ABSORBABLE SUTURE: Brand: COATED VICRYL

## (undated) DEVICE — DECANTER: Brand: UNBRANDED

## (undated) DEVICE — SPONGE STICK WITH PVP-I: Brand: KENDALL

## (undated) DEVICE — TOWEL SURG XR DETECT GREEN STRL RFD

## (undated) DEVICE — SUT VICRYL 2-0 REEL 54 IN J286G

## (undated) DEVICE — NEEDLE 22 G X 1 1/2 SAFETY

## (undated) DEVICE — SUT VICRYL 1 CT-1 27 IN J261H

## (undated) DEVICE — PAD GROUNDING ADULT

## (undated) DEVICE — SUT PDS II 3-0 SH 27 IN Z316H

## (undated) DEVICE — SUT MONOCRYL 4-0 PS-2 18 IN Y496G